# Patient Record
Sex: FEMALE | Race: WHITE | NOT HISPANIC OR LATINO | Employment: OTHER | URBAN - METROPOLITAN AREA
[De-identification: names, ages, dates, MRNs, and addresses within clinical notes are randomized per-mention and may not be internally consistent; named-entity substitution may affect disease eponyms.]

---

## 2018-05-21 ENCOUNTER — TRANSFERRED RECORDS (OUTPATIENT)
Dept: HEALTH INFORMATION MANAGEMENT | Facility: CLINIC | Age: 83
End: 2018-05-21

## 2018-05-23 ENCOUNTER — TRANSFERRED RECORDS (OUTPATIENT)
Dept: HEALTH INFORMATION MANAGEMENT | Facility: CLINIC | Age: 83
End: 2018-05-23

## 2018-05-23 LAB — EJECTION FRACTION: 84 %

## 2018-06-27 ENCOUNTER — FOLLOW UP ESTABLISHED (OUTPATIENT)
Dept: URBAN - METROPOLITAN AREA CLINIC 44 | Facility: CLINIC | Age: 83
End: 2018-06-27
Payer: MEDICARE

## 2018-06-27 PROCEDURE — 92004 COMPRE OPH EXAM NEW PT 1/>: CPT | Performed by: OPTOMETRIST

## 2018-06-27 PROCEDURE — 92250 FUNDUS PHOTOGRAPHY W/I&R: CPT | Performed by: OPTOMETRIST

## 2018-06-27 ASSESSMENT — VISUAL ACUITY
OD: 20/25
OS: 20/25

## 2018-06-27 ASSESSMENT — KERATOMETRY
OD: 42.88
OS: 41.88

## 2018-06-27 ASSESSMENT — INTRAOCULAR PRESSURE
OD: 11
OS: 10
OD: 12
OS: 12

## 2018-08-30 ENCOUNTER — FOLLOW UP ESTABLISHED (OUTPATIENT)
Dept: URBAN - METROPOLITAN AREA CLINIC 44 | Facility: CLINIC | Age: 83
End: 2018-08-30
Payer: MEDICARE

## 2018-08-30 PROCEDURE — 92012 INTRM OPH EXAM EST PATIENT: CPT | Performed by: OPTOMETRIST

## 2018-08-30 PROCEDURE — 92133 CPTRZD OPH DX IMG PST SGM ON: CPT | Performed by: OPTOMETRIST

## 2018-08-30 PROCEDURE — 92083 EXTENDED VISUAL FIELD XM: CPT | Performed by: OPTOMETRIST

## 2018-08-30 ASSESSMENT — INTRAOCULAR PRESSURE
OS: 19
OD: 14
OD: 10
OS: 10

## 2018-08-31 ENCOUNTER — TRANSFERRED RECORDS (OUTPATIENT)
Dept: HEALTH INFORMATION MANAGEMENT | Facility: CLINIC | Age: 83
End: 2018-08-31

## 2018-09-10 ENCOUNTER — FOLLOW UP ESTABLISHED (OUTPATIENT)
Dept: URBAN - METROPOLITAN AREA CLINIC 44 | Facility: CLINIC | Age: 83
End: 2018-09-10
Payer: MEDICARE

## 2018-09-10 DIAGNOSIS — H40.1133 PRIMARY OPEN-ANGLE GLAUCOMA, SEVERE STAGE, BILATERAL: Primary | ICD-10-CM

## 2018-09-10 PROCEDURE — 92014 COMPRE OPH EXAM EST PT 1/>: CPT | Performed by: OPHTHALMOLOGY

## 2018-09-10 ASSESSMENT — INTRAOCULAR PRESSURE
OS: 20
OD: 21

## 2018-10-01 ENCOUNTER — Encounter (OUTPATIENT)
Dept: URBAN - METROPOLITAN AREA CLINIC 44 | Facility: CLINIC | Age: 83
End: 2018-10-01
Payer: MEDICARE

## 2018-10-01 PROCEDURE — 65855 TRABECULOPLASTY LASER SURG: CPT | Performed by: OPHTHALMOLOGY

## 2018-10-15 ENCOUNTER — Encounter (OUTPATIENT)
Dept: URBAN - METROPOLITAN AREA CLINIC 44 | Facility: CLINIC | Age: 83
End: 2018-10-15
Payer: MEDICARE

## 2018-10-15 PROCEDURE — 65855 TRABECULOPLASTY LASER SURG: CPT | Performed by: OPHTHALMOLOGY

## 2018-11-23 ENCOUNTER — TRANSFERRED RECORDS (OUTPATIENT)
Dept: HEALTH INFORMATION MANAGEMENT | Facility: CLINIC | Age: 83
End: 2018-11-23

## 2018-11-23 LAB — EJECTION FRACTION: 69 %

## 2018-12-17 ENCOUNTER — FOLLOW UP ESTABLISHED (OUTPATIENT)
Dept: URBAN - METROPOLITAN AREA CLINIC 44 | Facility: CLINIC | Age: 83
End: 2018-12-17
Payer: MEDICARE

## 2018-12-17 PROCEDURE — 92012 INTRM OPH EXAM EST PATIENT: CPT | Performed by: OPHTHALMOLOGY

## 2018-12-17 ASSESSMENT — INTRAOCULAR PRESSURE
OD: 12
OS: 12

## 2019-01-03 ENCOUNTER — TRANSFERRED RECORDS (OUTPATIENT)
Dept: HEALTH INFORMATION MANAGEMENT | Facility: CLINIC | Age: 84
End: 2019-01-03

## 2019-01-22 ENCOUNTER — FOLLOW UP ESTABLISHED (OUTPATIENT)
Dept: URBAN - METROPOLITAN AREA CLINIC 44 | Facility: CLINIC | Age: 84
End: 2019-01-22
Payer: MEDICARE

## 2019-01-22 DIAGNOSIS — Z96.1 PRESENCE OF INTRAOCULAR LENS: ICD-10-CM

## 2019-01-22 DIAGNOSIS — H04.123 DRY EYE SYNDROME OF BILATERAL LACRIMAL GLANDS: ICD-10-CM

## 2019-01-22 DIAGNOSIS — H43.813 VITREOUS DEGENERATION, BILATERAL: ICD-10-CM

## 2019-01-22 PROCEDURE — 92134 CPTRZ OPH DX IMG PST SGM RTA: CPT | Performed by: OPTOMETRIST

## 2019-01-22 PROCEDURE — 92014 COMPRE OPH EXAM EST PT 1/>: CPT | Performed by: OPTOMETRIST

## 2019-01-22 RX ORDER — ENALAPRIL MALEATE 20 MG/1
20 MG TABLET ORAL
Qty: 0 | Refills: 0 | Status: INACTIVE
Start: 2019-01-22 | End: 2019-01-22

## 2019-01-22 RX ORDER — BRIMONIDINE TARTRATE 1 MG/ML
0.1 % SOLUTION/ DROPS OPHTHALMIC
Qty: 0 | Refills: 0 | Status: INACTIVE
Start: 2019-01-22 | End: 2019-02-07

## 2019-01-22 RX ORDER — DORZOLAMIDE HCL 20 MG/ML
2 % SOLUTION/ DROPS OPHTHALMIC
Qty: 3 | Refills: 3 | Status: INACTIVE
Start: 2019-01-22 | End: 2019-03-18

## 2019-01-22 ASSESSMENT — INTRAOCULAR PRESSURE
OD: 12
OS: 12

## 2019-01-22 ASSESSMENT — KERATOMETRY
OD: 43.38
OS: 42.25

## 2019-01-22 ASSESSMENT — VISUAL ACUITY
OS: 20/30
OD: 20/30

## 2019-04-22 ENCOUNTER — TRANSFERRED RECORDS (OUTPATIENT)
Dept: HEALTH INFORMATION MANAGEMENT | Facility: CLINIC | Age: 84
End: 2019-04-22

## 2019-04-26 ENCOUNTER — TRANSFERRED RECORDS (OUTPATIENT)
Dept: HEALTH INFORMATION MANAGEMENT | Facility: CLINIC | Age: 84
End: 2019-04-26

## 2019-05-06 ENCOUNTER — TRANSFERRED RECORDS (OUTPATIENT)
Dept: HEALTH INFORMATION MANAGEMENT | Facility: CLINIC | Age: 84
End: 2019-05-06

## 2019-06-03 ENCOUNTER — TRANSFERRED RECORDS (OUTPATIENT)
Dept: HEALTH INFORMATION MANAGEMENT | Facility: CLINIC | Age: 84
End: 2019-06-03

## 2019-06-03 ENCOUNTER — FOLLOW UP ESTABLISHED (OUTPATIENT)
Dept: URBAN - METROPOLITAN AREA CLINIC 44 | Facility: CLINIC | Age: 84
End: 2019-06-03
Payer: MEDICARE

## 2019-06-03 PROCEDURE — 92012 INTRM OPH EXAM EST PATIENT: CPT | Performed by: OPHTHALMOLOGY

## 2019-06-03 RX ORDER — DORZOLAMIDE HCL 20 MG/ML
2 % SOLUTION/ DROPS OPHTHALMIC
Qty: 3 | Refills: 3 | Status: INACTIVE
Start: 2019-06-03 | End: 2020-06-15

## 2019-06-03 RX ORDER — LATANOPROST 50 UG/ML
0.005 % SOLUTION OPHTHALMIC
Qty: 3 | Refills: 3 | Status: INACTIVE
Start: 2019-06-03 | End: 2019-06-05

## 2019-06-03 RX ORDER — BRIMONIDINE TARTRATE 1 MG/ML
0.1 % SOLUTION/ DROPS OPHTHALMIC
Qty: 3 | Refills: 3 | Status: INACTIVE
Start: 2019-06-03 | End: 2020-06-10

## 2019-06-03 ASSESSMENT — INTRAOCULAR PRESSURE
OD: 18
OS: 17

## 2019-06-13 ENCOUNTER — MEDICAL CORRESPONDENCE (OUTPATIENT)
Dept: HEALTH INFORMATION MANAGEMENT | Facility: CLINIC | Age: 84
End: 2019-06-13

## 2019-08-14 ENCOUNTER — OFFICE VISIT (OUTPATIENT)
Dept: FAMILY MEDICINE | Facility: CLINIC | Age: 84
End: 2019-08-14
Payer: COMMERCIAL

## 2019-08-14 VITALS
DIASTOLIC BLOOD PRESSURE: 60 MMHG | RESPIRATION RATE: 16 BRPM | SYSTOLIC BLOOD PRESSURE: 132 MMHG | HEART RATE: 59 BPM | BODY MASS INDEX: 26.06 KG/M2 | HEIGHT: 61 IN | WEIGHT: 138 LBS | TEMPERATURE: 97.8 F

## 2019-08-14 DIAGNOSIS — Z12.31 ENCOUNTER FOR SCREENING MAMMOGRAM FOR BREAST CANCER: ICD-10-CM

## 2019-08-14 DIAGNOSIS — I10 ESSENTIAL HYPERTENSION: Primary | ICD-10-CM

## 2019-08-14 DIAGNOSIS — F42.9 OBSESSIVE-COMPULSIVE DISORDER, UNSPECIFIED TYPE: ICD-10-CM

## 2019-08-14 DIAGNOSIS — H40.003 GLAUCOMA SUSPECT, BILATERAL: ICD-10-CM

## 2019-08-14 DIAGNOSIS — F41.9 ANXIETY: ICD-10-CM

## 2019-08-14 DIAGNOSIS — Z86.718 PERSONAL HISTORY OF DVT (DEEP VEIN THROMBOSIS): ICD-10-CM

## 2019-08-14 PROCEDURE — 99203 OFFICE O/P NEW LOW 30 MIN: CPT | Performed by: FAMILY MEDICINE

## 2019-08-14 RX ORDER — ENALAPRIL MALEATE 20 MG/1
TABLET ORAL 2 TIMES DAILY
COMMUNITY
Start: 2019-05-26 | End: 2019-09-04

## 2019-08-14 RX ORDER — HYDRALAZINE HYDROCHLORIDE 25 MG/1
TABLET, FILM COATED ORAL 3 TIMES DAILY
COMMUNITY
Start: 2019-06-04 | End: 2019-09-04

## 2019-08-14 RX ORDER — LATANOPROST 50 UG/ML
1 SOLUTION/ DROPS OPHTHALMIC AT BEDTIME
COMMUNITY
Start: 2019-08-06 | End: 2021-05-06

## 2019-08-14 RX ORDER — ALPRAZOLAM 0.5 MG
0.5 TABLET ORAL DAILY
Status: ON HOLD | COMMUNITY
Start: 2019-08-06 | End: 2021-03-09

## 2019-08-14 RX ORDER — CLOPIDOGREL BISULFATE 75 MG/1
75 TABLET ORAL DAILY
Qty: 90 TABLET | Refills: 1 | Status: SHIPPED | OUTPATIENT
Start: 2019-08-14 | End: 2019-12-05

## 2019-08-14 RX ORDER — SIMVASTATIN 10 MG
TABLET ORAL AT BEDTIME
COMMUNITY
Start: 2019-06-03 | End: 2019-12-05

## 2019-08-14 RX ORDER — OLANZAPINE 10 MG/1
10 TABLET ORAL AT BEDTIME
Status: ON HOLD | COMMUNITY
Start: 2019-06-08 | End: 2021-03-09

## 2019-08-14 RX ORDER — PAROXETINE 20 MG/1
20 TABLET, FILM COATED ORAL DAILY
Status: ON HOLD | COMMUNITY
Start: 2019-06-07 | End: 2021-03-09

## 2019-08-14 RX ORDER — CLOPIDOGREL BISULFATE 75 MG/1
TABLET ORAL DAILY
COMMUNITY
Start: 2019-04-16 | End: 2019-08-14

## 2019-08-14 ASSESSMENT — MIFFLIN-ST. JEOR: SCORE: 1028.34

## 2019-08-14 NOTE — PROGRESS NOTES
Subjective     Swathi Dukes is a 83 year old female who presents to clinic today for the following health issues:    HPI   Establish care    Swathi is a bit of a challenging historian.    She reports history of a left lower extremity DVT.  She is currently taking Plavix.  She was told she would need to be on Plavix the rest of her life.  When asked if she has a clotting disorder, she denies this.    She reports she has a history of depression, OCD, and panic attacks.  She is on Paxil, Zyprexa, and Xanax.  She is taking a half tab of Xanax every morning and occasionally the other half during the day for high panic times.    She reports a history of hypertension.  She reports she is taking enalapril and hydralazine.  I also see Norvasc on her list, although she is unsure if she is taking this currently.    Her son reports she has trouble with irritable bowel syndrome, diarrhea predominant, and some rectal muscle insufficiency.  For this reason, she has occasional stool leakage.  She can identify foods that tend to make this worse.  She does not plan to eliminate these foods.    She would like to continue breast cancer screening at this point.  She notes she would pursue treatment of breast cancer were to be identified.    She is unclear when her last bone density scan was.    She has previously declined the pneumonia vaccine as she feels she is at low risk for this.  She is unsure if she has had the previous shingles vaccine.      Patient Active Problem List   Diagnosis     Essential hypertension     Anxiety     Glaucoma suspect, bilateral     Personal history of DVT (deep vein thrombosis)     History reviewed. No pertinent surgical history.    Social History     Tobacco Use     Smoking status: Never Smoker     Smokeless tobacco: Never Used   Substance Use Topics     Alcohol use: Not Currently     History reviewed. No pertinent family history.      Reviewed and updated as needed this visit by Provider  Rodolfo   "Allergies  Meds  Problems  Med Hx  Surg Hx  Fam Hx         Review of Systems   ROS COMP: Constitutional, HEENT, cardiovascular, pulmonary, gi and gu systems are negative, except as otherwise noted.      Objective    /60   Pulse 59   Temp 97.8  F (36.6  C) (Oral)   Resp 16   Ht 1.549 m (5' 1\")   Wt 62.6 kg (138 lb)   Breastfeeding? No   BMI 26.07 kg/m    Body mass index is 26.07 kg/m .  Physical Exam   GENERAL: healthy, alert and no distress  EYES: Eyes grossly normal to inspection, PERRL and conjunctivae and sclerae normal  NECK: no adenopathy, no asymmetry, masses, or scars and thyroid normal to palpation  RESP: lungs clear to auscultation - no rales, rhonchi or wheezes  CV: regular rate and rhythm, normal S1 S2, no S3 or S4, no murmur, click or rub, no peripheral edema and peripheral pulses strong  SKIN: no suspicious lesions or rashes  NEURO: Normal strength and tone, mentation intact and speech normal  PSYCH: mentation appears normal, affect normal/bright    Diagnostic Test Results:  Labs reviewed in Epic        Assessment & Plan     1. Essential hypertension - in range on recheck, advised to continue current, although there is some question regarding which medications she is currently taking.  Will obtain her records from previous clinic to confirm medications.    2. Obsessive-compulsive disorder, unspecified type -she reports she takes Paxil and Zyprexa to help her manage both depression and OCD symptoms.  She feels these are well controlled and would like to continue these medications.    3. Anxiety -discussed concern regarding daily Xanax use.  Reviewed the potential dangers of using an addictive substance such as Xanax.  It does not seem like she is willing to eliminate Xanax from her regimen.  Advised that I do not prescribe Xanax and she should pursue an alternative avenue if she would like to continue this medication.    4. Glaucoma suspect, bilateral -gave referrals for a new " ophthalmologist in the area.    5. Personal history of DVT (deep vein thrombosis) -per her report, taking long-term Plavix for history of DVT.  Will need to obtain records to better evaluate of the need for persistent anticoagulation.  - clopidogrel (PLAVIX) 75 MG tablet; Take 1 tablet (75 mg) by mouth daily  Dispense: 90 tablet; Refill: 1    6. Encounter for screening mammogram for breast cancer  - MA Screening Digital Bilateral; Future     Return in about 3 months (around 11/14/2019) for Yearly Physical Exam.    Cortney Vanessa MD  [facility]

## 2019-08-14 NOTE — PATIENT INSTRUCTIONS
[facility]   [facility]    Check with insurance about Shingrix vaccine series    Low FODMAP diet

## 2019-08-27 ENCOUNTER — APPOINTMENT (OUTPATIENT)
Dept: CT IMAGING | Facility: CLINIC | Age: 84
End: 2019-08-27
Attending: EMERGENCY MEDICINE
Payer: COMMERCIAL

## 2019-08-27 ENCOUNTER — APPOINTMENT (OUTPATIENT)
Dept: GENERAL RADIOLOGY | Facility: CLINIC | Age: 84
End: 2019-08-27
Attending: EMERGENCY MEDICINE
Payer: COMMERCIAL

## 2019-08-27 ENCOUNTER — HOSPITAL ENCOUNTER (EMERGENCY)
Facility: CLINIC | Age: 84
Discharge: HOME | End: 2019-08-27
Attending: EMERGENCY MEDICINE | Admitting: EMERGENCY MEDICINE
Payer: COMMERCIAL

## 2019-08-27 VITALS
RESPIRATION RATE: 16 BRPM | HEART RATE: 62 BPM | SYSTOLIC BLOOD PRESSURE: 158 MMHG | WEIGHT: 138 LBS | BODY MASS INDEX: 26.07 KG/M2 | OXYGEN SATURATION: 96 % | TEMPERATURE: 97.8 F | DIASTOLIC BLOOD PRESSURE: 65 MMHG

## 2019-08-27 DIAGNOSIS — R07.89 ATYPICAL CHEST PAIN: ICD-10-CM

## 2019-08-27 DIAGNOSIS — R51.9 ACUTE NONINTRACTABLE HEADACHE, UNSPECIFIED HEADACHE TYPE: ICD-10-CM

## 2019-08-27 DIAGNOSIS — I10 ESSENTIAL HYPERTENSION: ICD-10-CM

## 2019-08-27 DIAGNOSIS — H53.8 BLURRED VISION: ICD-10-CM

## 2019-08-27 LAB
ANION GAP SERPL CALCULATED.3IONS-SCNC: 3 MMOL/L (ref 3–14)
BASOPHILS # BLD AUTO: 0.1 10E9/L (ref 0–0.2)
BASOPHILS NFR BLD AUTO: 1 %
BUN SERPL-MCNC: 19 MG/DL (ref 7–30)
CALCIUM SERPL-MCNC: 8.8 MG/DL (ref 8.5–10.1)
CHLORIDE SERPL-SCNC: 109 MMOL/L (ref 94–109)
CO2 SERPL-SCNC: 28 MMOL/L (ref 20–32)
CREAT SERPL-MCNC: 0.65 MG/DL (ref 0.52–1.04)
DIFFERENTIAL METHOD BLD: ABNORMAL
EOSINOPHIL # BLD AUTO: 0.2 10E9/L (ref 0–0.7)
EOSINOPHIL NFR BLD AUTO: 4.2 %
ERYTHROCYTE [DISTWIDTH] IN BLOOD BY AUTOMATED COUNT: 14.2 % (ref 10–15)
GFR SERPL CREATININE-BSD FRML MDRD: 83 ML/MIN/{1.73_M2}
GLUCOSE SERPL-MCNC: 129 MG/DL (ref 70–99)
HCT VFR BLD AUTO: 32.1 % (ref 35–47)
HGB BLD-MCNC: 10.4 G/DL (ref 11.7–15.7)
IMM GRANULOCYTES # BLD: 0 10E9/L (ref 0–0.4)
IMM GRANULOCYTES NFR BLD: 0.2 %
LYMPHOCYTES # BLD AUTO: 1.8 10E9/L (ref 0.8–5.3)
LYMPHOCYTES NFR BLD AUTO: 32.2 %
MCH RBC QN AUTO: 29.7 PG (ref 26.5–33)
MCHC RBC AUTO-ENTMCNC: 32.4 G/DL (ref 31.5–36.5)
MCV RBC AUTO: 92 FL (ref 78–100)
MONOCYTES # BLD AUTO: 0.7 10E9/L (ref 0–1.3)
MONOCYTES NFR BLD AUTO: 12.6 %
NEUTROPHILS # BLD AUTO: 2.9 10E9/L (ref 1.6–8.3)
NEUTROPHILS NFR BLD AUTO: 49.8 %
NRBC # BLD AUTO: 0 10*3/UL
NRBC BLD AUTO-RTO: 0 /100
PLATELET # BLD AUTO: 262 10E9/L (ref 150–450)
POTASSIUM SERPL-SCNC: 3.8 MMOL/L (ref 3.4–5.3)
RBC # BLD AUTO: 3.5 10E12/L (ref 3.8–5.2)
SODIUM SERPL-SCNC: 140 MMOL/L (ref 133–144)
TROPONIN I BLD-MCNC: 0 UG/L (ref 0–0.08)
TROPONIN I BLD-MCNC: 0.01 UG/L (ref 0–0.08)
TROPONIN I SERPL-MCNC: <0.015 UG/L (ref 0–0.04)
WBC # BLD AUTO: 5.7 10E9/L (ref 4–11)

## 2019-08-27 PROCEDURE — 80048 BASIC METABOLIC PNL TOTAL CA: CPT | Performed by: EMERGENCY MEDICINE

## 2019-08-27 PROCEDURE — 84484 ASSAY OF TROPONIN QUANT: CPT | Performed by: EMERGENCY MEDICINE

## 2019-08-27 PROCEDURE — 70450 CT HEAD/BRAIN W/O DYE: CPT

## 2019-08-27 PROCEDURE — 93005 ELECTROCARDIOGRAM TRACING: CPT

## 2019-08-27 PROCEDURE — 99285 EMERGENCY DEPT VISIT HI MDM: CPT | Mod: 25

## 2019-08-27 PROCEDURE — 71046 X-RAY EXAM CHEST 2 VIEWS: CPT

## 2019-08-27 PROCEDURE — 84484 ASSAY OF TROPONIN QUANT: CPT | Mod: 91

## 2019-08-27 PROCEDURE — 85025 COMPLETE CBC W/AUTO DIFF WBC: CPT | Performed by: EMERGENCY MEDICINE

## 2019-08-27 ASSESSMENT — ENCOUNTER SYMPTOMS
HEADACHES: 1
LIGHT-HEADEDNESS: 1
SHORTNESS OF BREATH: 0
WEAKNESS: 0

## 2019-08-27 NOTE — ED AVS SNAPSHOT
[facility] Emergency Department  [address]  [facility] [address]  Phone:  [phone]  Fax:  [phone]                                    Swathi Dukes   MRN: [id number]    Department:  [facility] Emergency Department   Date of Visit:  8/27/2019           After Visit Summary Signature Page    I have received my discharge instructions, and my questions have been answered. I have discussed any challenges I see with this plan with the nurse or doctor.    ..........................................................................................................................................  Patient/Patient Representative Signature      ..........................................................................................................................................  Patient Representative Print Name and Relationship to Patient    ..................................................               ................................................  Date                                   Time    ..........................................................................................................................................  Reviewed by Signature/Title    ...................................................              ..............................................  Date                                               Time          22EPIC Rev 08/18

## 2019-08-28 ENCOUNTER — NURSE TRIAGE (OUTPATIENT)
Dept: FAMILY MEDICINE | Facility: CLINIC | Age: 84
End: 2019-08-28

## 2019-08-28 LAB — INTERPRETATION ECG - MUSE: NORMAL

## 2019-08-28 NOTE — ED PROVIDER NOTES
History   Chief Complaint:  Hypertension & Headache    HPI   Swathi Dukes is an anticoagulated 83 year old female with a history of hypertension and DVT who presents with hypertension and a headache. The patient reports that yesterday she developed intermittent blurry vision bilaterally. At 1100 this morning she developed an intermittent headache and has continued to have blurry vision. She took a Tylenol at that time and the headache improved. The patient states that she took her hydralazine at 1800 this evening and took her blood pressure about one hour later at which point it was 209/80. It was checked again by staff at the patient's [facility] facility at 1900 and was 223/78, prompting her to come to the ED. In the ED, she also endorses lightheadedness and 3-4 episodes of sharp chest pain lasting about one second since 1700 this evening. The patient's son reports that the patient is supposed to take three hydralazine per day but has only been taking it one time per day. She did have a hypertensive crisis four months ago for which she was admitted in [place]. She was started on the hydralazine at that time. The son also adds the patient had several possible near-syncopal episodes last week. The patient does not typically get headaches. She denies shortness of breath, one-sided weakness, vision loss, diplopia, and syncope.    Allergies:  Aspirin  Iodine contrast     Medications:    Xanax  Plavix  Enalapril  Hydralazine  Zyprexa  Paxil   Zocor    Past Medical History:    Essential hypertension  Anxiety  Glaucoma suspect, bilateral  DVT    Past Surgical History:    History reviewed. No pertinent surgical history.    Family History:    History reviewed. No pertinent family history.     Social History:  Smoking status: Never smoker  Alcohol use: Not currently  Marital Status:   [5]     Review of Systems   Eyes: Positive for visual disturbance (positive for intermittent blurry vision bilaterally, negative  for vision loss and diplopia).   Respiratory: Negative for shortness of breath.    Cardiovascular: Positive for chest pain (intermittent).   Neurological: Positive for light-headedness and headaches (intermittent). Negative for syncope and weakness (one-sided).   All other systems reviewed and are negative.        Physical Exam     Patient Vitals for the past 24 hrs:   BP Temp Temp src Pulse Heart Rate Resp SpO2   08/27/19 2100 (!) 165/66 -- -- -- -- 16 96 %   08/27/19 2045 (!) 190/77 -- -- -- -- -- 97 %   08/27/19 2030 (!) 196/100 97.8  F (36.6  C) Oral 84 84 18 98 %     Physical Exam  General: Elderly female sitting upright  Eyes: PERRL, Conjunctive within normal limits. EOMI.  ENT: Moist mucous membranes, oropharynx clear.   CV: Normal S1S2, no murmur, rub or gallop. Regular rate and rhythm  Resp: Clear to auscultation bilaterally, no wheezes, rales or rhonchi. Normal respiratory effort.  GI: Abdomen is soft, nontender and nondistended. No palpable masses. No rebound or guarding.  MSK: No edema. Nontender. Normal active range of motion.   Skin: Warm and dry. No rashes or lesions or ecchymoses on visible skin.  Neuro: Alert and oriented. Responds appropriately to all questions and commands. No facial asymmetry. Speech is normal. Sensation and strength grossly intact. No focal findings appreciated. Normal muscle tone.  Psych: Normal mood and affect. Pleasant.    Emergency Department Course   ECG (20:54:17):  Rate 64 bpm. WV interval 216. QRS duration 82. QT/QTc 430/443. P-R-T axes 47 -25 77. Sinus rhythm with 1st degree AV block. Voltage criteria for left ventricular hypertrophy/ Interpreted at 2055 by Keyona Cabrera MD.    Imaging:  Radiographic findings were communicated with the patient who voiced understanding of the findings.    Head CT w/o Contrast:  1.  No CT finding of mass, infarct or hemorrhage.  2.  Age-related changes.  As read by Radiology.    Chest XR, PA & LAT:  The cardiac silhouette is  enlarged. Small focus of scarring in the right lateral midlung. The right lung is otherwise clear. There is retrocardiac airspace opacification which could reflect atelectasis or infiltrate. There is also focal   opacification overlying the lower thoracic spine on the lateral image, which may reflect left atrial dilatation, aortic ectasia or hiatal hernia. No visible pneumothorax or pleural effusion. Degenerative disc change throughout the thoracic and visualized   upper lumbar spine. Lower cervical facet arthropathy.  As read by Radiology.     Laboratory:  CBC: HGB 10.4 (L) o/w WNL (WBC 5.7, )  BMP: Glucose 129 (H) o/w WNL (Creatinine 0.65)     2107: Troponin: <0.015  2110: Troponin: 0.00  2310: Troponin: 0.01    Emergency Department Course:  Past medical records, nursing notes, and vitals reviewed.   2039: I performed an exam of the patient and obtained history, as documented above.    IV inserted and blood drawn. The patient was sent for a head CT and chest x-ray while in the emergency department, findings above. EKG obtained, results above.    2200: I rechecked the patient. Explained findings to the patient. Patient states that she is currently not having any symptoms. She has not had recurrence of the chest pain.     I rechecked the patient. Plan explained to the patient. Patient discharged home with instructions regarding supportive care, medications, and reasons to return. The importance of close follow-up was reviewed.     Impression & Plan    Medical Decision Making:  Swathi Dukes is a 83 year old female with a history of hypertension who, of her own accord, has been not taking her hydralazine as-prescribed who presents to the emergency department with elevated blood pressure as well as intermittent mild headaches over the day and occasional seconds of sharp, left-sided chest pain since 1700 this evening. Here in the emergency department, she was noting mild blurry vision bilaterally but no other  symptoms. She did not have recurrence of her chest pain here. This would be atypical for ACS by those symptoms. She had no signs of acute ischemia on EKG. Laboratory evaluation was remarkable for mild anemia but no other worrisome findings. She had a CT head given these new headaches for her although they were not severe, which did not show signs of intracranial hemorrhage, mass, or infarct. She also had a chest x-ray that did not show any acute pathology. Suspicion for pulmonary embolism is extremely low based on this evaluation and no ongoing chest pain with the description of pain earlier, therefore d-dimer was not checked and CT scan seems unlikely to be helpful. Her blood pressure improved over time here in the emergency department. She had taken a dose of hydralazine prior to evaluation here. She denied any symptoms on reassessment including her vision. Serial troponin will be checked and is pending on this dictation. If normal, she will be discharged home with recommendation to follow up with her PCP within three days. She was recommended to continue with her normal medications including taking the hydralazine as prescribed by an earlier provider. Recommended to return immediately for any worsening symptoms. All questions answered prior to discharge.    Diagnosis:    ICD-10-CM   1. Essential hypertension I10   2. Blurred vision H53.8   3. Acute nonintractable headache, unspecified headache type R51   4. Atypical chest pain R07.89       Disposition:  Discharged to home with her son.        Sha Bridges  8/27/2019   [facility] EMERGENCY DEPARTMENT  I, Sha Bridges, am serving as a scribe at 8:39 PM on 8/27/2019 to document services personally performed by Keyona Cabrera MD based on my observations and the provider's statements to me.        Keyona Cabrera MD  08/28/19 7250

## 2019-08-28 NOTE — ED TRIAGE NOTES
Patient had her BP checked at 7pm and it was 223/78. Intermittent headache today. Taking tylenol which did help with headache, last dose at 3pm. Patient stated that she suppse to take hydralazine 3 times a day, but only takes it once a day.ABCs intact.

## 2019-08-28 NOTE — TELEPHONE ENCOUNTER
Spoke with the pt. She reports new onset, in the last 24 hours moderate to severe weakness. She is having a hard time walking and moving around. She has been checking her BP at home, was 141/57 at noon, 152/57 at 2:00pm. Denies any dizziness or lightheadedness. Denies any chest pain.     Advised ER, due to new onset weakness that is moderate/severe.     Per request of Swathi, I called her son Brent and Left detailed message informing him of my recommendations. Advised callback if needed.     Brent is her transportation. 911 if weakness is severe.     Yesika Gil RN -- [place]           Reason for Disposition    [1] MODERATE weakness (i.e., interferes with work, school, normal activities) AND [2] cause unknown  (Exceptions: weakness with acute minor illness, or weakness from poor fluid intake)    Additional Information    Negative: Severe difficulty breathing (e.g., struggling for each breath, speaks in single words)    Negative: Shock suspected (e.g., cold/pale/clammy skin, too weak to stand, low BP, rapid pulse)    Negative: Difficult to awaken or acting confused (e.g., disoriented, slurred speech)    Negative: [1] Fainted > 15 minutes ago AND [2] still feels too weak or dizzy to stand    Negative: [1] SEVERE weakness (i.e., unable to walk or barely able to walk, requires support) AND     [2] new onset or worsening    Negative: Sounds like a life-threatening emergency to the triager    Negative: Weakness of the face, arm or leg on one side of the body    Negative: [1] Has diabetes (diabetes mellitus) AND [2] weakness from low blood sugar (i.e., < 60 mg/dl or 3.5 mmol/l)    Negative: Heat exhaustion suspected (i.e., dehydration from heat exposure)    Negative: Vomiting is main symptom    Negative: Diarrhea is main symptom    Negative: Difficulty breathing    Negative: Heart beating < 50 beats per minute OR > 140 beats per minute    Negative: Extra heart beats OR irregular heart beating   (i.e.,  "\"palpitations\")    Negative: Follows bleeding (e.g., from vomiting, rectum, vagina; EXCEPTION: small brief weakness from sight of a small amount blood)    Negative: Black or tarry bowel movements    Negative: [1] Drinking very little AND [2] dehydration suspected (e.g., no urine > 12 hours, very dry mouth, very lightheaded)    Negative: Patient sounds very sick or weak to the triager    Answer Assessment - Initial Assessment Questions  1. DESCRIPTION: \"Describe how you are feeling.\"      Just feeling very weak all over  2. SEVERITY: \"How bad is it?\"  \"Can you stand and walk?\"    - MILD - Feels weak or tired, but does not interfere with work, school or normal activities    - MODERATE - Able to stand and walk; weakness interferes with work, school, or normal activities    - SEVERE - Unable to stand or walk      Moderate to severe. Having a hard time walking.   3. ONSET:  \"When did the weakness begin?\"      In the last 24 hours, after ER discharge.   4. CAUSE: \"What do you think is causing the weakness?\"      Unknown. Maybe low blood pressure.   5. MEDICINES: \"Have you recently started a new medicine or had a change in the amount of a medicine?\"      Hydralazine was resumed taking TID, was taking only once daily.   6. OTHER SYMPTOMS: \"Do you have any other symptoms?\" (e.g., chest pain, fever, cough, SOB, vomiting, diarrhea, bleeding, other areas of pain)      Just significant weakness.   7. PREGNANCY: \"Is there any chance you are pregnant?\" \"When was your last menstrual period?\"      No    Protocols used: WEAKNESS (GENERALIZED) AND FATIGUE-A-AH    "

## 2019-08-28 NOTE — DISCHARGE INSTRUCTIONS
Discharge Instructions  Hypertension - High Blood Pressure    During you visit to the Emergency Department, your blood pressure was higher than the recommended blood pressure.  This may be related to stress, pain, medication or other temporary conditions. In these cases, your blood pressure may return to normal on its own. If you have a history of high blood pressure, you may need to have your provider adjust your medications. Sometimes, your high measurement here may indicate that you have developed high blood pressure that will stay high unless it is treated. As a general rule, high blood pressure causes problems over years rather than days, weeks, or months. So, while it is important to treat blood pressure, it is rarely important to treat blood pressure immediately. Occasionally we will begin a medication in the Emergency Department; more often we will recommend close follow-up for medications with a primary doctor/clinic.    Generally, every Emergency Department visit should have a follow-up clinic visit with either a primary or a specialty clinic/provider. Please follow-up as instructed by your emergency provider today.    Return to the Emergency Department if you start to have:  A severe headache.  Chest pain.  Shortness of breath.  Weakness or numbness that affects one part of the body.  Confusion.  Vision changes.  Significant swelling of legs and/or eyes.  A reaction to any medication started in the Emergency Department.    What can I do to help myself?  Avoid alcohol.  Take any blood pressure medicine that you are prescribed.  Get a good night s sleep.  Lower your salt intake.  Exercise.  Lose weight.  Manage stress.  See your doctor regularly    If blood pressure medication was started in the Emergency Department:  The medicine may not have an immediate effect. The body and brain determine what blood pressure you have. The medicine s job is to retrain the body s  thermostat  to a lower blood  pressure.  You will need to follow up with your provider to see how this medicine is working for you.  If you were given a prescription for medicine here today, be sure to read all of the information (including the package insert) that comes with your prescription.  This will include important information about the medicine, its side effects, and any warnings that you need to know about.  The pharmacist who fills the prescription can provide more information and answer questions you may have about the medicine.  If you have questions or concerns that the pharmacist cannot address, please call or return to the Emergency Department.   Remember that you can always come back to the Emergency Department if you are not able to see your regular provider in the amount of time listed above, if you get any new symptoms, or if there is anything that worries you.    Discharge Instructions  Headache    You were seen today for a headache. Headaches may be caused by many different things such as muscle tension, sinus inflammation, anxiety and stress, having too little sleep, too much alcohol, some medical conditions or injury. You may have a migraine, which is caused by changes in the blood vessels in your head.  At this time your provider does not find that your headache is a sign of anything dangerous or life-threatening.  However, sometimes the signs of serious illness do not show up right away.      Generally, every Emergency Department visit should have a follow-up clinic visit with either a primary or a specialty clinic/provider. Please follow-up as instructed by your emergency provider today.    Return to the Emergency Department if:  You get a new fever of 100.4 F or higher.  Your headache gets much worse.  You get a stiff neck with your headache.  You get a new headache that is significantly different or worse than headaches you have had before.  You are vomiting (throwing up) and cannot keep food or water down.  You have  blurry or double vision or other problems with your eyes.  You have a new weakness on one side of your body.  You have difficulty with balance which is new.  You or your family thinks you are confused.  You have a seizure.    What can I do to help myself?  Pain medications - You may take a pain medication such as Tylenol  (acetaminophen), Advil , Motrin  (ibuprofen) or Aleve  (naproxen).  Take a pain reliever as soon as you notice symptoms.  Starting medications as soon as you start to have symptoms may lessen the amount of pain you have.  Relaxing in a quiet, dark room may help.  Get enough sleep and eat meals regularly.  You may need to watch for certain foods or other things which may trigger your headaches.  Keeping a journal of your headaches and possible triggers may help you and your primary provider to identify things which you should avoid which may be causing your headaches.  If you were given a prescription for medicine here today, be sure to read all of the information (including the package insert) that comes with your prescription.  This will include important information about the medicine, its side effects, and any warnings that you need to know about.  The pharmacist who fills the prescription can provide more information and answer questions you may have about the medicine.  If you have questions or concerns that the pharmacist cannot address, please call or return to the Emergency Department.   Remember that you can always come back to the Emergency Department if you are not able to see your regular provider in the amount of time listed above, if you get any new symptoms, or if there is anything that worries you.

## 2019-09-04 ENCOUNTER — OFFICE VISIT (OUTPATIENT)
Dept: FAMILY MEDICINE | Facility: CLINIC | Age: 84
End: 2019-09-04
Payer: COMMERCIAL

## 2019-09-04 VITALS
DIASTOLIC BLOOD PRESSURE: 61 MMHG | HEIGHT: 61 IN | BODY MASS INDEX: 26.06 KG/M2 | HEART RATE: 65 BPM | RESPIRATION RATE: 16 BRPM | TEMPERATURE: 97.6 F | SYSTOLIC BLOOD PRESSURE: 147 MMHG | WEIGHT: 138 LBS

## 2019-09-04 DIAGNOSIS — I10 ESSENTIAL HYPERTENSION: Primary | ICD-10-CM

## 2019-09-04 PROCEDURE — 99213 OFFICE O/P EST LOW 20 MIN: CPT | Performed by: FAMILY MEDICINE

## 2019-09-04 RX ORDER — HYDRALAZINE HYDROCHLORIDE 25 MG/1
25 TABLET, FILM COATED ORAL 3 TIMES DAILY
Qty: 270 TABLET | Refills: 3 | Status: SHIPPED | OUTPATIENT
Start: 2019-09-04 | End: 2019-12-05

## 2019-09-04 RX ORDER — ENALAPRIL MALEATE 20 MG/1
20 TABLET ORAL 2 TIMES DAILY
Qty: 180 TABLET | Refills: 3 | Status: SHIPPED | OUTPATIENT
Start: 2019-09-04 | End: 2019-12-05

## 2019-09-04 ASSESSMENT — MIFFLIN-ST. JEOR: SCORE: 1028.34

## 2019-09-04 NOTE — PROGRESS NOTES
Subjective     Swathi Dukes is a 83 year old female who presents to clinic today for the following health issues:    HPI   Hypertension Follow-up      Do you check your blood pressure regularly outside of the clinic? Yes     Are you following a low salt diet? No    Are your blood pressures ever more than 140 on the top number (systolic) OR more   than 90 on the bottom number (diastolic), for example 140/90? Yes      How many servings of fruits and vegetables do you eat daily?  2-3    On average, how many sweetened beverages do you drink each day (soda, juice, sweet tea, etc)?   3    How many days per week do you miss taking your medication? 0      Was in the emergency room last week with hypertensive urgency.  She reports she was taking her enalapril as prescribed, 20 mg twice daily.  She was only taking once daily hydralazine however. Does not report medication side effect. Thought she only needed to take hydralazine once daily.     She is now back to taking twice daily enalapril as prescribed and 3 times daily hydralazine as prescribed.  She reports her home blood pressures are looking better. She is feeling well. No longer having headaches, blurry vision, or lightheadedness.       Patient Active Problem List   Diagnosis     Essential hypertension     Anxiety     Glaucoma suspect, bilateral     Personal history of DVT (deep vein thrombosis)     History reviewed. No pertinent surgical history.    Social History     Tobacco Use     Smoking status: Never Smoker     Smokeless tobacco: Never Used   Substance Use Topics     Alcohol use: Not Currently     History reviewed. No pertinent family history.      Reviewed and updated as needed this visit by Provider  Tobacco  Allergies  Meds  Problems  Med Hx  Surg Hx  Fam Hx         Review of Systems   ROS COMP: Constitutional, HEENT, cardiovascular, pulmonary, gi and gu systems are negative, except as otherwise noted.      Objective    BP (!) 147/61 (BP Location: Left  "arm, Patient Position: Chair, Cuff Size: Adult Small)   Pulse 65   Temp 97.6  F (36.4  C) (Oral)   Resp 16   Ht 1.549 m (5' 1\")   Wt 62.6 kg (138 lb)   Breastfeeding? No   BMI 26.07 kg/m    Body mass index is 26.07 kg/m .  Physical Exam   GENERAL: healthy, alert and no distress  RESP: lungs clear to auscultation - no rales, rhonchi or wheezes  CV: regular rate and rhythm, normal S1 S2, no S3 or S4, no murmur, click or rub, no peripheral edema and peripheral pulses strong  PSYCH: mentation appears normal, affect normal/bright    Diagnostic Test Results:  Labs reviewed in Epic        Assessment & Plan     1. Essential hypertension - in range today, taking meds as prescribed. Reviewed importance of taking medications as prescribed and why this is important.     Return in about 3 months (around 12/4/2019) for Yearly Physical Exam.    Cortney Vanessa MD  [facility]        "

## 2019-10-02 ENCOUNTER — TRANSFERRED RECORDS (OUTPATIENT)
Dept: HEALTH INFORMATION MANAGEMENT | Facility: CLINIC | Age: 84
End: 2019-10-02

## 2019-12-05 ENCOUNTER — OFFICE VISIT (OUTPATIENT)
Dept: FAMILY MEDICINE | Facility: CLINIC | Age: 84
End: 2019-12-05
Payer: COMMERCIAL

## 2019-12-05 ENCOUNTER — NURSE TRIAGE (OUTPATIENT)
Dept: FAMILY MEDICINE | Facility: CLINIC | Age: 84
End: 2019-12-05

## 2019-12-05 VITALS
HEART RATE: 60 BPM | BODY MASS INDEX: 26.45 KG/M2 | DIASTOLIC BLOOD PRESSURE: 58 MMHG | SYSTOLIC BLOOD PRESSURE: 134 MMHG | OXYGEN SATURATION: 95 % | TEMPERATURE: 98.3 F | WEIGHT: 140 LBS

## 2019-12-05 DIAGNOSIS — I35.1 NONRHEUMATIC AORTIC VALVE INSUFFICIENCY: ICD-10-CM

## 2019-12-05 DIAGNOSIS — M16.11 PRIMARY OSTEOARTHRITIS OF RIGHT HIP: ICD-10-CM

## 2019-12-05 DIAGNOSIS — Z23 NEED FOR PROPHYLACTIC VACCINATION AND INOCULATION AGAINST INFLUENZA: ICD-10-CM

## 2019-12-05 DIAGNOSIS — R29.898 PELVIC GIRDLE WEAKNESS: ICD-10-CM

## 2019-12-05 DIAGNOSIS — Z13.1 SCREENING FOR DIABETES MELLITUS: ICD-10-CM

## 2019-12-05 DIAGNOSIS — R73.09 OTHER ABNORMAL GLUCOSE: ICD-10-CM

## 2019-12-05 DIAGNOSIS — Z00.00 ENCOUNTER FOR MEDICARE ANNUAL WELLNESS EXAM: Primary | ICD-10-CM

## 2019-12-05 DIAGNOSIS — L57.0 AK (ACTINIC KERATOSIS): ICD-10-CM

## 2019-12-05 DIAGNOSIS — F42.2 MIXED OBSESSIONAL THOUGHTS AND ACTS: ICD-10-CM

## 2019-12-05 DIAGNOSIS — Z86.718 PERSONAL HISTORY OF DVT (DEEP VEIN THROMBOSIS): ICD-10-CM

## 2019-12-05 DIAGNOSIS — E78.2 MIXED HYPERLIPIDEMIA: ICD-10-CM

## 2019-12-05 DIAGNOSIS — I10 ESSENTIAL HYPERTENSION: ICD-10-CM

## 2019-12-05 DIAGNOSIS — M47.26 OSTEOARTHRITIS OF SPINE WITH RADICULOPATHY, LUMBAR REGION: ICD-10-CM

## 2019-12-05 DIAGNOSIS — M81.0 AGE-RELATED OSTEOPOROSIS WITHOUT CURRENT PATHOLOGICAL FRACTURE: ICD-10-CM

## 2019-12-05 PROCEDURE — 17000 DESTRUCT PREMALG LESION: CPT | Performed by: FAMILY MEDICINE

## 2019-12-05 PROCEDURE — G0008 ADMIN INFLUENZA VIRUS VAC: HCPCS | Performed by: FAMILY MEDICINE

## 2019-12-05 PROCEDURE — 99397 PER PM REEVAL EST PAT 65+ YR: CPT | Mod: 25 | Performed by: FAMILY MEDICINE

## 2019-12-05 PROCEDURE — 99213 OFFICE O/P EST LOW 20 MIN: CPT | Mod: 25 | Performed by: FAMILY MEDICINE

## 2019-12-05 PROCEDURE — 90662 IIV NO PRSV INCREASED AG IM: CPT | Performed by: FAMILY MEDICINE

## 2019-12-05 RX ORDER — HYDRALAZINE HYDROCHLORIDE 25 MG/1
25 TABLET, FILM COATED ORAL 3 TIMES DAILY
Qty: 270 TABLET | Refills: 3 | Status: SHIPPED | OUTPATIENT
Start: 2019-12-05 | End: 2020-12-16

## 2019-12-05 RX ORDER — CLOPIDOGREL BISULFATE 75 MG/1
75 TABLET ORAL DAILY
Qty: 90 TABLET | Refills: 1 | Status: SHIPPED | OUTPATIENT
Start: 2019-12-05 | End: 2019-12-05

## 2019-12-05 RX ORDER — ENALAPRIL MALEATE 20 MG/1
20 TABLET ORAL 2 TIMES DAILY
Qty: 180 TABLET | Refills: 3 | Status: SHIPPED | OUTPATIENT
Start: 2019-12-05 | End: 2020-09-15

## 2019-12-05 RX ORDER — SIMVASTATIN 10 MG
10 TABLET ORAL AT BEDTIME
Qty: 90 TABLET | Refills: 3 | Status: SHIPPED | OUTPATIENT
Start: 2019-12-05 | End: 2020-09-22

## 2019-12-05 RX ORDER — CLOPIDOGREL BISULFATE 75 MG/1
75 TABLET ORAL DAILY
Qty: 90 TABLET | Refills: 3 | Status: SHIPPED | OUTPATIENT
Start: 2019-12-05 | End: 2020-02-27

## 2019-12-05 ASSESSMENT — ENCOUNTER SYMPTOMS
JOINT SWELLING: 1
HEMATURIA: 0
MYALGIAS: 0
CONSTIPATION: 0
ABDOMINAL PAIN: 0
DYSURIA: 0
SORE THROAT: 0
SHORTNESS OF BREATH: 1
HEADACHES: 0
HEARTBURN: 0
FREQUENCY: 1
CHILLS: 0
ARTHRALGIAS: 1
FEVER: 0
HEMATOCHEZIA: 0
NAUSEA: 0
COUGH: 0
NERVOUS/ANXIOUS: 0
PALPITATIONS: 0
DIARRHEA: 1
WEAKNESS: 0
EYE PAIN: 0
BREAST MASS: 0
PARESTHESIAS: 0
DIZZINESS: 1

## 2019-12-05 ASSESSMENT — ACTIVITIES OF DAILY LIVING (ADL): CURRENT_FUNCTION: NO ASSISTANCE NEEDED

## 2019-12-05 ASSESSMENT — PATIENT HEALTH QUESTIONNAIRE - PHQ9
SUM OF ALL RESPONSES TO PHQ QUESTIONS 1-9: 2
SUM OF ALL RESPONSES TO PHQ QUESTIONS 1-9: 2
10. IF YOU CHECKED OFF ANY PROBLEMS, HOW DIFFICULT HAVE THESE PROBLEMS MADE IT FOR YOU TO DO YOUR WORK, TAKE CARE OF THINGS AT HOME, OR GET ALONG WITH OTHER PEOPLE: NOT DIFFICULT AT ALL

## 2019-12-05 NOTE — PATIENT INSTRUCTIONS
1. Actinic keratosis on nose frozen today - will blister, normal self care for a blister    2. Physical therapy for low back and leg pain - call to schedule, number in this paperwork    3. Schedule consultation with Cardiologist - call to schedule, number in this paperwork    4. No changes to BP medications    5. Continue plavix and simvastatin    6. If pain from recent fall on the buttocks persists, return to clinic for imaging.     7. Hand swelling is likely dependent edema, no need for concern     8. Flu shot given today    9. Call insurance about Shingrix vaccine series - see what coverage looks like    10. Consider Tdap vaccine for next visit        Patient Education   Personalized Prevention Plan  You are due for the preventive services outlined below.  Your care team is available to assist you in scheduling these services.  If you have already completed any of these items, please share that information with your care team to update in your medical record.  Health Maintenance Due   Topic Date Due     Discuss Advance Care Planning  1935     Diptheria Tetanus Pertussis (DTAP/TDAP/TD) Vaccine (1 - Tdap) 11/19/1948     Zoster (Shingles) Vaccine (1 of 2) 11/19/1987     Annual Wellness Visit  11/19/2002     FALL RISK ASSESSMENT  11/19/2002     Pneumococcal Vaccine (1 of 2 - PCV13) 11/19/2002     PHQ-2  01/01/2019     Flu Vaccine (1) 09/01/2019        At your visit today, we discussed your risk for falls and preventive options.    Fall Prevention  Falls often occur due to slipping, tripping or losing your balance. Millions of people fall every year and injure themselves. Here are ways to reduce your risk of falling again.    Think about your fall, was there anything that caused your fall that can be fixed, removed, or replaced?    Make your home safe by keeping walkways clear of objects you may trip over, such as electric cords.    Use non-slip pads under rugs. Don't use area rugs or small throw rugs.    Use  non-slip mats in bathtubs and showers.    Install handrails and lights on staircases. The handrails should be on both sides of the stairs.    Don't walk in poorly lit areas.    Don't stand on chairs or wobbly ladders.    Use caution when reaching overhead or looking upward. This position can cause a loss of balance.    Be sure your shoes fit properly, have non-slip bottoms and are in good condition.     Wear shoes both inside and out. Don't go barefoot or wear slippers.    Be cautious when going up and down stairs, curbs, and when walking on uneven sidewalks.    If your balance is poor, consider using a cane or walker.    If your fall was related to alcohol use, stop or limit alcohol intake.     If your fall was related to use of sleeping medicines, talk to your healthcare provider about this. You may need to reduce your dosage at bedtime if you awaken during the night to go to the bathroom.      To reduce the need for nighttime bathroom trips:  ? Don't drink fluids for several hours before going to bed  ? Empty your bladder before going to bed  ? Men can keep a urinal at the bedside    Stay as active as you can. Balance, flexibility, strength, and endurance all come from exercise. They all play a role in preventing falls. Ask your healthcare provider which types of activity are right for you.    Get your vision checked on a regular basis.    If you have pets, know where they are before you stand up or walk so you don't trip over them.    Use night lights.    Go over all your medicines with a pharmacist or other healthcare provider to see if any of them could make you more likely to fall.  Date Last Reviewed: 4/1/2018 2000-2018 [email or web address]. [address] [place] [address], [place] [address]. All rights reserved. This information is not intended as a substitute for professional medical care. Always follow your healthcare professional's instructions.

## 2019-12-05 NOTE — TELEPHONE ENCOUNTER
"    Additional Information    SEVERE dizziness (e.g., unable to stand, requires support to walk, feels like passing out now)    Answer Assessment - Initial Assessment Questions  1. DESCRIPTION: \"Describe your dizziness.\"     Wavy feeling  2. LIGHTHEADED: \"Do you feel lightheaded?\" (e.g., somewhat faint, woozy, weak upon standing)      \"liek a hard headache\" lightheaded  3. VERTIGO: \"Do you feel like either you or the room is spinning or tilting?\" (i.e. vertigo)      no  4. SEVERITY: \"How bad is it?\"  \"Do you feel like you are going to faint?\" \"Can you stand and walk?\"    - MILD - walking normally    - MODERATE - interferes with normal activities (e.g., work, school)     - SEVERE - unable to stand, requires support to walk, feels like passing out now.       Unable to really answer but rates it at a 5/10  5. ONSET:  \"When did the dizziness begin?\"      Started yesterday  6. AGGRAVATING FACTORS: \"Does anything make it worse?\" (e.g., standing, change in head position)      none  7. HEART RATE: \"Can you tell me your heart rate?\" \"How many beats in 15 seconds?\"  (Note: not all patients can do this)       No but no heart racing feeling  8. CAUSE: \"What do you think is causing the dizziness?\"      unsure  9. RECURRENT SYMPTOM: \"Have you had dizziness before?\" If so, ask: \"When was the last time?\" \"What happened that time?\"      Had this about 10 years ago when she had a clot in her leg  10. OTHER SYMPTOMS: \"Do you have any other symptoms?\" (e.g., fever, chest pain, vomiting, diarrhea, bleeding)        no  11. PREGNANCY: \"Is there any chance you are pregnant?\" \"When was your last menstrual period?\"        n/a    Protocols used: DIZZINESS-A-OH      "

## 2019-12-05 NOTE — PROGRESS NOTES
"SUBJECTIVE:   Swathi Dukes is a 84 year old female who presents for Preventive Visit.      Pain in the low back and posterior thighs upon rising from a chair if she's been seated for a while. Most of the time, pain resolves as she gets moving. At times, pain persists. Known history of lumbar OA with radiculopathy, diagnosed through her previous provider. No loss of B/B. No numbness or weakness down the legs.     Spot on the right side of the nose, present for 2 years, scaly patch overlying.     History of aortic insufficiency, was advised to establish care with a cardiologist here in [place].     History of osteopenia based on last DEXA earlier this year. Getting some weight bearing exercise. No calcium or vitamin D supplementation.     Taking hydralazine and enalapril for management of HTN. No side effects, not checking BPs.     Fall onto her buttocks on carpeted stairs a week ago. Immediate pain, but getting better over the past week. Bruising, on blood thinners. No trouble with sleeping. Some trouble with sitting and then rising.       Are you in the first 12 months of your Medicare coverage?  No    Healthy Habits:     In general, how would you rate your overall health?  Good    Frequency of exercise:  6-7 days/week    Duration of exercise:  Less than 15 minutes    Do you usually eat at least 4 servings of fruit and vegetables a day, include whole grains    & fiber and avoid regularly eating high fat or \"junk\" foods?  Yes    Taking medications regularly:  Yes    Medication side effects:  None    Ability to successfully perform activities of daily living:  No assistance needed    Home Safety:  No safety concerns identified    Hearing Impairment:  No hearing concerns    In the past 6 months, have you been bothered by leaking of urine?  No    In general, how would you rate your overall mental or emotional health?  Good      PHQ-2 Total Score: 3    Additional concerns today:  Yes    Do you feel safe in your environment? " Yes    Have you ever done Advance Care Planning? (For example, a Health Directive, POLST, or a discussion with a medical provider or your loved ones about your wishes): Yes, patient states has an Advance Care Planning document and will bring a copy to the clinic.    Fall risk  Fallen 2 or more times in the past year?: Yes  Any fall with injury in the past year?: Yes    Cognitive Screening   1) Repeat 3 items (Leader, Season, Table)    2) Clock draw: NORMAL  3) 3 item recall: Recalls 1 object   Results: NORMAL clock, 1-2 items recalled: COGNITIVE IMPAIRMENT LESS LIKELY    Mini-CogTM Copyright S [employer]. Licensed by the author for use in [facility]; reprinted with permission (solawrence@[facility]). All rights reserved.      Do you have sleep apnea, excessive snoring or daytime drowsiness?: no    Reviewed and updated as needed this visit by clinical staff  Tobacco  Allergies  Meds  Med Hx  Surg Hx  Fam Hx  Soc Hx        Reviewed and updated as needed this visit by Provider  Meds        Social History     Tobacco Use     Smoking status: Never Smoker     Smokeless tobacco: Never Used   Substance Use Topics     Alcohol use: Not Currently         Alcohol Use 12/5/2019   Prescreen: >3 drinks/day or >7 drinks/week? No         Current providers sharing in care for this patient include:   Patient Care Team:  Cortney Vanessa MD as PCP - General (Family Practice)  Cortney Vanessa MD as Assigned PCP    The following health maintenance items are reviewed in Epic and correct as of today:  Health Maintenance   Topic Date Due     ADVANCE CARE PLANNING  1935     ZOSTER IMMUNIZATION (1 of 2) 11/19/1987     MEDICARE ANNUAL WELLNESS VISIT  11/19/2002     FALL RISK ASSESSMENT  11/19/2002     PNEUMOCOCCAL IMMUNIZATION 65+ LOW/MEDIUM RISK (1 of 2 - PCV13) 11/19/2002     PHQ-2  01/01/2019     INFLUENZA VACCINE (1) 09/01/2019     DTAP/TDAP/TD IMMUNIZATION (1 - Tdap) 12/01/2020 (Originally 11/19/1946)     MAYE   "Completed     IPV IMMUNIZATION  Aged Out     MENINGITIS IMMUNIZATION  Aged Out     Patient Active Problem List   Diagnosis     Essential hypertension     Anxiety     Glaucoma suspect, bilateral     Personal history of DVT (deep vein thrombosis)     Mixed obsessional thoughts and acts     Age-related osteoporosis without current pathological fracture     Nonrheumatic aortic valve insufficiency     History reviewed. No pertinent surgical history.    Social History     Tobacco Use     Smoking status: Never Smoker     Smokeless tobacco: Never Used   Substance Use Topics     Alcohol use: Not Currently     History reviewed. No pertinent family history.          Review of Systems  Constitutional, HEENT, cardiovascular, pulmonary, GI, , musculoskeletal, neuro, skin, endocrine and psych systems are negative, except as otherwise noted.    OBJECTIVE:   /58 (BP Location: Left arm, Patient Position: Sitting, Cuff Size: Adult Regular)   Pulse 60   Temp 98.3  F (36.8  C) (Oral)   Wt 63.5 kg (140 lb)   SpO2 95%   BMI 26.45 kg/m   Estimated body mass index is 26.45 kg/m  as calculated from the following:    Height as of 9/4/19: 1.549 m (5' 1\").    Weight as of this encounter: 63.5 kg (140 lb).  Physical Exam  GENERAL: healthy, alert and no distress  EYES: Eyes grossly normal to inspection, PERRL and conjunctivae and sclerae normal  HENT: ear canals and TM's normal, nose and mouth without ulcers or lesions  NECK: no adenopathy, no asymmetry, masses, or scars and thyroid normal to palpation  RESP: lungs clear to auscultation - no rales, rhonchi or wheezes  BREAST: normal without masses, tenderness or nipple discharge and no palpable axillary masses or adenopathy  CV: regular rate and rhythm, normal S1 S2, no S3 or S4, no murmur, click or rub, no peripheral edema and peripheral pulses strong  ABDOMEN: soft, nontender, no hepatosplenomegaly, no masses and bowel sounds normal  MS: no gross musculoskeletal defects noted, no " edema  SKIN: solar lentigo on the nose, overlying AK - scaly erythematous papule  NEURO: Normal strength and tone, mentation intact and speech normal  PSYCH: mentation appears normal, affect normal/bright    Diagnostic Test Results:  Labs reviewed in Epic    Procedure: cryotherapy to AK on nose  3 cycles of freezing today, tolerated well, discussed aftercare      ASSESSMENT / PLAN:   1. Encounter for Medicare annual wellness exam  - Lipid panel reflex to direct LDL Fasting  - Hemoglobin A1c    2. Essential hypertension - in range, continue current, surveillance labs today  - enalapril (VASOTEC) 20 MG tablet; Take 1 tablet (20 mg) by mouth 2 times daily  Dispense: 180 tablet; Refill: 3  - hydrALAZINE (APRESOLINE) 25 MG tablet; Take 1 tablet (25 mg) by mouth 3 times daily  Dispense: 270 tablet; Refill: 3  - Comprehensive metabolic panel (BMP + Alb, Alk Phos, ALT, AST, Total. Bili, TP)    3. Osteoarthritis of spine with radiculopathy, lumbar region - suggested PT consultation to see how we can strengthen her core and buttocks  - [facility]RACT[facility] REFERRAL; Future    4. Primary osteoarthritis of right hip - as above  - [facility], [facility] CHIR[facility]RACTIC REFERRAL; Future    5. Pelvic girdle weakness    6. AK (actinic keratosis)  - DESTRUCT PREMALIGNANT LESION, FIRST    7. Personal history of DVT (deep vein thrombosis)  - clopidogrel (PLAVIX) 75 MG tablet; Take 1 tablet (75 mg) by mouth daily  Dispense: 90 tablet; Refill: 3    8. Mixed hyperlipidemia - continue statin  - simvastatin (ZOCOR) 10 MG tablet; Take 1 tablet (10 mg) by mouth At Bedtime  Dispense: 90 tablet; Refill: 3  - Lipid panel reflex to direct LDL Fasting    9. Need for prophylactic vaccination and inoculation against influenza  - INFLUENZA (HIGH DOSE) 3 VALENT VACCINE [99480]  - Vaccine Administration, Initial [37047]    10. Mixed obsessional thoughts and acts    11. Age-related osteoporosis without current pathological fracture  - Vitamin D  "Deficiency    12. Nonrheumatic aortic valve insufficiency  - CARDIOLOGY EVAL ADULT REFERRAL    COUNSELING:  Reviewed preventive health counseling, as reflected in patient instructions    Estimated body mass index is 26.45 kg/m  as calculated from the following:    Height as of 9/4/19: 1.549 m (5' 1\").    Weight as of this encounter: 63.5 kg (140 lb).     reports that she has never smoked. She has never used smokeless tobacco.      Appropriate preventive services were discussed with this patient, including applicable screening as appropriate for cardiovascular disease, diabetes, osteopenia/osteoporosis, and glaucoma.  As appropriate for age/gender, discussed screening for colorectal cancer, prostate cancer, breast cancer, and cervical cancer. Checklist reviewing preventive services available has been given to the patient.    Reviewed patients plan of care and provided an AVS. The Basic Care Plan (routine screening as documented in Health Maintenance) for Swathi meets the Care Plan requirement. This Care Plan has been established and reviewed with the Patient.    Cortney Vanessa MD  [facility]    Identified Health Risks:  Answers for HPI/ROS submitted by the patient on 12/5/2019   Annual Exam:  If you checked off any problems, how difficult have these problems made it for you to do your work, take care of things at home, or get along with other people?: Not difficult at all  PHQ9 TOTAL SCORE: 2    "

## 2019-12-06 ENCOUNTER — TELEPHONE (OUTPATIENT)
Dept: FAMILY MEDICINE | Facility: CLINIC | Age: 84
End: 2019-12-06

## 2019-12-06 ASSESSMENT — PATIENT HEALTH QUESTIONNAIRE - PHQ9: SUM OF ALL RESPONSES TO PHQ QUESTIONS 1-9: 2

## 2019-12-06 NOTE — TELEPHONE ENCOUNTER
"Called patient as she did not go to UC as directed.  She states she wanted to \"lay low\" and if she still had dizziness this morning should would come to UC.  Per patient she woke up this morning and is not having the dizziness.    Pt advised to follow up it returns    Pt expressed understanding and acceptance of the plan.  Pt had no further questions at this time.  Advised can call back to clinic at any time with concerns.       Siva Aguilar RN, BSN    "

## 2020-01-29 ENCOUNTER — OFFICE VISIT (OUTPATIENT)
Dept: CARDIOLOGY | Facility: CLINIC | Age: 85
End: 2020-01-29
Attending: FAMILY MEDICINE
Payer: COMMERCIAL

## 2020-01-29 VITALS
HEIGHT: 61 IN | OXYGEN SATURATION: 95 % | HEART RATE: 62 BPM | DIASTOLIC BLOOD PRESSURE: 58 MMHG | SYSTOLIC BLOOD PRESSURE: 136 MMHG | BODY MASS INDEX: 26.81 KG/M2 | WEIGHT: 142 LBS

## 2020-01-29 DIAGNOSIS — I10 ESSENTIAL HYPERTENSION: ICD-10-CM

## 2020-01-29 DIAGNOSIS — E78.5 HYPERLIPIDEMIA LDL GOAL <100: ICD-10-CM

## 2020-01-29 DIAGNOSIS — I35.9 AORTIC VALVE DISORDER: Primary | ICD-10-CM

## 2020-01-29 PROCEDURE — 82550 ASSAY OF CK (CPK): CPT | Performed by: INTERNAL MEDICINE

## 2020-01-29 PROCEDURE — 80061 LIPID PANEL: CPT | Performed by: INTERNAL MEDICINE

## 2020-01-29 PROCEDURE — 99205 OFFICE O/P NEW HI 60 MIN: CPT | Performed by: INTERNAL MEDICINE

## 2020-01-29 PROCEDURE — 36415 COLL VENOUS BLD VENIPUNCTURE: CPT | Performed by: INTERNAL MEDICINE

## 2020-01-29 ASSESSMENT — MIFFLIN-ST. JEOR: SCORE: 1041.49

## 2020-01-29 NOTE — PROGRESS NOTES
HISTORY:    Swathi Dukes is a pleasant 84-year-old patient who moved to [place] from [place] last summer and is here today to establish care.  No records were available for review at the time of my visit with her, but they arrived after she left.  Her records indicate a history of hypertension, diastolic dysfunction, chronic pericardial effusion, and hyperlipidemia.  The last available echo was done in November 2018 showing an ejection fraction of 69%, estimated right heart pressure of 32 mmHg, moderate aortic insufficiency, mild to moderate pericardial effusion without tamponade.  She had a normal nuclear stress test done in May 2018 in response to some complaints of chest pain at that time.    Swathi reports that she is doing well and has no cardiac concerns.  She does have some mild shortness of breath with exertion which she thinks has been worse over the past 6 months or so although she denies any PND or orthopnea.  She is still able to go to the exercise class at her assisted living home on a daily basis and can keep up with other attendees.  She is physically rather inactive with no strenuous activities.  She denies any exertional chest, arm, neck, or jaw discomfort as well as any sense of palpitations, syncope or near syncope, strokelike symptoms, orthostasis, significant peripheral edema, or symptoms of claudication.  She does however complain of bilateral leg pain from her hips to her ankles which is continuous but worse when she walks.    Examination today shows a 2/6 harsh systolic ejection murmur, no audible diastolic murmur, no rub, clear lungs, and no significant peripheral edema.  Her peripheral pulses are adequate.  She has no visible JVD and her lungs are clear.    ECG shows sinus rhythm with first-degree AV block and probable LVH.      ASSESSMENT/PLAN:    1.  Hypertension.  Blood pressure is currently well controlled using enalapril 20 mg twice daily and hydralazine 25 mg 3 times daily.  She  takes her medications independently and reports that she has no difficulty wearing to take her medicines 3 times daily.  Continue current medications for now.  2.  Hyperlipidemia.  The patient is taking low-dose simvastatin 10 mg daily.  I will have a lipid profile checked.  3.  Aortic insufficiency by medical record.  I do not hear an audible diastolic murmur, echo pending.  4.  Audible murmur of aortic stenosis, echo pending.  5.  Chronic pericardial effusion.  The patient reports that she had a surgery to drain her fluid with a small incision.  I do not see any reference to that in the medical records available to me.  The last available echo from [place] dated November 2018 reported a recurrent effusion measuring up to 2 cm without evidence of tamponade.  New echo pending.  6.  I see that the patient is on chronic Plavix and I see no reason for this.  I believe this medication likely exposes her to more risk than benefit and probably should be discontinued.    Thank you for inviting me to participate in your patient's care.  Please do not hesitate to call if I can be of further assistance.    Orders Placed This Encounter   Procedures     CK total     Lipid Profile     Echocardiogram Complete     No orders of the defined types were placed in this encounter.    There are no discontinued medications.    10 year ASCVD risk: The ASCVD Risk score (Calais DC Jr., et al., 2013) failed to calculate for the following reasons:    The 2013 ASCVD risk score is only valid for ages 40 to 79    Encounter Diagnoses   Name Primary?     Aortic valve disorder Yes     Essential hypertension      Hyperlipidemia LDL goal <100        CURRENT MEDICATIONS:  Current Outpatient Medications   Medication Sig Dispense Refill     ALPRAZolam (XANAX) 0.5 MG tablet 2 times daily as needed       clopidogrel (PLAVIX) 75 MG tablet Take 1 tablet (75 mg) by mouth daily 90 tablet 3     enalapril (VASOTEC) 20 MG tablet Take 1 tablet (20 mg) by mouth 2  times daily 180 tablet 3     hydrALAZINE (APRESOLINE) 25 MG tablet Take 1 tablet (25 mg) by mouth 3 times daily 270 tablet 3     latanoprost (XALATAN) 0.005 % ophthalmic solution daily       OLANZapine (ZYPREXA) 10 MG tablet At Bedtime       PARoxetine (PAXIL) 20 MG tablet daily       simvastatin (ZOCOR) 10 MG tablet Take 1 tablet (10 mg) by mouth At Bedtime 90 tablet 3       ALLERGIES     Allergies   Allergen Reactions     Aspirin      Iodine      Contrast         PAST MEDICAL HISTORY:  History reviewed. No pertinent past medical history.    PAST SURGICAL HISTORY:  History reviewed. No pertinent surgical history.    FAMILY HISTORY:  Family History   Problem Relation Age of Onset     Diabetes Father        SOCIAL HISTORY:  Social History     Socioeconomic History     Marital status:      Spouse name: None     Number of children: None     Years of education: None     Highest education level: None   Occupational History     None   Social Needs     Financial resource strain: None     Food insecurity:     Worry: None     Inability: None     Transportation needs:     Medical: None     Non-medical: None   Tobacco Use     Smoking status: Never Smoker     Smokeless tobacco: Never Used   Substance and Sexual Activity     Alcohol use: Not Currently     Drug use: Never     Sexual activity: Not Currently   Lifestyle     Physical activity:     Days per week: None     Minutes per session: None     Stress: None   Relationships     Social connections:     Talks on phone: None     Gets together: None     Attends Ca[employer] service: None     Active member of club or organization: None     Attends meetings of clubs or organizations: None     Relationship status: None     Intimate partner violence:     Fear of current or ex partner: None     Emotionally abused: None     Physically abused: None     Forced sexual activity: None   Other Topics Concern     Parent/sibling w/ CABG, MI or angioplasty before 65F 55M? Not Asked   Social  "History Narrative     None       Review of Systems:  Skin:  Positive for itching   Eyes:  Positive for glasses;glaucoma;double vision  ENT:  Negative    Respiratory:  Positive for dyspnea on exertion;shortness of breath  Cardiovascular:    Positive for  Gastroenterology: Negative    Genitourinary:  Negative    Musculoskeletal:  Positive for arthritis  Neurologic:  Positive for numbness or tingling of hands  Psychiatric:  Positive for anxiety;depression  Heme/Lymph/Imm:  Negative    Endocrine:  Negative      Physical Exam:  Vitals: /58 (BP Location: Right arm, Patient Position: Chair, Cuff Size: Adult Regular)   Pulse 62   Ht 1.549 m (5' 1\")   Wt 64.4 kg (142 lb)   SpO2 95%   BMI 26.83 kg/m      Constitutional:  cooperative, alert and oriented, well developed, well nourished, in no acute distress        Skin:  warm and dry to the touch, no apparent skin lesions or masses noted        Head:  normocephalic, no masses or lesions        Eyes:  no xanthalasma;no nystagmus        ENT:  no pallor or cyanosis;dentition good        Neck:  carotid pulses are full and equal bilaterally, JVP normal, no carotid bruit        Chest:  normal breath sounds, clear to auscultation, normal A-P diameter, normal symmetry, normal respiratory excursion, no use of accessory muscles        Cardiac: regular rhythm, normal S1/S2, no S3 or S4, apical impulse not displaced, no murmurs, gallops or rubs                  Abdomen:  abdomen soft;BS normoactive        Vascular: pulses full and equal                                      Extremities and Back:  no edema        Neurological:  no gross motor deficits          Recent Lab Results:  LIPID RESULTS:  No results found for: CHOL, HDL, LDL, TRIG, CHOLHDLRATIO    LIVER ENZYME RESULTS:  No results found for: AST, ALT    CBC RESULTS:  Lab Results   Component Value Date    WBC 5.7 08/27/2019    RBC 3.50 (L) 08/27/2019    HGB 10.4 (L) 08/27/2019    HCT 32.1 (L) 08/27/2019    MCV 92 08/27/2019 "    MCH 29.7 08/27/2019    MCHC 32.4 08/27/2019    RDW 14.2 08/27/2019     08/27/2019       BMP RESULTS:  Lab Results   Component Value Date     08/27/2019    POTASSIUM 3.8 08/27/2019    CHLORIDE 109 08/27/2019    CO2 28 08/27/2019    ANIONGAP 3 08/27/2019     (H) 08/27/2019    BUN 19 08/27/2019    CR 0.65 08/27/2019    GFRESTIMATED 83 08/27/2019    GFRESTBLACK >90 08/27/2019    FILIPE 8.8 08/27/2019        A1C RESULTS:  No results found for: A1C    INR RESULTS:  No results found for: INR      Luis Manuel Davis MD, FACC    CC  Cortney Vanessa MD  [id number] [place] [address]

## 2020-01-29 NOTE — LETTER
"  January 31, 2020       TO: Swathi Dukes   [address]  [facility] [address]       Dear Ms. Dukes,    The results of your recent laboratory tests.    Results for orders placed or performed in visit on 01/29/20   CK total     Status: None   Result Value Ref Range    CK Total 51 30 - 225 U/L   Lipid Profile     Status: None   Result Value Ref Range    Cholesterol 174 <200 mg/dL    Triglycerides 136 <150 mg/dL    HDL Cholesterol 79 >49 mg/dL    LDL Cholesterol Calculated 68 <100 mg/dL    Non HDL Cholesterol 95 <130 mg/dL         Your test results fall within the expected ranges. Please continue your current medications. If you have any questions, please contact me at [phone].      Sincerely,    Luh \"Jan\" RN, BSN/Dr. Davis's Nurse    [facility] - [place], [place]               "

## 2020-01-29 NOTE — LETTER
1/29/2020    Cortney Vanessa MD  [id number]  [facility] [address]    RE: Swathi Dukes       Dear Colleague,    I had the pleasure of seeing Swatih Dukes in the [facility].    HISTORY:    Swathi Dukes is a pleasant 84-year-old patient who moved to [place] from [place] last summer and is here today to establish care.  No records were available for review at the time of my visit with her, but they arrived after she left.  Her records indicate a history of hypertension, diastolic dysfunction, chronic pericardial effusion, and hyperlipidemia.  The last available echo was done in November 2018 showing an ejection fraction of 69%, estimated right heart pressure of 32 mmHg, moderate aortic insufficiency, mild to moderate pericardial effusion without tamponade.  She had a normal nuclear stress test done in May 2018 in response to some complaints of chest pain at that time.    Swathi reports that she is doing well and has no cardiac concerns.  She does have some mild shortness of breath with exertion which she thinks has been worse over the past 6 months or so although she denies any PND or orthopnea.  She is still able to go to the exercise class at her assisted living home on a daily basis and can keep up with other attendees.  She is physically rather inactive with no strenuous activities.  She denies any exertional chest, arm, neck, or jaw discomfort as well as any sense of palpitations, syncope or near syncope, strokelike symptoms, orthostasis, significant peripheral edema, or symptoms of claudication.  She does however complain of bilateral leg pain from her hips to her ankles which is continuous but worse when she walks.    Examination today shows a 2/6 harsh systolic ejection murmur, no audible diastolic murmur, no rub, clear lungs, and no significant peripheral edema.  Her peripheral pulses are adequate.  She has no visible JVD and her lungs are clear.    ECG shows sinus  rhythm with first-degree AV block and probable LVH.      ASSESSMENT/PLAN:    1.  Hypertension.  Blood pressure is currently well controlled using enalapril 20 mg twice daily and hydralazine 25 mg 3 times daily.  She takes her medications independently and reports that she has no difficulty wearing to take her medicines 3 times daily.  Continue current medications for now.  2.  Hyperlipidemia.  The patient is taking low-dose simvastatin 10 mg daily.  I will have a lipid profile checked.  3.  Aortic insufficiency by medical record.  I do not hear an audible diastolic murmur, echo pending.  4.  Audible murmur of aortic stenosis, echo pending.  5.  Chronic pericardial effusion.  The patient reports that she had a surgery to drain her fluid with a small incision.  I do not see any reference to that in the medical records available to me.  The last available echo from [place] dated November 2018 reported a recurrent effusion measuring up to 2 cm without evidence of tamponade.  New echo pending.  6.  I see that the patient is on chronic Plavix and I see no reason for this.  I believe this medication likely exposes her to more risk than benefit and probably should be discontinued.    Thank you for inviting me to participate in your patient's care.  Please do not hesitate to call if I can be of further assistance.    Orders Placed This Encounter   Procedures     CK total     Lipid Profile     Echocardiogram Complete     No orders of the defined types were placed in this encounter.    There are no discontinued medications.    10 year ASCVD risk: The ASCVD Risk score (Merlyn DION Jr., et al., 2013) failed to calculate for the following reasons:    The 2013 ASCVD risk score is only valid for ages 40 to 79    Encounter Diagnoses   Name Primary?     Aortic valve disorder Yes     Essential hypertension      Hyperlipidemia LDL goal <100        CURRENT MEDICATIONS:  Current Outpatient Medications   Medication Sig Dispense Refill      ALPRAZolam (XANAX) 0.5 MG tablet 2 times daily as needed       clopidogrel (PLAVIX) 75 MG tablet Take 1 tablet (75 mg) by mouth daily 90 tablet 3     enalapril (VASOTEC) 20 MG tablet Take 1 tablet (20 mg) by mouth 2 times daily 180 tablet 3     hydrALAZINE (APRESOLINE) 25 MG tablet Take 1 tablet (25 mg) by mouth 3 times daily 270 tablet 3     latanoprost (XALATAN) 0.005 % ophthalmic solution daily       OLANZapine (ZYPREXA) 10 MG tablet At Bedtime       PARoxetine (PAXIL) 20 MG tablet daily       simvastatin (ZOCOR) 10 MG tablet Take 1 tablet (10 mg) by mouth At Bedtime 90 tablet 3       ALLERGIES     Allergies   Allergen Reactions     Aspirin      Iodine      Contrast         PAST MEDICAL HISTORY:  History reviewed. No pertinent past medical history.    PAST SURGICAL HISTORY:  History reviewed. No pertinent surgical history.    FAMILY HISTORY:  Family History   Problem Relation Age of Onset     Diabetes Father        SOCIAL HISTORY:  Social History     Socioeconomic History     Marital status:      Spouse name: None     Number of children: None     Years of education: None     Highest education level: None   Occupational History     None   Social Needs     Financial resource strain: None     Food insecurity:     Worry: None     Inability: None     Transportation needs:     Medical: None     Non-medical: None   Tobacco Use     Smoking status: Never Smoker     Smokeless tobacco: Never Used   Substance and Sexual Activity     Alcohol use: Not Currently     Drug use: Never     Sexual activity: Not Currently   Lifestyle     Physical activity:     Days per week: None     Minutes per session: None     Stress: None   Relationships     Social connections:     Talks on phone: None     Gets together: None     Attends Church service: None     Active member of club or organization: None     Attends meetings of clubs or organizations: None     Relationship status: None     Intimate partner violence:     Fear of current  "or ex partner: None     Emotionally abused: None     Physically abused: None     Forced sexual activity: None   Other Topics Concern     Parent/sibling w/ CABG, MI or angioplasty before 65F 55M? Not Asked   Social History Narrative     None       Review of Systems:  Skin:  Positive for itching   Eyes:  Positive for glasses;glaucoma;double vision  ENT:  Negative    Respiratory:  Positive for dyspnea on exertion;shortness of breath  Cardiovascular:    Positive for  Gastroenterology: Negative    Genitourinary:  Negative    Musculoskeletal:  Positive for arthritis  Neurologic:  Positive for numbness or tingling of hands  Psychiatric:  Positive for anxiety;depression  Heme/Lymph/Imm:  Negative    Endocrine:  Negative      Physical Exam:  Vitals: /58 (BP Location: Right arm, Patient Position: Chair, Cuff Size: Adult Regular)   Pulse 62   Ht 1.549 m (5' 1\")   Wt 64.4 kg (142 lb)   SpO2 95%   BMI 26.83 kg/m       Constitutional:  cooperative, alert and oriented, well developed, well nourished, in no acute distress        Skin:  warm and dry to the touch, no apparent skin lesions or masses noted        Head:  normocephalic, no masses or lesions        Eyes:  no xanthalasma;no nystagmus        ENT:  no pallor or cyanosis;dentition good        Neck:  carotid pulses are full and equal bilaterally, JVP normal, no carotid bruit        Chest:  normal breath sounds, clear to auscultation, normal A-P diameter, normal symmetry, normal respiratory excursion, no use of accessory muscles        Cardiac: regular rhythm, normal S1/S2, no S3 or S4, apical impulse not displaced, no murmurs, gallops or rubs                  Abdomen:  abdomen soft;BS normoactive        Vascular: pulses full and equal                                      Extremities and Back:  no edema        Neurological:  no gross motor deficits          Recent Lab Results:  LIPID RESULTS:  No results found for: CHOL, HDL, LDL, TRIG, CHOLHDLRATIO    LIVER ENZYME " RESULTS:  No results found for: AST, ALT    CBC RESULTS:  Lab Results   Component Value Date    WBC 5.7 08/27/2019    RBC 3.50 (L) 08/27/2019    HGB 10.4 (L) 08/27/2019    HCT 32.1 (L) 08/27/2019    MCV 92 08/27/2019    MCH 29.7 08/27/2019    MCHC 32.4 08/27/2019    RDW 14.2 08/27/2019     08/27/2019       BMP RESULTS:  Lab Results   Component Value Date     08/27/2019    POTASSIUM 3.8 08/27/2019    CHLORIDE 109 08/27/2019    CO2 28 08/27/2019    ANIONGAP 3 08/27/2019     (H) 08/27/2019    BUN 19 08/27/2019    CR 0.65 08/27/2019    GFRESTIMATED 83 08/27/2019    GFRESTBLACK >90 08/27/2019    FILIPE 8.8 08/27/2019        A1C RESULTS:  No results found for: A1C    INR RESULTS:  No results found for: INR        Thank you for allowing me to participate in the care of your patient.    Sincerely,     Luis Manuel Davis MD     [facility]

## 2020-01-29 NOTE — LETTER
1/29/2020    Cortney Vanessa MD  [id number]  [facility] [address]    RE: Swathi Dukes       Dear Colleague,    I had the pleasure of seeing Swathi Dukes in the [facility].    HISTORY:    Swathi Dukes is a pleasant 84-year-old patient who moved to [place] from [place] last summer and is here today to establish care.  No records were available for review at the time of my visit with her, but they arrived after she left.  Her records indicate a history of hypertension, diastolic dysfunction, chronic pericardial effusion, and hyperlipidemia.  The last available echo was done in November 2018 showing an ejection fraction of 69%, estimated right heart pressure of 32 mmHg, moderate aortic insufficiency, mild to moderate pericardial effusion without tamponade.  She had a normal nuclear stress test done in May 2018 in response to some complaints of chest pain at that time.    Swathi reports that she is doing well and has no cardiac concerns.  She does have some mild shortness of breath with exertion which she thinks has been worse over the past 6 months or so although she denies any PND or orthopnea.  She is still able to go to the exercise class at her assisted living home on a daily basis and can keep up with other attendees.  She is physically rather inactive with no strenuous activities.  She denies any exertional chest, arm, neck, or jaw discomfort as well as any sense of palpitations, syncope or near syncope, strokelike symptoms, orthostasis, significant peripheral edema, or symptoms of claudication.  She does however complain of bilateral leg pain from her hips to her ankles which is continuous but worse when she walks.    Examination today shows a 2/6 harsh systolic ejection murmur, no audible diastolic murmur, no rub, clear lungs, and no significant peripheral edema.  Her peripheral pulses are adequate.  She has no visible JVD and her lungs are clear.    ECG shows sinus  rhythm with first-degree AV block and probable LVH.      ASSESSMENT/PLAN:    1.  Hypertension.  Blood pressure is currently well controlled using enalapril 20 mg twice daily and hydralazine 25 mg 3 times daily.  She takes her medications independently and reports that she has no difficulty wearing to take her medicines 3 times daily.  Continue current medications for now.  2.  Hyperlipidemia.  The patient is taking low-dose simvastatin 10 mg daily.  I will have a lipid profile checked.  3.  Aortic insufficiency by medical record.  I do not hear an audible diastolic murmur, echo pending.  4.  Audible murmur of aortic stenosis, echo pending.  5.  Chronic pericardial effusion.  The patient reports that she had a surgery to drain her fluid with a small incision.  I do not see any reference to that in the medical records available to me.  The last available echo from [place] dated November 2018 reported a recurrent effusion measuring up to 2 cm without evidence of tamponade.  New echo pending.  6.  I see that the patient is on chronic Plavix and I see no reason for this.  I believe this medication likely exposes her to more risk than benefit and probably should be discontinued.    Thank you for inviting me to participate in your patient's care.  Please do not hesitate to call if I can be of further assistance.    Orders Placed This Encounter   Procedures     CK total     Lipid Profile     Echocardiogram Complete     No orders of the defined types were placed in this encounter.    There are no discontinued medications.    10 year ASCVD risk: The ASCVD Risk score (Merlyn DION Jr., et al., 2013) failed to calculate for the following reasons:    The 2013 ASCVD risk score is only valid for ages 40 to 79    Encounter Diagnoses   Name Primary?     Aortic valve disorder Yes     Essential hypertension      Hyperlipidemia LDL goal <100        CURRENT MEDICATIONS:  Current Outpatient Medications   Medication Sig Dispense Refill      ALPRAZolam (XANAX) 0.5 MG tablet 2 times daily as needed       clopidogrel (PLAVIX) 75 MG tablet Take 1 tablet (75 mg) by mouth daily 90 tablet 3     enalapril (VASOTEC) 20 MG tablet Take 1 tablet (20 mg) by mouth 2 times daily 180 tablet 3     hydrALAZINE (APRESOLINE) 25 MG tablet Take 1 tablet (25 mg) by mouth 3 times daily 270 tablet 3     latanoprost (XALATAN) 0.005 % ophthalmic solution daily       OLANZapine (ZYPREXA) 10 MG tablet At Bedtime       PARoxetine (PAXIL) 20 MG tablet daily       simvastatin (ZOCOR) 10 MG tablet Take 1 tablet (10 mg) by mouth At Bedtime 90 tablet 3       ALLERGIES     Allergies   Allergen Reactions     Aspirin      Iodine      Contrast         PAST MEDICAL HISTORY:  History reviewed. No pertinent past medical history.    PAST SURGICAL HISTORY:  History reviewed. No pertinent surgical history.    FAMILY HISTORY:  Family History   Problem Relation Age of Onset     Diabetes Father        SOCIAL HISTORY:  Social History     Socioeconomic History     Marital status:      Spouse name: None     Number of children: None     Years of education: None     Highest education level: None   Occupational History     None   Social Needs     Financial resource strain: None     Food insecurity:     Worry: None     Inability: None     Transportation needs:     Medical: None     Non-medical: None   Tobacco Use     Smoking status: Never Smoker     Smokeless tobacco: Never Used   Substance and Sexual Activity     Alcohol use: Not Currently     Drug use: Never     Sexual activity: Not Currently   Lifestyle     Physical activity:     Days per week: None     Minutes per session: None     Stress: None   Relationships     Social connections:     Talks on phone: None     Gets together: None     Attends [employer] service: None     Active member of club or organization: None     Attends meetings of clubs or organizations: None     Relationship status: None     Intimate partner violence:     Fear of current  "or ex partner: None     Emotionally abused: None     Physically abused: None     Forced sexual activity: None   Other Topics Concern     Parent/sibling w/ CABG, MI or angioplasty before 65F 55M? Not Asked   Social History Narrative     None       Review of Systems:  Skin:  Positive for itching   Eyes:  Positive for glasses;glaucoma;double vision  ENT:  Negative    Respiratory:  Positive for dyspnea on exertion;shortness of breath  Cardiovascular:    Positive for  Gastroenterology: Negative    Genitourinary:  Negative    Musculoskeletal:  Positive for arthritis  Neurologic:  Positive for numbness or tingling of hands  Psychiatric:  Positive for anxiety;depression  Heme/Lymph/Imm:  Negative    Endocrine:  Negative      Physical Exam:  Vitals: /58 (BP Location: Right arm, Patient Position: Chair, Cuff Size: Adult Regular)   Pulse 62   Ht 1.549 m (5' 1\")   Wt 64.4 kg (142 lb)   SpO2 95%   BMI 26.83 kg/m       Constitutional:  cooperative, alert and oriented, well developed, well nourished, in no acute distress        Skin:  warm and dry to the touch, no apparent skin lesions or masses noted        Head:  normocephalic, no masses or lesions        Eyes:  no xanthalasma;no nystagmus        ENT:  no pallor or cyanosis;dentition good        Neck:  carotid pulses are full and equal bilaterally, JVP normal, no carotid bruit        Chest:  normal breath sounds, clear to auscultation, normal A-P diameter, normal symmetry, normal respiratory excursion, no use of accessory muscles        Cardiac: regular rhythm, normal S1/S2, no S3 or S4, apical impulse not displaced, no murmurs, gallops or rubs                  Abdomen:  abdomen soft;BS normoactive        Vascular: pulses full and equal                                      Extremities and Back:  no edema        Neurological:  no gross motor deficits          Recent Lab Results:  LIPID RESULTS:  No results found for: CHOL, HDL, LDL, TRIG, CHOLHDLRATIO    LIVER ENZYME " RESULTS:  No results found for: AST, ALT    CBC RESULTS:  Lab Results   Component Value Date    WBC 5.7 08/27/2019    RBC 3.50 (L) 08/27/2019    HGB 10.4 (L) 08/27/2019    HCT 32.1 (L) 08/27/2019    MCV 92 08/27/2019    MCH 29.7 08/27/2019    MCHC 32.4 08/27/2019    RDW 14.2 08/27/2019     08/27/2019       BMP RESULTS:  Lab Results   Component Value Date     08/27/2019    POTASSIUM 3.8 08/27/2019    CHLORIDE 109 08/27/2019    CO2 28 08/27/2019    ANIONGAP 3 08/27/2019     (H) 08/27/2019    BUN 19 08/27/2019    CR 0.65 08/27/2019    GFRESTIMATED 83 08/27/2019    GFRESTBLACK >90 08/27/2019    FILIPE 8.8 08/27/2019        A1C RESULTS:  No results found for: A1C    INR RESULTS:  No results found for: INR      Luis Manuel Davis MD, FACC    CC  Cortney Vanessa MD  [address] [place] [id number]                    Thank you for allowing me to participate in the care of your patient.      Sincerely,     Luis Manuel Davis MD     [facility]    cc:   Cortney Vanessa MD  [address] [place] [address]

## 2020-01-30 LAB
CHOLEST SERPL-MCNC: 174 MG/DL
CK SERPL-CCNC: 51 U/L (ref 30–225)
HDLC SERPL-MCNC: 79 MG/DL
LDLC SERPL CALC-MCNC: 68 MG/DL
NONHDLC SERPL-MCNC: 95 MG/DL
TRIGL SERPL-MCNC: 136 MG/DL

## 2020-02-05 ENCOUNTER — TELEPHONE (OUTPATIENT)
Dept: FAMILY MEDICINE | Facility: CLINIC | Age: 85
End: 2020-02-05

## 2020-02-05 ENCOUNTER — OFFICE VISIT (OUTPATIENT)
Dept: FAMILY MEDICINE | Facility: CLINIC | Age: 85
End: 2020-02-05
Payer: COMMERCIAL

## 2020-02-05 VITALS
RESPIRATION RATE: 14 BRPM | HEIGHT: 61 IN | WEIGHT: 140 LBS | SYSTOLIC BLOOD PRESSURE: 130 MMHG | BODY MASS INDEX: 26.43 KG/M2 | HEART RATE: 61 BPM | DIASTOLIC BLOOD PRESSURE: 58 MMHG | TEMPERATURE: 97.7 F

## 2020-02-05 DIAGNOSIS — L57.0 AK (ACTINIC KERATOSIS): ICD-10-CM

## 2020-02-05 DIAGNOSIS — N30.00 ACUTE CYSTITIS WITHOUT HEMATURIA: Primary | ICD-10-CM

## 2020-02-05 DIAGNOSIS — I10 ESSENTIAL HYPERTENSION: ICD-10-CM

## 2020-02-05 DIAGNOSIS — M81.0 AGE-RELATED OSTEOPOROSIS WITHOUT CURRENT PATHOLOGICAL FRACTURE: ICD-10-CM

## 2020-02-05 DIAGNOSIS — R82.90 CLOUDY URINE: ICD-10-CM

## 2020-02-05 DIAGNOSIS — M19.042 PRIMARY OSTEOARTHRITIS OF BOTH HANDS: Primary | ICD-10-CM

## 2020-02-05 DIAGNOSIS — M19.041 PRIMARY OSTEOARTHRITIS OF BOTH HANDS: Primary | ICD-10-CM

## 2020-02-05 DIAGNOSIS — R73.09 OTHER ABNORMAL GLUCOSE: ICD-10-CM

## 2020-02-05 DIAGNOSIS — M47.26 OSTEOARTHRITIS OF SPINE WITH RADICULOPATHY, LUMBAR REGION: ICD-10-CM

## 2020-02-05 DIAGNOSIS — R82.90 NONSPECIFIC FINDING ON EXAMINATION OF URINE: ICD-10-CM

## 2020-02-05 LAB
ALBUMIN UR-MCNC: NEGATIVE MG/DL
APPEARANCE UR: ABNORMAL
BACTERIA #/AREA URNS HPF: ABNORMAL /HPF
BILIRUB UR QL STRIP: NEGATIVE
COLOR UR AUTO: YELLOW
GLUCOSE UR STRIP-MCNC: NEGATIVE MG/DL
HBA1C MFR BLD: 5.5 % (ref 0–5.6)
HGB UR QL STRIP: NEGATIVE
KETONES UR STRIP-MCNC: NEGATIVE MG/DL
LEUKOCYTE ESTERASE UR QL STRIP: ABNORMAL
NITRATE UR QL: NEGATIVE
PH UR STRIP: 7 PH (ref 5–7)
RBC #/AREA URNS AUTO: ABNORMAL /HPF
SOURCE: ABNORMAL
SP GR UR STRIP: 1.01 (ref 1–1.03)
TRANS CELLS #/AREA URNS HPF: ABNORMAL /HPF
UROBILINOGEN UR STRIP-ACNC: 0.2 EU/DL (ref 0.2–1)
WBC #/AREA URNS AUTO: ABNORMAL /HPF
WBC CLUMPS #/AREA URNS HPF: PRESENT /HPF

## 2020-02-05 PROCEDURE — 87088 URINE BACTERIA CULTURE: CPT | Performed by: FAMILY MEDICINE

## 2020-02-05 PROCEDURE — 36415 COLL VENOUS BLD VENIPUNCTURE: CPT | Performed by: FAMILY MEDICINE

## 2020-02-05 PROCEDURE — 81001 URINALYSIS AUTO W/SCOPE: CPT | Performed by: FAMILY MEDICINE

## 2020-02-05 PROCEDURE — 83036 HEMOGLOBIN GLYCOSYLATED A1C: CPT | Performed by: FAMILY MEDICINE

## 2020-02-05 PROCEDURE — 87086 URINE CULTURE/COLONY COUNT: CPT | Performed by: FAMILY MEDICINE

## 2020-02-05 PROCEDURE — 82306 VITAMIN D 25 HYDROXY: CPT | Performed by: FAMILY MEDICINE

## 2020-02-05 PROCEDURE — 87186 SC STD MICRODIL/AGAR DIL: CPT | Performed by: FAMILY MEDICINE

## 2020-02-05 PROCEDURE — 80053 COMPREHEN METABOLIC PANEL: CPT | Performed by: FAMILY MEDICINE

## 2020-02-05 PROCEDURE — 17000 DESTRUCT PREMALG LESION: CPT | Performed by: FAMILY MEDICINE

## 2020-02-05 PROCEDURE — 99214 OFFICE O/P EST MOD 30 MIN: CPT | Mod: 25 | Performed by: FAMILY MEDICINE

## 2020-02-05 RX ORDER — NITROFURANTOIN 25; 75 MG/1; MG/1
100 CAPSULE ORAL 2 TIMES DAILY
Qty: 14 CAPSULE | Refills: 0 | Status: SHIPPED | OUTPATIENT
Start: 2020-02-05 | End: 2020-07-14

## 2020-02-05 ASSESSMENT — MIFFLIN-ST. JEOR: SCORE: 1032.42

## 2020-02-05 NOTE — PATIENT INSTRUCTIONS
For the legs and back ---  Physical therapy  Call one number to schedule at any of the above locations: [phone]  Can continue tylenol as you are taking this      For the hands ---  Try the voltaren gel four times daily      For the urine ---  We will do urinalysis today      For the nose ---  Freezing treatment today

## 2020-02-05 NOTE — PROGRESS NOTES
"Subjective     Swathi Dukes is a 84 year old female who presents to clinic today for the following health issues:    HPI     1. Low back and buttock pain for months. Pain is with rising from a seated position. No weakness in the legs. We addressed this at her last visit. She had to cancel her PT consultation and never rescheduled. She would be open to this if it will help. Tylenol 1000 mg BID helps relieve the pain.     2. Would like her physical lab work drawn today.     3. Pain in the hands - she is worried about an allergy with this. Pain present always in the hands, has been worsening.     4. Cloudy urine - past couple weeks, no pain with urination. Has had UTI in the past. NO fevers.     5. AK that we froze last visit never blistered, still present.       Patient Active Problem List   Diagnosis     Essential hypertension     Anxiety     Glaucoma suspect, bilateral     Personal history of DVT (deep vein thrombosis)     Mixed obsessional thoughts and acts     Age-related osteoporosis without current pathological fracture     Nonrheumatic aortic valve insufficiency     History reviewed. No pertinent surgical history.    Social History     Tobacco Use     Smoking status: Never Smoker     Smokeless tobacco: Never Used   Substance Use Topics     Alcohol use: Not Currently     Family History   Problem Relation Age of Onset     Diabetes Father          Reviewed and updated as needed this visit by Provider  Tobacco  Allergies  Meds  Problems  Med Hx  Surg Hx  Fam Hx         Review of Systems   ROS COMP: Constitutional, HEENT, cardiovascular, pulmonary, gi and gu systems are negative, except as otherwise noted.      Objective    /58 (BP Location: Right arm, Patient Position: Sitting, Cuff Size: Adult Regular)   Pulse 61   Temp 97.7  F (36.5  C) (Oral)   Resp 14   Ht 1.549 m (5' 1\")   Wt 63.5 kg (140 lb)   Breastfeeding No   BMI 26.45 kg/m    Body mass index is 26.45 kg/m .  Physical Exam   GENERAL: " healthy, alert and no distress  RESP: lungs clear to auscultation - no rales, rhonchi or wheezes  CV: regular rate and rhythm, normal S1 S2, no S3 or S4, no murmur, click or rub, no peripheral edema and peripheral pulses strong  MS: apparent DJD of the bilateral hands and fingers, no synovial joint capsule thickening    Diagnostic Test Results:  Results for orders placed or performed in visit on 02/05/20 (from the past 24 hour(s))   *UA reflex to Microscopic and Culture ([place] and [facility] (except [place] and [place])   Result Value Ref Range    Color Urine Yellow     Appearance Urine Cloudy     Glucose Urine Negative NEG^Negative mg/dL    Bilirubin Urine Negative NEG^Negative    Ketones Urine Negative NEG^Negative mg/dL    Specific Gravity Urine 1.015 1.003 - 1.035    Blood Urine Negative NEG^Negative    pH Urine 7.0 5.0 - 7.0 pH    Protein Albumin Urine Negative NEG^Negative mg/dL    Urobilinogen Urine 0.2 0.2 - 1.0 EU/dL    Nitrite Urine Negative NEG^Negative    Leukocyte Esterase Urine Large (A) NEG^Negative    Source Midstream Urine    Hemoglobin A1c   Result Value Ref Range    Hemoglobin A1C 5.5 0 - 5.6 %   Urine Microscopic   Result Value Ref Range    WBC Urine 25-50 (A) OTO5^0 - 5 /HPF    RBC Urine O - 2 OTO2^O - 2 /HPF    WBC Clumps Present (A) NEG^Negative /HPF    Transitional Epi Few FEW^Few /HPF    Bacteria Urine Many (A) NEG^Negative /HPF         Procedure: Cryotherapy  3 cycles of freezing to 1 AK on the nasal bridge, tolerated well.  Discussed aftercare.    Assessment & Plan     1. Primary osteoarthritis of both hands -discussed diagnosis, based purely on exam and her symptoms.  She is already taking Tylenol twice daily.  Offered a topical anti-inflammatory.  She had an allergy to aspirin, discussed this will not likely react the same way.  - diclofenac (VOLTAREN) 1 % topical gel; Place 4 g onto the skin 4 times daily  Dispense: 1 g; Refill: 3  - Urine Microscopic    2. Osteoarthritis of  spine with radiculopathy, lumbar region -encouraged the physical therapy referral that I had placed for her previously    3. Cloudy urine -will evaluate with urinalysis today  - *UA reflex to Microscopic and Culture ([place] and [facility] (except [place] and [place])    4. AK (actinic keratosis) -frozen today, contact if the lesion does not blister.  We will get her over to dermatology if that is the case.  - Hemoglobin A1c  - DESTRUCT PREMALIGNANT LESION, FIRST    5. Essential hypertension -surveillance lab work, blood pressure in range.  - Comprehensive metabolic panel (BMP + Alb, Alk Phos, ALT, AST, Total. Bili, TP)  - Hemoglobin A1c  - Vitamin D Deficiency    6. Other abnormal glucose   - Hemoglobin A1c    7. Age-related osteoporosis without current pathological fracture  - Hemoglobin A1c  - Vitamin D Deficiency     Return in about 1 year (around 2/5/2021) for Yearly Physical Exam.    Cortney Vanessa MD  [facility]

## 2020-02-05 NOTE — LETTER
February 6, 2020      Swathi Dukes  [address]   [place] [address]        Dear ,    We are writing to inform you of your test results.    Your recent lab results are back.     Your vitamin D levels are in a good range.     Your kidney and liver function look good.     Your diabetes screen was negative.     Resulted Orders   *UA reflex to Microscopic and Culture ([place] and [facility] (except [place] and [place])   Result Value Ref Range    Color Urine Yellow     Appearance Urine Cloudy     Glucose Urine Negative NEG^Negative mg/dL    Bilirubin Urine Negative NEG^Negative    Ketones Urine Negative NEG^Negative mg/dL    Specific Gravity Urine 1.015 1.003 - 1.035    Blood Urine Negative NEG^Negative    pH Urine 7.0 5.0 - 7.0 pH    Protein Albumin Urine Negative NEG^Negative mg/dL    Urobilinogen Urine 0.2 0.2 - 1.0 EU/dL    Nitrite Urine Negative NEG^Negative    Leukocyte Esterase Urine Large (A) NEG^Negative    Source Midstream Urine    Comprehensive metabolic panel (BMP + Alb, Alk Phos, ALT, AST, Total. Bili, TP)   Result Value Ref Range    Sodium 138 133 - 144 mmol/L    Potassium 4.2 3.4 - 5.3 mmol/L    Chloride 106 94 - 109 mmol/L    Carbon Dioxide 28 20 - 32 mmol/L    Anion Gap 4 3 - 14 mmol/L    Glucose 101 (H) 70 - 99 mg/dL    Urea Nitrogen 13 7 - 30 mg/dL    Creatinine 0.80 0.52 - 1.04 mg/dL    GFR Estimate 68 >60 mL/min/[1.73_m2]      Comment:      Non  GFR Calc  Starting 12/18/2018, serum creatinine based estimated GFR (eGFR) will be   calculated using the Chronic Kidney Disease Epidemiology Collaboration   (CKD-EPI) equation.      GFR Estimate If Black 79 >60 mL/min/[1.73_m2]      Comment:       GFR Calc  Starting 12/18/2018, serum creatinine based estimated GFR (eGFR) will be   calculated using the Chronic Kidney Disease Epidemiology Collaboration   (CKD-EPI) equation.      Calcium 9.4 8.5 - 10.1 mg/dL    Bilirubin Total 0.2 0.2 - 1.3 mg/dL     Albumin 3.7 3.4 - 5.0 g/dL    Protein Total 7.0 6.8 - 8.8 g/dL    Alkaline Phosphatase 75 40 - 150 U/L    ALT 18 0 - 50 U/L    AST 19 0 - 45 U/L   Hemoglobin A1c   Result Value Ref Range    Hemoglobin A1C 5.5 0 - 5.6 %      Comment:      Normal <5.7% Prediabetes 5.7-6.4%  Diabetes 6.5% or higher - adopted from ADA   consensus guidelines.     Vitamin D Deficiency   Result Value Ref Range    Vitamin D Deficiency screening 42 20 - 75 ug/L      Comment:      Season, race, dietary intake, and treatment affect the concentration of   25-hydroxy-Vitamin D. Values may decrease during winter months and increase   during summer months. Values 20-29 ug/L may indicate Vitamin D insufficiency   and values <20 ug/L may indicate Vitamin D deficiency.  Vitamin D determination is routinely performed by an immunoassay specific for   25 hydroxyvitamin D3.  If an individual is on vitamin D2 (ergocalciferol)   supplementation, please specify 25 OH vitamin D2 and D3 level determination by   LCMSMS test [id number].     Urine Microscopic   Result Value Ref Range    WBC Urine 25-50 (A) OTO5^0 - 5 /HPF    RBC Urine O - 2 OTO2^O - 2 /HPF    WBC Clumps Present (A) NEG^Negative /HPF    Transitional Epi Few FEW^Few /HPF    Bacteria Urine Many (A) NEG^Negative /HPF   Urine Culture Aerobic Bacterial   Result Value Ref Range    Specimen Description Midstream Urine     Culture Micro       Referred to [facility], await final report.       If you have any questions or concerns, please call the clinic at the number listed above.       Sincerely,        Cortney Vanessa MD

## 2020-02-05 NOTE — TELEPHONE ENCOUNTER
----- Message from Cortney Vanessa MD sent at 2/5/2020  1:48 PM CST -----  Please call - UA shows UTI, script send for macrobid. ANGELA

## 2020-02-06 ENCOUNTER — TELEPHONE (OUTPATIENT)
Dept: FAMILY MEDICINE | Facility: CLINIC | Age: 85
End: 2020-02-06

## 2020-02-06 LAB
ALBUMIN SERPL-MCNC: 3.7 G/DL (ref 3.4–5)
ALP SERPL-CCNC: 75 U/L (ref 40–150)
ALT SERPL W P-5'-P-CCNC: 18 U/L (ref 0–50)
ANION GAP SERPL CALCULATED.3IONS-SCNC: 4 MMOL/L (ref 3–14)
AST SERPL W P-5'-P-CCNC: 19 U/L (ref 0–45)
BACTERIA SPEC CULT: ABNORMAL
BILIRUB SERPL-MCNC: 0.2 MG/DL (ref 0.2–1.3)
BUN SERPL-MCNC: 13 MG/DL (ref 7–30)
CALCIUM SERPL-MCNC: 9.4 MG/DL (ref 8.5–10.1)
CHLORIDE SERPL-SCNC: 106 MMOL/L (ref 94–109)
CO2 SERPL-SCNC: 28 MMOL/L (ref 20–32)
CREAT SERPL-MCNC: 0.8 MG/DL (ref 0.52–1.04)
DEPRECATED CALCIDIOL+CALCIFEROL SERPL-MC: 42 UG/L (ref 20–75)
GFR SERPL CREATININE-BSD FRML MDRD: 68 ML/MIN/{1.73_M2}
GLUCOSE SERPL-MCNC: 101 MG/DL (ref 70–99)
POTASSIUM SERPL-SCNC: 4.2 MMOL/L (ref 3.4–5.3)
PROT SERPL-MCNC: 7 G/DL (ref 6.8–8.8)
SODIUM SERPL-SCNC: 138 MMOL/L (ref 133–144)
SPECIMEN SOURCE: ABNORMAL

## 2020-02-06 NOTE — TELEPHONE ENCOUNTER
Pt calling stating she used the voltaren gel about 10 minutes ago and now is having swelling of her esophagus and difficulty breathing.  She does not have any one at home but her son his coming over any minute and is an EMT.  She does not have any benadryl at home either to take.  Advised she needs to wash off the gel from her hands, call son to see where he is and if he is not close to the house she needs to call 911.    FYI to PCP    Pt expressed understanding and acceptance of the plan.  Pt had no further questions at this time.  Advised can call back to clinic at any time with concerns.

## 2020-02-06 NOTE — TELEPHONE ENCOUNTER
Talked with son over the phone.     Swathi is doing fine. He said she had a mild reaction. Took 50 mg benadryl.     Will not use Voltaren or any NSAID going forward.     Suggested biofreeze for hand discomfort. Can always discuss alternatives if pain persists. JH

## 2020-02-10 ENCOUNTER — THERAPY VISIT (OUTPATIENT)
Dept: PHYSICAL THERAPY | Facility: CLINIC | Age: 85
End: 2020-02-10
Payer: COMMERCIAL

## 2020-02-10 DIAGNOSIS — M54.50 LUMBAGO: ICD-10-CM

## 2020-02-10 DIAGNOSIS — M16.11 PRIMARY OSTEOARTHRITIS OF RIGHT HIP: ICD-10-CM

## 2020-02-10 DIAGNOSIS — M47.26 OSTEOARTHRITIS OF SPINE WITH RADICULOPATHY, LUMBAR REGION: ICD-10-CM

## 2020-02-10 PROCEDURE — 97162 PT EVAL MOD COMPLEX 30 MIN: CPT | Mod: GP | Performed by: PHYSICAL THERAPIST

## 2020-02-10 PROCEDURE — 97110 THERAPEUTIC EXERCISES: CPT | Mod: GP | Performed by: PHYSICAL THERAPIST

## 2020-02-10 ASSESSMENT — ACTIVITIES OF DAILY LIVING (ADL)
RECREATIONAL_ACTIVITIES: SLIGHT DIFFICULTY
SITTING_FOR_15_MINUTES: SLIGHT DIFFICULTY
GETTING_INTO_AND_OUT_OF_AN_AVERAGE_CAR: SLIGHT DIFFICULTY
HOS_ADL_SCORE(%): 75
PUTTING_ON_SOCKS_AND_SHOES: MODERATE DIFFICULTY
GOING_UP_1_FLIGHT_OF_STAIRS: SLIGHT DIFFICULTY
LIGHT_TO_MODERATE_WORK: SLIGHT DIFFICULTY
HOS_ADL_COUNT: 16
DEEP_SQUATTING: SLIGHT DIFFICULTY
WALKING_15_MINUTES_OR_GREATER: NO DIFFICULTY AT ALL
STANDING_FOR_15_MINUTES: SLIGHT DIFFICULTY
WALKING_DOWN_STEEP_HILLS: MODERATE DIFFICULTY
WALKING_UP_STEEP_HILLS: MODERATE DIFFICULTY
HOW_WOULD_YOU_RATE_YOUR_CURRENT_LEVEL_OF_FUNCTION_DURING_YOUR_USUAL_ACTIVITIES_OF_DAILY_LIVING_FROM_0_TO_100_WITH_100_BEING_YOUR_LEVEL_OF_FUNCTION_PRIOR_TO_YOUR_HIP_PROBLEM_AND_0_BEING_THE_INABILITY_TO_PERFORM_ANY_OF_YOUR_USUAL_DAILY_ACTIVITIES?: 50
GOING_DOWN_1_FLIGHT_OF_STAIRS: SLIGHT DIFFICULTY
HEAVY_WORK: MODERATE DIFFICULTY
WALKING_APPROXIMATELY_10_MINUTES: NO DIFFICULTY AT ALL
STEPPING_UP_AND_DOWN_CURBS: SLIGHT DIFFICULTY
HOS_ADL_ITEM_SCORE_TOTAL: 48
ROLLING_OVER_IN_BED: MODERATE DIFFICULTY
WALKING_INITIALLY: NO DIFFICULTY AT ALL
HOS_ADL_HIGHEST_POTENTIAL_SCORE: 64
TWISTING/PIVOTING_ON_INVOLVED_LEG: NO DIFFICULTY AT ALL

## 2020-02-10 NOTE — PROGRESS NOTES
[facility] Initial Evaluation  Subjective:  The history is provided by the patient. No  was used.   Swathi Dukes being seen for low back.   Date of Onset: Jan 2020. Where condition occurred: for unknown reasons.Problem occurred: unknown  and reported as 6/10 on pain scale. General health as reported by patient is good. Pertinent medical history includes:  Asthma, depression, high blood pressure, history of fractures, menopausal and osteoarthritis.  Medical allergies: other. Other medical allergies details: IVP dye, ASA.  Surgeries include:  Orthopedic surgery. Other surgery history details: R femur.  Current medications:  Anti-depressants, heparin/coumadin and high blood pressure medication.     Pain is described as sharp and is intermittent. Pain is worse in the P.M.. Since onset symptoms are unchanged.      Patient is retired.   Barriers include:  Lives alone and transportation.  Red flags:  None as reported by patient.  Type of problem:  Lumbar   Condition occurred with:  Insidious onset. This is a new condition   Problem details: Reporting insidious onset low back pain that radiates to the lateral R>L hip and anterior thigh and shin. Denies numbness/tingling. Unsure of what makes the pain worse, tends to increase as the day goes on. Tylenol helps with the pain..   Patient reports pain:  Lower lumbar spine. Radiates to:  Gluteals right, gluteals left, thigh left, thigh right, lower leg left and lower leg right. Associated symptoms:  Loss of strength. Symptoms are exacerbated by other (unknown) and relieved by rest and analgesics.                      Objective:  Standing Alignment:        Lumbar:  Lateral shift L and lordosis decr                Flexibility/Screens:       Lower Extremity:  Decreased left lower extremity flexibility:Hamstrings    Decreased right lower extremity flexibility:  Hamstrings               Lumbar/SI Evaluation  ROM:  Arom wnl lumbar: RSGIS L hip  to wall: abolishes symptoms.  AROM Lumbar:   Flexion:        Knees LBP  Ext:                    10% LBP   Side Bend:        Left:     Right:   Rotation:           Left:     Right:   Side Glide:        Left:     Right:         Strength: TrA Contraction: poor, Glute Max R: 3/5, L: -3/5  Lumbar Myotomes:    T12-L3 (Hip Flex):  Left: 4    Right: 4  L2-4 (Quads):  Left:  5    Right:  5  L4 (Ankle DF):  Left:  5    Right:  5  L5 (Great Toe Ext): Left: 5    Right: 5   S1 (Toe Raise):  Left: 5    Right: 5        Neural Tension/Mobility:      Left side:SLR w/DF  negative.     Right side:   SLR w/DF  negative.   Lumbar Palpation:    Tenderness present at Left:    PSIS  Tenderness present at Right: PSIS                                                     General     ROS    Assessment/Plan:    Patient is a 84 year old female with lumbar complaints.    Patient has the following significant findings with corresponding treatment plan.                Diagnosis 1:  Lumbar radiculopathy  Pain -  hot/cold therapy, US, manual therapy, education, directional preference exercise and home program  Decreased ROM/flexibility - manual therapy and therapeutic exercise  Decreased strength - therapeutic exercise and therapeutic activities  Impaired muscle performance - neuro re-education  Decreased function - therapeutic activities  Impaired posture - neuro re-education    Therapy Evaluation Codes:   1) History comprised of:   Personal factors that impact the plan of care:      Age.    Comorbidity factors that impact the plan of care are:      Asthma, Depression, High blood pressure, Menopausal and Osteoarthritis.     Medications impacting care: Anti-depressant, High blood pressure and Heparin/coumadin.  2) Examination of Body Systems comprised of:   Body structures and functions that impact the plan of care:      Lumbar spine.   Activity limitations that impact the plan of care are:      Lifting, Stairs, Standing and Walking.  3) Clinical  presentation characteristics are:   Evolving/Changing.  4) Decision-Making    Moderate complexity using standardized patient assessment instrument and/or measureable assessment of functional outcome.  Cumulative Therapy Evaluation is: Moderate complexity.    Previous and current functional limitations:  (See Goal Flow Sheet for this information)    Short term and Long term goals: (See Goal Flow Sheet for this information)     Communication ability:  Patient appears to be able to clearly communicate and understand verbal and written communication and follow directions correctly.  Treatment Explanation - The following has been discussed with the patient:   RX ordered/plan of care  Anticipated outcomes  Possible risks and side effects  This patient would benefit from PT intervention to resume normal activities.   Rehab potential is good.    Frequency:  1 X week, once daily  Duration:  for 6 weeks  Discharge Plan:  Achieve all LTG.  Independent in home treatment program.  Reach maximal therapeutic benefit.    Please refer to the daily flowsheet for treatment today, total treatment time and time spent performing 1:1 timed codes.

## 2020-02-11 ENCOUNTER — ANCILLARY PROCEDURE (OUTPATIENT)
Dept: CARDIOLOGY | Facility: CLINIC | Age: 85
End: 2020-02-11
Attending: INTERNAL MEDICINE
Payer: COMMERCIAL

## 2020-02-11 DIAGNOSIS — I35.9 AORTIC VALVE DISORDER: ICD-10-CM

## 2020-02-11 PROCEDURE — 93306 TTE W/DOPPLER COMPLETE: CPT | Performed by: INTERNAL MEDICINE

## 2020-02-21 ENCOUNTER — THERAPY VISIT (OUTPATIENT)
Dept: PHYSICAL THERAPY | Facility: CLINIC | Age: 85
End: 2020-02-21
Payer: COMMERCIAL

## 2020-02-21 DIAGNOSIS — M54.50 LUMBAGO: ICD-10-CM

## 2020-02-21 PROCEDURE — 97110 THERAPEUTIC EXERCISES: CPT | Mod: GP | Performed by: PHYSICAL THERAPIST

## 2020-02-21 PROCEDURE — 97112 NEUROMUSCULAR REEDUCATION: CPT | Mod: GP | Performed by: PHYSICAL THERAPIST

## 2020-02-27 ENCOUNTER — TELEPHONE (OUTPATIENT)
Dept: CARDIOLOGY | Facility: CLINIC | Age: 85
End: 2020-02-27

## 2020-02-27 DIAGNOSIS — I35.9 AORTIC VALVE DISORDER: Primary | ICD-10-CM

## 2020-02-27 NOTE — TELEPHONE ENCOUNTER
Echo was completed on 02-.    ----- Message from Luis Manuel Davis MD sent at 2/12/2020  1:06 PM CST -----  Echo results noted.  Small pericardial effusion, mild aortic stenosis, mild to moderate aortic insufficiency.  There is no apparent reason for her to be on Plavix.  We will discontinue this medication.  Plans will be for a one-year follow-up office visit with echocardiogram.    Patient was informed of her results.   Patient was instructed to stop her Plavix. Med list updated.   Patient was advised to f/u next year w/repeat Echo.   Patient had no questions.     Rad RN, BSN  [facility]

## 2020-03-02 ENCOUNTER — THERAPY VISIT (OUTPATIENT)
Dept: PHYSICAL THERAPY | Facility: CLINIC | Age: 85
End: 2020-03-02
Payer: COMMERCIAL

## 2020-03-02 DIAGNOSIS — M54.50 LUMBAGO: ICD-10-CM

## 2020-03-02 PROCEDURE — 97112 NEUROMUSCULAR REEDUCATION: CPT | Mod: GP | Performed by: PHYSICAL THERAPIST

## 2020-03-02 PROCEDURE — 97110 THERAPEUTIC EXERCISES: CPT | Mod: GP | Performed by: PHYSICAL THERAPIST

## 2020-03-09 ENCOUNTER — THERAPY VISIT (OUTPATIENT)
Dept: PHYSICAL THERAPY | Facility: CLINIC | Age: 85
End: 2020-03-09
Payer: COMMERCIAL

## 2020-03-09 DIAGNOSIS — M54.50 LUMBAGO: ICD-10-CM

## 2020-03-09 PROCEDURE — 97110 THERAPEUTIC EXERCISES: CPT | Mod: GP | Performed by: PHYSICAL THERAPIST

## 2020-03-09 PROCEDURE — 97112 NEUROMUSCULAR REEDUCATION: CPT | Mod: GP | Performed by: PHYSICAL THERAPIST

## 2020-04-17 ENCOUNTER — NURSE TRIAGE (OUTPATIENT)
Dept: FAMILY MEDICINE | Facility: CLINIC | Age: 85
End: 2020-04-17

## 2020-04-17 NOTE — TELEPHONE ENCOUNTER
Can reasonably use mucinex over the weekend if needed. Available over the counter, can son pick some up? ANGELA

## 2020-04-17 NOTE — TELEPHONE ENCOUNTER
"Pt calls, wants  input     S-(situation): thinks breathing is off    B-(background): coughs 1-2 times an hour, non productive, onset past 3 days, \"feels like obstacle in my throat\", lives at [facility], had nurse evaluate at facility, P-wnl, lungs sounds clear, no obvious concerns, no fever,  pt denies cold symptoms, able to articulate without difficulty, nurse at [facility] told pt to call PCP, pt \"thinks may need mucinex\",nurse will be checking in later this afternoon to \"see what pcp says\"    A-(assessment): breathing concern    R-(recommendations): pt wants  input, ROUTED TO , PLEASE ADVISE, OK TO OBSERVE, TRIAL OF MUCINEX?     INFORM PT OF PLAN    Additional Information    Negative: Breathing stopped and hasn't returned    Negative: Choking on something    Negative: SEVERE difficulty breathing (e.g., struggling for each breath, speaks in single words, pulse > 120)    Negative: Bluish (or gray) lips or face    Negative: Difficult to awaken or acting confused (e.g., disoriented, slurred speech)    Negative: Passed out (i.e., fainted, collapsed and was not responding)    Negative: Wheezing started suddenly after medicine, an allergic food, or bee sting    Negative: Stridor    Negative: Slow, shallow and weak breathing    Negative: Sounds like a life-threatening emergency to the triager    Negative: Chest pain    Negative: Wheezing (high pitched whistling sound) and previous asthma attacks or use of asthma medicines    Negative: Difficulty breathing and only present when coughing    Negative: Difficulty breathing and only from stuffy or runny nose    Negative: MODERATE difficulty breathing (e.g., speaks in phrases, SOB even at rest, pulse 100-120) of new onset or worse than normal    Negative: Wheezing can be heard across the room    Negative: Drooling or spitting out saliva (because can't swallow)    Negative: Any history of prior \"blood clot\" in leg or lungs    Negative: Recent illness requiring " "prolonged bedrest (i.e., immobilization)    Negative: Hip or leg fracture in past 2 months (e.g., or had cast on leg or ankle)    Negative: Major surgery in the past month    Negative: Recent long-distance travel with prolonged time in car, bus, plane, or train (i.e., within past 2 weeks; 6 or  more hours duration)    Negative: Extra heart beats OR irregular heart beating   (i.e., \"palpitations\")    Negative: Fever > 103 F (39.4 C)    Negative: Fever > 101 F (38.3 C) and over 60 years of age    Negative: Fever > 100.0 F (37.8 C) and bedridden (e.g., nursing home patient, stroke, chronic illness, recovering from surgery)    Negative: Fever > 100.0 F (37.8 C) and diabetes mellitus or weak immune system (e.g., HIV positive, cancer chemo, splenectomy, organ transplant, chronic steroids)    Negative: Periods where breathing stops and then resumes normally and bedridden (e.g., nursing home patient, CVA)    Negative: Pregnant or postpartum (< 1 month since delivery)    Negative: Patient sounds very sick or weak to the triager    Answer Assessment - Initial Assessment Questions  1. RESPIRATORY STATUS: \"Describe your breathing?\" (e.g., wheezing, shortness of breath, unable to speak, severe coughing)       \"feels like obstacle in my throat\" \"not free flowing\"  2. ONSET: \"When did this breathing problem begin?\"       2-3 days  3. PATTERN \"Does the difficult breathing come and go, or has it been constant since it started?\"       \"pretty constant\"  4. SEVERITY: \"How bad is your breathing?\" (e.g., mild, moderate, severe)     - MILD: No SOB at rest, mild SOB with walking, speaks normally in sentences, can lay down, no retractions, pulse < 100.   5. RECURRENT SYMPTOM: \"Have you had difficulty breathing before?\" If so, ask: \"When was the last time?\" and \"What happened that time?\"       Yes, this time worse, no wheezing, thinks asthm  6. CARDIAC HISTORY: \"Do you have any history of heart disease?\" (e.g., heart attack, angina, bypass " "surgery, angioplasty)       no  7. LUNG HISTORY: \"Do you have any history of lung disease?\"  (e.g., pulmonary embolus, asthma, emphysema)      Had COPD 5 years ago and no longer a problem  8. CAUSE: \"What do you think is causing the breathing problem?\"       I have a bad cold  9. OTHER SYMPTOMS: \"Do you have any other symptoms? (e.g., dizziness, runny nose, cough, chest pain, fever)      Has non productive, coughs 1-2 times an hour, denies cold symptoms  10. PREGNANCY: \"Is there any chance you are pregnant?\" \"When was your last menstrual period?\"        n/a  11. TRAVEL: \"Have you traveled out of the country in the last month?\" (e.g., travel history, exposures)        no    Protocols used: BREATHING DIFFICULTY-A-OH    Swathi Leggett RN, BSN  Message handled by Nurse Triage.    "

## 2020-04-20 ENCOUNTER — TELEPHONE (OUTPATIENT)
Dept: FAMILY MEDICINE | Facility: CLINIC | Age: 85
End: 2020-04-20

## 2020-04-20 NOTE — TELEPHONE ENCOUNTER
Pt calls, has phone visit tomorrow, c/o left ear pain, see 4/17/20 phone message, informs trial of mucinex helping, ear hurts when opens up mouth, informed best to make appointment, will see if [facility] has other recommendations tomorrow, pt will check with son to see if can bring in    Swathi Leggett RN, BSN  Message handled by Nurse Triage.

## 2020-04-21 ENCOUNTER — VIRTUAL VISIT (OUTPATIENT)
Dept: FAMILY MEDICINE | Facility: CLINIC | Age: 85
End: 2020-04-21
Payer: COMMERCIAL

## 2020-04-21 DIAGNOSIS — M26.609 TMJ (TEMPOROMANDIBULAR JOINT SYNDROME): Primary | ICD-10-CM

## 2020-04-21 PROCEDURE — 99213 OFFICE O/P EST LOW 20 MIN: CPT | Mod: 95 | Performed by: FAMILY MEDICINE

## 2020-04-21 RX ORDER — PREDNISONE 20 MG/1
20 TABLET ORAL DAILY
Qty: 5 TABLET | Refills: 0 | Status: SHIPPED | OUTPATIENT
Start: 2020-04-21 | End: 2020-07-14

## 2020-04-21 NOTE — PROGRESS NOTES
"Swathi Dukes is a 84 year old female who is being evaluated via a billable telephone visit.      The patient has been notified of following:     \"This telephone visit will be conducted via a call between you and your physician/provider. We have found that certain health care needs can be provided without the need for a physical exam.  This service lets us provide the care you need with a short phone conversation.  If a prescription is necessary we can send it directly to your pharmacy.  If lab work is needed we can place an order for that and you can then stop by our lab to have the test done at a later time.    Telephone visits are billed at different rates depending on your insurance coverage. During this emergency period, for some insurers they may be billed the same as an in-person visit.  Please reach out to your insurance provider with any questions.    If during the course of the call the physician/provider feels a telephone visit is not appropriate, you will not be charged for this service.\"    Patient has given verbal consent for Telephone visit?  Yes    How would you like to obtain your AVS? Mail a copy    Subjective     Swathi Dukes is a 84 year old female who presents to clinic today for the following health issues:    HPI    Had upper respiratory infection that started last week.  Began having left ear and jaw pain a few days into her illness.  Notes that she has significant left jaw pain that radiates to the left ear when chewing.    Heat and ice offer temporary relief.  Having some ear and jaw pain at rest, significant jaw pain that radiates to the ear with chewing.  No drainage from the ear.  No fevers.  No clicking or locking of the jaw.  No current dental issues.      Patient Active Problem List   Diagnosis     Essential hypertension     Anxiety     Glaucoma suspect, bilateral     Personal history of DVT (deep vein thrombosis)     Mixed obsessional thoughts and acts     Age-related osteoporosis " without current pathological fracture     Nonrheumatic aortic valve insufficiency     Lumbago     History reviewed. No pertinent surgical history.    Social History     Tobacco Use     Smoking status: Never Smoker     Smokeless tobacco: Never Used   Substance Use Topics     Alcohol use: Not Currently     Family History   Problem Relation Age of Onset     Diabetes Father            Reviewed and updated as needed this visit by Provider         Review of Systems   ROS COMP: Constitutional, HEENT, cardiovascular, pulmonary, gi and gu systems are negative, except as otherwise noted.       Objective   Reported vitals:  There were no vitals taken for this visit.   PSYCH: Alert and oriented times 3; coherent speech, normal   rate and volume, able to articulate logical thoughts, able   to abstract reason, no tangential thoughts, no hallucinations   or delusions  Her affect is normal    Diagnostic Test Results:  Labs reviewed in Epic        Assessment/Plan:  1. TMJ (temporomandibular joint syndrome) -suspected based on her current symptoms, but did review that I cannot assess the ear canal or middle ear structures over the phone.  She has an anaphylactic allergy to NSAIDs and aspirin.  For this reason, offered her oral prednisone low-dose for the next 5 days.  Reviewed potential side effects with her.  I will have my staff reach out to her in 3 days to assess her progress.  - predniSONE (DELTASONE) 20 MG tablet; Take 1 tablet (20 mg) by mouth daily  Dispense: 5 tablet; Refill: 0    Return in about 6 months (around 10/21/2020) for Medication Recheck.      Phone call duration:  12 minutes    Cortney Vanessa MD

## 2020-04-21 NOTE — Clinical Note
Please call on Friday AM to see how jaw pain is. If not better, needs to be seen by [facility] or [facility]. [facility]

## 2020-04-23 ENCOUNTER — NURSE TRIAGE (OUTPATIENT)
Dept: FAMILY MEDICINE | Facility: CLINIC | Age: 85
End: 2020-04-23

## 2020-04-23 NOTE — TELEPHONE ENCOUNTER
Patient calling stating she had a phone visit with Dr. Vanessa on 4/21/2020 for left ear pain and jaw pain and she is not getting any better.  Feels like she is getting worse.  Now having a fever (99.6)  Says her normal temp is low.  States that her right ear is now starting to hurt.  Her neighbor said her left ear looks a little swollen.  Feels like her breathing is labored or more shallow.  States that she was wheezing last night but it went away today.  Has a slight cough.  Says she has been dealing with this for 6 weeks and would like to have someone take a look at it.  Appointment scheduled tomorrow, 4/24/2020 with Dr. Parra.    Ashley Sol RN

## 2020-04-24 ENCOUNTER — OFFICE VISIT (OUTPATIENT)
Dept: FAMILY MEDICINE | Facility: CLINIC | Age: 85
End: 2020-04-24
Payer: COMMERCIAL

## 2020-04-24 ENCOUNTER — TELEPHONE (OUTPATIENT)
Dept: FAMILY MEDICINE | Facility: CLINIC | Age: 85
End: 2020-04-24

## 2020-04-24 VITALS
DIASTOLIC BLOOD PRESSURE: 60 MMHG | OXYGEN SATURATION: 98 % | HEART RATE: 63 BPM | TEMPERATURE: 98.6 F | RESPIRATION RATE: 14 BRPM | SYSTOLIC BLOOD PRESSURE: 170 MMHG

## 2020-04-24 DIAGNOSIS — J06.9 VIRAL URI WITH COUGH: ICD-10-CM

## 2020-04-24 DIAGNOSIS — H61.22 IMPACTED CERUMEN OF LEFT EAR: Primary | ICD-10-CM

## 2020-04-24 DIAGNOSIS — I10 ESSENTIAL HYPERTENSION: ICD-10-CM

## 2020-04-24 PROCEDURE — 69210 REMOVE IMPACTED EAR WAX UNI: CPT | Performed by: FAMILY MEDICINE

## 2020-04-24 PROCEDURE — 99213 OFFICE O/P EST LOW 20 MIN: CPT | Mod: 25 | Performed by: FAMILY MEDICINE

## 2020-04-24 ASSESSMENT — ENCOUNTER SYMPTOMS
TROUBLE SWALLOWING: 0
FEVER: 0
CHEST TIGHTNESS: 0
RHINORRHEA: 0
WHEEZING: 0
SHORTNESS OF BREATH: 0
SORE THROAT: 0

## 2020-04-24 NOTE — PATIENT INSTRUCTIONS
Patient Education       Earwax, Home Treatment    Everyone produces earwax from the lining of the ear canal. It serves to lubricate and protect the ear. The wax that forms in the canal naturally moves toward the outside of the ear and falls out. Sometimes the ear canal may contain too much wax. This can cause a blockage and loss of hearing. Directions are given below for home treatment.  Home care  If your doctor has advised you to remove a wax blockage yourself, follow these directions:    Unless a medicine was prescribed, you may use an over-the-counter product made for clearing earwax. These contain carbamide peroxide. Lie down with the blocked ear facing upward. Apply one dropper full of medicine and wait a few minutes. Grasp the outer ear and wiggle it to help the solution enter the canal.    Lean over a sink or basin with the blocked ear facing downward. Use a bulb syringe filled with warm (not hot or cold) water to rinse the ear several times. Use gentle pressure only.    If you are having trouble draining the water out of your ear canal, put a few drops of rubbing alcohol (isopropyl alcohol) into the ear canal. This will help remove the remaining water.    Repeat this procedure once a day for up to three days, or until your hearing is back to normal. Do not use this treatment for more than three days in a row.  Don ts    Don t use cold water to rinse the ear. This will make you dizzy.    Don t perform this procedure if you have an ear infection.    Don t perform this procedure if you have a ruptured eardrum.    Don t use cotton swabs, matches, hairpins, keys, or other objects to  clean  the ear canal. This can cause infection of the ear canal or rupture the eardrum. Because of their size and shape, cotton swabs can push earwax deeper into the ear canal instead of removing it.  Follow-up care  Follow up with your health care provider if you are not improving after three cleaning attempts, or as advised.  When  to seek medical advice  Call your health care provider right away if any of these occur:    Worsening ear pain    Fever of 101 F (38.3 C) or higher, or as directed by your health care provider    Hearing does not return to normal after three days of treatment    Fluid drainage or bleeding from the ear canal    Swelling, redness, or tenderness of the outer ear    Headache, neck pain, or stiff neck    3088-9756 The [employer]. [address] [place] [address], [place] [address]. All rights reserved. This information is not intended as a substitute for professional medical care. Always follow your healthcare professional's instructions.

## 2020-04-24 NOTE — TELEPHONE ENCOUNTER
Patient calling stating that when she was in for her visit today, her blood pressure was 170. Patient stated that she meant to talk to Dr. Parra about it but forgot. Patient is wondering if she needs to change her medication. Please advise and call patient at [phone], okay to leave message.    Kari Islas,

## 2020-04-24 NOTE — TELEPHONE ENCOUNTER
TC contacted patient; recommend 1 wk followup for BP recheck, patient declined to schedule and will call us back later to schedule appt.    Aidan Parra MD

## 2020-04-24 NOTE — PROGRESS NOTES
Subjective     Swathi Dukes is a 84 year old female who presents to clinic today for the following health issues:    HPI   Patient is a pleasant 84-year-old female with history of essential hypertension who presents to clinic for concerns of left ear pain.     She is currently on Mucinex for recent cold-like symptoms have been getting better.  Has been dealing with symptoms for over a month.  No difficulties breathing however intermittently gets a nonproductive cough which Mucinex seems to be helping.  Her biggest concern is that her left ear hurts and now her right ear is beginning to hurt.  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    Reviewed and updated as needed this visit by Provider         Review of Systems   Constitutional: Negative for fever.   HENT: Positive for congestion. Negative for rhinorrhea, sore throat and trouble swallowing.    Respiratory: Negative for chest tightness, shortness of breath and wheezing.    Cardiovascular: Negative for chest pain.      Past Medical History:   Diagnosis Date     HTN (hypertension)          Family History   Problem Relation Age of Onset     Diabetes Father        Social History     Tobacco Use     Smoking status: Never Smoker     Smokeless tobacco: Never Used   Substance Use Topics     Alcohol use: Not Currently           Objective    BP (!) 170/60 (BP Location: Left arm, Patient Position: Left side, Cuff Size: Adult Small)   Pulse 63   Temp 98.6  F (37  C)   Resp 14   SpO2 98%   There is no height or weight on file to calculate BMI.   171/64 mmHg on recheck       Physical Exam  Vitals signs reviewed.   Constitutional:       Appearance: She is not ill-appearing.   HENT:      Right Ear: Tympanic membrane normal.      Left Ear: Tympanic membrane normal.      Ears:      Comments: Scant cerumen in the right ear canal.    Left ear canal,  complete obstruction with cerumen.    Bilateral mastoids are normal in appearance.  No pre-or postauricular adenopathy.       Mouth/Throat:      Pharynx: Oropharynx is clear.   Eyes:      General:         Right eye: No discharge.         Left eye: No discharge.   Cardiovascular:      Rate and Rhythm: Normal rate and regular rhythm.   Pulmonary:      Effort: Pulmonary effort is normal.      Breath sounds: Normal breath sounds.   Lymphadenopathy:      Cervical: No cervical adenopathy.   Psychiatric:         Mood and Affect: Mood normal.          Diagnostic Test Results:  Labs reviewed in Epic        Assessment & Plan     1. Impacted cerumen of left ear  Full droplet precautions were obtained throughout the entire visit.  Left ear canal was irrigated with warm saline, cerumen was removed via curette by the physician, remove cerumen was shown to the patient, had symptomatic improvement after removal.  Recheck after cerumen removal showed an intact tympanic membrane without evidence of otitis media.  Start Debrox.  - carbamide peroxide (DEBROX) 6.5 % otic solution; Place 5 drops into both ears 2 times daily  Dispense: 15 mL; Refill: 0  - REMOVE IMPACTED CERUMEN    2. Essential hypertension  Uncontrolled, asymptomatic, recheck BP in 1 week.      3. Viral URI with cough  Improving.  Saturating well on room air with no focal pulmonary findings.  X-ray not necessary at this time.  We will continue to monitor.    Return in about 3 days (around 4/27/2020) for If symptoms do not improve or gets worse..    Aidan Parra MD  [facility]      Documentation was prepared using Dragon voice recognition software. Please excuse typographical errors. Please contact me if documentation is unclear.

## 2020-05-26 ENCOUNTER — TRANSFERRED RECORDS (OUTPATIENT)
Dept: HEALTH INFORMATION MANAGEMENT | Facility: CLINIC | Age: 85
End: 2020-05-26

## 2020-05-29 ENCOUNTER — TRANSFERRED RECORDS (OUTPATIENT)
Dept: HEALTH INFORMATION MANAGEMENT | Facility: CLINIC | Age: 85
End: 2020-05-29

## 2020-05-29 ENCOUNTER — OFFICE VISIT (OUTPATIENT)
Dept: FAMILY MEDICINE | Facility: CLINIC | Age: 85
End: 2020-05-29
Payer: COMMERCIAL

## 2020-05-29 ENCOUNTER — TELEPHONE (OUTPATIENT)
Dept: FAMILY MEDICINE | Facility: CLINIC | Age: 85
End: 2020-05-29

## 2020-05-29 VITALS
TEMPERATURE: 98.9 F | BODY MASS INDEX: 23.78 KG/M2 | HEIGHT: 64 IN | OXYGEN SATURATION: 94 % | SYSTOLIC BLOOD PRESSURE: 148 MMHG | HEART RATE: 64 BPM | WEIGHT: 139.3 LBS | DIASTOLIC BLOOD PRESSURE: 60 MMHG

## 2020-05-29 DIAGNOSIS — M77.22 INFLAMMATION AROUND WRIST, LEFT: ICD-10-CM

## 2020-05-29 DIAGNOSIS — K52.9 CHRONIC DIARRHEA: Primary | ICD-10-CM

## 2020-05-29 DIAGNOSIS — D72.829 LEUKOCYTOSIS, UNSPECIFIED TYPE: ICD-10-CM

## 2020-05-29 LAB
BASOPHILS # BLD AUTO: 0 10E9/L (ref 0–0.2)
BASOPHILS NFR BLD AUTO: 0.4 %
DIFFERENTIAL METHOD BLD: ABNORMAL
EOSINOPHIL # BLD AUTO: 0.1 10E9/L (ref 0–0.7)
EOSINOPHIL NFR BLD AUTO: 1.5 %
ERYTHROCYTE [DISTWIDTH] IN BLOOD BY AUTOMATED COUNT: 14.1 % (ref 10–15)
ERYTHROCYTE [SEDIMENTATION RATE] IN BLOOD BY WESTERGREN METHOD: 31 MM/H (ref 0–30)
HCT VFR BLD AUTO: 32.3 % (ref 35–47)
HGB BLD-MCNC: 10.4 G/DL (ref 11.7–15.7)
LYMPHOCYTES # BLD AUTO: 1.7 10E9/L (ref 0.8–5.3)
LYMPHOCYTES NFR BLD AUTO: 25.5 %
MCH RBC QN AUTO: 29.5 PG (ref 26.5–33)
MCHC RBC AUTO-ENTMCNC: 32.2 G/DL (ref 31.5–36.5)
MCV RBC AUTO: 92 FL (ref 78–100)
MONOCYTES # BLD AUTO: 0.9 10E9/L (ref 0–1.3)
MONOCYTES NFR BLD AUTO: 13.4 %
NEUTROPHILS # BLD AUTO: 4 10E9/L (ref 1.6–8.3)
NEUTROPHILS NFR BLD AUTO: 59.2 %
PLATELET # BLD AUTO: 240 10E9/L (ref 150–450)
RBC # BLD AUTO: 3.52 10E12/L (ref 3.8–5.2)
TSH SERPL-ACNC: 2.22 UIU/ML (ref 0.45–4.5)
WBC # BLD AUTO: 6.7 10E9/L (ref 4–11)

## 2020-05-29 PROCEDURE — 86140 C-REACTIVE PROTEIN: CPT | Performed by: PHYSICIAN ASSISTANT

## 2020-05-29 PROCEDURE — 86039 ANTINUCLEAR ANTIBODIES (ANA): CPT | Performed by: PHYSICIAN ASSISTANT

## 2020-05-29 PROCEDURE — 85652 RBC SED RATE AUTOMATED: CPT | Performed by: PHYSICIAN ASSISTANT

## 2020-05-29 PROCEDURE — 86256 FLUORESCENT ANTIBODY TITER: CPT | Mod: 90 | Performed by: PHYSICIAN ASSISTANT

## 2020-05-29 PROCEDURE — 80053 COMPREHEN METABOLIC PANEL: CPT | Performed by: PHYSICIAN ASSISTANT

## 2020-05-29 PROCEDURE — 86038 ANTINUCLEAR ANTIBODIES: CPT | Performed by: PHYSICIAN ASSISTANT

## 2020-05-29 PROCEDURE — 86200 CCP ANTIBODY: CPT | Performed by: PHYSICIAN ASSISTANT

## 2020-05-29 PROCEDURE — 83516 IMMUNOASSAY NONANTIBODY: CPT | Performed by: PHYSICIAN ASSISTANT

## 2020-05-29 PROCEDURE — 99000 SPECIMEN HANDLING OFFICE-LAB: CPT | Performed by: PHYSICIAN ASSISTANT

## 2020-05-29 PROCEDURE — 86431 RHEUMATOID FACTOR QUANT: CPT | Performed by: PHYSICIAN ASSISTANT

## 2020-05-29 PROCEDURE — 99214 OFFICE O/P EST MOD 30 MIN: CPT | Performed by: PHYSICIAN ASSISTANT

## 2020-05-29 PROCEDURE — 85025 COMPLETE CBC W/AUTO DIFF WBC: CPT | Performed by: PHYSICIAN ASSISTANT

## 2020-05-29 PROCEDURE — 36415 COLL VENOUS BLD VENIPUNCTURE: CPT | Performed by: PHYSICIAN ASSISTANT

## 2020-05-29 ASSESSMENT — MIFFLIN-ST. JEOR: SCORE: 1068.92

## 2020-05-29 NOTE — TELEPHONE ENCOUNTER
Left  hand pain started 4-5 days and today it is going up to elbow.  Hand might be swollen a little bit.  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  No redness or tenderness.  No injury. Can't make a tight fist.  She also has pain in the right middle finger but the left hand has pain in all fingers.    Please advise if pt should be seen in clinic or if you can do a telephone visit with  Her.    Siva Aguilar RN, BSN

## 2020-05-29 NOTE — TELEPHONE ENCOUNTER
General Call:   Who is calling:  Swathi  Reason for Call:  Left arm and Hand pain  What are your questions or concerns:  Patient thinks Dr. Vanessa should know for the last 5 days she has been having left hand pain and now for the last day left arm pain  Date of last appointment with provider: 04/24/20 Dr. Aida Maldonado to leave a detailed message:Yes at Home number on file [phone] (home)     Coty John

## 2020-05-29 NOTE — PROGRESS NOTES
Subjective     Swathi Dukes is a 84 year old female who presents to clinic today for the following health issues:    Started with right middle finger pain and swelling and locked up and then turned into left hand and wrist swelling, redness and warm to tough.  Not had before and no recent injury      Also, she has additional complaints of has had chronic diarrhea for 20 years.  Takes immodium and now hasnt gone for 2 days. .      HPI   Joint Pain    Onset: a week ago with just the finger pains    Description:   Location: left shoulder and left wrist  Character: shooting pain    Intensity: 10/10    Progression of Symptoms: intermittent    Accompanying Signs & Symptoms:  Other symptoms: swelling wrist pain  - very intense pain - shooting pain to elbow     History:   Previous similar pain: no       Precipitating factors:   Trauma or overuse: no     Alleviating factors:  Improved by: nothing    Therapies Tried and outcome:           Current Outpatient Medications   Medication Sig Dispense Refill     ALPRAZolam (XANAX) 0.5 MG tablet 2 times daily as needed       carbamide peroxide (DEBROX) 6.5 % otic solution Place 5 drops into both ears 2 times daily 15 mL 0     enalapril (VASOTEC) 20 MG tablet Take 1 tablet (20 mg) by mouth 2 times daily 180 tablet 3     hydrALAZINE (APRESOLINE) 25 MG tablet Take 1 tablet (25 mg) by mouth 3 times daily 270 tablet 3     latanoprost (XALATAN) 0.005 % ophthalmic solution daily       OLANZapine (ZYPREXA) 10 MG tablet At Bedtime       PARoxetine (PAXIL) 20 MG tablet daily       predniSONE (DELTASONE) 20 MG tablet Take 1 tablet (20 mg) by mouth daily 5 tablet 0     simvastatin (ZOCOR) 10 MG tablet Take 1 tablet (10 mg) by mouth At Bedtime 90 tablet 3     Recent Labs   Lab Test 05/29/20  1500 02/05/20  1145 01/29/20  1144   A1C  --  5.5  --    LDL  --   --  68   HDL  --   --  79   TRIG  --   --  136   ALT 17 18  --    CR 0.74 0.80  --    GFRESTIMATED 75 68  --    GFRESTBLACK 87 79  --   "  POTASSIUM 3.8 4.2  --       BP Readings from Last 3 Encounters:   05/29/20 (!) 148/60   04/30/20 120/40   04/24/20 (!) 170/60    Wt Readings from Last 3 Encounters:   05/29/20 63.2 kg (139 lb 4.8 oz)   04/30/20 63.5 kg (140 lb)   02/05/20 63.5 kg (140 lb)                      Reviewed and updated as needed this visit by Provider         Review of Systems   Constitutional, HEENT, cardiovascular, pulmonary, gi and gu systems are negative, except as otherwise noted.      Objective    BP (!) 148/60 (BP Location: Right arm, Patient Position: Chair, Cuff Size: Adult Regular)   Pulse 64   Temp 98.9  F (37.2  C) (Oral)   Ht 1.613 m (5' 3.5\")   Wt 63.2 kg (139 lb 4.8 oz)   SpO2 94%   BMI 24.29 kg/m    Body mass index is 24.29 kg/m .  Physical Exam   GENERAL: healthy, alert and no distress  HENT: ear canals and TM's normal, nose and mouth without ulcers or lesions  RESP: lungs clear to auscultation - no rales, rhonchi or wheezes  CV: regular rate and rhythm, normal S1 S2, no S3 or S4, no murmur, click or rub, no peripheral edema and peripheral pulses strong  Left wrist/hand: swollen and red and warm to touch over dorsum of hand and over wrist.  rom and hand  decreased due to pain.     Diagnostic Test Results:  Labs reviewed in Epic        Assessment & Plan     1. Chronic diarrhea  Well get lab to rule out celiac.  She is unsure if this has been done.   - Endomysial Antibody IgA by IFA  - Tissue transglutaminase conchita IgA and IgG    2. Inflammation around wrist, left  ? An autoimmune process like RA.  Will contact patient with results when known and determine plan  Put n wrist brace and advised to wear day and night   - CBC with platelets differential  - Comprehensive metabolic panel  - Erythrocyte sedimentation rate auto  - Rheumatoid factor  - CRP inflammation  - Cyclic Citrullinated Peptide Antibody IgG  - Anti Nuclear Conchita IgG by IFA with Reflex  - Wrist/Arm Supplies Order for DME - ONLY FOR DME    3. " Leukocytosis, unspecified type     - CBC with platelets differential       See Patient Instructions    No follow-ups on file.    Ramona Ann Aaseby-Aguilera, PA-C  [facility]

## 2020-05-30 LAB
ALBUMIN SERPL-MCNC: 3.5 G/DL (ref 3.4–5)
ALP SERPL-CCNC: 73 U/L (ref 40–150)
ALT SERPL W P-5'-P-CCNC: 17 U/L (ref 0–50)
ANION GAP SERPL CALCULATED.3IONS-SCNC: 6 MMOL/L (ref 3–14)
AST SERPL W P-5'-P-CCNC: 21 U/L (ref 0–45)
BILIRUB SERPL-MCNC: 0.3 MG/DL (ref 0.2–1.3)
BUN SERPL-MCNC: 12 MG/DL (ref 7–30)
CALCIUM SERPL-MCNC: 8.9 MG/DL (ref 8.5–10.1)
CHLORIDE SERPL-SCNC: 106 MMOL/L (ref 94–109)
CO2 SERPL-SCNC: 26 MMOL/L (ref 20–32)
CREAT SERPL-MCNC: 0.74 MG/DL (ref 0.52–1.04)
CRP SERPL-MCNC: 16 MG/L (ref 0–8)
GFR SERPL CREATININE-BSD FRML MDRD: 75 ML/MIN/{1.73_M2}
GLUCOSE SERPL-MCNC: 93 MG/DL (ref 70–99)
POTASSIUM SERPL-SCNC: 3.8 MMOL/L (ref 3.4–5.3)
PROT SERPL-MCNC: 7 G/DL (ref 6.8–8.8)
SODIUM SERPL-SCNC: 138 MMOL/L (ref 133–144)

## 2020-05-31 LAB — ENDOMYSIUM IGA TITR SER IF: NORMAL {TITER}

## 2020-06-01 ENCOUNTER — TELEPHONE (OUTPATIENT)
Dept: FAMILY MEDICINE | Facility: CLINIC | Age: 85
End: 2020-06-01

## 2020-06-01 DIAGNOSIS — M19.032 JOINT INFLAMMATION OF LEFT HAND AND WRIST: Primary | ICD-10-CM

## 2020-06-01 DIAGNOSIS — M19.042 JOINT INFLAMMATION OF LEFT HAND AND WRIST: Primary | ICD-10-CM

## 2020-06-01 LAB
CCP AB SER IA-ACNC: 1 U/ML
RHEUMATOID FACT SER NEPH-ACNC: <7 IU/ML (ref 0–20)
TTG IGA SER-ACNC: 1 U/ML
TTG IGG SER-ACNC: <1 U/ML

## 2020-06-01 NOTE — TELEPHONE ENCOUNTER
Pt notified of below    Pt expressed understanding and acceptance of the plan. had no further questions at this time.  Advised can call back to clinic at any time with concerns.     Ronda Miller RN

## 2020-06-01 NOTE — TELEPHONE ENCOUNTER
Swathi was seen last week. She was given a brace for her hand.    Swathi would like to know how long she needs to wear her hand brace.

## 2020-06-03 ENCOUNTER — TELEPHONE (OUTPATIENT)
Dept: FAMILY MEDICINE | Facility: CLINIC | Age: 85
End: 2020-06-03

## 2020-06-03 LAB
ANA PAT SER IF-IMP: ABNORMAL
ANA SER QL IF: POSITIVE
ANA TITR SER IF: ABNORMAL {TITER}

## 2020-06-03 NOTE — LETTER
Latesha 3, 2020      Swathi Elyaddie  [address]   [place] [address]        Dear Swathi,       We are writing to inform you of your test results.    One of your autoimmune labs came back positive.  I sent in a referral for rheumatology earlier this week.  They should be calling you to set up appointment.  If you haven't heard by end of the week please call the clinic.           Sincerely,          Ramona Aaseby-Aguilera, PA-C

## 2020-06-04 ENCOUNTER — TELEPHONE (OUTPATIENT)
Dept: FAMILY MEDICINE | Facility: CLINIC | Age: 85
End: 2020-06-04

## 2020-06-10 NOTE — TELEPHONE ENCOUNTER
Pt called and is wondering if there is a different rheumatologist she can be referred to. She stated she talked with the referral dept and they can't get her in anywhere until September.  She is wondering if that is ok or if there is any way you can get her in somewhere sooner. Please call her with a new referral or other options   [phone]

## 2020-06-12 ENCOUNTER — TELEPHONE (OUTPATIENT)
Dept: FAMILY MEDICINE | Facility: CLINIC | Age: 85
End: 2020-06-12

## 2020-06-22 ENCOUNTER — TRANSFERRED RECORDS (OUTPATIENT)
Dept: HEALTH INFORMATION MANAGEMENT | Facility: CLINIC | Age: 85
End: 2020-06-22

## 2020-06-24 ENCOUNTER — TRANSFERRED RECORDS (OUTPATIENT)
Dept: HEALTH INFORMATION MANAGEMENT | Facility: CLINIC | Age: 85
End: 2020-06-24

## 2020-07-07 ENCOUNTER — TRANSFERRED RECORDS (OUTPATIENT)
Dept: HEALTH INFORMATION MANAGEMENT | Facility: CLINIC | Age: 85
End: 2020-07-07

## 2020-07-14 ENCOUNTER — OFFICE VISIT (OUTPATIENT)
Dept: FAMILY MEDICINE | Facility: CLINIC | Age: 85
End: 2020-07-14
Payer: COMMERCIAL

## 2020-07-14 VITALS
SYSTOLIC BLOOD PRESSURE: 146 MMHG | BODY MASS INDEX: 23.9 KG/M2 | TEMPERATURE: 98.8 F | HEIGHT: 64 IN | DIASTOLIC BLOOD PRESSURE: 66 MMHG | HEART RATE: 56 BPM | RESPIRATION RATE: 14 BRPM | WEIGHT: 140 LBS

## 2020-07-14 DIAGNOSIS — K52.9 CHRONIC DIARRHEA: ICD-10-CM

## 2020-07-14 DIAGNOSIS — M16.12 PRIMARY OSTEOARTHRITIS OF LEFT HIP: ICD-10-CM

## 2020-07-14 DIAGNOSIS — N30.00 ACUTE CYSTITIS WITHOUT HEMATURIA: ICD-10-CM

## 2020-07-14 DIAGNOSIS — R30.0 DYSURIA: Primary | ICD-10-CM

## 2020-07-14 DIAGNOSIS — M79.641 PAIN IN BOTH HANDS: ICD-10-CM

## 2020-07-14 DIAGNOSIS — R82.90 ABNORMAL URINE FINDINGS: ICD-10-CM

## 2020-07-14 DIAGNOSIS — M79.642 PAIN IN BOTH HANDS: ICD-10-CM

## 2020-07-14 LAB
ALBUMIN UR-MCNC: NEGATIVE MG/DL
APPEARANCE UR: CLEAR
BACTERIA #/AREA URNS HPF: ABNORMAL /HPF
BILIRUB UR QL STRIP: NEGATIVE
COLOR UR AUTO: YELLOW
GLUCOSE UR STRIP-MCNC: NEGATIVE MG/DL
HGB UR QL STRIP: NEGATIVE
KETONES UR STRIP-MCNC: NEGATIVE MG/DL
LEUKOCYTE ESTERASE UR QL STRIP: ABNORMAL
NITRATE UR QL: NEGATIVE
PH UR STRIP: 7 PH (ref 5–7)
RBC #/AREA URNS AUTO: ABNORMAL /HPF
SOURCE: ABNORMAL
SP GR UR STRIP: 1.01 (ref 1–1.03)
SPECIMEN SOURCE: NORMAL
UROBILINOGEN UR STRIP-ACNC: 0.2 EU/DL (ref 0.2–1)
WBC #/AREA URNS AUTO: >100 /HPF
WET PREP SPEC: NORMAL

## 2020-07-14 PROCEDURE — 87088 URINE BACTERIA CULTURE: CPT | Performed by: FAMILY MEDICINE

## 2020-07-14 PROCEDURE — 87186 SC STD MICRODIL/AGAR DIL: CPT | Performed by: FAMILY MEDICINE

## 2020-07-14 PROCEDURE — 87086 URINE CULTURE/COLONY COUNT: CPT | Performed by: FAMILY MEDICINE

## 2020-07-14 PROCEDURE — 81001 URINALYSIS AUTO W/SCOPE: CPT | Performed by: FAMILY MEDICINE

## 2020-07-14 PROCEDURE — 87210 SMEAR WET MOUNT SALINE/INK: CPT | Performed by: FAMILY MEDICINE

## 2020-07-14 PROCEDURE — 99214 OFFICE O/P EST MOD 30 MIN: CPT | Performed by: FAMILY MEDICINE

## 2020-07-14 ASSESSMENT — MIFFLIN-ST. JEOR: SCORE: 1072.1

## 2020-07-14 NOTE — PROGRESS NOTES
Subjective     Swathi Dukes is a 84 year old female who presents to clinic today for the following health issues:    HPI   ARTHRITIS PAIN      Duration: chronic issue    Description (location/character/radiation): hands     Intensity:  severe    Accompanying signs and symptoms: none    History (similar episodes/previous evaluation): previous OV    Precipitating or alleviating factors: gripping    Therapies tried and outcome: None       Following with rheumatology for chronic hand and wrist pain.  Has known osteoarthritis of the lumbar spine.  Based on lab work, Swathi reports that her rheumatologist is suspicious she either has lupus or he also mentioned malaria.  She is currently on a trial of hydroxychloroquine. She has not noticed significant improvement in her hand and wrist pain.    Possible hernia left lower abdomen -has had left groin pain for the past 2 weeks.  Typically bothersome when she wakes up in the morning and starts moving.  Has not had any groin pain today.  Her neighbor says that she felt a lump in the area where Swathi is having her pain.    Working with a GI doctor in regards to her chronic diarrhea.  He suggested she started Citrucel and use Imodium as needed.  She has not yet started the Citrucel.    Reports dysuria for the past week.  She feels like she has urinary tract infection, which she has had in the past.  She just finished a course of antibiotics.  Denies vaginal symptoms of irritation, odor, discharge.      Patient Active Problem List   Diagnosis     Essential hypertension     Anxiety     Glaucoma suspect, bilateral     Personal history of DVT (deep vein thrombosis)     Mixed obsessional thoughts and acts     Age-related osteoporosis without current pathological fracture     Nonrheumatic aortic valve insufficiency     Lumbago     Chronic diarrhea     Pain in both hands     Primary osteoarthritis of left hip     History reviewed. No pertinent surgical history.    Social History  "    Tobacco Use     Smoking status: Never Smoker     Smokeless tobacco: Never Used   Substance Use Topics     Alcohol use: Not Currently     Family History   Problem Relation Age of Onset     Diabetes Father          Current Outpatient Medications   Medication Sig Dispense Refill     ALPRAZolam (XANAX) 0.5 MG tablet 2 times daily as needed       carbamide peroxide (DEBROX) 6.5 % otic solution Place 5 drops into both ears 2 times daily 15 mL 0     enalapril (VASOTEC) 20 MG tablet Take 1 tablet (20 mg) by mouth 2 times daily 180 tablet 3     hydrALAZINE (APRESOLINE) 25 MG tablet Take 1 tablet (25 mg) by mouth 3 times daily 270 tablet 3     latanoprost (XALATAN) 0.005 % ophthalmic solution daily       OLANZapine (ZYPREXA) 10 MG tablet At Bedtime       PARoxetine (PAXIL) 20 MG tablet daily       simvastatin (ZOCOR) 10 MG tablet Take 1 tablet (10 mg) by mouth At Bedtime 90 tablet 3     Reviewed and updated as needed this visit by Provider         Review of Systems   Constitutional, HEENT, cardiovascular, pulmonary, gi and gu systems are negative, except as otherwise noted.      Objective    BP (!) 146/66 (BP Location: Right arm, Patient Position: Sitting, Cuff Size: Adult Regular)   Pulse 56   Temp 98.8  F (37.1  C) (Oral)   Resp 14   Ht 1.613 m (5' 3.5\")   Wt 63.5 kg (140 lb)   Breastfeeding No   BMI 24.41 kg/m    Body mass index is 24.41 kg/m .  Physical Exam   GENERAL: healthy, alert and no distress  ABDOMEN: soft, nontender, no hepatosplenomegaly, no masses and bowel sounds normal  MS: No palpable hernia in the area of pain, negative Cesia    Diagnostic Test Results:  none         Assessment & Plan     1. Dysuria - lab work pending  - Wet prep  - *UA reflex to Microscopic and Culture ([facility] (except [place] and [place])    2. Chronic diarrhea -working with GI, encouraged her to start the regimen of Citrucel and as needed Imodium.    3. Pain in both hands -working with rheumatology.  She " is on a trial of hydroxychloroquine.    4. Primary osteoarthritis of left hip -suggested most likely diagnosis for her recent hip pain.  This is resolved at this point, I advised that she monitor her symptoms and call with concerns.      Return in about 3 months (around 10/14/2020) for Medication Recheck.    Cortney Vanessa MD  [facility]

## 2020-07-14 NOTE — PATIENT INSTRUCTIONS
Continue the hydroxychloroquine    Urine and vaginal swab for urinary symptoms    Monitor groin/hip pain, call if recurrence    Check BP twice in the next week and call with results

## 2020-07-15 RX ORDER — NITROFURANTOIN 25; 75 MG/1; MG/1
100 CAPSULE ORAL 2 TIMES DAILY
Qty: 14 CAPSULE | Refills: 0 | Status: SHIPPED | OUTPATIENT
Start: 2020-07-15 | End: 2020-07-22

## 2020-07-16 LAB
BACTERIA SPEC CULT: ABNORMAL
SPECIMEN SOURCE: ABNORMAL

## 2020-08-04 ENCOUNTER — HOSPITAL ENCOUNTER (EMERGENCY)
Facility: CLINIC | Age: 85
Discharge: HOME | End: 2020-08-04
Attending: EMERGENCY MEDICINE | Admitting: EMERGENCY MEDICINE
Payer: COMMERCIAL

## 2020-08-04 ENCOUNTER — TELEPHONE (OUTPATIENT)
Dept: FAMILY MEDICINE | Facility: CLINIC | Age: 85
End: 2020-08-04

## 2020-08-04 VITALS
SYSTOLIC BLOOD PRESSURE: 168 MMHG | RESPIRATION RATE: 16 BRPM | OXYGEN SATURATION: 100 % | DIASTOLIC BLOOD PRESSURE: 78 MMHG | TEMPERATURE: 98.1 F

## 2020-08-04 DIAGNOSIS — K52.9 CHRONIC DIARRHEA: ICD-10-CM

## 2020-08-04 DIAGNOSIS — F41.9 ANXIETY: ICD-10-CM

## 2020-08-04 DIAGNOSIS — R45.851 SUICIDAL IDEATION: ICD-10-CM

## 2020-08-04 DIAGNOSIS — R82.81 PYURIA: ICD-10-CM

## 2020-08-04 LAB
ALBUMIN UR-MCNC: 10 MG/DL
ANION GAP SERPL CALCULATED.3IONS-SCNC: 6 MMOL/L (ref 3–14)
APPEARANCE UR: CLEAR
BACTERIA #/AREA URNS HPF: ABNORMAL /HPF
BASOPHILS # BLD AUTO: 0.1 10E9/L (ref 0–0.2)
BASOPHILS NFR BLD AUTO: 0.8 %
BILIRUB UR QL STRIP: NEGATIVE
BUN SERPL-MCNC: 8 MG/DL (ref 7–30)
CALCIUM SERPL-MCNC: 9.1 MG/DL (ref 8.5–10.1)
CHLORIDE SERPL-SCNC: 101 MMOL/L (ref 94–109)
CO2 SERPL-SCNC: 28 MMOL/L (ref 20–32)
COLOR UR AUTO: ABNORMAL
CREAT SERPL-MCNC: 0.8 MG/DL (ref 0.52–1.04)
DIFFERENTIAL METHOD BLD: ABNORMAL
EOSINOPHIL # BLD AUTO: 0.1 10E9/L (ref 0–0.7)
EOSINOPHIL NFR BLD AUTO: 2 %
ERYTHROCYTE [DISTWIDTH] IN BLOOD BY AUTOMATED COUNT: 13.2 % (ref 10–15)
GFR SERPL CREATININE-BSD FRML MDRD: 69 ML/MIN/{1.73_M2}
GLUCOSE SERPL-MCNC: 98 MG/DL (ref 70–99)
GLUCOSE UR STRIP-MCNC: NEGATIVE MG/DL
HCT VFR BLD AUTO: 38 % (ref 35–47)
HGB BLD-MCNC: 11.9 G/DL (ref 11.7–15.7)
HGB UR QL STRIP: NEGATIVE
HYALINE CASTS #/AREA URNS LPF: 7 /LPF (ref 0–2)
IMM GRANULOCYTES # BLD: 0 10E9/L (ref 0–0.4)
IMM GRANULOCYTES NFR BLD: 0.5 %
KETONES UR STRIP-MCNC: NEGATIVE MG/DL
LEUKOCYTE ESTERASE UR QL STRIP: ABNORMAL
LYMPHOCYTES # BLD AUTO: 1.3 10E9/L (ref 0.8–5.3)
LYMPHOCYTES NFR BLD AUTO: 20.7 %
MCH RBC QN AUTO: 30 PG (ref 26.5–33)
MCHC RBC AUTO-ENTMCNC: 31.3 G/DL (ref 31.5–36.5)
MCV RBC AUTO: 96 FL (ref 78–100)
MONOCYTES # BLD AUTO: 0.8 10E9/L (ref 0–1.3)
MONOCYTES NFR BLD AUTO: 12.4 %
MUCOUS THREADS #/AREA URNS LPF: PRESENT /LPF
NEUTROPHILS # BLD AUTO: 4.1 10E9/L (ref 1.6–8.3)
NEUTROPHILS NFR BLD AUTO: 63.6 %
NITRATE UR QL: NEGATIVE
NRBC # BLD AUTO: 0 10*3/UL
NRBC BLD AUTO-RTO: 0 /100
PH UR STRIP: 5 PH (ref 5–7)
PLATELET # BLD AUTO: 277 10E9/L (ref 150–450)
POTASSIUM SERPL-SCNC: 3.7 MMOL/L (ref 3.4–5.3)
RBC # BLD AUTO: 3.97 10E12/L (ref 3.8–5.2)
RBC #/AREA URNS AUTO: 1 /HPF (ref 0–2)
SODIUM SERPL-SCNC: 135 MMOL/L (ref 133–144)
SOURCE: ABNORMAL
SP GR UR STRIP: 1.01 (ref 1–1.03)
SQUAMOUS #/AREA URNS AUTO: <1 /HPF (ref 0–1)
UROBILINOGEN UR STRIP-MCNC: NORMAL MG/DL (ref 0–2)
WBC # BLD AUTO: 6.4 10E9/L (ref 4–11)
WBC #/AREA URNS AUTO: 13 /HPF (ref 0–5)

## 2020-08-04 PROCEDURE — 81001 URINALYSIS AUTO W/SCOPE: CPT | Performed by: EMERGENCY MEDICINE

## 2020-08-04 PROCEDURE — 90791 PSYCH DIAGNOSTIC EVALUATION: CPT

## 2020-08-04 PROCEDURE — 85025 COMPLETE CBC W/AUTO DIFF WBC: CPT | Performed by: EMERGENCY MEDICINE

## 2020-08-04 PROCEDURE — 99285 EMERGENCY DEPT VISIT HI MDM: CPT | Mod: 25

## 2020-08-04 PROCEDURE — 80048 BASIC METABOLIC PNL TOTAL CA: CPT | Performed by: EMERGENCY MEDICINE

## 2020-08-04 PROCEDURE — 25000132 ZZH RX MED GY IP 250 OP 250 PS 637: Performed by: EMERGENCY MEDICINE

## 2020-08-04 PROCEDURE — 87086 URINE CULTURE/COLONY COUNT: CPT | Performed by: EMERGENCY MEDICINE

## 2020-08-04 RX ORDER — LORAZEPAM 0.5 MG/1
0.25 TABLET ORAL ONCE
Status: COMPLETED | OUTPATIENT
Start: 2020-08-04 | End: 2020-08-04

## 2020-08-04 RX ADMIN — LORAZEPAM 0.25 MG: 0.5 TABLET ORAL at 15:48

## 2020-08-04 ASSESSMENT — ENCOUNTER SYMPTOMS
NERVOUS/ANXIOUS: 1
CONSTIPATION: 1
HALLUCINATIONS: 0
DIARRHEA: 1
FREQUENCY: 0
DYSURIA: 0

## 2020-08-04 NOTE — ED PROVIDER NOTES
"  History     Chief Complaint:  Suicidal      The history is provided by the patient and a relative.      Swathi Dukes is a 84 year old female who presents with anxiety, depression, and thoughts of self-harm, which she has been intermittently experiencing over the last 2 months. Her son reports that COVID precautions were put in place in the senior living center where she resides, and residents were kept in their buildings and socialization was greatly reduced. He believes that this isolation is exacerbating the patient's baseline, as she has been dealing with mental health problems all of her adult life. The patient notes that one of her neighbors is a \"basket case\", and constantly makes complaints about her daily activities, though she denies feeling unsafe. Her son was contacted by Mitzi today while he was at work, and was told that the patient was suicidal and had a plan, which prompted him to bring her in for evaluation. Here, the patient reports that her whole body \"feels like it is shaking inside\". She is looking for a psychiatrist, as she is not established, and received a list of resources in the mail yesterday. She is currently prescribed Paroxetine and Alprazolam, and she last took her daily dose of 0.25 mg Alprazolam this morning, though mentions wanting new anxiety medication that is stronger. She denies any visual or auditory hallucinations, and states that she is not at risk to self-harm if she were to leave ED. She further denies any suicidal or homicidal ideation. She is not sure whether she wants to proceed with mental health hospitalization.    Of note, the patient also reports having 6 episodes of diarrhea on Sunday 8/1, and had taken 3 imodium, but has not had any bowel movements since. She was seen a month ago for her third recurrence of C. Difficile, for which she was prescribed oral vancomycin. Most recently, she was placed on a week of antibiotics in July for serial UTI's, but isn't sure " whether this has cleared up. She denies any urinary frequency or discomfort.      Allergies:  Dicoflenac  Aspirin  Iodine    Medications:    Alprazolam  Enalapril  Hydralazine  Olanzapine  Paroxetine  Simvastatin    Past Medical History:    Chronic diarrhea  Hypertension  Bilateral hand pain  Primary osteoarthritis of right hip  Lumbago  Mixed obsessional thoughts and acts  Age-related osteoporosis without current pathological fracture  Nonrheumatic aortic valve insufficiency  Anxiety  Bilateral glaucoma suspect  DVT    Past Surgical History:    History reviewed. No pertinent surgical history.    Family History:    Father: Diabetes    Social History:  The patient was accompanied to the ED by her son.  Smoking Status: Never smoker  Smokeless Tobacco: Never used  Alcohol Use: Not currently  Drug Use: No  Marital Status:      Review of Systems   Gastrointestinal: Positive for constipation (No BM since 8/1) and diarrhea (6 episodes 8/1).   Genitourinary: Negative for dysuria and frequency.   Psychiatric/Behavioral: Negative for hallucinations (visual or auditory) and suicidal ideas (Depressive mood but no SI or HI here per patient). The patient is nervous/anxious.    All other systems reviewed and are negative.    Physical Exam     Patient Vitals for the past 24 hrs:   BP Temp Temp src Heart Rate Resp SpO2   08/04/20 1523 (!) 168/78 98.1  F (36.7  C) Oral 85 16 100 %       Physical Exam    HEENT:  mmm  Neck: supple  CV: ppi, regular   Resp: speaking in full sentences without any resp distress  Abd: abdomen is soft without significant tenderness, masses, organomegaly or guarding  Ext: peripheral edema present:  No  Skin: warm dry well perfused  Neuro: Alert, no gross motor or sensory deficits,  gait stable  Psych: Mildly anxious appearing, no active suicidal ideation or homicidal ideation, not responding to internal stimuli.      Emergency Department Course       Laboratory:  Laboratory findings were communicated  with the patient and son who voiced understanding of the findings.    Labs Ordered and Resulted from Time of ED Arrival Up to the Time of Departure from the ED   ROUTINE UA WITH MICROSCOPIC - Abnormal; Notable for the following components:       Result Value    Protein Albumin Urine 10 (*)     Leukocyte Esterase Urine Small (*)     WBC Urine 13 (*)     Bacteria Urine Few (*)     Mucous Urine Present (*)     Hyaline Casts 7 (*)     All other components within normal limits   CBC WITH PLATELETS DIFFERENTIAL - Abnormal; Notable for the following components:    MCHC 31.3 (*)     All other components within normal limits   BASIC METABOLIC PANEL   URINE CULTURE AEROBIC BACTERIAL         Urine Culture Aerobic Bacterial: Pending    Procedures  None.    Interventions:  1548 Ativan 0.25 mg PO    Medications   LORazepam (ATIVAN) half-tab 0.25 mg (0.25 mg Oral Given 20 1548)       Emergency Department Course:  Past medical records, nursing notes, and vitals reviewed.    1534 I performed an exam of the patient as documented above.     IV was inserted and blood was drawn for laboratory testing, results above.    The patient provided a urine sample here in the emergency department. This was sent for laboratory testing, findings above.    Patient rechecked and updated.     I spoke with DEC regarding patient's presentation, findings, and plan of care.    1809 I rechecked the patient and discussed the results of her workup thus far.       Impression & Plan     Medical Decision Makin-year-old female here with concerns of anxiety, passive suicidal ideation earlier while talking to triage nurse.  I think she is at low risk of completing suicide has no homicidal ideation and does not have any active hallucinations.  Low suspicion for underlying provoking medical etiology.  Recently finished a course of antibiotics for urinary tract infection currently has no urinary frequency urgency or dysuria.  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  Would be hesitant to start antibiotics unless she has symptoms and a significantly abnormal UA.  UA here shows mild pyuria but in the absence of ongoing symptoms we will send this off for culture and not restart antibiotics given the risk-benefit given the recurrent C. difficile.  We will have our mental health  talk with her as well as her son and help with resources disposition.    Update: Seen by her mental health  felt safe and stable for discharge home will get a safety plan with follow-up visits arrange for them and discharged home both patient and son are comfortable and agreeable with that plan.  Discussed with her the urinalysis results and expected a call from the culture nurse should she grow out a bacteria and a discussion of if she develops symptoms would be an indication for antibiotics but given the recurrent C. difficile if we can safely avoid antibiotics would be in her best interest.  We also discussed anxiety medication and benzodiazepines and that I think given her history this would best come from primary care physician or psychiatry and not here from the emergency room.    Diagnosis:    ICD-10-CM    1. Anxiety  F41.9    2. Suicidal ideation  R45.851    3. Pyuria  R82.81    4. Chronic diarrhea  K52.9        Disposition:  Discharged to home.    Discharge Medications:  New Prescriptions    No medications on file         Scribe Disclosure:  I, Munira Lozano, am serving as a scribe at 3:34 PM on 8/4/2020 to document services personally performed by Yefri Gross MD based on my observations and the provider's statements to me.     8/4/2020   [facility] EMERGENCY DEPARTMENT       Yefri Gross MD  08/04/20 1813

## 2020-08-04 NOTE — ED TRIAGE NOTES
Pt experiencing high anxiety and suicidal ideation.  Recently received treatment for UTI; patient states she did finish the course of antibiotic.  Primary care referred to ED for evaluation.

## 2020-08-04 NOTE — ED AVS SNAPSHOT
[facility]  [address]  [facility] [address]  Phone:  [phone]  Fax:  [phone]                                    Swathi Dukes   MRN: [id number]    Department:  [facility]   Date of Visit:  8/4/2020           After Visit Summary Signature Page    I have received my discharge instructions, and my questions have been answered. I have discussed any challenges I see with this plan with the nurse or doctor.    ..........................................................................................................................................  Patient/Patient Representative Signature      ..........................................................................................................................................  Patient Representative Print Name and Relationship to Patient    ..................................................               ................................................  Date                                   Time    ..........................................................................................................................................  Reviewed by Signature/Title    ...................................................              ..............................................  Date                                               Time          22EPIC Rev 08/18

## 2020-08-04 NOTE — TELEPHONE ENCOUNTER
"Conference call with RNHussain with [facility]     Hussain was doing well check on pt and pt is expressing severe depression and anxiety with suicidal plan.   She is tell Hussain \"I would just take all my medication\"     Pt with recent UTI - this could be causing some symptoms.      Hussain does have CTC with pt's son.        Pt has recent RF of Xanax 0.5 mg on 6/27 # 30 for her provider in [place].      After several minutes of talking pt did agree to go to [facility]-  She did not want 911 called. Agreed to let Hussain call her son.   If Hussain is unable to reach her son she will LM for him to call her or [facility] clinic back     FYI to PCP     Ronda Miller RN        "

## 2020-08-04 NOTE — TELEPHONE ENCOUNTER
Patient called back and stated that her son is on his way and that she will be going to the [facility].     Helene Kong RN Flex

## 2020-08-05 LAB
BACTERIA SPEC CULT: NORMAL
SPECIMEN SOURCE: NORMAL

## 2020-08-06 NOTE — RESULT ENCOUNTER NOTE
Final urine culture report is NEGATIVE per May ED Lab Result protocol.    If NEGATIVE result, no change in treatment, per May ED Lab Result protocol.

## 2020-09-14 DIAGNOSIS — I10 ESSENTIAL HYPERTENSION: ICD-10-CM

## 2020-09-14 DIAGNOSIS — Z86.718 PERSONAL HISTORY OF DVT (DEEP VEIN THROMBOSIS): ICD-10-CM

## 2020-09-14 RX ORDER — CLOPIDOGREL BISULFATE 75 MG/1
75 TABLET ORAL DAILY
Qty: 90 TABLET | Refills: 1 | OUTPATIENT
Start: 2020-09-14

## 2020-09-14 NOTE — TELEPHONE ENCOUNTER
Routing refill request to provider for review/approval because:  Labs out of range:  bp  BP Readings from Last 3 Encounters:   08/04/20 (!) 168/78   07/14/20 (!) 146/66   05/29/20 (!) 148/60     Siva Aguilar RN, BSN

## 2020-09-14 NOTE — TELEPHONE ENCOUNTER
Patient requests refill of Clopidogrel 75 MG tablet, which is not active on patient's medication list.

## 2020-09-15 ENCOUNTER — NURSE TRIAGE (OUTPATIENT)
Dept: NURSING | Facility: CLINIC | Age: 85
End: 2020-09-15

## 2020-09-15 ENCOUNTER — TELEPHONE (OUTPATIENT)
Dept: FAMILY MEDICINE | Facility: CLINIC | Age: 85
End: 2020-09-15

## 2020-09-15 RX ORDER — ENALAPRIL MALEATE 20 MG/1
20 TABLET ORAL 2 TIMES DAILY
Qty: 180 TABLET | Refills: 3 | Status: SHIPPED | OUTPATIENT
Start: 2020-09-15 | End: 2021-01-22

## 2020-09-15 NOTE — TELEPHONE ENCOUNTER
Patient calling.  Advised to stop Plavix.  States no one told her.  Advised of below documentation.  Simvastatin has refill til 12/5/20.  Enalapril was sent today for a year RX.  Patient will no longer take Plavix per below.  No further question.  ALFREDO Yun Trang, RN        2/27/20 10:15 AM   Note      Echo was completed on 02-.     ----- Message from Luis Manuel Davis MD sent at 2/12/2020  1:06 PM CST -----  Echo results noted.  Small pericardial effusion, mild aortic stenosis, mild to moderate aortic insufficiency.  There is no apparent reason for her to be on Plavix.  We will discontinue this medication.  Plans will be for a one-year follow-up office visit with echocardiogram.     Patient was informed of her results.   Patient was instructed to stop her Plavix. Med list updated.   Patient was advised to f/u next year w/repeat Echo.   Patient had no questions.      Rad FUENTES, BSN  [facility]

## 2020-09-15 NOTE — TELEPHONE ENCOUNTER
General Call:     Who is calling:  Swathi    Reason for Call:  Pt stated that [employer] called to tell her that we need to approve her Rx of   simvastatin (ZOCOR) 10 MG tablet 10 mg, AT BEDTIME   and  Clopidogrel Bisulfate 75 mg Oral DAILY     What are your questions or concerns:  Clopidogrel was denied already due to not being on pt's active med list but pt states that she is still taking it.    [facility] - [facility] [place] [address] FDax- [phone]    Date of last appointment with provider: 07/14/2020    Okay to leave a detailed message:No at Home number on file [phone] (home)     Tatum Marie Patient Representative

## 2020-09-15 NOTE — TELEPHONE ENCOUNTER
Pt called in ask medication question.  The question is answered no other concern at this time.      Tommie Bonilla Nurse Advisor 9/15/2020 5:42 PM

## 2020-09-22 DIAGNOSIS — E78.2 MIXED HYPERLIPIDEMIA: ICD-10-CM

## 2020-09-22 RX ORDER — SIMVASTATIN 10 MG
10 TABLET ORAL AT BEDTIME
Qty: 90 TABLET | Refills: 3 | Status: SHIPPED | OUTPATIENT
Start: 2020-09-22 | End: 2021-01-22

## 2020-09-22 NOTE — TELEPHONE ENCOUNTER
Patient is requesting a refill for the following medications but one of them has been discontinued on her chart.    Coty John     simvastatin (ZOCOR) 10 MG tablet  90 tablet  3  12/5/2019   No    Sig - Route: Take 1 tablet (10 mg) by mouth At Bedtime - Oral    Sent to pharmacy as: simvastatin (ZOCOR) 10 MG tablet    Class: E-Prescribe      clopidogrel (PLAVIX) 75 MG tablet (Discontinued)  90 tablet  3  12/5/2019 2/27/2020  No    Sig - Route: Take 1 tablet (75 mg) by mouth daily - Oral    Sent to pharmacy as: clopidogrel (PLAVIX) 75 MG tablet    Class: E-Prescribe

## 2020-09-22 NOTE — TELEPHONE ENCOUNTER
Plavix was discontinued 12/5/2019 but in the office note it said to continue it    Please advise  Siva Aguilar RN, BSN

## 2020-10-01 ENCOUNTER — VIRTUAL VISIT (OUTPATIENT)
Dept: FAMILY MEDICINE | Facility: CLINIC | Age: 85
End: 2020-10-01
Payer: COMMERCIAL

## 2020-10-01 ENCOUNTER — TELEPHONE (OUTPATIENT)
Dept: FAMILY MEDICINE | Facility: CLINIC | Age: 85
End: 2020-10-01

## 2020-10-01 DIAGNOSIS — R45.851 SUICIDAL IDEATION: ICD-10-CM

## 2020-10-01 DIAGNOSIS — N30.00 ACUTE CYSTITIS WITHOUT HEMATURIA: Primary | ICD-10-CM

## 2020-10-01 DIAGNOSIS — R35.0 URINARY FREQUENCY: Primary | ICD-10-CM

## 2020-10-01 DIAGNOSIS — R82.90 NONSPECIFIC FINDING ON EXAMINATION OF URINE: ICD-10-CM

## 2020-10-01 LAB
ALBUMIN UR-MCNC: NEGATIVE MG/DL
APPEARANCE UR: ABNORMAL
BACTERIA #/AREA URNS HPF: ABNORMAL /HPF
BILIRUB UR QL STRIP: NEGATIVE
COLOR UR AUTO: YELLOW
GLUCOSE UR STRIP-MCNC: NEGATIVE MG/DL
HGB UR QL STRIP: NEGATIVE
KETONES UR STRIP-MCNC: NEGATIVE MG/DL
LEUKOCYTE ESTERASE UR QL STRIP: ABNORMAL
NITRATE UR QL: POSITIVE
NON-SQ EPI CELLS #/AREA URNS LPF: ABNORMAL /LPF
PH UR STRIP: 5.5 PH (ref 5–7)
RBC #/AREA URNS AUTO: ABNORMAL /HPF
SOURCE: ABNORMAL
SP GR UR STRIP: 1.01 (ref 1–1.03)
UROBILINOGEN UR STRIP-ACNC: 0.2 EU/DL (ref 0.2–1)
WBC #/AREA URNS AUTO: ABNORMAL /HPF

## 2020-10-01 PROCEDURE — 87086 URINE CULTURE/COLONY COUNT: CPT | Performed by: FAMILY MEDICINE

## 2020-10-01 PROCEDURE — 87186 SC STD MICRODIL/AGAR DIL: CPT | Performed by: FAMILY MEDICINE

## 2020-10-01 PROCEDURE — 99213 OFFICE O/P EST LOW 20 MIN: CPT | Mod: 95 | Performed by: FAMILY MEDICINE

## 2020-10-01 PROCEDURE — 87088 URINE BACTERIA CULTURE: CPT | Performed by: FAMILY MEDICINE

## 2020-10-01 PROCEDURE — 81001 URINALYSIS AUTO W/SCOPE: CPT | Performed by: FAMILY MEDICINE

## 2020-10-01 RX ORDER — NITROFURANTOIN 25; 75 MG/1; MG/1
100 CAPSULE ORAL 2 TIMES DAILY
Qty: 14 CAPSULE | Refills: 0 | Status: SHIPPED | OUTPATIENT
Start: 2020-10-01 | End: 2020-10-02

## 2020-10-01 NOTE — TELEPHONE ENCOUNTER
----- Message from Cortney Vanessa MD sent at 10/1/2020  4:41 PM CDT -----  Please call -sent macrobid to her pharmacy

## 2020-10-01 NOTE — PROGRESS NOTES
"Swathi Dukes is a 84 year old female who is being evaluated via a billable telephone visit.      The patient has been notified of following:     \"This telephone visit will be conducted via a call between you and your physician/provider. We have found that certain health care needs can be provided without the need for a physical exam.  This service lets us provide the care you need with a short phone conversation.  If a prescription is necessary we can send it directly to your pharmacy.  If lab work is needed we can place an order for that and you can then stop by our lab to have the test done at a later time.    Telephone visits are billed at different rates depending on your insurance coverage. During this emergency period, for some insurers they may be billed the same as an in-person visit.  Please reach out to your insurance provider with any questions.    If during the course of the call the physician/provider feels a telephone visit is not appropriate, you will not be charged for this service.\"    Patient has given verbal consent for Telephone visit?  Yes    What phone number would you like to be contacted at? [phone]    How would you like to obtain your AVS? Mail a copy    Subjective     Swathi Dukes is a 84 year old female who presents via phone visit today for the following health issues:    HPI     Genitourinary - Female  Onset/Duration: Ongoing for 3 weeks and worsening  Description:   Painful urination (Dysuria): no           Frequency: YES   Blood in urine (Hematuria): no  Delay in urine (Hesitency): no  Intensity: 8/10  Progression of Symptoms:  worsening  Accompanying Signs & Symptoms:  Fever/chills: no  Flank pain: Unknown due to chronic back pain  Nausea and vomiting: no  Vaginal symptoms: discharge  Abdominal/Pelvic Pain: no  History:   History of frequent UTI s: YES  History of kidney stones: no  Sexually Active: no  Possibility of pregnancy: No  Precipitating or alleviating factors: " None  Therapies tried and outcome: Taking Tylenol         History of recurrent UTI.   Last culture grew E enterococcus. Had asymptomatic period following last abx course in July 2020.       Was in ER with suicidal plan in August. Son took her in. Reports her continues to have symptoms of depression but no longer has thoughts of self harm. Has an appointment to meet with a Psychiatric CNP next week through [facility].          Review of Systems   Constitutional, HEENT, cardiovascular, pulmonary, gi and gu systems are negative, except as otherwise noted.       Objective          Vitals:  No vitals were obtained today due to virtual visit.    healthy, alert and no distress  PSYCH: Alert and oriented times 3; coherent speech, normal   rate and volume, able to articulate logical thoughts, able   to abstract reason, no tangential thoughts, no hallucinations   or delusions  Her affect is normal  RESP: No cough, no audible wheezing, able to talk in full sentences  Remainder of exam unable to be completed due to telephone visits          Assessment/Plan:    Assessment & Plan     1. Urinary frequency - will assess for infection, she will come in for UA today  - *UA reflex to Microscopic and Culture ([place] and [facility] (except [place] and [place])    2. Suicidal ideation - she contracts for safety, meeting with  next week.           No follow-ups on file.    Cortney Vanessa MD  [facility]    Phone call duration:  8 minutes

## 2020-10-02 DIAGNOSIS — N30.00 ACUTE CYSTITIS WITHOUT HEMATURIA: ICD-10-CM

## 2020-10-02 RX ORDER — NITROFURANTOIN 25; 75 MG/1; MG/1
100 CAPSULE ORAL 2 TIMES DAILY
Qty: 14 CAPSULE | Refills: 0 | Status: SHIPPED | OUTPATIENT
Start: 2020-10-02 | End: 2020-11-03

## 2020-10-02 NOTE — TELEPHONE ENCOUNTER
Left message on machine to call back    Please verify that pt was able to get the antibiotic sent in      Siva Aguilar RN, BSN

## 2020-10-02 NOTE — TELEPHONE ENCOUNTER
Patient calling back for lab results.  Advised of below.  Son is going to  RX today.  Jordyn Barnhart RN

## 2020-10-02 NOTE — TELEPHONE ENCOUNTER
The following Rx's was sent to [facility] mail order pharmacy, son states it should go to [facility].  nitroFURantoin macrocrystal-monohydrate (MACROBID) 100 MG capsule 14 capsule 0 10/1/2020 10/8/2020 --   Sig - Route: Take 1 capsule (100 mg) by mouth 2 times daily for 7 days - Oral   Sent to pharmacy as: Nitrofurantoin Monohyd Macro 100 MG Oral Capsule (MACROBID)   Class: E-Prescribe     Coty John

## 2020-10-03 LAB
BACTERIA SPEC CULT: ABNORMAL
SPECIMEN SOURCE: ABNORMAL

## 2020-10-21 ENCOUNTER — TRANSFERRED RECORDS (OUTPATIENT)
Dept: HEALTH INFORMATION MANAGEMENT | Facility: CLINIC | Age: 85
End: 2020-10-21

## 2020-11-02 NOTE — TELEPHONE ENCOUNTER
Last seen 7/8/19.   Pt doing well today and did not seek care in ER    Around 8 pm started to feel better and doing well today     Ronda Miller RN

## 2020-11-03 ENCOUNTER — ANCILLARY PROCEDURE (OUTPATIENT)
Dept: GENERAL RADIOLOGY | Facility: CLINIC | Age: 85
End: 2020-11-03
Attending: FAMILY MEDICINE
Payer: COMMERCIAL

## 2020-11-03 ENCOUNTER — OFFICE VISIT (OUTPATIENT)
Dept: FAMILY MEDICINE | Facility: CLINIC | Age: 85
End: 2020-11-03
Payer: COMMERCIAL

## 2020-11-03 VITALS
BODY MASS INDEX: 24.24 KG/M2 | WEIGHT: 139 LBS | RESPIRATION RATE: 20 BRPM | HEART RATE: 56 BPM | SYSTOLIC BLOOD PRESSURE: 174 MMHG | DIASTOLIC BLOOD PRESSURE: 62 MMHG

## 2020-11-03 DIAGNOSIS — M25.551 HIP PAIN, RIGHT: ICD-10-CM

## 2020-11-03 DIAGNOSIS — R00.2 PALPITATIONS: ICD-10-CM

## 2020-11-03 DIAGNOSIS — I10 ESSENTIAL HYPERTENSION: Primary | ICD-10-CM

## 2020-11-03 PROCEDURE — 99214 OFFICE O/P EST MOD 30 MIN: CPT | Performed by: FAMILY MEDICINE

## 2020-11-03 PROCEDURE — 93000 ELECTROCARDIOGRAM COMPLETE: CPT | Performed by: FAMILY MEDICINE

## 2020-11-03 PROCEDURE — 73502 X-RAY EXAM HIP UNI 2-3 VIEWS: CPT | Mod: RT | Performed by: RADIOLOGY

## 2020-11-03 PROCEDURE — 72100 X-RAY EXAM L-S SPINE 2/3 VWS: CPT | Performed by: RADIOLOGY

## 2020-11-03 RX ORDER — AMLODIPINE BESYLATE 5 MG/1
5 TABLET ORAL DAILY
Qty: 30 TABLET | Refills: 0 | Status: SHIPPED | OUTPATIENT
Start: 2020-11-03 | End: 2020-11-27

## 2020-11-03 NOTE — PROGRESS NOTES
Subjective     Swathi Dukes is a 84 year old female who presents to clinic today for the following health issues:    HPI      Patient presented to clinic today to follow up on blood pressure. Has been elevated the last few times she has checked in the last month. Of note, patient mentions she has felt some heart fluttering the last two weeks and had a complaint of a nose bleed yesterday. PCP agreed to follow up on patient to discuss previous complaints.     Denies any SOB, perspiration, pounding headaches, or visual changes.     Taking enalapril BID and hydralazine TID as prescribed.     Has had 2 episodes of heart fluttering, lasted a minute, then fully resolved. Was at rest during these episodes.     Echo in 2/2020 showed mild LVH, mild aortic stenosis.     Reports right hip pain for the past 6 weeks. Makes difficult to sit down at times. No pain while seated. Pain wraps from front of pelvic bone to back of pelvic bone. Reports pain as severe.     Recent diagnosis of lupus. Following with Rheumatology.       Review of Systems   Constitutional, HEENT, cardiovascular, pulmonary, gi and gu systems are negative, except as otherwise noted.      Objective    BP (!) 174/62 (BP Location: Right arm, Patient Position: Chair, Cuff Size: Adult Regular)   Pulse 56   Resp 20   Wt 63 kg (139 lb)   BMI 24.24 kg/m    Body mass index is 24.24 kg/m .  Physical Exam   GENERAL: healthy, alert and no distress  RESP: lungs clear to auscultation - no rales, rhonchi or wheezes  CV: regular rate and rhythm, normal S1 S2, no S3 or S4, no murmur, click or rub, no peripheral edema and peripheral pulses strong  PSYCH: mentation appears normal, affect normal/bright          Assessment & Plan     1. Essential hypertension - uncontrolled, will add amlodipine, recheck in 1 week.  - amLODIPine (NORVASC) 5 MG tablet; Take 1 tablet (5 mg) by mouth daily  Dispense: 30 tablet; Refill: 0    2. Palpitations - EKG showed NSR today.   - EKG 12-lead  complete w/read - Clinics    3. Hip pain, right - will await radiology read to determine next step.   - XR Hip Right 2-3 Views; Future  - XR Lumbar Spine 2/3 Views; Future            Return in about 3 months (around 2/3/2021) for Medication Recheck.    Cortney Vanessa MD  [facility]

## 2020-11-10 ENCOUNTER — ALLIED HEALTH/NURSE VISIT (OUTPATIENT)
Dept: FAMILY MEDICINE | Facility: CLINIC | Age: 85
End: 2020-11-10
Payer: COMMERCIAL

## 2020-11-10 ENCOUNTER — TELEPHONE (OUTPATIENT)
Dept: FAMILY MEDICINE | Facility: CLINIC | Age: 85
End: 2020-11-10

## 2020-11-10 VITALS — DIASTOLIC BLOOD PRESSURE: 60 MMHG | HEART RATE: 60 BPM | SYSTOLIC BLOOD PRESSURE: 140 MMHG

## 2020-11-10 DIAGNOSIS — Z01.30 BP CHECK: Primary | ICD-10-CM

## 2020-11-10 PROCEDURE — 99207 PR NO CHARGE NURSE ONLY: CPT

## 2020-11-10 NOTE — PROGRESS NOTES
Swathi Dukes is a 84 year old patient who comes in today for a Blood Pressure check.  Initial BP:  There were no vitals taken for this visit.     Data Unavailable  Disposition: results routed to provider    BP Reading 140/60. Pt states she is taking 2 BP medications and has no sx.    BP Readings from Last 6 Encounters:   11/03/20 (!) 174/62   11/03/20 (!) 174/62   08/04/20 (!) 168/78   07/14/20 (!) 146/66   05/29/20 (!) 148/60   04/30/20 120/40     CHERIE Torres

## 2020-11-10 NOTE — TELEPHONE ENCOUNTER
Spoke to patient.     She is in agreement to plan.     She is wondering if her son can take her blood pressure at home and then call in the BP readings in 2 weeks as it was difficult for her to get to the clinic today due to age and weather.     Please review and advise.     Helene Kong RN Flex

## 2020-11-10 NOTE — TELEPHONE ENCOUNTER
Swathi Dukes is a 84 year old patient who comes in today for a Blood Pressure check.  Initial BP:  There were no vitals taken for this visit.     Data Unavailable  Disposition: results routed to provider    Pt is taking 2 BP medications and came in with no sx.   BP Readings from Last 6 Encounters:   11/10/20 (!) 140/60   11/03/20 (!) 174/62   11/03/20 (!) 174/62   08/04/20 (!) 168/78   07/14/20 (!) 146/66   05/29/20 (!) 148/60     CHERIE Torres

## 2020-11-11 NOTE — TELEPHONE ENCOUNTER
Patient expressed understanding and acceptance of the plan and had no further questions at this time.     Helene Kong RN Flex

## 2020-11-19 ENCOUNTER — NURSE TRIAGE (OUTPATIENT)
Dept: FAMILY MEDICINE | Facility: CLINIC | Age: 85
End: 2020-11-19

## 2020-11-19 ENCOUNTER — OFFICE VISIT (OUTPATIENT)
Dept: PEDIATRICS | Facility: CLINIC | Age: 85
End: 2020-11-19
Payer: COMMERCIAL

## 2020-11-19 ENCOUNTER — ANCILLARY PROCEDURE (OUTPATIENT)
Dept: GENERAL RADIOLOGY | Facility: CLINIC | Age: 85
End: 2020-11-19
Attending: NURSE PRACTITIONER
Payer: COMMERCIAL

## 2020-11-19 VITALS
OXYGEN SATURATION: 95 % | BODY MASS INDEX: 23.73 KG/M2 | TEMPERATURE: 97.7 F | SYSTOLIC BLOOD PRESSURE: 132 MMHG | HEART RATE: 69 BPM | WEIGHT: 139 LBS | DIASTOLIC BLOOD PRESSURE: 52 MMHG | HEIGHT: 64 IN | RESPIRATION RATE: 16 BRPM

## 2020-11-19 DIAGNOSIS — R06.00 DYSPNEA, UNSPECIFIED TYPE: Primary | ICD-10-CM

## 2020-11-19 DIAGNOSIS — J34.89 FRONTAL SINUS PAIN: ICD-10-CM

## 2020-11-19 DIAGNOSIS — R09.81 NASAL CONGESTION: ICD-10-CM

## 2020-11-19 PROCEDURE — 71046 X-RAY EXAM CHEST 2 VIEWS: CPT | Performed by: RADIOLOGY

## 2020-11-19 PROCEDURE — 99214 OFFICE O/P EST MOD 30 MIN: CPT | Performed by: NURSE PRACTITIONER

## 2020-11-19 PROCEDURE — U0003 INFECTIOUS AGENT DETECTION BY NUCLEIC ACID (DNA OR RNA); SEVERE ACUTE RESPIRATORY SYNDROME CORONAVIRUS 2 (SARS-COV-2) (CORONAVIRUS DISEASE [COVID-19]), AMPLIFIED PROBE TECHNIQUE, MAKING USE OF HIGH THROUGHPUT TECHNOLOGIES AS DESCRIBED BY CMS-2020-01-R: HCPCS | Performed by: NURSE PRACTITIONER

## 2020-11-19 RX ORDER — AZITHROMYCIN 250 MG/1
TABLET, FILM COATED ORAL
Qty: 6 TABLET | Refills: 0 | Status: SHIPPED | OUTPATIENT
Start: 2020-11-19 | End: 2020-11-24

## 2020-11-19 ASSESSMENT — MIFFLIN-ST. JEOR: SCORE: 1062.56

## 2020-11-19 NOTE — PROGRESS NOTES
"Subjective     Swathi Dukes is a 85 year old female who presents to clinic today for the following health issues:    HPI      Acute Illness   Acute illness concerns: Shortness of breath. Labored breathing. Can't catch breath.  Onset/Duration: Started last week.  Symptoms:  Fever: No  Chills/Sweats: Yes- chills   Headache (location?): no  Sinus Pressure: no  Conjunctivitis:  no  Ear Pain: no  Rhinorrhea: no  Congestion: Yes- when blowing nose yellow mucus and blood.   Sore Throat: no  Cough: Yes -non-productive  Wheeze: Yes- located in chest.   Decreased Appetite: no  Nausea: no  Vomiting: no  Diarrhea: no  Dysuria/Freq.: no  Dysuria or Hematuria: no  Fatigue/Achiness: Yes- achiness in ribs and lower back.   Sick/Strep Exposure: no  Therapies tried and outcome: None    -Patient states that it feels like she is breathing through a towel.   -Had cold last week and states that it has gotten worse.  -Patient lives in an assisted living   -States one resident in her nursing home has COVID. Per patient, was not around this resident  -SOB with exertion  -No temperature recorded at home.  Afebrile now, 97.7%    Review of Systems   Constitutional, HEENT, cardiovascular, pulmonary, gi and gu systems are negative, except as otherwise noted.      Objective    /52 (BP Location: Right arm, Patient Position: Chair, Cuff Size: Adult Regular)   Pulse 69   Temp 97.7  F (36.5  C) (Tympanic)   Resp 16   Ht 1.613 m (5' 3.5\")   Wt 63 kg (139 lb)   SpO2 95%   BMI 24.24 kg/m    Body mass index is 24.24 kg/m .       Physical Exam   GENERAL: healthy, alert and no distress  EYES: Eyes grossly normal to inspection, PERRL and conjunctivae and sclerae normal  HENT: ear canals and TM's normal, nose and mouth without ulcers or lesions; sinus pressure to palpation--frontal  NECK: no adenopathy, no asymmetry, masses, or scars and thyroid normal to palpation  RESP: lungs clear to auscultation - no rales, rhonchi or wheezes  CV: regular " rate and rhythm, normal S1 S2, no S3 or S4, no murmur, click or rub, no peripheral edema and peripheral pulses strong  PSYCH: mentation appears normal, affect normal/bright      Xray - no acute abnormalities seen in chest xray.        Assessment & Plan     1. Dyspnea, unspecified type  Chest xray ordered  - XR Chest 2 Views    2. Nasal congestion  Will screen for COVID  Azithromycin given  - azithromycin (ZITHROMAX) 250 MG tablet; Take 2 tablets (500 mg) by mouth daily for 1 day, THEN 1 tablet (250 mg) daily for 4 days.  Dispense: 6 tablet; Refill: 0  - Symptomatic COVID-19 Virus (Coronavirus) by PCR    3. Frontal sinus pain  Use OTC sinus relief without decongestant d/t HTN        See Patient Instructions  ER if needed  No follow-ups on file.    Coty Ferguson NP  [facility]

## 2020-11-19 NOTE — LETTER
November 23, 2020      Swathi Dukes  [address]   [place] [address]        Hi Swathi,     Your COVID-19 PCR test was negative.     Please let me know if you have any questions,     Coty Ferguson DNP   Family Medicine     Resulted Orders   Symptomatic COVID-19 Virus (Coronavirus) by PCR   Result Value Ref Range    COVID-19 Virus PCR to [facility] - Source Nasopharyngeal     COVID-19 Virus PCR to [facility] - Result Not Detected       Comment:      Collection of multiple specimens from the same patient may be necessary to   detect the virus. The possibility of a false negative should be considered if   the patient's recent exposure or clinical presentation suggests 2019 nCOV   infection and diagnostic tests for other causes of illness are negative.   Repeat testing may be considered in this setting.  Patient sample was heat inactivated and amplified using the HDPCR SARS-CoV-2   assay ([employer] The HDPCRTM SARS-CoV-2 assay is a reverse   transcription real-time polymerase chain reaction (qRT-PCR) test intended for   the qualitative detection of nucleic acid  from SARS-CoV-2 in human nasopharyngeal swabs, oropharyngeal swabs, anterior   nasal swabs, mid-turbinate nasal swabs as well as nasal aspirate, nasal wash,   and bronchoalveolar lavage (BAL) specimens from individuals who are suspected   of COVID-19 by their healthcare provider.  A negative result does not rule out the presence of real-time PCR inhibitors   in the sp ecimen or COVID-19 RNA in concentrations below the limit of detection   of the assay. The possibility of a false negative should be considered if the   patients recent exposure or clinical presentation suggests COVID-19.   Additional testing or repeat testing requires consultation with the   laboratory.  Nasopharyngeal specimen is the preferred choice for swab-based SARS CoV2   testing. When collection of a nasopharyngeal swab is not possible the   following are acceptable  alternatives:  an oropharyngeal (OP) specimen collected by a healthcare professional, or a   nasal mid-turbinate (NMT) swab collected by a healthcare professional or by   onsite self-collection (using a flocked tapered swab), or an anterior nares   specimen collected by a healthcare professional or by onsite self-collection   (using a round foam swab). (Centers for Disease Control)  Testing performed by [facility] ([facility]) [address] [facility] [address]  The test performance characteristics were determined by [facility]. It has not been   cleared or approved by the FDA.  The laboratory is regulated under the Clinical Laboratory Improvement   Amendments of 1988 (CLIA-88) as qualified to perform high-complexity testing.   This test is used for clinical purposes. It should not be regarded as   investigational or for research.

## 2020-11-19 NOTE — TELEPHONE ENCOUNTER
"Patient called stating that she has started to have shortness of breath \"like I am breathing through a towel\". Had a cold last week and now has gotten worse, has chills. A little bit of labored breathing, no fever, has not had fever at all since symptoms began. Coughing a little bit. Patient denies any exposure to covid and lives in assisted living with no known cases within her facility.     Patient states \"I think I have pneumonia\". Advised patient be seen in clinic for lung evaluation and possible chest xray. Patient is requesting a chest xray.     Patient scheduled in [facility] for an acute visit. No availability in primary clinic.    Emerson HERNANDEZ RN, BSN     Additional Information    Negative: Breathing stopped and hasn't returned    Negative: Choking on something    Negative: SEVERE difficulty breathing (e.g., struggling for each breath, speaks in single words, pulse > 120)    Negative: Bluish (or gray) lips or face    Negative: Difficult to awaken or acting confused (e.g., disoriented, slurred speech)    Negative: Passed out (i.e., fainted, collapsed and was not responding)    Negative: Wheezing started suddenly after medicine, an allergic food, or bee sting    Negative: Stridor    Negative: Slow, shallow and weak breathing    Negative: Sounds like a life-threatening emergency to the triager    Negative: Chest pain    Negative: Wheezing (high pitched whistling sound) and previous asthma attacks or use of asthma medicines    Negative: Difficulty breathing and only present when coughing    Negative: Difficulty breathing and only from stuffy or runny nose    Negative: MODERATE difficulty breathing (e.g., speaks in phrases, SOB even at rest, pulse 100-120) of new onset or worse than normal    Negative: Wheezing can be heard across the room    Negative: Drooling or spitting out saliva (because can't swallow)    Negative: Any history of prior \"blood clot\" in leg or lungs    Negative: Recent illness requiring " "prolonged bedrest (i.e., immobilization)    Negative: Hip or leg fracture in past 2 months (e.g., or had cast on leg or ankle)    Negative: Major surgery in the past month    Negative: Recent long-distance travel with prolonged time in car, bus, plane, or train (i.e., within past 2 weeks; 6 or  more hours duration)    Negative: Extra heart beats OR irregular heart beating   (i.e., \"palpitations\")    Negative: Fever > 103 F (39.4 C)    Negative: Fever > 101 F (38.3 C) and over 60 years of age    Negative: Fever > 100.0 F (37.8 C) and bedridden (e.g., nursing home patient, stroke, chronic illness, recovering from surgery)    Negative: Fever > 100.0 F (37.8 C) and diabetes mellitus or weak immune system (e.g., HIV positive, cancer chemo, splenectomy, organ transplant, chronic steroids)    Negative: Periods where breathing stops and then resumes normally and bedridden (e.g., nursing home patient, CVA)    Negative: Pregnant or postpartum (from 0 to 6 weeks after delivery)    Negative: Patient sounds very sick or weak to the triager    Patient wants to be seen    MILD difficulty breathing (e.g., minimal/no SOB at rest, SOB with walking, pulse < 100) of new onset or worse than normal    Answer Assessment - Initial Assessment Questions  1. RESPIRATORY STATUS: \"Describe your breathing?\" (e.g., wheezing, shortness of breath, unable to speak, severe coughing)       Shortness of breath  2. ONSET: \"When did this breathing problem begin?\"       Couple days  3. PATTERN \"Does the difficult breathing come and go, or has it been constant since it started?\"       At rest and exertion  4. SEVERITY: \"How bad is your breathing?\" (e.g., mild, moderate, severe)     - MILD: No SOB at rest, mild SOB with walking, speaks normally in sentences, can lay down, no retractions, pulse < 100.     - MODERATE: SOB at rest, SOB with minimal exertion and prefers to sit, cannot lie down flat, speaks in phrases, mild retractions, audible wheezing, pulse " "100-120.     - SEVERE: Very SOB at rest, speaks in single words, struggling to breathe, sitting hunched forward, retractions, pulse > 120       Mild to moderate  5. RECURRENT SYMPTOM: \"Have you had difficulty breathing before?\" If so, ask: \"When was the last time?\" and \"What happened that time?\"       Yes, has hx of pneumonia  6. CARDIAC HISTORY: \"Do you have any history of heart disease?\" (e.g., heart attack, angina, bypass surgery, angioplasty)         7. LUNG HISTORY: \"Do you have any history of lung disease?\"  (e.g., pulmonary embolus, asthma, emphysema)        8. CAUSE: \"What do you think is causing the breathing problem?\"       I think I have pneumonia, had a cold last week  9. OTHER SYMPTOMS: \"Do you have any other symptoms? (e.g., dizziness, runny nose, cough, chest pain, fever)      Coughing a little bit  10. PREGNANCY: \"Is there any chance you are pregnant?\" \"When was your last menstrual period?\"        no  11. TRAVEL: \"Have you traveled out of the country in the last month?\" (e.g., travel history, exposures)        no    Protocols used: BREATHING DIFFICULTY-A-[place]      "

## 2020-11-20 LAB
SARS-COV-2 RNA SPEC QL NAA+PROBE: NOT DETECTED
SPECIMEN SOURCE: NORMAL

## 2020-11-27 DIAGNOSIS — I10 ESSENTIAL HYPERTENSION: ICD-10-CM

## 2020-11-27 RX ORDER — AMLODIPINE BESYLATE 5 MG/1
5 TABLET ORAL DAILY
Qty: 30 TABLET | Refills: 1 | Status: SHIPPED | OUTPATIENT
Start: 2020-11-27 | End: 2020-12-29

## 2020-11-27 NOTE — TELEPHONE ENCOUNTER
Routing refill request to provider for review/approval because:  Drug interaction warning    Pt is due for BP  Check in 2 months  Ronda Miller RN

## 2020-12-16 DIAGNOSIS — I10 ESSENTIAL HYPERTENSION: ICD-10-CM

## 2020-12-16 RX ORDER — HYDRALAZINE HYDROCHLORIDE 25 MG/1
25 TABLET, FILM COATED ORAL 3 TIMES DAILY
Qty: 270 TABLET | Refills: 3 | Status: SHIPPED | OUTPATIENT
Start: 2020-12-16 | End: 2021-01-22

## 2020-12-16 NOTE — TELEPHONE ENCOUNTER
Prescription approved per [facility] Ref[facility]. BP reviewed, last BP in range. Keily Thomas R.N.

## 2020-12-17 PROBLEM — M54.50 LUMBAGO: Status: RESOLVED | Noted: 2020-02-10 | Resolved: 2020-12-17

## 2020-12-17 NOTE — PROGRESS NOTES
Discharge Note    Progress reporting period is from initial evaluation date (please see noted date below) to Mar 9, 2020.  Linked Episodes   Type: Episode: Status: Noted: Resolved: Last update: Updated by:   PHYSICAL THERAPY LBP 2/10/2020 Active 2/10/2020  3/9/2020 10:16 AM Renetta Martines PT      Comments:       Swathi failed to follow up and current status is unknown.  Please see information below for last relevant information on current status.  Patient seen for 4 visits.    SUBJECTIVE  Subjective changes noted by patient:  Back is hurting more this week. Reports pain occasionally with the strengthening exercises. Notices an occasional sharp pain on the 4 rep of the roll.  .  Current pain level is  .     Previous pain level was  6/10.   Changes in function:  Yes (See Goal flowsheet attached for changes in current functional level)  Adverse reaction to treatment or activity: None    OBJECTIVE  Changes noted in objective findings:       ASSESSMENT/PLAN  Diagnosis: lumbar radiculopathy   Updated problem list and treatment plan:   Pain - HEP  Decreased function - HEP  Impaired muscle performance - HEP  STG/LTGs have been met or progress has been made towards goals:  Yes, please see goal flowsheet for most current information  Assessment of Progress: current status is unknown.    Last current status:     Self Management Plans:  HEP  I have re-evaluated this patient and find that the nature, scope, duration and intensity of the therapy is appropriate for the medical condition of the patient.  Swathi continues to require the following intervention to meet STG and LTG's:  HEP.    Recommendations:  Discharge with current home program.  Patient to follow up with MD as needed.    Please refer to the daily flowsheet for treatment today, total treatment time and time spent performing 1:1 timed codes.

## 2020-12-29 DIAGNOSIS — I10 ESSENTIAL HYPERTENSION: ICD-10-CM

## 2020-12-29 RX ORDER — AMLODIPINE BESYLATE 5 MG/1
5 TABLET ORAL DAILY
Qty: 90 TABLET | Refills: 0 | Status: SHIPPED | OUTPATIENT
Start: 2020-12-29 | End: 2021-01-22

## 2020-12-29 NOTE — TELEPHONE ENCOUNTER
Routing refill request to provider for review/approval because:  Drug interaction warning  Medium  Drug-Drug: amLODIPine and simvastatin    Helene Kong RN Flex

## 2021-01-02 ENCOUNTER — HOSPITAL ENCOUNTER (EMERGENCY)
Facility: CLINIC | Age: 86
Discharge: ANOTHER HOSPITAL | End: 2021-01-03
Attending: PHYSICIAN ASSISTANT | Admitting: PHYSICIAN ASSISTANT
Payer: COMMERCIAL

## 2021-01-02 ENCOUNTER — APPOINTMENT (OUTPATIENT)
Dept: CT IMAGING | Facility: CLINIC | Age: 86
End: 2021-01-02
Attending: PHYSICIAN ASSISTANT
Payer: COMMERCIAL

## 2021-01-02 DIAGNOSIS — R07.9 CHEST PAIN: ICD-10-CM

## 2021-01-02 DIAGNOSIS — I31.39 PERICARDIAL EFFUSION: ICD-10-CM

## 2021-01-02 DIAGNOSIS — R06.02 SOB (SHORTNESS OF BREATH): ICD-10-CM

## 2021-01-02 LAB
ALBUMIN SERPL-MCNC: 3.3 G/DL (ref 3.4–5)
ALP SERPL-CCNC: 77 U/L (ref 40–150)
ALT SERPL W P-5'-P-CCNC: 24 U/L (ref 0–50)
ANION GAP SERPL CALCULATED.3IONS-SCNC: 4 MMOL/L (ref 3–14)
AST SERPL W P-5'-P-CCNC: 37 U/L (ref 0–45)
BASOPHILS # BLD AUTO: 0 10E9/L (ref 0–0.2)
BASOPHILS NFR BLD AUTO: 0.6 %
BILIRUB SERPL-MCNC: 0.4 MG/DL (ref 0.2–1.3)
BUN SERPL-MCNC: 17 MG/DL (ref 7–30)
CALCIUM SERPL-MCNC: 9.3 MG/DL (ref 8.5–10.1)
CHLORIDE SERPL-SCNC: 108 MMOL/L (ref 94–109)
CO2 SERPL-SCNC: 27 MMOL/L (ref 20–32)
CREAT BLD-MCNC: 0.8 MG/DL (ref 0.52–1.04)
CREAT SERPL-MCNC: 0.75 MG/DL (ref 0.52–1.04)
DIFFERENTIAL METHOD BLD: ABNORMAL
EOSINOPHIL # BLD AUTO: 0.1 10E9/L (ref 0–0.7)
EOSINOPHIL NFR BLD AUTO: 1.1 %
ERYTHROCYTE [DISTWIDTH] IN BLOOD BY AUTOMATED COUNT: 15.6 % (ref 10–15)
ERYTHROCYTE [SEDIMENTATION RATE] IN BLOOD BY WESTERGREN METHOD: 48 MM/H (ref 0–30)
GFR SERPL CREATININE-BSD FRML MDRD: 69 ML/MIN/{1.73_M2}
GFR SERPL CREATININE-BSD FRML MDRD: 74 ML/MIN/{1.73_M2}
GLUCOSE SERPL-MCNC: 104 MG/DL (ref 70–99)
HCT VFR BLD AUTO: 30.1 % (ref 35–47)
HGB BLD-MCNC: 9.5 G/DL (ref 11.7–15.7)
IMM GRANULOCYTES # BLD: 0 10E9/L (ref 0–0.4)
IMM GRANULOCYTES NFR BLD: 0.3 %
LIPASE SERPL-CCNC: 52 U/L (ref 73–393)
LYMPHOCYTES # BLD AUTO: 1.5 10E9/L (ref 0.8–5.3)
LYMPHOCYTES NFR BLD AUTO: 21.4 %
MAGNESIUM SERPL-MCNC: 2.2 MG/DL (ref 1.6–2.3)
MCH RBC QN AUTO: 31.5 PG (ref 26.5–33)
MCHC RBC AUTO-ENTMCNC: 31.6 G/DL (ref 31.5–36.5)
MCV RBC AUTO: 100 FL (ref 78–100)
MONOCYTES # BLD AUTO: 0.6 10E9/L (ref 0–1.3)
MONOCYTES NFR BLD AUTO: 7.8 %
NEUTROPHILS # BLD AUTO: 4.9 10E9/L (ref 1.6–8.3)
NEUTROPHILS NFR BLD AUTO: 68.8 %
NRBC # BLD AUTO: 0 10*3/UL
NRBC BLD AUTO-RTO: 0 /100
PLATELET # BLD AUTO: 270 10E9/L (ref 150–450)
POTASSIUM SERPL-SCNC: 4.5 MMOL/L (ref 3.4–5.3)
PROT SERPL-MCNC: 7.2 G/DL (ref 6.8–8.8)
RBC # BLD AUTO: 3.02 10E12/L (ref 3.8–5.2)
SODIUM SERPL-SCNC: 139 MMOL/L (ref 133–144)
TROPONIN I SERPL-MCNC: <0.015 UG/L (ref 0–0.04)
WBC # BLD AUTO: 7.1 10E9/L (ref 4–11)

## 2021-01-02 PROCEDURE — 96374 THER/PROPH/DIAG INJ IV PUSH: CPT

## 2021-01-02 PROCEDURE — 250N000011 HC RX IP 250 OP 636: Performed by: PHYSICIAN ASSISTANT

## 2021-01-02 PROCEDURE — 82565 ASSAY OF CREATININE: CPT | Mod: 59

## 2021-01-02 PROCEDURE — 96375 TX/PRO/DX INJ NEW DRUG ADDON: CPT

## 2021-01-02 PROCEDURE — 99285 EMERGENCY DEPT VISIT HI MDM: CPT | Mod: 25

## 2021-01-02 PROCEDURE — 71250 CT THORAX DX C-: CPT

## 2021-01-02 PROCEDURE — 96361 HYDRATE IV INFUSION ADD-ON: CPT

## 2021-01-02 PROCEDURE — 85652 RBC SED RATE AUTOMATED: CPT | Performed by: PHYSICIAN ASSISTANT

## 2021-01-02 PROCEDURE — 85025 COMPLETE CBC W/AUTO DIFF WBC: CPT | Performed by: PHYSICIAN ASSISTANT

## 2021-01-02 PROCEDURE — 80053 COMPREHEN METABOLIC PANEL: CPT | Performed by: PHYSICIAN ASSISTANT

## 2021-01-02 PROCEDURE — 83690 ASSAY OF LIPASE: CPT | Performed by: PHYSICIAN ASSISTANT

## 2021-01-02 PROCEDURE — 83735 ASSAY OF MAGNESIUM: CPT | Performed by: PHYSICIAN ASSISTANT

## 2021-01-02 PROCEDURE — 250N000009 HC RX 250: Performed by: PHYSICIAN ASSISTANT

## 2021-01-02 PROCEDURE — 93005 ELECTROCARDIOGRAM TRACING: CPT

## 2021-01-02 PROCEDURE — 84484 ASSAY OF TROPONIN QUANT: CPT | Performed by: PHYSICIAN ASSISTANT

## 2021-01-02 RX ORDER — DIPHENHYDRAMINE HYDROCHLORIDE 50 MG/ML
25 INJECTION INTRAMUSCULAR; INTRAVENOUS ONCE
Status: COMPLETED | OUTPATIENT
Start: 2021-01-02 | End: 2021-01-02

## 2021-01-02 RX ORDER — METHYLPREDNISOLONE SODIUM SUCCINATE 125 MG/2ML
125 INJECTION, POWDER, LYOPHILIZED, FOR SOLUTION INTRAMUSCULAR; INTRAVENOUS ONCE
Status: COMPLETED | OUTPATIENT
Start: 2021-01-02 | End: 2021-01-02

## 2021-01-02 RX ADMIN — METHYLPREDNISOLONE SODIUM SUCCINATE 125 MG: 125 INJECTION, POWDER, FOR SOLUTION INTRAMUSCULAR; INTRAVENOUS at 23:17

## 2021-01-02 RX ADMIN — FAMOTIDINE 20 MG: 10 INJECTION, SOLUTION INTRAVENOUS at 23:16

## 2021-01-02 RX ADMIN — DIPHENHYDRAMINE HYDROCHLORIDE 25 MG: 50 INJECTION, SOLUTION INTRAMUSCULAR; INTRAVENOUS at 23:17

## 2021-01-02 ASSESSMENT — ENCOUNTER SYMPTOMS
RHINORRHEA: 0
SORE THROAT: 0
BACK PAIN: 1
SHORTNESS OF BREATH: 1
WHEEZING: 1
NECK PAIN: 1
DIAPHORESIS: 0
FATIGUE: 0
COUGH: 0
HEADACHES: 0
HEMATURIA: 0
FEVER: 0
NUMBNESS: 0
NAUSEA: 0
VOMITING: 0
DYSURIA: 0
DIARRHEA: 0
ABDOMINAL PAIN: 0
FREQUENCY: 0

## 2021-01-03 ENCOUNTER — HOSPITAL ENCOUNTER (INPATIENT)
Facility: CLINIC | Age: 86
LOS: 2 days | Discharge: HOME | DRG: 287 | End: 2021-01-05
Attending: STUDENT IN AN ORGANIZED HEALTH CARE EDUCATION/TRAINING PROGRAM | Admitting: STUDENT IN AN ORGANIZED HEALTH CARE EDUCATION/TRAINING PROGRAM
Payer: COMMERCIAL

## 2021-01-03 ENCOUNTER — APPOINTMENT (OUTPATIENT)
Dept: CARDIOLOGY | Facility: CLINIC | Age: 86
DRG: 287 | End: 2021-01-03
Attending: STUDENT IN AN ORGANIZED HEALTH CARE EDUCATION/TRAINING PROGRAM
Payer: COMMERCIAL

## 2021-01-03 VITALS
TEMPERATURE: 98.7 F | HEART RATE: 70 BPM | OXYGEN SATURATION: 96 % | RESPIRATION RATE: 16 BRPM | SYSTOLIC BLOOD PRESSURE: 159 MMHG | DIASTOLIC BLOOD PRESSURE: 62 MMHG

## 2021-01-03 DIAGNOSIS — I10 ESSENTIAL HYPERTENSION: ICD-10-CM

## 2021-01-03 DIAGNOSIS — M54.2 NECK PAIN: Primary | ICD-10-CM

## 2021-01-03 DIAGNOSIS — R94.39 ABNORMAL CARDIOVASCULAR STRESS TEST: ICD-10-CM

## 2021-01-03 PROBLEM — I31.39 PERICARDIAL EFFUSION: Status: ACTIVE | Noted: 2021-01-03

## 2021-01-03 LAB
ANION GAP SERPL CALCULATED.3IONS-SCNC: 5 MMOL/L (ref 3–14)
BUN SERPL-MCNC: 16 MG/DL (ref 7–30)
CALCIUM SERPL-MCNC: 9.3 MG/DL (ref 8.5–10.1)
CHLORIDE SERPL-SCNC: 111 MMOL/L (ref 94–109)
CK SERPL-CCNC: 58 U/L (ref 30–225)
CO2 SERPL-SCNC: 24 MMOL/L (ref 20–32)
CREAT SERPL-MCNC: 0.57 MG/DL (ref 0.52–1.04)
CRP SERPL-MCNC: 53.1 MG/L (ref 0–8)
ERYTHROCYTE [DISTWIDTH] IN BLOOD BY AUTOMATED COUNT: 15.6 % (ref 10–15)
ERYTHROCYTE [SEDIMENTATION RATE] IN BLOOD BY WESTERGREN METHOD: 66 MM/H (ref 0–30)
GFR SERPL CREATININE-BSD FRML MDRD: 86 ML/MIN/{1.73_M2}
GLUCOSE SERPL-MCNC: 163 MG/DL (ref 70–99)
HCT VFR BLD AUTO: 32.4 % (ref 35–47)
HGB BLD-MCNC: 10.4 G/DL (ref 11.7–15.7)
INR PPP: 0.97 (ref 0.86–1.14)
INTERPRETATION ECG - MUSE: NORMAL
LABORATORY COMMENT REPORT: NORMAL
MCH RBC QN AUTO: 31.2 PG (ref 26.5–33)
MCHC RBC AUTO-ENTMCNC: 32.1 G/DL (ref 31.5–36.5)
MCV RBC AUTO: 97 FL (ref 78–100)
PLATELET # BLD AUTO: 293 10E9/L (ref 150–450)
POTASSIUM SERPL-SCNC: 3.5 MMOL/L (ref 3.4–5.3)
RBC # BLD AUTO: 3.33 10E12/L (ref 3.8–5.2)
SARS-COV-2 RNA SPEC QL NAA+PROBE: NEGATIVE
SODIUM SERPL-SCNC: 140 MMOL/L (ref 133–144)
SPECIMEN SOURCE: NORMAL
TROPONIN I SERPL-MCNC: <0.015 UG/L (ref 0–0.04)
WBC # BLD AUTO: 5.8 10E9/L (ref 4–11)

## 2021-01-03 PROCEDURE — 85610 PROTHROMBIN TIME: CPT | Performed by: STUDENT IN AN ORGANIZED HEALTH CARE EDUCATION/TRAINING PROGRAM

## 2021-01-03 PROCEDURE — 86038 ANTINUCLEAR ANTIBODIES: CPT | Performed by: INTERNAL MEDICINE

## 2021-01-03 PROCEDURE — 250N000013 HC RX MED GY IP 250 OP 250 PS 637: Performed by: PSYCHIATRY & NEUROLOGY

## 2021-01-03 PROCEDURE — 210N000002 HC R&B HEART CARE

## 2021-01-03 PROCEDURE — 99223 1ST HOSP IP/OBS HIGH 75: CPT | Mod: AI | Performed by: STUDENT IN AN ORGANIZED HEALTH CARE EDUCATION/TRAINING PROGRAM

## 2021-01-03 PROCEDURE — 250N000013 HC RX MED GY IP 250 OP 250 PS 637: Performed by: STUDENT IN AN ORGANIZED HEALTH CARE EDUCATION/TRAINING PROGRAM

## 2021-01-03 PROCEDURE — 36415 COLL VENOUS BLD VENIPUNCTURE: CPT | Performed by: INTERNAL MEDICINE

## 2021-01-03 PROCEDURE — 250N000013 HC RX MED GY IP 250 OP 250 PS 637: Performed by: PHYSICIAN ASSISTANT

## 2021-01-03 PROCEDURE — 99222 1ST HOSP IP/OBS MODERATE 55: CPT | Performed by: PSYCHIATRY & NEUROLOGY

## 2021-01-03 PROCEDURE — 86039 ANTINUCLEAR ANTIBODIES (ANA): CPT | Performed by: INTERNAL MEDICINE

## 2021-01-03 PROCEDURE — 87635 SARS-COV-2 COVID-19 AMP PRB: CPT | Performed by: STUDENT IN AN ORGANIZED HEALTH CARE EDUCATION/TRAINING PROGRAM

## 2021-01-03 PROCEDURE — 83516 IMMUNOASSAY NONANTIBODY: CPT | Performed by: INTERNAL MEDICINE

## 2021-01-03 PROCEDURE — 99207 PR CDG-CODE CATEGORY CHANGED: CPT | Performed by: PSYCHIATRY & NEUROLOGY

## 2021-01-03 PROCEDURE — 82550 ASSAY OF CK (CPK): CPT | Performed by: INTERNAL MEDICINE

## 2021-01-03 PROCEDURE — 80048 BASIC METABOLIC PNL TOTAL CA: CPT | Performed by: STUDENT IN AN ORGANIZED HEALTH CARE EDUCATION/TRAINING PROGRAM

## 2021-01-03 PROCEDURE — 85027 COMPLETE CBC AUTOMATED: CPT | Performed by: STUDENT IN AN ORGANIZED HEALTH CARE EDUCATION/TRAINING PROGRAM

## 2021-01-03 PROCEDURE — 86140 C-REACTIVE PROTEIN: CPT | Performed by: INTERNAL MEDICINE

## 2021-01-03 PROCEDURE — 99223 1ST HOSP IP/OBS HIGH 75: CPT | Mod: 25 | Performed by: INTERNAL MEDICINE

## 2021-01-03 PROCEDURE — 93306 TTE W/DOPPLER COMPLETE: CPT

## 2021-01-03 PROCEDURE — 85652 RBC SED RATE AUTOMATED: CPT | Performed by: INTERNAL MEDICINE

## 2021-01-03 PROCEDURE — 258N000003 HC RX IP 258 OP 636: Performed by: PHYSICIAN ASSISTANT

## 2021-01-03 PROCEDURE — 250N000013 HC RX MED GY IP 250 OP 250 PS 637: Performed by: INTERNAL MEDICINE

## 2021-01-03 PROCEDURE — 84484 ASSAY OF TROPONIN QUANT: CPT | Performed by: INTERNAL MEDICINE

## 2021-01-03 PROCEDURE — 93306 TTE W/DOPPLER COMPLETE: CPT | Mod: 26 | Performed by: INTERNAL MEDICINE

## 2021-01-03 PROCEDURE — 36415 COLL VENOUS BLD VENIPUNCTURE: CPT | Performed by: STUDENT IN AN ORGANIZED HEALTH CARE EDUCATION/TRAINING PROGRAM

## 2021-01-03 RX ORDER — FOLIC ACID 1 MG/1
1 TABLET ORAL DAILY
Status: DISCONTINUED | OUTPATIENT
Start: 2021-01-03 | End: 2021-01-05 | Stop reason: HOSPADM

## 2021-01-03 RX ORDER — LATANOPROST 50 UG/ML
1 SOLUTION/ DROPS OPHTHALMIC DAILY
Status: DISCONTINUED | OUTPATIENT
Start: 2021-01-03 | End: 2021-01-05 | Stop reason: HOSPADM

## 2021-01-03 RX ORDER — ONDANSETRON 2 MG/ML
4 INJECTION INTRAMUSCULAR; INTRAVENOUS EVERY 6 HOURS PRN
Status: DISCONTINUED | OUTPATIENT
Start: 2021-01-03 | End: 2021-01-05 | Stop reason: HOSPADM

## 2021-01-03 RX ORDER — POLYETHYLENE GLYCOL 3350 17 G/17G
17 POWDER, FOR SOLUTION ORAL DAILY PRN
Status: DISCONTINUED | OUTPATIENT
Start: 2021-01-03 | End: 2021-01-05 | Stop reason: HOSPADM

## 2021-01-03 RX ORDER — ENALAPRIL MALEATE 20 MG/1
20 TABLET ORAL 2 TIMES DAILY
Status: DISCONTINUED | OUTPATIENT
Start: 2021-01-03 | End: 2021-01-05 | Stop reason: HOSPADM

## 2021-01-03 RX ORDER — ONDANSETRON 4 MG/1
4 TABLET, ORALLY DISINTEGRATING ORAL EVERY 6 HOURS PRN
Status: DISCONTINUED | OUTPATIENT
Start: 2021-01-03 | End: 2021-01-05 | Stop reason: HOSPADM

## 2021-01-03 RX ORDER — FOLIC ACID 1 MG/1
1 TABLET ORAL DAILY
COMMUNITY
End: 2021-08-07

## 2021-01-03 RX ORDER — BRIMONIDINE TARTRATE 1 MG/ML
1 SOLUTION/ DROPS OPHTHALMIC 2 TIMES DAILY
COMMUNITY
End: 2021-01-25

## 2021-01-03 RX ORDER — PAROXETINE 10 MG/1
5 TABLET, FILM COATED ORAL EVERY MORNING
Status: ON HOLD | COMMUNITY
End: 2021-03-09

## 2021-01-03 RX ORDER — ACETAMINOPHEN 500 MG
1000 TABLET ORAL EVERY 6 HOURS PRN
Status: DISCONTINUED | OUTPATIENT
Start: 2021-01-03 | End: 2021-01-05

## 2021-01-03 RX ORDER — SIMVASTATIN 10 MG
10 TABLET ORAL AT BEDTIME
Status: DISCONTINUED | OUTPATIENT
Start: 2021-01-03 | End: 2021-01-05 | Stop reason: HOSPADM

## 2021-01-03 RX ORDER — PROCHLORPERAZINE 25 MG
12.5 SUPPOSITORY, RECTAL RECTAL EVERY 12 HOURS PRN
Status: DISCONTINUED | OUTPATIENT
Start: 2021-01-03 | End: 2021-01-05 | Stop reason: HOSPADM

## 2021-01-03 RX ORDER — DORZOLAMIDE HCL 20 MG/ML
1 SOLUTION/ DROPS OPHTHALMIC 2 TIMES DAILY
COMMUNITY
End: 2021-01-25

## 2021-01-03 RX ORDER — OLANZAPINE 10 MG/1
10 TABLET ORAL AT BEDTIME
Status: DISCONTINUED | OUTPATIENT
Start: 2021-01-03 | End: 2021-01-05 | Stop reason: HOSPADM

## 2021-01-03 RX ORDER — PAROXETINE 20 MG/1
20 TABLET, FILM COATED ORAL DAILY
Status: DISCONTINUED | OUTPATIENT
Start: 2021-01-03 | End: 2021-01-03

## 2021-01-03 RX ORDER — DORZOLAMIDE HCL 20 MG/ML
1 SOLUTION/ DROPS OPHTHALMIC 2 TIMES DAILY
Status: DISCONTINUED | OUTPATIENT
Start: 2021-01-03 | End: 2021-01-05 | Stop reason: HOSPADM

## 2021-01-03 RX ORDER — LOPERAMIDE HCL 2 MG
2 CAPSULE ORAL 3 TIMES DAILY PRN
Status: DISCONTINUED | OUTPATIENT
Start: 2021-01-03 | End: 2021-01-05 | Stop reason: HOSPADM

## 2021-01-03 RX ORDER — HYDROXYCHLOROQUINE SULFATE 200 MG/1
200 TABLET, FILM COATED ORAL 2 TIMES DAILY
COMMUNITY
End: 2021-03-16

## 2021-01-03 RX ORDER — PROCHLORPERAZINE MALEATE 5 MG
5 TABLET ORAL EVERY 6 HOURS PRN
Status: DISCONTINUED | OUTPATIENT
Start: 2021-01-03 | End: 2021-01-05 | Stop reason: HOSPADM

## 2021-01-03 RX ORDER — LIDOCAINE 40 MG/G
CREAM TOPICAL
Status: DISCONTINUED | OUTPATIENT
Start: 2021-01-03 | End: 2021-01-05

## 2021-01-03 RX ORDER — AMOXICILLIN 250 MG
2 CAPSULE ORAL 2 TIMES DAILY PRN
Status: DISCONTINUED | OUTPATIENT
Start: 2021-01-03 | End: 2021-01-05 | Stop reason: HOSPADM

## 2021-01-03 RX ORDER — HYDROXYCHLOROQUINE SULFATE 200 MG/1
200 TABLET, FILM COATED ORAL 2 TIMES DAILY
Status: DISCONTINUED | OUTPATIENT
Start: 2021-01-03 | End: 2021-01-05 | Stop reason: HOSPADM

## 2021-01-03 RX ORDER — PAROXETINE 30 MG/1
30 TABLET, FILM COATED ORAL DAILY
Status: DISCONTINUED | OUTPATIENT
Start: 2021-01-04 | End: 2021-01-05 | Stop reason: HOSPADM

## 2021-01-03 RX ORDER — ALPRAZOLAM 0.5 MG
0.5 TABLET ORAL 2 TIMES DAILY PRN
Status: DISCONTINUED | OUTPATIENT
Start: 2021-01-03 | End: 2021-01-05 | Stop reason: HOSPADM

## 2021-01-03 RX ORDER — AMOXICILLIN 250 MG
1 CAPSULE ORAL 2 TIMES DAILY PRN
Status: DISCONTINUED | OUTPATIENT
Start: 2021-01-03 | End: 2021-01-05 | Stop reason: HOSPADM

## 2021-01-03 RX ORDER — BISACODYL 10 MG
10 SUPPOSITORY, RECTAL RECTAL DAILY PRN
Status: DISCONTINUED | OUTPATIENT
Start: 2021-01-03 | End: 2021-01-05 | Stop reason: HOSPADM

## 2021-01-03 RX ADMIN — OLANZAPINE 10 MG: 10 TABLET, FILM COATED ORAL at 20:47

## 2021-01-03 RX ADMIN — SIMVASTATIN 10 MG: 10 TABLET, FILM COATED ORAL at 20:47

## 2021-01-03 RX ADMIN — ALPRAZOLAM 0.5 MG: 0.5 TABLET ORAL at 19:49

## 2021-01-03 RX ADMIN — SODIUM CHLORIDE 1000 ML: 9 INJECTION, SOLUTION INTRAVENOUS at 01:00

## 2021-01-03 RX ADMIN — HYDROXYCHLOROQUINE SULFATE 200 MG: 200 TABLET, FILM COATED ORAL at 20:47

## 2021-01-03 RX ADMIN — LOPERAMIDE HYDROCHLORIDE 2 MG: 2 CAPSULE ORAL at 10:58

## 2021-01-03 RX ADMIN — FOLIC ACID 1 MG: 1 TABLET ORAL at 10:58

## 2021-01-03 RX ADMIN — ACETAMINOPHEN 1000 MG: 500 TABLET, FILM COATED ORAL at 16:10

## 2021-01-03 RX ADMIN — ACETAMINOPHEN AND CODEINE PHOSPHATE 2 TABLET: 300; 30 TABLET ORAL at 00:59

## 2021-01-03 RX ADMIN — PAROXETINE HYDROCHLORIDE 20 MG: 20 TABLET, FILM COATED ORAL at 08:40

## 2021-01-03 RX ADMIN — DORZOLAMIDE HCL 1 DROP: 20 SOLUTION/ DROPS OPHTHALMIC at 20:54

## 2021-01-03 RX ADMIN — ENALAPRIL MALEATE 20 MG: 20 TABLET ORAL at 20:47

## 2021-01-03 RX ADMIN — LOPERAMIDE HYDROCHLORIDE 2 MG: 2 CAPSULE ORAL at 12:28

## 2021-01-03 RX ADMIN — HYDROXYCHLOROQUINE SULFATE 200 MG: 200 TABLET, FILM COATED ORAL at 13:25

## 2021-01-03 RX ADMIN — ENALAPRIL MALEATE 20 MG: 20 TABLET ORAL at 13:25

## 2021-01-03 RX ADMIN — LATANOPROST 1 DROP: 50 SOLUTION OPHTHALMIC at 08:36

## 2021-01-03 NOTE — PHARMACY-ADMISSION MEDICATION HISTORY
Pharmacy Medication History  Admission medication history interview status for the 1/3/2021  admission is complete. See EPIC admission navigator for prior to admission medications       Medication history sources: Patient Sure Scripts     Adherence Assessment: Moderate    Significant changes made to the medication list:  Discontinued:  New: alphagan, dorzolamide, folic acid, hydroxychloroquine   Changed: alprazolam, hydralazine (?), paroxetine          Additional medication history information:   Patient says she takes 5mg of hydralazine three times a day. Gets 25mg tab filled.     Medication reconciliation completed by provider prior to medication history? Yes    Time spent in this activity: 30min       Prior to Admission medications    Medication Sig Last Dose Taking? Auth Provider   ALPRAZolam (XANAX) 0.5 MG tablet Take 0.5 mg by mouth daily  1/2/2021 at Unknown time Yes Reported, Patient   amLODIPine (NORVASC) 5 MG tablet Take 1 tablet (5 mg) by mouth daily 1/2/2021 at Unknown time Yes Adriane Hampton PA-C   brimonidine (ALPHAGAN P) 0.1 % ophthalmic solution Place 1 drop into both eyes 2 times daily 1/2/2021 at Unknown time Yes Unknown, Entered By History   dorzolamide (TRUSOPT) 2 % ophthalmic solution Place 1 drop into both eyes 2 times daily 1/2/2021 at Unknown time Yes Unknown, Entered By History   enalapril (VASOTEC) 20 MG tablet Take 1 tablet (20 mg) by mouth 2 times daily 1/2/2021 at Unknown time Yes Cortney Vanessa MD   folic acid (FOLVITE) 1 MG tablet Take 1 mg by mouth daily 1/2/2021 at Unknown time Yes Unknown, Entered By History   hydroxychloroquine (PLAQUENIL) 200 MG tablet Take 200 mg by mouth 2 times daily  Yes Unknown, Entered By History   PARoxetine (PAXIL) 10 MG tablet Take 5 mg by mouth every morning With 20mg to = 25mg daily 1/2/2021 at Unknown time Yes Unknown, Entered By History   simvastatin (ZOCOR) 10 MG tablet Take 1 tablet (10 mg) by mouth At Bedtime 1/2/2021 at  Unknown time Yes Cortney Vanessa MD   hydrALAZINE (APRESOLINE) 25 MG tablet Take 1 tablet (25 mg) by mouth 3 times daily  Patient taking differently: Take 5 mg by mouth 3 times daily    Cortney Vanessa MD   latanoprost (XALATAN) 0.005 % ophthalmic solution Place 1 drop into both eyes At Bedtime    Reported, Patient   OLANZapine (ZYPREXA) 10 MG tablet Take 10 mg by mouth At Bedtime    Reported, Patient   PARoxetine (PAXIL) 20 MG tablet Take 20 mg by mouth daily Plus 5mg = 25mg daily   Reported, Patient       The information provided in this note is only as accurate as the sources available at the time of the update(s).   Alexia StreetD

## 2021-01-03 NOTE — ED PROVIDER NOTES
History   Chief Complaint  Chest Pain    HPI   Swathi Dukes is a 85 year old female with a history of hypertension, DVT, aortic valve insufficiency, and anxiety, who presents for evaluation back pain that radiates into her chest and neck. She reports that this pain started about 48 hours ago and has been constant since, noting that it has been getting worse in severity. This pain is currently a 10/10 and associated with shortness of breath and some degree of wheezing. She denies any fevers, sweats, fatigue, headaches, facial numbness, change in vision, difficulty speaking, rhinorrhea, sore throat, cough, abdominal pain, nausea, vomiting, diarrhea, urinary symptoms, or rash.     Review of Systems   Constitutional: Negative for diaphoresis, fatigue and fever.   HENT: Negative for rhinorrhea and sore throat.    Eyes: Negative for visual disturbance.   Respiratory: Positive for shortness of breath and wheezing. Negative for cough.    Cardiovascular: Positive for chest pain.   Gastrointestinal: Negative for abdominal pain, diarrhea, nausea and vomiting.   Genitourinary: Negative for dysuria, frequency, hematuria and urgency.   Musculoskeletal: Positive for back pain and neck pain.   Skin: Negative for rash.   Neurological: Negative for numbness and headaches.   All other systems reviewed and are negative.    Allergies  Diclofenac  Aspirin  Iodine    Medications   Xanax  Amlodipine  Vasotec  Apresoline  Zyprexa  Paxil  Zocor    Past Medical History  Hypertension  Anxiety  Glaucoma  DVT  Aortic valve insufficiency  Chronic diarrhea  Osteoarthritis  Suicidal ideation    Family History  Diabetes    Social History  Presents to the ED alone.    Physical Exam     Patient Vitals for the past 24 hrs:   BP Temp Temp src Pulse Resp SpO2   01/02/21 2345 134/52 -- -- 59 -- 95 %   01/02/21 2330 139/53 -- -- 59 -- 95 %   01/02/21 2250 -- -- -- 57 16 94 %   01/02/21 2245 125/54 -- -- 56 18 93 %   01/02/21 2240 -- -- -- 56 17 95 %    01/02/21 2235 -- -- -- 57 15 95 %   01/02/21 2230 129/51 -- -- 56 22 95 %   01/02/21 2225 -- -- -- 58 15 95 %   01/02/21 2220 -- -- -- 59 9 95 %   01/02/21 2215 (!) 129/104 -- -- 58 21 94 %   01/02/21 2210 -- -- -- 57 19 95 %   01/02/21 2205 -- -- -- 60 16 97 %   01/02/21 2200 132/57 -- -- 58 16 93 %   01/02/21 2155 -- -- -- 58 18 94 %   01/02/21 2150 -- -- -- 59 18 94 %   01/02/21 2145 (!) 150/60 -- -- 60 15 96 %   01/02/21 2127 (!) 172/74 99.1  F (37.3  C) Oral 64 18 96 %     Physical Exam  Vitals signs and nursing note reviewed.   HENT:      Head: Atraumatic.   Eyes:      General: No scleral icterus.     Extraocular Movements: Extraocular movements intact.   Cardiovascular:      Rate and Rhythm: Normal rate and regular rhythm.      Pulses: Normal pulses.      Heart sounds: Normal heart sounds.   Pulmonary:      Effort: Pulmonary effort is normal. No respiratory distress.      Breath sounds: Normal breath sounds.   Abdominal:      General: There is no distension.      Palpations: Abdomen is soft.      Tenderness: There is no abdominal tenderness.   Musculoskeletal:         General: No tenderness.      Right lower leg: No edema.      Left lower leg: No edema.   Skin:     General: Skin is warm.      Findings: No rash.   Neurological:      Mental Status: She is alert.       Emergency Department Course   EKG  Time: 2138  Rate 63 bpm. UT interval 186. QRS duration 78. QT/QTc 424/433. P-R-T axes 53 -13 84.  Normal sinus rhythm. Left ventricular hypertrophy with repolarization abnormality. Abnormal ECG.   Read by Dr. Beny Collins.     Imaging:  CT Chest without contrast:   1.  Moderate to large pericardial effusion.   2.  Moderately enlarged heart with coronary artery disease and atherosclerotic vascular disease.  As per radiology.     Laboratory:  CBC: WBC: 7.1, HGB: 9.5 (L), PLT: 270  CMP: Glucose 104 (H), albumin 3.3 (L), o/w WNL (Creatinine: 0.75)  2214 Troponin I: <0.015  Lipase: 52 (L)  Magnesium:  2.2  Erythrocyte sedimentation rate auto: 48 (H)  Creatinine POCT: all WNL    Emergency Department Course:  Reviewed:  I reviewed nursing notes, vitals and past medical history    Assessments:  2200 I physically examined the patient as documented above.    2323 I rechecked the patient.   ED Course as of Jan 03 0101   Sun Jan 03, 2021   0029 12:29 AM Patient asking for information regarding diagnosis and plan. Updated again on her evaluation at this time, does not remember previous conversations.         Consults:   2350 I consulted with Dr. Wu, on call hospitalist at [facility], regarding the patient's history and presentation here in the emergency department.     I consulted with on call cardiologist, regarding the patient's history and presentation here in the emergency department.    Interventions:  2316 Pepcid 20 mg IV  2317 Benadryl 25 mg IV  2317 Solu-Medrol 125 mg IV    Disposition:  The patient was transferred to [facility] via EMS. Dr. Wu accepted the patient for transfer.     Impression & Plan   Medical Decision Making:  She presents for evaluation of back/chest/neck pain. Here she has reassuring vital signs during her ED evaluation. Her EKG is unchanged from prior without STEMI or dysrhythmia. Her troponin is without elevation ruling down NSTEMI. Clinically ACS/CAD is considered but is not the leading diagnosis. Due to the description of her symptoms initially there was desire for CTA to further evaluate for aortic or carotid artery dissection. Given the lack of neurological symptoms, carotid artery dissection less likely. She initially did not mention her anaphylaxis to contrast dye. Prior to undergoing CT imaging, she did mention having the contrast allergy 25-30 years ago. She has not undergone imaging since that time. Due to this concern, her study was performed without contrast while a plan was drafted. Given the time between her last imaging and newer contrast material being  available, patient was pre-medicated should there be a need for imaging was with contrast during her hospitalization.   Fortunately, her CT imaging did demonstrate the likely source of her symptoms. She is noted to demonstrate a moderate/large pericardial effusion. This would likely account for her symptoms at this time. Given limitations of [facility] regarding the need for formal ECHO, cardiology consultation, potential intervention, she was arranged for transfer to [facility]. Cardiology consulted, made aware of patient, plan for overnight ECHO, if tamponade physiology plan for emergent intervention otherwise AM consultation for care.     Diagnosis:    ICD-10-CM    1. Pericardial effusion  I31.3    2. Chest pain  R07.9    3. SOB (shortness of breath)  R06.02      Scribe Disclosure:  I, Darnell Venegas, am serving as a scribe on 1/2/2021 at 11:29 PM to personally document services performed by Viktor Alvarez PA based on my observations and the provider's statements to me.        Viktor Alvarez PA-C  01/03/21 0109

## 2021-01-03 NOTE — PLAN OF CARE
"CNS: A&O, pt states passive SI with plan without intent stating \"I have been really depressed lately and it's crossed my mind to just take a bunch of pills and end it. But I think of my son and know I can't do it.\"     CVS: SR, murmur present. Pt has 5/10 CP with CT showing mod/large pericardial effusion. Pt states dizziness w/ activity.     Resp: RA, LS clear    GI: NPO    : WNL    Skin: WNL    Access: PIV    Family: Pt's son aware of hospital admission    Plan: Stat echo, cardiology consult, psych consult for passive SI/depression. Bedrest w/ limited activity.    "

## 2021-01-03 NOTE — CONSULTS
"Consult Date:  01/03/2021      REASON FOR CONSULTATION:  Depression, suicidal ideation.      Requested by Dr. Wu.      IDENTIFYING DATA:  Swathi is a delightful 85-year-old   female with a history of severe recurrent depression with possible psychotic features along with OCD.  She recently moved back from [place] where she resided for 30 years.  Unfortunately, she developed chest pain and while being evaluated it was determined she has a pericardial effusion, so she is convalescing here on the Cardiac Intensive Care Unit.  She expressed some passive suicidal thoughts to her care providers here.      CHIEF COMPLAINT:  \"Sometimes I think why not just end it but I'd never do it because of my son.\"      HISTORY OF PRESENT ILLNESS:  Swathi is an 85-year-old   female.  She has a lengthy and storied history of major depressive disorder with possible psychotic features.  She has been hospitalized in the past, received most of her care in [place], but recently moved back to [place] to be closer to family.  She has been residing in a senior's setting, is unhappy there because she feels very isolated due to the pandemic.  She is on 20 mg of Paxil daily, 10 mg Zyprexa at bedtime, 0.5 mg of Xanax b.i.d.  She has been on many, many antidepressants, has a history of OCD with perfectionistic tendencies counting, checking and a history of \"hearing voices in my head.\"  She describes a conversation between 2 voices, sometimes it is hard for her to make this out.  Zyprexa was added about 2 years ago.  She is sleeping fine, she does not endorse active thoughts of self-harm, but has some degree of frustration and hopelessness mostly spurred on by isolation from the pandemic.  She does not currently have a psychiatric provider here in [place], but would like one.  She lives in the [facility].  She states that they tried ECT with her, but it was a mistake, she did not like how she felt and indicated " cognitive impairment from it.  She denies ever being diagnosed bipolar, was not entirely sure why they had her on Zyprexa.      On further questioning, she does not give a convincing history of hypomania, eugene, generalized anxiety, panic, eating disorder history or trauma history.  She alludes to some OCD symptoms, which are longstanding, long-term psychiatric medication management and past hospitalizations because of her depression.  She is not sure of her diagnosis, but she does acknowledge a history of hearing voices, which seemed to have escalated in the last couple of years, though somewhat improved on the Zyprexa.      PAST PSYCHIATRIC HISTORY:  As above.      PAST CHEMICAL DEPENDENCY HISTORY:  Negative.      PAST MEDICAL HISTORY:  Per chart review includes hypertension, possible cardiac tamponade this admission, hyperlipidemia, glaucoma.      PRIOR TO ADMISSION MEDICATIONS:   1.  Paxil 20 mg daily.   2.  Zyprexa 10 mg at bedtime.   3.  Xanax 0.5 mg b.i.d. p.r.n.   4.  Norvasc.   5.  Vasotec.   6.  Hydralazine.     7.  Xalatan.     8.  Zocor.      FAMILY HISTORY:  She denies mental illness, chemical dependency or suicide, though tragically her son who is a twin  at age 40 from complications from rapidly progressive Parkinson's disease.      SOCIAL HISTORY:  The patient is .  She had twin boys, one is still living.  The other  of Parkinson's about 20 years ago.  She resided in [place] for 30 years though she is originally from [place] and came back here recently.  She resides in the [facility] in a senior's setting, which she feels is confining, though it is a very nice place.  She has a very good relationship with her living son and views him as the main reason that she keeps moving forward with her life.  She denies trauma history, legal history or  history.  I believe she had 3 siblings in her family.      REVIEW OF SYSTEMS:  A 10-point review of systems unchanged from   [facility] 01/03/2021 at 6:07 a.m.      MOST RECENT VITAL SIGNS:  Blood pressure is 136/64, temperature 97.8, pulse 64, respiratory rate 14, oxygen saturation 94%.      MENTAL STATUS EXAMINATION:  Appearance:  The patient is a bespectacled woman.  She appears younger than her stated age of 85.  She shows little facial expression, but has a pleasant demeanor.  Speech is soft.  Rate and flow normal.  Use of language appropriate.  Motor exam is calm.  Coordination, station and gait not tested.  Muscle strength and tone adequate.  Affect is slightly constricted.  Mood depressed.  Thought process logical, coherent and goal directed.  No loosening of associations or flight of ideas.  No formal thought disorder on exam.  Thought content negative for current delusions, paranoia, or homicidal ideation.  She at times hears voices, having a conversation in her head, endorses some degree of depression with passive suicidal thoughts that wax and wane, but she does not have any planful thoughts.  She said she would never act on these and views her son as her main barrier to suicide because she has a very good relationship with him.  Insight and judgment intact.  Cognitive exam:  The patient is alert and oriented x3.  Concentration intact.  Recent and remote memory intact.  General fund of knowledge average.      IMPRESSION:  The patient is an 85-year-old woman with longstanding issues with OCD and probable major depressive disorder with psychotic features.  She is on Paxil and Zyprexa.  She is situationally stressed.  We can optimize the Paxil to address the depression and OCD symptoms.  She does not need a sitter.  She is interested in psychiatry and therapy referrals in the [facility].      DIAGNOSES:   1.  Major depressive disorder, recurrent, severe, rule out psychotic features.   2.  Obsessive-compulsive disorder by history.   3.  Multiple medical comorbidities including possible cardiac tamponade.      PLAN:   1.   Medical management as you are.   2.  No current need for a sitter.   3.  Would continue Paxil, Xanax and Zyprexa, though increasing Paxil at 30 mg daily is reasonable to address depressive undertones.   4.  Recommend Psychiatry and therapy referral, so I ordered a Social Service consult so we can accomplish this once she is discharged from the hospital.  She has complex longterm psychiatric needs.         NORI FULTON MD             D: 2021   T: 2021   MT: KIM      Name:     RYELE BRICE   MRN:      [id number]        Account:       [id number]   :      1935           Consult Date:  2021      Document: [id number]       cc: Joel Wu MD

## 2021-01-03 NOTE — H&P
[facility]    History and Physical - Hospitalist Service       Date of Admission:  1/3/2021    Assessment & Plan   Swathi Dukes is a 85 year old female admitted on 1/3/2021. She presented with neck pain to [facility].  Found to have moderate to large pericardial effusion.  Transferred to [facility] for further work-up.    Pericardial effusion  Concern for tamponade  Unclear etiology.  Patient reports prior history of pericardial effusion approximately 30 years ago that she had to have surgically drained.  She does not remember any further history, and does not recall if she was ever told the etiology for this effusion.  Based on the rapidly progressive nature of her neck pain and her orthostatic symptoms concern for developing tamponade.  Due to inability to perform air survey with CT, it is possible that she does have a type a dissection with associated effusion.  -Stat TTE  -Consult cardiology  -Hold anticoagulation and antiplatelets for possible pericardiocentesis    Hypertension  -Hold PTA enalapril, hydralazine, amlodipine due to pericardial effusion    Passive suicidal ideation  Reports thoughts of wondering what it would be like to take multiple pills to end her life but has no intent to do so.  Does have a history of obsessive thoughts and acts.  -Consult to psychiatry  -Continue PTA olanzapine, paxil    Hyperlipidemia  -Continue PTA statin    Glaucoma  - continue PTA gtts         Diet: NPO per Anesthesia Guidelines for Procedure/Surgery Except for: Meds  DVT Prophylaxis: Pneumatic Compression Devices  Momin Catheter: not present  Code Status: No CPR- Do NOT Intubate         Disposition Plan   Expected discharge: 4 - 7 days, recommended to TBD once pericardial effusion plan in place.  Entered: Joel Wu MD 01/03/2021, 5:51 AM     The patient's care was discussed with the Bedside Nurse, Patient and ED PA.    Joel Wu MD  [facility]  Hospital  Contact information available via [facility] Paging/Directory      ______________________________________________________________________    Chief Complaint   Neck pain    History is obtained from the patient    History of Present Illness   Swathi Dukes is a 85 year old female who presents with several days of worsening left neck pain.  This pain began suddenly and has progressively worsened.  She also notes associated orthostatic symptoms.  She otherwise denies any lightheadedness, dizziness, syncope, chest pain, or other associated symptoms.  She does have chronic right upper quadrant right mid back and right leg pain.    Because of her neck pain she decided to seek care.  She presented to [facility] ED and there was noted to have symptoms concerning for aortic dissection.  Unfortunately due to history of anaphylactic allergy to contrast she did not receive aortic survey.  Noncon CT of her chest was performed and demonstrated a large pericardial effusion.  She was reportedly hemodynamically stable with a wide pulse pressure.  Transfer was sought to [facility] and she was accepted.    Review of Systems    The 10 point Review of Systems is negative other than noted in the HPI or here.     Past Medical History    I have reviewed this patient's medical history and updated it with pertinent information if needed.   Past Medical History:   Diagnosis Date     HTN (hypertension)        Past Surgical History   I have reviewed this patient's surgical history and updated it with pertinent information if needed.  No past surgical history on file.   Reports prior history of drainage of fluid around her heart, but doesn't remember much more    Social History   I have reviewed this patient's social history and updated it with pertinent information if needed.  Social History     Tobacco Use     Smoking status: Never Smoker     Smokeless tobacco: Never Used   Substance Use Topics     Alcohol use: Not Currently      Drug use: Never       Family History   I have reviewed this patient's family history and updated it with pertinent information if needed.  Family History   Problem Relation Age of Onset     Diabetes Father        Prior to Admission Medications   Prior to Admission Medications   Prescriptions Last Dose Informant Patient Reported? Taking?   ALPRAZolam (XANAX) 0.5 MG tablet   Yes No   Si times daily as needed   OLANZapine (ZYPREXA) 10 MG tablet   Yes No   Sig: At Bedtime   PARoxetine (PAXIL) 20 MG tablet   Yes No   Sig: daily   amLODIPine (NORVASC) 5 MG tablet   No No   Sig: Take 1 tablet (5 mg) by mouth daily   carbamide peroxide (DEBROX) 6.5 % otic solution   No No   Sig: Place 5 drops into both ears 2 times daily   enalapril (VASOTEC) 20 MG tablet   No No   Sig: Take 1 tablet (20 mg) by mouth 2 times daily   hydrALAZINE (APRESOLINE) 25 MG tablet   No No   Sig: Take 1 tablet (25 mg) by mouth 3 times daily   latanoprost (XALATAN) 0.005 % ophthalmic solution   Yes No   Sig: daily   simvastatin (ZOCOR) 10 MG tablet   No No   Sig: Take 1 tablet (10 mg) by mouth At Bedtime      Facility-Administered Medications: None     Allergies   Allergies   Allergen Reactions     Diclofenac Anaphylaxis     Iodine Anaphylaxis     Contrast  Pt states anaphylactic reaction to ct contrast about 20 years ago     Aspirin        Physical Exam   Vital Signs:                    Weight: 0 lbs 0 oz    Constitutional: Awake, alert, cooperative, no apparent cardiopulmonary distress.  Eyes: Conjunctiva and pupils examined and normal.  HEENT: Moist mucous membranes, normal dentition.  Respiratory: Clear to auscultation bilaterally, no crackles or wheezing.  Cardiovascular: Regular rate and rhythm, normal S1 and S2, and no murmur noted.  GI: Soft, non-distended, non-tender, normal bowel sounds.  Lymph/Hematologic: No anterior cervical or supraclavicular adenopathy.  Skin: No rashes, no cyanosis, no edema noted on exposed  skin.  Musculoskeletal: No joint swelling, erythema or tenderness. No gross bony abnormalities  Neurologic: Cranial nerves 2-12 grossly intact, normal strength and sensation.  Psychiatric: Alert, oriented to person, place and time, no obvious anxiety or depression.      Data   Data reviewed today: I reviewed all medications, new labs and imaging results over the last 24 hours. I personally reviewed the EKG tracing showing NSR and the chest CT image(s) showing pericardial effusion.    Recent Labs   Lab 01/02/21  2214   WBC 7.1   HGB 9.5*            POTASSIUM 4.5   CHLORIDE 108   CO2 27   BUN 17   CR 0.75   ANIONGAP 4   FILIPE 9.3   *   ALBUMIN 3.3*   PROTTOTAL 7.2   BILITOTAL 0.4   ALKPHOS 77   ALT 24   AST 37   LIPASE 52*   TROPI <0.015     Recent Results (from the past 24 hour(s))   Chest CT w/o contrast    Narrative    EXAM: CT CHEST W/O CONTRAST  LOCATION: [facility]  DATE/TIME: 1/2/2021 11:01 PM    INDICATION: Chest, back and neck pain, allergy to dye.  COMPARISON: Chest x-ray dated 11/19/2020.  TECHNIQUE: CT chest without IV contrast. Multiplanar reformats were obtained. Dose reduction techniques were used.  CONTRAST: None.    FINDINGS: Lack of IV contrast degrades sensitivity to visceral and vascular pathology, patient is noted to have a severe contrast allergy.    LUNGS AND PLEURA: Discoid atelectasis lingula and left lower lobe. No pneumothorax or pleural effusion.    MEDIASTINUM/AXILLAE: Moderate to large pericardial effusion. Moderately enlarged heart. Atherosclerotic calcifications of the aorta and coronary arteries. Mitral annular calcifications. No hilar or mediastinal lymphadenopathy.    UPPER ABDOMEN: No significant finding.    MUSCULOSKELETAL: Multilevel discogenic degenerative change. Chronic appearing mild T10 compression deformity.      Impression    IMPRESSION:   1.  Moderate to large pericardial effusion.  2.  Moderately enlarged heart with coronary artery  disease and atherosclerotic vascular disease.

## 2021-01-03 NOTE — PROGRESS NOTES
Patient seen and evaluated this morning, history updated.   Patient has upper neck pain, bilateral shoulder, wrist, and hip pain. Some RLQ pain as well   Her pain is more on right side as compare to left for the last 3 months.  She was noticed to have Pericardial effusion on CT chest without contrast, no contrast because of possible allergy to contrast.   No chest pain, SOB,nausea or vomiting today  Reviewed echo mostly localized pericardial effusion without tamponade  Hemodynamically stable.     Her symptoms most likely secondary to lupus, may need to rule out drug induce lupus as she is on hydralazine.   Cardiology is consulted, will wait for there recommendations, may need thoracentesis for diagnosis.  At this time, I will send ESR, CRP, RAUL with reflex and check anti histone antibodies.   Appreciate input from Psychiatry  Overall stable, restart her enalapril and diet this morning.   Did received dose of IV Solumedrol 125 mg in ER at [facility], most likely that help her symptoms  Need out patient Rheumatology follow up.   Restart Plaquenil     Discussed with the nursing staff taking care of the patient today.

## 2021-01-03 NOTE — PLAN OF CARE
Neuro:depression, psych saw, adjusted meds, reports sadness and being overwhelmed  CV/Rhythm:SR, nuc stress in am, trop pending  Resp/02:RA  GI/Diet:regular diet, reports one episode of stools, immodium  :voiding  Skin/Incisions/Sites:intact  Pulses/CMS:intact  Edema:trace BLE  Activity/Falls Risk:SBA to BR, fall risk  Lines/Drains/IVs:PIV  Labs/BGM:echo done 65-70%, no tamponade, cards saw  Test/Procedures:nuc stress in am  VS/Pain:stable, reports generalized pain, R hip, L neck  DC Plan:post cardiac and lupus work up  Other:psych saw

## 2021-01-03 NOTE — CONSULTS
[facility]    Cardiology Consultation     Date of Admission:  1/3/2021    Assessment & Plan   Swathi Dukes is a 85 year old female who was admitted on 1/3/2021.    Chronic pericardial effusion  History of pericardial window 13 years ago in [place]  Exertional dyspnea  Right neck right chest abdomen and right thigh pain  Lupus  Hypertension  Coronary disease noted on CT  Mild aortic stenosis and mild AI    Plan and recommendations  1. The patient's presenting symptoms of right neck pain right shoulder pain abdominal and right hip pain do not seem cardiac in etiology.  2. Her pericardial effusion is chronic and has measured 1.5 to 2 cm posteriorly previously as well.  Per cardiology note this was noted even in 2018 study.  I do not think this is the cause of her symptoms.  She says she had a pericardial window and there is a small incision related to that about 10 years ago.  This was in [place] and we do not have records of that.  In any case I think this effusion is chronic and may be related to her rheumatologic illness.  This is not ideally amenable to tap interventionally.  Having said that I do not think she needs a therapeutic tap for this chronic effusion.  3. Unrelated to her current presentation she says she has been having exertional dyspnea and chest heaviness for the last many months when she walks at her senior living.  Her chest CT does show coronary calcifications in the left main and LAD.  I recommend a nuclear stress test tomorrow.  She says she is allergic to aspirin and does not want to take it.  If there is anterior or moderate to severe areas of ischemia we could discuss this again with her in preparation for potential coronary angiography.  She is on a statin therapy will continue at this time.  4. Only one set of troponin was done.  We will cycle that overnight.  5. Mild aortic stenosis and AI on echo. Outpatient follow up for this.  6. Cardiology will follow along  tomorrow.    Jimena Gann MD    Primary Care Physician   Cortney Vanessa    Reason for Consult   Reason for consult: I was asked by medicine team to evaluate this patient for pericardial effusion.    History of Present Illness   Swathi Dukes is a 85 year old female who presents with right-sided neck pain.  Patient denies any prior cardiovascular history.  She has a history of chronic pericardial effusion for which she had a pericardial window done about 10 to 13 years ago in [place] she says.  She was seen at [facility] at [place] for the same reason in 2018 when an echocardiogram showed a stable chronic 1/2 to 2 cm posterior pericardial effusion.  Conservative medical management was recommended for that.  She was last seen by us in January 2019.  She does not report any active cardiac issues however complains of exertional shortness of breath and periodic chest heaviness over the last few months.  This was not the reason for her presentation to the ED today.  She says she is allergic to aspirin.  She has rheumatologic illnesses for which she has recently been started on methotrexate in addition to her hydroxychloroquine that she takes before she is on simvastatin.     She presented yesterday to [facility] ED with right-sided neck pain radiating to her right shoulder right chest right abdomen and right hip.  On presentation she had a chest CT which showed moderate to large pericardial effusion and as result of this she was transferred from [facility] ER to [facility].  Echocardiography here showed stable findings compared to previous echoes.  There is a 2 cm posterior pericardial effusion.  No echocardiographic or hemodynamic signs of tamponade.  IVC is collapsible and blood pressures rocksolid stable.    Patient Active Problem List   Diagnosis     Essential hypertension     Anxiety     Glaucoma suspect, bilateral     Personal history of DVT (deep vein thrombosis)      Mixed obsessional thoughts and acts     Age-related osteoporosis without current pathological fracture     Nonrheumatic aortic valve insufficiency     Chronic diarrhea     Pain in both hands     Primary osteoarthritis of left hip     Suicidal ideation     Pericardial effusion       Past Medical History   I have reviewed this patient's medical history and updated it with pertinent information if needed.   Past Medical History:   Diagnosis Date     HTN (hypertension)        Past Surgical History   I have reviewed this patient's surgical history and updated it with pertinent information if needed.  No past surgical history on file.    Prior to Admission Medications   Prior to Admission Medications   Prescriptions Last Dose Informant Patient Reported? Taking?   ALPRAZolam (XANAX) 0.5 MG tablet 1/2/2021 at Unknown time Self Yes Yes   Sig: Take 0.5 mg by mouth daily    OLANZapine (ZYPREXA) 10 MG tablet  Self Yes No   Sig: Take 10 mg by mouth At Bedtime    PARoxetine (PAXIL) 10 MG tablet 1/2/2021 at Unknown time Self Yes Yes   Sig: Take 5 mg by mouth every morning With 20mg to = 25mg daily   PARoxetine (PAXIL) 20 MG tablet  Self Yes No   Sig: Take 20 mg by mouth daily Plus 5mg = 25mg daily   amLODIPine (NORVASC) 5 MG tablet 1/2/2021 at Unknown time Self No Yes   Sig: Take 1 tablet (5 mg) by mouth daily   brimonidine (ALPHAGAN P) 0.1 % ophthalmic solution 1/2/2021 at Unknown time Self Yes Yes   Sig: Place 1 drop into both eyes 2 times daily   dorzolamide (TRUSOPT) 2 % ophthalmic solution 1/2/2021 at Unknown time Self Yes Yes   Sig: Place 1 drop into both eyes 2 times daily   enalapril (VASOTEC) 20 MG tablet 1/2/2021 at Unknown time Self No Yes   Sig: Take 1 tablet (20 mg) by mouth 2 times daily   folic acid (FOLVITE) 1 MG tablet 1/2/2021 at Unknown time Self Yes Yes   Sig: Take 1 mg by mouth daily   hydrALAZINE (APRESOLINE) 25 MG tablet  Self No No   Sig: Take 1 tablet (25 mg) by mouth 3 times daily   Patient taking  differently: Take 5 mg by mouth 3 times daily    hydroxychloroquine (PLAQUENIL) 200 MG tablet  Self Yes Yes   Sig: Take 200 mg by mouth 2 times daily   latanoprost (XALATAN) 0.005 % ophthalmic solution  Self Yes No   Sig: Place 1 drop into both eyes At Bedtime    simvastatin (ZOCOR) 10 MG tablet 1/2/2021 at Unknown time Self No Yes   Sig: Take 1 tablet (10 mg) by mouth At Bedtime      Facility-Administered Medications: None     Current Facility-Administered Medications   Medication Dose Route Frequency     enalapril  20 mg Oral BID     folic acid  1 mg Oral Daily     hydroxychloroquine  200 mg Oral BID     latanoprost  1 drop Both Eyes Daily     OLANZapine  10 mg Oral At Bedtime     [START ON 1/4/2021] PARoxetine  30 mg Oral Daily     simvastatin  10 mg Oral At Bedtime     sodium chloride (PF)  3 mL Intracatheter Q8H     Current Facility-Administered Medications   Medication Last Rate     Allergies   Allergies   Allergen Reactions     Diclofenac Anaphylaxis     Iodine Anaphylaxis     Contrast  Pt states anaphylactic reaction to ct contrast about 20 years ago     Aspirin        Social History    reports that she has never smoked. She has never used smokeless tobacco. She reports previous alcohol use. She reports that she does not use drugs.    Family History   Family History   Problem Relation Age of Onset     Diabetes Father        Review of Systems   The comprehensive 10 point Review of Systems is negative other than noted in the HPI or here.     Physical Exam   Vital Signs with Ranges  Temp:  [97.8  F (36.6  C)-99.1  F (37.3  C)] 97.8  F (36.6  C)  Pulse:  [56-70] 68  Resp:  [9-22] 16  BP: (125-172)/() 151/73  SpO2:  [92 %-97 %] 92 %  Wt Readings from Last 4 Encounters:   01/03/21 59.9 kg (132 lb)   11/19/20 63 kg (139 lb)   11/03/20 63.4 kg (139 lb 12.8 oz)   11/03/20 63 kg (139 lb)     No intake/output data recorded.      Vitals: BP (!) 151/73 (BP Location: Left arm)   Pulse 68   Temp 97.8  F (36.6  C)  (Oral)   Resp 16   Wt 59.9 kg (132 lb)   SpO2 92%   BMI 23.02 kg/m      General alert oriented x3 no acute distress neck is supple JVP is normal lungs are clear cardiac sounds are regular there is a ejection systolic murmur psych appropriate affect she has suicidal ideations in the past HEENT no icterus pallor extremities warm without edema abdomen nondistended    Recent Labs   Lab 01/02/21 2214   TROPI <0.015       Recent Labs   Lab 01/03/21  0555 01/02/21 2222 01/02/21 2214   WBC 5.8  --  7.1   HGB 10.4*  --  9.5*   MCV 97  --  100     --  270   INR 0.97  --   --      --  139   POTASSIUM 3.5  --  4.5   CHLORIDE 111*  --  108   CO2 24  --  27   BUN 16  --  17   CR 0.57  --  0.75   GFRESTIMATED 86 69 74   GFRESTBLACK >90 83 85   ANIONGAP 5  --  4   FILIPE 9.3  --  9.3   *  --  104*   ALBUMIN  --   --  3.3*   PROTTOTAL  --   --  7.2   BILITOTAL  --   --  0.4   ALKPHOS  --   --  77   ALT  --   --  24   AST  --   --  37   LIPASE  --   --  52*   TROPI  --   --  <0.015     Recent Labs   Lab Test 01/29/20  1144   CHOL 174   HDL 79   LDL 68   TRIG 136     Recent Labs   Lab 01/03/21  0555 01/02/21 2214   WBC 5.8 7.1   HGB 10.4* 9.5*   HCT 32.4* 30.1*   MCV 97 100    270     No results for input(s): PH, PHV, PO2, PO2V, SAT, PCO2, PCO2V, HCO3, HCO3V in the last 168 hours.  No results for input(s): NTBNPI, NTBNP in the last 168 hours.  No results for input(s): DD in the last 168 hours.  Recent Labs   Lab 01/03/21  1128 01/02/21 2214   SED 66* 48*   CRP 53.1*  --      Recent Labs   Lab 01/03/21  0555 01/02/21  2214    270     No results for input(s): TSH in the last 168 hours.  No results for input(s): COLOR, APPEARANCE, URINEGLC, URINEBILI, URINEKETONE, SG, UBLD, URINEPH, PROTEIN, UROBILINOGEN, NITRITE, LEUKEST, RBCU, WBCU in the last 168 hours.    Imaging:  Recent Results (from the past 48 hour(s))   Chest CT w/o contrast    Narrative    EXAM: CT CHEST W/O CONTRAST  LOCATION: [facility]  DATE/TIME: 2021 11:01 PM    INDICATION: Chest, back and neck pain, allergy to dye.  COMPARISON: Chest x-ray dated 2020.  TECHNIQUE: CT chest without IV contrast. Multiplanar reformats were obtained. Dose reduction techniques were used.  CONTRAST: None.    FINDINGS: Lack of IV contrast degrades sensitivity to visceral and vascular pathology, patient is noted to have a severe contrast allergy.    LUNGS AND PLEURA: Discoid atelectasis lingula and left lower lobe. No pneumothorax or pleural effusion.    MEDIASTINUM/AXILLAE: Moderate to large pericardial effusion. Moderately enlarged heart. Atherosclerotic calcifications of the aorta and coronary arteries. Mitral annular calcifications. No hilar or mediastinal lymphadenopathy.    UPPER ABDOMEN: No significant finding.    MUSCULOSKELETAL: Multilevel discogenic degenerative change. Chronic appearing mild T10 compression deformity.      Impression    IMPRESSION:   1.  Moderate to large pericardial effusion.  2.  Moderately enlarged heart with coronary artery disease and atherosclerotic vascular disease.     Echocardiogram Complete    Narrative    [id number]  [id number]  [id number]  [id number]^GEETHA^JERE^MEGHNA           [facility]  Echocardiography Laboratory  [address] [place] [address]        Name: RYLEE BRICE  MRN: [id number]  : 1935  Study Date: 2021 06:57 AM  Age: 85 yrs  Gender: Female  Patient Location: [facility]  Reason For Study: Tamponade  Ordering Physician: JERE INIGUEZ  Referring Physician: MORENO CHAVEZ  Performed By: Lennie Olivo     BSA: 1.6 m2  Height: 63 in  Weight: 132 lb  HR: 65  BP: 167/63 mmHg  _____________________________________________________________________________  __        Procedure  Complete Portable Echo Adult.  _____________________________________________________________________________  __        Interpretation Summary     The visual ejection fraction is  estimated at 65-70%. No regional wall motion  abnormalities noted.  The right ventricle is normal in size and function.  Right ventricular systolic pressure could not be approximated due to  inadequate tricuspid regurgitation.  The inferior vena cava was normal in size with preserved respiratory  variability.  There is moderated sized pericardial effusion 1.9 cms maximum width that is  predominantly posterior without hemodynamic echocardiagraphic features of  tamponade.  Mild AS with MG of 10 mm Hg. Visually the valve appears to open well. Mild AI.     Compared to the previous study from 2020, the effusion is slightly larger. No  other significant change.  _____________________________________________________________________________  __        Left Ventricle  The left ventricle is normal in size. There is borderline concentric left  ventricular hypertrophy. Left ventricular systolic function is normal. The  visual ejection fraction is estimated at 65-70%. Diastolic Doppler findings  (E/E' ratio and/or other parameters) suggest left ventricular filling  pressures are indeterminate. No regional wall motion abnormalities noted.     Right Ventricle  The right ventricle is normal in size and function.     Atria  The left atrium is borderline dilated. Right atrial size is normal.     Mitral Valve  The mitral valve is normal in structure and function. There is mild mitral  annular calcification. There is trace mitral regurgitation. There is no mitral  valve stenosis.        Tricuspid Valve  The tricuspid valve is normal in structure and function. There is trace  tricuspid regurgitation. Right ventricular systolic pressure could not be  approximated due to inadequate tricuspid regurgitation.     Aortic Valve  The aortic valve is not well visualized. There is mild (1+) aortic  regurgitation. The peak AoV pressure gradient is 20.0 mmHg. The mean AoV  pressure gradient is 9.9 mmHg. Mild valvular aortic stenosis.     Pulmonic  Valve  The pulmonic valve is not well visualized. This degree of valvular  regurgitation is within normal limits.     Vessels  The aortic root is normal size. Normal size ascending aorta. The inferior vena  cava was normal in size with preserved respiratory variability.     Pericardium  Moderate posterior pericardial effusion.     _____________________________________________________________________________  __  MMode/2D Measurements & Calculations  IVSd: 1.0 cm  LVIDd: 4.7 cm  LVIDs: 2.7 cm  LVPWd: 0.90 cm  FS: 41.3 %  LV mass(C)d: 154.2 grams  LV mass(C)dI: 95.1 grams/m2     Ao root diam: 2.5 cm  LA dimension: 3.7 cm  asc Aorta Diam: 3.1 cm  LA/Ao: 1.5  RWT: 0.39        Doppler Measurements & Calculations  MV E max kike: 69.0 cm/sec  MV A max kike: 121.8 cm/sec  MV E/A: 0.57  MV dec slope: 284.0 cm/sec2  MV dec time: 0.24 sec  Ao V2 max: 223.9 cm/sec  Ao max P.0 mmHg  Ao V2 mean: 144.5 cm/sec  Ao mean P.9 mmHg  Ao V2 VTI: 52.4 cm  LV V1 max P.2 mmHg  LV V1 max: 151.5 cm/sec  LV V1 VTI: 37.9 cm  PA acc time: 0.10 sec  AV Kike Ratio (DI): 0.68  E/E' av.2  Lateral E/e': 7.1  Medial E/e': 17.3              _____________________________________________________________________________  __        Report approved by: Erin Arriola 2021 09:27 AM          Echo:  No results found for this or any previous visit (from the past 4320 hour(s)).

## 2021-01-03 NOTE — ED TRIAGE NOTES
Here for chest pain, sob and bilateral back/neck pain started on Thursday. Today pain is worse associated with chills. Taking tylenol all day, last dose about 1.5 hr ago. ABCs intact.

## 2021-01-04 ENCOUNTER — APPOINTMENT (OUTPATIENT)
Dept: NUCLEAR MEDICINE | Facility: CLINIC | Age: 86
DRG: 287 | End: 2021-01-04
Attending: INTERNAL MEDICINE
Payer: COMMERCIAL

## 2021-01-04 PROBLEM — M54.2 NECK PAIN: Status: ACTIVE | Noted: 2021-01-04

## 2021-01-04 PROBLEM — R94.39 ABNORMAL CARDIOVASCULAR STRESS TEST: Status: ACTIVE | Noted: 2021-01-03

## 2021-01-04 LAB
ALBUMIN SERPL-MCNC: 3.1 G/DL (ref 3.4–5)
ANA PAT SER IF-IMP: ABNORMAL
ANA SER QL IF: POSITIVE
ANA TITR SER IF: ABNORMAL {TITER}
ANION GAP SERPL CALCULATED.3IONS-SCNC: 3 MMOL/L (ref 3–14)
BASOPHILS # BLD AUTO: 0 10E9/L (ref 0–0.2)
BASOPHILS NFR BLD AUTO: 0.4 %
BUN SERPL-MCNC: 19 MG/DL (ref 7–30)
CALCIUM SERPL-MCNC: 8.8 MG/DL (ref 8.5–10.1)
CHLORIDE SERPL-SCNC: 112 MMOL/L (ref 94–109)
CO2 SERPL-SCNC: 26 MMOL/L (ref 20–32)
CREAT SERPL-MCNC: 0.63 MG/DL (ref 0.52–1.04)
CV STRESS MAX HR HE: 85
DIFFERENTIAL METHOD BLD: ABNORMAL
EOSINOPHIL # BLD AUTO: 0.1 10E9/L (ref 0–0.7)
EOSINOPHIL NFR BLD AUTO: 0.8 %
ERYTHROCYTE [DISTWIDTH] IN BLOOD BY AUTOMATED COUNT: 15.6 % (ref 10–15)
GFR SERPL CREATININE-BSD FRML MDRD: 83 ML/MIN/{1.73_M2}
GLUCOSE BLDC GLUCOMTR-MCNC: 96 MG/DL (ref 70–99)
GLUCOSE SERPL-MCNC: 102 MG/DL (ref 70–99)
HCT VFR BLD AUTO: 28.9 % (ref 35–47)
HGB BLD-MCNC: 9.4 G/DL (ref 11.7–15.7)
IMM GRANULOCYTES # BLD: 0 10E9/L (ref 0–0.4)
IMM GRANULOCYTES NFR BLD: 0.3 %
LYMPHOCYTES # BLD AUTO: 1.5 10E9/L (ref 0.8–5.3)
LYMPHOCYTES NFR BLD AUTO: 19.8 %
MCH RBC QN AUTO: 31.8 PG (ref 26.5–33)
MCHC RBC AUTO-ENTMCNC: 32.5 G/DL (ref 31.5–36.5)
MCV RBC AUTO: 98 FL (ref 78–100)
MONOCYTES # BLD AUTO: 0.4 10E9/L (ref 0–1.3)
MONOCYTES NFR BLD AUTO: 5.8 %
NEUTROPHILS # BLD AUTO: 5.4 10E9/L (ref 1.6–8.3)
NEUTROPHILS NFR BLD AUTO: 72.9 %
NRBC # BLD AUTO: 0 10*3/UL
NRBC BLD AUTO-RTO: 0 /100
PHOSPHATE SERPL-MCNC: 3.2 MG/DL (ref 2.5–4.5)
PLATELET # BLD AUTO: 278 10E9/L (ref 150–450)
POTASSIUM SERPL-SCNC: 3.6 MMOL/L (ref 3.4–5.3)
RATE PRESSURE PRODUCT: NORMAL
RBC # BLD AUTO: 2.96 10E12/L (ref 3.8–5.2)
SODIUM SERPL-SCNC: 141 MMOL/L (ref 133–144)
STRESS ECHO BASELINE DIASTOLIC HE: 67
STRESS ECHO BASELINE HR: 62
STRESS ECHO BASELINE SYSTOLIC BP: 179
STRESS ECHO CALCULATED PERCENT HR: 62 %
STRESS ECHO LAST STRESS DIASTOLIC BP: 66
STRESS ECHO LAST STRESS SYSTOLIC BP: 141
STRESS ECHO TARGET HR: 137
WBC # BLD AUTO: 7.4 10E9/L (ref 4–11)

## 2021-01-04 PROCEDURE — 250N000013 HC RX MED GY IP 250 OP 250 PS 637: Performed by: INTERNAL MEDICINE

## 2021-01-04 PROCEDURE — 85025 COMPLETE CBC W/AUTO DIFF WBC: CPT | Performed by: INTERNAL MEDICINE

## 2021-01-04 PROCEDURE — 999N000154 HC STATISTIC RADIOLOGY XRAY, US, CT, MAR, NM

## 2021-01-04 PROCEDURE — 250N000013 HC RX MED GY IP 250 OP 250 PS 637: Performed by: STUDENT IN AN ORGANIZED HEALTH CARE EDUCATION/TRAINING PROGRAM

## 2021-01-04 PROCEDURE — 80069 RENAL FUNCTION PANEL: CPT | Performed by: INTERNAL MEDICINE

## 2021-01-04 PROCEDURE — 78452 HT MUSCLE IMAGE SPECT MULT: CPT | Mod: 26 | Performed by: INTERNAL MEDICINE

## 2021-01-04 PROCEDURE — 93016 CV STRESS TEST SUPVJ ONLY: CPT | Performed by: INTERNAL MEDICINE

## 2021-01-04 PROCEDURE — 250N000011 HC RX IP 250 OP 636: Performed by: INTERNAL MEDICINE

## 2021-01-04 PROCEDURE — 999N000128 HC STATISTIC PERIPHERAL IV START W/O US GUIDANCE

## 2021-01-04 PROCEDURE — 999N001017 HC STATISTIC GLUCOSE BY METER IP

## 2021-01-04 PROCEDURE — 343N000001 HC RX 343: Performed by: STUDENT IN AN ORGANIZED HEALTH CARE EDUCATION/TRAINING PROGRAM

## 2021-01-04 PROCEDURE — A9502 TC99M TETROFOSMIN: HCPCS | Performed by: STUDENT IN AN ORGANIZED HEALTH CARE EDUCATION/TRAINING PROGRAM

## 2021-01-04 PROCEDURE — 99232 SBSQ HOSP IP/OBS MODERATE 35: CPT | Mod: 25 | Performed by: INTERNAL MEDICINE

## 2021-01-04 PROCEDURE — 99232 SBSQ HOSP IP/OBS MODERATE 35: CPT | Performed by: INTERNAL MEDICINE

## 2021-01-04 PROCEDURE — 250N000009 HC RX 250: Performed by: INTERNAL MEDICINE

## 2021-01-04 PROCEDURE — 36415 COLL VENOUS BLD VENIPUNCTURE: CPT | Performed by: INTERNAL MEDICINE

## 2021-01-04 PROCEDURE — 250N000012 HC RX MED GY IP 250 OP 636 PS 637: Performed by: INTERNAL MEDICINE

## 2021-01-04 PROCEDURE — 250N000013 HC RX MED GY IP 250 OP 250 PS 637: Performed by: PSYCHIATRY & NEUROLOGY

## 2021-01-04 PROCEDURE — 210N000002 HC R&B HEART CARE

## 2021-01-04 PROCEDURE — 93017 CV STRESS TEST TRACING ONLY: CPT | Performed by: REHABILITATION PRACTITIONER

## 2021-01-04 PROCEDURE — 78452 HT MUSCLE IMAGE SPECT MULT: CPT

## 2021-01-04 PROCEDURE — 93018 CV STRESS TEST I&R ONLY: CPT | Performed by: INTERNAL MEDICINE

## 2021-01-04 RX ORDER — DIPHENHYDRAMINE HYDROCHLORIDE 50 MG/ML
25 INJECTION INTRAMUSCULAR; INTRAVENOUS ONCE
Status: COMPLETED | OUTPATIENT
Start: 2021-01-04 | End: 2021-01-05

## 2021-01-04 RX ORDER — ACYCLOVIR 200 MG/1
0-1 CAPSULE ORAL
Status: DISCONTINUED | OUTPATIENT
Start: 2021-01-04 | End: 2021-01-04

## 2021-01-04 RX ORDER — CAFFEINE CITRATE 20 MG/ML
60 SOLUTION INTRAVENOUS
Status: DISCONTINUED | OUTPATIENT
Start: 2021-01-04 | End: 2021-01-04

## 2021-01-04 RX ORDER — AMINOPHYLLINE 25 MG/ML
50-100 INJECTION, SOLUTION INTRAVENOUS
Status: DISCONTINUED | OUTPATIENT
Start: 2021-01-04 | End: 2021-01-04

## 2021-01-04 RX ORDER — PAROXETINE HYDROCHLORIDE 10 MG/1
5 TABLET, FILM COATED ORAL EVERY MORNING
Status: DISCONTINUED | OUTPATIENT
Start: 2021-01-05 | End: 2021-01-04 | Stop reason: ALTCHOICE

## 2021-01-04 RX ORDER — REGADENOSON 0.08 MG/ML
0.4 INJECTION, SOLUTION INTRAVENOUS ONCE
Status: COMPLETED | OUTPATIENT
Start: 2021-01-04 | End: 2021-01-04

## 2021-01-04 RX ORDER — ALBUTEROL SULFATE 90 UG/1
2 AEROSOL, METERED RESPIRATORY (INHALATION) EVERY 5 MIN PRN
Status: DISCONTINUED | OUTPATIENT
Start: 2021-01-04 | End: 2021-01-04

## 2021-01-04 RX ORDER — AMLODIPINE BESYLATE 5 MG/1
5 TABLET ORAL DAILY
Status: DISCONTINUED | OUTPATIENT
Start: 2021-01-04 | End: 2021-01-05 | Stop reason: HOSPADM

## 2021-01-04 RX ORDER — BRIMONIDINE TARTRATE 2 MG/ML
1 SOLUTION/ DROPS OPHTHALMIC 2 TIMES DAILY
Status: DISCONTINUED | OUTPATIENT
Start: 2021-01-04 | End: 2021-01-05 | Stop reason: HOSPADM

## 2021-01-04 RX ORDER — ACETAMINOPHEN 500 MG
1000 TABLET ORAL EVERY 6 HOURS PRN
COMMUNITY
Start: 2021-01-04 | End: 2021-01-16

## 2021-01-04 RX ORDER — HYDRALAZINE HYDROCHLORIDE 25 MG/1
25 TABLET, FILM COATED ORAL 3 TIMES DAILY
Status: DISCONTINUED | OUTPATIENT
Start: 2021-01-04 | End: 2021-01-05 | Stop reason: HOSPADM

## 2021-01-04 RX ORDER — PREDNISONE 20 MG/1
20 TABLET ORAL EVERY 8 HOURS
Status: COMPLETED | OUTPATIENT
Start: 2021-01-04 | End: 2021-01-05

## 2021-01-04 RX ORDER — METOPROLOL TARTRATE 50 MG
50 TABLET ORAL 2 TIMES DAILY
Status: DISCONTINUED | OUTPATIENT
Start: 2021-01-04 | End: 2021-01-05

## 2021-01-04 RX ADMIN — FOLIC ACID 1 MG: 1 TABLET ORAL at 10:47

## 2021-01-04 RX ADMIN — HYDROXYCHLOROQUINE SULFATE 200 MG: 200 TABLET, FILM COATED ORAL at 20:52

## 2021-01-04 RX ADMIN — PREDNISONE 20 MG: 20 TABLET ORAL at 22:55

## 2021-01-04 RX ADMIN — LATANOPROST 1 DROP: 50 SOLUTION OPHTHALMIC at 10:49

## 2021-01-04 RX ADMIN — AMLODIPINE BESYLATE 5 MG: 5 TABLET ORAL at 14:43

## 2021-01-04 RX ADMIN — TETROFOSMIN 9.5 MCI.: 1.38 INJECTION, POWDER, LYOPHILIZED, FOR SOLUTION INTRAVENOUS at 07:25

## 2021-01-04 RX ADMIN — PAROXETINE HYDROCHLORIDE 30 MG: 30 TABLET, FILM COATED ORAL at 10:48

## 2021-01-04 RX ADMIN — PREDNISONE 20 MG: 20 TABLET ORAL at 16:04

## 2021-01-04 RX ADMIN — TETROFOSMIN 26.6 MCI.: 1.38 INJECTION, POWDER, LYOPHILIZED, FOR SOLUTION INTRAVENOUS at 10:27

## 2021-01-04 RX ADMIN — DORZOLAMIDE HCL 1 DROP: 20 SOLUTION/ DROPS OPHTHALMIC at 22:00

## 2021-01-04 RX ADMIN — ACETAMINOPHEN 1000 MG: 500 TABLET, FILM COATED ORAL at 16:46

## 2021-01-04 RX ADMIN — METOPROLOL TARTRATE 50 MG: 50 TABLET, FILM COATED ORAL at 20:52

## 2021-01-04 RX ADMIN — HYDROXYCHLOROQUINE SULFATE 200 MG: 200 TABLET, FILM COATED ORAL at 10:47

## 2021-01-04 RX ADMIN — HYDRALAZINE HYDROCHLORIDE 25 MG: 25 TABLET ORAL at 21:57

## 2021-01-04 RX ADMIN — ENALAPRIL MALEATE 20 MG: 20 TABLET ORAL at 21:57

## 2021-01-04 RX ADMIN — SIMVASTATIN 10 MG: 10 TABLET, FILM COATED ORAL at 21:57

## 2021-01-04 RX ADMIN — ENALAPRIL MALEATE 20 MG: 20 TABLET ORAL at 10:47

## 2021-01-04 RX ADMIN — REGADENOSON 0.4 MG: 0.08 INJECTION, SOLUTION INTRAVENOUS at 10:21

## 2021-01-04 RX ADMIN — BRIMONIDINE TARTRATE 1 DROP: 2 SOLUTION OPHTHALMIC at 22:00

## 2021-01-04 RX ADMIN — HYDRALAZINE HYDROCHLORIDE 25 MG: 25 TABLET ORAL at 14:43

## 2021-01-04 RX ADMIN — LOPERAMIDE HYDROCHLORIDE 2 MG: 2 CAPSULE ORAL at 21:58

## 2021-01-04 RX ADMIN — OLANZAPINE 10 MG: 10 TABLET, FILM COATED ORAL at 21:57

## 2021-01-04 RX ADMIN — DORZOLAMIDE HCL 1 DROP: 20 SOLUTION/ DROPS OPHTHALMIC at 10:49

## 2021-01-04 NOTE — PLAN OF CARE
Pt has flat affect, sl slow to follow commands. A1, Systolic 's Tele SR/SB 50-60.Trops negative, CT of chest showed some calcification, order for NM lexiscan. NPO.

## 2021-01-04 NOTE — DISCHARGE INSTRUCTIONS
Please follow up with [facility] for an outpatient psychotherapy appointment.  They are located at [address] [place] [address].  The phone number is [phone].      Schedule an appointment with your primary care doctor in about a week to discuss your hospitalization.         Cardiac Angiogram Discharge Instructions - Femoral    After you go home:      Have an adult stay with you until tomorrow.    Drink extra fluids for 2 days.    You may resume your normal diet.    No smoking       For 24 hours - due to the sedation you received:    Relax and take it easy.    Do NOT make any important or legal decisions.    Do NOT drive or operate machines at home or at work.    Do NOT drink alcohol.    Care of Groin Puncture Site:      For the first 24 hrs - check the puncture site every 1-2 hours while awake.    For 2 days, when you cough, sneeze, laugh or move your bowels, hold your hand over the puncture site and press firmly.    Remove the bandaid after 24 hours. If there is minor oozing, apply another bandaid and remove it after 12 hours.    It is normal to have a small bruise or pea size lump at the site.    You may shower tomorrow. Do NOT take a bath, or use a hot tub or pool for at least 3 days. Do NOT scrub the site. Do not use lotion or powder near the puncture site.    Activity:            For 2 days:    No stooping or squatting    Do NOT do any heavy activity such as exercise, lifting, or straining.     No housework, yard work or any activity that make you sweat    Do NOT lift more than 10 pounds    Bleeding:      If you start bleeding from the site in your groin, lie down flat and press firmly on/above the site for 10 minutes.     Once bleeding stops, lay flat for 2 hours.     Call [facility] as soon as you can.       Call 911 right away if you have heavy bleeding or bleeding that does not stop.      Medicines:      If you are taking an antiplatelet medication such as Plavix, Brilinta  or Effient, do not stop taking it until you talk to your cardiologist.      If you are on Metformin (Glucophage), do not restart it until you have blood tests (within 2 to 3 days after discharge).  After you have your blood drawn, you may restart the Metformin.     Take your medications, including blood thinners, unless your provider tells you not to.      If you take Coumadin (Warfarin), have your INR checked by your provider in  3-5 days. Call your clinic to schedule this.    If you have stopped any medicines, check with your provider about when to restart them.    Follow Up Appointments:      Follow up with [facility] Nurse Practitioner at [facility] of patient preference in 7-10 days.    Call the clinic if:      You have increased pain or a large or growing hard lump around the site.    The site is red, swollen, hot or tender.    Blood or fluid is draining from the site.    You have chills or a fever greater than 101 F (38 C).    Your leg feels numb, cool or changes color.    You have hives, a rash or unusual itching.    New pain in the back or belly that you cannot control with Tylenol.    Any questions or concerns.          [facility] [place]:    [phone] [facility] (7 days a week)

## 2021-01-04 NOTE — CONSULTS
SW:  [address] [place]  [address]  [phone]    This information has been placed on her discharge orders.      ERNESTINA Gordon, [facility]  Lead   [phone]  [facility]

## 2021-01-04 NOTE — PLAN OF CARE
VSS on RA.  Tele: SB 1AVB.  Denies pain or shortness of breath.  Up with SBA.  A&Ox4 but forgetful.  Lexiscan completed today.  NPO at midnight for angio tomorrow.  Call light within reach.  Will continue to monitor.

## 2021-01-04 NOTE — DISCHARGE SUMMARY
[facility]  Discharge Summary        Swathi Dukes MRN# [id number]   YOB: 1935 Age: 85 year old     Date of Admission: 1/3/2021  Date of Discharge: 1/5/2021  Admitting Physician: Jc Sierra MD  Discharge Physician: Jc Sierra MD     Primary Provider: Cortney Vanessa  Primary Care Physician Phone Number: [phone]         Discharge Diagnoses:   1. Neck pain, possibly musculoskeletal.  2. Pericardial effusion, suspect chronic, ?related to rheum disease.  3. Abnormal stress test, but negative angiogram (trivial CAD).  4. Mild aortic stenosis, mild aortic regurgitation.  5. Passive suicidal ideation.  6. Chronic right groin/abdominal pain.  7. Elevated inflammatory markers, suspect related to rheum disease.        Other Chronic Medical Problems:      1. Hypertension (benign essential).  2. SLE.  3. Hyperlipidemia.   4. Glaucoma.       Allergies:         Allergies   Allergen Reactions     Diclofenac Anaphylaxis     Iodine Anaphylaxis     Contrast  Pt states anaphylactic reaction to ct contrast about 20 years ago     Aspirin            Discharge Medications:        Current Discharge Medication List      START taking these medications    Details   acetaminophen (TYLENOL) 500 MG tablet Take 2 tablets (1,000 mg) by mouth every 6 hours as needed  Qty:      Associated Diagnoses: Neck pain      metoprolol tartrate (LOPRESSOR) 25 MG tablet Take 1 tablet (25 mg) by mouth 2 times daily  Qty: 180 tablet, Refills: 3    Associated Diagnoses: Essential hypertension         CONTINUE these medications which have NOT CHANGED    Details   ALPRAZolam (XANAX) 0.5 MG tablet Take 0.5 mg by mouth daily       amLODIPine (NORVASC) 5 MG tablet Take 1 tablet (5 mg) by mouth daily  Qty: 90 tablet, Refills: 0    Comments: Patient to see provider before the next refill.  Associated Diagnoses: Essential hypertension      brimonidine (ALPHAGAN P) 0.1 % ophthalmic solution Place 1  drop into both eyes 2 times daily      dorzolamide (TRUSOPT) 2 % ophthalmic solution Place 1 drop into both eyes 2 times daily      enalapril (VASOTEC) 20 MG tablet Take 1 tablet (20 mg) by mouth 2 times daily  Qty: 180 tablet, Refills: 3    Associated Diagnoses: Essential hypertension      folic acid (FOLVITE) 1 MG tablet Take 1 mg by mouth daily      hydroxychloroquine (PLAQUENIL) 200 MG tablet Take 200 mg by mouth 2 times daily      !! PARoxetine (PAXIL) 10 MG tablet Take 5 mg by mouth every morning With 20mg to = 25mg daily      simvastatin (ZOCOR) 10 MG tablet Take 1 tablet (10 mg) by mouth At Bedtime  Qty: 90 tablet, Refills: 3    Associated Diagnoses: Mixed hyperlipidemia      hydrALAZINE (APRESOLINE) 25 MG tablet Take 1 tablet (25 mg) by mouth 3 times daily  Qty: 270 tablet, Refills: 3    Associated Diagnoses: Essential hypertension      latanoprost (XALATAN) 0.005 % ophthalmic solution Place 1 drop into both eyes At Bedtime       OLANZapine (ZYPREXA) 10 MG tablet Take 10 mg by mouth At Bedtime       !! PARoxetine (PAXIL) 20 MG tablet Take 20 mg by mouth daily Plus 5mg = 25mg daily       !! - Potential duplicate medications found. Please discuss with provider.              Discharge Instructions and Follow-Up:      Discharge Orders      Reason for your hospital stay    Neck pain, suspect musculoskeletal.  Abnormal stress test.  Chronic pericardial effusion.     Follow-up and recommended labs and tests    Follow-up with primary care provider, Cortney Vanessa, within 7 days for hospital follow-up.  No follow up labs or test are needed.  Follow-up with primary Rheumatologist as previously scheduled.     Activity    Your activity upon discharge: activity as tolerated     Diet    Follow this diet upon discharge: Orders Placed This Encounter      NPO for Medical/Clinical Reasons Except for: Meds             Consultations This Hospital Stay:      1. Cardiology.  2. Psychiatry.        Admission History:       Please see the H&P by Jc Sierra MD on 1/3/2021 for complete details. Briefly, Swathi Dukes is a 85 year old female with history of HTN and HLD, who presented to [facility] admitted on 1/2/2021 woth neck pain and found to have a moderate to large pericardial effusion.  Transferred to [facility] for further work-up on 1/3/72186.        Problem Oriented Hospital Course:        Neck pain, possibly musculoskeletal.  Pericardial effusion, suspect chronic, ?related to rheum disease.  * Pt with h/o pericardial effusion requiring pericardial window in [place] 13 yrs ago.  * Presented to [facility] 1/2 with neck/chect pain. On initial evaluation 1/2, pt was afebrile, not tachycardic, was hypertensive. ECG 1/2 showed sinus w/o acute ischemic changes. Trop negative. CT chest w/o contrast 1/2 showed moderate to large pericardial effusion; mkderately enlarged heart with coronary artery disease and atherosclerotic vascular disease. Transferred to [facility] 1/3 for further management.  * Echo 1/3 showed LVEF 65-70%, no regional wall motion abnormalities; RV OK; moderate pericardial effusion w/o hemodynamic features of tamponade; mild AS; compared to the previous study from 2020, the effusion was slightly larger. Cardiology consulted and felt effusion was chronic and not contributing to presenting symptoms. Nuc stress 1/4 abnormal showing small area of mild ischemic in the mid to distal anterior segement(s) of the LV. Underwent angiogram 1/5 that showed only trivial disease.  - Continue PRN acetaminophen for pain.  - Follow-up neck pain outpatient.  - Follow-up effusion outpatient.  - Management of lupus as below.    Mild aortic stenosis, mild aortic regurgitation.  Noted on echo 1/3.  - Follow-up outpatient.    Hypertension (benign essential).  [PTA: amlodipine 5 mg daily; enalapril 20 mg BID; hydralazine 25 mg TID.]  Started on metoprolol per Cardiology.  - Continue amlodipine, enalapril, hydralazine, metoprolol  (new).     Passive suicidal ideation.  Reported thoughts of wondering what it would be like to take multiple pills to end her life but had no intent to do so.  Does have a history of obsessive thoughts and acts. Seen by Psychiatry.  - Outpatient Psychiatry and therapy referral.  - Continue PTA olanzapine, paxil.    SLE.  Elevated inflammatory markers.  * Pt has seen a Rheumatologist locally and was diagnosed with mild lupus. On hydroxychloroquine PTA.  CRP 53.1 and ESR 66 on 1/3. RAUL screen and anti-histone ab 1/3 pending.  - Continue hydroxychloroquine.  - Follow-up with Rheumatology.    Chronic right groin/abdominal pain.  Notes chronic pain in right groin/abdomen; not new; no relation to eating. Says she has told other providers about this but has not had it worked up.  - Follow-up outpatient.     Hyperlipidemia.  Chronic and stable.  -Continue simvastatin.     Glaucoma.  Chronic and stable.  - Continue PTA gtts.       COVID-19 testing.  COVID-19 PCR Results    COVID-19 PCR Results 11/19/20 1/3/21   COVID-19 Virus PCR to [facility] - Result Not Detected    COVID-19 Virus PCR to [facility] - Source Nasopharyngeal    SARS-CoV-2 Virus Specimen Source  Nasopharyngeal   SARS-CoV-2 PCR Result  NEGATIVE      Comments are available for some flowsheets but are not being displayed.         COVID-19 Antibody Results, Testing for Immunity    COVID-19 Antibody Results, Testing for Immunity   No data to display.                 Pending Results:        Unresulted Labs Ordered in the Past 30 Days of this Admission     No orders found from 12/4/2020 to 1/4/2021.                Discharge Disposition:      Discharged to home.        Discharge Time:      Less than 30 minutes.        Key Imaging Studies, Lab Findings and Procedures/Surgeries:        Results for orders placed or performed during the hospital encounter of 01/03/21   Echocardiogram Complete    Narrative    [id number]  [id number]  [id number]  [id number]^GEETHA^JERE^MEGHNA            [facility]  Echocardiography Laboratory  [address] [place] [address]        Name: RYLEE BRICE  MRN: [id number]  : 1935  Study Date: 2021 06:57 AM  Age: 85 yrs  Gender: Female  Patient Location: [facility]  Reason For Study: Tamponade  Ordering Physician: JERE INIGUEZ  Referring Physician: MORENO CHAVEZ  Performed By: Lennie Olivo     BSA: 1.6 m2  Height: 63 in  Weight: 132 lb  HR: 65  BP: 167/63 mmHg  _____________________________________________________________________________  __        Procedure  Complete Portable Echo Adult.  _____________________________________________________________________________  __        Interpretation Summary     The visual ejection fraction is estimated at 65-70%. No regional wall motion  abnormalities noted.  The right ventricle is normal in size and function.  Right ventricular systolic pressure could not be approximated due to  inadequate tricuspid regurgitation.  The inferior vena cava was normal in size with preserved respiratory  variability.  There is moderated sized pericardial effusion 1.9 cms maximum width that is  predominantly posterior without hemodynamic echocardiagraphic features of  tamponade.  Mild AS with MG of 10 mm Hg. Visually the valve appears to open well. Mild AI.     Compared to the previous study from , the effusion is slightly larger. No  other significant change.  _____________________________________________________________________________  __        Left Ventricle  The left ventricle is normal in size. There is borderline concentric left  ventricular hypertrophy. Left ventricular systolic function is normal. The  visual ejection fraction is estimated at 65-70%. Diastolic Doppler findings  (E/E' ratio and/or other parameters) suggest left ventricular filling  pressures are indeterminate. No regional wall motion abnormalities noted.     Right Ventricle  The right ventricle is normal in  size and function.     Atria  The left atrium is borderline dilated. Right atrial size is normal.     Mitral Valve  The mitral valve is normal in structure and function. There is mild mitral  annular calcification. There is trace mitral regurgitation. There is no mitral  valve stenosis.        Tricuspid Valve  The tricuspid valve is normal in structure and function. There is trace  tricuspid regurgitation. Right ventricular systolic pressure could not be  approximated due to inadequate tricuspid regurgitation.     Aortic Valve  The aortic valve is not well visualized. There is mild (1+) aortic  regurgitation. The peak AoV pressure gradient is 20.0 mmHg. The mean AoV  pressure gradient is 9.9 mmHg. Mild valvular aortic stenosis.     Pulmonic Valve  The pulmonic valve is not well visualized. This degree of valvular  regurgitation is within normal limits.     Vessels  The aortic root is normal size. Normal size ascending aorta. The inferior vena  cava was normal in size with preserved respiratory variability.     Pericardium  Moderate posterior pericardial effusion.     _____________________________________________________________________________  __  MMode/2D Measurements & Calculations  IVSd: 1.0 cm  LVIDd: 4.7 cm  LVIDs: 2.7 cm  LVPWd: 0.90 cm  FS: 41.3 %  LV mass(C)d: 154.2 grams  LV mass(C)dI: 95.1 grams/m2     Ao root diam: 2.5 cm  LA dimension: 3.7 cm  asc Aorta Diam: 3.1 cm  LA/Ao: 1.5  RWT: 0.39        Doppler Measurements & Calculations  MV E max kike: 69.0 cm/sec  MV A max kike: 121.8 cm/sec  MV E/A: 0.57  MV dec slope: 284.0 cm/sec2  MV dec time: 0.24 sec  Ao V2 max: 223.9 cm/sec  Ao max P.0 mmHg  Ao V2 mean: 144.5 cm/sec  Ao mean P.9 mmHg  Ao V2 VTI: 52.4 cm  LV V1 max P.2 mmHg  LV V1 max: 151.5 cm/sec  LV V1 VTI: 37.9 cm  PA acc time: 0.10 sec  AV Kike Ratio (DI): 0.68  E/E' av.2  Lateral E/e': 7.1  Medial E/e': 17.3               _____________________________________________________________________________  __        Report approved by: Erin Arriola 01/03/2021 09:27 AM      NM Lexiscan stress test (nuc card)     Value    Target     Baseline Systolic     Baseline Diastolic BP 67    Last Stress Systolic     Last Stress Diastolic BP 66    Baseline HR 62    Max HR 85    Calculated Percent HR 62    Rate Pressure Product 11,985.0    Narrative       The nuclear stress test is probably abnormal.     There is a small area of mild ischemia in the mid to distal anterior   segment(s) of the left ventricle.     Left ventricular function is hyperdynamic.     There is no prior study for comparison.       Procedure 1/5/2021    Heart Catheterization with Possible Intervention   Left Ventriculogram   Indications    Abnormal cardiovascular stress test [R94.39 (ICD-10-CM)]   Comments/Patient Narrative    Pt with chest pain, rule out for MI, arthritis, chronic pericardial effusion, calcium in LCA system and nuc gxt suggests mild anterior ischemia. Heart cath requested   Pre Procedure Diagnosis    abnormal stress test       Conclusion         The ejection fraction is greater than 55% by visual estimate.    Left ventricular filling pressures are normal.    Prox LAD lesion is 10% stenosed.    Mid LAD lesion is 15% stenosed.    Prox RCA lesion is 5% stenosed.     1, Trivial CAD. 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 Normal LVEF  3. Trivial gradient across aortic valve <10mmHg

## 2021-01-04 NOTE — PROGRESS NOTES
Pt A/O. C/o pain in hip improved w/ tylenol. Ambulating in room w/ St. By assist. Vitals stable on RA.  Tele SR. Following k+, recheck in the Am. Following trops. NPO at midnight for lexiscan. continue to monitor.

## 2021-01-04 NOTE — PROGRESS NOTES
INTERNAL MEDICINE UPDATE    Nuc stress showed:    The nuclear stress test is probably abnormal.    There is a small area of mild ischemia in the mid to distal anterior segment(s) of the left ventricle.    Left ventricular function is hyperdynamic.    There is no prior study for comparison.      PLAN:  - I d/w Dr. Juarez, Cardiology and plan [facility]; pt with contrast allergy and will need pretreatment, thus procedure will be tomorrow 1/5.   - Appreciate help from Dr. Juarez.    Jc Sierra Jr., MD  [phone] (p)  Text Page

## 2021-01-04 NOTE — PROGRESS NOTES
Care Suites Procedure Nursing Note    Patient Information  Name: Swathi Dukes  Age: 85 year old    Procedure  Procedure: Lexiscan stress test  Procedure start time: 1015    Procedure complete time: 1035  Concerns/abnormal assessment: Lt AC IV not flushing. Some leaking. Restarted per IV team  If abnormal assessment, provider notified: N/A  Plan/Other: Bladimir stress test well.    Baljeet Rincon RN

## 2021-01-04 NOTE — PROGRESS NOTES
Assessment and Plan:     Swathi Dukes is a 85 year old female who was admitted on 1/3/2021.       1. Right neck and shoulder pain, new and severe. Not like her usual arthritis pain. Negative troponins. CAD noted by CT involving LM and LAD. Also, symptoms of exertional dyspnea recently.     2. Chronic pericardial effusion,unchanged by echo without tamponade. History of pericardial window 13 years ago in [place].    3. Lupus    4. Hypertension with elevated BP's    5. Mild aortic stenosis and mild AI    Lexiscan nuclear perfusion test completed today. I reviewed the images personally. She has a small to moderate sized area of mid to  distal anterior and anteroseptal ischemia. I would suggest coronary angiography based on the location in the LAD or LM territories. She has has a contrast allergy with a severe reaction to contrast 25 years ago. I will premedicate her with prednisone and benedryl.     Risks and benefits of left heart catheterization and coronary angiogram were discussed with the patient in detail. Risk estimated at 0.1-0.3% for diagnostic angio and 1-2% for PCI, including risk of stroke, MI, death, emergent bypass, contrast induced allergic reaction, renal dysfunction, and vascular complications (including bleeding and transfusion) were discussed. Patient understands and wishes to proceed.     Plan and recommendations    Coronary angiogram tomorrow. Discussed with patient's son who agrees.     DEBRA AYALA MD        Interval History:   doing well; no cp, sob, n/v/d, or abd pain.          Medications:       amLODIPine  5 mg Oral Daily     brimonidine  1 drop Both Eyes BID     dorzolamide  1 drop Both Eyes BID     enalapril  20 mg Oral BID     folic acid  1 mg Oral Daily     hydrALAZINE  25 mg Oral TID     hydroxychloroquine  200 mg Oral BID     latanoprost  1 drop Both Eyes Daily     OLANZapine  10 mg Oral At Bedtime     PARoxetine  30 mg Oral Daily     simvastatin  10 mg Oral At Bedtime      sodium chloride (PF)  10 mL Intravenous Once     sodium chloride (PF)  3 mL Intracatheter Q8H            Physical Exam:   Blood pressure (!) 193/78, pulse 67, temperature 97.7  F (36.5  C), temperature source Oral, resp. rate 14, weight 62.6 kg (138 lb), SpO2 100 %, not currently breastfeeding.    Vitals:    01/03/21 0556 01/04/21 0535   Weight: 59.9 kg (132 lb) 62.6 kg (138 lb)         Intake/Output Summary (Last 24 hours) at 1/4/2021 1429  Last data filed at 1/4/2021 1116  Gross per 24 hour   Intake 480 ml   Output 700 ml   Net -220 ml       12/30 0700 - 01/04 0659  In: 720 [P.O.:720]  Out: 700 [Urine:700]  Net: 20    Exam:  GENERAL APPEARANCE ADULT: Alert, in no acute distress  RESP: lungs clear to auscultation   CV: regular rate and rhythm, no murmur, rub, or gallop  ABDOMEN: normal bowel sounds, soft, nontender, no hepatosplenomegaly or other masses  EXTREMITIES: No edema         Data:   LABS (Last four results)  CMP  Recent Labs   Lab 01/04/21  0556 01/03/21  0555 01/02/21 2222 01/02/21 2214    140  --  139   POTASSIUM 3.6 3.5  --  4.5   CHLORIDE 112* 111*  --  108   CO2 26 24  --  27   ANIONGAP 3 5  --  4   * 163*  --  104*   BUN 19 16  --  17   CR 0.63 0.57  --  0.75   GFRESTIMATED 83 86 69 74   GFRESTBLACK >90 >90 83 85   FILIPE 8.8 9.3  --  9.3   MAG  --   --   --  2.2   PHOS 3.2  --   --   --    PROTTOTAL  --   --   --  7.2   ALBUMIN 3.1*  --   --  3.3*   BILITOTAL  --   --   --  0.4   ALKPHOS  --   --   --  77   AST  --   --   --  37   ALT  --   --   --  24     CBC  Recent Labs   Lab 01/04/21  0556 01/03/21  0555 01/02/21 2214   WBC 7.4 5.8 7.1   RBC 2.96* 3.33* 3.02*   HGB 9.4* 10.4* 9.5*   HCT 28.9* 32.4* 30.1*   MCV 98 97 100   MCH 31.8 31.2 31.5   MCHC 32.5 32.1 31.6   RDW 15.6* 15.6* 15.6*    293 270     INR  Recent Labs   Lab 01/03/21  0555   INR 0.97     TROPONINS   Lab Results   Component Value Date    TROPI <0.015 01/03/2021    TROPI <0.015 01/03/2021    TROPI <0.015  01/03/2021    TROPI <0.015 01/02/2021    TROPI <0.015 08/27/2019    TROPONIN 0.01 08/27/2019    TROPONIN 0.00 08/27/2019                                                                                                               DEBRA AYALA MD

## 2021-01-04 NOTE — PROGRESS NOTES
[facility]    Internal Medicine Hospitalist Progress Note  01/04/2021  I evaluated patient on the above date.    Jc Sierra Jr., MD  [phone] (p)  Text Page        Assessment & Plan New actions/orders today (01/04/2021) are underlined.    Swathi Dukes is a 85 year old female with history of HTN and HLD, who presented to [facility] admitted on 1/2/2021 woth neck pain and found to have a moderate to large pericardial effusion.  Transferred to [facility] for further work-up on 1/3/61329.    Neck pain, possibly musculoskeletal.  Pericardial effusion, suspect chronic, ?related to rheum disease.  * Pt with h/o pericardial effusion requiring pericardial window in [place] 13 yrs ago.  * Presented to [facility] 1/2 with neck/chect pain. On initial evaluation 1/2, pt was afebrile, not tachycardic, was hypertensive. ECG 1/2 showed sinus w/o acute ischemic changes. Trop negative. CT chest w/o contrast 1/2 showed moderate to large pericardial effusion; mkderately enlarged heart with coronary artery disease and atherosclerotic vascular disease. Transferred to [facility] 1/3 for further management.  * Echo 1/3 showed LVEF 65-70%, no regional wall motion abnormalities; RV OK; moderate pericardial effusion w/o hemodynamic features of tamponade; mild AS; compared to the previous study from 2020, the effusion was slightly larger. Cardiology consulted and felt effusion was chronic and not contributing to presenting symptoms. Nuc stress 1/4 pending.  - Follow-up stress test results.  - Continue PRN acetaminophen for pain.  - Follow-up neck pain outpatient.  - Follow-up effusion outpatient.  - Management of lupus as below.  - Appreciate Cardiology help.    Hypertension (benign essential).  [PTA: amlodipine 5 mg daily; enalapril 20 mg BID; hydralazine 25 mg TID.]  - Continue enalapril.  - Resume amlodipine and hydralazine.     Passive suicidal ideation.  Reported thoughts of wondering what it would be like to take  multiple pills to end her life but had no intent to do so.  Does have a history of obsessive thoughts and acts. Seen by Psychiatry.  - Outpatient Psychiatry and therapy referral.  - Continue PTA olanzapine, paxil.    SLE.  Elevated inflammatory markers.  * Pt has seen a Rheumatologist locally and was diagnosed with mild lupus. On hydroxychloroquine PTA.  CRP 53.1 and ESR 66 on 1/3. RAUL screen and anti-histone ab 1/3 pending.  - Continue hydroxychloroquine.  - Follow-up with Rheumatology.    Chronic right upper abdominal pain.  Notes chronic pain in upper right abdomen; not new; no relation to eating. Says she has told other providers about this but has not had it worked up.  - Follow-up outpatient.     Hyperlipidemia.  Chronic and stable.  -Continue simvastatin.     Glaucoma.  Chronic and stable.  - Continue PTA gtts.       COVID-19 testing.  COVID-19 PCR Results    COVID-19 PCR Results 11/19/20 1/3/21   COVID-19 Virus PCR to [facility] - Result Not Detected    COVID-19 Virus PCR to [facility] - Source Nasopharyngeal    SARS-CoV-2 Virus Specimen Source  Nasopharyngeal   SARS-CoV-2 PCR Result  NEGATIVE      Comments are available for some flowsheets but are not being displayed.         COVID-19 Antibody Results, Testing for Immunity    COVID-19 Antibody Results, Testing for Immunity   No data to display.             Diet: NPO for Medical/Clinical Reasons Except for: Meds    Prophylaxis: PCD's, ambulation.     Momin Catheter: not present  Code Status: No CPR- Do NOT Intubate    Disposition Plan   Expected discharge: Today, recommended to prior living arrangement pending stress test results.  Entered: Jc Sierra MD 01/04/2021, 1:34 PM         Interval History   Doing better.  Pain better on its own.  Notes chronic pain in upper right abdomen; not new; no relation to eating.    -Data reviewed today: I reviewed all new labs and imaging over the last 24 hours. I personally reviewed the EKG tracing showing findings as  above.    Physical Exam    , Blood pressure (!) 176/69, pulse 59, temperature 97.7  F (36.5  C), temperature source Oral, resp. rate 16, weight 62.6 kg (138 lb), SpO2 96 %, not currently breastfeeding.  Vitals:    01/03/21 0556 01/04/21 0535   Weight: 59.9 kg (132 lb) 62.6 kg (138 lb)     Vital Signs with Ranges  Temp:  [97.4  F (36.3  C)-98.4  F (36.9  C)] 97.7  F (36.5  C)  Pulse:  [54-82] 59  Resp:  [11-23] 16  BP: (129-179)/(53-73) 176/69  SpO2:  [94 %-97 %] 96 %  Patient Vitals for the past 24 hrs:   BP Temp Temp src Pulse Resp SpO2 Weight   01/04/21 1304 (!) 176/69 -- -- 59 -- -- --   01/04/21 1042 (!) 175/66 97.7  F (36.5  C) Oral 72 16 96 % --   01/04/21 1029 (!) 146/63 -- -- 82 16 97 % --   01/04/21 1025 129/53 -- -- 81 14 97 % --   01/04/21 1021 (!) 179/67 -- -- 59 14 96 % --   01/04/21 0800 -- 98  F (36.7  C) Oral -- -- -- --   01/04/21 0535 (!) 165/73 -- -- 60 23 95 % 62.6 kg (138 lb)   01/04/21 0300 -- -- -- 54 11 -- --   01/04/21 0048 (!) 160/64 97.4  F (36.3  C) Oral 66 15 95 % --   01/03/21 1945 (!) 156/61 -- -- 61 19 96 % --   01/03/21 1607 (!) 148/63 -- -- 68 20 94 % --   01/03/21 1523 -- 98.4  F (36.9  C) Oral -- -- -- --     I/O's Last 24 hours  I/O last 3 completed shifts:  In: 720 [P.O.:720]  Out: 700 [Urine:700]    Constitutional: Awake, alert.  Respiratory: Diminished in bases. No crackles or wheezes.=  Cardiovascular: RRR, no m/r/g.  GI: Soft, no r/g tenderness, +BS.  Skin/Integumen:   Other:        Data   Recent Labs   Lab 01/04/21  0556 01/03/21  2151 01/03/21  1820 01/03/21  1414 01/03/21  0555 01/02/21  2214   WBC 7.4  --   --   --  5.8 7.1   HGB 9.4*  --   --   --  10.4* 9.5*   MCV 98  --   --   --  97 100     --   --   --  293 270   INR  --   --   --   --  0.97  --      --   --   --  140 139   POTASSIUM 3.6  --   --   --  3.5 4.5   CHLORIDE 112*  --   --   --  111* 108   CO2 26  --   --   --  24 27   BUN 19  --   --   --  16 17   CR 0.63  --   --   --  0.57 0.75   ANIONGAP  3  --   --   --  5 4   FILIPE 8.8  --   --   --  9.3 9.3   *  --   --   --  163* 104*   ALBUMIN 3.1*  --   --   --   --  3.3*   PROTTOTAL  --   --   --   --   --  7.2   BILITOTAL  --   --   --   --   --  0.4   ALKPHOS  --   --   --   --   --  77   ALT  --   --   --   --   --  24   AST  --   --   --   --   --  37   LIPASE  --   --   --   --   --  52*   TROPI  --  <0.015 <0.015 <0.015  --  <0.015     Recent Labs   Lab Test 01/04/21  0818 01/04/21  0556 01/03/21  0555 01/02/21  2214 08/04/20  1632 05/29/20  1500   GLC  --  102* 163* 104* 98 93   BGM 96  --   --   --   --   --      Recent Labs   Lab 01/03/21  1128   CRP 53.1*         Recent Results (from the past 24 hour(s))   NM Lexiscan stress test (nuc card)   Result Value    Target     Baseline Systolic     Baseline Diastolic BP 67    Last Stress Systolic     Last Stress Diastolic BP 66    Baseline HR 62    Max HR 85    Calculated Percent HR 62    Rate Pressure Product 11,985.0       Medications   All medications were reviewed.      dorzolamide  1 drop Both Eyes BID     enalapril  20 mg Oral BID     folic acid  1 mg Oral Daily     hydroxychloroquine  200 mg Oral BID     latanoprost  1 drop Both Eyes Daily     OLANZapine  10 mg Oral At Bedtime     PARoxetine  30 mg Oral Daily     simvastatin  10 mg Oral At Bedtime     sodium chloride (PF)  10 mL Intravenous Once     sodium chloride (PF)  3 mL Intracatheter Q8H

## 2021-01-04 NOTE — UTILIZATION REVIEW
"  Admission Status; Secondary Review Determination         Under the authority of the Utilization Management Committee, the utilization review process indicated a secondary review on the above patient.  The review outcome is based on review of the medical records, discussions with staff, and applying clinical experience noted on the date of the review.        (X)      Inpatient Status Appropriate - This patient's medical care is consistent with medical management for inpatient care and reasonable inpatient medical practice.      () Observation Status Appropriate - This patient does not meet hospital inpatient criteria and is placed in observation status. If this patient's primary payer is Medicare and was admitted as an inpatient, Condition Code 44 should be used and patient status changed to \"observation\".   () Admission Status NOT Appropriate - This patient's medical care is not consistent with medical management for Inpatient or Observation Status.          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  She presented with right-sided neck pain radiating to her right shoulder, right chest,  right abdomen, and right hip.  Chest CT showed a moderate to large pericardial effusion.  Patient also had some passive suicidal ideation.  TTE and Lexiscan are planned.  Cardiology and Psychiatry were consulted.  Her symptoms are not felt to be related to her effusion, and no pericardial intervention is planned.  She is appropriate for Observation status.  I spoke to Dr Sierra.    Addendum:  Stress test is positive, and patient will require LHC with premedication and will require ongoing hospitalization.  She is appropriate for Inpatient status, and recent Observation order should be disregarded.      The severity of illness, intensity of service provided, expected LOS and risk for adverse outcome make the care complex, high risk and appropriate for " hospital admission.        The information on this document is developed by the utilization review team in order for the business office to ensure compliance.  This only denotes the appropriateness of proper admission status and does not reflect the quality of care rendered.         The definitions of Inpatient Status and Observation Status used in making the determination above are those provided in the CMS Coverage Manual, Chapter 1 and Chapter 6, section 70.4.      Sincerely,     Kane Cain MD  Physician Advisor  Utilization Review/ Case Management  [facility].

## 2021-01-05 VITALS
OXYGEN SATURATION: 98 % | WEIGHT: 136.8 LBS | DIASTOLIC BLOOD PRESSURE: 58 MMHG | BODY MASS INDEX: 23.85 KG/M2 | HEART RATE: 57 BPM | RESPIRATION RATE: 16 BRPM | TEMPERATURE: 98.7 F | SYSTOLIC BLOOD PRESSURE: 146 MMHG

## 2021-01-05 LAB
ANION GAP SERPL CALCULATED.3IONS-SCNC: 4 MMOL/L (ref 3–14)
BASOPHILS # BLD AUTO: 0 10E9/L (ref 0–0.2)
BASOPHILS NFR BLD AUTO: 0.2 %
BUN SERPL-MCNC: 24 MG/DL (ref 7–30)
CALCIUM SERPL-MCNC: 9 MG/DL (ref 8.5–10.1)
CATH EF ESTIMATED: 60 %
CHLORIDE SERPL-SCNC: 109 MMOL/L (ref 94–109)
CO2 SERPL-SCNC: 26 MMOL/L (ref 20–32)
CREAT SERPL-MCNC: 0.67 MG/DL (ref 0.52–1.04)
DIFFERENTIAL METHOD BLD: ABNORMAL
EOSINOPHIL # BLD AUTO: 0 10E9/L (ref 0–0.7)
EOSINOPHIL NFR BLD AUTO: 0 %
ERYTHROCYTE [DISTWIDTH] IN BLOOD BY AUTOMATED COUNT: 15.4 % (ref 10–15)
GFR SERPL CREATININE-BSD FRML MDRD: 81 ML/MIN/{1.73_M2}
GLUCOSE SERPL-MCNC: 145 MG/DL (ref 70–99)
HCT VFR BLD AUTO: 29.7 % (ref 35–47)
HGB BLD-MCNC: 9.7 G/DL (ref 11.7–15.7)
HISTONE IGG SER IA-ACNC: 0.4 UNITS (ref 0–0.9)
IMM GRANULOCYTES # BLD: 0 10E9/L (ref 0–0.4)
IMM GRANULOCYTES NFR BLD: 0.5 %
LYMPHOCYTES # BLD AUTO: 0.5 10E9/L (ref 0.8–5.3)
LYMPHOCYTES NFR BLD AUTO: 8.3 %
MCH RBC QN AUTO: 31.7 PG (ref 26.5–33)
MCHC RBC AUTO-ENTMCNC: 32.7 G/DL (ref 31.5–36.5)
MCV RBC AUTO: 97 FL (ref 78–100)
MONOCYTES # BLD AUTO: 0.1 10E9/L (ref 0–1.3)
MONOCYTES NFR BLD AUTO: 1.5 %
NEUTROPHILS # BLD AUTO: 5.8 10E9/L (ref 1.6–8.3)
NEUTROPHILS NFR BLD AUTO: 89.5 %
NRBC # BLD AUTO: 0 10*3/UL
NRBC BLD AUTO-RTO: 0 /100
PLATELET # BLD AUTO: 267 10E9/L (ref 150–450)
POTASSIUM SERPL-SCNC: 4.2 MMOL/L (ref 3.4–5.3)
RBC # BLD AUTO: 3.06 10E12/L (ref 3.8–5.2)
SODIUM SERPL-SCNC: 139 MMOL/L (ref 133–144)
WBC # BLD AUTO: 6.5 10E9/L (ref 4–11)

## 2021-01-05 PROCEDURE — 250N000013 HC RX MED GY IP 250 OP 250 PS 637: Performed by: PSYCHIATRY & NEUROLOGY

## 2021-01-05 PROCEDURE — 250N000011 HC RX IP 250 OP 636: Performed by: INTERNAL MEDICINE

## 2021-01-05 PROCEDURE — B2111ZZ FLUOROSCOPY OF MULTIPLE CORONARY ARTERIES USING LOW OSMOLAR CONTRAST: ICD-10-PCS | Performed by: INTERNAL MEDICINE

## 2021-01-05 PROCEDURE — B2151ZZ FLUOROSCOPY OF LEFT HEART USING LOW OSMOLAR CONTRAST: ICD-10-PCS | Performed by: INTERNAL MEDICINE

## 2021-01-05 PROCEDURE — 250N000009 HC RX 250: Performed by: INTERNAL MEDICINE

## 2021-01-05 PROCEDURE — 99152 MOD SED SAME PHYS/QHP 5/>YRS: CPT | Performed by: INTERNAL MEDICINE

## 2021-01-05 PROCEDURE — 250N000013 HC RX MED GY IP 250 OP 250 PS 637: Performed by: INTERNAL MEDICINE

## 2021-01-05 PROCEDURE — 93458 L HRT ARTERY/VENTRICLE ANGIO: CPT | Performed by: INTERNAL MEDICINE

## 2021-01-05 PROCEDURE — 258N000003 HC RX IP 258 OP 636: Performed by: INTERNAL MEDICINE

## 2021-01-05 PROCEDURE — 80048 BASIC METABOLIC PNL TOTAL CA: CPT | Performed by: INTERNAL MEDICINE

## 2021-01-05 PROCEDURE — 99207 PR CDG-CODE CATEGORY CHANGED: CPT | Performed by: INTERNAL MEDICINE

## 2021-01-05 PROCEDURE — 272N000001 HC OR GENERAL SUPPLY STERILE: Performed by: INTERNAL MEDICINE

## 2021-01-05 PROCEDURE — 36415 COLL VENOUS BLD VENIPUNCTURE: CPT | Performed by: INTERNAL MEDICINE

## 2021-01-05 PROCEDURE — 4A023N7 MEASUREMENT OF CARDIAC SAMPLING AND PRESSURE, LEFT HEART, PERCUTANEOUS APPROACH: ICD-10-PCS | Performed by: INTERNAL MEDICINE

## 2021-01-05 PROCEDURE — 99232 SBSQ HOSP IP/OBS MODERATE 35: CPT | Mod: 25 | Performed by: INTERNAL MEDICINE

## 2021-01-05 PROCEDURE — 99153 MOD SED SAME PHYS/QHP EA: CPT | Performed by: INTERNAL MEDICINE

## 2021-01-05 PROCEDURE — 93005 ELECTROCARDIOGRAM TRACING: CPT

## 2021-01-05 PROCEDURE — 250N000012 HC RX MED GY IP 250 OP 636 PS 637: Performed by: INTERNAL MEDICINE

## 2021-01-05 PROCEDURE — 85025 COMPLETE CBC W/AUTO DIFF WBC: CPT | Performed by: INTERNAL MEDICINE

## 2021-01-05 PROCEDURE — 93010 ELECTROCARDIOGRAM REPORT: CPT | Performed by: INTERNAL MEDICINE

## 2021-01-05 PROCEDURE — 999N000128 HC STATISTIC PERIPHERAL IV START W/O US GUIDANCE

## 2021-01-05 PROCEDURE — 99238 HOSP IP/OBS DSCHRG MGMT 30/<: CPT | Performed by: INTERNAL MEDICINE

## 2021-01-05 RX ORDER — LIDOCAINE 40 MG/G
CREAM TOPICAL
Status: DISCONTINUED | OUTPATIENT
Start: 2021-01-05 | End: 2021-01-05 | Stop reason: HOSPADM

## 2021-01-05 RX ORDER — FENTANYL CITRATE 50 UG/ML
INJECTION, SOLUTION INTRAMUSCULAR; INTRAVENOUS
Status: DISCONTINUED | OUTPATIENT
Start: 2021-01-05 | End: 2021-01-05 | Stop reason: HOSPADM

## 2021-01-05 RX ORDER — FENTANYL CITRATE 50 UG/ML
25-50 INJECTION, SOLUTION INTRAMUSCULAR; INTRAVENOUS
Status: ACTIVE | OUTPATIENT
Start: 2021-01-05 | End: 2021-01-05

## 2021-01-05 RX ORDER — ACETAMINOPHEN 325 MG/1
650 TABLET ORAL EVERY 4 HOURS PRN
Status: DISCONTINUED | OUTPATIENT
Start: 2021-01-05 | End: 2021-01-05 | Stop reason: HOSPADM

## 2021-01-05 RX ORDER — ATROPINE SULFATE 0.1 MG/ML
0.5 INJECTION INTRAVENOUS
Status: ACTIVE | OUTPATIENT
Start: 2021-01-05 | End: 2021-01-05

## 2021-01-05 RX ORDER — IOPAMIDOL 755 MG/ML
INJECTION, SOLUTION INTRAVASCULAR
Status: DISCONTINUED | OUTPATIENT
Start: 2021-01-05 | End: 2021-01-05 | Stop reason: HOSPADM

## 2021-01-05 RX ORDER — LORAZEPAM 0.5 MG/1
0.5 TABLET ORAL
Status: DISCONTINUED | OUTPATIENT
Start: 2021-01-05 | End: 2021-01-05 | Stop reason: HOSPADM

## 2021-01-05 RX ORDER — METOPROLOL TARTRATE 25 MG/1
25 TABLET, FILM COATED ORAL 2 TIMES DAILY
Qty: 180 TABLET | Refills: 3 | Status: SHIPPED | OUTPATIENT
Start: 2021-01-05 | End: 2021-01-05

## 2021-01-05 RX ORDER — SODIUM CHLORIDE 9 MG/ML
INJECTION, SOLUTION INTRAVENOUS CONTINUOUS
Status: DISCONTINUED | OUTPATIENT
Start: 2021-01-05 | End: 2021-01-05 | Stop reason: HOSPADM

## 2021-01-05 RX ORDER — NALOXONE HYDROCHLORIDE 0.4 MG/ML
0.2 INJECTION, SOLUTION INTRAMUSCULAR; INTRAVENOUS; SUBCUTANEOUS
Status: DISCONTINUED | OUTPATIENT
Start: 2021-01-05 | End: 2021-01-05 | Stop reason: HOSPADM

## 2021-01-05 RX ORDER — FLUMAZENIL 0.1 MG/ML
0.2 INJECTION, SOLUTION INTRAVENOUS
Status: ACTIVE | OUTPATIENT
Start: 2021-01-05 | End: 2021-01-05

## 2021-01-05 RX ORDER — NALOXONE HYDROCHLORIDE 0.4 MG/ML
0.4 INJECTION, SOLUTION INTRAMUSCULAR; INTRAVENOUS; SUBCUTANEOUS
Status: DISCONTINUED | OUTPATIENT
Start: 2021-01-05 | End: 2021-01-05 | Stop reason: HOSPADM

## 2021-01-05 RX ORDER — POTASSIUM CHLORIDE 1500 MG/1
20 TABLET, EXTENDED RELEASE ORAL
Status: DISCONTINUED | OUTPATIENT
Start: 2021-01-05 | End: 2021-01-05 | Stop reason: HOSPADM

## 2021-01-05 RX ORDER — METOPROLOL TARTRATE 25 MG/1
25 TABLET, FILM COATED ORAL 2 TIMES DAILY
Qty: 180 TABLET | Refills: 3 | Status: ON HOLD | OUTPATIENT
Start: 2021-01-05 | End: 2021-01-18

## 2021-01-05 RX ORDER — LORAZEPAM 2 MG/ML
0.5 INJECTION INTRAMUSCULAR
Status: DISCONTINUED | OUTPATIENT
Start: 2021-01-05 | End: 2021-01-05 | Stop reason: HOSPADM

## 2021-01-05 RX ORDER — METOPROLOL TARTRATE 25 MG/1
25 TABLET, FILM COATED ORAL 2 TIMES DAILY
Status: DISCONTINUED | OUTPATIENT
Start: 2021-01-05 | End: 2021-01-05 | Stop reason: HOSPADM

## 2021-01-05 RX ADMIN — DORZOLAMIDE HCL 1 DROP: 20 SOLUTION/ DROPS OPHTHALMIC at 08:45

## 2021-01-05 RX ADMIN — DIPHENHYDRAMINE HYDROCHLORIDE 25 MG: 50 INJECTION, SOLUTION INTRAMUSCULAR; INTRAVENOUS at 09:56

## 2021-01-05 RX ADMIN — BRIMONIDINE TARTRATE 1 DROP: 2 SOLUTION OPHTHALMIC at 08:45

## 2021-01-05 RX ADMIN — HYDRALAZINE HYDROCHLORIDE 25 MG: 25 TABLET ORAL at 08:40

## 2021-01-05 RX ADMIN — ENALAPRIL MALEATE 20 MG: 20 TABLET ORAL at 20:07

## 2021-01-05 RX ADMIN — FOLIC ACID 1 MG: 1 TABLET ORAL at 08:41

## 2021-01-05 RX ADMIN — HYDROXYCHLOROQUINE SULFATE 200 MG: 200 TABLET, FILM COATED ORAL at 08:40

## 2021-01-05 RX ADMIN — BRIMONIDINE TARTRATE 1 DROP: 2 SOLUTION OPHTHALMIC at 20:10

## 2021-01-05 RX ADMIN — AMLODIPINE BESYLATE 5 MG: 5 TABLET ORAL at 08:41

## 2021-01-05 RX ADMIN — LOPERAMIDE HYDROCHLORIDE 2 MG: 2 CAPSULE ORAL at 07:01

## 2021-01-05 RX ADMIN — HYDROXYCHLOROQUINE SULFATE 200 MG: 200 TABLET, FILM COATED ORAL at 20:07

## 2021-01-05 RX ADMIN — PAROXETINE HYDROCHLORIDE 30 MG: 30 TABLET, FILM COATED ORAL at 08:41

## 2021-01-05 RX ADMIN — LOPERAMIDE HYDROCHLORIDE 2 MG: 2 CAPSULE ORAL at 20:09

## 2021-01-05 RX ADMIN — DORZOLAMIDE HCL 1 DROP: 20 SOLUTION/ DROPS OPHTHALMIC at 20:10

## 2021-01-05 RX ADMIN — PREDNISONE 20 MG: 20 TABLET ORAL at 07:01

## 2021-01-05 RX ADMIN — ENALAPRIL MALEATE 20 MG: 20 TABLET ORAL at 08:40

## 2021-01-05 RX ADMIN — LATANOPROST 1 DROP: 50 SOLUTION OPHTHALMIC at 20:10

## 2021-01-05 RX ADMIN — HYDRALAZINE HYDROCHLORIDE 25 MG: 25 TABLET ORAL at 15:23

## 2021-01-05 RX ADMIN — SODIUM CHLORIDE: 9 INJECTION, SOLUTION INTRAVENOUS at 08:47

## 2021-01-05 NOTE — PROGRESS NOTES
[facility]    Internal Medicine Hospitalist Progress Note  01/05/2021  I evaluated patient on the above date.    Jc Sierra Jr., MD  [phone] (p)  Text Page        Assessment & Plan New actions/orders today (01/05/2021) are underlined.    Swathi Dukes is a 85 year old female with history of HTN and HLD, who presented to [facility] admitted on 1/2/2021 woth neck pain and found to have a moderate to large pericardial effusion.  Transferred to [facility] for further work-up on 1/3/98935.    Neck pain, possibly musculoskeletal.  Pericardial effusion, suspect chronic, ?related to rheum disease.  * Pt with h/o pericardial effusion requiring pericardial window in [place] 13 yrs ago.  * Presented to [facility] 1/2 with neck/chect pain. On initial evaluation 1/2, pt was afebrile, not tachycardic, was hypertensive. ECG 1/2 showed sinus w/o acute ischemic changes. Trop negative. CT chest w/o contrast 1/2 showed moderate to large pericardial effusion; mkderately enlarged heart with coronary artery disease and atherosclerotic vascular disease. Transferred to [facility] 1/3 for further management.  * Echo 1/3 showed LVEF 65-70%, no regional wall motion abnormalities; RV OK; moderate pericardial effusion w/o hemodynamic features of tamponade; mild AS; compared to the previous study from 2020, the effusion was slightly larger. Cardiology consulted and felt effusion was chronic and not contributing to presenting symptoms. Nuc stress 1/4 abnormal showing small area of mild ischemic in the mid to distal anterior segement(s) of the LV. Underwent angiogram 1/5 that showed only trivial disease.  - Continue PRN acetaminophen for pain.  - Follow-up neck pain outpatient.  - Follow-up effusion outpatient.  - Management of lupus as below.  - Appreciate Cardiology help.    Mild aortic stenosis, mild aortic regurgitation.  Noted on echo 1/3.  - Follow-up outpatient.    Hypertension (benign essential).  [PTA: amlodipine 5 mg  daily; enalapril 20 mg BID; hydralazine 25 mg TID.]  - Continue amlodipine, enalapril and hydralazine.     Passive suicidal ideation.  Reported thoughts of wondering what it would be like to take multiple pills to end her life but had no intent to do so.  Does have a history of obsessive thoughts and acts. Seen by Psychiatry.  - Outpatient Psychiatry and therapy referral.  - Continue PTA olanzapine, paxil.    SLE.  Elevated inflammatory markers.  * Pt has seen a Rheumatologist locally and was diagnosed with mild lupus. On hydroxychloroquine PTA.  CRP 53.1 and ESR 66 on 1/3. RAUL screen and anti-histone ab 1/3 pending.  - Continue hydroxychloroquine.  - Follow-up with Rheumatology.    Chronic right groin/abdominal pain.  Notes chronic pain in upper right abdomen; not new; no relation to eating. Says she has told other providers about this but has not had it worked up.  - Follow-up outpatient.     Hyperlipidemia.  Chronic and stable.  -Continue simvastatin.     Glaucoma.  Chronic and stable.  - Continue PTA gtts.       COVID-19 testing.  COVID-19 PCR Results    COVID-19 PCR Results 11/19/20 1/3/21   COVID-19 Virus PCR to [facility] - Result Not Detected    COVID-19 Virus PCR to [facility] - Source Nasopharyngeal    SARS-CoV-2 Virus Specimen Source  Nasopharyngeal   SARS-CoV-2 PCR Result  NEGATIVE      Comments are available for some flowsheets but are not being displayed.         COVID-19 Antibody Results, Testing for Immunity    COVID-19 Antibody Results, Testing for Immunity   No data to display.             Diet: Diet  Advance Diet as Tolerated: Regular Diet Adult    Prophylaxis: PCD's, ambulation.     Momin Catheter: not present  Code Status: No CPR- Do NOT Intubate    Disposition Plan   Expected discharge: Today, recommended to prior living arrangement.  Entered: Jc Sierra MD 01/05/2021, 12:34 PM         Interval History   Doing OK after angiogram.  Ready to go home.    -Data reviewed today: I reviewed all  new labs and imaging over the last 24 hours. I personally reviewed the EKG tracing showing findings as above.    Physical Exam    , Blood pressure 138/55, pulse 57, temperature 97.3  F (36.3  C), temperature source Oral, resp. rate 14, weight 62.1 kg (136 lb 12.8 oz), SpO2 94 %, not currently breastfeeding.  Vitals:    01/03/21 0556 01/04/21 0535 01/05/21 0559   Weight: 59.9 kg (132 lb) 62.6 kg (138 lb) 62.1 kg (136 lb 12.8 oz)     Vital Signs with Ranges  Temp:  [97.3  F (36.3  C)-98.2  F (36.8  C)] 97.3  F (36.3  C)  Pulse:  [50-67] 57  Resp:  [12-14] 14  BP: (125-193)/(52-78) 138/55  SpO2:  [94 %-100 %] 94 %  Patient Vitals for the past 24 hrs:   BP Temp Temp src Pulse Resp SpO2 Weight   01/05/21 1215 138/55 -- -- 57 -- -- --   01/05/21 1200 125/52 -- -- 54 -- -- --   01/05/21 1145 134/53 -- -- 57 -- -- --   01/05/21 1130 135/56 -- -- 57 -- -- --   01/05/21 1116 135/53 -- -- 55 -- 94 % --   01/05/21 1107 (!) 153/61 97.3  F (36.3  C) Oral 57 14 -- --   01/05/21 0832 (!) 156/63 -- -- 52 14 98 % --   01/05/21 0800 -- 98.2  F (36.8  C) Oral -- -- -- --   01/05/21 0559 -- -- -- -- -- -- 62.1 kg (136 lb 12.8 oz)   01/05/21 0510 (!) 159/57 -- -- 57 -- -- --   01/05/21 0400 -- -- -- 51 12 -- --   01/05/21 0030 -- -- -- 50 14 -- --   01/04/21 2210 (!) 142/59 -- -- 56 -- -- --   01/04/21 2052 (!) 164/67 -- -- 63 -- -- --   01/04/21 1602 (!) 149/54 -- -- 66 -- -- --   01/04/21 1529 -- 97.8  F (36.6  C) Oral -- -- -- --   01/04/21 1428 (!) 193/78 -- -- 67 14 100 % --   01/04/21 1304 (!) 176/69 -- -- 59 -- -- --     I/O's Last 24 hours  I/O last 3 completed shifts:  In: 960 [P.O.:960]  Out: 575 [Urine:575]    Constitutional: Awake, alert, pleasant.  Respiratory: Diminished in bases. No crackles or wheezes.  Cardiovascular: RRR, no m/r/g.  GI:   Skin/Integumen:   Other:        Data   Recent Labs   Lab 01/05/21  0526 01/04/21  0556 01/03/21  2151 01/03/21  1820 01/03/21  1414 01/03/21  0555 01/02/21 2214   WBC 6.5 7.4  --   --    --  5.8 7.1   HGB 9.7* 9.4*  --   --   --  10.4* 9.5*   MCV 97 98  --   --   --  97 100    278  --   --   --  293 270   INR  --   --   --   --   --  0.97  --     141  --   --   --  140 139   POTASSIUM 4.2 3.6  --   --   --  3.5 4.5   CHLORIDE 109 112*  --   --   --  111* 108   CO2 26 26  --   --   --  24 27   BUN 24 19  --   --   --  16 17   CR 0.67 0.63  --   --   --  0.57 0.75   ANIONGAP 4 3  --   --   --  5 4   FILIPE 9.0 8.8  --   --   --  9.3 9.3   * 102*  --   --   --  163* 104*   ALBUMIN  --  3.1*  --   --   --   --  3.3*   PROTTOTAL  --   --   --   --   --   --  7.2   BILITOTAL  --   --   --   --   --   --  0.4   ALKPHOS  --   --   --   --   --   --  77   ALT  --   --   --   --   --   --  24   AST  --   --   --   --   --   --  37   LIPASE  --   --   --   --   --   --  52*   TROPI  --   --  <0.015 <0.015 <0.015  --  <0.015     Recent Labs   Lab Test 01/05/21  0526 01/04/21  0818 01/04/21  0556 01/03/21  0555 01/02/21  2214 08/04/20  1632   *  --  102* 163* 104* 98   BGM  --  96  --   --   --   --      Recent Labs   Lab 01/03/21  1128   CRP 53.1*         Recent Results (from the past 24 hour(s))   NM Lexiscan stress test (nuc card)   Result Value    Target     Baseline Systolic     Baseline Diastolic BP 67    Last Stress Systolic     Last Stress Diastolic BP 66    Baseline HR 62    Max HR 85    Calculated Percent HR 62    Rate Pressure Product 11,985.0    Narrative       The nuclear stress test is probably abnormal.     There is a small area of mild ischemia in the mid to distal anterior   segment(s) of the left ventricle.     Left ventricular function is hyperdynamic.     There is no prior study for comparison.     Cardiac Catheterization   Result Value    Cath EF Estimated 60    Narrative      The ejection fraction is greater than 55% by visual estimate.    Left ventricular filling pressures are normal.    Prox LAD lesion is 10% stenosed.    Mid LAD lesion is 15%  stenosed.    Prox RCA lesion is 5% stenosed.     1, Trivial CAD. 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 Normal LVEF  3. Trivial gradient across aortic valve <10mmHg         Medications   All medications were reviewed.    sodium chloride 75 mL/hr at 01/05/21 1113       amLODIPine  5 mg Oral Daily     brimonidine  1 drop Both Eyes BID     dorzolamide  1 drop Both Eyes BID     enalapril  20 mg Oral BID     folic acid  1 mg Oral Daily     hydrALAZINE  25 mg Oral TID     hydroxychloroquine  200 mg Oral BID     latanoprost  1 drop Both Eyes Daily     metoprolol tartrate  25 mg Oral BID     OLANZapine  10 mg Oral At Bedtime     PARoxetine  30 mg Oral Daily     simvastatin  10 mg Oral At Bedtime

## 2021-01-05 NOTE — PLAN OF CARE
VSS on RA except armand.  A&Ox4 but forgetful.  Up with SBA.  Angio today with no intervention.  R groin CDI.  Pt ambulating in [place] without difficulty post angio.  AVS discussed with pt and all questions answered.  New medications given to pt.  Clothes, Jacket, purse, wallet, glasses sent with pt.  Pts son will be picking her up at 2030.  Call light within reach.  Will continue to monitor.

## 2021-01-05 NOTE — PLAN OF CARE
Swathi is alert, oriented, pleasant, and forgetful. She denies pain, nausea, dyspnea. She does report diarrhea, receiving PRN TID Immodium. NPO overnight for angio today. Bed alarm for safety. Up with minimal assist to restroom.

## 2021-01-05 NOTE — PROGRESS NOTES
Assessment and Plan:     Swathi Dukes is a 85 year old female who was admitted on 1/3/2021.        1. Right neck and shoulder pain, new and severe. Not like her usual arthritis pain. Negative troponins. CAD noted by CT involving LM and LAD with coronary calcification. Also, symptoms of exertional dyspnea recently.  Lexiscan nuclear perfusion imaging study suggested anterior ischemia in the LAD, the same distribution of coronary calcification on CT scan.     2. Chronic pericardial effusion,unchanged by echo without tamponade. History of pericardial window 13 years ago in [place].     3. Lupus     4. Hypertension with elevated BP's     5. Mild aortic stenosis and mild AI    Coronary angiography performed today demonstrated no evidence of significant coronary artery disease.  The patient's chronic pericardial effusion appears stable and is likely asymptomatic  No active cardiac disease suspected.  No further work-up recommended.    I suspect her right shoulder and neck discomfort are due to her chronic arthritis issues.    Okay for discharge home after bedrest completed.  Heart rate is low.  I will reduce her metoprolol dose in half to 25 mg bid.      DEBRA AYALA MD        Interval History:   doing well; no cp, sob, n/v/d, or abd pain.          Medications:       amLODIPine  5 mg Oral Daily     brimonidine  1 drop Both Eyes BID     dorzolamide  1 drop Both Eyes BID     enalapril  20 mg Oral BID     folic acid  1 mg Oral Daily     hydrALAZINE  25 mg Oral TID     hydroxychloroquine  200 mg Oral BID     latanoprost  1 drop Both Eyes Daily     metoprolol tartrate  25 mg Oral BID     OLANZapine  10 mg Oral At Bedtime     PARoxetine  30 mg Oral Daily     simvastatin  10 mg Oral At Bedtime            Physical Exam:   Blood pressure 138/55, pulse 57, temperature 97.3  F (36.3  C), temperature source Oral, resp. rate 14, weight 62.1 kg (136 lb 12.8 oz), SpO2 94 %, not currently breastfeeding.    Vitals:     01/03/21 0556 01/04/21 0535 01/05/21 0559   Weight: 59.9 kg (132 lb) 62.6 kg (138 lb) 62.1 kg (136 lb 12.8 oz)         Intake/Output Summary (Last 24 hours) at 1/5/2021 1218  Last data filed at 1/5/2021 0507  Gross per 24 hour   Intake 720 ml   Output 575 ml   Net 145 ml       12/31 0700 - 01/05 0659  In: 1680 [P.O.:1680]  Out: 1275 [Urine:1275]  Net: 405    Exam:  GENERAL APPEARANCE ADULT: Alert, in no acute distress  RESP: lungs clear to auscultation   CV: regular rate and rhythm, no murmur, rub, or gallop  ABDOMEN: normal bowel sounds, soft, nontender, no hepatosplenomegaly or other masses  EXTREMITIES: No edema         Data:   LABS (Last four results)  CMP  Recent Labs   Lab 01/05/21  0526 01/04/21  0556 01/03/21  0555 01/02/21 2222 01/02/21  2214    141 140  --  139   POTASSIUM 4.2 3.6 3.5  --  4.5   CHLORIDE 109 112* 111*  --  108   CO2 26 26 24  --  27   ANIONGAP 4 3 5  --  4   * 102* 163*  --  104*   BUN 24 19 16  --  17   CR 0.67 0.63 0.57  --  0.75   GFRESTIMATED 81 83 86 69 74   GFRESTBLACK >90 >90 >90 83 85   FILIPE 9.0 8.8 9.3  --  9.3   MAG  --   --   --   --  2.2   PHOS  --  3.2  --   --   --    PROTTOTAL  --   --   --   --  7.2   ALBUMIN  --  3.1*  --   --  3.3*   BILITOTAL  --   --   --   --  0.4   ALKPHOS  --   --   --   --  77   AST  --   --   --   --  37   ALT  --   --   --   --  24     CBC  Recent Labs   Lab 01/05/21  0526 01/04/21  0556 01/03/21  0555 01/02/21  2214   WBC 6.5 7.4 5.8 7.1   RBC 3.06* 2.96* 3.33* 3.02*   HGB 9.7* 9.4* 10.4* 9.5*   HCT 29.7* 28.9* 32.4* 30.1*   MCV 97 98 97 100   MCH 31.7 31.8 31.2 31.5   MCHC 32.7 32.5 32.1 31.6   RDW 15.4* 15.6* 15.6* 15.6*    278 293 270     INR  Recent Labs   Lab 01/03/21  0555   INR 0.97     TROPONINS   Lab Results   Component Value Date    TROPI <0.015 01/03/2021    TROPI <0.015 01/03/2021    TROPI <0.015 01/03/2021    TROPI <0.015 01/02/2021    TROPI <0.015 08/27/2019    TROPONIN 0.01 08/27/2019    TROPONIN 0.00 08/27/2019                                                                                                                DEBRA AYALA MD

## 2021-01-05 NOTE — PRE-PROCEDURE
GENERAL PRE-PROCEDURE:   Procedure:  Heart cath poss intervention  Date/Time:  1/5/2021 10:30 AM    Written consent obtained?: Yes    Consent given by:  Patient  Expected level of sedation:  Moderate  ASA Class:  Class 3- Severe systemic disease, definite functional limitations    Reviewed consult and PN and discussed with pt  Asa allergy

## 2021-01-06 ENCOUNTER — TELEPHONE (OUTPATIENT)
Dept: FAMILY MEDICINE | Facility: CLINIC | Age: 86
End: 2021-01-06

## 2021-01-06 NOTE — TELEPHONE ENCOUNTER
ED / Discharge Outreach Protocol    Patient Contact    Attempt # 1    Was call answered?  No.  Left message on voicemail with information to call me back.    Follow-up with primary care provider, Cortney Vanessa, within 7 days for hospital follow-up.  No follow up labs or test are needed.  Follow-up with primary Rheumatologist as previously scheduled.

## 2021-01-06 NOTE — PLAN OF CARE
Patient ready for discharge at start of shift. Discharge education completed by previous RN. Bedtime medications given by this RN. Vital signs stable. Patient's ride arrived at 2030, patient was brought to door 6 by nursing assistant with belongings in wheelchair.

## 2021-01-07 NOTE — TELEPHONE ENCOUNTER
"Hospital/TCU/ED for chronic condition Discharge Protocol    \"Hi, my name is Siva Aguilar RN, a registered nurse, and I am calling from [facility].  I am calling to follow up and see how things are going for you after your recent emergency visit/hospital/TCU stay.\"    Tell me how you are doing now that you are home?\" still hurting      Discharge Instructions    \"Let's review your discharge instructions.  What is/are the follow-up recommendations?  Pt. Response: follow with PCP    \"Has an appointment with your primary care provider been scheduled?\"   pt has to check with her new insurance to see who is in her network.  she will call back to PAL     \"When you see the provider, I would recommend that you bring your medications with you.\"    Medications    \"Tell me what changed about your medicines when you discharged?\"    Changes to chronic meds?    0-1    \"What questions do you have about your medications?\"    None     New diagnoses of heart failure, COPD, diabetes, or MI?    No              Post Discharge Medication Reconciliation Status: discharge medications reconciled, continue medications without change.    Was MTM referral placed (*Make sure to put transitions as reason for referral)?   No    Call Summary    \"What questions or concerns do you have about your recent visit and your follow-up care?\"     none    \"If you have questions or things don't continue to improve, we encourage you contact us through the main clinic number (give number).  Even if the clinic is not open, triage nurses are available 24/7 to help you.     We would like you to know that our clinic has extended hours (provide information).  We also have urgent care (provide details on closest location and hours/contact info)\"      \"Thank you for your time and take care!\"         Siva Aguilar RN, BSN      "

## 2021-01-07 NOTE — TELEPHONE ENCOUNTER
Pt is scheduled next week for an OV   Is this ok or do you want to do virtual?    Siva Aguilar RN, BSN

## 2021-01-08 ENCOUNTER — TELEPHONE (OUTPATIENT)
Dept: CARDIOLOGY | Facility: CLINIC | Age: 86
End: 2021-01-08

## 2021-01-08 NOTE — TELEPHONE ENCOUNTER
Patient was evaluated by cardiology while inpatient for severe right chest and shoulder/neck pain unlike typical arthritic pain. PMH: RA, HTN, chronic pericardial effusion, pericardial window done in [place] 13 yrs ago, Lupus. Echo 1/3 showed LVEF 65-70%, no regional wall motion abnormalities; RV OK; moderate pericardial effusion w/o hemodynamic features of tamponade; mild AS; compared to the previous study from 2020, the effusion was slightly larger. Lexiscan nuclear perfusion imaging study suggested anterior ischemia in the LAD, the same distribution of coronary calcification on CT scan. 1/5/21: Coronary angiogram via RFA showed trivial CAD. Medical management. Metoprolol started. Called patient to discuss any post hospital d/c questions she may have, review medication changes, and confirm f/u appts.  Patient denied any questions regarding new medications or changes with their PTA medications. Patient denied any SOB, chest pain, or light headedness. RFA cardiac cath site is without bleeding, swelling, redness or tenderness. Denies fever. RN confirmed with patient that she has an OV scheduled on 1/13/21 at 1345 with Dr. Davis at our [facility]. Patient advised to call clinic with any cardiac related questions or concerns prior to this janine't. Patient verbalized understanding and agreed with plan. SOPHIE Alejandro RN.

## 2021-01-09 LAB — INTERPRETATION ECG - MUSE: NORMAL

## 2021-01-11 ENCOUNTER — OFFICE VISIT (OUTPATIENT)
Dept: FAMILY MEDICINE | Facility: CLINIC | Age: 86
End: 2021-01-11
Payer: COMMERCIAL

## 2021-01-11 VITALS
BODY MASS INDEX: 25.34 KG/M2 | OXYGEN SATURATION: 96 % | WEIGHT: 143 LBS | RESPIRATION RATE: 16 BRPM | DIASTOLIC BLOOD PRESSURE: 64 MMHG | HEIGHT: 63 IN | SYSTOLIC BLOOD PRESSURE: 138 MMHG | TEMPERATURE: 99.2 F

## 2021-01-11 DIAGNOSIS — M54.2 NECK PAIN: ICD-10-CM

## 2021-01-11 DIAGNOSIS — I31.39 PERICARDIAL EFFUSION: Primary | ICD-10-CM

## 2021-01-11 DIAGNOSIS — F32.4 MAJOR DEPRESSIVE DISORDER IN PARTIAL REMISSION, UNSPECIFIED WHETHER RECURRENT (H): ICD-10-CM

## 2021-01-11 PROCEDURE — 99214 OFFICE O/P EST MOD 30 MIN: CPT | Performed by: FAMILY MEDICINE

## 2021-01-11 ASSESSMENT — MIFFLIN-ST. JEOR: SCORE: 1072.77

## 2021-01-12 ASSESSMENT — ENCOUNTER SYMPTOMS
FATIGUE: 0
JOINT SWELLING: 0
FEVER: 0
CHOKING: 0
COUGH: 0
ARTHRALGIAS: 0

## 2021-01-13 ENCOUNTER — OFFICE VISIT (OUTPATIENT)
Dept: CARDIOLOGY | Facility: CLINIC | Age: 86
End: 2021-01-13
Attending: INTERNAL MEDICINE
Payer: COMMERCIAL

## 2021-01-13 VITALS
OXYGEN SATURATION: 96 % | HEART RATE: 48 BPM | BODY MASS INDEX: 25.46 KG/M2 | SYSTOLIC BLOOD PRESSURE: 118 MMHG | WEIGHT: 143.7 LBS | HEIGHT: 63 IN | DIASTOLIC BLOOD PRESSURE: 58 MMHG

## 2021-01-13 DIAGNOSIS — I35.9 AORTIC VALVE DISORDER: ICD-10-CM

## 2021-01-13 PROCEDURE — 99214 OFFICE O/P EST MOD 30 MIN: CPT | Performed by: INTERNAL MEDICINE

## 2021-01-13 ASSESSMENT — MIFFLIN-ST. JEOR: SCORE: 1075.95

## 2021-01-13 NOTE — PROGRESS NOTES
HISTORY:    Swathi Dukes is a pleasant 85-year-old female accompanied by her granddaughter today.  She moved from [place] about a year and a half ago and had been cared for there with complaints of hypertension, diastolic dysfunction, chronic pericardial effusion, and hyperlipidemia.  She also had aortic insufficiency.    Over the last year Swathi has done well.  She was hospitalized earlier this month with complaints of some jaw and head pain which was felt possibly to represent angina.  I reviewed her hospital records in detail.  She had several negative troponins but underwent a nuclear stress test suggesting distal anterior ischemia and subsequently underwent a coronary angiogram.  That study showed only trivial CAD and normal left ventricular filling pressures with a trivial gradient across the aortic valve.  Her recent echocardiogram was also reviewed and showed a normal ejection fraction with a moderate sized pericardial effusion (chronic, seen on previous echoes) and mild AS with a mean gradient of 10 mmHg.    Swathi reports that she exercises daily taking a walk of about 1 to 1.5 miles.  She finds herself getting mildly dyspneic with this walk.  She has a fair amount of back pain so walks in a stooped position and quite slowly.  Today her blood pressure was measured at 118/58.  She states that she did not believe that because it has never that lost so I rechecked it and found her systolic value to be 108.  She denies exertional chest pain, palpitations, PND/orthopnea, syncope or near syncope, strokelike symptoms, significant peripheral edema, or symptoms of claudication.      ASSESSMENT/PLAN:    1.  Chronic pericardial effusion.  This has been stable for at least a year and has been present for several years.  No specific therapy planned.  She will let us know if sh there is any significant change in her dyspnea.  2.  Mild aortic stenosis.  Trivial gradient, unlikely to become significant in any less than 5  years.  She does have some mild aortic insufficiency as well.  3.  Coronary artery disease.  Trivial by recent angiography.  Given her advanced age is unlikely this will ever be a problem.  4.  Hypertension.  Very well controlled on current medications, no changes needed.  5.  Hyperlipidemia.  Very well controlled on just 10 mg/day of simvastatin, continue same.    Thank you for inviting me to participate in your patient's care.  Please do not hesitate to call if I can be of further assistance.  Documents reviewed for today's visit include our last office visit, the recent heart cath, recent nuclear scan, recent echo, and recent laboratory studies.    Chart documentation was completed, in part, with [employer] voice-recognition software. Even though reviewed, some grammatical, spelling, and word errors may remain.       Orders Placed This Encounter   Procedures     Follow-Up with Cardiologist     No orders of the defined types were placed in this encounter.    There are no discontinued medications.    10 year ASCVD risk: The ASCVD Risk score (Simpson DION Jr., et al., 2013) failed to calculate for the following reasons:    The 2013 ASCVD risk score is only valid for ages 40 to 79    Encounter Diagnosis   Name Primary?     Aortic valve disorder        CURRENT MEDICATIONS:  Current Outpatient Medications   Medication Sig Dispense Refill     acetaminophen (TYLENOL) 500 MG tablet Take 2 tablets (1,000 mg) by mouth every 6 hours as needed       ALPRAZolam (XANAX) 0.5 MG tablet Take 0.5 mg by mouth daily        amLODIPine (NORVASC) 5 MG tablet Take 1 tablet (5 mg) by mouth daily 90 tablet 0     brimonidine (ALPHAGAN P) 0.1 % ophthalmic solution Place 1 drop into both eyes 2 times daily       dorzolamide (TRUSOPT) 2 % ophthalmic solution Place 1 drop into both eyes 2 times daily       enalapril (VASOTEC) 20 MG tablet Take 1 tablet (20 mg) by mouth 2 times daily 180 tablet 3     folic acid (FOLVITE) 1 MG tablet Take 1 mg by mouth  daily       hydrALAZINE (APRESOLINE) 25 MG tablet Take 1 tablet (25 mg) by mouth 3 times daily 270 tablet 3     hydroxychloroquine (PLAQUENIL) 200 MG tablet Take 200 mg by mouth 2 times daily       latanoprost (XALATAN) 0.005 % ophthalmic solution Place 1 drop into both eyes At Bedtime        metoprolol tartrate (LOPRESSOR) 25 MG tablet Take 1 tablet (25 mg) by mouth 2 times daily 180 tablet 3     OLANZapine (ZYPREXA) 10 MG tablet Take 10 mg by mouth At Bedtime        PARoxetine (PAXIL) 10 MG tablet Take 5 mg by mouth every morning With 20mg to = 25mg daily       PARoxetine (PAXIL) 20 MG tablet Take 20 mg by mouth daily Plus 5mg = 25mg daily       simvastatin (ZOCOR) 10 MG tablet Take 1 tablet (10 mg) by mouth At Bedtime 90 tablet 3       ALLERGIES     Allergies   Allergen Reactions     Diclofenac Anaphylaxis     Iodine Anaphylaxis     Contrast  Pt states anaphylactic reaction to ct contrast about 20 years ago     Aspirin        PAST MEDICAL HISTORY:  Past Medical History:   Diagnosis Date     HTN (hypertension)        PAST SURGICAL HISTORY:  Past Surgical History:   Procedure Laterality Date     CV HEART CATHETERIZATION WITH POSSIBLE INTERVENTION N/A 1/5/2021    Procedure: Heart Catheterization with Possible Intervention;  Surgeon: Hayden Bedoya MD;  Location:  HEART CARDIAC CATH LAB     CV LEFT VENTRICULOGRAM N/A 1/5/2021    Procedure: Left Ventriculogram;  Surgeon: Hayden Bedoya MD;  Location:  HEART CARDIAC CATH LAB       FAMILY HISTORY:  Family History   Problem Relation Age of Onset     Diabetes Father        SOCIAL HISTORY:  Social History     Socioeconomic History     Marital status:      Spouse name: None     Number of children: None     Years of education: None     Highest education level: None   Occupational History     None   Social Needs     Financial resource strain: None     Food insecurity     Worry: None     Inability: None     Transportation needs     Medical: None      "Non-medical: None   Tobacco Use     Smoking status: Never Smoker     Smokeless tobacco: Never Used   Substance and Sexual Activity     Alcohol use: Not Currently     Drug use: Never     Sexual activity: Not Currently   Lifestyle     Physical activity     Days per week: None     Minutes per session: None     Stress: None   Relationships     Social connections     Talks on phone: None     Gets together: None     Attends Sikh service: None     Active member of club or organization: None     Attends meetings of clubs or organizations: None     Relationship status: None     Intimate partner violence     Fear of current or ex partner: None     Emotionally abused: None     Physically abused: None     Forced sexual activity: None   Other Topics Concern     Parent/sibling w/ CABG, MI or angioplasty before 65F 55M? Not Asked   Social History Narrative     None       Review of Systems:  Skin:  Positive for itching   Eyes:  Positive for glasses;glaucoma;double vision  ENT:  Negative    Respiratory:  Positive for dyspnea on exertion;shortness of breath  Cardiovascular:    Positive for;fatigue  Gastroenterology: Negative    Genitourinary:  Negative    Musculoskeletal:  Positive for arthritis;back pain;neck pain;joint pain  Neurologic:  Positive for numbness or tingling of hands  Psychiatric:  Positive for anxiety;depression  Heme/Lymph/Imm:  Negative    Endocrine:  Negative      Physical Exam:  Vitals: /58 (BP Location: Right arm, Patient Position: Chair, Cuff Size: Adult Regular)   Pulse (!) 48   Ht 1.6 m (5' 3\")   Wt 65.2 kg (143 lb 11.2 oz)   SpO2 96%   BMI 25.46 kg/m      Constitutional:  cooperative, alert and oriented, well developed, well nourished, in no acute distress overweight      Skin:  warm and dry to the touch, no apparent skin lesions or masses noted        Head:  normocephalic, no masses or lesions        Eyes:  no xanthalasma;no nystagmus        ENT:  no pallor or cyanosis;dentition good   masked " chin!    Neck:  carotid pulses are full and equal bilaterally, JVP normal, no carotid bruit        Chest:  normal breath sounds, clear to auscultation, normal A-P diameter, normal symmetry, normal respiratory excursion, no use of accessory muscles        Cardiac: regular rhythm;normal S1 and S2;no S3 or S4;apical impulse not displaced       systolic murmur;grade 2;RUSB          Abdomen:  abdomen soft;BS normoactive        Vascular: pulses full and equal                                      Extremities and Back:  no edema        Neurological:  no gross motor deficits          Recent Lab Results:  LIPID RESULTS:  Lab Results   Component Value Date    CHOL 174 01/29/2020    HDL 79 01/29/2020    LDL 68 01/29/2020    TRIG 136 01/29/2020       LIVER ENZYME RESULTS:  Lab Results   Component Value Date    AST 37 01/02/2021    ALT 24 01/02/2021       CBC RESULTS:  Lab Results   Component Value Date    WBC 6.5 01/05/2021    RBC 3.06 (L) 01/05/2021    HGB 9.7 (L) 01/05/2021    HCT 29.7 (L) 01/05/2021    MCV 97 01/05/2021    MCH 31.7 01/05/2021    MCHC 32.7 01/05/2021    RDW 15.4 (H) 01/05/2021     01/05/2021       BMP RESULTS:  Lab Results   Component Value Date     01/05/2021    POTASSIUM 4.2 01/05/2021    CHLORIDE 109 01/05/2021    CO2 26 01/05/2021    ANIONGAP 4 01/05/2021     (H) 01/05/2021    BUN 24 01/05/2021    CR 0.67 01/05/2021    GFRESTIMATED 81 01/05/2021    GFRESTBLACK >90 01/05/2021    FILIPE 9.0 01/05/2021        A1C RESULTS:  Lab Results   Component Value Date    A1C 5.5 02/05/2020       INR RESULTS:  Lab Results   Component Value Date    INR 0.97 01/03/2021         Luis Manuel Davis MD, FACC    CC  Luis Manuel Davis MD  [address] [place] [id number]

## 2021-01-13 NOTE — LETTER
1/13/2021    Cortney Vanessa MD  [id number]  [facility] [address]    RE: Swathi Dukes       Dear Colleague,    I had the pleasure of seeing Swathi Dukes in the [facility].    HISTORY:    Swathi Dukes is a pleasant 85-year-old female accompanied by her granddaughter today.  She moved from [place] about a year and a half ago and had been cared for there with complaints of hypertension, diastolic dysfunction, chronic pericardial effusion, and hyperlipidemia.  She also had aortic insufficiency.    Over the last year Swathi has done well.  She was hospitalized earlier this month with complaints of some jaw and head pain which was felt possibly to represent angina.  I reviewed her hospital records in detail.  She had several negative troponins but underwent a nuclear stress test suggesting distal anterior ischemia and subsequently underwent a coronary angiogram.  That study showed only trivial CAD and normal left ventricular filling pressures with a trivial gradient across the aortic valve.  Her recent echocardiogram was also reviewed and showed a normal ejection fraction with a moderate sized pericardial effusion (chronic, seen on previous echoes) and mild AS with a mean gradient of 10 mmHg.    Swathi reports that she exercises daily taking a walk of about 1 to 1.5 miles.  She finds herself getting mildly dyspneic with this walk.  She has a fair amount of back pain so walks in a stooped position and quite slowly.  Today her blood pressure was measured at 118/58.  She states that she did not believe that because it has never that lost so I rechecked it and found her systolic value to be 108.  She denies exertional chest pain, palpitations, PND/orthopnea, syncope or near syncope, strokelike symptoms, significant peripheral edema, or symptoms of claudication.      ASSESSMENT/PLAN:    1.  Chronic pericardial effusion.  This has been stable for at least a year and has been present  for several years.  No specific therapy planned.  She will let us know if sh there is any significant change in her dyspnea.  2.  Mild aortic stenosis.  Trivial gradient, unlikely to become significant in any less than 5 years.  She does have some mild aortic insufficiency as well.  3.  Coronary artery disease.  Trivial by recent angiography.  Given her advanced age is unlikely this will ever be a problem.  4.  Hypertension.  Very well controlled on current medications, no changes needed.  5.  Hyperlipidemia.  Very well controlled on just 10 mg/day of simvastatin, continue same.    Thank you for inviting me to participate in your patient's care.  Please do not hesitate to call if I can be of further assistance.  Documents reviewed for today's visit include our last office visit, the recent heart cath, recent nuclear scan, recent echo, and recent laboratory studies.    Chart documentation was completed, in part, with [employer] voice-recognition software. Even though reviewed, some grammatical, spelling, and word errors may remain.       Orders Placed This Encounter   Procedures     Follow-Up with Cardiologist     No orders of the defined types were placed in this encounter.    There are no discontinued medications.    10 year ASCVD risk: The ASCVD Risk score (Merlyn DC Jr., et al., 2013) failed to calculate for the following reasons:    The 2013 ASCVD risk score is only valid for ages 40 to 79    Encounter Diagnosis   Name Primary?     Aortic valve disorder        CURRENT MEDICATIONS:  Current Outpatient Medications   Medication Sig Dispense Refill     acetaminophen (TYLENOL) 500 MG tablet Take 2 tablets (1,000 mg) by mouth every 6 hours as needed       ALPRAZolam (XANAX) 0.5 MG tablet Take 0.5 mg by mouth daily        amLODIPine (NORVASC) 5 MG tablet Take 1 tablet (5 mg) by mouth daily 90 tablet 0     brimonidine (ALPHAGAN P) 0.1 % ophthalmic solution Place 1 drop into both eyes 2 times daily       dorzolamide (TRUSOPT)  2 % ophthalmic solution Place 1 drop into both eyes 2 times daily       enalapril (VASOTEC) 20 MG tablet Take 1 tablet (20 mg) by mouth 2 times daily 180 tablet 3     folic acid (FOLVITE) 1 MG tablet Take 1 mg by mouth daily       hydrALAZINE (APRESOLINE) 25 MG tablet Take 1 tablet (25 mg) by mouth 3 times daily 270 tablet 3     hydroxychloroquine (PLAQUENIL) 200 MG tablet Take 200 mg by mouth 2 times daily       latanoprost (XALATAN) 0.005 % ophthalmic solution Place 1 drop into both eyes At Bedtime        metoprolol tartrate (LOPRESSOR) 25 MG tablet Take 1 tablet (25 mg) by mouth 2 times daily 180 tablet 3     OLANZapine (ZYPREXA) 10 MG tablet Take 10 mg by mouth At Bedtime        PARoxetine (PAXIL) 10 MG tablet Take 5 mg by mouth every morning With 20mg to = 25mg daily       PARoxetine (PAXIL) 20 MG tablet Take 20 mg by mouth daily Plus 5mg = 25mg daily       simvastatin (ZOCOR) 10 MG tablet Take 1 tablet (10 mg) by mouth At Bedtime 90 tablet 3       ALLERGIES     Allergies   Allergen Reactions     Diclofenac Anaphylaxis     Iodine Anaphylaxis     Contrast  Pt states anaphylactic reaction to ct contrast about 20 years ago     Aspirin        PAST MEDICAL HISTORY:  Past Medical History:   Diagnosis Date     HTN (hypertension)        PAST SURGICAL HISTORY:  Past Surgical History:   Procedure Laterality Date     CV HEART CATHETERIZATION WITH POSSIBLE INTERVENTION N/A 1/5/2021    Procedure: Heart Catheterization with Possible Intervention;  Surgeon: Hayden Bedoya MD;  Location:  HEART CARDIAC CATH LAB     CV LEFT VENTRICULOGRAM N/A 1/5/2021    Procedure: Left Ventriculogram;  Surgeon: Hayden Bedoya MD;  Location:  HEART CARDIAC CATH LAB       FAMILY HISTORY:  Family History   Problem Relation Age of Onset     Diabetes Father        SOCIAL HISTORY:  Social History     Socioeconomic History     Marital status:      Spouse name: None     Number of children: None     Years of education: None      "Highest education level: None   Occupational History     None   Social Needs     Financial resource strain: None     Food insecurity     Worry: None     Inability: None     Transportation needs     Medical: None     Non-medical: None   Tobacco Use     Smoking status: Never Smoker     Smokeless tobacco: Never Used   Substance and Sexual Activity     Alcohol use: Not Currently     Drug use: Never     Sexual activity: Not Currently   Lifestyle     Physical activity     Days per week: None     Minutes per session: None     Stress: None   Relationships     Social connections     Talks on phone: None     Gets together: None     Attends [employer] service: None     Active member of club or organization: None     Attends meetings of clubs or organizations: None     Relationship status: None     Intimate partner violence     Fear of current or ex partner: None     Emotionally abused: None     Physically abused: None     Forced sexual activity: None   Other Topics Concern     Parent/sibling w/ CABG, MI or angioplasty before 65F 55M? Not Asked   Social History Narrative     None       Review of Systems:  Skin:  Positive for itching   Eyes:  Positive for glasses;glaucoma;double vision  ENT:  Negative    Respiratory:  Positive for dyspnea on exertion;shortness of breath  Cardiovascular:    Positive for;fatigue  Gastroenterology: Negative    Genitourinary:  Negative    Musculoskeletal:  Positive for arthritis;back pain;neck pain;joint pain  Neurologic:  Positive for numbness or tingling of hands  Psychiatric:  Positive for anxiety;depression  Heme/Lymph/Imm:  Negative    Endocrine:  Negative      Physical Exam:  Vitals: /58 (BP Location: Right arm, Patient Position: Chair, Cuff Size: Adult Regular)   Pulse (!) 48   Ht 1.6 m (5' 3\")   Wt 65.2 kg (143 lb 11.2 oz)   SpO2 96%   BMI 25.46 kg/m      Constitutional:  cooperative, alert and oriented, well developed, well nourished, in no acute distress overweight      Skin:  " warm and dry to the touch, no apparent skin lesions or masses noted        Head:  normocephalic, no masses or lesions        Eyes:  no xanthalasma;no nystagmus        ENT:  no pallor or cyanosis;dentition good   masked chin!    Neck:  carotid pulses are full and equal bilaterally, JVP normal, no carotid bruit        Chest:  normal breath sounds, clear to auscultation, normal A-P diameter, normal symmetry, normal respiratory excursion, no use of accessory muscles        Cardiac: regular rhythm;normal S1 and S2;no S3 or S4;apical impulse not displaced       systolic murmur;grade 2;RUSB          Abdomen:  abdomen soft;BS normoactive        Vascular: pulses full and equal                                      Extremities and Back:  no edema        Neurological:  no gross motor deficits          Recent Lab Results:  LIPID RESULTS:  Lab Results   Component Value Date    CHOL 174 01/29/2020    HDL 79 01/29/2020    LDL 68 01/29/2020    TRIG 136 01/29/2020       LIVER ENZYME RESULTS:  Lab Results   Component Value Date    AST 37 01/02/2021    ALT 24 01/02/2021       CBC RESULTS:  Lab Results   Component Value Date    WBC 6.5 01/05/2021    RBC 3.06 (L) 01/05/2021    HGB 9.7 (L) 01/05/2021    HCT 29.7 (L) 01/05/2021    MCV 97 01/05/2021    MCH 31.7 01/05/2021    MCHC 32.7 01/05/2021    RDW 15.4 (H) 01/05/2021     01/05/2021       BMP RESULTS:  Lab Results   Component Value Date     01/05/2021    POTASSIUM 4.2 01/05/2021    CHLORIDE 109 01/05/2021    CO2 26 01/05/2021    ANIONGAP 4 01/05/2021     (H) 01/05/2021    BUN 24 01/05/2021    CR 0.67 01/05/2021    GFRESTIMATED 81 01/05/2021    GFRESTBLACK >90 01/05/2021    FILIPE 9.0 01/05/2021        A1C RESULTS:  Lab Results   Component Value Date    A1C 5.5 02/05/2020       INR RESULTS:  Lab Results   Component Value Date    INR 0.97 01/03/2021           Thank you for allowing me to participate in the care of your patient.    Sincerely,     Luis Manuel Davis MD      [facility]

## 2021-01-16 ENCOUNTER — APPOINTMENT (OUTPATIENT)
Dept: CT IMAGING | Facility: CLINIC | Age: 86
End: 2021-01-16
Attending: EMERGENCY MEDICINE
Payer: COMMERCIAL

## 2021-01-16 ENCOUNTER — APPOINTMENT (OUTPATIENT)
Dept: GENERAL RADIOLOGY | Facility: CLINIC | Age: 86
End: 2021-01-16
Attending: EMERGENCY MEDICINE
Payer: COMMERCIAL

## 2021-01-16 ENCOUNTER — HOSPITAL ENCOUNTER (EMERGENCY)
Facility: CLINIC | Age: 86
Discharge: HOME | End: 2021-01-16
Attending: EMERGENCY MEDICINE | Admitting: EMERGENCY MEDICINE
Payer: COMMERCIAL

## 2021-01-16 ENCOUNTER — HOSPITAL ENCOUNTER (OUTPATIENT)
Facility: CLINIC | Age: 86
Setting detail: OBSERVATION
Discharge: HOME | End: 2021-01-18
Attending: EMERGENCY MEDICINE | Admitting: INTERNAL MEDICINE
Payer: COMMERCIAL

## 2021-01-16 VITALS
OXYGEN SATURATION: 95 % | SYSTOLIC BLOOD PRESSURE: 184 MMHG | DIASTOLIC BLOOD PRESSURE: 72 MMHG | TEMPERATURE: 98.3 F | RESPIRATION RATE: 20 BRPM | HEART RATE: 49 BPM

## 2021-01-16 DIAGNOSIS — I31.39 PERICARDIAL EFFUSION: ICD-10-CM
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
## 2021-01-16 DIAGNOSIS — N39.0 URINARY TRACT INFECTION WITHOUT HEMATURIA, SITE UNSPECIFIED: ICD-10-CM

## 2021-01-16 DIAGNOSIS — S01.01XA LACERATION OF SCALP WITHOUT FOREIGN BODY, INITIAL ENCOUNTER: ICD-10-CM

## 2021-01-16 DIAGNOSIS — D64.9 CHRONIC ANEMIA: ICD-10-CM

## 2021-01-16 DIAGNOSIS — R06.02 SOB (SHORTNESS OF BREATH): ICD-10-CM

## 2021-01-16 DIAGNOSIS — N30.00 ACUTE CYSTITIS WITHOUT HEMATURIA: ICD-10-CM

## 2021-01-16 LAB
ALBUMIN SERPL-MCNC: 3.3 G/DL (ref 3.4–5)
ALBUMIN UR-MCNC: NEGATIVE MG/DL
ALP SERPL-CCNC: 75 U/L (ref 40–150)
ALT SERPL W P-5'-P-CCNC: 25 U/L (ref 0–50)
ANION GAP SERPL CALCULATED.3IONS-SCNC: 3 MMOL/L (ref 3–14)
ANION GAP SERPL CALCULATED.3IONS-SCNC: 6 MMOL/L (ref 3–14)
APPEARANCE UR: ABNORMAL
AST SERPL W P-5'-P-CCNC: 20 U/L (ref 0–45)
BACTERIA #/AREA URNS HPF: ABNORMAL /HPF
BASE EXCESS BLDV CALC-SCNC: 0.6 MMOL/L
BASOPHILS # BLD AUTO: 0.1 10E9/L (ref 0–0.2)
BASOPHILS # BLD AUTO: 0.1 10E9/L (ref 0–0.2)
BASOPHILS NFR BLD AUTO: 0.4 %
BASOPHILS NFR BLD AUTO: 0.6 %
BILIRUB SERPL-MCNC: 0.2 MG/DL (ref 0.2–1.3)
BILIRUB UR QL STRIP: NEGATIVE
BUN SERPL-MCNC: 15 MG/DL (ref 7–30)
BUN SERPL-MCNC: 19 MG/DL (ref 7–30)
CALCIUM SERPL-MCNC: 8.6 MG/DL (ref 8.5–10.1)
CALCIUM SERPL-MCNC: 9.2 MG/DL (ref 8.5–10.1)
CHLORIDE SERPL-SCNC: 108 MMOL/L (ref 94–109)
CHLORIDE SERPL-SCNC: 109 MMOL/L (ref 94–109)
CO2 SERPL-SCNC: 27 MMOL/L (ref 20–32)
CO2 SERPL-SCNC: 27 MMOL/L (ref 20–32)
COLOR UR AUTO: ABNORMAL
CREAT SERPL-MCNC: 0.69 MG/DL (ref 0.52–1.04)
CREAT SERPL-MCNC: 0.82 MG/DL (ref 0.52–1.04)
DIFFERENTIAL METHOD BLD: ABNORMAL
DIFFERENTIAL METHOD BLD: ABNORMAL
EOSINOPHIL # BLD AUTO: 0 10E9/L (ref 0–0.7)
EOSINOPHIL # BLD AUTO: 0.2 10E9/L (ref 0–0.7)
EOSINOPHIL NFR BLD AUTO: 0.2 %
EOSINOPHIL NFR BLD AUTO: 2.4 %
ERYTHROCYTE [DISTWIDTH] IN BLOOD BY AUTOMATED COUNT: 15.6 % (ref 10–15)
ERYTHROCYTE [DISTWIDTH] IN BLOOD BY AUTOMATED COUNT: 15.8 % (ref 10–15)
FLUAV RNA RESP QL NAA+PROBE: NEGATIVE
FLUBV RNA RESP QL NAA+PROBE: NEGATIVE
GFR SERPL CREATININE-BSD FRML MDRD: 66 ML/MIN/{1.73_M2}
GFR SERPL CREATININE-BSD FRML MDRD: 80 ML/MIN/{1.73_M2}
GLUCOSE SERPL-MCNC: 107 MG/DL (ref 70–99)
GLUCOSE SERPL-MCNC: 120 MG/DL (ref 70–99)
GLUCOSE UR STRIP-MCNC: NEGATIVE MG/DL
HCO3 BLDV-SCNC: 26 MMOL/L (ref 21–28)
HCT VFR BLD AUTO: 27.7 % (ref 35–47)
HCT VFR BLD AUTO: 28.3 % (ref 35–47)
HGB BLD-MCNC: 8.9 G/DL (ref 11.7–15.7)
HGB BLD-MCNC: 9.1 G/DL (ref 11.7–15.7)
HGB UR QL STRIP: NEGATIVE
IMM GRANULOCYTES # BLD: 0 10E9/L (ref 0–0.4)
IMM GRANULOCYTES # BLD: 0.1 10E9/L (ref 0–0.4)
IMM GRANULOCYTES NFR BLD: 0.3 %
IMM GRANULOCYTES NFR BLD: 0.6 %
INTERPRETATION ECG - MUSE: NORMAL
KETONES UR STRIP-MCNC: NEGATIVE MG/DL
LABORATORY COMMENT REPORT: NORMAL
LACTATE BLD-SCNC: 0.9 MMOL/L (ref 0.7–2)
LEUKOCYTE ESTERASE UR QL STRIP: ABNORMAL
LYMPHOCYTES # BLD AUTO: 0.9 10E9/L (ref 0.8–5.3)
LYMPHOCYTES # BLD AUTO: 1.4 10E9/L (ref 0.8–5.3)
LYMPHOCYTES NFR BLD AUTO: 15 %
LYMPHOCYTES NFR BLD AUTO: 7.6 %
MCH RBC QN AUTO: 31.9 PG (ref 26.5–33)
MCH RBC QN AUTO: 32.4 PG (ref 26.5–33)
MCHC RBC AUTO-ENTMCNC: 32.1 G/DL (ref 31.5–36.5)
MCHC RBC AUTO-ENTMCNC: 32.2 G/DL (ref 31.5–36.5)
MCV RBC AUTO: 101 FL (ref 78–100)
MCV RBC AUTO: 99 FL (ref 78–100)
MONOCYTES # BLD AUTO: 0.6 10E9/L (ref 0–1.3)
MONOCYTES # BLD AUTO: 0.7 10E9/L (ref 0–1.3)
MONOCYTES NFR BLD AUTO: 6.5 %
MONOCYTES NFR BLD AUTO: 6.5 %
MUCOUS THREADS #/AREA URNS LPF: PRESENT /LPF
NEUTROPHILS # BLD AUTO: 7.1 10E9/L (ref 1.6–8.3)
NEUTROPHILS # BLD AUTO: 9.6 10E9/L (ref 1.6–8.3)
NEUTROPHILS NFR BLD AUTO: 75.2 %
NEUTROPHILS NFR BLD AUTO: 84.7 %
NITRATE UR QL: POSITIVE
NRBC # BLD AUTO: 0 10*3/UL
NRBC # BLD AUTO: 0 10*3/UL
NRBC BLD AUTO-RTO: 0 /100
NRBC BLD AUTO-RTO: 0 /100
NT-PROBNP SERPL-MCNC: 2659 PG/ML (ref 0–1800)
O2/TOTAL GAS SETTING VFR VENT: ABNORMAL %
PCO2 BLDV: 41 MM HG (ref 40–50)
PH BLDV: 7.4 PH (ref 7.32–7.43)
PH UR STRIP: 6.5 PH (ref 5–7)
PLATELET # BLD AUTO: 251 10E9/L (ref 150–450)
PLATELET # BLD AUTO: 262 10E9/L (ref 150–450)
PO2 BLDV: 51 MM HG (ref 25–47)
POTASSIUM SERPL-SCNC: 3.8 MMOL/L (ref 3.4–5.3)
POTASSIUM SERPL-SCNC: 3.9 MMOL/L (ref 3.4–5.3)
PROT SERPL-MCNC: 6.4 G/DL (ref 6.8–8.8)
RBC # BLD AUTO: 2.75 10E12/L (ref 3.8–5.2)
RBC # BLD AUTO: 2.85 10E12/L (ref 3.8–5.2)
RBC #/AREA URNS AUTO: 5 /HPF (ref 0–2)
RSV RNA SPEC QL NAA+PROBE: NORMAL
SARS-COV-2 RNA RESP QL NAA+PROBE: NEGATIVE
SODIUM SERPL-SCNC: 139 MMOL/L (ref 133–144)
SODIUM SERPL-SCNC: 141 MMOL/L (ref 133–144)
SOURCE: ABNORMAL
SP GR UR STRIP: 1.01 (ref 1–1.03)
SPECIMEN SOURCE: NORMAL
TROPONIN I SERPL-MCNC: 0.02 UG/L (ref 0–0.04)
TROPONIN I SERPL-MCNC: <0.015 UG/L (ref 0–0.04)
UROBILINOGEN UR STRIP-MCNC: NORMAL MG/DL (ref 0–2)
WBC # BLD AUTO: 11.3 10E9/L (ref 4–11)
WBC # BLD AUTO: 9.5 10E9/L (ref 4–11)
WBC #/AREA URNS AUTO: 119 /HPF (ref 0–5)

## 2021-01-16 PROCEDURE — 80053 COMPREHEN METABOLIC PANEL: CPT | Performed by: EMERGENCY MEDICINE

## 2021-01-16 PROCEDURE — 73562 X-RAY EXAM OF KNEE 3: CPT | Mod: 50

## 2021-01-16 PROCEDURE — 99285 EMERGENCY DEPT VISIT HI MDM: CPT | Mod: 25

## 2021-01-16 PROCEDURE — 87186 SC STD MICRODIL/AGAR DIL: CPT | Performed by: EMERGENCY MEDICINE

## 2021-01-16 PROCEDURE — 12001 RPR S/N/AX/GEN/TRNK 2.5CM/<: CPT

## 2021-01-16 PROCEDURE — 81001 URINALYSIS AUTO W/SCOPE: CPT | Performed by: EMERGENCY MEDICINE

## 2021-01-16 PROCEDURE — 80048 BASIC METABOLIC PNL TOTAL CA: CPT | Performed by: EMERGENCY MEDICINE

## 2021-01-16 PROCEDURE — 250N000013 HC RX MED GY IP 250 OP 250 PS 637: Performed by: INTERNAL MEDICINE

## 2021-01-16 PROCEDURE — G0378 HOSPITAL OBSERVATION PER HR: HCPCS

## 2021-01-16 PROCEDURE — 36415 COLL VENOUS BLD VENIPUNCTURE: CPT | Performed by: EMERGENCY MEDICINE

## 2021-01-16 PROCEDURE — 73660 X-RAY EXAM OF TOE(S): CPT | Mod: RT

## 2021-01-16 PROCEDURE — C9803 HOPD COVID-19 SPEC COLLECT: HCPCS

## 2021-01-16 PROCEDURE — 250N000011 HC RX IP 250 OP 636: Performed by: EMERGENCY MEDICINE

## 2021-01-16 PROCEDURE — 83605 ASSAY OF LACTIC ACID: CPT | Performed by: EMERGENCY MEDICINE

## 2021-01-16 PROCEDURE — 250N000013 HC RX MED GY IP 250 OP 250 PS 637: Performed by: PHYSICIAN ASSISTANT

## 2021-01-16 PROCEDURE — 93005 ELECTROCARDIOGRAM TRACING: CPT | Mod: 59

## 2021-01-16 PROCEDURE — 85025 COMPLETE CBC W/AUTO DIFF WBC: CPT | Performed by: EMERGENCY MEDICINE

## 2021-01-16 PROCEDURE — 96374 THER/PROPH/DIAG INJ IV PUSH: CPT | Mod: 59

## 2021-01-16 PROCEDURE — 72125 CT NECK SPINE W/O DYE: CPT

## 2021-01-16 PROCEDURE — 70450 CT HEAD/BRAIN W/O DYE: CPT

## 2021-01-16 PROCEDURE — 87088 URINE BACTERIA CULTURE: CPT | Performed by: EMERGENCY MEDICINE

## 2021-01-16 PROCEDURE — 250N000013 HC RX MED GY IP 250 OP 250 PS 637: Performed by: EMERGENCY MEDICINE

## 2021-01-16 PROCEDURE — 87636 SARSCOV2 & INF A&B AMP PRB: CPT | Performed by: EMERGENCY MEDICINE

## 2021-01-16 PROCEDURE — 71250 CT THORAX DX C-: CPT

## 2021-01-16 PROCEDURE — 99220 PR INITIAL OBSERVATION CARE,LEVEL III: CPT | Performed by: INTERNAL MEDICINE

## 2021-01-16 PROCEDURE — 82803 BLOOD GASES ANY COMBINATION: CPT | Performed by: EMERGENCY MEDICINE

## 2021-01-16 PROCEDURE — 93005 ELECTROCARDIOGRAM TRACING: CPT

## 2021-01-16 PROCEDURE — 84484 ASSAY OF TROPONIN QUANT: CPT | Performed by: EMERGENCY MEDICINE

## 2021-01-16 PROCEDURE — 84484 ASSAY OF TROPONIN QUANT: CPT | Mod: 91 | Performed by: EMERGENCY MEDICINE

## 2021-01-16 PROCEDURE — 83880 ASSAY OF NATRIURETIC PEPTIDE: CPT | Performed by: EMERGENCY MEDICINE

## 2021-01-16 PROCEDURE — 87086 URINE CULTURE/COLONY COUNT: CPT | Performed by: EMERGENCY MEDICINE

## 2021-01-16 RX ORDER — ONDANSETRON 2 MG/ML
4 INJECTION INTRAMUSCULAR; INTRAVENOUS EVERY 6 HOURS PRN
Status: DISCONTINUED | OUTPATIENT
Start: 2021-01-16 | End: 2021-01-18 | Stop reason: HOSPADM

## 2021-01-16 RX ORDER — LANOLIN ALCOHOL/MO/W.PET/CERES
3 CREAM (GRAM) TOPICAL
Status: DISCONTINUED | OUTPATIENT
Start: 2021-01-16 | End: 2021-01-18 | Stop reason: HOSPADM

## 2021-01-16 RX ORDER — HYDRALAZINE HYDROCHLORIDE 25 MG/1
25 TABLET, FILM COATED ORAL 3 TIMES DAILY
Status: DISCONTINUED | OUTPATIENT
Start: 2021-01-16 | End: 2021-01-18 | Stop reason: HOSPADM

## 2021-01-16 RX ORDER — CEFTRIAXONE 1 G/1
1 INJECTION, POWDER, FOR SOLUTION INTRAMUSCULAR; INTRAVENOUS EVERY 24 HOURS
Status: DISCONTINUED | OUTPATIENT
Start: 2021-01-17 | End: 2021-01-17

## 2021-01-16 RX ORDER — ACETAMINOPHEN 325 MG/1
650 TABLET ORAL EVERY 4 HOURS PRN
Status: DISCONTINUED | OUTPATIENT
Start: 2021-01-16 | End: 2021-01-18 | Stop reason: HOSPADM

## 2021-01-16 RX ORDER — ENALAPRIL MALEATE 10 MG/1
20 TABLET ORAL 2 TIMES DAILY
Status: DISCONTINUED | OUTPATIENT
Start: 2021-01-16 | End: 2021-01-18 | Stop reason: HOSPADM

## 2021-01-16 RX ORDER — AMLODIPINE BESYLATE 5 MG/1
5 TABLET ORAL DAILY
Status: DISCONTINUED | OUTPATIENT
Start: 2021-01-17 | End: 2021-01-17

## 2021-01-16 RX ORDER — HYDROCODONE BITARTRATE AND ACETAMINOPHEN 5; 325 MG/1; MG/1
1 TABLET ORAL EVERY 6 HOURS PRN
Status: DISCONTINUED | OUTPATIENT
Start: 2021-01-16 | End: 2021-01-18 | Stop reason: HOSPADM

## 2021-01-16 RX ORDER — OLANZAPINE 10 MG/1
10 TABLET ORAL AT BEDTIME
Status: DISCONTINUED | OUTPATIENT
Start: 2021-01-16 | End: 2021-01-18 | Stop reason: HOSPADM

## 2021-01-16 RX ORDER — CEPHALEXIN 500 MG/1
500 CAPSULE ORAL 2 TIMES DAILY
Qty: 14 CAPSULE | Refills: 0 | Status: ON HOLD | OUTPATIENT
Start: 2021-01-16 | End: 2021-01-16

## 2021-01-16 RX ORDER — LIDOCAINE 4 G/G
2 PATCH TOPICAL
Status: DISCONTINUED | OUTPATIENT
Start: 2021-01-16 | End: 2021-01-18 | Stop reason: HOSPADM

## 2021-01-16 RX ORDER — NALOXONE HYDROCHLORIDE 0.4 MG/ML
0.4 INJECTION, SOLUTION INTRAMUSCULAR; INTRAVENOUS; SUBCUTANEOUS
Status: DISCONTINUED | OUTPATIENT
Start: 2021-01-16 | End: 2021-01-18 | Stop reason: HOSPADM

## 2021-01-16 RX ORDER — ONDANSETRON 4 MG/1
4 TABLET, ORALLY DISINTEGRATING ORAL EVERY 6 HOURS PRN
Status: DISCONTINUED | OUTPATIENT
Start: 2021-01-16 | End: 2021-01-18 | Stop reason: HOSPADM

## 2021-01-16 RX ORDER — NALOXONE HYDROCHLORIDE 0.4 MG/ML
0.2 INJECTION, SOLUTION INTRAMUSCULAR; INTRAVENOUS; SUBCUTANEOUS
Status: DISCONTINUED | OUTPATIENT
Start: 2021-01-16 | End: 2021-01-18 | Stop reason: HOSPADM

## 2021-01-16 RX ORDER — POLYETHYLENE GLYCOL 3350 17 G/17G
17 POWDER, FOR SOLUTION ORAL DAILY PRN
Status: DISCONTINUED | OUTPATIENT
Start: 2021-01-16 | End: 2021-01-18 | Stop reason: HOSPADM

## 2021-01-16 RX ORDER — PROCHLORPERAZINE 25 MG
12.5 SUPPOSITORY, RECTAL RECTAL EVERY 12 HOURS PRN
Status: DISCONTINUED | OUTPATIENT
Start: 2021-01-16 | End: 2021-01-18 | Stop reason: HOSPADM

## 2021-01-16 RX ORDER — PROCHLORPERAZINE MALEATE 5 MG
5 TABLET ORAL EVERY 6 HOURS PRN
Status: DISCONTINUED | OUTPATIENT
Start: 2021-01-16 | End: 2021-01-18 | Stop reason: HOSPADM

## 2021-01-16 RX ORDER — LIDOCAINE 40 MG/G
CREAM TOPICAL
Status: DISCONTINUED | OUTPATIENT
Start: 2021-01-16 | End: 2021-01-16 | Stop reason: HOSPADM

## 2021-01-16 RX ORDER — CEPHALEXIN 500 MG/1
500 CAPSULE ORAL ONCE
Status: COMPLETED | OUTPATIENT
Start: 2021-01-16 | End: 2021-01-16

## 2021-01-16 RX ORDER — ALPRAZOLAM 0.25 MG
0.5 TABLET ORAL DAILY PRN
Status: DISCONTINUED | OUTPATIENT
Start: 2021-01-16 | End: 2021-01-18 | Stop reason: HOSPADM

## 2021-01-16 RX ORDER — HYDROXYCHLOROQUINE SULFATE 200 MG/1
200 TABLET, FILM COATED ORAL 2 TIMES DAILY
Status: DISCONTINUED | OUTPATIENT
Start: 2021-01-16 | End: 2021-01-18 | Stop reason: HOSPADM

## 2021-01-16 RX ORDER — METOPROLOL TARTRATE 25 MG/1
25 TABLET, FILM COATED ORAL 2 TIMES DAILY
Status: DISCONTINUED | OUTPATIENT
Start: 2021-01-16 | End: 2021-01-17

## 2021-01-16 RX ORDER — CEFTRIAXONE 1 G/1
1 INJECTION, POWDER, FOR SOLUTION INTRAMUSCULAR; INTRAVENOUS ONCE
Status: COMPLETED | OUTPATIENT
Start: 2021-01-16 | End: 2021-01-16

## 2021-01-16 RX ORDER — LOPERAMIDE HCL 2 MG
2 CAPSULE ORAL ONCE
Status: COMPLETED | OUTPATIENT
Start: 2021-01-16 | End: 2021-01-16

## 2021-01-16 RX ADMIN — HYDRALAZINE HYDROCHLORIDE 25 MG: 25 TABLET, FILM COATED ORAL at 16:40

## 2021-01-16 RX ADMIN — LIDOCAINE 2 PATCH: 560 PATCH PERCUTANEOUS; TOPICAL; TRANSDERMAL at 20:35

## 2021-01-16 RX ADMIN — ENALAPRIL MALEATE 20 MG: 10 TABLET ORAL at 20:37

## 2021-01-16 RX ADMIN — HYDROXYCHLOROQUINE SULFATE 200 MG: 200 TABLET, FILM COATED ORAL at 20:35

## 2021-01-16 RX ADMIN — HYDRALAZINE HYDROCHLORIDE 25 MG: 25 TABLET, FILM COATED ORAL at 21:16

## 2021-01-16 RX ADMIN — OLANZAPINE 10 MG: 10 TABLET, FILM COATED ORAL at 21:59

## 2021-01-16 RX ADMIN — LOPERAMIDE HYDROCHLORIDE 2 MG: 2 CAPSULE ORAL at 16:40

## 2021-01-16 RX ADMIN — CEPHALEXIN 500 MG: 500 CAPSULE ORAL at 03:59

## 2021-01-16 RX ADMIN — CEFTRIAXONE 1 G: 1 INJECTION, POWDER, FOR SOLUTION INTRAMUSCULAR; INTRAVENOUS at 13:01

## 2021-01-16 RX ADMIN — METOPROLOL TARTRATE 25 MG: 25 TABLET, FILM COATED ORAL at 20:37

## 2021-01-16 ASSESSMENT — MIFFLIN-ST. JEOR: SCORE: 1059.84

## 2021-01-16 ASSESSMENT — ENCOUNTER SYMPTOMS
FEVER: 0
WOUND: 1
COUGH: 0
ARTHRALGIAS: 1
FEVER: 0
COUGH: 0
CHILLS: 1
CHILLS: 1
SHORTNESS OF BREATH: 1
SHORTNESS OF BREATH: 1

## 2021-01-16 NOTE — ED AVS SNAPSHOT
[facility] Emergency Dept  [address]  [facility] [address]  Phone: [phone]  Fax: [phone]                                    Swathi Dukes   MRN: [id number]    Department: [facility] Emergency Dept   Date of Visit: 1/16/2021           After Visit Summary Signature Page    I have received my discharge instructions, and my questions have been answered. I have discussed any challenges I see with this plan with the nurse or doctor.    ..........................................................................................................................................  Patient/Patient Representative Signature      ..........................................................................................................................................  Patient Representative Print Name and Relationship to Patient    ..................................................               ................................................  Date                                   Time    ..........................................................................................................................................  Reviewed by Signature/Title    ...................................................              ..............................................  Date                                               Time          22EPIC Rev 08/18

## 2021-01-16 NOTE — ED PROVIDER NOTES
History     Chief Complaint:  Shortness of Breath    HPI  Swathi Dukes is a 85 year old female with a history of HTN, DVT, Lupus, and recent pericardial effusion diagnosis who presents to the emergency department for evaluation of shortness of breath. Patient was recently admitted to [facility] from 1/3/2021 - 1/5/2021 due to a pericardial effusion, discharge summary described below. COVID swab negative at that time. This afternoon she began having chills and after eating dinner she began having difficulty breathing with some left sided chest pain. She denies fever or cough. She states her current symptoms feel different from her previous presentation.     Discharge  Neck pain, possibly musculoskeletal:  Pericardial effusion, suspect chronic, ?related to rheum disease.:  * Pt with h/o pericardial effusion requiring pericardial window in [place] 13 yrs ago.  * Presented to [facility] 1/2 with neck/chect pain. On initial evaluation 1/2, pt was afebrile, not tachycardic, was hypertensive. ECG 1/2 showed sinus w/o acute ischemic changes. Trop negative. CT chest w/o contrast 1/2 showed moderate to large pericardial effusion; mkderately enlarged heart with coronary artery disease and atherosclerotic vascular disease. [facility] management.  * Echo 1/3 showed LVEF 65-70%, no regional wall motion abnormalities; RV OK; moderate pericardial effusion w/o hemodynamic features of tamponade; mild AS; compared to the previous study from 2020, the effusion was slightly larger. Cardiology consulted and felt effusion was chronic and not contributing to presenting symptoms. Nuc stress 1/4 abnormal showing small area of mild ischemic in the mid to distal anterior segement(s) of the LV. Underwent angiogram 1/5 that showed only trivial disease.  - Continue PRN acetaminophen for pain.  - Follow-up neck pain outpatient.  - Follow-up effusion outpatient.  - Management of lupus as below.     Mild aortic stenosis, mild aortic  regurgitation:  Noted on echo 1/3.  - Follow-up outpatient.     Hypertension (benign essential):  [PTA: amlodipine 5 mg daily; enalapril 20 mg BID; hydralazine 25 mg TID.]  Started on metoprolol per Cardiology.  - Continue amlodipine, enalapril, hydralazine, metoprolol (new).     Passive suicidal ideation:  Reported thoughts of wondering what it would be like to take multiple pills to end her life but had no intent to do so.  Does have a history of obsessive thoughts and acts. Seen by Psychiatry.  - Outpatient Psychiatry and therapy referral.  - Continue PTA olanzapine, paxil.     SLE:  Elevated inflammatory markers::  * Pt has seen a Rheumatologist locally and was diagnosed with mild lupus. On hydroxychloroquine PTA.  CRP 53.1 and ESR 66 on 1/3. RAUL screen and anti-histone ab 1/3 pending.  - Continue hydroxychloroquine.  - Follow-up with Rheumatology.     Chronic right groin/abdominal pain:  Notes chronic pain in right groin/abdomen; not new; no relation to eating. Says she has told other providers about this but has not had it worked up.  - Follow-up outpatient.     Hyperlipidemia:  Chronic and stable.  -Continue simvastatin.     Glaucoma:  Chronic and stable.  - Continue PTA gtts.    Allergies:  Diclofenac  Iodine  Aspirin    Medications:    Xanax  Amlodipine  Enalapril  Folic acid  Hydralazine  Plaquenil  Metoprolol  Zyprexa  Paxil  Simvastatin    Past Medical History:    Aortic stenosis  Glaucoma  HTN  HLD  Pericardial effusion  Systemic lupus erythematosus  Osteoarthritis  DVT  Chronic diarrhea  Anxiety    Past Surgical History:    Heart cath  Left ventriculogram    Family History:    Diabetes    Social History:  The patient presents to the emergency department alone.    Review of Systems   Constitutional: Positive for chills. Negative for fever.   Respiratory: Positive for shortness of breath. Negative for cough.    Cardiovascular: Positive for chest pain.   All other systems reviewed and are  negative.    Physical Exam     Patient Vitals for the past 24 hrs:   BP Temp Temp src Pulse Resp SpO2   01/16/21 0415  184/72 -- -- 49 -- 95 %   01/16/21 0400  184/71 -- -- 51 -- 93 %   01/16/21 0345  186/70 -- -- 51 -- 93 %   01/16/21 0315  173/75 -- -- 51 20 94 %   01/16/21 0300 -- -- -- -- 21 95 %   01/16/21 0215  153/60 -- -- 51 25 92 %   01/16/21 0200  148/59 -- -- 50 23 92 %   01/16/21 0127  186/59 98.3  F (36.8  C) Oral 53 20 96 %     Physical Exam  Nursing note and vitals reviewed.  Constitutional: Cooperative.   HENT:   Mouth/Throat: Mucous membranes are normal.   Cardiovascular: Bradycardic but regular rhythm.  3/6 systolic murmur.  Pulmonary/Chest: Effort normal and breath sounds normal. No respiratory distress. No wheezes. No rales.   Abdominal: Soft. Normal appearance and bowel sounds are normal. No distension. There is no tenderness. There is no rigidity and no guarding.   Musculoskeletal: No lower extremity edema.  Neurological: Alert. Oriented x4  Skin: Skin is warm and dry. No rash noted.   Psychiatric: Normal mood and affect.        Emergency Department Course   EKG  Indication: Chest Pain and Shortness of Breath  Time: 1:41:03  Rate 52 bpm. DC interval 204. QRS duration 84. QT/QTc 468/435. P-R-T axes 62-9 69  Sinus bradycardia. Otherwise normal ECG.   Interpreted at 142 by Sha Cisneros MD.    Imaging:  CT Chest without contrast:   IMPRESSION:   1.  Stable moderate to large pericardial effusion along the left ventricular free wall posteriorly.    2.  Cardiomegaly and coronary artery disease.    3.  New pulmonary interstitial edema with small right pleural effusion.   as per radiology.      Laboratory:  CBC: WBC: 9.5, HGB: 9.1 (L), PLT: 251  CMP: Glucose 107 (H), Albumin: 3.3 (L), Protein Total: 6.4 (L), o/w WNL (Creatinine: 0.82)    Troponin (153): <0.015    Lactic acid (153): 0.9    UA: Nitrite: Positive, Leukocyte Esterase: Large, WBC: 119 (H), RBC: 5 (H), Bacteria: Many, Mucous: Present, o/w  Negative    Urine Culture Aerobic Bacterial: Pending    VBG: pH 7.40 / PCO2 41 / PO2 51 (H) / Bicarb 26    BNP: 2659 (H)    Symptomatic Influenza A/B antigen & COVID-19 PCR: Pending    Emergency Department Course:  Reviewed:  I reviewed the patient's nursing notes, vitals, past medical records, Care Everywhere.     Assessments:  142 I assessed the patient. Exam findings described above.    336 I reassessed the patient and discussed the results of her workup.    Interventions:  Keflex 500 mg Oral    Disposition:  Discharged to home.    Impression & Plan    Medical Decision Making:  Swathi Dukes is a 85 year old female with the above history including a recent admission to evaluate a large but likely chronic and stable pericardial effusion. She presents with somewhat of a globus sensation in her throat but no direct evidence of esophageal obstruction, tracheal obstruction, etc. Labs are reassuring. She has a slight elevation in her BNP and a stable pericardial effusion. No evidence of decompensation or heart failure. Troponin is negative. She is anemic but this is also chronic and stable. She has never been hypoxic or increased work to breathe. Recent COVID swab was reviewed which was negative. She did provide a urine sample and was found to have a nitrite positive UTI. Culture sent on this with antibiotics initiated. She would be a good candidate for discharge to home with close primary care follow up. She is comfortable with this and counseled in regards to return precautions.    Covid-19  Swathi Dukes was evaluated during a global COVID-19 pandemic, which necessitated consideration that the patient might be at risk for infection with the SARS-CoV-2 virus that causes COVID-19.   Applicable protocols for evaluation were followed during the patient's care.   COVID-19 was considered as part of the patient's evaluation. The plan for testing is:  a test was obtained at a previous visit and reviewed & considered  today.      Diagnosis:    ICD-10-CM   1. SOB (shortness of breath)  R06.02   2. Chronic anemia  D64.9   3. Pericardial effusion - chronic  I31.3   4. Urinary tract infection without hematuria N39.0       Disposition:  Discharged to home    Discharge Medications:  New Prescriptions    CEPHALEXIN (KEFLEX) 500 MG CAPSULE    Take 1 capsule (500 mg) by mouth 2 times daily for 7 days         Hang Rodas  1/16/2021   EMERGENCY DEPARTMENT  Scribe Disclosure:  I, Hang Rodas, am serving as a scribe at 1:42 AM on 1/16/2021 to document services personally performed by Sha Cisneros MD based on my observations and the provider's statements to me.          Sha Cisneros MD  01/16/21 0588

## 2021-01-16 NOTE — ED NOTES
[facility]  ED Nurse Handoff Report    Swathi Dukes is a 85 year old female   ED Chief complaint: No chief complaint on file.  . ED Diagnosis:   Final diagnoses:   Fall, initial encounter   Laceration of scalp without foreign body, initial encounter   Acute cystitis without hematuria     Allergies:   Allergies   Allergen Reactions     Diclofenac Anaphylaxis     Iodine Anaphylaxis     Contrast  Pt states anaphylactic reaction to ct contrast about 20 years ago     Aspirin        Code Status: DNR / DNI  Activity level - Baseline/Home:  Stand by Assist. Activity Level - Current:   Stand by Assist. Lift room needed: No. Bariatric: No   Needed: No   Isolation: No. Infection: Not Applicable.     Vital Signs:   Vitals:    01/16/21 1000 01/16/21 1015 01/16/21 1030   BP: (!) 176/64 (!) 160/60 (!) 153/55   Pulse: 56 53 53   SpO2: 92% 94% 95%       Cardiac Rhythm:  ,      Pain level:    Patient confused: Yes - mild baseline alzheimers. Forgetful at times. Alert and oriented X4 while in ER. Patient Falls Risk: Yes.   Elimination Status: Has voided   Patient Report - Initial Complaint: unwitnessed fall with right temporal artery laceration. Focused Assessment: MD sutured arterial laceration closed. Bleeding controlled. Patient alert and oriented. Reports pain tolerable.   Tests Performed: xray, ct scan, labs. Abnormal Results:   Labs Ordered and Resulted from Time of ED Arrival Up to the Time of Departure from the ED   CBC WITH PLATELETS DIFFERENTIAL - Abnormal; Notable for the following components:       Result Value    WBC 11.3 (*)     RBC Count 2.75 (*)     Hemoglobin 8.9 (*)     Hematocrit 27.7 (*)      (*)     RDW 15.6 (*)     Absolute Neutrophil 9.6 (*)     All other components within normal limits   BASIC METABOLIC PANEL - Abnormal; Notable for the following components:    Glucose 120 (*)     All other components within normal limits   TROPONIN I   PERIPHERAL IV CATHETER     XR Toe Right  G/E 2 Views   Final Result      XR Knee Bilateral 3 Views   Final Result      Cervical spine CT w/o contrast   Final Result   IMPRESSION:    1. No evidence of acute fracture or subluxation in the cervical spine.   2. Degenerative changes in the cervical spine as described above.       ALTA LUQUE MD      Head CT w/o contrast   Final Result   IMPRESSION:      1. No evidence of acute intracranial hemorrhage, mass, or herniation.   2. Moderate diffuse parenchymal volume loss and mild white matter   changes likely due to chronic microvascular ischemic disease.    3. Right lateral scalp soft tissue injury without underlying fracture   appreciated.      ALTA LUQUE MD      .   Treatments provided: see MAR  Family Comments: sonBrent, updated.   OBS brochure/video discussed/provided to patient:  Yes  ED Medications:   Medications   cefTRIAXone (ROCEPHIN) 1 g vial to attach to  mL bag for ADULTS or NS 50 mL bag for PEDS (1 g Intravenous New Bag 1/16/21 1301)     Drips infusing:  No  For the majority of the shift, the patient's behavior Green. Interventions performed were reinforced plan of care.    Sepsis treatment initiated: No     Patient tested for COVID 19 prior to admission: YES. Patient received COVID PCR at around 03:00 and result was negative    ED Nurse Name/Phone Number: Herminia Rodriguez RN,   2:02 PM    RECEIVING UNIT ED HANDOFF REVIEW    Above ED Nurse Handoff Report was reviewed: Yes  Reviewed by: Yoly Polk RN on January 16, 2021 at 2:48 PM

## 2021-01-16 NOTE — PHARMACY-ADMISSION MEDICATION HISTORY
Admission medication history interview status for this patient is complete. See [facility] admission navigator for allergy information, prior to admission medications and immunization status.     Medication history interview done via telephone during Covid-19 pandemic, indicate source(s): Patient  Medication history resources (including written lists, pill bottles, clinic record):None    Changes made to PTA medication list:  Added: none  Deleted: cephalexin  Changed: none    Actions taken by pharmacist (provider contacted, etc):None     Additional medication history information: Patient recently had Geodon 20 mg daily filled, however she has no recollection of starting this medication or currently taking it. This was not added on to the PTA med list.     Medication reconciliation/reorder completed by provider prior to medication history?  N   (Y/N)     Prior to Admission medications    Medication Sig Last Dose Taking? Auth Provider   ALPRAZolam (XANAX) 0.5 MG tablet Take 0.5 mg by mouth daily  1/15/2021 at Unknown time Yes Reported, Patient   amLODIPine (NORVASC) 5 MG tablet Take 1 tablet (5 mg) by mouth daily 1/15/2021 at Unknown time Yes Adriane Hampton PA-C   brimonidine (ALPHAGAN P) 0.1 % ophthalmic solution Place 1 drop into both eyes 2 times daily 1/15/2021 at Unknown time Yes Unknown, Entered By History   dorzolamide (TRUSOPT) 2 % ophthalmic solution Place 1 drop into both eyes 2 times daily 1/15/2021 at Unknown time Yes Unknown, Entered By History   enalapril (VASOTEC) 20 MG tablet Take 1 tablet (20 mg) by mouth 2 times daily 1/15/2021 at Unknown time Yes Cortney Vanessa MD   folic acid (FOLVITE) 1 MG tablet Take 1 mg by mouth daily 1/15/2021 at Unknown time Yes Unknown, Entered By History   hydrALAZINE (APRESOLINE) 25 MG tablet Take 1 tablet (25 mg) by mouth 3 times daily 1/15/2021 at Unknown time Yes Cortney Vanessa MD   hydroxychloroquine (PLAQUENIL) 200 MG tablet Take 200 mg by mouth  2 times daily 1/15/2021 at Unknown time Yes Unknown, Entered By History   latanoprost (XALATAN) 0.005 % ophthalmic solution Place 1 drop into both eyes At Bedtime  1/15/2021 at Unknown time Yes Reported, Patient   metoprolol tartrate (LOPRESSOR) 25 MG tablet Take 1 tablet (25 mg) by mouth 2 times daily 1/15/2021 at Unknown time Yes Jc Sierra MD   OLANZapine (ZYPREXA) 10 MG tablet Take 10 mg by mouth At Bedtime  1/15/2021 at Unknown time Yes Reported, Patient   PARoxetine (PAXIL) 10 MG tablet Take 5 mg by mouth every morning With 20mg to = 25mg daily 1/15/2021 at Unknown time Yes Unknown, Entered By History   PARoxetine (PAXIL) 20 MG tablet Take 20 mg by mouth daily Plus 5mg = 25mg daily 1/15/2021 at Unknown time Yes Reported, Patient   simvastatin (ZOCOR) 10 MG tablet Take 1 tablet (10 mg) by mouth At Bedtime 1/15/2021 at Unknown time Yes oCrtney Vanessa MD

## 2021-01-16 NOTE — PROGRESS NOTES
BP elevated on admit. 184/48- scheduled hydralazine will be given once verified and will re-assess.

## 2021-01-16 NOTE — ED PROVIDER NOTES
History     Chief Complaint:  Head laceration       HPI  Swathi Dukes is a 85 year old female with a history of  hypertension, DVT, Lupus, and recent pericardial effusion diagnosis  who presents for evaluation of a fall and head laceration.  Per EMS, the patient fell at her care facility and hit her head on the side of a table, inflicting a 1 cm laceration to her right scalp.  At the facility, manual pressure was applied to try to stop the bleeding.  In the ED, she also states she is having some knee pain.    Of note, the patient was discharged from here at 0142 this morning after presenting for shortness of breath.  She was found COVID negative.    Allergies:  Diclofenac  Iodine  Aspirin     Medications:    Xanax  Amlodipine  Enalapril  Folic acid  Hydralazine  Plaquenil  Metoprolol  Zyprexa  Paxil  Simvastatin     Past Medical History:    Aortic stenosis  Glaucoma  HTN  HLD  Pericardial effusion  Systemic lupus erythematosus  Osteoarthritis  DVT  Chronic diarrhea  Anxiety     Past Surgical History:    Heart cath  Left ventriculogram     Family History:    Diabetes     Social History:  The patient presents to ED via EMS.  PCP: Cortney Vanessa      Review of Systems   Constitutional: Positive for chills. Negative for fever.   Respiratory: Positive for shortness of breath. Negative for cough.    Musculoskeletal: Positive for arthralgias (knee).   Skin: Positive for wound.   All other systems reviewed and are negative.    Physical Exam     Patient Vitals for the past 24 hrs:   BP Pulse SpO2   01/16/21 1030 (!) 153/55 53 95 %   01/16/21 1015 (!) 160/60 53 94 %   01/16/21 1000 (!) 176/64 56 92 %        Physical Exam     Nursing note and vitals reviewed.  Constitutional: Cooperative.   HENT:   Mouth/Throat: Moist mucous membranes. 1 cm laceration to right temple with arterial bleeding.  Eyes: EOMI, nonicteric sclera  Cardiovascular: bradycardic  Pulmonary/Chest: Effort normal and breath sounds normal. No  respiratory distress. No wheezes. No rales.   Abdominal: Soft. Nontender, nondistended, no guarding or rigidity.   Musculoskeletal: Normal range of motion of all extremities including shoulders, elbows, wrists, hips, knees, ankles.  Bilateral knee contusions noted.  Neurological: Alert. Moves all extremities spontaneously.   Skin: Skin is warm and dry. No rash noted.       Emergency Department Course     ECG:  ECG taken at 1026, ECG read at 1033  Sinus bradycardia  Minimal voltage criteria for LVH, may be normal variant  Borderline ECG  Rate 53 bpm. WV interval 180 ms. QRS duration 84 ms. QT/QTc 470/441 ms. P-R-T axes 43 -14 39.     Imaging:    XR Toe Right G/E 2 Views  FINDINGS:  Degenerative change at the first MTP joint and IP joint of  the great toe. Degenerative change of multiple IP joints of the  additional toes. Degenerative change across the midfoot. No evidence  for fracture. No erosive changes are identified.     LUNA ADAMS MD  Reading per radiology     XR Knee Bilateral 3 Views  FINDINGS:  Postoperative changes of left total knee arthroplasty and  patellar resurfacing. Components appear well seated. Chronic  enthesitis along the superior pole of the patella at the quadriceps  attachment. Tiny left knee joint effusion. No evidence for fracture.  The right knee demonstrates incompletely visualized postoperative  changes within the distal aspect of the right femur. The knee joint  itself is negative for fracture or significant compartment narrowing.  There is a tiny calcification along the medial margin of the medial  tibial plateau which is likely chronic but could potentially represent  capsular or MCL injury. There is no significant intra-articular  effusion, but there is soft tissue swelling external and anterior to  the patella.     LUNA ADAMS MD  Reading per radiology     Cervical spine CT w/o contrast  IMPRESSION:   1. No evidence of acute fracture or subluxation in the cervical spine.  2.  Degenerative changes in the cervical spine as described above.     ALTA LUQUE MD  Reading per radiology     Head CT w/o contrast  IMPRESSION:     1. No evidence of acute intracranial hemorrhage, mass, or herniation.  2. Moderate diffuse parenchymal volume loss and mild white matter  changes likely due to chronic microvascular ischemic disease.   3. Right lateral scalp soft tissue injury without underlying fracture  appreciated.    ALTA LUQUE MD  Reading per radiology     Laboratory:    CBC: WBC: 11.3(H), HGB: 8.9(L), PLT: 262  BMP: Glucose 120(H), o/w WNL (Creatinine: 0.69)  Troponin (1244): 0.019    Procedures      Laceration Repair        LACERATION:  A simple clean 1 cm laceration.      LOCATION:  Right temple      FUNCTION:  Distally sensation are intact.      ANESTHESIA:  Local using 1% lidocaine with epi total of 3 mLs      PREPARATION:  Irrigation with Normal Saline.      DEBRIDEMENT:  no debridement      CLOSURE:  Wound was closed with One Layer.  Skin closed with 3 x 5.0 fast-absorbing plain gut sutures using interrupted sutures.      Emergency Department Course:     Reviewed:  I reviewed the patient's nursing notes, vitals, past medical records, Care Everywhere.      Assessments:  0953 I preformed my initial assessment of the patient.    1030 Pt rechecked. Laceration repaired.     1350 Patient rechecked and updated.      Consults:   1345 I spoke with Dr. Garcia of the hospitalist service regarding patient's presentation, findings, and plan of care.       Interventions:  1301 Rocephin, 1 g, IV injection     Disposition:  Admitted to Dr. Garcia.       Impression & Plan     Medical Decision Making:  Patient presents following fall at nursing home.  Laceration was noted to have arterial bleeding.  This was successfully stopped with 1% lidocaine with epi and direct pressure.  Then wound was cleaned and I repaired it.  Imaging negative for fracture.  Patient was just in emergency department several hours  ago.  This is her second fall in less than 24 hours.  She was found to have urinary tract infection last night.  Patient given 1 g of IV Rocephin for treatment of this, and admission requested given multiple falls and concern for safety at home.  Patient is in agreement with this plan.  All questions answered.  Dr. Garcia from our hospitalist service accepts patient for admission.    Covid-19  Swathi Dukes was evaluated during a global COVID-19 pandemic, which necessitated consideration that the patient might be at risk for infection with the SARS-CoV-2 virus that causes COVID-19.   Applicable protocols for evaluation were followed during the patient's care.   COVID-19 was considered as part of the patient's evaluation. The plan for testing is:  a test was obtained from a previous visit and considered today.    Diagnosis:     ICD-10-CM    1. Fall, initial encounter  W19.XXXA    2. Laceration of scalp without foreign body, initial encounter  S01.01XA    3. Acute cystitis without hematuria  N30.00         Disposition:  Admitted to Dr. Garcia.      Scribe Disclosure:  IMirna, am serving as a scribe at 9:56 AM on 1/16/2021 to document services personally performed by Huy William MD based on my observations and the provider's statements to me.     KingstreeHuy Suarez RD, MD  01/16/21 6563

## 2021-01-16 NOTE — PLAN OF CARE
ROOM # 227    Living Situation (if not independent, order SW consult):  Ind senior living  Facility name: [facility]  : Brent (son) [phone]    Activity level at baseline: Ind  Activity level on admit: SBA gaitbelt      Patient registered to observation; given Patient Bill of Rights; given the opportunity to ask questions about observation status and their plan of care.  Patient has been oriented to the observation room, bathroom and call light is in place.    Discussed discharge goals and expectations with patient/family.

## 2021-01-16 NOTE — ED NOTES
Bed: ED18  Expected date: 1/16/21  Expected time: 9:22 AM  Means of arrival: Ambulance  Comments:  [facility]- 85y, F, fall, fall, lac L temporal

## 2021-01-16 NOTE — ED TRIAGE NOTES
"Patient arrived at around 950 by ambulance after an unwitnessed fall. EMS holding pressure on right temporal artery. Bleeding controlled with manual pressure. States about \"5 towels\" of blood present in room. Unable to successfully use pressure dressing to control bleeding. Patient alert and oriented with some forgetfulness. EMS concerned for possible concussion. Patient likely hit head on corner of a table. Patient states she was getting up to go to the bathroom and unsure of how she fell. Reports pain to left knee, right greater toe, and head. ABCs intact. Alert and oriented X4. Oriented to room and call light. Patient educated about hand hygiene practices. MD notified of arterial laceration.     "

## 2021-01-16 NOTE — ED NOTES
"Pt ambulated to the bathroom without difficulty. Pt request to have urine sample sent to lab. Pt states, \"I get UTI's out the [facility].\"  "

## 2021-01-17 ENCOUNTER — APPOINTMENT (OUTPATIENT)
Dept: PHYSICAL THERAPY | Facility: CLINIC | Age: 86
End: 2021-01-17
Attending: INTERNAL MEDICINE
Payer: COMMERCIAL

## 2021-01-17 ENCOUNTER — APPOINTMENT (OUTPATIENT)
Dept: CARDIOLOGY | Facility: CLINIC | Age: 86
End: 2021-01-17
Attending: INTERNAL MEDICINE
Payer: COMMERCIAL

## 2021-01-17 LAB
ANION GAP SERPL CALCULATED.3IONS-SCNC: 3 MMOL/L (ref 3–14)
BUN SERPL-MCNC: 13 MG/DL (ref 7–30)
CALCIUM SERPL-MCNC: 8.5 MG/DL (ref 8.5–10.1)
CHLORIDE SERPL-SCNC: 111 MMOL/L (ref 94–109)
CO2 SERPL-SCNC: 27 MMOL/L (ref 20–32)
CREAT SERPL-MCNC: 0.71 MG/DL (ref 0.52–1.04)
ERYTHROCYTE [DISTWIDTH] IN BLOOD BY AUTOMATED COUNT: 15.8 % (ref 10–15)
GFR SERPL CREATININE-BSD FRML MDRD: 79 ML/MIN/{1.73_M2}
GLUCOSE SERPL-MCNC: 98 MG/DL (ref 70–99)
HCT VFR BLD AUTO: 24.1 % (ref 35–47)
HGB BLD-MCNC: 7.5 G/DL (ref 11.7–15.7)
HGB BLD-MCNC: 8.2 G/DL (ref 11.7–15.7)
MCH RBC QN AUTO: 31.6 PG (ref 26.5–33)
MCHC RBC AUTO-ENTMCNC: 31.1 G/DL (ref 31.5–36.5)
MCV RBC AUTO: 102 FL (ref 78–100)
PLATELET # BLD AUTO: 250 10E9/L (ref 150–450)
POTASSIUM SERPL-SCNC: 3.5 MMOL/L (ref 3.4–5.3)
RBC # BLD AUTO: 2.37 10E12/L (ref 3.8–5.2)
SODIUM SERPL-SCNC: 141 MMOL/L (ref 133–144)
WBC # BLD AUTO: 8.1 10E9/L (ref 4–11)

## 2021-01-17 PROCEDURE — 80048 BASIC METABOLIC PNL TOTAL CA: CPT | Performed by: INTERNAL MEDICINE

## 2021-01-17 PROCEDURE — 93325 DOPPLER ECHO COLOR FLOW MAPG: CPT

## 2021-01-17 PROCEDURE — 93308 TTE F-UP OR LMTD: CPT | Mod: 26 | Performed by: INTERNAL MEDICINE

## 2021-01-17 PROCEDURE — 85027 COMPLETE CBC AUTOMATED: CPT | Performed by: INTERNAL MEDICINE

## 2021-01-17 PROCEDURE — 97530 THERAPEUTIC ACTIVITIES: CPT | Mod: GP | Performed by: PHYSICAL THERAPIST

## 2021-01-17 PROCEDURE — 97161 PT EVAL LOW COMPLEX 20 MIN: CPT | Mod: GP | Performed by: PHYSICAL THERAPIST

## 2021-01-17 PROCEDURE — 93321 DOPPLER ECHO F-UP/LMTD STD: CPT | Mod: 26 | Performed by: INTERNAL MEDICINE

## 2021-01-17 PROCEDURE — 36415 COLL VENOUS BLD VENIPUNCTURE: CPT | Performed by: INTERNAL MEDICINE

## 2021-01-17 PROCEDURE — 93325 DOPPLER ECHO COLOR FLOW MAPG: CPT | Mod: 26 | Performed by: INTERNAL MEDICINE

## 2021-01-17 PROCEDURE — 99207 PR CDG-CHARGE REQUIRED MANUAL ENTRY: CPT | Performed by: INTERNAL MEDICINE

## 2021-01-17 PROCEDURE — G0378 HOSPITAL OBSERVATION PER HR: HCPCS

## 2021-01-17 PROCEDURE — 85018 HEMOGLOBIN: CPT | Performed by: INTERNAL MEDICINE

## 2021-01-17 PROCEDURE — 99225 PR SUBSEQUENT OBSERVATION CARE,LEVEL II: CPT | Performed by: INTERNAL MEDICINE

## 2021-01-17 PROCEDURE — 250N000011 HC RX IP 250 OP 636: Performed by: INTERNAL MEDICINE

## 2021-01-17 PROCEDURE — 250N000013 HC RX MED GY IP 250 OP 250 PS 637: Performed by: INTERNAL MEDICINE

## 2021-01-17 PROCEDURE — 97116 GAIT TRAINING THERAPY: CPT | Mod: GP | Performed by: PHYSICAL THERAPIST

## 2021-01-17 PROCEDURE — 96376 TX/PRO/DX INJ SAME DRUG ADON: CPT | Mod: 59

## 2021-01-17 RX ORDER — CEFUROXIME AXETIL 250 MG/1
500 TABLET ORAL EVERY 12 HOURS SCHEDULED
Status: DISCONTINUED | OUTPATIENT
Start: 2021-01-17 | End: 2021-01-18 | Stop reason: HOSPADM

## 2021-01-17 RX ORDER — LOPERAMIDE HCL 2 MG
2 CAPSULE ORAL 4 TIMES DAILY PRN
Status: DISCONTINUED | OUTPATIENT
Start: 2021-01-17 | End: 2021-01-17

## 2021-01-17 RX ORDER — AMLODIPINE BESYLATE 5 MG/1
5 TABLET ORAL 2 TIMES DAILY
Status: DISCONTINUED | OUTPATIENT
Start: 2021-01-17 | End: 2021-01-18 | Stop reason: HOSPADM

## 2021-01-17 RX ORDER — LOPERAMIDE HCL 2 MG
2 CAPSULE ORAL DAILY PRN
Status: DISCONTINUED | OUTPATIENT
Start: 2021-01-17 | End: 2021-01-18 | Stop reason: HOSPADM

## 2021-01-17 RX ADMIN — ENALAPRIL MALEATE 20 MG: 10 TABLET ORAL at 10:39

## 2021-01-17 RX ADMIN — HYDROXYCHLOROQUINE SULFATE 200 MG: 200 TABLET, FILM COATED ORAL at 20:03

## 2021-01-17 RX ADMIN — HYDROXYCHLOROQUINE SULFATE 200 MG: 200 TABLET, FILM COATED ORAL at 10:38

## 2021-01-17 RX ADMIN — ACETAMINOPHEN 650 MG: 325 TABLET, FILM COATED ORAL at 16:56

## 2021-01-17 RX ADMIN — LIDOCAINE 2 PATCH: 560 PATCH PERCUTANEOUS; TOPICAL; TRANSDERMAL at 20:04

## 2021-01-17 RX ADMIN — METOPROLOL TARTRATE 25 MG: 25 TABLET, FILM COATED ORAL at 10:38

## 2021-01-17 RX ADMIN — ENALAPRIL MALEATE 20 MG: 10 TABLET ORAL at 20:03

## 2021-01-17 RX ADMIN — CEFUROXIME AXETIL 500 MG: 250 TABLET ORAL at 20:09

## 2021-01-17 RX ADMIN — AMLODIPINE BESYLATE 5 MG: 5 TABLET ORAL at 20:03

## 2021-01-17 RX ADMIN — LOPERAMIDE HYDROCHLORIDE 2 MG: 2 CAPSULE ORAL at 11:55

## 2021-01-17 RX ADMIN — HYDRALAZINE HYDROCHLORIDE 25 MG: 25 TABLET, FILM COATED ORAL at 14:27

## 2021-01-17 RX ADMIN — HYDRALAZINE HYDROCHLORIDE 25 MG: 25 TABLET, FILM COATED ORAL at 10:38

## 2021-01-17 RX ADMIN — CEFTRIAXONE 1 G: 1 INJECTION, POWDER, FOR SOLUTION INTRAMUSCULAR; INTRAVENOUS at 10:47

## 2021-01-17 RX ADMIN — HYDRALAZINE HYDROCHLORIDE 25 MG: 25 TABLET, FILM COATED ORAL at 20:03

## 2021-01-17 RX ADMIN — OLANZAPINE 10 MG: 10 TABLET, FILM COATED ORAL at 22:23

## 2021-01-17 RX ADMIN — PAROXETINE HYDROCHLORIDE 25 MG: 10 TABLET, FILM COATED ORAL at 10:38

## 2021-01-17 RX ADMIN — AMLODIPINE BESYLATE 5 MG: 5 TABLET ORAL at 10:39

## 2021-01-17 ASSESSMENT — ACTIVITIES OF DAILY LIVING (ADL): DEPENDENT_IADLS:: INDEPENDENT

## 2021-01-17 NOTE — PLAN OF CARE
PRIMARY DIAGNOSIS: FALL/CYSTITIS  OUTPATIENT/OBSERVATION GOALS TO BE MET BEFORE DISCHARGE:  1. Orthostatic performed: N/A    2. Diagnostic testing complete & at baseline neurologic testing: Yes    3. Cleared by consultants (if involved): N/A    4. Interpretation of cardiac rhythm per telemetry tech: SB/SR- 50-mid 60's    5. Tolerating adequate PO diet and medications: Yes    6. Return to near baseline physical activity or neurologic status: Yes     Discharge Planner Nurse   Safe discharge environment identified: Yes  Barriers to discharge: Yes       Entered by: Yoly Polk 01/17/2021      Please review provider order for any additional goals.   Nurse to notify provider when observation goals have been met and patient is ready for discharge.

## 2021-01-17 NOTE — PROGRESS NOTES
01/17/21 1325   Quick Adds   Type of Visit Initial PT Evaluation   Living Environment   People in home alone   Current Living Arrangements independent living facility   Home Accessibility no concerns   Transportation Anticipated family or friend will provide   Living Environment Comments Pt lives in an independent living facilty. She has someone that helps with cleaning every other wednesday. She has TV dinners or can order dinner from the facility. Son lives in [place] and drives her to appointments as needed.   Self-Care   Usual Activity Tolerance moderate   Current Activity Tolerance moderate   Regular Exercise No   Equipment Currently Used at Home none   Activity/Exercise/Self-Care Comment Pt reports that she doesn't use any assistive devices at this time. She can get around her apartment and community without AD. Does walk around a good amount   Disability/Function   Hearing Difficulty or Deaf no   Wear Glasses or Blind yes   Fall history within last six months yes   Number of times patient has fallen within last six months 2   Change in Functional Status Since Onset of Current Illness/Injury no   General Information   Onset of Illness/Injury or Date of Surgery 01/16/21   Referring Physician Yessy Garcia MD   Patient/Family Therapy Goals Statement (PT) none stated at this time   Pertinent History of Current Problem (include personal factors and/or comorbidities that impact the POC) pt is a 85 year old female with a past medical history significant for SLE, mild aortic stenosis, chronic pericardial effusion, who presents to the hospital with concerns for falls and UTI.   Existing Precautions/Restrictions fall   General Observations Pt requiring some reinforcement to participate initially   Cognition   Orientation Status (Cognition) person;place;situation   Follows Commands (Cognition) follows two-step commands   Cognitive Status Comments Pt able to follow commands, attentive to therapist   Pain  Assessment   Patient Currently in Pain No   Posture    Posture Forward head position;Kyphosis;Protracted shoulders   Range of Motion (ROM)   ROM Comment WFL as seen per transfers   Strength   Strength Comments Tested sitting EOB: hip flexor L 3/5 R 3+/5, knee extensor L 4/5 R 4/5, hip abduction and adduction able to maintain against mod resistance   Bed Mobility   Comment (Bed Mobility) supine to sit SBA   Transfers   Transfer Safety Comments Sit to stand CGA no AD   Gait/Stairs (Locomotion)   Comment (Gait/Stairs) Pt walking 5' SBA without AD. Noted crouched posture, decreased step length   Balance   Balance Comments Pt unable to maintain narrow BRENNA in standing without 1 UE support, without support able to hold for ~5s requiring assist to correct   Sensory Examination   Sensory Perception Comments quick screen LE intact   Clinical Impression   Criteria for Skilled Therapeutic Intervention yes, treatment indicated   PT Diagnosis (PT) impaired gait   Influenced by the following impairments generalized weakness, balance, fatigue   Functional limitations due to impairments decrease independence with transfers and gait   Clinical Presentation Stable/Uncomplicated   Clinical Presentation Rationale per clincal judgement   Clinical Decision Making (Complexity) low complexity   Therapy Frequency (PT) Daily   Predicted Duration of Therapy Intervention (days/wks) 3 days   Planned Therapy Interventions (PT) balance training;gait training;home exercise program;neuromuscular re-education;patient/family education;ROM (range of motion);strengthening;transfer training   Anticipated Equipment Needs at Discharge (PT) walker, standard   Risk & Benefits of therapy have been explained evaluation/treatment results reviewed;care plan/treatment goals reviewed;participants included;patient   PT Discharge Planning    PT Discharge Recommendation (DC Rec) home with home care physical therapy   PT Rationale for DC Rec Patient would benefit from  ongoing acute therapy and ongoing therapy with home health PT to increase functional strength and decrease risk for falls. HHPT as patient would require Ax1 and it would be taxing effort to leave house.    PT Brief overview of current status  SBA bed mobility, SBA sit to stands with 2WW, SBA for ambulation with 2WW   Total Evaluation Time   Total Evaluation Time (Minutes) 11

## 2021-01-17 NOTE — PLAN OF CARE
"PRIMARY DIAGNOSIS: FALL/CYSTITIS  OUTPATIENT/OBSERVATION GOALS TO BE MET BEFORE DISCHARGE:  1. Orthostatic performed: N/A    2. Diagnostic testing complete & at baseline neurologic testing: XR knees and R greater toe, and CT C spine and head complete, echocardiogram ordered for tomorrow    3. Cleared by consultants (if involved): N/A    4. Interpretation of cardiac rhythm per telemetry tech: SR with HR 60s    5. Tolerating adequate PO diet and medications: Yes    6. Return to near baseline physical activity or neurologic status: Yes     BP (!) 153/40 (BP Location: Left arm)   Pulse 68   Temp 98.7  F (37.1  C) (Oral)   Resp 20   Ht 1.613 m (5' 3.5\")   Wt 62.8 kg (138 lb 6.4 oz)   SpO2 96%   BMI 24.13 kg/m      Discharge Planner Nurse   Safe discharge environment identified: Yes  Barriers to discharge: Yes       Entered by: Martha Peterson 01/16/2021 9:22 PM     Please review provider order for any additional goals.   Nurse to notify provider when observation goals have been met and patient is ready for discharge.    Patient alert and oriented x4 but forgetful at times. Complains of pain in L knee, L lower back, and R greater toe. Scheduled lidocaine patches applied with pain relief. Generalized bruising, R temple laceration with sutures, R knee abrasion. Telemetry monitoring. HTN improved with scheduled medications. Up SBA with gait belt. Tolerating regular diet. Reports diarrhea over \"last 30 years\" and has been \"seen by multiple specialists\", but no diarrhea noted since admission - one-time dose imodium administered, will continue to monitor. IV SL - Q24H IV rocephin. Echocardiogram ordered for tomorrow. Will continue with plan of care.    "

## 2021-01-17 NOTE — PLAN OF CARE
PRIMARY DIAGNOSIS: unwitnessed fall/cystitis  OUTPATIENT/OBSERVATION GOALS TO BE MET BEFORE DISCHARGE:  1. Orthostatic performed: N/A    2. Diagnostic testing complete & at baseline neurologic testing: No    3. Cleared by consultants (if involved): N/A    4. Interpretation of cardiac rhythm per telemetry tech: SB-1st degree AV block- HR 50's    5. Tolerating adequate PO diet and medications: Yes    6. Return to near baseline physical activity or neurologic status: Yes    Discharge Planner Nurse   Safe discharge environment identified: Yes  Barriers to discharge: Yes       Entered by: Yoly Polk 01/16/2021      Please review provider order for any additional goals.   Nurse to notify provider when observation goals have been met and patient is ready for discharge.    AOx4- forgetful. SBA suhail.  Head LAC, R temple-sutures, open to air-c/d/I.  Reports pain 6/10 in bilateral knees-R big toe-back-and head. Goal of 6/10- cold applied, declined further interventions.  Several bruises noted and tear on R knee-covered w/ padded dressing. Plan for echo tomorrow, monitor on tele, IV rocephin, and continued monitoring.

## 2021-01-17 NOTE — PLAN OF CARE
"PRIMARY DIAGNOSIS: FALL/CYSTITIS  OUTPATIENT/OBSERVATION GOALS TO BE MET BEFORE DISCHARGE:  1. Orthostatic performed: N/A    2. Diagnostic testing complete & at baseline neurologic testing: XR knees and R greater toe, and CT C spine and head complete, echocardiogram ordered for AM    3. Cleared by consultants (if involved): N/A    4. Interpretation of cardiac rhythm per telemetry tech: SB with HR 50s    5. Tolerating adequate PO diet and medications: Yes    6. Return to near baseline physical activity or neurologic status: Yes     BP (!) 156/37 (BP Location: Right arm)   Pulse 56   Temp 96.6  F (35.9  C) (Oral)   Resp 18   Ht 1.613 m (5' 3.5\")   Wt 62.8 kg (138 lb 6.4 oz)   SpO2 90%   BMI 24.13 kg/m      Discharge Planner Nurse   Safe discharge environment identified: Yes  Barriers to discharge: Yes       Entered by: Martha Peterson 01/17/2021 5:12 AM     Please review provider order for any additional goals.   Nurse to notify provider when observation goals have been met and patient is ready for discharge.    Patient alert and oriented x4 but forgetful at times. Complains of pain in L knee, L lower back, and R greater toe. Scheduled lidocaine patches applied with pain relief. Generalized bruising, R temple laceration with sutures, R knee abrasion. Telemetry monitoring. HTN improved with scheduled medications. Up SBA with gait belt. Tolerating regular diet. Reports diarrhea over \"last 30 years\" and has been \"seen by multiple specialists\", but no diarrhea noted since admission - one-time dose imodium administered, will continue to monitor. IV SL - Q24H IV rocephin. Echocardiogram ordered for tomorrow. Will continue with plan of care.    "

## 2021-01-17 NOTE — CONSULTS
Care Management Initial Consult    General Information  Assessment completed with: Patient,    Type of CM/SW Visit: Initial Assessment    Primary Care Provider verified and updated as needed: Yes   Readmission within the last 30 days: current reason for admission unrelated to previous admission      Reason for Consult: care coordination/care conference, discharge planning  Advance Care Planning:     NA       Communication Assessment  Patient's communication style: spoken language (English or Bilingual)    Hearing Difficulty or Deaf: no   Wear Glasses or Blind: yes    Cognitive  Cognitive/Neuro/Behavioral: .WDL except(forgetful)  Level of Consciousness: alert  Arousal Level: opens eyes spontaneously  Orientation: oriented x 4  Mood/Behavior: calm, cooperative  Best Language: 0 - No aphasia  Speech: clear, spontaneous, logical    Living Environment:      Current living Arrangements: independent living facility      Able to return to prior arrangements: yes       Family/Social Support:  Care provided by: self  Provides care for: no one     Children          Description of Support System: Supportive    Support Assessment: Patient communicates needs well met    Current Resources:   Skilled Home Care Services:  None  Community Resources: None  Equipment currently used at home: none  Supplies currently used at home: None    Employment/Financial:  Employment Status: retired        Financial Concerns: No concerns identified           Lifestyle & Psychosocial Needs:        Socioeconomic History     Marital status:      Spouse name: Not on file     Number of children: Not on file     Years of education: Not on file     Highest education level: Not on file     Tobacco Use     Smoking status: Never Smoker     Smokeless tobacco: Never Used   Substance and Sexual Activity     Alcohol use: Not Currently     Drug use: Never     Sexual activity: Not Currently       Functional Status:  Prior to admission patient needed  assistance:   Dependent ADLs:: Ambulation-no assistive device, Independent  Dependent IADLs:: Independent       Care Management Follow Up    Length of Stay (days): 0    Expected Discharge Date: 01/17/21     Concerns to be Addressed: discharge planning, denies needs/concerns at this time     Patient plan of care discussed at interdisciplinary rounds: Yes    Anticipated Discharge Disposition: Home     Anticipated Discharge Services: None  Anticipated Discharge DME: None    Education Provided on the Discharge Plan:  Yes  Patient/Family in Agreement with the Plan: yes    Referrals Placed by CM/SW:  None  Private pay costs discussed: Not applicable    Additional Information:  Met with patient at bedside. Patient lives at [facility]. Patient does not have any services. Patient does not use any DME/ HME. Patient manages ADL's and medications independently.     PT consult pending.    Patient anticipates that daughter in law, Irwin, will provide transportation home upon discharge.    CM will continue to follow for discharge planning.       Christine Anaya, RN    Christine Anaya RN Case Manager  Inpatient Care Coordination  [facility]   [phone]

## 2021-01-17 NOTE — DISCHARGE INSTRUCTIONS
Your home care referral was sent to [facility]  If you haven't heard from them within the next 24-48 hours,  Please call them at [phone]

## 2021-01-17 NOTE — PLAN OF CARE
"PRIMARY DIAGNOSIS: FALL/CYSTITIS  OUTPATIENT/OBSERVATION GOALS TO BE MET BEFORE DISCHARGE:  1. Orthostatic performed: N/A    2. Diagnostic testing complete & at baseline neurologic testing: XR knees and R greater toe, and CT C spine and head complete, echocardiogram ordered for tomorrow    3. Cleared by consultants (if involved): N/A    4. Interpretation of cardiac rhythm per telemetry tech: SB with HR 58    5. Tolerating adequate PO diet and medications: Yes    6. Return to near baseline physical activity or neurologic status: Yes     BP (!) 149/42 (BP Location: Right arm)   Pulse 55   Temp 99.5  F (37.5  C) (Oral)   Resp 18   Ht 1.613 m (5' 3.5\")   Wt 62.8 kg (138 lb 6.4 oz)   SpO2 94%   BMI 24.13 kg/m      Discharge Planner Nurse   Safe discharge environment identified: Yes  Barriers to discharge: Yes       Entered by: Martha Peterson 01/17/2021 12:43 AM     Please review provider order for any additional goals.   Nurse to notify provider when observation goals have been met and patient is ready for discharge.    Patient alert and oriented x4 but forgetful at times. Complains of pain in L knee, L lower back, and R greater toe. Scheduled lidocaine patches applied with pain relief. Generalized bruising, R temple laceration with sutures, R knee abrasion. Telemetry monitoring. HTN improved with scheduled medications. Up SBA with gait belt. Tolerating regular diet. Reports diarrhea over \"last 30 years\" and has been \"seen by multiple specialists\", but no diarrhea noted since admission - one-time dose imodium administered, will continue to monitor. IV SL - Q24H IV rocephin. Echocardiogram ordered for tomorrow. Will continue with plan of care.    "

## 2021-01-17 NOTE — PLAN OF CARE
"PRIMARY DIAGNOSIS: FALL/CYSTITIS  OUTPATIENT/OBSERVATION GOALS TO BE MET BEFORE DISCHARGE:  1. Orthostatic performed: N/A    2. Diagnostic testing complete & at baseline neurologic testing: XR knees and R greater toe, and CT C spine and head complete, echocardiogram to be done today    3. Cleared by consultants (if involved): N/A    4. Interpretation of cardiac rhythm per telemetry tech: SB with HR mid 50's    5. Tolerating adequate PO diet and medications: Yes    6. Return to near baseline physical activity or neurologic status: Yes     Discharge Planner Nurse   Safe discharge environment identified: Yes  Barriers to discharge: Yes       Entered by: Yoly Polk 01/17/2021      Please review provider order for any additional goals.   Nurse to notify provider when observation goals have been met and patient is ready for discharge.    Patient sleeping this am- not wanting to wake up. BP elevated- will give scheduled BP meds and reassess. Plan for echo and PT to see. LAC on R temporal c/d/i. BP (!) 176/56 (BP Location: Right arm)   Pulse 53   Temp 97.5  F (36.4  C) (Oral)   Resp 18   Ht 1.613 m (5' 3.5\")   Wt 62.8 kg (138 lb 6.4 oz)   SpO2 91%   BMI 24.13 kg/m    "

## 2021-01-17 NOTE — PLAN OF CARE
PRIMARY DIAGNOSIS: FALL/CYSTITIS  OUTPATIENT/OBSERVATION GOALS TO BE MET BEFORE DISCHARGE:  1. Orthostatic performed: N/A    2. Diagnostic testing complete & at baseline neurologic testing: Yes    3. Cleared by consultants (if involved): PT recommending home with home PT    4. Interpretation of cardiac rhythm per telemetry tech: SB/SR- 50-mid 60's    5. Tolerating adequate PO diet and medications: Yes    6. Return to near baseline physical activity or neurologic status: Yes     Discharge Planner Nurse   Safe discharge environment identified: Yes  Barriers to discharge: Yes       Entered by: Yoly Polk 01/17/2021      Please review provider order for any additional goals.   Nurse to notify provider when observation goals have been met and patient is ready for discharge.    AOx4-forgetful. PT recommending home with home PT. Metoprolol decreased-order to hold tonight's dose d/t sinus bradycardia throughout shift. Amlodipine increased. Echo completed-see results. Tylenol given x1 for pain- generalized but worst is in back. Pt. States lido patches work best- patch free period right now. Plan to Continue to monitor on tele and probable discharge tomorrow.

## 2021-01-18 ENCOUNTER — APPOINTMENT (OUTPATIENT)
Dept: CT IMAGING | Facility: CLINIC | Age: 86
End: 2021-01-18
Attending: INTERNAL MEDICINE
Payer: COMMERCIAL

## 2021-01-18 ENCOUNTER — APPOINTMENT (OUTPATIENT)
Dept: PHYSICAL THERAPY | Facility: CLINIC | Age: 86
End: 2021-01-18
Payer: COMMERCIAL

## 2021-01-18 ENCOUNTER — APPOINTMENT (OUTPATIENT)
Dept: CARDIOLOGY | Facility: CLINIC | Age: 86
End: 2021-01-18
Attending: INTERNAL MEDICINE
Payer: COMMERCIAL

## 2021-01-18 VITALS
OXYGEN SATURATION: 94 % | RESPIRATION RATE: 16 BRPM | BODY MASS INDEX: 23.63 KG/M2 | HEIGHT: 64 IN | SYSTOLIC BLOOD PRESSURE: 124 MMHG | DIASTOLIC BLOOD PRESSURE: 43 MMHG | WEIGHT: 138.4 LBS | TEMPERATURE: 97.4 F | HEART RATE: 47 BPM

## 2021-01-18 LAB
ANION GAP SERPL CALCULATED.3IONS-SCNC: 3 MMOL/L (ref 3–14)
BACTERIA SPEC CULT: ABNORMAL
BUN SERPL-MCNC: 17 MG/DL (ref 7–30)
CALCIUM SERPL-MCNC: 8.3 MG/DL (ref 8.5–10.1)
CHLORIDE SERPL-SCNC: 111 MMOL/L (ref 94–109)
CO2 SERPL-SCNC: 27 MMOL/L (ref 20–32)
CREAT SERPL-MCNC: 0.79 MG/DL (ref 0.52–1.04)
GFR SERPL CREATININE-BSD FRML MDRD: 69 ML/MIN/{1.73_M2}
GLUCOSE SERPL-MCNC: 102 MG/DL (ref 70–99)
HGB BLD-MCNC: 7.2 G/DL (ref 11.7–15.7)
HGB BLD-MCNC: 8 G/DL (ref 11.7–15.7)
INTERPRETATION ECG - MUSE: NORMAL
Lab: ABNORMAL
POTASSIUM SERPL-SCNC: 3.5 MMOL/L (ref 3.4–5.3)
SODIUM SERPL-SCNC: 141 MMOL/L (ref 133–144)
SPECIMEN SOURCE: ABNORMAL

## 2021-01-18 PROCEDURE — 99207 PR CDG-CODE CATEGORY CHANGED: CPT | Performed by: INTERNAL MEDICINE

## 2021-01-18 PROCEDURE — 85018 HEMOGLOBIN: CPT | Performed by: INTERNAL MEDICINE

## 2021-01-18 PROCEDURE — 93246 EXT ECG>7D<15D RECORDING: CPT

## 2021-01-18 PROCEDURE — 74176 CT ABD & PELVIS W/O CONTRAST: CPT

## 2021-01-18 PROCEDURE — G0378 HOSPITAL OBSERVATION PER HR: HCPCS

## 2021-01-18 PROCEDURE — 36415 COLL VENOUS BLD VENIPUNCTURE: CPT | Performed by: INTERNAL MEDICINE

## 2021-01-18 PROCEDURE — 99217 PR OBSERVATION CARE DISCHARGE: CPT | Performed by: INTERNAL MEDICINE

## 2021-01-18 PROCEDURE — 250N000013 HC RX MED GY IP 250 OP 250 PS 637: Performed by: INTERNAL MEDICINE

## 2021-01-18 PROCEDURE — 80048 BASIC METABOLIC PNL TOTAL CA: CPT | Performed by: INTERNAL MEDICINE

## 2021-01-18 PROCEDURE — 97530 THERAPEUTIC ACTIVITIES: CPT | Mod: GP | Performed by: PHYSICAL THERAPIST

## 2021-01-18 RX ORDER — METHENAMINE HIPPURATE 1000 MG/1
1 TABLET ORAL 2 TIMES DAILY
Qty: 60 TABLET | Refills: 0 | Status: SHIPPED | OUTPATIENT
Start: 2021-01-18 | End: 2021-02-11

## 2021-01-18 RX ADMIN — ENALAPRIL MALEATE 20 MG: 10 TABLET ORAL at 09:14

## 2021-01-18 RX ADMIN — LOPERAMIDE HYDROCHLORIDE 2 MG: 2 CAPSULE ORAL at 14:36

## 2021-01-18 RX ADMIN — AMLODIPINE BESYLATE 5 MG: 5 TABLET ORAL at 09:14

## 2021-01-18 RX ADMIN — HYDROXYCHLOROQUINE SULFATE 200 MG: 200 TABLET, FILM COATED ORAL at 09:15

## 2021-01-18 RX ADMIN — HYDRALAZINE HYDROCHLORIDE 25 MG: 25 TABLET, FILM COATED ORAL at 09:15

## 2021-01-18 RX ADMIN — PAROXETINE HYDROCHLORIDE 25 MG: 10 TABLET, FILM COATED ORAL at 09:14

## 2021-01-18 RX ADMIN — METOPROLOL TARTRATE 12.5 MG: 25 TABLET ORAL at 09:15

## 2021-01-18 RX ADMIN — CEFUROXIME AXETIL 500 MG: 250 TABLET ORAL at 09:16

## 2021-01-18 RX ADMIN — HYDRALAZINE HYDROCHLORIDE 25 MG: 25 TABLET, FILM COATED ORAL at 14:36

## 2021-01-18 RX ADMIN — ACETAMINOPHEN 650 MG: 325 TABLET, FILM COATED ORAL at 09:20

## 2021-01-18 NOTE — PLAN OF CARE
Patient's After Visit Summary was reviewed with patient.   Patient verbalized understanding of After Visit Summary, recommended follow up and was given an opportunity to ask questions.   Discharge medications sent home with patient/family: No   Discharged with son via private ride.   OBSERVATION patient END time: 4:58pm

## 2021-01-18 NOTE — PLAN OF CARE
"PRIMARY DIAGNOSIS: SYNCOPE/TIA  OUTPATIENT/OBSERVATION GOALS TO BE MET BEFORE DISCHARGE:  1. Orthostatic performed: N/A    2. Diagnostic testing complete & at baseline neurologic testing: Yes    3. Cleared by consultants (if involved): PT recommends home PT    4. Interpretation of cardiac rhythm per telemetry tech: SB with HR 50s    5. Tolerating adequate PO diet and medications: Yes    6. Return to near baseline physical activity or neurologic status: Yes     BP (!) 127/37 (BP Location: Right arm)   Pulse 67   Temp 96  F (35.6  C) (Oral)   Resp 18   Ht 1.613 m (5' 3.5\")   Wt 62.8 kg (138 lb 6.4 oz)   SpO2 96%   BMI 24.13 kg/m      Discharge Planner Nurse   Safe discharge environment identified: Yes  Barriers to discharge: Yes       Entered by: Martha Peterson 01/18/2021 1:13 AM     Please review provider order for any additional goals.   Nurse to notify provider when observation goals have been met and patient is ready for discharge.    Patient alert and oriented x4 but forgetful at times. States pain has improved and scheduled lidocaine patches are very effective for her lower back and L knee pain. Up SBA with gait belt. Tolerating regular diet. No IV access. PM dose metoprolol held d/t continued bradycardia per MD. PT recommends home with home PT, likely discharge back to independent living facility tomorrow. Will continue with plan of care.    "

## 2021-01-18 NOTE — PROGRESS NOTES
Paged provider Jose requesting to add home care RN orders for the discharge.    Christine Anaya RN Case Manager  Inpatient Care Coordination  [facility]   [phone]

## 2021-01-18 NOTE — PLAN OF CARE
"PRIMARY DIAGNOSIS: SYNCOPE/TIA  OUTPATIENT/OBSERVATION GOALS TO BE MET BEFORE DISCHARGE:  1. Orthostatic performed: N/A    2. Diagnostic testing complete & at baseline neurologic testing: Yes    3. Cleared by consultants (if involved): PT recommends home PT    4. Interpretation of cardiac rhythm per telemetry tech: SB with HR 50s    5. Tolerating adequate PO diet and medications: Yes    6. Return to near baseline physical activity or neurologic status: Yes     BP (!) 129/37 (BP Location: Right arm)   Pulse 51   Temp 97.8  F (36.6  C) (Oral)   Resp 18   Ht 1.613 m (5' 3.5\")   Wt 62.8 kg (138 lb 6.4 oz)   SpO2 94%   BMI 24.13 kg/m      Discharge Planner Nurse   Safe discharge environment identified: Yes  Barriers to discharge: Yes       Entered by: Martha Peterson 01/17/2021 11:04 PM     Please review provider order for any additional goals.   Nurse to notify provider when observation goals have been met and patient is ready for discharge.    Patient alert and oriented x4 but forgetful at times. States pain has improved and scheduled lidocaine patches are very effective for her lower back and L knee pain. Up SBA with gait belt. Tolerating regular diet. No IV access. PM dose metoprolol held d/t continued bradycardia per MD. PT recommends home with home PT, likely discharge back to independent living facility tomorrow. Will continue with plan of care.    "

## 2021-01-18 NOTE — PLAN OF CARE
"PRIMARY DIAGNOSIS: FALL/CYSTITIS  OUTPATIENT/OBSERVATION GOALS TO BE MET BEFORE DISCHARGE:  1. Orthostatic performed: N/A    2. Diagnostic testing complete & at baseline neurologic testing: Yes    3. Cleared by consultants (if involved): PT recommending home with home PT    4. Interpretation of cardiac rhythm per telemetry tech: SR- 60-70 per tele    5. Tolerating adequate PO diet and medications: Yes    6. Return to near baseline physical activity or neurologic status: Yes     Discharge Planner Nurse   Safe discharge environment identified: Yes  Barriers to discharge: No       Entered by: Yoly Polk 01/18/2021      Please review provider order for any additional goals.   Nurse to notify provider when observation goals have been met and patient is ready for discharge.    AOx4-forgetful. PT recommending home with home PT. Metoprolol decreased d/t sinus bradycardia throughout shift. Amlodipine increased. BP elevated this am-scheduled BP meds given and will re-assess. Tylenol given x1 for pain- generalized but worst is in back. Pt. States lido patches work best- patch free period right now.  Hgb dropped today 8.2 -->7.2.  Repeat hgb and abd ct today. BP (!) 172/47 (BP Location: Right arm)   Pulse 60   Temp 98.1  F (36.7  C) (Oral)   Resp 16   Ht 1.613 m (5' 3.5\")   Wt 62.8 kg (138 lb 6.4 oz)   SpO2 92%   BMI 24.13 kg/m      "

## 2021-01-18 NOTE — PLAN OF CARE
"PRIMARY DIAGNOSIS: FALL/CYSTITIS  OUTPATIENT/OBSERVATION GOALS TO BE MET BEFORE DISCHARGE:  1. Orthostatic performed: N/A    2. Diagnostic testing complete & at baseline neurologic testing: Yes    3. Cleared by consultants (if involved): PT recommending home with home PT    4. Interpretation of cardiac rhythm per telemetry tech: SR- 60-70 per tele    5. Tolerating adequate PO diet and medications: Yes    6. Return to near baseline physical activity or neurologic status: Yes     Discharge Planner Nurse   Safe discharge environment identified: Yes  Barriers to discharge: No       Entered by: Yoly Polk 01/18/2021      Please review provider order for any additional goals.   Nurse to notify provider when observation goals have been met and patient is ready for discharge.    AOx4-forgetful. PT recommending home with home PT. Metoprolol decreased d/t sinus bradycardia throughout shift. Amlodipine increased. /35 this afternoon when working with PT-pt reported feeling \"funny in the head\". Hgb dropped today 8.2 -->7.2.  Repeat hgb at 1300 and abd ct today.  "

## 2021-01-18 NOTE — DISCHARGE SUMMARY
[facility]  Hospitalist Discharge Summary       Date of Admission:  1/16/2021  Date of Discharge:  1/18/2021  4:58 PM  Discharging Provider: Yessy Garcia MD      Discharge Diagnoses   #E.Coli UTI  #Recurrent falls  #Left knee and right great toe bruising  #Closed head injury with scalp laceration and hematoma   #Acute on chronic anemia  #Mild bradycardia  #Chronic pericardial effusion  #Mild aortic stenosis  #SLE  #Hyperlipidemia   #Incidental findings of calcified liver lesion and nodular appearance of pancreas    Follow-ups Needed After Discharge   Follow-up Appointments     Follow-up and recommended labs and tests       Follow up with primary care provider, Cortney Vanessa, within 7 days   for hospital follow- up.  The following labs/tests are recommended:   Hemoglobin    Stop taking the metoprolol medication as it might be causing slow heart   beat and contributing to your dizziness. Wear the heart monitor for 2   weeks to monitor your pulse rate and rhythm at home.     -- Check your BP regularly, if it is staying high e.g. two systolic   readings > 160, then consider increasing the amlodipine to 5 mg twice a   day. Call your cardiology clinic if any questions    -- Finish the course of Keflex antibiotic for UTI    -- Starting you on a new medication called Hiprex to help prevent UTI's    -- Your CT abdomen showed incidental finding of a calcified liver lesion,   and fatty infiltration of pancreas, this can be followed up with MRI scan.   Discuss with your PCP             Unresulted Labs Ordered in the Past 30 Days of this Admission     No orders found from 12/17/2020 to 1/17/2021.          Hospital Course     Swathi Dukes is a 85 year old female with a past medical history significant for SLE, mild aortic stenosis, chronic pericardial effusion, who presented to the hospital with concerns for falls and UTI.     Patient was recently hospitalized at [facility]  about a week ago with concerns for neck and chest pain.  Underwent coronary angiogram which showed only trivial CAD.  The effusion was felt to be chronic in nature related to her rheumatological disease.  Has a remote history of requiring a pericardial window > 10 years ago.  Her pain was felt to be musculoskeletal in etiology and she was discharged home with addition of metoprolol.     Patient reports that she has been doing well since discharge home, when she had onset of fevers and chills a day PTA. She does have a history of frequent UTIs and presented to the ER that night.  She was found to have a UTI and was sent home with oral antibiotics.  Patient reports that she was sitting in a recliner when she felt like she had to have a bowel movement and she tried to rush to the bathroom.  She fell down and hit the edge of furniture with laceration to her scalp. She had significant bleeding with several soaking towels and was brought by EMS to the ER.  It looks like she required an epi injection to the area and some stitches to stop this scalp bleeding.  Also reports some soreness on her right knee and left foot great toe, with XR negative for fracture.      Very briefly, patient was treated with antibiotics for the UTI. Her trauma evaluation was negative. She did have some decrease in her Hb from baseline but stable in 7-8 range. She was noted to have some bradycardia and her metoprolol was stopped. At this point, stable for discharge home with assistance from family.     Detailed discussion of hospital issues as follows:    #E.Coli UTI.  Received IV ceftriaxone, lost IV access. Change to PO antibiotics. Cultures show it is sensitive to cephalosporins. Patient already has a prescription for Keflex, and her son confirmed that they had picked it. Advised to finish that.     Her son also mentioned hx of frequent UTI's. Recommended outpatient evaluation with urology. For now, will add Hiprex (has normal renal function)  to see if it can help.      #Recurrent falls  #Left knee and right great toe bruising.  X-rays negative for fracture.    #Closed head injury with scalp laceration and hematoma   It is unclear if patient truly had a syncopal event or not. Patient initially reported LOC and then denied it. Suspect worsening weakness due to her UTI. She had extensive evaluation and imaging including CT head, neck, CT chest, CT abdomen/pelvis, and XRs which did not show any acute traumatic injuries or fractures. Currently, her pain is under control. She has done well with PT and will discharge home with home health care. Her family will also be providing additional assistance for now.     In terms of the etiology of the falls, suspect worsening related to her UTI. Of note, she was also started on metoprolol recently about 2 weeks ago when she was admitted with chest pain. She was noticed to have mild bradycardia with HR in 50's and occasionally dropping to high 40's during this stay. She did report some dizziness on an occasion with this. At this point, will stop the metoprolol and discharge her home with a zio patch to r/o major bradycardia as the cause. She also has hx of chronic pericardial effusion, a limited ECHO was repeated which shows this is stable.     #Acute on chronic anemia. With acute anemia due to blood loss from the laceration. Pt reports she had significant bleeding form temple area after the fall.   -- Hb in 9-10 range at baseline, decreased but stable in 7-8 range. No other acute pathology noted on extensive imaging.      #Chronic pericardial effusion repeat limited echo as above    #HTN. Stopping the metoprolol as above. Pt is on numerous other agents including amlodipine, hydralazine which can be increased. She does run low diastolic BP. I have advised monitoring her BP at home, if staying elevated then consider increasing amlodipine to 5 mg BID. Her hydralazine could also be further increased if needed.      #Incidental findings:   Calcified lesion on liver, which appears stable per radiology and nodular appearance of the pancreas which is likely due to fatty infiltration per report. An MRI could be considered for follow up in the future. I have discussed these findings with the patient and advised to discuss with PCP.    This is a complex patient with multiple medical issues. At this time she is doing well, with pain adequately controlled, and ambulating. Discussed option of further observation in hospital although pt would really like to go home and would also get more assistance from her son. Updated son Brent over phone per pt permission and answered his questions.     #Mild aortic stenosis  #SLE  #Hyperlipidemia     Consultations This Hospital Stay   PHYSICAL THERAPY ADULT IP CONSULT  CARE MANAGEMENT / SOCIAL WORK IP CONSULT    Code Status   No CPR- Do NOT Intubate    Time Spent on this Encounter   I, Yessy Garcia MD, personally saw the patient today and spent greater than 30 minutes discharging this patient.       Yessy Garcia MD  [facility]  ______________________________________________________________________    Physical Exam   Vital Signs: Temp: 97.4  F (36.3  C) Temp src: Oral BP: 124/43 Pulse: (!) 47   Resp: 16 SpO2: 94 % O2 Device: None (Room air)    Weight: 138 lbs 6.4 oz  Patient is awake and alert, no acute distress, the bruising on right side of scalp appears to be improving  Lungs are clear to auscultation   Cardiac exam reveals regular rate and rhythm, normal S1, S2  Abdomen is soft, non-tender, no rebound or guarding  Bruising on left knee and right toe, but intact ROM and no major swelling        Primary Care Physician   Cortney Vanessa    Discharge Disposition   Discharged to home  Condition at discharge: Stable        Discharge Orders      Home Care PT Referral for Hospital Discharge      Home Care OT Referral for Hospital Discharge      Home care nursing  referral      Reason for your hospital stay    Fall  UTI  Slow heart beat     Follow-up and recommended labs and tests     Follow up with primary care provider, Cortney Vanessa, within 7 days for hospital follow- up.  The following labs/tests are recommended: Hemoglobin    Stop taking the metoprolol medication as it might be causing slow heart beat and contributing to your dizziness. Wear the heart monitor for 2 weeks to monitor your pulse rate and rhythm at home.     -- Check your BP regularly, if it is staying high e.g. two systolic readings > 160, then consider increasing the amlodipine to 5 mg twice a day. Call your cardiology clinic if any questions    -- Finish the course of Keflex antibiotic for UTI    -- Starting you on a new medication called Hiprex to help prevent UTI's    -- Your CT abdomen showed incidental finding of a calcified liver lesion, and fatty infiltration of pancreas, this can be followed up with MRI scan. Discuss with your PCP     Activity    Your activity upon discharge: activity as tolerated     MD face to face encounter    Documentation of Face to Face and Certification for Home Health Services    I certify that patient: Swathi Dukes is under my care and that I, or a nurse practitioner or physician's assistant working with me, had a face-to-face encounter that meets the physician face-to-face encounter requirements with this patient on: 1/18/2021.    This encounter with the patient was in whole, or in part, for the following medical condition, which is the primary reason for home health care: UTI, fall, scalp laceration.    I certify that, based on my findings, the following services are medically necessary home health services: Occupational Therapy and Physical Therapy.    My clinical findings support the need for the above services because: Occupational Therapy Services are needed to assess and treat cognitive ability and address ADL safety due to impairment in activity tolerance.  and Physical Therapy Services are needed to assess and treat the following functional impairments: mobility.    Further, I certify that my clinical findings support that this patient is homebound (i.e. absences from home require considerable and taxing effort and are for medical reasons or Jew services or infrequently or of short duration when for other reasons) because: Requires assistance of another person or specialized equipment to access medical services because patient: Is unable to exit home safely on own    Based on the above findings. I certify that this patient is confined to the home and needs intermittent skilled nursing care, physical therapy and/or speech therapy.  The patient is under my care, and I have initiated the establishment of the plan of care.  This patient will be followed by a physician who will periodically review the plan of care.  Physician/Provider to provide follow up care: Cortney Vanessa    Attending hospital physician (the Medicare certified [place] provider): Yessy Garcia MD  Physician Signature: See electronic signature associated with these discharge orders.  Date: 1/18/2021     Leadless EKG Monitor 8 to 14 Days    bradycardia, please forward results to patient's cardiology team     Walker Order    DME Documentation:   Describe the reason for need to support medical necessity: Impaired gait and mobility.     I, the undersigned, certify that the above prescribed supplies are medically necessary for this patient and is both reasonable and necessary in reference to accepted standards of medical and necessary in reference to accepted standards of medical practice in the treatment of this patient's condition and is not prescribed as a convenience.     Diet    Follow this diet upon discharge:      Regular Diet Adult     Discharge Medications   Current Discharge Medication List      START taking these medications    Details   methenamine (HIPREX) 1 g tablet Take 1 tablet  (1 g) by mouth 2 times daily  Qty: 60 tablet, Refills: 0    Comments: Future refills by PCP Dr. Cortney Vanessa with phone number [phone].  Associated Diagnoses: Acute cystitis without hematuria         CONTINUE these medications which have NOT CHANGED    Details   ALPRAZolam (XANAX) 0.5 MG tablet Take 0.5 mg by mouth daily       amLODIPine (NORVASC) 5 MG tablet Take 1 tablet (5 mg) by mouth daily  Qty: 90 tablet, Refills: 0    Comments: Patient to see provider before the next refill.  Associated Diagnoses: Essential hypertension      brimonidine (ALPHAGAN P) 0.1 % ophthalmic solution Place 1 drop into both eyes 2 times daily      dorzolamide (TRUSOPT) 2 % ophthalmic solution Place 1 drop into both eyes 2 times daily      enalapril (VASOTEC) 20 MG tablet Take 1 tablet (20 mg) by mouth 2 times daily  Qty: 180 tablet, Refills: 3    Associated Diagnoses: Essential hypertension      folic acid (FOLVITE) 1 MG tablet Take 1 mg by mouth daily      hydrALAZINE (APRESOLINE) 25 MG tablet Take 1 tablet (25 mg) by mouth 3 times daily  Qty: 270 tablet, Refills: 3    Associated Diagnoses: Essential hypertension      hydroxychloroquine (PLAQUENIL) 200 MG tablet Take 200 mg by mouth 2 times daily      latanoprost (XALATAN) 0.005 % ophthalmic solution Place 1 drop into both eyes At Bedtime       OLANZapine (ZYPREXA) 10 MG tablet Take 10 mg by mouth At Bedtime       !! PARoxetine (PAXIL) 10 MG tablet Take 5 mg by mouth every morning With 20mg to = 25mg daily      !! PARoxetine (PAXIL) 20 MG tablet Take 20 mg by mouth daily Plus 5mg = 25mg daily      simvastatin (ZOCOR) 10 MG tablet Take 1 tablet (10 mg) by mouth At Bedtime  Qty: 90 tablet, Refills: 3    Associated Diagnoses: Mixed hyperlipidemia       !! - Potential duplicate medications found. Please discuss with provider.      STOP taking these medications       metoprolol tartrate (LOPRESSOR) 25 MG tablet Comments:   Reason for Stopping:             Allergies   Allergies    Allergen Reactions     Diclofenac Anaphylaxis     Iodine Anaphylaxis     Contrast  Pt states anaphylactic reaction to ct contrast about 20 years ago     Aspirin        Significant Results and Procedures   Results for orders placed or performed during the hospital encounter of 01/16/21   XR Knee Bilateral 3 Views    Narrative    KNEE BILATERAL THREE VIEWS  1/16/2021 12:15 PM     CLINICAL INFORMATION: Fall, bilateral knee pain and bruising.    ADDITIONAL INFORMATION: None.    COMPARISON: None.    FINDINGS:  Postoperative changes of left total knee arthroplasty and  patellar resurfacing. Components appear well seated. Chronic  enthesitis along the superior pole of the patella at the quadriceps  attachment. Tiny left knee joint effusion. No evidence for fracture.  The right knee demonstrates incompletely visualized postoperative  changes within the distal aspect of the right femur. The knee joint  itself is negative for fracture or significant compartment narrowing.  There is a tiny calcification along the medial margin of the medial  tibial plateau which is likely chronic but could potentially represent  capsular or MCL injury. There is no significant intra-articular  effusion, but there is soft tissue swelling external and anterior to  the patella.    LUNA ADAMS MD   Head CT w/o contrast    Narrative    CT SCAN OF THE HEAD WITHOUT CONTRAST   1/16/2021 12:01 PM     HISTORY: Fall, head trauma.    TECHNIQUE:  Axial images of the head and coronal reformations without  IV contrast material. Radiation dose for this scan was reduced using  automated exposure control, adjustment of the mA and/or kV according  to patient size, or iterative reconstruction technique.    COMPARISON: Head CT 8/27/2018.    FINDINGS: There is no evidence of intracranial hemorrhage, mass, acute  infarct or anomaly. The ventricles are prominent in size, but not  significantly changed since prior head CT 8/27/2019. Moderate diffuse  parenchymal  volume loss. Mild patchy periventricular white matter  hypodensities which are nonspecific, but likely related to chronic  microvascular ischemic disease.     The visualized portions of the sinuses and mastoids appear normal.    Right lateral scalp soft tissue injury without underlying fracture  appreciated.      Impression    IMPRESSION:     1. No evidence of acute intracranial hemorrhage, mass, or herniation.  2. Moderate diffuse parenchymal volume loss and mild white matter  changes likely due to chronic microvascular ischemic disease.   3. Right lateral scalp soft tissue injury without underlying fracture  appreciated.    ALTA LUQUE MD   Cervical spine CT w/o contrast    Narrative    CT CERVICAL SPINE WITHOUT CONTRAST   1/16/2021 12:02 PM     HISTORY: Fall, head trauma.     TECHNIQUE: Axial images of the cervical spine were obtained without  intravenous contrast. Multiplanar reformations were performed.   Radiation dose for this scan was reduced using automated exposure  control, adjustment of the mA and/or kV according to patient size, or  iterative reconstruction technique.    COMPARISON: None.    FINDINGS: No evidence of fracture. No prevertebral soft tissue  swelling. Alignment is normal. Vertebral body height is maintained. No  destructive osseous lesions. Marked degenerative endplate changes and  loss of disc height throughout the cervical spine. Multiple levels of  facet hypertrophy. Mild spinal canal narrowings at C3-C4, C4-C5, and  C5-C6 due to disc osteophytes. Multiple levels of neural foraminal  narrowing due to disc osteophytes and facet hypertrophy, particularly  from C3-C4 through C6-C7.     Visualized paraspinous tissues: Unremarkable.      Impression    IMPRESSION:   1. No evidence of acute fracture or subluxation in the cervical spine.  2. Degenerative changes in the cervical spine as described above.     ALTA LUQUE MD   XR Toe Right G/E 2 Views    Narrative    RIGHT TOE TWO OR MORE VIEWS  1/16/2021 12:16 PM     CLINICAL INFORMATION: Toe pain after fall.    ADDITIONAL INFORMATION: None.    COMPARISON: None.    FINDINGS:  Degenerative change at the first MTP joint and IP joint of  the great toe. Degenerative change of multiple IP joints of the  additional toes. Degenerative change across the midfoot. No evidence  for fracture. No erosive changes are identified.    LUNA ADAMS MD   CT Abdomen Pelvis w/o Contrast    Narrative    CT ABDOMEN/PELVIS WITHOUT CONTRAST January 18, 2021 12:15 PM     HISTORY: Abdominal pain, acute, nonlocalized. Right-sided abdominal  pain, recent fall, rule out acute injuries, known pericardial effusion  and lupus.    TECHNIQUE: Noncontrast CT abdomen and pelvis was performed. Radiation  dose for this scan was reduced using automated exposure control,  adjustment of the mA and/or kV according to patient size, or iterative  reconstruction technique.    COMPARISON: Chest CT on 1/2/2021.    FINDINGS:   Lung bases: Small right pleural effusion and associated basilar  atelectasis/consolidation. Mild left basilar pulmonary opacities,  likely atelectasis. Partially visualized moderate size pericardial  effusion.    Abdomen/pelvis: Limited evaluation of the abdominal organs is due to  lack of IV contrast. Calcification along the posterior aspect of the  left hepatic lobe in a subcapsular area, appear similar to 1/2/2021  chest CT, not well evaluated on this noncontrast exam. Otherwise the  unenhanced liver, gallbladder, spleen and adrenal glands are grossly  unremarkable. Diffuse nodular appearance of the pancreas, likely due  to underlying diffuse fatty infiltration. No radiodense kidney stones  or hydronephrosis in either kidney.    No abnormally dilated bowel loops. Surgical clips along the cecum,  likely from prior appendectomy. Small posterior stomach diverticulum  (series 3 image 22). No significant free fluid in the abdomen and  pelvis. No free peritoneal or portal venous  gas. Moderate  atherosclerotic vascular calcification of the abdominal aorta and  iliac vessels.    Bones and soft tissues: Multilevel degenerative changes of the spine.  Postsurgical changes of right femoral ORIF. Few old lateral right rib  fractures.      Impression    IMPRESSION:  1. No evidence of acute injury in the abdomen and pelvis.  2. Partially visualized moderate size pericardial effusion.  3. Small right pleural effusion and associated basilar  atelectasis/consolidation, minimally increased as compared to  2020 exam.  4. Calcified lesion along the posterior aspect of the left hepatic  lobe in a subcapsular area, appear similar to 2020 chest CT, not  well evaluated on this noncontrast exam  5. Diffuse nodular appearance of the pancreas, likely due to diffuse  fatty infiltration, no definite solid pancreatic mass visualized. This  can be further evaluated with abdominal MRI/MRCP if clinically  warranted.    ROSLYN CALLEJAS MD   Echocardiogram Limited    Narrative    [id number]  [id number]  [id number]  [id number]^[id number]^UR           [facility]  Echocardiography Laboratory  [address] [place] [address]        Name: RYLEE BRICE  MRN: [id number]  : 1935  Study Date: 2021 07:56 AM  Age: 85 yrs  Gender: Female  Patient Location: [facility]  Reason For Study: Pericardial Effusion  Ordering Physician: VALERI CROW  Referring Physician: MORENO CHAVEZ  Performed By: Barbara Benavidez     BSA: 1.7 m2  Height: 63 in  Weight: 138 lb  HR: 53  BP: 174/59 mmHg  _____________________________________________________________________________  __        Procedure  Limited Portable Echo Adult.  _____________________________________________________________________________  __        Interpretation Summary     Left ventricular systolic function is normal.  The visual ejection fraction is estimated at 60-65%.  The right ventricle is normal in structure, function and  size.  Mild-moderate aortic regurgitation. Mild aortic stenosis. Mean gradient 13  mmHg.  Localized moderate pericardial effusion adjacent to the inferolateral  (posterior) wall. Diameter 1.8 cm. Mild septal flattening is present but  insignificant TR and MV Doppler inflow profile variation to suggestive  significant constrictive physiology. No RV or RA diastolic collapse.  IVC is normal.        Compared to study dated 1/3/2021, findings are overall similar.  _____________________________________________________________________________  __        Left Ventricle  The left ventricle is normal in structure, function and size. Left ventricular  systolic function is normal. The visual ejection fraction is estimated at 60-  65%. Normal left ventricular wall motion.     Right Ventricle  The right ventricle is normal in structure, function and size.     Atria  The left atrium is not well visualized. Right atrial size is normal.     Mitral Valve  The mitral valve is normal in structure and function. There is mild (1+)  mitral regurgitation.        Tricuspid Valve  The tricuspid valve is normal in structure and function. There is mild (1+)  tricuspid regurgitation. Right ventricular systolic pressure could not be  approximated due to inadequate tricuspid regurgitation.     Aortic Valve  The aortic valve is trileaflet. There is mild to moderate (1-2+) aortic  regurgitation. Mild valvular aortic stenosis. The mean AoV pressure gradient  is 13.3 mmHg.     Pulmonic Valve  The pulmonic valve is normal in structure and function. There is mild (1+)  pulmonic valvular regurgitation.     Vessels  The aortic root is normal size. The inferior vena cava is normal.     Pericardium  Localized moderate pericardial effusion adjacent to the inferolateral  (posterior) wall. Diameter 1.8 cm. Mild septal flattening is present but  insignificant TR and MV Doppler inflow profile variation to suggestive  significant constrictive physiology. The  mitral valve inflow Doppler reveals  no significant respiratory variation. The tricuspid valve inflow Doppler  reveals no significant respiratory variation. There are no echocardiographic  indications of cardiac tamponade.     _____________________________________________________________________________  __  MMode/2D Measurements & Calculations  LVOT diam: 1.7 cm  LVOT area: 2.2 cm2        Doppler Measurements & Calculations  Ao V2 max: 257.9 cm/sec  Ao max P.0 mmHg  Ao V2 mean: 170.0 cm/sec  Ao mean P.3 mmHg  Ao V2 VTI: 61.7 cm  TRISTIN(I,D): 1.4 cm2  TRISTIN(V,D): 1.4 cm2     AI P1/2t: 654.1 msec  LV V1 max PG: 10.5 mmHg  LV V1 max: 162.4 cm/sec  LV V1 VTI: 40.4 cm  SV(LVOT): 89.3 ml  SI(LVOT): 54.0 ml/m2  AV Kike Ratio (DI): 0.63  TRISTIN Index (cm2/m2): 0.88           _____________________________________________________________________________  __           Report approved by: Erin Vicente 2021 09:10 AM

## 2021-01-18 NOTE — PROGRESS NOTES
[facility]  Spoke with pt and son to discuss plans for HC.  Pt to be discharged today and has agreed to have [facility] follow with services of SN, PT and OT. Pt will be staying with son for a few days and is requesting home care start once she returns to [facility]. Patient care support center processing referral. Pt has 24 hour phone number for [facility] for any questions or concerns provided on AVS.

## 2021-01-19 ENCOUNTER — TELEPHONE (OUTPATIENT)
Dept: FAMILY MEDICINE | Facility: CLINIC | Age: 86
End: 2021-01-19

## 2021-01-19 NOTE — PLAN OF CARE
Physical Therapy Discharge Summary    Reason for therapy discharge:    Discharged to home with home therapy.    Progress towards therapy goal(s). See goals on Care Plan in [facility] electronic health record for goal details.  Goals partially met.  Barriers to achieving goals:   discharge from facility.    Therapy recommendation(s):    Continued therapy is recommended.  Rationale/Recommendations:  HHPT to maximize safety and indep with mobility.      Note: Pt not seen by documenting PT on this date. Information obtained from chart review.

## 2021-01-19 NOTE — TELEPHONE ENCOUNTER
Pt was in the hospital and does need follow up with PCP.  According to the homecare notes pt will be staying with her son for a couple days prior to returning to her assisted living facility.    Please advise if you need to see patient in office or ok for a virtual visit based on the hospital admission.    Siva Aguilar RN, BSN

## 2021-01-19 NOTE — TELEPHONE ENCOUNTER
"Son has some concerns about medication changes that occurred.    1. He is concerned about her Hgb being on the low end and if it is normal for her to be around 9-10.  He was told by hospitalist  that she tends to hover around this area and is ok but he wants to make sure this is ok for patient.    Baseline is around 10-11. This is reasonable, MCV have all been in range. Was low on hospital discharge. Needs to be rechecked when she returns in near future, will discuss at visit.     2. He is concerned about her BP and medication changes.  She was taken off metoprolol and is wearing a heart monitor currently. He states pt has been working with PCP regarding her hypertension and bradycardia.    This is appropriate for the current moment. Will see what the monitor shows.     3. He is concerned about the Kelfex that patient was placed on as she has a history of c. Diff.  She was prescribed this the day before she fell by the ER.  She was trying to get to the bathroom and fell down.  He wants to know if this is the best medication for her or would PCP recommend something different that was prescribed in the past by PCP.    Normal course of Keflex should not increase risk. I don't even see on her list any longer. Maybe she has completed?    4.  Hospital prescribed a medication to prevent UTIs called Methenamine Hippurate 1 GM Oral Tablet (HIPREX).  He is wondering if PCP feels this is appropriate for her.    Reasonable in the short term, can be helpful to prevent UTI in the long term however. Will discuss at visit.     He will be there during the telephone visit on Monday and PAL information given.    Siva Aguilar RN, BSN            Hospital/TCU/ED for chronic condition Discharge Protocol    \"Hi, my name is Siva Aguilar RN, a registered nurse, and I am calling from [facility].  I am calling to follow up and see how things are going for you after your recent emergency visit/hospital/TCU stay.\"    Tell me how you are " "doing now that you are home?\" doing ok per son      Discharge Instructions    \"Let's review your discharge instructions.  What is/are the follow-up recommendations?  Pt. Response: follow up with PCP    \"Has an appointment with your primary care provider been scheduled?\"   No (schedule appointment)    \"When you see the provider, I would recommend that you bring your medications with you.\"    Medications    \"Tell me what changed about your medicines when you discharged?\"    Changes to chronic meds?    0-1    \"What questions do you have about your medications?\"    son is concerned about stopping and starting medications by hospital and wants to make sure that PCP is aware of these changes and if she agrees with them - Questions cannot be easily answered - Epic MTM referral needed     New diagnoses of heart failure, COPD, diabetes, or MI?    No              Post Discharge Medication Reconciliation Status: discharge medications reconciled, continue medications without change.    Was MTM referral placed (*Make sure to put transitions as reason for referral)?   No    Call Summary    \"What questions or concerns do you have about your recent visit and your follow-up care?\"     none    \"If you have questions or things don't continue to improve, we encourage you contact us through the main clinic number (give number).  Even if the clinic is not open, triage nurses are available 24/7 to help you.     We would like you to know that our clinic has extended hours (provide information).  We also have urgent care (provide details on closest location and hours/contact info)\"      \"Thank you for your time and take care!\"             "

## 2021-01-22 ENCOUNTER — TELEPHONE (OUTPATIENT)
Dept: FAMILY MEDICINE | Facility: CLINIC | Age: 86
End: 2021-01-22

## 2021-01-22 DIAGNOSIS — E78.2 MIXED HYPERLIPIDEMIA: ICD-10-CM

## 2021-01-22 DIAGNOSIS — D50.9 IRON DEFICIENCY ANEMIA, UNSPECIFIED IRON DEFICIENCY ANEMIA TYPE: Primary | ICD-10-CM

## 2021-01-22 DIAGNOSIS — N30.00 ACUTE CYSTITIS WITHOUT HEMATURIA: ICD-10-CM

## 2021-01-22 DIAGNOSIS — I10 ESSENTIAL HYPERTENSION: ICD-10-CM

## 2021-01-22 RX ORDER — ENALAPRIL MALEATE 20 MG/1
20 TABLET ORAL 2 TIMES DAILY
Qty: 180 TABLET | Refills: 2 | Status: SHIPPED | OUTPATIENT
Start: 2021-01-22 | End: 2021-04-26

## 2021-01-22 RX ORDER — SIMVASTATIN 10 MG
10 TABLET ORAL AT BEDTIME
Qty: 90 TABLET | Refills: 2 | Status: SHIPPED | OUTPATIENT
Start: 2021-01-22 | End: 2021-06-04

## 2021-01-22 RX ORDER — HYDRALAZINE HYDROCHLORIDE 25 MG/1
25 TABLET, FILM COATED ORAL 3 TIMES DAILY
Qty: 270 TABLET | Refills: 3 | Status: SHIPPED | OUTPATIENT
Start: 2021-01-22 | End: 2021-06-04

## 2021-01-22 RX ORDER — AMLODIPINE BESYLATE 5 MG/1
5 TABLET ORAL DAILY
Qty: 90 TABLET | Refills: 0 | Status: SHIPPED | OUTPATIENT
Start: 2021-01-22 | End: 2021-03-26

## 2021-01-22 NOTE — TELEPHONE ENCOUNTER
[facility] nurse calling regarding patient medications and indicates that pt has a new pharmacy.  Son also called requesting a refill on brimonidine eye drops, however, this has never been prescribed by provider before.  Requested a call back from Brent regarding this.    Per Jaimie pt never took her keflex that was prescribed and she had 3 medication boxes filled with all sorts of wrong medications.  Jaimie set up her pills for the next week and will be seeing pt on Tuesday 1/26/21.  She can draw a CBC and get an UA as well.    Pt will be getting PT/OT/HHA as well.    Will follow up with Jaimie on Monday after visit     Siva Aguilra RN, BSN

## 2021-01-22 NOTE — TELEPHONE ENCOUNTER
Spoke with Brent and relayed information below.  He won't be able to attend visit on Monday but advised that PAL will reach out afterwards to give him an update on that plan of care.  He or Swathi can give PAL direct number to homecare for orders and concerns.    Siva Aguilar RN, BSN

## 2021-01-24 DIAGNOSIS — H40.003 GLAUCOMA SUSPECT, BILATERAL: Primary | ICD-10-CM

## 2021-01-24 RX ORDER — BRIMONIDINE TARTRATE 1 MG/ML
1 SOLUTION/ DROPS OPHTHALMIC 2 TIMES DAILY
Status: CANCELLED | OUTPATIENT
Start: 2021-01-24

## 2021-01-25 RX ORDER — DORZOLAMIDE HCL 20 MG/ML
1 SOLUTION/ DROPS OPHTHALMIC 2 TIMES DAILY
Qty: 10 ML | Refills: 0 | Status: SHIPPED | OUTPATIENT
Start: 2021-01-25 | End: 2021-05-06

## 2021-01-25 RX ORDER — BRIMONIDINE TARTRATE 1 MG/ML
1 SOLUTION/ DROPS OPHTHALMIC 2 TIMES DAILY
Qty: 10 ML | Refills: 0 | Status: SHIPPED | OUTPATIENT
Start: 2021-01-25 | End: 2021-03-19

## 2021-01-25 NOTE — TELEPHONE ENCOUNTER
Pt also needs a UA per last notes    Do you want to place that order too?    Jaimie will collect the samples and drop off     Siva Aguilar RN, BSN

## 2021-01-25 NOTE — TELEPHONE ENCOUNTER
Son is wondering if PCP can refill these eye drops for pt as she has not been following the previous provider.  She uses these for glaucoma    Siva Aguilar RN, BSN

## 2021-01-25 NOTE — TELEPHONE ENCOUNTER
Will fill once. These should come from ophtho however, because they should be monitoring her condition.

## 2021-01-25 NOTE — TELEPHONE ENCOUNTER
Do you want to place orders for a CBC and UA tomorrow for the HC to collect?    Siva Aguilar RN, BSN

## 2021-01-25 NOTE — TELEPHONE ENCOUNTER
Per Jaimie the patient never started the Keflex as she was in the hospital.    Do you want a UA tomorrow to see if she still has an infection?    Siva Aguilar RN, BSN

## 2021-01-26 NOTE — PROGRESS NOTES
Pre-Visit Planning   Next 5 appointments (look out 90 days)    Apr 12, 2021  2:00 PM  (Arrive by 1:40 PM)  Office Visit with Cortney Vanessa MD  [facility] [id number] [facility] [id number] [phone]        Appointment Notes for this encounter:      video visit hospital follow up [phone]  Brent with be with patient    Questionnaires Reviewed/Assigned  Additional questionnaires assigned      Patient preferred phone number: [phone]    Patient contact not needed. PAL scheduled appt with son, Brent, who will be with patient  PHQ will be updated during visit so son can help pt   Siva Aguilar RN, BSN

## 2021-01-26 NOTE — TELEPHONE ENCOUNTER
Routing refill request to provider for review/approval because:  Drug not on the [facility] refill protocol   Siva Aguilar RN, BSN

## 2021-01-27 ENCOUNTER — TELEPHONE (OUTPATIENT)
Dept: FAMILY MEDICINE | Facility: CLINIC | Age: 86
End: 2021-01-27

## 2021-01-27 DIAGNOSIS — D50.9 IRON DEFICIENCY ANEMIA, UNSPECIFIED IRON DEFICIENCY ANEMIA TYPE: ICD-10-CM

## 2021-01-27 DIAGNOSIS — N30.00 ACUTE CYSTITIS WITHOUT HEMATURIA: ICD-10-CM

## 2021-01-27 LAB
ALBUMIN UR-MCNC: NEGATIVE MG/DL
ANION GAP SERPL CALCULATED.3IONS-SCNC: 9 MMOL/L (ref 3–14)
APPEARANCE UR: CLEAR
BILIRUB UR QL STRIP: NEGATIVE
BUN SERPL-MCNC: 13 MG/DL (ref 7–30)
CALCIUM SERPL-MCNC: 9.6 MG/DL (ref 8.5–10.1)
CHLORIDE SERPL-SCNC: 104 MMOL/L (ref 94–109)
CO2 SERPL-SCNC: 27 MMOL/L (ref 20–32)
COLOR UR AUTO: YELLOW
CREAT SERPL-MCNC: 0.9 MG/DL (ref 0.52–1.04)
ERYTHROCYTE [DISTWIDTH] IN BLOOD BY AUTOMATED COUNT: 14.9 % (ref 10–15)
GFR SERPL CREATININE-BSD FRML MDRD: 58 ML/MIN/{1.73_M2}
GLUCOSE SERPL-MCNC: 122 MG/DL (ref 70–99)
GLUCOSE UR STRIP-MCNC: NEGATIVE MG/DL
HCT VFR BLD AUTO: 26.9 % (ref 35–47)
HGB BLD-MCNC: 8.2 G/DL (ref 11.7–15.7)
HGB UR QL STRIP: NEGATIVE
KETONES UR STRIP-MCNC: NEGATIVE MG/DL
LEUKOCYTE ESTERASE UR QL STRIP: NEGATIVE
MCH RBC QN AUTO: 32.2 PG (ref 26.5–33)
MCHC RBC AUTO-ENTMCNC: 30.5 G/DL (ref 31.5–36.5)
MCV RBC AUTO: 106 FL (ref 78–100)
NITRATE UR QL: NEGATIVE
PH UR STRIP: 6 PH (ref 5–7)
PLATELET # BLD AUTO: 386 10E9/L (ref 150–450)
POTASSIUM SERPL-SCNC: 4.3 MMOL/L (ref 3.4–5.3)
RBC # BLD AUTO: 2.55 10E12/L (ref 3.8–5.2)
SODIUM SERPL-SCNC: 140 MMOL/L (ref 133–144)
SOURCE: NORMAL
SP GR UR STRIP: <=1.005 (ref 1–1.03)
UROBILINOGEN UR STRIP-ACNC: 0.2 EU/DL (ref 0.2–1)
WBC # BLD AUTO: 6.5 10E9/L (ref 4–11)

## 2021-01-27 PROCEDURE — 36415 COLL VENOUS BLD VENIPUNCTURE: CPT | Performed by: FAMILY MEDICINE

## 2021-01-27 PROCEDURE — 81003 URINALYSIS AUTO W/O SCOPE: CPT | Performed by: FAMILY MEDICINE

## 2021-01-27 PROCEDURE — 85027 COMPLETE CBC AUTOMATED: CPT | Performed by: FAMILY MEDICINE

## 2021-01-27 PROCEDURE — 80048 BASIC METABOLIC PNL TOTAL CA: CPT | Performed by: FAMILY MEDICINE

## 2021-01-27 NOTE — TELEPHONE ENCOUNTER
Edilia is requesting verbal orders for OT 2 x a week for 2 weeks,self care, strengthening and cognitive testing please call [phone]

## 2021-01-27 NOTE — TELEPHONE ENCOUNTER
[facility] now requests orders and shares plan of care/discharge summaries for some patients through [employer].  Please REPLY TO THIS MESSAGE OR ROUTE BACK TO THE AUTHOR in order to give authorization for orders when needed.  This is considered a verbal order, you will still receive a faxed copy of orders for signature.  Thank you for your assistance in improving collaboration for our patients.    ORDER    Skilled PT 1w1, 2w2 to include safety with new walker, safety with gait and transfers, fall risk reduction strategies, and strengthening  Sarah Ibanez PT  [email or web address]  [phone]

## 2021-01-28 ENCOUNTER — VIRTUAL VISIT (OUTPATIENT)
Dept: FAMILY MEDICINE | Facility: CLINIC | Age: 86
End: 2021-01-28
Payer: COMMERCIAL

## 2021-01-28 VITALS — WEIGHT: 138 LBS | HEIGHT: 64 IN | BODY MASS INDEX: 23.56 KG/M2

## 2021-01-28 DIAGNOSIS — B96.20 E. COLI UTI: ICD-10-CM

## 2021-01-28 DIAGNOSIS — K76.9 LIVER LESION: ICD-10-CM

## 2021-01-28 DIAGNOSIS — Z09 HOSPITAL DISCHARGE FOLLOW-UP: Primary | ICD-10-CM

## 2021-01-28 DIAGNOSIS — M54.16 LUMBAR RADICULOPATHY: ICD-10-CM

## 2021-01-28 DIAGNOSIS — D62 ANEMIA DUE TO BLOOD LOSS, ACUTE: ICD-10-CM

## 2021-01-28 DIAGNOSIS — H40.003 GLAUCOMA SUSPECT, BILATERAL: ICD-10-CM

## 2021-01-28 DIAGNOSIS — R00.1 BRADYCARDIA: ICD-10-CM

## 2021-01-28 DIAGNOSIS — N17.9 ACUTE KIDNEY INJURY SUPERIMPOSED ON CKD (H): ICD-10-CM

## 2021-01-28 DIAGNOSIS — N39.0 RECURRENT UTI: ICD-10-CM
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
## 2021-01-28 DIAGNOSIS — S01.01XD LACERATION OF SCALP, SUBSEQUENT ENCOUNTER: ICD-10-CM

## 2021-01-28 DIAGNOSIS — N18.9 ACUTE KIDNEY INJURY SUPERIMPOSED ON CKD (H): ICD-10-CM

## 2021-01-28 DIAGNOSIS — N39.0 E. COLI UTI: ICD-10-CM

## 2021-01-28 PROCEDURE — 99214 OFFICE O/P EST MOD 30 MIN: CPT | Performed by: FAMILY MEDICINE

## 2021-01-28 RX ORDER — FERROUS GLUCONATE 324(38)MG
324 TABLET ORAL 2 TIMES DAILY WITH MEALS
Qty: 180 TABLET | Refills: 0 | Status: SHIPPED | OUTPATIENT
Start: 2021-01-28 | End: 2021-03-15

## 2021-01-28 RX ORDER — LANOLIN ALCOHOL/MO/W.PET/CERES
1000 CREAM (GRAM) TOPICAL DAILY
Qty: 90 TABLET | Refills: 0 | Status: SHIPPED | OUTPATIENT
Start: 2021-01-28 | End: 2021-03-15

## 2021-01-28 RX ORDER — HYDROXYCHLOROQUINE SULFATE 200 MG/1
200 TABLET, FILM COATED ORAL 2 TIMES DAILY
OUTPATIENT
Start: 2021-01-28

## 2021-01-28 ASSESSMENT — MIFFLIN-ST. JEOR: SCORE: 1058.02

## 2021-01-28 NOTE — PROGRESS NOTES
1. Sutures:    CLOSURE:  Wound was closed with One Layer.  Skin closed with 3 x 5.0 fast-absorbing plain gut sutures using interrupted sutures.    2. Pt is still wearing monitor so will call next week after she is finished to see if cardio wants to see her in person or virtually.  Will coordinate with Brent on this.    3.  HC RN has been updated on medication changes and lab needed next week    4.  Pt is on tracking list for 3 month lab and MRI scheduling.    5.  BP and HR from HC were taken before Swathi took her BP medications.  Pt has also been missing her afternoon dose of hydralazine.    bp 180/78 hr 78  bp  155/80 hr 77    Siva Aguilar RN, BSN

## 2021-01-28 NOTE — PROGRESS NOTES
Spoke with Jaimie and pt is taking her amlodipine already.  She was able to find another BP reading that was done by another nurse yesterday after pt had taken her medications 140/80.  Per huddle with PCP pt is to continue same regimen right now and Jaimie will have Brent look into the pill box when he comes to see Swathi to make sure her afternoon dose of hydralazine has been taken.  Jaimie is working the the assisted living facility to take over dispensing of medications so pt doesn't miss any and doesn't take them incorrectly.    Siva Aguilar RN, BSN

## 2021-01-28 NOTE — PROGRESS NOTES
Swathi is a 85 year old who is being evaluated via a billable video visit.      How would you like to obtain your AVS? Mail a copy  If the video visit is dropped, the invitation should be resent by: Text to cell phone: [phone]- zoom call via her cell - Brent her son will help her  Will anyone else be joining your video visit? No, but son will be there and may do most of the talking      Video Start Time: 10:12 AM      Assessment & Plan     Hospital discharge follow-up - stable    Fall, subsequent encounter - no orthostatic symptoms, suspect related to possible UTI and bradycardia    Laceration of scalp, subsequent encounter - no concerns - NEED TO KNOW IF THESE ARE DISSOLVABLE OR NON-DISSOLVABLE SUTURES - IF DISSOLVABLE, SHOULD COME OUT NOW    Bradycardia - possible contributed to by her metoprolol. This has now been stopped. She should be on amlodipine, enalapril, and hydralazine currently. NEED UPDATES BP AND HR READINGS FROM HOME HEALTH. PLEASE HELP SCHEDULE CARDIOLOGY FOLLOW UP AS WELL TO REVIEW HOLTER RESULTS.    E. coli UTI - UA from yesterday clear, no need to treat    Recurrent UTI - unsure if she is currently taking Hipprex, recent GFR decline - IF NOT, SHE SHOULD NOT START. IF SO, SHE SHOULD STOP.     Anemia due to blood loss, acute - likely related to her fall. Hgb levels from yesterday have come up a bit since discharge. Will put her on iron and B12 supplements for the net 3 months, then recheck levels at that time. After we replace iron/B12, will plan to check CBC every 3 months to ensure Hgb levels stay stable. LAB ORDERS IN FOR 3 MONTHS, SCRIPTS SENT TO HER PHARMACY - PLEASE UPDATE HOME HEALTH.   - cyanocobalamin (VITAMIN B-12) 1000 MCG tablet; Take 1 tablet (1,000 mcg) by mouth daily  - ferrous gluconate (FERGON) 324 (38 Fe) MG tablet; Take 1 tablet (324 mg) by mouth 2 times daily (with meals)    Acute kidney injury superimposed on CKD (H) - unclear what has caused this, possibly the Hipprex if she  is taking (should stop as above). Encouraged her to drink more fluids, and we will recheck BMP in a week. PLEASE ALERT HH RN ABOUT BLOOD DRAW IN A WEEK.   - Basic metabolic panel  (Ca, Cl, CO2, Creat, Gluc, K, Na, BUN)    Lumbar radiculopathy - given this has persisted for months, waking her from sleep, will pursue advanced imaging. She would be open to VIRI if indicated.   - MR Lumbar Spine w/o Contrast; Future    Liver lesion - needs MRI in the future, family open to this. Will hold for now and plan for 3-6 months imaging as she is currently dealing with several other health issues. PLEASE PUT ON YOUR RADAR FOR 3-6 MONTHS OUT.       Review of external notes as documented above     No follow-ups on file.    Cortney Vanessa MD  [facility] DEANGELO Echevarria is a 85 year old who presents to clinic today for the following health issues accompanied by her son    [facility]     Hospital Follow-up Visit:    Hospital/Nursing Home/IP Rehab Facility: [facility]  Date of Admission: 1/16/2021  Date of Discharge: 1/18/21  Reason(s) for Admission: recurrent UTI, E coli UTI, fall - closed head injury with scalp laceration, anemia, bradycardia, liver lesion      Was your hospitalization related to COVID-19? No   Problems taking medications regularly:  None, although there was question of whether she was taking medications correctly prior to hospitalization  Medication changes since discharge: None, son does not believe she is taking Hipprex currently (never started), nor did she take course of Keflex for UTI.   Problems adhering to non-medication therapy:  As above    Summary of hospitalization:  [facility] discharge summary reviewed  Diagnostic Tests/Treatments reviewed.  Follow up needed: has Holter on currently, will be removed in 4 days  Other Healthcare Providers Involved in Patient s Care:         Specialist appointment - Cardiology, no follow up scheduled  Update since  discharge: stable.       Post Discharge Medication Reconciliation: discharge medications reconciled and changed, per note/orders.  Plan of care communicated with patient and family                E coli UTI - based on home health UA yesterday, urine is clear. Swathi denies urinary symptoms. Did not take course of Keflex prescribed previously, but did get a few doses of abx in the hospital.     Recurrent UTI - was prescribed Hipprex, son is unsure if she is currently taking. Hospitalist suggested possible Urology consultation.     Bradycardia - son reports low to low-normal HR and higher BP over the past several visits. Put on metoprolol from hospital discharge earlier this month to help with HTN. No other changes were made to BP medications.  Son Brent notes Swathi had some confusion about her medications PTA. Now home health RN managing.     Scalp laceration - Swathi notes her sutures are still in place, wonders if she needs them removed or if they will dissolve on their own. Denies pain or drainage.     Anemia - lost quite a bit of blood with her fall and scalp laceration. Hgb went down by a few points. Not currently on supplementation.    LAWANDA - labs from yesterday indicated a tompkins GFR decline. Swathi notes she drinks maybe 30-40 oz water per day. Denies orthostatic symptoms or presyncope since she's been home. Unclear if she is taking Hipprex currently.     Liver lesion - noted on CT scan, comment of calcification. Family would be open to MRI in the future to further investigate.     Right hip pain - ongoing for months, occurring nightly, waking Swathi from sleep. Taking apap before bedtime, however she is awoken about 4-6 hours afterward with severe pain from the right hip that radiates down the leg. Redosing her apap is helpful enough to get her back to sleep. XR from 11/2020 showed to issues with the hip. 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       Review of Systems   Constitutional, HEENT,  cardiovascular, pulmonary, GI, , musculoskeletal, neuro, skin, endocrine and psych systems are negative, except as otherwise noted.      Objective           Vitals:  No vitals were obtained today due to virtual visit.    Physical Exam   GENERAL: Healthy, alert and no distress  EYES: Eyes grossly normal to inspection.  No discharge or erythema, or obvious scleral/conjunctival abnormalities.  RESP: No audible wheeze, cough, or visible cyanosis.  No visible retractions or increased work of breathing.    SKIN: Visible skin clear. No significant rash, abnormal pigmentation or lesions.  NEURO: Cranial nerves grossly intact.  Mentation and speech appropriate for age.  PSYCH: Mentation appears normal, affect normal/bright, judgement and insight intact, normal speech and appearance well-groomed.    All diagnostics from hospitalization were viewed by me.           Video-Visit Details    Type of service:  Video Visit    Video End Time: 10:35 AM    Originating Location (pt. Location): Home    Distant Location (provider location):  [facility]     Platform used for Video Visit: [employer]

## 2021-01-29 RX ORDER — BRIMONIDINE TARTRATE 1 MG/ML
1 SOLUTION/ DROPS OPHTHALMIC 2 TIMES DAILY
Qty: 10 ML | Refills: 0 | OUTPATIENT
Start: 2021-01-29

## 2021-02-02 ENCOUNTER — TELEPHONE (OUTPATIENT)
Dept: FAMILY MEDICINE | Facility: CLINIC | Age: 86
End: 2021-02-02

## 2021-02-02 NOTE — TELEPHONE ENCOUNTER
Received a callback from cardio and once the results are reviewed the provider will directed his staff on how he would like to follow up with patient.  Informed them that pt's son, Brent, is the one who schedules the appointments for her.    Informed Brent and will track to make sure the results come in.    Siva Aguilar RN, BSN

## 2021-02-02 NOTE — TELEPHONE ENCOUNTER
LM for cardiology to call    Need to know if they want patient to be seen in clinic for ZIO patch review or virtual    Siva Aguilar RN, BSN

## 2021-02-04 ENCOUNTER — HOSPITAL ENCOUNTER (OUTPATIENT)
Dept: MRI IMAGING | Facility: CLINIC | Age: 86
Discharge: HOME | End: 2021-02-04
Attending: FAMILY MEDICINE | Admitting: FAMILY MEDICINE
Payer: COMMERCIAL

## 2021-02-04 DIAGNOSIS — M54.16 LUMBAR RADICULOPATHY: ICD-10-CM

## 2021-02-04 PROCEDURE — 72148 MRI LUMBAR SPINE W/O DYE: CPT

## 2021-02-05 DIAGNOSIS — Z53.9 DIAGNOSIS NOT YET DEFINED: Primary | ICD-10-CM

## 2021-02-05 PROCEDURE — G0180 MD CERTIFICATION HHA PATIENT: HCPCS | Performed by: FAMILY MEDICINE

## 2021-02-08 ENCOUNTER — TELEPHONE (OUTPATIENT)
Dept: FAMILY MEDICINE | Facility: CLINIC | Age: 86
End: 2021-02-08

## 2021-02-08 NOTE — TELEPHONE ENCOUNTER
Pt calling stating the nurse that was out recently threw a bunch of medication away and now she doesn't have enough pills in her pillbox.  Explained to patient that the nurse set up her medications according to her medication list, got rid of the ones she should not have, and her pill box will look different for this reason.  Explained that she had too many of certain pills and they were mixed up which could have contributed to her fall.  She states she doesn't have enough of her medications either.  Informed her that she should be getting medications in the mail and that she shouldn't be running out.    Offered to speak with her son who has been helping her and she expressed that this is what she wanted.    Spoke to Brent and explained the situation.  He states he was there last night and there wasn't any issue and he will go over there in a little bit today.  He states that pt has a neighbor who mettles in her care and tells her incorrect information.  He will discuss this with her as well and setting out PAL number to call with questions/concerns.    Brent will call with any needs    Siva Aguilar RN, BSN

## 2021-02-10 NOTE — H&P
[facility]    History and Physical  Hospitalist    Name: Swathi Dukes    MRN: [id number]  YOB: 1935    Age: 85 year old  Primary care provider: Cortney Vanessa       Date of Admission:  1/16/2021  Date of Service (when I saw the patient): 01/16/21    Assessment & Plan   Swathi Dukes is a 85 year old female with a past medical history significant for SLE, mild aortic stenosis, chronic pericardial effusion, who presents with concerns for a fall and UTI.    Patient was recently hospitalized at [facility] about a week ago with concerns for neck and chest pain.  Underwent coronary angiogram which showed only trivial CAD.  The effusion was felt to be chronic in nature related to her rheumatological disease.  Has a remote history of requiring a pericardial window 13 years ago.  Her pain was felt to be musculoskeletal in etiology and she was discharged home.    Patient reports that she has been doing well since discharge home, when yesterday evening she noticed having some fevers and chills.  Associated some shortness of breath.  She does have a history of having UTIs and presented to the ER.  She was found to have a UTI and was sent home with oral antibiotics.  Patient reports that she was sitting in a recliner when she felt like she had to have a bowel movement and she tried to rush to the bathroom.  She reports that she fell down and hit the edge of furniture with laceration to her scalp.  She denies loss of consciousness to me and remembers the events.  She had significant bleeding and was brought by EMS to the ER.  It looks like she required an epi injection to the area and some stitches to stop this scalp bleeding.  Currently she feels well, reports some soreness on her right knee and left foot great toe.  Currently denies any chest pain or shortness of breath.  Feeling better after receiving a dose of IV antibiotic.  However given that she has had 2  falls in the last 24 hours, observation to the hospital was requested.    #UTI.  Continue IV ceftriaxone, follow cultures    #Recurrent falls x2.  #Left knee and right great toe bruising.  X-ray negative for fracture.    #Closed head injury with scalp laceration and hematoma   Patient denies any LOC to me.  Probably worsening weakness due to her UTI.  -Watch on telemetry  -Given her chronic pericardial effusion we will repeat a limited echo to ensure no major changes  -We will try to ambulate once she is feeling better, consider PT eval if difficulty with  -pain control    #Chronic pericardial effusion repeat limited echo as above    #Mild aortic stenosis  #SLE.  Continue her Plaquenil  #Hyperlipidemia     COVID-19 Status.  Test negative    Patient's baseline home medications will need to be resumed once the prior to admission medication list has been verified by pharmacy     DVT Prophylaxis: Pneumatic Compression Devices  Code Status: DNR / DNI.  Confirmed with the patient.    Disposition: Expected discharge - home tomorrow if pain controlled and safe to ambulate     Plan of care was discussed with the patient in great detail. All of the questions were answered extensively. Patient is comfortable with the plan and agrees with it.     Covid-19  Swathi Dukes was evaluated during a global COVID-19 pandemic, and applicable protocols for evaluation were followed during the patient's care.      Alexandra Tayo [facility]    Chief Complaint   fall    History is obtained from the patient    Case discussed with ER provider Dr. Huy William    History of Present Illness   Swathi Dukes is a 85 year old female who presents with fall.  Details of the HPI as above    Past Medical History    I have reviewed this patient's medical history and updated it with pertinent information if needed.   Past Medical History:   Diagnosis Date     Aortic stenosis      Glaucoma      HTN (hypertension)      Hyperlipidemia      Pericardial  effusion      Systemic lupus erythematosus        Past Surgical History   I have reviewed this patient's surgical history and updated it with pertinent information if needed.  Past Surgical History:   Procedure Laterality Date     CV HEART CATHETERIZATION WITH POSSIBLE INTERVENTION N/A 1/5/2021    Procedure: Heart Catheterization with Possible Intervention;  Surgeon: Hayden Bedoya MD;  Location:  HEART CARDIAC CATH LAB     CV LEFT VENTRICULOGRAM N/A 1/5/2021    Procedure: Left Ventriculogram;  Surgeon: Hayden Bedoya MD;  Location:  HEART CARDIAC CATH LAB       Prior to Admission Medications   Prior to Admission Medications   Prescriptions Last Dose Informant Patient Reported? Taking?   ALPRAZolam (XANAX) 0.5 MG tablet  Self Yes No   Sig: Take 0.5 mg by mouth daily    OLANZapine (ZYPREXA) 10 MG tablet  Self Yes No   Sig: Take 10 mg by mouth At Bedtime    PARoxetine (PAXIL) 10 MG tablet  Self Yes No   Sig: Take 5 mg by mouth every morning With 20mg to = 25mg daily   PARoxetine (PAXIL) 20 MG tablet  Self Yes No   Sig: Take 20 mg by mouth daily Plus 5mg = 25mg daily   amLODIPine (NORVASC) 5 MG tablet  Self No No   Sig: Take 1 tablet (5 mg) by mouth daily   brimonidine (ALPHAGAN P) 0.1 % ophthalmic solution  Self Yes No   Sig: Place 1 drop into both eyes 2 times daily   cephALEXin (KEFLEX) 500 MG capsule   No No   Sig: Take 1 capsule (500 mg) by mouth 2 times daily for 7 days   dorzolamide (TRUSOPT) 2 % ophthalmic solution  Self Yes No   Sig: Place 1 drop into both eyes 2 times daily   enalapril (VASOTEC) 20 MG tablet  Self No No   Sig: Take 1 tablet (20 mg) by mouth 2 times daily   folic acid (FOLVITE) 1 MG tablet  Self Yes No   Sig: Take 1 mg by mouth daily   hydrALAZINE (APRESOLINE) 25 MG tablet  Self No No   Sig: Take 1 tablet (25 mg) by mouth 3 times daily   hydroxychloroquine (PLAQUENIL) 200 MG tablet  Self Yes No   Sig: Take 200 mg by mouth 2 times daily   latanoprost (XALATAN) 0.005 % ophthalmic  solution  Self Yes No   Sig: Place 1 drop into both eyes At Bedtime    metoprolol tartrate (LOPRESSOR) 25 MG tablet   No No   Sig: Take 1 tablet (25 mg) by mouth 2 times daily   simvastatin (ZOCOR) 10 MG tablet  Self No No   Sig: Take 1 tablet (10 mg) by mouth At Bedtime      Facility-Administered Medications: None     Allergies   Allergies   Allergen Reactions     Diclofenac Anaphylaxis     Iodine Anaphylaxis     Contrast  Pt states anaphylactic reaction to ct contrast about 20 years ago     Aspirin        Social History   I have reviewed this patient's social history and updated it with pertinent information if needed.   Social History     Socioeconomic History     Marital status:      Spouse name: Not on file     Number of children: Not on file     Years of education: Not on file     Highest education level: Not on file   Occupational History     Not on file   Social Needs     Financial resource strain: Not on file     Food insecurity     Worry: Not on file     Inability: Not on file     Transportation needs     Medical: Not on file     Non-medical: Not on file   Tobacco Use     Smoking status: Never Smoker     Smokeless tobacco: Never Used   Substance and Sexual Activity     Alcohol use: Not Currently     Drug use: Never     Sexual activity: Not Currently   Lifestyle     Physical activity     Days per week: Not on file     Minutes per session: Not on file     Stress: Not on file   Relationships     Social connections     Talks on phone: Not on file     Gets together: Not on file     Attends [employer] service: Not on file     Active member of club or organization: Not on file     Attends meetings of clubs or organizations: Not on file     Relationship status: Not on file     Intimate partner violence     Fear of current or ex partner: Not on file     Emotionally abused: Not on file     Physically abused: Not on file     Forced sexual activity: Not on file   Other Topics Concern     Parent/sibling w/ CABG,  MI or angioplasty before 65F 55M? Not Asked   Social History Narrative     Not on file         Family History   I have reviewed this patient's family history and updated it with pertinent information if needed.   Family History   Problem Relation Age of Onset     Diabetes Father        Review of Systems   The 10 point Review of Systems is negative other than noted in the HPI or here.     Physical Exam       BP: (!) 153/55 Pulse: 53     SpO2: 95 %      Vital Signs with Ranges  Temp:  [98.3  F (36.8  C)] 98.3  F (36.8  C)  Pulse:  [49-56] 53  Resp:  [20-25] 20  BP: (148-186)/(55-75) 153/55  SpO2:  [92 %-96 %] 95 %    GENERAL:  Awake and alert, Comfortable appearing, No acute distress.  PSYCH: Pleasant, Cooperative, appropriate affect, no hallucinations   EYES: EOMI, conjunctiva clear  HEENT:  Head with significant bruising around the right temporal area with a scalp hematoma, s/p repair in ER. Neck is Supple, trachea is midline   CARDIOVASCULAR: Regular rate and rhythm, Normal S1, S2, no loud murmurs, no rubs or gallops.   PULMONARY:  Clear to auscultation bilaterally with good entry on both sides  CHEST: Good inspiratory effort bilaterally   GI: Abdomen is soft, non tender, non-distended, normal bowel sounds. No rebound or guarding   SKIN:  Dry, warm to touch. No obvious exanthems on exposed areas  EXTREMITIES:  Good capillary refill with signs of adequate peripheral perfusion. No peripheral edema   Neuro: Awake and oriented x 3. No focal weakness or numbness is noted. Moving all extremities with good strength, Grossly non-focal.   MSK: Noted mild bruising on left knee with small effusion and the right great toe, ROM intact.     Data   Data reviewed today:  I personally reviewed.    All lab data and imaging results from today have been reviewed.     Recent Labs   Lab 01/16/21  1244 01/16/21  0153   WBC 11.3* 9.5   HGB 8.9* 9.1*   * 99    251    139   POTASSIUM 3.9 3.8   CHLORIDE 108 109   CO2 27  27   BUN 15 19   CR 0.69 0.82   ANIONGAP 6 3   FILIPE 9.2 8.6   * 107*   ALBUMIN  --  3.3*   PROTTOTAL  --  6.4*   BILITOTAL  --  0.2   ALKPHOS  --  75   ALT  --  25   AST  --  20   TROPI 0.019 <0.015       Recent Results (from the past 24 hour(s))   Chest CT w/o contrast    Narrative    EXAM: CT CHEST W/O CONTRAST  LOCATION: [facility]  DATE/TIME: 1/16/2021 2:24 AM    INDICATION: Shortness of breath. Recent pericardial effusion.  COMPARISON: CT from 01/02/2021.  TECHNIQUE: CT chest without IV contrast. Multiplanar reformats were obtained. Dose reduction techniques were used.  CONTRAST: None.    FINDINGS:   LUNGS AND PLEURA: New diffuse interstitial infiltrates most suggestive of edema, with central bronchial wall/interstitial thickening. New small right pleural effusion. Left lower lobe subsegmental atelectasis.    MEDIASTINUM/AXILLAE: Stable cardiomegaly. A moderate to large pericardial effusion is seen in the posterior pericardial sac along the left ventricular free wall measuring up to 2 cm in thickness, not significantly changed from prior CT. There is   hypodensity of the ventricular blood pool relative to the myocardium consistent with anemia. There are coronary artery calcifications.    UPPER ABDOMEN: No acute findings.    MUSCULOSKELETAL: Lower thoracic degenerative change.      Impression    IMPRESSION:   1.  Stable moderate to large pericardial effusion along the left ventricular free wall posteriorly.    2.  Cardiomegaly and coronary artery disease.    3.  New pulmonary interstitial edema with small right pleural effusion.     Head CT w/o contrast    Narrative    CT SCAN OF THE HEAD WITHOUT CONTRAST   1/16/2021 12:01 PM     HISTORY: Fall, head trauma.    TECHNIQUE:  Axial images of the head and coronal reformations without  IV contrast material. Radiation dose for this scan was reduced using  automated exposure control, adjustment of the mA and/or kV according  to patient size, or  iterative reconstruction technique.    COMPARISON: Head CT 8/27/2018.    FINDINGS: There is no evidence of intracranial hemorrhage, mass, acute  infarct or anomaly. The ventricles are prominent in size, but not  significantly changed since prior head CT 8/27/2019. Moderate diffuse  parenchymal volume loss. Mild patchy periventricular white matter  hypodensities which are nonspecific, but likely related to chronic  microvascular ischemic disease.     The visualized portions of the sinuses and mastoids appear normal.    Right lateral scalp soft tissue injury without underlying fracture  appreciated.      Impression    IMPRESSION:     1. No evidence of acute intracranial hemorrhage, mass, or herniation.  2. Moderate diffuse parenchymal volume loss and mild white matter  changes likely due to chronic microvascular ischemic disease.   3. Right lateral scalp soft tissue injury without underlying fracture  appreciated.    ALTA LUQUE MD   Cervical spine CT w/o contrast    Narrative    CT CERVICAL SPINE WITHOUT CONTRAST   1/16/2021 12:02 PM     HISTORY: Fall, head trauma.     TECHNIQUE: Axial images of the cervical spine were obtained without  intravenous contrast. Multiplanar reformations were performed.   Radiation dose for this scan was reduced using automated exposure  control, adjustment of the mA and/or kV according to patient size, or  iterative reconstruction technique.    COMPARISON: None.    FINDINGS: No evidence of fracture. No prevertebral soft tissue  swelling. Alignment is normal. Vertebral body height is maintained. No  destructive osseous lesions. Marked degenerative endplate changes and  loss of disc height throughout the cervical spine. Multiple levels of  facet hypertrophy. Mild spinal canal narrowings at C3-C4, C4-C5, and  C5-C6 due to disc osteophytes. Multiple levels of neural foraminal  narrowing due to disc osteophytes and facet hypertrophy, particularly  from C3-C4 through C6-C7.     Visualized  paraspinous tissues: Unremarkable.      Impression    IMPRESSION:   1. No evidence of acute fracture or subluxation in the cervical spine.  2. Degenerative changes in the cervical spine as described above.     ALTA LUQUE MD   XR Knee Bilateral 3 Views    Narrative    KNEE BILATERAL THREE VIEWS  1/16/2021 12:15 PM     CLINICAL INFORMATION: Fall, bilateral knee pain and bruising.    ADDITIONAL INFORMATION: None.    COMPARISON: None.    FINDINGS:  Postoperative changes of left total knee arthroplasty and  patellar resurfacing. Components appear well seated. Chronic  enthesitis along the superior pole of the patella at the quadriceps  attachment. Tiny left knee joint effusion. No evidence for fracture.  The right knee demonstrates incompletely visualized postoperative  changes within the distal aspect of the right femur. The knee joint  itself is negative for fracture or significant compartment narrowing.  There is a tiny calcification along the medial margin of the medial  tibial plateau which is likely chronic but could potentially represent  capsular or MCL injury. There is no significant intra-articular  effusion, but there is soft tissue swelling external and anterior to  the patella.    LUNA ADAMS MD   XR Toe Right G/E 2 Views    Narrative    RIGHT TOE TWO OR MORE VIEWS 1/16/2021 12:16 PM     CLINICAL INFORMATION: Toe pain after fall.    ADDITIONAL INFORMATION: None.    COMPARISON: None.    FINDINGS:  Degenerative change at the first MTP joint and IP joint of  the great toe. Degenerative change of multiple IP joints of the  additional toes. Degenerative change across the midfoot. No evidence  for fracture. No erosive changes are identified.    LUNA ADAMS MD          [place] Counseling: I discussed with the patient the risks of tildrakizumab including but not limited to immunosuppression, malignancy, posterior leukoencephalopathy syndrome, and serious infections.  The patient understands that monitoring is required including a PPD at baseline and must alert us or the primary physician if symptoms of infection or other concerning signs are noted.

## 2021-02-11 NOTE — TELEPHONE ENCOUNTER
Zio results were just scanned into [facility] today.  Cardiology should follow up with pt/son to schedule appt to discuss results    Siva Aguilar RN, BSN

## 2021-02-13 ENCOUNTER — NURSE TRIAGE (OUTPATIENT)
Dept: NURSING | Facility: CLINIC | Age: 86
End: 2021-02-13

## 2021-02-13 NOTE — TELEPHONE ENCOUNTER
"Pt called stating, I have \"horrible bladder infection\", and the only time I can get in is monday after 10 am after dental appointment.     Pt reported she has frequent urination every 3 minutes, and severe burning urination that started about noon today. Pt reported she has had bladder infection previously.      Per protocol pt was advised to be seen with in 24 hours, pt stated she doesn't not have a ride to come in to  urgent care, and the only time she will get a ride is Monday. Pt schedule in clinic appointment on monday at 12:50 pm, pt was advised to call back if she develops fever or her symptoms get worse. Pt will take tylenol for th burning urination.        Frank Gifford RN  [facility]       COVID 19 Nurse Triage Plan/Patient Instructions    Please be aware that novel coronavirus (COVID-19) may be circulating in the community. If you develop symptoms such as fever, cough, or SOB or if you have concerns about the presence of another infection including coronavirus (COVID-19), please contact your health care provider or visit [email or web address].     Disposition/Instructions    In-Person Visit with provider recommended. Reference Visit Selection Guide.    Thank you for taking steps to prevent the spread of this virus.  o Limit your contact with others.  o Wear a simple mask to cover your cough.  o Wash your hands well and often.    Resources    [facility]: About COVID-19: [email or web address]    CDC: What to Do If You're Sick: www.cdc.gov/coronavirus/2019-ncov/about/steps-when-sick.html    CDC: Ending Home Isolation: www.cdc.gov/coronavirus/2019-ncov/hcp/disposition-in-home-patients.html     CDC: Caring for Someone: www.cdc.gov/coronavirus/2019-ncov/if-you-are-sick/care-for-someone.html     [facility]: Interim Guidance for Hospital Discharge to Home: www.health.[facility].mn.us/diseases/coronavirus/hcp/hospdischarge.pdf    [facility] clinical trials (COVID-19 research " studies): [email or web address]-clinical-trials     Below are the Iwebalize-19 hotlines at the [place] [facility] ([facility]). Interpreters are available.   o For health questions: Call 300-261-2659 or 1-173.260.1878 (7 a.m. to 7 p.m.)  o For questions about schools and childcare: Call 195-540-7136 or 1-201.810.2165 (7 a.m. to 7 p.m.)       Additional Information    Negative: Shock suspected (e.g., cold/pale/clammy skin, too weak to stand, low BP, rapid pulse)    Negative: Sounds like a life-threatening emergency to the triager    Negative: Followed a genital area injury    Negative: Followed a genital area injury (penis, scrotum)    Negative: Vaginal discharge    Negative: Pus (white, yellow) or bloody discharge from end of penis    Negative: [1] Taking antibiotic for urinary tract infection (UTI) AND [2] female    Negative: [1] Taking antibiotic for urinary tract infection (UTI) AND [2] male    Negative: [1] Discomfort (pain, burning or stinging) when passing urine AND [2] pregnant    Negative: [1] Discomfort (pain, burning or stinging) when passing urine AND [2] postpartum (from 0 to 6 weeks after delivery)    Negative: [1] Discomfort (pain, burning or stinging) when passing urine AND [2] female    Negative: [1] Discomfort (pain, burning or stinging) when passing urine AND [2] male    Negative: Pain or itching in the vulvar area    Negative: Pain in scrotum is main symptom    Negative: Blood in the urine is main symptom    Negative: Symptoms arising from use of a urinary catheter ([place]omin or [place])    Negative: [1] Unable to urinate (or only a few drops) > 4 hours AND     [2] bladder feels very full (e.g., palpable bladder or strong urge to urinate)    Negative: [1] Decreased urination and [2] drinking very little AND [2] dehydration suspected (e.g., dark urine, no urine > 12 hours, very dry mouth, very lightheaded)    Negative: Patient sounds very sick or weak to the triager    Negative: Fever > 100.5 F  (38.1 C)    Negative: Side (flank) or lower back pain present    Negative: [1] Can't control passage of urine (i.e., urinary incontinence) AND [2] new onset (< 2 weeks) or worsening    Urinating more frequently than usual (i.e., frequency)    Protocols used: URINARY SYMPTOMS-A-AH

## 2021-02-16 ENCOUNTER — HOSPITAL ENCOUNTER (INPATIENT)
Facility: CLINIC | Age: 86
LOS: 23 days | Discharge: HOME HEALTH | DRG: 885 | End: 2021-03-12
Attending: EMERGENCY MEDICINE | Admitting: PSYCHIATRY & NEUROLOGY
Payer: COMMERCIAL

## 2021-02-16 ENCOUNTER — TELEPHONE (OUTPATIENT)
Dept: FAMILY MEDICINE | Facility: CLINIC | Age: 86
End: 2021-02-16

## 2021-02-16 ENCOUNTER — TELEPHONE (OUTPATIENT)
Dept: BEHAVIORAL HEALTH | Facility: CLINIC | Age: 86
End: 2021-02-16

## 2021-02-16 DIAGNOSIS — M54.16 LUMBAR RADICULOPATHY: ICD-10-CM

## 2021-02-16 DIAGNOSIS — F42.2 MIXED OBSESSIONAL THOUGHTS AND ACTS: ICD-10-CM

## 2021-02-16 DIAGNOSIS — F32.A DEPRESSION, UNSPECIFIED DEPRESSION TYPE: ICD-10-CM

## 2021-02-16 DIAGNOSIS — F33.9 EPISODE OF RECURRENT MAJOR DEPRESSIVE DISORDER, UNSPECIFIED DEPRESSION EPISODE SEVERITY (H): ICD-10-CM

## 2021-02-16 DIAGNOSIS — R45.851 SUICIDAL IDEATION: ICD-10-CM

## 2021-02-16 DIAGNOSIS — F03.90 MAJOR NEUROCOGNITIVE DISORDER (H): Primary | ICD-10-CM

## 2021-02-16 DIAGNOSIS — Z11.52 ENCOUNTER FOR SCREENING LABORATORY TESTING FOR SEVERE ACUTE RESPIRATORY SYNDROME CORONAVIRUS 2 (SARS-COV-2): ICD-10-CM

## 2021-02-16 DIAGNOSIS — F41.9 ANXIETY: ICD-10-CM

## 2021-02-16 DIAGNOSIS — K52.9 CHRONIC DIARRHEA: ICD-10-CM

## 2021-02-16 LAB
ALBUMIN SERPL-MCNC: 3.8 G/DL (ref 3.4–5)
ALP SERPL-CCNC: 98 U/L (ref 40–150)
ALT SERPL W P-5'-P-CCNC: 15 U/L (ref 0–50)
AMPHETAMINES UR QL SCN: NEGATIVE
ANION GAP SERPL CALCULATED.3IONS-SCNC: 6 MMOL/L (ref 3–14)
AST SERPL W P-5'-P-CCNC: 15 U/L (ref 0–45)
BARBITURATES UR QL: NEGATIVE
BASOPHILS # BLD AUTO: 0 10E9/L (ref 0–0.2)
BASOPHILS NFR BLD AUTO: 0.5 %
BENZODIAZ UR QL: POSITIVE
BILIRUB SERPL-MCNC: 0.2 MG/DL (ref 0.2–1.3)
BUN SERPL-MCNC: 15 MG/DL (ref 7–30)
CALCIUM SERPL-MCNC: 9.5 MG/DL (ref 8.5–10.1)
CANNABINOIDS UR QL SCN: NEGATIVE
CHLORIDE SERPL-SCNC: 106 MMOL/L (ref 94–109)
CO2 SERPL-SCNC: 26 MMOL/L (ref 20–32)
COCAINE UR QL: NEGATIVE
CREAT SERPL-MCNC: 0.78 MG/DL (ref 0.52–1.04)
DIFFERENTIAL METHOD BLD: ABNORMAL
EOSINOPHIL # BLD AUTO: 0 10E9/L (ref 0–0.7)
EOSINOPHIL NFR BLD AUTO: 0.5 %
ERYTHROCYTE [DISTWIDTH] IN BLOOD BY AUTOMATED COUNT: 14.6 % (ref 10–15)
ETHANOL UR QL SCN: NEGATIVE
GFR SERPL CREATININE-BSD FRML MDRD: 70 ML/MIN/{1.73_M2}
GLUCOSE SERPL-MCNC: 101 MG/DL (ref 70–99)
HCT VFR BLD AUTO: 30.3 % (ref 35–47)
HGB BLD-MCNC: 9.8 G/DL (ref 11.7–15.7)
IMM GRANULOCYTES # BLD: 0 10E9/L (ref 0–0.4)
IMM GRANULOCYTES NFR BLD: 0.3 %
LABORATORY COMMENT REPORT: NORMAL
LYMPHOCYTES # BLD AUTO: 0.7 10E9/L (ref 0.8–5.3)
LYMPHOCYTES NFR BLD AUTO: 10.7 %
MCH RBC QN AUTO: 31.9 PG (ref 26.5–33)
MCHC RBC AUTO-ENTMCNC: 32.3 G/DL (ref 31.5–36.5)
MCV RBC AUTO: 99 FL (ref 78–100)
MONOCYTES # BLD AUTO: 0.5 10E9/L (ref 0–1.3)
MONOCYTES NFR BLD AUTO: 6.9 %
NEUTROPHILS # BLD AUTO: 5.3 10E9/L (ref 1.6–8.3)
NEUTROPHILS NFR BLD AUTO: 81.1 %
NRBC # BLD AUTO: 0 10*3/UL
NRBC BLD AUTO-RTO: 0 /100
OPIATES UR QL SCN: NEGATIVE
PLATELET # BLD AUTO: 351 10E9/L (ref 150–450)
POTASSIUM SERPL-SCNC: 4.3 MMOL/L (ref 3.4–5.3)
PROT SERPL-MCNC: 7.5 G/DL (ref 6.8–8.8)
RBC # BLD AUTO: 3.07 10E12/L (ref 3.8–5.2)
SARS-COV-2 RNA RESP QL NAA+PROBE: NEGATIVE
SODIUM SERPL-SCNC: 138 MMOL/L (ref 133–144)
SPECIMEN SOURCE: NORMAL
WBC # BLD AUTO: 6.6 10E9/L (ref 4–11)

## 2021-02-16 PROCEDURE — 80320 DRUG SCREEN QUANTALCOHOLS: CPT | Performed by: EMERGENCY MEDICINE

## 2021-02-16 PROCEDURE — 99285 EMERGENCY DEPT VISIT HI MDM: CPT | Mod: 25 | Performed by: EMERGENCY MEDICINE

## 2021-02-16 PROCEDURE — 80307 DRUG TEST PRSMV CHEM ANLYZR: CPT | Performed by: EMERGENCY MEDICINE

## 2021-02-16 PROCEDURE — 99285 EMERGENCY DEPT VISIT HI MDM: CPT | Performed by: EMERGENCY MEDICINE

## 2021-02-16 PROCEDURE — 80053 COMPREHEN METABOLIC PANEL: CPT | Performed by: EMERGENCY MEDICINE

## 2021-02-16 PROCEDURE — 250N000013 HC RX MED GY IP 250 OP 250 PS 637: Performed by: EMERGENCY MEDICINE

## 2021-02-16 PROCEDURE — U0005 INFEC AGEN DETEC AMPLI PROBE: HCPCS | Performed by: EMERGENCY MEDICINE

## 2021-02-16 PROCEDURE — U0003 INFECTIOUS AGENT DETECTION BY NUCLEIC ACID (DNA OR RNA); SEVERE ACUTE RESPIRATORY SYNDROME CORONAVIRUS 2 (SARS-COV-2) (CORONAVIRUS DISEASE [COVID-19]), AMPLIFIED PROBE TECHNIQUE, MAKING USE OF HIGH THROUGHPUT TECHNOLOGIES AS DESCRIBED BY CMS-2020-01-R: HCPCS | Performed by: EMERGENCY MEDICINE

## 2021-02-16 PROCEDURE — 90791 PSYCH DIAGNOSTIC EVALUATION: CPT | Performed by: FAMILY MEDICINE

## 2021-02-16 PROCEDURE — 85025 COMPLETE CBC W/AUTO DIFF WBC: CPT | Performed by: EMERGENCY MEDICINE

## 2021-02-16 PROCEDURE — 250N000009 HC RX 250: Performed by: EMERGENCY MEDICINE

## 2021-02-16 PROCEDURE — C9803 HOPD COVID-19 SPEC COLLECT: HCPCS | Performed by: EMERGENCY MEDICINE

## 2021-02-16 RX ORDER — HYDROXYZINE HYDROCHLORIDE 25 MG/1
25 TABLET, FILM COATED ORAL ONCE
Status: COMPLETED | OUTPATIENT
Start: 2021-02-16 | End: 2021-02-16

## 2021-02-16 RX ORDER — LATANOPROST 50 UG/ML
1 SOLUTION/ DROPS OPHTHALMIC AT BEDTIME
Status: DISCONTINUED | OUTPATIENT
Start: 2021-02-16 | End: 2021-03-12 | Stop reason: HOSPADM

## 2021-02-16 RX ORDER — ALPRAZOLAM 0.5 MG
0.5 TABLET ORAL ONCE
Status: COMPLETED | OUTPATIENT
Start: 2021-02-16 | End: 2021-02-16

## 2021-02-16 RX ORDER — SIMVASTATIN 10 MG
10 TABLET ORAL AT BEDTIME
Status: DISCONTINUED | OUTPATIENT
Start: 2021-02-16 | End: 2021-03-12 | Stop reason: HOSPADM

## 2021-02-16 RX ORDER — HYDRALAZINE HYDROCHLORIDE 25 MG/1
25 TABLET, FILM COATED ORAL 3 TIMES DAILY
Status: DISCONTINUED | OUTPATIENT
Start: 2021-02-16 | End: 2021-03-12 | Stop reason: HOSPADM

## 2021-02-16 RX ORDER — ENALAPRIL MALEATE 20 MG/1
20 TABLET ORAL 2 TIMES DAILY
Status: DISCONTINUED | OUTPATIENT
Start: 2021-02-16 | End: 2021-03-12 | Stop reason: HOSPADM

## 2021-02-16 RX ORDER — BRIMONIDINE TARTRATE 1.5 MG/ML
1 SOLUTION/ DROPS OPHTHALMIC 2 TIMES DAILY
Status: DISCONTINUED | OUTPATIENT
Start: 2021-02-16 | End: 2021-03-12 | Stop reason: HOSPADM

## 2021-02-16 RX ORDER — DORZOLAMIDE HCL 20 MG/ML
1 SOLUTION/ DROPS OPHTHALMIC 2 TIMES DAILY
Status: DISCONTINUED | OUTPATIENT
Start: 2021-02-16 | End: 2021-03-12 | Stop reason: HOSPADM

## 2021-02-16 RX ADMIN — LATANOPROST 1 DROP: 50 SOLUTION OPHTHALMIC at 23:09

## 2021-02-16 RX ADMIN — ENALAPRIL MALEATE 20 MG: 20 TABLET ORAL at 23:29

## 2021-02-16 RX ADMIN — HYDROXYZINE HYDROCHLORIDE 25 MG: 25 TABLET, FILM COATED ORAL at 14:19

## 2021-02-16 RX ADMIN — ALPRAZOLAM 0.5 MG: 0.5 TABLET ORAL at 11:40

## 2021-02-16 RX ADMIN — DORZOLAMIDE HYDROCHLORIDE 1 DROP: 20 SOLUTION/ DROPS OPHTHALMIC at 23:08

## 2021-02-16 RX ADMIN — SIMVASTATIN 10 MG: 10 TABLET, FILM COATED ORAL at 23:29

## 2021-02-16 RX ADMIN — HYDRALAZINE HYDROCHLORIDE 25 MG: 25 TABLET ORAL at 23:29

## 2021-02-16 RX ADMIN — BRIMONIDINE TARTRATE 1 DROP: 1.5 SOLUTION OPHTHALMIC at 23:06

## 2021-02-16 ASSESSMENT — ENCOUNTER SYMPTOMS
NAUSEA: 0
DYSPHORIC MOOD: 1
VOMITING: 0
CHILLS: 0
FEVER: 0
SHORTNESS OF BREATH: 0
DIARRHEA: 0
DYSURIA: 0

## 2021-02-16 NOTE — SAFE
Swathi Dukes  February 16, 2021    Patient is an 85 year old female presenting to the ED with SI x 1 month and plan to overdose on medication.  She reports if her son would not have helped her today she would have taken the medication.  Hx of depression, anxiety, OCD, and dependent personality disorder.      Current Suicidal Ideation/Self-Injurious Concerns/Methods: Ingestion plan to overdose on medication    Inappropriate Sexual Behavior: No    Aggression/Homicidal Ideation: None - N/A      For additional details see full DEC assessment.       Jayda Dillon

## 2021-02-16 NOTE — TELEPHONE ENCOUNTER
Received callback from Brent.  Pt is currently at the [facility] ER due to suicidal ideation.  Brent will call [facility] once pt has been discharged to facilitate follow up.    Siva Aguilar RN, BSN

## 2021-02-16 NOTE — TELEPHONE ENCOUNTER
LMTRC for patient.  She was scheduled and no showed for a UTI yesterday.      LM for son to call back to see how she is doing as well as scheduling a follow up with Dr Vanessa for medication review and MTM.  Zio patch results are in but cardiology appt not schedule.  Will have Brent call to schedule with them for a follow up to review results.    Siva Aguilar RN, BSN

## 2021-02-16 NOTE — ED NOTES
Pt lives in independent living, in transition to assisted living. Pt's son was called to pick his mom up and bring her in due to suicidal ideations. Pt states thoughts only. Denies homicidal ideations. Pt wants to see a psych doctor. Denies ever attempting to end life. Pt placed on a continuous one to one with a . Ambulates with a walker, but doesn't at all times.

## 2021-02-16 NOTE — ED PROVIDER NOTES
[facility] EMERGENCY DEPARTMENT ([facility])   February 16, 2021  ED 9   10:46 AM   History     Chief Complaint   Patient presents with     Suicidal     ongoing, thoughts only     The history is provided by the patient and medical records.     Swathi Dukes is a 85 year old female with history of lupus, hypertension, hyperlipidemia, aortic stenosis and report of longstanding psychiatric issues who presents with depression and passive suicidal ideation over the past month.  Patient was living independently previously, but suffered multiple falls and was transitioned to assisted living on a temporary basis for patient to recover.  Son states this decision was made in order to keep her safe, patient has been very unhappy about this choice given the financial cost, loss of independence, and the fact that she feels it is unnecessary.  Son states that they our staff is with [facility], which has aging in place programs and plan is to return patient back to her home once she is recovered better.  Son states that patient has had PT/OT evaluation and that they felt she could benefit from 24/7 care, patient is not ready for this.  Patient wants to return to independent living and her mood has been worsening over the past month.  Patient has been up since 5 AM. She could barely take a shower because she was shaking so bad and her mind isn't working. She states she is feeling anxious, sad and frustrated, isn't functioning.  Patient called her son and he brought her here for further evaluation.  Son states that patient has had mental health since 1960s and required psychiatric hospitalizations, no psychiatric hospitalizations in past 10 years.  She did have a psychiatric assessment at [facility], was seen by [facility] crisis team and was referred to new Psychiatry group whom she is still with. She is followed by a therapist whom she has seen 3 times in the past  3x, therapist isn't aware of her issues.  Patient  "feels she needs to be hospitalized. Patient states \"I just need help.\" States that she is suicidal but hasn't thought of how she'd commit suicide. She has not tried to kill herself before.   No alcohol use. Son believes she is on Paxil as well as another anxiolytic. She takes alprazolam once a day, no doses yet today. She does feels she needs something stronger. Primary care doctor was concerned for her. Her primary care doctor doesn't prescribe benzodiazepines to geriatric patients, and had told patient that she would need to see a different provider if she wanted a benzodiazepine prescription.  Patient does see a nurse practitioner who is able to write for benzos for her.  She would like to see a psychiatrist.  She states that she does not have any plans to harm herself.  No homicidal ideation.    Epic records reviewed, patient contacted her clinic complaining of urinary frequency and dysuria that started 3 days ago, got an appointment arranged for yesterday but missed it.  Clinic reached out to patient's son, Brent Dukes ([facility] trauma services director).  Today patient denies any urinary symptoms. No medical concerns or complaints. No chest pain, shortness of breath, nausea, vomiting. She has had history of recurrent urinary tract infections, had multiple positive urine cultures last year that grew out E. coli (resistant to ampicillin, ciprofloxacin and levofloxacin) as well as Enterococcus faecalis.  Last positive urine culture was from 1/16/2021 that grew out the resistant E. coli.  She was treated with 1 g of IV Rocephin as well as 1 capsule of Keflex 500 mg.    Patient spoke with [facility] , please see her notes for details. Patient did divulge suicide plan to [facility] , stating that if she didn't reach out to her son she would have overdosed on her medications.     PAST MEDICAL HISTORY:   Past Medical History:   Diagnosis Date     Aortic stenosis      Glaucoma      HTN (hypertension)      " Hyperlipidemia      Pericardial effusion      Systemic lupus erythematosus        PAST SURGICAL HISTORY:   Past Surgical History:   Procedure Laterality Date     CV HEART CATHETERIZATION WITH POSSIBLE INTERVENTION N/A 1/5/2021    Procedure: Heart Catheterization with Possible Intervention;  Surgeon: Hayden Bedoya MD;  Location:  HEART CARDIAC CATH LAB     CV LEFT VENTRICULOGRAM N/A 1/5/2021    Procedure: Left Ventriculogram;  Surgeon: Hayden Bedoya MD;  Location:  HEART CARDIAC CATH LAB       Past medical history, past surgical history, medications, and allergies were reviewed with the patient. Additional pertinent items: None    FAMILY HISTORY:   Family History   Problem Relation Age of Onset     Diabetes Father        SOCIAL HISTORY:   Social History     Tobacco Use     Smoking status: Never Smoker     Smokeless tobacco: Never Used   Substance Use Topics     Alcohol use: Not Currently     Social history was reviewed with the patient. Additional pertinent items: None        Patient's Medications   New Prescriptions    No medications on file   Previous Medications    ALPRAZOLAM (XANAX) 0.5 MG TABLET    Take 0.5 mg by mouth daily     AMLODIPINE (NORVASC) 5 MG TABLET    Take 1 tablet (5 mg) by mouth daily    BRIMONIDINE (ALPHAGAN P) 0.1 % OPHTHALMIC SOLUTION    Place 1 drop into both eyes 2 times daily    CYANOCOBALAMIN (VITAMIN B-12) 1000 MCG TABLET    Take 1 tablet (1,000 mcg) by mouth daily    DORZOLAMIDE (TRUSOPT) 2 % OPHTHALMIC SOLUTION    Place 1 drop into both eyes 2 times daily    ENALAPRIL (VASOTEC) 20 MG TABLET    Take 1 tablet (20 mg) by mouth 2 times daily    FERROUS GLUCONATE (FERGON) 324 (38 FE) MG TABLET    Take 1 tablet (324 mg) by mouth 2 times daily (with meals)    FOLIC ACID (FOLVITE) 1 MG TABLET    Take 1 mg by mouth daily    HYDRALAZINE (APRESOLINE) 25 MG TABLET    Take 1 tablet (25 mg) by mouth 3 times daily    HYDROXYCHLOROQUINE (PLAQUENIL) 200 MG TABLET    Take 200 mg by mouth 2  times daily    LATANOPROST (XALATAN) 0.005 % OPHTHALMIC SOLUTION    Place 1 drop into both eyes At Bedtime     OLANZAPINE (ZYPREXA) 10 MG TABLET    Take 10 mg by mouth At Bedtime     PAROXETINE (PAXIL) 10 MG TABLET    Take 5 mg by mouth every morning With 20mg to = 25mg daily    PAROXETINE (PAXIL) 20 MG TABLET    Take 20 mg by mouth daily Plus 5mg = 25mg daily    SIMVASTATIN (ZOCOR) 10 MG TABLET    Take 1 tablet (10 mg) by mouth At Bedtime   Modified Medications    No medications on file   Discontinued Medications    No medications on file          Allergies   Allergen Reactions     Diclofenac Anaphylaxis     Iodine Anaphylaxis     Contrast  Pt states anaphylactic reaction to ct contrast about 20 years ago     Aspirin         Review of Systems   Constitutional: Negative for chills and fever.   Respiratory: Negative for shortness of breath.    Cardiovascular: Negative for chest pain.   Gastrointestinal: Negative for diarrhea, nausea and vomiting.   Genitourinary: Negative for dysuria.   Psychiatric/Behavioral: Positive for dysphoric mood and suicidal ideas.   All other systems reviewed and are negative.        Physical Exam   BP: (!) 177/71  Pulse: 84  Temp: 97.3  F (36.3  C)  Resp: 20  Weight: 61.9 kg (136 lb 6.4 oz)  SpO2: 96 %      Physical Exam  Vitals signs and nursing note reviewed.   Constitutional:       General: She is not in acute distress.     Appearance: She is well-developed.   HENT:      Head: Normocephalic.   Eyes:      Extraocular Movements: Extraocular movements intact.   Neck:      Musculoskeletal: Neck supple.   Pulmonary:      Effort: No respiratory distress.   Abdominal:      General: There is no distension.   Musculoskeletal:         General: No deformity.   Skin:     General: Skin is dry.   Neurological:      Mental Status: She is alert.      Comments: alert   Psychiatric:         Behavior: Behavior normal.      Comments: Tearful         ED Course        Procedures        Results for orders  placed or performed during the hospital encounter of 02/16/21   CBC with platelets differential     Status: Abnormal   Result Value Ref Range    WBC 6.6 4.0 - 11.0 10e9/L    RBC Count 3.07 (L) 3.8 - 5.2 10e12/L    Hemoglobin 9.8 (L) 11.7 - 15.7 g/dL    Hematocrit 30.3 (L) 35.0 - 47.0 %    MCV 99 78 - 100 fl    MCH 31.9 26.5 - 33.0 pg    MCHC 32.3 31.5 - 36.5 g/dL    RDW 14.6 10.0 - 15.0 %    Platelet Count 351 150 - 450 10e9/L    Diff Method Automated Method     % Neutrophils 81.1 %    % Lymphocytes 10.7 %    % Monocytes 6.9 %    % Eosinophils 0.5 %    % Basophils 0.5 %    % Immature Granulocytes 0.3 %    Nucleated RBCs 0 0 /100    Absolute Neutrophil 5.3 1.6 - 8.3 10e9/L    Absolute Lymphocytes 0.7 (L) 0.8 - 5.3 10e9/L    Absolute Monocytes 0.5 0.0 - 1.3 10e9/L    Absolute Eosinophils 0.0 0.0 - 0.7 10e9/L    Absolute Basophils 0.0 0.0 - 0.2 10e9/L    Abs Immature Granulocytes 0.0 0 - 0.4 10e9/L    Absolute Nucleated RBC 0.0    Comprehensive metabolic panel     Status: Abnormal   Result Value Ref Range    Sodium 138 133 - 144 mmol/L    Potassium 4.3 3.4 - 5.3 mmol/L    Chloride 106 94 - 109 mmol/L    Carbon Dioxide 26 20 - 32 mmol/L    Anion Gap 6 3 - 14 mmol/L    Glucose 101 (H) 70 - 99 mg/dL    Urea Nitrogen 15 7 - 30 mg/dL    Creatinine 0.78 0.52 - 1.04 mg/dL    GFR Estimate 70 >60 mL/min/[1.73_m2]    GFR Estimate If Black 81 >60 mL/min/[1.73_m2]    Calcium 9.5 8.5 - 10.1 mg/dL    Bilirubin Total 0.2 0.2 - 1.3 mg/dL    Albumin 3.8 3.4 - 5.0 g/dL    Protein Total 7.5 6.8 - 8.8 g/dL    Alkaline Phosphatase 98 40 - 150 U/L    ALT 15 0 - 50 U/L    AST 15 0 - 45 U/L   Asymptomatic SARS-CoV-2 COVID-19 Virus (Coronavirus) by PCR     Status: None    Specimen: Nasopharyngeal   Result Value Ref Range    SARS-CoV-2 Virus Specimen Source Nasopharyngeal     SARS-CoV-2 PCR Result NEGATIVE     SARS-CoV-2 PCR Comment       Testing was performed using the Xpert Xpress SARS-CoV-2 Assay on the [employer] Gene-Xpert   Instrument  Systems. Additional information about this Emergency Use Authorization (EUA)   assay can be found via the Lab Guide.              Results for orders placed or performed during the hospital encounter of 02/16/21 (from the past 24 hour(s))   CBC with platelets differential   Result Value Ref Range    WBC 6.6 4.0 - 11.0 10e9/L    RBC Count 3.07 (L) 3.8 - 5.2 10e12/L    Hemoglobin 9.8 (L) 11.7 - 15.7 g/dL    Hematocrit 30.3 (L) 35.0 - 47.0 %    MCV 99 78 - 100 fl    MCH 31.9 26.5 - 33.0 pg    MCHC 32.3 31.5 - 36.5 g/dL    RDW 14.6 10.0 - 15.0 %    Platelet Count 351 150 - 450 10e9/L    Diff Method Automated Method     % Neutrophils 81.1 %    % Lymphocytes 10.7 %    % Monocytes 6.9 %    % Eosinophils 0.5 %    % Basophils 0.5 %    % Immature Granulocytes 0.3 %    Nucleated RBCs 0 0 /100    Absolute Neutrophil 5.3 1.6 - 8.3 10e9/L    Absolute Lymphocytes 0.7 (L) 0.8 - 5.3 10e9/L    Absolute Monocytes 0.5 0.0 - 1.3 10e9/L    Absolute Eosinophils 0.0 0.0 - 0.7 10e9/L    Absolute Basophils 0.0 0.0 - 0.2 10e9/L    Abs Immature Granulocytes 0.0 0 - 0.4 10e9/L    Absolute Nucleated RBC 0.0    Comprehensive metabolic panel   Result Value Ref Range    Sodium 138 133 - 144 mmol/L    Potassium 4.3 3.4 - 5.3 mmol/L    Chloride 106 94 - 109 mmol/L    Carbon Dioxide 26 20 - 32 mmol/L    Anion Gap 6 3 - 14 mmol/L    Glucose 101 (H) 70 - 99 mg/dL    Urea Nitrogen 15 7 - 30 mg/dL    Creatinine 0.78 0.52 - 1.04 mg/dL    GFR Estimate 70 >60 mL/min/[1.73_m2]    GFR Estimate If Black 81 >60 mL/min/[1.73_m2]    Calcium 9.5 8.5 - 10.1 mg/dL    Bilirubin Total 0.2 0.2 - 1.3 mg/dL    Albumin 3.8 3.4 - 5.0 g/dL    Protein Total 7.5 6.8 - 8.8 g/dL    Alkaline Phosphatase 98 40 - 150 U/L    ALT 15 0 - 50 U/L    AST 15 0 - 45 U/L   Asymptomatic SARS-CoV-2 COVID-19 Virus (Coronavirus) by PCR    Specimen: Nasopharyngeal   Result Value Ref Range    SARS-CoV-2 Virus Specimen Source Nasopharyngeal     SARS-CoV-2 PCR Result NEGATIVE     SARS-CoV-2 PCR  Comment       Testing was performed using the Xpert Xpress SARS-CoV-2 Assay on the [employer] Gene-Xpert   Instrument Systems. Additional information about this Emergency Use Authorization (EUA)   assay can be found via the Lab Guide.       Medications   ALPRAZolam (XANAX) tablet 0.5 mg (0.5 mg Oral Given 2/16/21 1140)             Assessments & Plan (with Medical Decision Making)   85-year-old female presents to us with a chief complaint of anxiety and suicidal thoughts.  The patient stated that if she had not come in today she thinks she would have likely overdosed.  She was evaluated by myself as well as the mental health .  We do feel is appropriate to admit the patient to the hospital.  Patient is in agreement with this plan.  She has no further other medical symptoms at this time and is medically cleared.    I have reviewed the nursing notes.    I have reviewed the findings, diagnosis, plan and need for follow up with the patient.    New Prescriptions    No medications on file       Final diagnoses:   Suicidal ideation   Depression, unspecified depression type   I, Joselyn Garcia, am serving as a trained medical scribe to document services personally performed by Rico Davidson DO based on the provider's statements to me on February 16, 2021.  This document has been checked and approved by the attending provider.    I, Rico Davidson DO, was physically present and have reviewed and verified the accuracy of this note documented by Joselyn Garcia, medical scribe.       2/16/2021   [facility] EMERGENCY DEPARTMENT     Rico Davidson DO  02/16/21 2318

## 2021-02-16 NOTE — PHARMACY-ADMISSION MEDICATION HISTORY
"Admission Medication History Completed by Pharmacy    See [facility] Admission Navigator for allergy information, preferred outpatient pharmacy, prior to admission medications and immunization status.     Medication history sources:  patient via iPad interview, Discharge Summary 1/18/21 ([facility])    Changes made to PTA medication list (reason)  Added: N/A  Deleted: N/A  Changed: N/A    Additional medication history information:   - Patient denies any changes to medication list (with exception of new iron and cyanocobalamin supplements) since her last hospital discharge. No medications today prior to presenting in ED.  - Patient denies taking any additional Rx/OTC medications other than the ones listed below.    Addendum: patient reports she has one additional psych med for her \"compulsions\" (aside from paroxetine, olanzapine, and alprazolam which she recognized the names of). Writer reviewed available [employer] records with patient and only other recent psych med filled is ziprasidone 20 mg daily (last filled 1/9/21 for 30 days prescribed by Ya Boyd)- patient was adamant she is not taking this medication.    Prior to Admission medications    Medication Sig Last Dose Taking? Auth Provider   ALPRAZolam (XANAX) 0.5 MG tablet Take 0.5 mg by mouth daily  2/15/2021 Yes Reported, Patient   amLODIPine (NORVASC) 5 MG tablet Take 1 tablet (5 mg) by mouth daily 2/15/2021 Yes Cortney Vanessa MD   brimonidine (ALPHAGAN P) 0.1 % ophthalmic solution Place 1 drop into both eyes 2 times daily 2/15/2021 Yes Cortney Vanessa MD   cyanocobalamin (VITAMIN B-12) 1000 MCG tablet Take 1 tablet (1,000 mcg) by mouth daily 2/15/2021 Yes Cortney Vanessa MD   dorzolamide (TRUSOPT) 2 % ophthalmic solution Place 1 drop into both eyes 2 times daily 2/15/2021 Yes Cortney Vanessa MD   enalapril (VASOTEC) 20 MG tablet Take 1 tablet (20 mg) by mouth 2 times daily 2/15/2021 Yes Cortney Vanessa MD "   ferrous gluconate (FERGON) 324 (38 Fe) MG tablet Take 1 tablet (324 mg) by mouth 2 times daily (with meals) 2/15/2021 Yes Cortney Vanessa MD   folic acid (FOLVITE) 1 MG tablet Take 1 mg by mouth daily 2/15/2021 Yes Unknown, Entered By History   hydrALAZINE (APRESOLINE) 25 MG tablet Take 1 tablet (25 mg) by mouth 3 times daily 2/15/2021 Yes Cortney Vanessa MD   hydroxychloroquine (PLAQUENIL) 200 MG tablet Take 200 mg by mouth 2 times daily 2/15/2021 Yes Unknown, Entered By History   latanoprost (XALATAN) 0.005 % ophthalmic solution Place 1 drop into both eyes At Bedtime  2/15/2021 Yes Reported, Patient   OLANZapine (ZYPREXA) 10 MG tablet Take 10 mg by mouth At Bedtime  2/15/2021 Yes Reported, Patient   PARoxetine (PAXIL) 10 MG tablet Take 5 mg by mouth every morning With 20mg to = 25mg daily 2/15/2021 Yes Unknown, Entered By History   PARoxetine (PAXIL) 20 MG tablet Take 20 mg by mouth daily Plus 5mg = 25mg daily 2/15/2021 Yes Reported, Patient   simvastatin (ZOCOR) 10 MG tablet Take 1 tablet (10 mg) by mouth At Bedtime 2/15/2021 Yes Cortney Vanessa MD     Date completed: 02/16/21    Medication history completed by:   Roberto Gonzalez, PharmD, BCPS  [facility]  Emergency Department: Ascom *[id number]

## 2021-02-16 NOTE — TELEPHONE ENCOUNTER
Pt brought to [facility] ED by son.  B: pt worsening depression past month.  Pt increase isolation, lonely, does not believe meds are not working, crying bouts constantly.  Pt SI with thghts to overdose on her meds. Called son today and states that if he didn't bring her in she would have ingested. Pt lives in senior living and up until a month ago was in the independent section until a fall. Pt uses walker to ambulate. No other acute medical issues. No dementia.   A: depression,anxiety.  Anxious, cooperative. Vol.  R: milmikeeva/3b  Patient cleared and ready for behavioral bed placement: Yes

## 2021-02-17 PROBLEM — R45.851 SUICIDAL IDEATION: Status: ACTIVE | Noted: 2020-10-01

## 2021-02-17 PROBLEM — F32.A DEPRESSION, UNSPECIFIED DEPRESSION TYPE: Status: ACTIVE | Noted: 2021-02-17

## 2021-02-17 LAB
ALBUMIN UR-MCNC: NEGATIVE MG/DL
APPEARANCE UR: CLEAR
BILIRUB UR QL STRIP: NEGATIVE
COLOR UR AUTO: ABNORMAL
GLUCOSE UR STRIP-MCNC: NEGATIVE MG/DL
HGB UR QL STRIP: NEGATIVE
HYALINE CASTS #/AREA URNS LPF: 1 /LPF (ref 0–2)
KETONES UR STRIP-MCNC: NEGATIVE MG/DL
LEUKOCYTE ESTERASE UR QL STRIP: ABNORMAL
MUCOUS THREADS #/AREA URNS LPF: PRESENT /LPF
NITRATE UR QL: NEGATIVE
PH UR STRIP: 5 PH (ref 5–7)
RBC #/AREA URNS AUTO: 1 /HPF (ref 0–2)
SOURCE: ABNORMAL
SP GR UR STRIP: 1.01 (ref 1–1.03)
UROBILINOGEN UR STRIP-MCNC: NORMAL MG/DL (ref 0–2)
WBC #/AREA URNS AUTO: 12 /HPF (ref 0–5)

## 2021-02-17 PROCEDURE — H2032 ACTIVITY THERAPY, PER 15 MIN: HCPCS

## 2021-02-17 PROCEDURE — 250N000013 HC RX MED GY IP 250 OP 250 PS 637: Performed by: NURSE PRACTITIONER

## 2021-02-17 PROCEDURE — 99207 PR CONSULT E&M CHANGED TO INITIAL LEVEL: CPT | Performed by: NURSE PRACTITIONER

## 2021-02-17 PROCEDURE — 99222 1ST HOSP IP/OBS MODERATE 55: CPT | Performed by: NURSE PRACTITIONER

## 2021-02-17 PROCEDURE — 124N000003 HC R&B MH SENIOR/ADOLESCENT

## 2021-02-17 PROCEDURE — G0177 OPPS/PHP; TRAIN & EDUC SERV: HCPCS

## 2021-02-17 PROCEDURE — 250N000009 HC RX 250: Performed by: EMERGENCY MEDICINE

## 2021-02-17 PROCEDURE — 93005 ELECTROCARDIOGRAM TRACING: CPT

## 2021-02-17 PROCEDURE — 81001 URINALYSIS AUTO W/SCOPE: CPT | Performed by: EMERGENCY MEDICINE

## 2021-02-17 PROCEDURE — 250N000013 HC RX MED GY IP 250 OP 250 PS 637: Performed by: EMERGENCY MEDICINE

## 2021-02-17 PROCEDURE — 250N000013 HC RX MED GY IP 250 OP 250 PS 637: Performed by: FAMILY MEDICINE

## 2021-02-17 PROCEDURE — 87086 URINE CULTURE/COLONY COUNT: CPT | Performed by: EMERGENCY MEDICINE

## 2021-02-17 PROCEDURE — 87088 URINE BACTERIA CULTURE: CPT | Performed by: EMERGENCY MEDICINE

## 2021-02-17 PROCEDURE — 93010 ELECTROCARDIOGRAM REPORT: CPT | Performed by: INTERNAL MEDICINE

## 2021-02-17 PROCEDURE — 99223 1ST HOSP IP/OBS HIGH 75: CPT | Mod: AI | Performed by: NURSE PRACTITIONER

## 2021-02-17 PROCEDURE — 87186 SC STD MICRODIL/AGAR DIL: CPT | Performed by: EMERGENCY MEDICINE

## 2021-02-17 RX ORDER — OLANZAPINE 5 MG/1
5 TABLET ORAL AT BEDTIME
Status: DISCONTINUED | OUTPATIENT
Start: 2021-02-17 | End: 2021-03-01

## 2021-02-17 RX ORDER — ALPRAZOLAM 0.5 MG
0.5 TABLET ORAL DAILY
Status: DISCONTINUED | OUTPATIENT
Start: 2021-02-17 | End: 2021-02-17

## 2021-02-17 RX ORDER — HYDROXYZINE HYDROCHLORIDE 25 MG/1
25 TABLET, FILM COATED ORAL EVERY 4 HOURS PRN
Status: DISCONTINUED | OUTPATIENT
Start: 2021-02-17 | End: 2021-03-05

## 2021-02-17 RX ORDER — OLANZAPINE 5 MG/1
5 TABLET ORAL 3 TIMES DAILY PRN
Status: DISCONTINUED | OUTPATIENT
Start: 2021-02-17 | End: 2021-03-05

## 2021-02-17 RX ORDER — FOLIC ACID 1 MG/1
1 TABLET ORAL DAILY
Status: DISCONTINUED | OUTPATIENT
Start: 2021-02-17 | End: 2021-02-26

## 2021-02-17 RX ORDER — GABAPENTIN 100 MG/1
100 CAPSULE ORAL 3 TIMES DAILY
Status: DISCONTINUED | OUTPATIENT
Start: 2021-02-17 | End: 2021-02-26

## 2021-02-17 RX ORDER — OLANZAPINE 10 MG/2ML
5 INJECTION, POWDER, FOR SOLUTION INTRAMUSCULAR 3 TIMES DAILY PRN
Status: DISCONTINUED | OUTPATIENT
Start: 2021-02-17 | End: 2021-03-05

## 2021-02-17 RX ORDER — HYDROXYCHLOROQUINE SULFATE 200 MG/1
200 TABLET, FILM COATED ORAL 2 TIMES DAILY
Status: DISCONTINUED | OUTPATIENT
Start: 2021-02-17 | End: 2021-02-17

## 2021-02-17 RX ORDER — TRAZODONE HYDROCHLORIDE 50 MG/1
50 TABLET, FILM COATED ORAL
Status: DISCONTINUED | OUTPATIENT
Start: 2021-02-17 | End: 2021-03-12 | Stop reason: HOSPADM

## 2021-02-17 RX ORDER — PAROXETINE 10 MG/1
20 TABLET, FILM COATED ORAL DAILY
Status: DISCONTINUED | OUTPATIENT
Start: 2021-02-17 | End: 2021-02-17

## 2021-02-17 RX ORDER — AMLODIPINE BESYLATE 5 MG/1
5 TABLET ORAL DAILY
Status: DISCONTINUED | OUTPATIENT
Start: 2021-02-17 | End: 2021-03-12 | Stop reason: HOSPADM

## 2021-02-17 RX ORDER — PAROXETINE HYDROCHLORIDE 10 MG/1
5 TABLET, FILM COATED ORAL EVERY MORNING
Status: DISCONTINUED | OUTPATIENT
Start: 2021-02-17 | End: 2021-02-17

## 2021-02-17 RX ORDER — FERROUS GLUCONATE 324(38)MG
324 TABLET ORAL 2 TIMES DAILY WITH MEALS
Status: DISCONTINUED | OUTPATIENT
Start: 2021-02-17 | End: 2021-03-12 | Stop reason: HOSPADM

## 2021-02-17 RX ORDER — LANOLIN ALCOHOL/MO/W.PET/CERES
1000 CREAM (GRAM) TOPICAL DAILY
Status: DISCONTINUED | OUTPATIENT
Start: 2021-02-17 | End: 2021-03-12 | Stop reason: HOSPADM

## 2021-02-17 RX ORDER — ALPRAZOLAM 0.5 MG
0.5 TABLET ORAL AT BEDTIME
Status: DISCONTINUED | OUTPATIENT
Start: 2021-02-17 | End: 2021-02-24

## 2021-02-17 RX ORDER — LOPERAMIDE HCL 2 MG
4 CAPSULE ORAL ONCE
Status: COMPLETED | OUTPATIENT
Start: 2021-02-17 | End: 2021-02-17

## 2021-02-17 RX ORDER — HYDROXYZINE HYDROCHLORIDE 25 MG/1
25 TABLET, FILM COATED ORAL ONCE
Status: COMPLETED | OUTPATIENT
Start: 2021-02-17 | End: 2021-02-17

## 2021-02-17 RX ORDER — POLYETHYLENE GLYCOL 3350 17 G/17G
17 POWDER, FOR SOLUTION ORAL DAILY PRN
Status: DISCONTINUED | OUTPATIENT
Start: 2021-02-17 | End: 2021-03-12 | Stop reason: HOSPADM

## 2021-02-17 RX ORDER — PAROXETINE 10 MG/1
10 TABLET, FILM COATED ORAL AT BEDTIME
Status: DISCONTINUED | OUTPATIENT
Start: 2021-02-17 | End: 2021-02-19

## 2021-02-17 RX ORDER — HYDROXYCHLOROQUINE SULFATE 200 MG/1
200 TABLET, FILM COATED ORAL 2 TIMES DAILY
Status: DISCONTINUED | OUTPATIENT
Start: 2021-02-17 | End: 2021-03-12 | Stop reason: HOSPADM

## 2021-02-17 RX ORDER — ACETAMINOPHEN 325 MG/1
650 TABLET ORAL EVERY 4 HOURS PRN
Status: DISCONTINUED | OUTPATIENT
Start: 2021-02-17 | End: 2021-03-12 | Stop reason: HOSPADM

## 2021-02-17 RX ORDER — MAGNESIUM HYDROXIDE/ALUMINUM HYDROXICE/SIMETHICONE 120; 1200; 1200 MG/30ML; MG/30ML; MG/30ML
30 SUSPENSION ORAL EVERY 4 HOURS PRN
Status: DISCONTINUED | OUTPATIENT
Start: 2021-02-17 | End: 2021-03-12 | Stop reason: HOSPADM

## 2021-02-17 RX ORDER — DULOXETIN HYDROCHLORIDE 20 MG/1
20 CAPSULE, DELAYED RELEASE ORAL DAILY
Status: DISCONTINUED | OUTPATIENT
Start: 2021-02-18 | End: 2021-02-19

## 2021-02-17 RX ADMIN — GABAPENTIN 100 MG: 100 CAPSULE ORAL at 14:05

## 2021-02-17 RX ADMIN — ENALAPRIL MALEATE 20 MG: 20 TABLET ORAL at 08:40

## 2021-02-17 RX ADMIN — DORZOLAMIDE HYDROCHLORIDE 1 DROP: 20 SOLUTION/ DROPS OPHTHALMIC at 21:05

## 2021-02-17 RX ADMIN — DORZOLAMIDE HYDROCHLORIDE 1 DROP: 20 SOLUTION/ DROPS OPHTHALMIC at 07:46

## 2021-02-17 RX ADMIN — HYDRALAZINE HYDROCHLORIDE 25 MG: 25 TABLET ORAL at 14:05

## 2021-02-17 RX ADMIN — ENALAPRIL MALEATE 20 MG: 20 TABLET ORAL at 21:04

## 2021-02-17 RX ADMIN — CYANOCOBALAMIN TAB 1000 MCG 1000 MCG: 1000 TAB at 12:48

## 2021-02-17 RX ADMIN — HYDROXYCHLOROQUINE SULFATE 200 MG: 200 TABLET, FILM COATED ORAL at 21:04

## 2021-02-17 RX ADMIN — PAROXETINE HYDROCHLORIDE 10 MG: 10 TABLET, FILM COATED ORAL at 21:04

## 2021-02-17 RX ADMIN — OLANZAPINE 5 MG: 5 TABLET, FILM COATED ORAL at 21:04

## 2021-02-17 RX ADMIN — SIMVASTATIN 10 MG: 10 TABLET, FILM COATED ORAL at 21:04

## 2021-02-17 RX ADMIN — HYDRALAZINE HYDROCHLORIDE 25 MG: 25 TABLET ORAL at 08:40

## 2021-02-17 RX ADMIN — BRIMONIDINE TARTRATE 1 DROP: 1.5 SOLUTION OPHTHALMIC at 07:46

## 2021-02-17 RX ADMIN — HYDROXYZINE HYDROCHLORIDE 25 MG: 25 TABLET, FILM COATED ORAL at 12:55

## 2021-02-17 RX ADMIN — ALPRAZOLAM 0.5 MG: 0.5 TABLET ORAL at 21:04

## 2021-02-17 RX ADMIN — LOPERAMIDE HYDROCHLORIDE 4 MG: 2 CAPSULE ORAL at 07:45

## 2021-02-17 RX ADMIN — AMLODIPINE BESYLATE 5 MG: 5 TABLET ORAL at 12:48

## 2021-02-17 RX ADMIN — HYDROXYCHLOROQUINE SULFATE 200 MG: 200 TABLET, FILM COATED ORAL at 12:48

## 2021-02-17 RX ADMIN — GABAPENTIN 100 MG: 100 CAPSULE ORAL at 21:04

## 2021-02-17 RX ADMIN — BRIMONIDINE TARTRATE 1 DROP: 1.5 SOLUTION OPHTHALMIC at 21:05

## 2021-02-17 RX ADMIN — FERROUS GLUCONATE 324 MG: 324 TABLET ORAL at 12:48

## 2021-02-17 RX ADMIN — LATANOPROST 1 DROP: 50 SOLUTION OPHTHALMIC at 21:06

## 2021-02-17 RX ADMIN — HYDROXYZINE HYDROCHLORIDE 25 MG: 25 TABLET, FILM COATED ORAL at 08:40

## 2021-02-17 RX ADMIN — FOLIC ACID 1 MG: 1 TABLET ORAL at 12:48

## 2021-02-17 RX ADMIN — FERROUS GLUCONATE 324 MG: 324 TABLET ORAL at 18:14

## 2021-02-17 RX ADMIN — HYDRALAZINE HYDROCHLORIDE 25 MG: 25 TABLET ORAL at 21:04

## 2021-02-17 ASSESSMENT — ACTIVITIES OF DAILY LIVING (ADL)
LAUNDRY: UNABLE TO COMPLETE
ORAL_HYGIENE: INDEPENDENT
DRESS: INDEPENDENT
LAUNDRY: WITH SUPERVISION
HYGIENE/GROOMING: INDEPENDENT
HYGIENE/GROOMING: INDEPENDENT
ORAL_HYGIENE: INDEPENDENT
DRESS: INDEPENDENT

## 2021-02-17 NOTE — H&P
"DATE OF ADMISSION: 2/16/2021                                     PATIENT'S [id number]   DATE OF SERVICE: 2/17/2021                                           PATIENT'S: 1935  ADMITTING PROVIDER: Tod Villanueva MD  ATTENDING PROVIDER: Gloria URIARTE CNP  LEGAL STATUS:  Voluntary  SOURCES OF INFORMATION: Information was obtained from the patient and available records.  CHIEF COMPLAIN: \"I have been so depressed I could not take a shower by myself\".  HISTORY F PRESENT ILLNESS: Per ED note: Swathi Dukes is a 85 year old female with history of lupus, hypertension, hyperlipidemia, aortic stenosis and report of longstanding psychiatric issues who presents with depression and passive suicidal ideation over the past month.  Patient was living independently previously, but suffered multiple falls and was transitioned to assisted living on a temporary basis for patient to recover.  Son states this decision was made in order to keep her safe, patient has been very unhappy about this choice given the financial cost, loss of independence, and the fact that she feels it is unnecessary.  Son states that they our staff is with [facility], which has aging in place programs and plan is to return patient back to her home once she is recovered better.  Son states that patient has had PT/OT evaluation and that they felt she could benefit from 24/7 care, patient is not ready for this.  Patient wants to return to independent living and her mood has been worsening over the past month.  She could barely take a shower because she was shaking so bad and her mind isn't working. She states she is feeling anxious, sad and frustrated, isn't functioning.  Son states that patient has had mental health since 1960s and required psychiatric hospitalizations, no psychiatric hospitalizations in past 10 years.  She did have a psychiatric assessment at [facility], was seen by [facility] crisis team and was referred to [facility]" "Psychiatry group whom she is still with. She is followed by a therapist whom she has seen 3 times in the past  3x, therapist isn't aware of her issues.  Patient feels she needs to be hospitalized. Patient states \"I just need help.\" States that she is suicidal but hasn't thought of how she'd commit suicide. She has not tried to kill herself before. No alcohol use. Son believes she is on Paxil as well as another anxiolytic. She takes alprazolam once a day, no doses yet today. She does feels she needs something stronger. Primary care doctor was concerned for her. Her primary care doctor doesn't prescribe benzodiazepines to geriatric patients, and had told patient that she would need to see a different provider if she wanted a benzodiazepine prescription.  Patient does see a nurse practitioner who is able to write for benzos for her.  She would like to see a psychiatrist.  She states that she does not have any plans to harm herself.  No homicidal ideation. Epic records reviewed, patient contacted her clinic complaining of urinary frequency and dysuria that started 3 days ago, got an appointment arranged for yesterday but missed it.  Clinic reached out to patient's son, Brent Dukes ([facility] trauma services director).  Today patient denies any urinary symptoms. No medical concerns or complaints. No chest pain, shortness of breath, nausea, vomiting. She has had history of recurrent urinary tract infections, had multiple positive urine cultures last year that grew out E. coli (resistant to ampicillin, ciprofloxacin and levofloxacin) as well as Enterococcus faecalis.  Last positive urine culture was from 1/16/2021 that grew out the resistant E. coli.  She was treated with 1 g of IV Rocephin as well as 1 capsule of Keflex 500 mg. Patient spoke with [facility] , please see her notes for details. Patient did divulge suicide plan to [facility] , stating that if she didn't reach out to her son she would have " "overdosed on her medications.   The patient is a fairly reliable historian.  She confirms information in the previous paragraph.  She is here because of severe depression and inability to take care of herself. She tried to take a shower but was not able to due to shakiness.  The current episode started about a year and a half ago when she broke up with a boyfriend of 7 years.  She used to live with him, but since the break-up, she moved into an assisted living about a year and a half ago.  She does not like the place and has been starting to slowly decompensate.  The Covid restrictions have also contributed to her depression.  She feels quite isolated.  She is allowed to attend one event  And have one visitor a week.  She feels bored.  She reads books and watches TV.  Reports feeling anxious, sad, and frustrated.  Depression is rated as \"bad\".  Sleep is \"okay\".  She takes catnaps throughout the day.  Lacks interest in everyday activities.  Energy is low, memory and concentration impaired, appetite is good.  Reports passive suicidal ideation with a plan to take a bottle of pills.  Protective factors are her children.  She feels hopeless, helpless, and worthless.  Reports ruminating thoughts.  Anxiety is high all day long.  Reports panic attacks 2-3 times a day that manifest as \"sadness and depression\".  Denies manic episodes.  Reports psychosis, primarily hearing a voice that is derogatory in nature.  She is not sure if this is her own voice or comes from outside her head.  Reports history of OCD that manifest as a constant cleaning.  Denies eating disorder, borderline personality disorder, suicide attempts, self injury behaviors.  Denies history of seizures.  To the patient reports that she fell twice in the last months.  During the second fall she hit her head but did not lose consciousness.  SUBSTANCE USE HISTORY:   Denies.  Drinks 2 diet Cokes a day.  Never smoked cigarettes.  Does not drink " coffee.    PSYCHIATRIC HISTORY:   The patient is a history of depression, anxiety, and OCD.  She reports 5 or 6 hospitalization in her lifetime, the last one about 10 years ago.  She had ECT treatment which made her symptoms worse.  Prior medication trials include Prozac, possibly Wellbutrin, Xanax for many years, Valium, hydroxyzine, Zyprexa, possibly Haldol.  She does not have a psychiatrist.  Her medications are prescribed by her Doctor.  No history of commitments or violence towards others.  PAST MEDICAL HISTORY:   Past Medical History:   Diagnosis Date     Aortic stenosis      Glaucoma      HTN (hypertension)      Hyperlipidemia      Pericardial effusion      Systemic lupus erythematosus        Past Surgical History:   Procedure Laterality Date     CV HEART CATHETERIZATION WITH POSSIBLE INTERVENTION N/A 1/5/2021    Procedure: Heart Catheterization with Possible Intervention;  Surgeon: Hayden Bedoya MD;  Location:  HEART CARDIAC CATH LAB     CV LEFT VENTRICULOGRAM N/A 1/5/2021    Procedure: Left Ventriculogram;  Surgeon: Hayden Bedoya MD;  Location:  HEART CARDIAC CATH LAB       ALLERGIES:    Allergies   Allergen Reactions     Diclofenac Anaphylaxis     Iodine Anaphylaxis     Contrast  Pt states anaphylactic reaction to ct contrast about 20 years ago     Aspirin      FAMILY HISTORY:  Family history is negative for mental illness and chemical dependency.  Family History   Problem Relation Age of Onset     Diabetes Father        SOCIAL HISTORY:   The patient grew up in [place].  She has 3 brothers and 1 sister.  One of her brothers passed away about a month ago.  They were not close.  The patient has been  for many years.  She has 2 twin sons.  Currently lives in a assisted living.  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  She completed 2 years of college.  Denies legal history.   MEDICAL REVIEW OF SYSTEM: Please refer to the review of systems done by Lety  Rico Hoyos,  on 2/16/2021, which I reviewed and confirmed. The remaining 10-point systems review was negative based on my exam.   MEDICATIONS PRIOR TO ADMISSION:   Prior to Admission medications    Medication Sig Start Date End Date Taking? Authorizing Provider   ALPRAZolam (XANAX) 0.5 MG tablet Take 0.5 mg by mouth daily  8/6/19  Yes Reported, Patient   amLODIPine (NORVASC) 5 MG tablet Take 1 tablet (5 mg) by mouth daily 1/22/21  Yes Cortney Vanessa MD   brimonidine (ALPHAGAN P) 0.1 % ophthalmic solution Place 1 drop into both eyes 2 times daily 1/25/21  Yes Cortney Vanessa MD   cyanocobalamin (VITAMIN B-12) 1000 MCG tablet Take 1 tablet (1,000 mcg) by mouth daily 1/28/21  Yes Cortney Vanessa MD   dorzolamide (TRUSOPT) 2 % ophthalmic solution Place 1 drop into both eyes 2 times daily 1/25/21  Yes Cortney Vanessa MD   enalapril (VASOTEC) 20 MG tablet Take 1 tablet (20 mg) by mouth 2 times daily 1/22/21  Yes Cortney Vanessa MD   ferrous gluconate (FERGON) 324 (38 Fe) MG tablet Take 1 tablet (324 mg) by mouth 2 times daily (with meals) 1/28/21  Yes Cortney Vanessa MD   folic acid (FOLVITE) 1 MG tablet Take 1 mg by mouth daily   Yes Unknown, Entered By History   hydrALAZINE (APRESOLINE) 25 MG tablet Take 1 tablet (25 mg) by mouth 3 times daily 1/22/21  Yes Cortney Vanessa MD   hydroxychloroquine (PLAQUENIL) 200 MG tablet Take 200 mg by mouth 2 times daily   Yes Unknown, Entered By History   latanoprost (XALATAN) 0.005 % ophthalmic solution Place 1 drop into both eyes At Bedtime  8/6/19  Yes Reported, Patient   OLANZapine (ZYPREXA) 10 MG tablet Take 10 mg by mouth At Bedtime  6/8/19  Yes Reported, Patient   PARoxetine (PAXIL) 10 MG tablet Take 5 mg by mouth every morning With 20mg to = 25mg daily   Yes Unknown, Entered By History   PARoxetine (PAXIL) 20 MG tablet Take 20 mg by mouth daily Plus 5mg = 25mg daily 6/7/19  Yes Reported, Patient   simvastatin (ZOCOR) 10 MG  tablet Take 1 tablet (10 mg) by mouth At Bedtime 1/22/21  Yes Cortney Vanessa MD     LABORATORY DATA:   Recent Results (from the past 672 hour(s))   *UA reflex to Microscopic and Culture ([place] and [facility] (except [place] and [place])    Collection Time: 01/27/21  6:22 PM    Specimen: Midstream Urine   Result Value Ref Range    Color Urine Yellow     Appearance Urine Clear     Glucose Urine Negative NEG^Negative mg/dL    Bilirubin Urine Negative NEG^Negative    Ketones Urine Negative NEG^Negative mg/dL    Specific Gravity Urine <=1.005 1.003 - 1.035    Blood Urine Negative NEG^Negative    pH Urine 6.0 5.0 - 7.0 pH    Protein Albumin Urine Negative NEG^Negative mg/dL    Urobilinogen Urine 0.2 0.2 - 1.0 EU/dL    Nitrite Urine Negative NEG^Negative    Leukocyte Esterase Urine Negative NEG^Negative    Source Midstream Urine    **Basic metabolic panel FUTURE anytime    Collection Time: 01/27/21  6:23 PM   Result Value Ref Range    Sodium 140 133 - 144 mmol/L    Potassium 4.3 3.4 - 5.3 mmol/L    Chloride 104 94 - 109 mmol/L    Carbon Dioxide 27 20 - 32 mmol/L    Anion Gap 9 3 - 14 mmol/L    Glucose 122 (H) 70 - 99 mg/dL    Urea Nitrogen 13 7 - 30 mg/dL    Creatinine 0.90 0.52 - 1.04 mg/dL    GFR Estimate 58 (L) >60 mL/min/[1.73_m2]    GFR Estimate If Black 67 >60 mL/min/[1.73_m2]    Calcium 9.6 8.5 - 10.1 mg/dL   **CBC with platelets FUTURE anytime    Collection Time: 01/27/21  6:23 PM   Result Value Ref Range    WBC 6.5 4.0 - 11.0 10e9/L    RBC Count 2.55 (L) 3.8 - 5.2 10e12/L    Hemoglobin 8.2 (L) 11.7 - 15.7 g/dL    Hematocrit 26.9 (L) 35.0 - 47.0 %     (H) 78 - 100 fl    MCH 32.2 26.5 - 33.0 pg    MCHC 30.5 (L) 31.5 - 36.5 g/dL    RDW 14.9 10.0 - 15.0 %    Platelet Count 386 150 - 450 10e9/L   CBC with platelets differential    Collection Time: 02/16/21 11:36 AM   Result Value Ref Range    WBC 6.6 4.0 - 11.0 10e9/L    RBC Count 3.07 (L) 3.8 - 5.2 10e12/L    Hemoglobin 9.8 (L) 11.7 - 15.7 g/dL     Hematocrit 30.3 (L) 35.0 - 47.0 %    MCV 99 78 - 100 fl    MCH 31.9 26.5 - 33.0 pg    MCHC 32.3 31.5 - 36.5 g/dL    RDW 14.6 10.0 - 15.0 %    Platelet Count 351 150 - 450 10e9/L    Diff Method Automated Method     % Neutrophils 81.1 %    % Lymphocytes 10.7 %    % Monocytes 6.9 %    % Eosinophils 0.5 %    % Basophils 0.5 %    % Immature Granulocytes 0.3 %    Nucleated RBCs 0 0 /100    Absolute Neutrophil 5.3 1.6 - 8.3 10e9/L    Absolute Lymphocytes 0.7 (L) 0.8 - 5.3 10e9/L    Absolute Monocytes 0.5 0.0 - 1.3 10e9/L    Absolute Eosinophils 0.0 0.0 - 0.7 10e9/L    Absolute Basophils 0.0 0.0 - 0.2 10e9/L    Abs Immature Granulocytes 0.0 0 - 0.4 10e9/L    Absolute Nucleated RBC 0.0    Comprehensive metabolic panel    Collection Time: 02/16/21 11:36 AM   Result Value Ref Range    Sodium 138 133 - 144 mmol/L    Potassium 4.3 3.4 - 5.3 mmol/L    Chloride 106 94 - 109 mmol/L    Carbon Dioxide 26 20 - 32 mmol/L    Anion Gap 6 3 - 14 mmol/L    Glucose 101 (H) 70 - 99 mg/dL    Urea Nitrogen 15 7 - 30 mg/dL    Creatinine 0.78 0.52 - 1.04 mg/dL    GFR Estimate 70 >60 mL/min/[1.73_m2]    GFR Estimate If Black 81 >60 mL/min/[1.73_m2]    Calcium 9.5 8.5 - 10.1 mg/dL    Bilirubin Total 0.2 0.2 - 1.3 mg/dL    Albumin 3.8 3.4 - 5.0 g/dL    Protein Total 7.5 6.8 - 8.8 g/dL    Alkaline Phosphatase 98 40 - 150 U/L    ALT 15 0 - 50 U/L    AST 15 0 - 45 U/L   Asymptomatic SARS-CoV-2 COVID-19 Virus (Coronavirus) by PCR    Collection Time: 02/16/21 11:36 AM    Specimen: Nasopharyngeal   Result Value Ref Range    SARS-CoV-2 Virus Specimen Source Nasopharyngeal     SARS-CoV-2 PCR Result NEGATIVE     SARS-CoV-2 PCR Comment       Testing was performed using the Xpert Xpress SARS-CoV-2 Assay on the Cepheid Gene-Xpert   Instrument Systems. Additional information about this Emergency Use Authorization (EUA)   assay can be found via the Lab Guide.     Drug abuse screen 6 urine (chem dep)    Collection Time: 02/16/21  6:53 PM   Result Value Ref  Range    Amphetamine Qual Urine Negative NEG^Negative    Barbiturates Qual Urine Negative NEG^Negative    Benzodiazepine Qual Urine Positive (A) NEG^Negative    Cannabinoids Qual Urine Negative NEG^Negative    Cocaine Qual Urine Negative NEG^Negative    Ethanol Qual Urine Negative NEG^Negative    Opiates Qualitative Urine Negative NEG^Negative   UA reflex to Microscopic and Culture    Collection Time: 02/17/21  1:04 AM    Specimen: Urine clean catch; Unspecified Urine   Result Value Ref Range    Color Urine Light Yellow     Appearance Urine Clear     Glucose Urine Negative NEG^Negative mg/dL    Bilirubin Urine Negative NEG^Negative    Ketones Urine Negative NEG^Negative mg/dL    Specific Gravity Urine 1.010 1.003 - 1.035    Blood Urine Negative NEG^Negative    pH Urine 5.0 5.0 - 7.0 pH    Protein Albumin Urine Negative NEG^Negative mg/dL    Urobilinogen mg/dL Normal 0.0 - 2.0 mg/dL    Nitrite Urine Negative NEG^Negative    Leukocyte Esterase Urine Moderate (A) NEG^Negative    Source Unspecified Urine     RBC Urine 1 0 - 2 /HPF    WBC Urine 12 (H) 0 - 5 /HPF    Mucous Urine Present (A) NEG^Negative /LPF    Hyaline Casts 1 0 - 2 /LPF   Urine Culture Aerobic Bacterial    Collection Time: 02/17/21  1:04 AM    Specimen: Unspecified Urine   Result Value Ref Range    Specimen Description Unspecified Urine     Special Requests Specimen received in preservative     Culture Micro PENDING      PHYSICAL EXAMINATON:   Temp: 98.6  F (37  C) Temp src: Temporal BP: (!) 160/62 Pulse: 95   Resp: 16 SpO2: 95 % O2 Device: None (Room air)    Data Unavailable 136 lbs 6.4 oz Body mass index is 23.78 kg/m .  MENTAL STATUS EXAM: Patient is a very pleasant,  female who is sitting in a wheelchair.  She is calm, pleasant, and cooperative.  Eye contact is good, mood is depressed, affect is sad, speech is clear and coherent, psychomotor behavior is negative for agitation retardation, thought process is logical and goal oriented, no  loose associations, thought content is negative for suicidal homicidal ideation, paranoia, delusions, auditory visual hallucinations, insight and judgment are intact, she is oriented to self, date, place, and situation, attention span and concentration are intact, recent and remote memory are intact, she has no problems expressing herself, fund of knowledge is adequate for the level of education and training.     DIAGNOSIS:  1.  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  Generalized anxiety disorder  3.  OCD, per history  4.  Medical problems include: Recent UTI, history of DVT, glaucoma, aortic valve insufficiency, lung nodules, pericardial effusion, chronic pain, chronic diarrhea, among others.  PLAN AND RECOMMENDATIONS: The patient is a very pleasant,  female who was admitted with severe depression, anxiety, suicidal thoughts.  The following medication changes will take place:   --Continue Xanax 0.5 mg at bedtime.    --Decrease Paxil to 10 mg at bedtime.    --Start Cymbalta 20 mg every morning.    --Decrease Zyprexa to 5 mg at bedtime.    --Start gabapentin 100 mg 3 times a day.    --Blood work was reviewed.  Additional orders will include vitamin D, B12, folate, TSH with T4.  UA/UC.  EKG.    --Internal medicine follow-up for medical problems.   --The patient was consulted on nature of illness and treatment options.   --Care was coordinated with the treatment team.  Attestation: Patient has been seen and evaluated by yeimy URIARTE CNP  2/17/2021  11:24 AM  This note was created with the help of Dragon dictation system. All grammatical/typing errors or context distortion are unintentional and inherent to software.

## 2021-02-17 NOTE — ED NOTES
ED to Behavioral Floor Handoff    SITUATION  Swathi Dukes is a 85 year old female who speaks English and lives in an assisted living alone The patient arrived in the ED by private car from home with a complaint of Suicidal (ongoing, thoughts only)  .The patient's current symptoms started/worsened 1 day(s) ago and during this time the symptoms have increased.   In the ED, pt was diagnosed with   Final diagnoses:   Suicidal ideation   Depression, unspecified depression type        Initial vitals were: BP: (!) 177/71  Pulse: 84  Temp: 97.3  F (36.3  C)  Resp: 20  Weight: 61.9 kg (136 lb 6.4 oz)  SpO2: 96 %   --------  Is the patient diabetic? No   If yes, last blood glucose? --     If yes, was this treated in the ED? --  --------  Is the patient inebriated (ETOH) No or Impaired on other substances? No  MSSA done? N/A  Last MSSA score: --    Were withdrawal symptoms treated? N/A  Does the patient have a seizure history? No. If yes, date of most recent seizure--  --------  Is the patient patient experiencing suicidal ideation? reports occasional suicidal thoughts representing feeling that life is not worth feeling    Homicidal ideation? denies current or recent homicidal ideation or behaviors.    Self-injurious behavior/urges? denies current or recent self injurious behavior or ideation.  ------  Was pt aggressive in the ED No  Was a code called No  Is the pt now cooperative? Yes  -------  Meds given in ED:   Medications   enalapril (VASOTEC) tablet 20 mg (20 mg Oral Given 2/17/21 0840)   brimonidine (ALPHAGAN-P) 0.15 % ophthalmic solution 1 drop (1 drop Both Eyes Given 2/17/21 0746)   hydrALAZINE (APRESOLINE) tablet 25 mg (25 mg Oral Given 2/17/21 0840)   simvastatin (ZOCOR) tablet 10 mg (10 mg Oral Given 2/16/21 3219)   dorzolamide (TRUSOPT) 2 % ophthalmic solution 1 drop (1 drop Both Eyes Given 2/17/21 0746)   latanoprost (XALATAN) 0.005 % ophthalmic solution 1 drop (1 drop Both Eyes Given 2/16/21 7331)    ALPRAZolam (XANAX) tablet 0.5 mg (0.5 mg Oral Given 2/16/21 1140)   hydrOXYzine (ATARAX) tablet 25 mg (25 mg Oral Given 2/16/21 1419)   loperamide (IMODIUM) capsule 4 mg (4 mg Oral Given 2/17/21 7252)   hydrOXYzine (ATARAX) tablet 25 mg (25 mg Oral Given 2/17/21 2092)      Family present during ED course? No  Family currently present? No    BACKGROUND  Does the patient have a cognitive impairment or developmental disability? No  Allergies:   Allergies   Allergen Reactions     Diclofenac Anaphylaxis     Iodine Anaphylaxis     Contrast  Pt states anaphylactic reaction to ct contrast about 20 years ago     Aspirin    .   Social demographics are   Social History     Socioeconomic History     Marital status:      Spouse name: None     Number of children: None     Years of education: None     Highest education level: None   Occupational History     None   Social Needs     Financial resource strain: None     Food insecurity     Worry: None     Inability: None     Transportation needs     Medical: None     Non-medical: None   Tobacco Use     Smoking status: Never Smoker     Smokeless tobacco: Never Used   Substance and Sexual Activity     Alcohol use: Not Currently     Drug use: Never     Sexual activity: Not Currently   Lifestyle     Physical activity     Days per week: None     Minutes per session: None     Stress: None   Relationships     Social connections     Talks on phone: None     Gets together: None     Attends [employer] service: None     Active member of club or organization: None     Attends meetings of clubs or organizations: None     Relationship status: None     Intimate partner violence     Fear of current or ex partner: None     Emotionally abused: None     Physically abused: None     Forced sexual activity: None   Other Topics Concern     Parent/sibling w/ CABG, MI or angioplasty before 65F 55M? Not Asked   Social History Narrative     None        ASSESSMENT  Labs results   Labs Ordered and  Resulted from Time of ED Arrival Up to the Time of Departure from the ED   CBC WITH PLATELETS DIFFERENTIAL - Abnormal; Notable for the following components:       Result Value    RBC Count 3.07 (*)     Hemoglobin 9.8 (*)     Hematocrit 30.3 (*)     Absolute Lymphocytes 0.7 (*)     All other components within normal limits   COMPREHENSIVE METABOLIC PANEL - Abnormal; Notable for the following components:    Glucose 101 (*)     All other components within normal limits   UA MACROSCOPIC WITH REFLEX TO MICRO AND CULTURE - Abnormal; Notable for the following components:    Leukocyte Esterase Urine Moderate (*)     WBC Urine 12 (*)     Mucous Urine Present (*)     All other components within normal limits   DRUG ABUSE SCREEN 6 CHEM DEP URINE ([facility]) - Abnormal; Notable for the following components:    Benzodiazepine Qual Urine Positive (*)     All other components within normal limits   SARS-COV-2 (COVID-19) VIRUS RT-PCR   URINE CULTURE AEROBIC BACTERIAL      Imaging Studies: No results found for this or any previous visit (from the past 24 hour(s)).   Most recent vital signs /50   Pulse 61   Temp 98.5  F (36.9  C) (Oral)   Resp 18   Wt 61.9 kg (136 lb 6.4 oz)   SpO2 95%   BMI 23.78 kg/m     Abnormal labs/tests/findings requiring intervention:---   Pain control: pt had none  Nausea control: pt had none    RECOMMENDATION  Are any infection precautions needed (MRSA, VRE, etc.)? No If yes, what infection? --  ---  Does the patient have mobility issues? independently. If yes, what device does the pt use? ---  ---  Is patient on 72 hour hold or commitment? No If on 72 hour hold, have hold and rights been given to patient? No  Are admitting orders written if after 10 p.m. ?N/A  Tasks needing to be completed:---     Beth Ryder, RN   ascom--    [phone] [place]   [phone] [facility] ED

## 2021-02-17 NOTE — ED NOTES
Sign out Provider: Johnnie      Sign out Plan: Patient who was seen and evaluated on the earlier shift.  She was cleared medically and had a psychiatric evaluation and deemed in need of inpatient psychiatric admission.  She is awaiting bed placement.      Reassessment: While awaiting bed placement she reported to the nurse an episode of diarrhea.  She states she frequently gets diarrhea when she is anxious.  She is in no other distress.  She was requesting her morning meds and an antidiarrheal and was given a single dose of Imodium.  Appears otherwise stable.      Disposition: Continue with plan for inpatient admission.       Kane Herzog MD  02/17/21 0805

## 2021-02-17 NOTE — PLAN OF CARE
Problem: OT General Care Plan  Goal: OT Goal 1  Description: Will attend OT groups and set goal focus.      Note: Patient has not attended OT groups yet.  Will encourage attendance and participation, set goal focus and assess cognition needs.

## 2021-02-17 NOTE — PLAN OF CARE
Problem: General Plan of Care (Inpatient Behavioral)  Goal: Individualization/Patient Specific Goal (IP Behavioral)  Description: The patient and/or their representative will achieve their patient-specific goals related to the plan of care.    The patient-specific goals include:  Note: Reasons you are in the hospital:  1)  Took shower and couldn't remember what to do.  2)  OCD  3)  Depression - plan to overdose on medication    Goals for Discharges:  1)  Get well  2)  Develop coping skills

## 2021-02-17 NOTE — PLAN OF CARE
Pt admitted voluntarily to station 3B due increased depression, anxiety and suicidal ideations. Patient reports feeling lonely, sad and frustrated. She reports multiple falls and shakiness due to anxiety. Patient has history of Lupus, hypertension, hyperlipidemia, aortic stenoses, anxiety and depression. Precipitating events include increased loneliness, social isolations due to Covid and difficulty performing ADLS. Safety search completed. VS stable at admit time. See patient profile. 15 min checks initiated. Treatment plan initiated. Brief orientation to unit provided.

## 2021-02-17 NOTE — ED NOTES
"Patient awake in the room, teary eyes. Appears sad, writer asked pt why she is crying, pt reported \"I have been alone since 10 pm yesterday\" I want someone to talk to. Writer stayed with the patient for few minutes.   "

## 2021-02-17 NOTE — ED NOTES
Pt's son called to give pt's mental health provider information.    Mariama Boyd [phone] [address] [place], [place]

## 2021-02-17 NOTE — PLAN OF CARE
Problem: General Plan of Care (Inpatient Behavioral)  Goal: Team Discussion  Description: Team Plan:  Note: BEHAVIORAL TEAM DISCUSSION    Participants: Gloria Martinez DNP, Opal Maldonado RN, [facility], Edilia Castillo, OT  Progress: New admit  Anticipated length of stay: 5 - 7 days  Continued Stay Criteria/Rationale: Confusion  Medical/Physical: See medical notes  Precautions:   Behavioral Orders   Procedures     Code 1 - Restrict to Unit     Discontinue 1:1 attendant for suicide risk     Order Specific Question:   I have performed an in person assessment of the patient     Answer:   Based on this assessment the patient no longer requires a one on one attendant at this point in time.     Order Specific Question:   Rationale     Answer:   Patient States able to remain safe in hospital     Fall precautions     Routine Programming     As clinically indicated     Status 15     Every 15 minutes.     Suicide precautions     Patients on Suicide Precautions should have a Combination Diet ordered that includes a Diet selection(s) AND a Behavioral Tray selection for Safe Tray - with utensils, or Safe Tray - NO utensils       Plan: The plan is to assess the patient for mental health and medication needs.  The patient will be prescribed medications to treat the identified symptoms.  Upon discharge the patient will be referred to services as appropriate based on the assessment.  Rationale for change in precautions or plan: N/A

## 2021-02-17 NOTE — CONSULTS
Internal Medicine Consult - Initial Visit       Swathi Dukes MRN# [id number]   YOB: 1935 Age: 85 year old   Date of Admission: 2/16/2021  PCP: Cortney Vanessa  Date of Service: 2/17/2021    Referring Provider: Tod Villanueva MD  Reason for Consult: Medical co-management          Assessment and Recommendations:   Swathi Dukes is a 85 year old female with a history of depression, anxiety, SLE, chronic pericardial effusion, aortic stenosis, HTN, HLD, glaucoma, recurrent UTIs, and recent falls at home resulting in closed head injury w/ scalp laceration and hematoma (seen at [facility] 1/16-1/18) admitted to station 3B for depression and SI.      # Depression, SI   # Anxiety   Living independently until after several falls at home.  Wants to regain independence and go back to prior living arrangement.  Per chart review, pt w/ hx of psychiatric hospitalization but not in the last 10 years.     - Defer management to Psychiatry  - Consider PT/OT consults while on the unit to help with strength/mobility     # Recurrent UTIs - Per pt, feels that this has been a long standing issue.  Has never been seen by Urology, but recommended to do so after recent UTI during hospitalization 1/16-1/18.  UA this admission unremarkable.  - Recommended to start Hiprex after recent discharge - will clarify this w/ pt's family as to whether or not this was started   - Encourage adequate fluid intake  - Please notify IM if new c/o urinary frequency, dysuria, abdominal pain     # Bilateral leg pain - Describes aches in bilateral legs.  Reports onset when admitted to the unit, attributes to uncomfortable bed/mattress.  Mag, lytes wnl.  Hx of LLE DVT in the past > 10 years ago, but not currently on anticoagulation.   - Monitor, if worsening, will order US BLEs to evaluate for DVT/VTE  - Soft care mattress     # CAD   # HTN  # HLD   # Hx aortic stenosis   BPs stable.    - Continue Amlodipine 5mg daily   - Continue  Enalpril 20mg BID   - Continue Hydralazine 25mg daily   - Continue simvastatin 10mg at HS     # SLE - Began having issues with joint swelling and hand pain about six months ago.  Follows w/ [facility].   - Continue PTA Plaquenil 200mg BID     # Chronic pericardial effusion - S/p pericardial window ~ 13 years ago in [place].  Last echo on 1/18/21 with preserved EF 60-65%, mild-moderate aortic regurgitation, mild aortic stenosis, and 1.8cm pericardial effusion with insignificant septal flattening.  No complaints of dyspnea today.       # Glaucoma - Continue PTA eye gtts     Medicine will sign off, no further recommendations at this time.  Please feel free to reconsult if patient's symptoms worsen or if new problems arise.  Thank you for the opportunity to care for this patient.       Tangela Vuong, CNP, APRN  Internal Medicine [facility] Hospitalist  [facility]  Pager [phone]           History of Present Illness:   History is obtained from the patient and medical record.     This patient is a 85 year old female with a history of depression, anxiety, SLE, chronic pericardial effusion, aortic stenosis, HTN, HLD, glaucoma, recurrent UTIs, and recent falls at home resulting in closed head injury w/ scalp laceration and hematoma (seen at [facility] 1/16-1/18) admitted to station 3B for depression and SI.        Internal Medicine service was asked to see patient for medical co-management.  Swathi is seen in the conference room.  She ambulates with a walker but states that she does not require one at home.  She reports some pain in her legs, but otherwise has no medical complaints.  Denies headaches, dizziness, chest pain, dyspnea, vomiting, and abdominal pain.            Review of Systems:   A 10 point ROS was performed and negative unless otherwise noted in HPI.           Past Medical History:   Reviewed and updated in Epic.  Past Medical History:   Diagnosis Date     Aortic stenosis      Glaucoma       HTN (hypertension)      Hyperlipidemia      Pericardial effusion      Systemic lupus erythematosus              Past Surgical History:   Reviewed and updated in Epic.  Past Surgical History:   Procedure Laterality Date     CV HEART CATHETERIZATION WITH POSSIBLE INTERVENTION N/A 1/5/2021    Procedure: Heart Catheterization with Possible Intervention;  Surgeon: Hayden Bedoya MD;  Location:  HEART CARDIAC CATH LAB     CV LEFT VENTRICULOGRAM N/A 1/5/2021    Procedure: Left Ventriculogram;  Surgeon: Hayden Bedoya MD;  Location:  HEART CARDIAC CATH LAB             Social History:   Reviewed and updated in [facility].  Social History     Socioeconomic History     Marital status:      Spouse name: Not on file     Number of children: Not on file     Years of education: Not on file     Highest education level: Not on file   Occupational History     Not on file   Social Needs     Financial resource strain: Not on file     Food insecurity     Worry: Not on file     Inability: Not on file     Transportation needs     Medical: Not on file     Non-medical: Not on file   Tobacco Use     Smoking status: Never Smoker     Smokeless tobacco: Never Used   Substance and Sexual Activity     Alcohol use: Not Currently     Drug use: Never     Sexual activity: Not Currently   Lifestyle     Physical activity     Days per week: Not on file     Minutes per session: Not on file     Stress: Not on file   Relationships     Social connections     Talks on phone: Not on file     Gets together: Not on file     Attends Anabaptist service: Not on file     Active member of club or organization: Not on file     Attends meetings of clubs or organizations: Not on file     Relationship status: Not on file     Intimate partner violence     Fear of current or ex partner: Not on file     Emotionally abused: Not on file     Physically abused: Not on file     Forced sexual activity: Not on file   Other Topics Concern     Parent/sibling w/ CABG,  MI or angioplasty before 65F 55M? Not Asked   Social History Narrative     Not on file              Family History:   Reviewed and updated in Epic.  Family History   Problem Relation Age of Onset     Diabetes Father              Allergies:     Allergies   Allergen Reactions     Diclofenac Anaphylaxis     Iodine Anaphylaxis     Contrast  Pt states anaphylactic reaction to ct contrast about 20 years ago     Aspirin              Medications:     Current Facility-Administered Medications   Medication     acetaminophen (TYLENOL) tablet 650 mg     ALPRAZolam (XANAX) tablet 0.5 mg     alum & mag hydroxide-simethicone (MAALOX) suspension 30 mL     amLODIPine (NORVASC) tablet 5 mg     brimonidine (ALPHAGAN-P) 0.15 % ophthalmic solution 1 drop     cyanocobalamin (VITAMIN B-12) tablet 1,000 mcg     dorzolamide (TRUSOPT) 2 % ophthalmic solution 1 drop     enalapril (VASOTEC) tablet 20 mg     ferrous gluconate (FERGON) tablet 324 mg     folic acid (FOLVITE) tablet 1 mg     hydrALAZINE (APRESOLINE) tablet 25 mg     hydroxychloroquine (PLAQUENIL) tablet 200 mg     hydrOXYzine (ATARAX) tablet 25 mg     latanoprost (XALATAN) 0.005 % ophthalmic solution 1 drop     OLANZapine (zyPREXA) tablet 5 mg    Or     OLANZapine (zyPREXA) injection 5 mg     PARoxetine (PAXIL) tablet 25 mg     polyethylene glycol (MIRALAX) Packet 17 g     simvastatin (ZOCOR) tablet 10 mg     traZODone (DESYREL) tablet 50 mg            Physical Exam:   Blood pressure (!) 160/62, pulse 95, temperature 98.6  F (37  C), temperature source Temporal, resp. rate 16, weight 61.9 kg (136 lb 6.4 oz), SpO2 95 %, not currently breastfeeding.  Body mass index is 23.78 kg/m .    GENERAL: Alert and oriented x 3. Well nourished, well developed.  No acute distress.    HEENT: Normocephalic, atraumatic. Anicteric sclera. Mucous membranes moist.   CV: RRR. S1, S2. No murmurs appreciated.   RESPIRATORY: Effort normal on room air. Lungs CTAB with no wheezing, rales, or rhonchi.   GI:  Abdomen soft and non distended, bowel sounds present x all 4 quadrants. No tenderness, rebound, or guarding.   NEUROLOGICAL: No focal deficits. Follows commands.  Strength equal in upper and lower extremities.   MUSCULOSKELETAL: No joint swelling or tenderness. Moves all extremities.   EXTREMITIES: No gross deformities. No peripheral edema. Intact bilateral pedal pulses.   SKIN: Grossly warm, dry, and intact. No jaundice. No rashes.             Data:   I personally reviewed the following studies:    ROUTINE IP LABS (Last four results)  CMP   Recent Labs   Lab 02/16/21  1136      POTASSIUM 4.3   CHLORIDE 106   CO2 26   ANIONGAP 6   *   BUN 15   CR 0.78   FILIPE 9.5   PROTTOTAL 7.5   ALBUMIN 3.8   BILITOTAL 0.2   ALKPHOS 98   AST 15   ALT 15     CBC   Recent Labs   Lab 02/16/21  1136   WBC 6.6   RBC 3.07*   HGB 9.8*   HCT 30.3*   MCV 99   MCH 31.9   MCHC 32.3   RDW 14.6        INR No lab results found in last 7 days.        Unresulted Labs Ordered in the Past 30 Days of this Admission     Date and Time Order Name Status Description    2/17/2021 0104 Urine Culture Aerobic Bacterial Preliminary               Suturegard Retention Suture: 2-0 Nylon

## 2021-02-17 NOTE — PROGRESS NOTES
02/17/21 1123   Valuables   Patient Belongings locker;sent to security per site process   Patient Belongings Put in Hospital Secure Location (Security or Locker, etc.) cash/credit card;clothing;keys;purse/wallet;shoes;wallet;other (see comments)   Did you bring any home meds/supplements to the hospital?  No       IN PT LOCKER: 1 pair of gloves, jacket, change ($), alert lanyard, face mask, 1 pair of socks, 1 pair of shoes, purse, 2 lipsticks (one without cap), tin of mints, set of keys, wallet    SENT TO SECURITY ([employer] #[id number]): $287.00 in cash, [employer] (5080), [employer] (0739), [employer] (9201), [employer] (9884), [employer] (1962), [employer] (1922), [employer] (2749), [employer] (5898), [employer] giftcard    *there was no cellphone in pt's belongings upon admission*      A               Admission:  I am responsible for any personal items that are not sent to the safe or pharmacy.  [facility] is not responsible for loss, theft or damage of any property in my possession.    Signature:  _________________________________ Date: _______  Time: _____                                              Staff Signature:  ____________________________ Date: ________  Time: _____      2nd Staff person, if patient is unable/unwilling to sign:    Signature: ________________________________ Date: ________  Time: _____     Discharge:  [facility] has returned all of my personal belongings:    Signature: _________________________________ Date: ________  Time: _____                                          Staff Signature:  ____________________________ Date: ________  Time: _____

## 2021-02-17 NOTE — PLAN OF CARE
Initial Psychosocial Assessment    I have reviewed the chart, met with the patient, and developed Care Plan.  Information for assessment was obtained from:   Pt interview and chart review      Presenting Problem:  Pt is a 85 year old female with history of lupus, hypertension, hyperlipidemia, aortic stenosis and report of longstanding psychiatric issues who presents with depression and passive suicidal ideation over the past month.  Patient was living independently previously, but suffered multiple falls and was transitioned to assisted living on a temporary basis for patient to recover.  Son states this decision was made in order to keep her safe, patient has been very unhappy about this choice given the financial cost, loss of independence, and the fact that she feels it is unnecessary.  Son states that they our staff is with [facility], which has aging in place programs and plan is to return patient back to her home once she is recovered better.  Son states that patient has had PT/OT evaluation and that they felt she could benefit from 24/7 care, patient is not ready for this.  Patient wants to return to independent living and her mood has been worsening over the past month.  Patient has been up since 5 AM. She could barely take a shower because she was shaking so bad and her mind isn't working. She states she is feeling anxious, sad and frustrated, isn't functioning.  Patient called her son and he brought her here for further evaluation.  Son states that patient has had mental health since 1960s and required psychiatric hospitalizations, no psychiatric hospitalizations in past 10 years.  She did have a psychiatric assessment at [facility], was seen by [facility] crisis team and was referred to [facility] group whom she is still with. She is followed by a therapist whom she has seen 3 times in the past  3x, therapist isn't aware of her issues.  Patient feels she needs to be hospitalized. Patient states  "\"I just need help.\" States that she is suicidal but hasn't thought of how she'd commit suicide. She has not tried to kill herself before. No alcohol use. Son believes she is on Paxil as well as another anxiolytic. She takes alprazolam once a day, no doses yet today. She does feels she needs something stronger. Primary care doctor was concerned for her. Her primary care doctor doesn't prescribe benzodiazepines to geriatric patients, and had told patient that she would need to see a different provider if she wanted a benzodiazepine prescription.  Patient does see a nurse practitioner who is able to write for benzos for her.  She would like to see a psychiatrist.  She states that she does not have any plans to harm herself.  No homicidal ideation.    History of Mental Health and Chemical Dependency:  Pt reports a history of OCD and depression. Pt has been hospitalized for mental health issues at least 3-4 times. Unable to remember last hospitalization except that it was in [place].    Family Description (Constellation, Family Psychiatric History):  Pt is . She had male twins. One of them is . The other is 60 years old. Pt was born and raised byboth parents. No history of abuse.    Significant Life Events (Illness, Abuse, Trauma, Death):  None identified.    Living Situation:  Lives in [facility] [place]    Educational Background:  Three years of college.    Occupational History:  Retired. Worked as teller in bank.    Financial Status:  Social security  Investments    Legal Issues:  None identified.    Ethnic/Cultural Issues:  \"I'm a Polish.\"    Spiritual Orientation:  Polish Quaker     Service History:  No      Current Treatment Providers are:  Medication management  KANNAN Johnson    Social Service Assessment/Plan:  The plan is to assess the patient for mental health and medication needs.  The patient will be prescribed medications to treat the identified symptoms.  Upon " discharge the patient will be referred to services as appropriate based on the assessment.

## 2021-02-18 ENCOUNTER — APPOINTMENT (OUTPATIENT)
Dept: PHYSICAL THERAPY | Facility: CLINIC | Age: 86
DRG: 885 | End: 2021-02-18
Payer: COMMERCIAL

## 2021-02-18 LAB
BACTERIA SPEC CULT: ABNORMAL
DEPRECATED CALCIDIOL+CALCIFEROL SERPL-MC: 36 UG/L (ref 20–75)
FOLATE SERPL-MCNC: 18.8 NG/ML
Lab: ABNORMAL
MAGNESIUM SERPL-MCNC: 2.2 MG/DL (ref 1.6–2.3)
SPECIMEN SOURCE: ABNORMAL
TSH SERPL DL<=0.005 MIU/L-ACNC: 2.07 MU/L (ref 0.4–4)
VIT B12 SERPL-MCNC: 661 PG/ML (ref 193–986)

## 2021-02-18 PROCEDURE — 250N000013 HC RX MED GY IP 250 OP 250 PS 637: Performed by: NURSE PRACTITIONER

## 2021-02-18 PROCEDURE — 97110 THERAPEUTIC EXERCISES: CPT | Mod: GP

## 2021-02-18 PROCEDURE — 82746 ASSAY OF FOLIC ACID SERUM: CPT | Performed by: NURSE PRACTITIONER

## 2021-02-18 PROCEDURE — 124N000003 HC R&B MH SENIOR/ADOLESCENT

## 2021-02-18 PROCEDURE — 250N000013 HC RX MED GY IP 250 OP 250 PS 637: Performed by: EMERGENCY MEDICINE

## 2021-02-18 PROCEDURE — 36415 COLL VENOUS BLD VENIPUNCTURE: CPT | Performed by: NURSE PRACTITIONER

## 2021-02-18 PROCEDURE — 84443 ASSAY THYROID STIM HORMONE: CPT | Performed by: NURSE PRACTITIONER

## 2021-02-18 PROCEDURE — 82607 VITAMIN B-12: CPT | Performed by: NURSE PRACTITIONER

## 2021-02-18 PROCEDURE — 99233 SBSQ HOSP IP/OBS HIGH 50: CPT | Performed by: NURSE PRACTITIONER

## 2021-02-18 PROCEDURE — 97161 PT EVAL LOW COMPLEX 20 MIN: CPT | Mod: GP

## 2021-02-18 PROCEDURE — 83735 ASSAY OF MAGNESIUM: CPT | Performed by: NURSE PRACTITIONER

## 2021-02-18 PROCEDURE — G0177 OPPS/PHP; TRAIN & EDUC SERV: HCPCS

## 2021-02-18 PROCEDURE — 97116 GAIT TRAINING THERAPY: CPT | Mod: GP

## 2021-02-18 PROCEDURE — 82306 VITAMIN D 25 HYDROXY: CPT | Performed by: NURSE PRACTITIONER

## 2021-02-18 RX ADMIN — HYDRALAZINE HYDROCHLORIDE 25 MG: 25 TABLET ORAL at 14:25

## 2021-02-18 RX ADMIN — BRIMONIDINE TARTRATE 1 DROP: 1.5 SOLUTION OPHTHALMIC at 08:47

## 2021-02-18 RX ADMIN — HYDRALAZINE HYDROCHLORIDE 25 MG: 25 TABLET ORAL at 08:44

## 2021-02-18 RX ADMIN — DORZOLAMIDE HYDROCHLORIDE 1 DROP: 20 SOLUTION/ DROPS OPHTHALMIC at 20:37

## 2021-02-18 RX ADMIN — DORZOLAMIDE HYDROCHLORIDE 1 DROP: 20 SOLUTION/ DROPS OPHTHALMIC at 08:47

## 2021-02-18 RX ADMIN — GABAPENTIN 100 MG: 100 CAPSULE ORAL at 14:25

## 2021-02-18 RX ADMIN — HYDROXYCHLOROQUINE SULFATE 200 MG: 200 TABLET, FILM COATED ORAL at 08:44

## 2021-02-18 RX ADMIN — ENALAPRIL MALEATE 20 MG: 20 TABLET ORAL at 20:37

## 2021-02-18 RX ADMIN — FERROUS GLUCONATE 324 MG: 324 TABLET ORAL at 17:40

## 2021-02-18 RX ADMIN — FERROUS GLUCONATE 324 MG: 324 TABLET ORAL at 08:44

## 2021-02-18 RX ADMIN — ENALAPRIL MALEATE 20 MG: 20 TABLET ORAL at 08:44

## 2021-02-18 RX ADMIN — AMLODIPINE BESYLATE 5 MG: 5 TABLET ORAL at 08:44

## 2021-02-18 RX ADMIN — CYANOCOBALAMIN TAB 1000 MCG 1000 MCG: 1000 TAB at 08:44

## 2021-02-18 RX ADMIN — HYDROXYCHLOROQUINE SULFATE 200 MG: 200 TABLET, FILM COATED ORAL at 20:37

## 2021-02-18 RX ADMIN — GABAPENTIN 100 MG: 100 CAPSULE ORAL at 20:37

## 2021-02-18 RX ADMIN — SIMVASTATIN 10 MG: 10 TABLET, FILM COATED ORAL at 22:33

## 2021-02-18 RX ADMIN — FOLIC ACID 1 MG: 1 TABLET ORAL at 08:44

## 2021-02-18 RX ADMIN — GABAPENTIN 100 MG: 100 CAPSULE ORAL at 08:47

## 2021-02-18 RX ADMIN — ALPRAZOLAM 0.5 MG: 0.5 TABLET ORAL at 22:33

## 2021-02-18 RX ADMIN — OLANZAPINE 5 MG: 5 TABLET, FILM COATED ORAL at 22:33

## 2021-02-18 RX ADMIN — PAROXETINE HYDROCHLORIDE 10 MG: 10 TABLET, FILM COATED ORAL at 22:33

## 2021-02-18 RX ADMIN — HYDRALAZINE HYDROCHLORIDE 25 MG: 25 TABLET ORAL at 20:37

## 2021-02-18 RX ADMIN — LATANOPROST 1 DROP: 50 SOLUTION OPHTHALMIC at 22:33

## 2021-02-18 RX ADMIN — BRIMONIDINE TARTRATE 1 DROP: 1.5 SOLUTION OPHTHALMIC at 20:37

## 2021-02-18 RX ADMIN — DULOXETINE HYDROCHLORIDE 20 MG: 20 CAPSULE, DELAYED RELEASE ORAL at 08:44

## 2021-02-18 ASSESSMENT — ACTIVITIES OF DAILY LIVING (ADL)
DRESS: SCRUBS (BEHAVIORAL HEALTH);INDEPENDENT
HYGIENE/GROOMING: INDEPENDENT
LAUNDRY: UNABLE TO COMPLETE
LAUNDRY: UNABLE TO COMPLETE
ORAL_HYGIENE: INDEPENDENT
ORAL_HYGIENE: INDEPENDENT
DRESS: SCRUBS (BEHAVIORAL HEALTH)
HYGIENE/GROOMING: SHOWER;WITH SUPERVISION

## 2021-02-18 NOTE — PLAN OF CARE
"  Problem: Depressive Symptoms  Goal: Depressive Symptoms  Description: Signs and symptoms of listed problems will be absent or manageable.  Outcome: No Change    Assessment Pt describes her mood as \"scared\", elaborates it to needing to take a shower and \"not wanting to not happen again what happen before\". Found pt sitting on side of bed not doing anything. Pt does not make her requests known. Pt appears like she did not know what to do. Pt able to complete tasks, but not knowing what to do or where to go next despite being in hospital x 2 days. Pt was disoriented to date by 2 days. This     Observations Pt ate breakfast in lounge, but quickly went back to her room. Pt showered independently with supervision. Pt did well with some anxiety, made tremor signs with her hands.     Precautions  Suicide and Fall     Self Care   Sleep slept well last night   ADL showered with supervision    Appetite adequate,     poor fluid intake    Groups attending    Toileting no signs of diarrhea     Medical elevated /73, 156/70 c/o of generalized pain all over, but declined tylenol.     Intervention   Medication compliant denies side effects   Coping Skills Promoted   Education (see education section)    Recommendation/ Follow Up  Asking for imodium related to chronic diarrhea.   "

## 2021-02-18 NOTE — PROGRESS NOTES
Patient attended a 60 minute psychoeducation group on the Cognitive Model. The relationship between thoughts, emotions and behaviors was discussed. Participants discussed several scenarios and ways to change irrational thoughts into positive and rational thoughts. They also discussed the new emotion and behavior that came from positive, rational thoughts. Patient was a quiet participant, but would respond when called upon.

## 2021-02-18 NOTE — PLAN OF CARE
Problem: General Plan of Care (Inpatient Behavioral)  Goal: Team Discussion  Description: Team Plan:  Note: BEHAVIORAL TEAM DISCUSSION    Participants: Gloria Martinez DNP,  Cortney Gray RN, Sofia Swan [facility], Edilia Castillo, OT  Progress: Some improvement  Anticipated length of stay: 5-7 days  Continued Stay Criteria/Rationale: Depression, anxiety  Medical/Physical: See medical notes  Precautions:   Behavioral Orders   Procedures     Code 1 - Restrict to Unit     Discontinue 1:1 attendant for suicide risk     Order Specific Question:   I have performed an in person assessment of the patient     Answer:   Based on this assessment the patient no longer requires a one on one attendant at this point in time.     Order Specific Question:   Rationale     Answer:   Patient States able to remain safe in hospital     Fall precautions     Routine Programming     As clinically indicated     Status 15     Every 15 minutes.     Suicide precautions     Patients on Suicide Precautions should have a Combination Diet ordered that includes a Diet selection(s) AND a Behavioral Tray selection for Safe Tray - with utensils, or Safe Tray - NO utensils       Plan: Pt will discharge back to [facility] once her mental health stabilizes.  Rationale for change in precautions or plan: N/A

## 2021-02-18 NOTE — PLAN OF CARE
"Patient was out in the milieu socializing with peers and attending unit activities. She endorses high anxiety and depression, rates both at 10. Patient reports feeling lonely at her living facility and not having anyone to socialize with. She states \"all I do all day is eat and sit in my room\". She requested and was PRN hydroxyzine for anxiety. Patient denies thoughts of self harm and is cooperative with care.    "

## 2021-02-18 NOTE — PLAN OF CARE
Pt attended a structured Therapeutic Recreation group this evening. Pt actively participated in a guided chair exercise routine as a strategy to facilitate therapeutic exercise, calming, and stress management. Pt actively followed all the movements and remained attentive and engaged throughout group. Pt contributed one idea to the discussion at the end of group regarding the benefits of exercise, stretching, and deep breathing.

## 2021-02-18 NOTE — PROGRESS NOTES
"[facility], [place]   Psychiatric Progress Note        Interim History:   From H&P: Per ED note: Swathi Dukes is a 85 year old female with history of lupus, hypertension, hyperlipidemia, aortic stenosis and report of longstanding psychiatric issues who presents with depression and passive suicidal ideation over the past month.  Patient was living independently previously, but suffered multiple falls and was transitioned to assisted living on a temporary basis for patient to recover.  Son states this decision was made in order to keep her safe, patient has been very unhappy about this choice given the financial cost, loss of independence, and the fact that she feels it is unnecessary.  Son states that they our staff is with [facility], which has aging in place programs and plan is to return patient back to her home once she is recovered better.  Son states that patient has had PT/OT evaluation and that they felt she could benefit from 24/7 care, patient is not ready for this.  Patient wants to return to independent living and her mood has been worsening over the past month.  She could barely take a shower because she was shaking so bad and her mind isn't working. She states she is feeling anxious, sad and frustrated, isn't functioning.  Son states that patient has had mental health since 1960s and required psychiatric hospitalizations, no psychiatric hospitalizations in past 10 years.      Team meeting report: The patient's care was discussed with the treatment team during the daily team meeting and/or staff's chart notes were reviewed.  Staff report patient has been calm, pleasant, cooperative. Patient is attending groups and participating appropriately. Patient is eating well and taking medications as prescribed. Rates mood as severely depressed and anxious. Feels lonely in her AL. Slept all night.    Met with patient. Feels slightly better. \"I am not shaking nearly as much as before\". " "Still anxious but better. \"My legs heart because I am nervous. Sleep was good. Appetite and fluid intake are poor. Hasn't had water today. The patient doesn't want to go back to her AL, \"nobody helps me with the shower\". \"I am afraid to go home\". Wants to live independently but agrees that currently she is not in a position to do so.     Spoke with patient's son Brent [phone].  States the patient has a lifelong history of depression and anxiety.  She is originally from [place] however, moved to [place] when her sons went to college there.  Remained in [place] for about 20 years.  The patient has had ECT treatments about 50 years ago.  She was hospitalized multiple times including in [facility] where she was started on Valium, which she has taken for 20 to 30 years.  When in [place], the patient was taking Haldol and Parnate for about 10 years.  Believes that the Haldol has caused cognitive issues.  The patient has had major crisis in her life such as his twn brother dying at age 39 of Parkinson's disease and her  passing away 2 years prior, from Parkinson's as well.  The patient has been having frequent UTIs.  When living in [place], she was in and out of the urgency care.  She has been dependent on her boyfriend who \"made a lot of the decisions for her\".  About a year and a half ago, the boyfriend felt that he can no longer take care of her since she has been having more and more problems.  Since then she has been living in an assisted living, which is very restrictive in terms of the COVID-19 pandemic.  She has been allowed 1 visit and 1 group activity a week.  The patient has been falling frequently.  She has expressed suicidal ideation on several occasion.  Reports that about a month ago the patient was \"fine\".  Money are not a problem, however, the patient does not want to pay for the AL.  She has her own apartment.         Medications:       ALPRAZolam  0.5 mg Oral At Bedtime     " amLODIPine  5 mg Oral Daily     brimonidine  1 drop Both Eyes BID     cyanocobalamin  1,000 mcg Oral Daily     dorzolamide  1 drop Both Eyes BID     DULoxetine  20 mg Oral Daily     enalapril  20 mg Oral BID     ferrous gluconate  324 mg Oral BID w/meals     folic acid  1 mg Oral Daily     gabapentin  100 mg Oral TID     hydrALAZINE  25 mg Oral TID     hydroxychloroquine  200 mg Oral BID     latanoprost  1 drop Both Eyes At Bedtime     OLANZapine  5 mg Oral At Bedtime     PARoxetine  10 mg Oral At Bedtime     simvastatin  10 mg Oral At Bedtime          Allergies:     Allergies   Allergen Reactions     Diclofenac Anaphylaxis     Iodine Anaphylaxis     Contrast  Pt states anaphylactic reaction to ct contrast about 20 years ago     Aspirin           Labs:     Recent Results (from the past 672 hour(s))   *UA reflex to Microscopic and Culture ([facility] (except [place] and [place])    Collection Time: 01/27/21  6:22 PM    Specimen: Midstream Urine   Result Value Ref Range    Color Urine Yellow     Appearance Urine Clear     Glucose Urine Negative NEG^Negative mg/dL    Bilirubin Urine Negative NEG^Negative    Ketones Urine Negative NEG^Negative mg/dL    Specific Gravity Urine <=1.005 1.003 - 1.035    Blood Urine Negative NEG^Negative    pH Urine 6.0 5.0 - 7.0 pH    Protein Albumin Urine Negative NEG^Negative mg/dL    Urobilinogen Urine 0.2 0.2 - 1.0 EU/dL    Nitrite Urine Negative NEG^Negative    Leukocyte Esterase Urine Negative NEG^Negative    Source Midstream Urine    **Basic metabolic panel FUTURE anytime    Collection Time: 01/27/21  6:23 PM   Result Value Ref Range    Sodium 140 133 - 144 mmol/L    Potassium 4.3 3.4 - 5.3 mmol/L    Chloride 104 94 - 109 mmol/L    Carbon Dioxide 27 20 - 32 mmol/L    Anion Gap 9 3 - 14 mmol/L    Glucose 122 (H) 70 - 99 mg/dL    Urea Nitrogen 13 7 - 30 mg/dL    Creatinine 0.90 0.52 - 1.04 mg/dL    GFR Estimate 58 (L) >60 mL/min/[1.73_m2]    GFR Estimate If Black 67  >60 mL/min/[1.73_m2]    Calcium 9.6 8.5 - 10.1 mg/dL   **CBC with platelets FUTURE anytime    Collection Time: 01/27/21  6:23 PM   Result Value Ref Range    WBC 6.5 4.0 - 11.0 10e9/L    RBC Count 2.55 (L) 3.8 - 5.2 10e12/L    Hemoglobin 8.2 (L) 11.7 - 15.7 g/dL    Hematocrit 26.9 (L) 35.0 - 47.0 %     (H) 78 - 100 fl    MCH 32.2 26.5 - 33.0 pg    MCHC 30.5 (L) 31.5 - 36.5 g/dL    RDW 14.9 10.0 - 15.0 %    Platelet Count 386 150 - 450 10e9/L   CBC with platelets differential    Collection Time: 02/16/21 11:36 AM   Result Value Ref Range    WBC 6.6 4.0 - 11.0 10e9/L    RBC Count 3.07 (L) 3.8 - 5.2 10e12/L    Hemoglobin 9.8 (L) 11.7 - 15.7 g/dL    Hematocrit 30.3 (L) 35.0 - 47.0 %    MCV 99 78 - 100 fl    MCH 31.9 26.5 - 33.0 pg    MCHC 32.3 31.5 - 36.5 g/dL    RDW 14.6 10.0 - 15.0 %    Platelet Count 351 150 - 450 10e9/L    Diff Method Automated Method     % Neutrophils 81.1 %    % Lymphocytes 10.7 %    % Monocytes 6.9 %    % Eosinophils 0.5 %    % Basophils 0.5 %    % Immature Granulocytes 0.3 %    Nucleated RBCs 0 0 /100    Absolute Neutrophil 5.3 1.6 - 8.3 10e9/L    Absolute Lymphocytes 0.7 (L) 0.8 - 5.3 10e9/L    Absolute Monocytes 0.5 0.0 - 1.3 10e9/L    Absolute Eosinophils 0.0 0.0 - 0.7 10e9/L    Absolute Basophils 0.0 0.0 - 0.2 10e9/L    Abs Immature Granulocytes 0.0 0 - 0.4 10e9/L    Absolute Nucleated RBC 0.0    Comprehensive metabolic panel    Collection Time: 02/16/21 11:36 AM   Result Value Ref Range    Sodium 138 133 - 144 mmol/L    Potassium 4.3 3.4 - 5.3 mmol/L    Chloride 106 94 - 109 mmol/L    Carbon Dioxide 26 20 - 32 mmol/L    Anion Gap 6 3 - 14 mmol/L    Glucose 101 (H) 70 - 99 mg/dL    Urea Nitrogen 15 7 - 30 mg/dL    Creatinine 0.78 0.52 - 1.04 mg/dL    GFR Estimate 70 >60 mL/min/[1.73_m2]    GFR Estimate If Black 81 >60 mL/min/[1.73_m2]    Calcium 9.5 8.5 - 10.1 mg/dL    Bilirubin Total 0.2 0.2 - 1.3 mg/dL    Albumin 3.8 3.4 - 5.0 g/dL    Protein Total 7.5 6.8 - 8.8 g/dL    Alkaline  Phosphatase 98 40 - 150 U/L    ALT 15 0 - 50 U/L    AST 15 0 - 45 U/L   Asymptomatic SARS-CoV-2 COVID-19 Virus (Coronavirus) by PCR    Collection Time: 02/16/21 11:36 AM    Specimen: Nasopharyngeal   Result Value Ref Range    SARS-CoV-2 Virus Specimen Source Nasopharyngeal     SARS-CoV-2 PCR Result NEGATIVE     SARS-CoV-2 PCR Comment       Testing was performed using the Xpert Xpress SARS-CoV-2 Assay on the Cepheid Gene-Xpert   Instrument Systems. Additional information about this Emergency Use Authorization (EUA)   assay can be found via the Lab Guide.     Drug abuse screen 6 urine (chem dep)    Collection Time: 02/16/21  6:53 PM   Result Value Ref Range    Amphetamine Qual Urine Negative NEG^Negative    Barbiturates Qual Urine Negative NEG^Negative    Benzodiazepine Qual Urine Positive (A) NEG^Negative    Cannabinoids Qual Urine Negative NEG^Negative    Cocaine Qual Urine Negative NEG^Negative    Ethanol Qual Urine Negative NEG^Negative    Opiates Qualitative Urine Negative NEG^Negative   UA reflex to Microscopic and Culture    Collection Time: 02/17/21  1:04 AM    Specimen: Urine clean catch; Unspecified Urine   Result Value Ref Range    Color Urine Light Yellow     Appearance Urine Clear     Glucose Urine Negative NEG^Negative mg/dL    Bilirubin Urine Negative NEG^Negative    Ketones Urine Negative NEG^Negative mg/dL    Specific Gravity Urine 1.010 1.003 - 1.035    Blood Urine Negative NEG^Negative    pH Urine 5.0 5.0 - 7.0 pH    Protein Albumin Urine Negative NEG^Negative mg/dL    Urobilinogen mg/dL Normal 0.0 - 2.0 mg/dL    Nitrite Urine Negative NEG^Negative    Leukocyte Esterase Urine Moderate (A) NEG^Negative    Source Unspecified Urine     RBC Urine 1 0 - 2 /HPF    WBC Urine 12 (H) 0 - 5 /HPF    Mucous Urine Present (A) NEG^Negative /LPF    Hyaline Casts 1 0 - 2 /LPF   Urine Culture Aerobic Bacterial    Collection Time: 02/17/21  1:04 AM    Specimen: Unspecified Urine   Result Value Ref Range    Specimen  Description Unspecified Urine     Special Requests Specimen received in preservative     Culture Micro (A)      10,000 to 50,000 colonies/mL  Enterococcus faecalis  Susceptibility testing in progress     EKG 12-lead, tracing only    Collection Time: 02/17/21 12:01 PM   Result Value Ref Range    Interpretation ECG Click View Image link to view waveform and result    TSH with free T4 reflex and/or T3 as indicated    Collection Time: 02/18/21  7:36 AM   Result Value Ref Range    TSH 2.07 0.40 - 4.00 mU/L            Psychiatric Examination:   Temp: 97.5  F (36.4  C) Temp src: Temporal BP: (!) 159/74 Pulse: 66   Resp: 16 SpO2: 98 % O2 Device: None (Room air)    Weight is 136 lbs 6.4 oz  Body mass index is 23.78 kg/m .    Appearance: well groomed, awake, alert, cooperative, no apparent distress and normal weight  Attitude:  cooperative  Eye Contact:  good  Mood:  anxious and depressed  Affect:  intensity is blunted and intensity is flat  Speech:  dysarthria  Psychomotor Behavior:  no evidence of tardive dyskinesia, dystonia, or tics  Throught Process:  logical and goal oriented  Associations:  no loose associations  Thought Content:  no evidence of suicidal ideation or homicidal ideation  Insight:  good  Judgement:  intact  Oriented to:  time, person, and place  Attention Span and Concentration:  intact  Recent and Remote Memory:  fair         Precautions:     Behavioral Orders   Procedures     Code 1 - Restrict to Unit     Discontinue 1:1 attendant for suicide risk     Order Specific Question:   I have performed an in person assessment of the patient     Answer:   Based on this assessment the patient no longer requires a one on one attendant at this point in time.     Order Specific Question:   Rationale     Answer:   Patient States able to remain safe in hospital     Fall precautions     Routine Programming     As clinically indicated     Status 15     Every 15 minutes.     Suicide precautions     Patients on Suicide  Precautions should have a Combination Diet ordered that includes a Diet selection(s) AND a Behavioral Tray selection for Safe Tray - with utensils, or Safe Tray - NO utensils            DIagnoses:   1.  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  Generalized anxiety disorder  3.  OCD, per history  4.  Medical problems include: Recent UTI, history of DVT, glaucoma, aortic valve insufficiency, lung nodules, pericardial effusion, chronic pain, chronic diarrhea, among others.         Plan:   The patient is a very pleasant,  female who was admitted with severe depression, anxiety, suicidal thoughts.  The following medication changes will take place:   --Continue Xanax 0.5 mg at bedtime.    --Decrease Paxil to 10 mg at bedtime.    --Start Cymbalta 20 mg every morning.    --Decrease Zyprexa to 5 mg at bedtime.    --Start Gabapentin 100 mg 3 times a day.      --Blood work was reviewed.  Additional orders will include vitamin D, B12, folate, TSH with T4.  UA/UC.  EKG.    --Internal medicine follow-up for medical problems.     --The patient was consulted on nature of illness and treatment options.   --Care was coordinated with the treatment team.  --PT  --I&O       Disposition Plan   Reason for ongoing admission: is unable to care for self due to depression, anxiety, SI.   Disposition: TBD  Estimated length of stay: 5-7 days  Legal Status:  voluntary  Discharge will be granted once symptoms improved.    Gloria URIARTE CNP  Date: 02/18/21  Time: 12:35 PM        This note was created with the help of Dragon dictation system. All grammatical/typing errors or context distortion are unintentional and inherent to software.    More than 30 minutes were spent for assessment, documentation, and coordination of care.

## 2021-02-18 NOTE — PLAN OF CARE
"  Problem: Depressive Symptoms  Goal: Depressive Symptoms  Description: Signs and symptoms of listed problems will be absent or manageable.  Outcome: Improving  Pt visible in milieu but mostly withdrawn, social with staff. Affect flat and blunted, mood sad and depressed, brightens at times with conversation. Patient making negative statements about herself, stating \"I am a disappointment to my self, I am going to end up not being able to do anything again\". Writer encouraged positive thinking and that PT will be working with her. Pt endorsed anxiety and depression at 8/10 with 10 being the highest, however states it has improved since admission with the help of groups.   Goal today to improve anxiety and depression was to spend less time in her room in bed.   Pt states \"I would like the psychiatrist to help me learn how to cope without activities when I return home\". She states that is the reason she breakdown in the first place.    Pt ate about 75% of super, drank a total of 720 ml. Medication compliant.  "

## 2021-02-18 NOTE — PROGRESS NOTES
"CLINICAL NUTRITION SERVICES - ASSESSMENT NOTE     Nutrition Prescription    RECOMMENDATIONS FOR MDs/PROVIDERS TO ORDER:  None today    Malnutrition Status:    Unable to determine     Recommendations already ordered by Registered Dietitian (RD):  Nutrition Education  PM snack: pretzels  HS snack: baked chips      Future/Additional Recommendations:  Adjust snacks per pt preferences      REASON FOR ASSESSMENT  Rylee Dukes is an 85 year old female assessed by the dietitian for MST score of 2: positive  for wt loss of unsure and negative  for poor PO intake R/t decreased appetite  PMH significant for lupus, hypertension, hyperlipidemia, aortic stenosis, depression, anxiety, and OCD admitted for SI  NUTRITION HISTORY  Pt reports a good appetite PTA. Stated she usually eats 3 meals per day however they aren't \"real meals\" as they're typically smaller. Will have 1/2 a sandwich or a bowl of frosted flakes for a meal. Will have a few snacks such as candy or cookies as well. Reports a UBW of 137# and no recent wt changes.   Per RN note: Patient reports feeling lonely at her living facility and not having anyone to socialize with. She states \"all I do all day is eat and sit in my room\".   CURRENT NUTRITION ORDERS  Diet: Regular  Intake/Tolerance: Per documentation pt is eating well. Reports eating a muffin and eggs this morning. Believes she is eating about the same as usually. Denies N/V/C/D or difficulty chewing/swallowing. Offered snacks, pt requested chips and pretzels for PM and HS snacks.     LABS  Labs reviewed:  Results for RYLEE DUKES (MRN [id number]) as of 2/18/2021 08:34   Ref. Range 2/16/2021 11:36   Sodium Latest Ref Range: 133 - 144 mmol/L 138   Potassium Latest Ref Range: 3.4 - 5.3 mmol/L 4.3   Chloride Latest Ref Range: 94 - 109 mmol/L 106   Carbon Dioxide Latest Ref Range: 20 - 32 mmol/L 26   Urea Nitrogen Latest Ref Range: 7 - 30 mg/dL 15   Creatinine Latest Ref Range: 0.52 - 1.04 mg/dL 0.78   GFR " "Estimate Latest Ref Range: >60 mL/min/1.73_m2 70   GFR Estimate If Black Latest Ref Range: >60 mL/min/1.73_m2 81   Calcium Latest Ref Range: 8.5 - 10.1 mg/dL 9.5   Anion Gap Latest Ref Range: 3 - 14 mmol/L 6   Albumin Latest Ref Range: 3.4 - 5.0 g/dL 3.8   Protein Total Latest Ref Range: 6.8 - 8.8 g/dL 7.5   Bilirubin Total Latest Ref Range: 0.2 - 1.3 mg/dL 0.2   Alkaline Phosphatase Latest Ref Range: 40 - 150 U/L 98   ALT Latest Ref Range: 0 - 50 U/L 15   AST Latest Ref Range: 0 - 45 U/L 15      MEDICATIONS  Medications reviewed:  Vitamin B12  Fergon  Folic acid  PRN: maalox, miralax    ANTHROPOMETRICS  Height: 161.3 cm (5'3.5\")  Most Recent Weight: 61.9 kg (136 lb 6.4 oz)    IBW: 53.4 kg  BMI: 23.8 kg/m^2, Normal BMI  Weight History: 7# (4.9%) wt loss in 1 month, 3# (2.2%) wt loss in 3 months and 4# (2.8%) wt loss in 7 months. BTE=318  Wt Readings from Last 10 Encounters:   02/16/21 61.9 kg (136 lb 6.4 oz)   01/28/21 62.6 kg (138 lb)   01/16/21 62.8 kg (138 lb 6.4 oz)   01/13/21 65.2 kg (143 lb 11.2 oz)   01/11/21 64.9 kg (143 lb)   01/05/21 62.1 kg (136 lb 12.8 oz)   11/19/20 63 kg (139 lb)   11/03/20 63.4 kg (139 lb 12.8 oz)   11/03/20 63 kg (139 lb)   07/14/20 63.5 kg (140 lb)   06/04/19 62.6 kg (138 lb)     Dosing Weight: 61.9 kg    ASSESSED NUTRITION NEEDS  Estimated Energy Needs: 7501-3546 kcals/day (25 - 30 kcals/kg)  Justification: Maintenance  Estimated Protein Needs: 62-74 grams protein/day (1 - 1.2 grams of pro/kg)  Justification: Maintenance  Estimated Fluid Needs: 2129-3843 mL/day (1 mL/kcal)   Justification: Maintenance or Per provider pending fluid status    PHYSICAL FINDINGS  See malnutrition section below.    MALNUTRITION  % Intake: No decreased intake noted  % Weight Loss: Up to 5% in 1 month (non-severe)  Subcutaneous Fat Loss: Unable to assess  Muscle Loss: Unable to assess  Fluid Accumulation/Edema: None noted  Malnutrition Diagnosis: Unable to determine due to no NFPA completed     NUTRITION " DIAGNOSIS  No nutrition diagnosis at this time     INTERVENTIONS  Implementation  Nutrition Education: Discussed role of RD, menu ordering and available snacks/supplements. Encouraged continued good intakes to meet nutritional needs and maintain wt.    PM snack: pretzels  HS snack: baked chips      Goals  Patient to consume % of nutritionally adequate meal trays TID, or the equivalent with supplements/snacks.     Monitoring/Evaluation  Progress toward goals will be monitored and evaluated per protocol.    Tayla Medina MS, RD, LDN  Unit Pager [phone]

## 2021-02-18 NOTE — PLAN OF CARE
Work Completed: Reviewed chart. Met with team to discuss pt progress. Pt appears a bit less anxious than yesterday.    Discharge plan or goal: Discharge to [facility].                Barriers to discharge: High anxiety and depression.

## 2021-02-18 NOTE — PROGRESS NOTES
02/18/21 1558   Quick Adds   Type of Visit Initial PT Evaluation       Present no   Language English   Living Environment   People in home facility resident   Current Living Arrangements independent living facility   Home Accessibility no concerns   Transportation Anticipated family or friend will provide   Self-Care   Usual Activity Tolerance moderate   Current Activity Tolerance fair   Regular Exercise No   Equipment Currently Used at Home none   Disability/Function   Hearing Difficulty or Deaf no   Wear Glasses or Blind yes   Concentrating, Remembering or Making Decisions Difficulty no   Difficulty Communicating no   Difficulty Eating/Swallowing no   Walking or Climbing Stairs Difficulty no   Dressing/Bathing Difficulty no   Toileting issues no   Doing Errands Independently Difficulty (such as shopping) no   Fall history within last six months yes   Number of times patient has fallen within last six months 2   Change in Functional Status Since Onset of Current Illness/Injury yes   General Information   Onset of Illness/Injury or Date of Surgery 02/18/21   Referring Physician Gloria Martinez, CHAPITO CNP   Patient/Family Therapy Goals Statement (PT) Did not endorse   Pertinent History of Current Problem (include personal factors and/or comorbidities that impact the POC)  85 year old female with history of lupus, hypertension, hyperlipidemia, aortic stenosis and report of longstanding psychiatric issues who presents with depression and passive suicidal ideation over the past month.  Patient was living independently previously, but suffered multiple falls and was transitioned to assisted living on a temporary basis for patient to recover.  Son states this decision was made in order to keep her safe, patient has been very unhappy about this choice given the financial cost, loss of independence, and the fact that she feels it is unnecessary.  Son states that they our staff is with  [facility], which has aging in place programs and plan is to return patient back to her home once she is recovered better.  Son states that patient has had PT/OT evaluation and that they felt she could benefit from 24/7 care, patient is not ready for this.  Patient wants to return to independent living and her mood has been worsening over the past month.   Existing Precautions/Restrictions fall   Weight-Bearing Status - LUE full weight-bearing   Weight-Bearing Status - RUE full weight-bearing   Weight-Bearing Status - LLE full weight-bearing   Weight-Bearing Status - RLE full weight-bearing   General Observations Sitting up in common area upon arrival, pleasant and agreeable   Cognition   Orientation Status (Cognition) oriented x 3   Affect/Mental Status (Cognition) WNL   Follows Commands (Cognition) WNL   Pain Assessment   Patient Currently in Pain Yes, see Vital Sign flowsheet   Integumentary/Edema   Integumentary/Edema no deficits were identifed   Posture    Posture Forward head position;Protracted shoulders;Kyphosis   Posture Comments Mild to moderate sitting EOB and standing   Range of Motion (ROM)   ROM Comment Did not formally assess, demosntrates functional ROM with mobility   Strength   Strength Comments Did not formally assess, demonstrates some weakness and decreased overall activity tolerance   Bed Mobility   Comment (Bed Mobility) Independent   Transfers   Transfer Safety Comments PT: Completes sit<>stand transfer with SBA and use of walker   Gait/Stairs (Locomotion)   Comment (Gait/Stairs) PT: Ambulates in [place] with SBA and use of walker, mild instability noted   Balance   Balance Comments Independent sitting balance, SBA for standing balance with UE support from walker   Sensory Examination   Sensory Perception WNL   Clinical Impression   Criteria for Skilled Therapeutic Intervention yes, treatment indicated   PT Diagnosis (PT) impaired functional mobility   Influenced by the following impairments  decreased activity tolerance, decreased standing balance   Functional limitations due to impairments impaired transfers and ambulation   Clinical Presentation Stable/Uncomplicated   Clinical Presentation Rationale  85 year old female with history of lupus, hypertension, hyperlipidemia, aortic stenosis and report of longstanding psychiatric issues who presents with depression and passive suicidal ideation over the past month.  Patient was living independently previously, but suffered multiple falls and was transitioned to assisted living on a temporary basis for patient to recover.  Son states this decision was made in order to keep her safe, patient has been very unhappy about this choice given the financial cost, loss of independence, and the fact that she feels it is unnecessary.  Son states that they our staff is with [facility], which has aging in place programs and plan is to return patient back to her home once she is recovered better.  Son states that patient has had PT/OT evaluation and that they felt she could benefit from 24/7 care, patient is not ready for this.  Patient wants to return to independent living and her mood has been worsening over the past month. Mobilizing well   Clinical Decision Making (Complexity) low complexity   Therapy Frequency (PT) 4x/week   Predicted Duration of Therapy Intervention (days/wks) 10 days   Planned Therapy Interventions (PT) balance training;gait training;strengthening;neuromuscular re-education;transfer training   Anticipated Equipment Needs at Discharge (PT) walker, standard   Risk & Benefits of therapy have been explained evaluation/treatment results reviewed;care plan/treatment goals reviewed;risks/benefits reviewed;current/potential barriers reviewed   PT Discharge Planning    PT Discharge Recommendation (DC Rec) home with home care physical therapy   PT Rationale for DC Rec PT: Return to independent senior living with home PT for safety evaluation and progression    PT Brief overview of current status  PT: Independent with bed mobility and SBA for transfers and ambulation with use of walker   Total Evaluation Time   Total Evaluation Time (Minutes) 9

## 2021-02-19 ENCOUNTER — APPOINTMENT (OUTPATIENT)
Dept: PHYSICAL THERAPY | Facility: CLINIC | Age: 86
DRG: 885 | End: 2021-02-19
Payer: COMMERCIAL

## 2021-02-19 LAB — INTERPRETATION ECG - MUSE: NORMAL

## 2021-02-19 PROCEDURE — 250N000013 HC RX MED GY IP 250 OP 250 PS 637: Performed by: NURSE PRACTITIONER

## 2021-02-19 PROCEDURE — 97116 GAIT TRAINING THERAPY: CPT | Mod: GP | Performed by: PHYSICAL THERAPIST

## 2021-02-19 PROCEDURE — 124N000003 HC R&B MH SENIOR/ADOLESCENT

## 2021-02-19 PROCEDURE — 97110 THERAPEUTIC EXERCISES: CPT | Mod: GP | Performed by: PHYSICAL THERAPIST

## 2021-02-19 PROCEDURE — 250N000013 HC RX MED GY IP 250 OP 250 PS 637: Performed by: EMERGENCY MEDICINE

## 2021-02-19 PROCEDURE — G0177 OPPS/PHP; TRAIN & EDUC SERV: HCPCS

## 2021-02-19 PROCEDURE — 99233 SBSQ HOSP IP/OBS HIGH 50: CPT | Performed by: NURSE PRACTITIONER

## 2021-02-19 RX ORDER — LOPERAMIDE HCL 2 MG
2 CAPSULE ORAL 3 TIMES DAILY PRN
Status: DISCONTINUED | OUTPATIENT
Start: 2021-02-19 | End: 2021-03-11

## 2021-02-19 RX ORDER — DULOXETIN HYDROCHLORIDE 20 MG/1
40 CAPSULE, DELAYED RELEASE ORAL DAILY
Status: DISCONTINUED | OUTPATIENT
Start: 2021-02-20 | End: 2021-02-23

## 2021-02-19 RX ORDER — PAROXETINE HYDROCHLORIDE 10 MG/1
5 TABLET, FILM COATED ORAL AT BEDTIME
Status: DISCONTINUED | OUTPATIENT
Start: 2021-02-19 | End: 2021-02-22

## 2021-02-19 RX ORDER — LOPERAMIDE HCL 2 MG
4 CAPSULE ORAL 4 TIMES DAILY PRN
Status: DISCONTINUED | OUTPATIENT
Start: 2021-02-19 | End: 2021-02-19

## 2021-02-19 RX ADMIN — GABAPENTIN 100 MG: 100 CAPSULE ORAL at 08:46

## 2021-02-19 RX ADMIN — BRIMONIDINE TARTRATE 1 DROP: 1.5 SOLUTION OPHTHALMIC at 08:51

## 2021-02-19 RX ADMIN — AMLODIPINE BESYLATE 5 MG: 5 TABLET ORAL at 08:46

## 2021-02-19 RX ADMIN — SIMVASTATIN 10 MG: 10 TABLET, FILM COATED ORAL at 21:43

## 2021-02-19 RX ADMIN — ALPRAZOLAM 0.5 MG: 0.5 TABLET ORAL at 21:43

## 2021-02-19 RX ADMIN — DORZOLAMIDE HYDROCHLORIDE 1 DROP: 20 SOLUTION/ DROPS OPHTHALMIC at 19:59

## 2021-02-19 RX ADMIN — CYANOCOBALAMIN TAB 1000 MCG 1000 MCG: 1000 TAB at 08:45

## 2021-02-19 RX ADMIN — GABAPENTIN 100 MG: 100 CAPSULE ORAL at 13:40

## 2021-02-19 RX ADMIN — HYDROXYCHLOROQUINE SULFATE 200 MG: 200 TABLET, FILM COATED ORAL at 20:00

## 2021-02-19 RX ADMIN — DORZOLAMIDE HYDROCHLORIDE 1 DROP: 20 SOLUTION/ DROPS OPHTHALMIC at 08:51

## 2021-02-19 RX ADMIN — GABAPENTIN 100 MG: 100 CAPSULE ORAL at 20:00

## 2021-02-19 RX ADMIN — FOLIC ACID 1 MG: 1 TABLET ORAL at 08:46

## 2021-02-19 RX ADMIN — HYDRALAZINE HYDROCHLORIDE 25 MG: 25 TABLET ORAL at 20:00

## 2021-02-19 RX ADMIN — HYDRALAZINE HYDROCHLORIDE 25 MG: 25 TABLET ORAL at 08:45

## 2021-02-19 RX ADMIN — FERROUS GLUCONATE 324 MG: 324 TABLET ORAL at 08:45

## 2021-02-19 RX ADMIN — LATANOPROST 1 DROP: 50 SOLUTION OPHTHALMIC at 21:43

## 2021-02-19 RX ADMIN — DULOXETINE HYDROCHLORIDE 20 MG: 20 CAPSULE, DELAYED RELEASE ORAL at 08:45

## 2021-02-19 RX ADMIN — Medication 5 MG: at 21:43

## 2021-02-19 RX ADMIN — FERROUS GLUCONATE 324 MG: 324 TABLET ORAL at 18:09

## 2021-02-19 RX ADMIN — HYDRALAZINE HYDROCHLORIDE 25 MG: 25 TABLET ORAL at 13:39

## 2021-02-19 RX ADMIN — HYDROXYCHLOROQUINE SULFATE 200 MG: 200 TABLET, FILM COATED ORAL at 08:45

## 2021-02-19 RX ADMIN — ENALAPRIL MALEATE 20 MG: 20 TABLET ORAL at 20:00

## 2021-02-19 RX ADMIN — BRIMONIDINE TARTRATE 1 DROP: 1.5 SOLUTION OPHTHALMIC at 20:00

## 2021-02-19 RX ADMIN — OLANZAPINE 5 MG: 5 TABLET, FILM COATED ORAL at 21:43

## 2021-02-19 RX ADMIN — ENALAPRIL MALEATE 20 MG: 20 TABLET ORAL at 08:45

## 2021-02-19 ASSESSMENT — ACTIVITIES OF DAILY LIVING (ADL)
HYGIENE/GROOMING: INDEPENDENT
ORAL_HYGIENE: INDEPENDENT
DRESS: INDEPENDENT
LAUNDRY: UNABLE TO COMPLETE
LAUNDRY: UNABLE TO COMPLETE
HYGIENE/GROOMING: INDEPENDENT
ORAL_HYGIENE: INDEPENDENT
DRESS: INDEPENDENT

## 2021-02-19 NOTE — PLAN OF CARE
Work Completed: Reviewed chart. Was not at team. 1:1 therapy was ordered. This writer is unable to do this today due to time restraints.    Discharge plan or goal: Discharge home when mental health is stable.                Barriers to discharge: Symptom management

## 2021-02-19 NOTE — PROGRESS NOTES
[facility]  Patient is currently open to home care services with [facility]. The patient is currently receiving     RN services.  [facility]  and team have been notified of patient admission.  [facility] liaison will continue to follow patient during stay.  If appropriate provide orders in Epic  to resume home care at time of discharge.  Intake  or [email or web address]

## 2021-02-19 NOTE — PLAN OF CARE
Problem: Adult Inpatient Plan of Care  Goal: Optimal Comfort and Wellbeing  Outcome: Improving     Slept all night for 7.25 hours, no intake this shift.

## 2021-02-19 NOTE — PROGRESS NOTES
[facility], [place]   Psychiatric Progress Note        Interim History:   From H&P: Per ED note: Swathi Dukes is a 85 year old female with history of lupus, hypertension, hyperlipidemia, aortic stenosis and report of longstanding psychiatric issues who presents with depression and passive suicidal ideation over the past month.  Patient was living independently previously, but suffered multiple falls and was transitioned to assisted living on a temporary basis for patient to recover.  Son states this decision was made in order to keep her safe, patient has been very unhappy about this choice given the financial cost, loss of independence, and the fact that she feels it is unnecessary.  Son states that they our staff is with [facility], which has aging in place programs and plan is to return patient back to her home once she is recovered better.  Son states that patient has had PT/OT evaluation and that they felt she could benefit from 24/7 care, patient is not ready for this.  Patient wants to return to independent living and her mood has been worsening over the past month.  She could barely take a shower because she was shaking so bad and her mind isn't working. She states she is feeling anxious, sad and frustrated, isn't functioning.  Son states that patient has had mental health since 1960s and required psychiatric hospitalizations, no psychiatric hospitalizations in past 10 years.      2/18/2021 Spoke with patient's son Brent [phone].  States the patient has a lifelong history of depression and anxiety.  She is originally from [place] however, moved to [place] when her sons went to college there.  Remained in [place] for about 20 years.  The patient has had ECT treatments about 50 years ago.  She was hospitalized multiple times including in [facility] where she was started on Valium, which she has taken for 20 to 30 years.  When in [place], the patient was taking  "Haldol and Parnate for about 10 years.  Believes that the Haldol has caused cognitive issues.  The patient has had major crisis in her life such as his twn brother dying at age 39 of Parkinson's disease and her  passing away 2 years prior, from Parkinson's as well.  The patient has been having frequent UTIs.  When living in [place], she was in and out of the [facility] care.  She has been dependent on her boyfriend who \"made a lot of the decisions for her\".  About a year and a half ago, the boyfriend felt that he can no longer take care of her since she has been having more and more problems.  Since then she has been living in an assisted living, which is very restrictive in terms of the COVID-19 pandemic.  She has been allowed 1 visit and 1 group activity a week.  The patient has been falling frequently.  She has expressed suicidal ideation on several occasion.  Reports that about a month ago the patient was \"fine\".  Money are not a problem, however, the patient does not want to pay for the AL.  She has her own apartment.    Team meeting report: The patient's care was discussed with the treatment team during the daily team meeting and/or staff's chart notes were reviewed.  Staff report patient has been calm, pleasant, cooperative. Patient is attending groups and participating appropriately. Patient is eating well and taking medications as prescribed.  She was visible in the milieu, social with peers.  Rated depression and anxiety 8/10, 10 being the worst.  Made several negative self statements.  Reports improvement since admission.  Slept all night.    Met with patient.  Reports some improvement in her depression and anxiety.  Agreeable to increase Cymbalta and decrease Paxil.  The patient was repeating herself a bit.  Reports that her boyfriend passed away 4 weeks ago and her brother 3 weeks ago.  She spoke with both of them on a regular basis and misses them.  She reports significant body pain, not sure why.  " Rates depression as 10/10.  The patient is smiling while saying how depressed she is.   Anxiety is about the same.  She feels tired.  Agreeable to have individual therapy and on the unit.  Complains of diarrhea and would like to have Imodium.           Medications:       ALPRAZolam  0.5 mg Oral At Bedtime     amLODIPine  5 mg Oral Daily     brimonidine  1 drop Both Eyes BID     cyanocobalamin  1,000 mcg Oral Daily     dorzolamide  1 drop Both Eyes BID     [START ON 2/20/2021] DULoxetine  40 mg Oral Daily     enalapril  20 mg Oral BID     ferrous gluconate  324 mg Oral BID w/meals     folic acid  1 mg Oral Daily     gabapentin  100 mg Oral TID     hydrALAZINE  25 mg Oral TID     hydroxychloroquine  200 mg Oral BID     latanoprost  1 drop Both Eyes At Bedtime     OLANZapine  5 mg Oral At Bedtime     PARoxetine  5 mg Oral At Bedtime     simvastatin  10 mg Oral At Bedtime          Allergies:     Allergies   Allergen Reactions     Diclofenac Anaphylaxis     Iodine Anaphylaxis     Contrast  Pt states anaphylactic reaction to ct contrast about 20 years ago     Aspirin           Labs:     Recent Results (from the past 672 hour(s))   *UA reflex to Microscopic and Culture ([place] and [facility] (except [place] and [place])    Collection Time: 01/27/21  6:22 PM    Specimen: Midstream Urine   Result Value Ref Range    Color Urine Yellow     Appearance Urine Clear     Glucose Urine Negative NEG^Negative mg/dL    Bilirubin Urine Negative NEG^Negative    Ketones Urine Negative NEG^Negative mg/dL    Specific Gravity Urine <=1.005 1.003 - 1.035    Blood Urine Negative NEG^Negative    pH Urine 6.0 5.0 - 7.0 pH    Protein Albumin Urine Negative NEG^Negative mg/dL    Urobilinogen Urine 0.2 0.2 - 1.0 EU/dL    Nitrite Urine Negative NEG^Negative    Leukocyte Esterase Urine Negative NEG^Negative    Source Midstream Urine    **Basic metabolic panel FUTURE anytime    Collection Time: 01/27/21  6:23 PM   Result Value Ref Range     Sodium 140 133 - 144 mmol/L    Potassium 4.3 3.4 - 5.3 mmol/L    Chloride 104 94 - 109 mmol/L    Carbon Dioxide 27 20 - 32 mmol/L    Anion Gap 9 3 - 14 mmol/L    Glucose 122 (H) 70 - 99 mg/dL    Urea Nitrogen 13 7 - 30 mg/dL    Creatinine 0.90 0.52 - 1.04 mg/dL    GFR Estimate 58 (L) >60 mL/min/[1.73_m2]    GFR Estimate If Black 67 >60 mL/min/[1.73_m2]    Calcium 9.6 8.5 - 10.1 mg/dL   **CBC with platelets FUTURE anytime    Collection Time: 01/27/21  6:23 PM   Result Value Ref Range    WBC 6.5 4.0 - 11.0 10e9/L    RBC Count 2.55 (L) 3.8 - 5.2 10e12/L    Hemoglobin 8.2 (L) 11.7 - 15.7 g/dL    Hematocrit 26.9 (L) 35.0 - 47.0 %     (H) 78 - 100 fl    MCH 32.2 26.5 - 33.0 pg    MCHC 30.5 (L) 31.5 - 36.5 g/dL    RDW 14.9 10.0 - 15.0 %    Platelet Count 386 150 - 450 10e9/L   CBC with platelets differential    Collection Time: 02/16/21 11:36 AM   Result Value Ref Range    WBC 6.6 4.0 - 11.0 10e9/L    RBC Count 3.07 (L) 3.8 - 5.2 10e12/L    Hemoglobin 9.8 (L) 11.7 - 15.7 g/dL    Hematocrit 30.3 (L) 35.0 - 47.0 %    MCV 99 78 - 100 fl    MCH 31.9 26.5 - 33.0 pg    MCHC 32.3 31.5 - 36.5 g/dL    RDW 14.6 10.0 - 15.0 %    Platelet Count 351 150 - 450 10e9/L    Diff Method Automated Method     % Neutrophils 81.1 %    % Lymphocytes 10.7 %    % Monocytes 6.9 %    % Eosinophils 0.5 %    % Basophils 0.5 %    % Immature Granulocytes 0.3 %    Nucleated RBCs 0 0 /100    Absolute Neutrophil 5.3 1.6 - 8.3 10e9/L    Absolute Lymphocytes 0.7 (L) 0.8 - 5.3 10e9/L    Absolute Monocytes 0.5 0.0 - 1.3 10e9/L    Absolute Eosinophils 0.0 0.0 - 0.7 10e9/L    Absolute Basophils 0.0 0.0 - 0.2 10e9/L    Abs Immature Granulocytes 0.0 0 - 0.4 10e9/L    Absolute Nucleated RBC 0.0    Comprehensive metabolic panel    Collection Time: 02/16/21 11:36 AM   Result Value Ref Range    Sodium 138 133 - 144 mmol/L    Potassium 4.3 3.4 - 5.3 mmol/L    Chloride 106 94 - 109 mmol/L    Carbon Dioxide 26 20 - 32 mmol/L    Anion Gap 6 3 - 14 mmol/L    Glucose  101 (H) 70 - 99 mg/dL    Urea Nitrogen 15 7 - 30 mg/dL    Creatinine 0.78 0.52 - 1.04 mg/dL    GFR Estimate 70 >60 mL/min/[1.73_m2]    GFR Estimate If Black 81 >60 mL/min/[1.73_m2]    Calcium 9.5 8.5 - 10.1 mg/dL    Bilirubin Total 0.2 0.2 - 1.3 mg/dL    Albumin 3.8 3.4 - 5.0 g/dL    Protein Total 7.5 6.8 - 8.8 g/dL    Alkaline Phosphatase 98 40 - 150 U/L    ALT 15 0 - 50 U/L    AST 15 0 - 45 U/L   Asymptomatic SARS-CoV-2 COVID-19 Virus (Coronavirus) by PCR    Collection Time: 02/16/21 11:36 AM    Specimen: Nasopharyngeal   Result Value Ref Range    SARS-CoV-2 Virus Specimen Source Nasopharyngeal     SARS-CoV-2 PCR Result NEGATIVE     SARS-CoV-2 PCR Comment       Testing was performed using the [employer] Xpress SARS-CoV-2 Assay on the Cepheid Gene-Xpert   Instrument Systems. Additional information about this Emergency Use Authorization (EUA)   assay can be found via the Lab Guide.     Drug abuse screen 6 urine (chem dep)    Collection Time: 02/16/21  6:53 PM   Result Value Ref Range    Amphetamine Qual Urine Negative NEG^Negative    Barbiturates Qual Urine Negative NEG^Negative    Benzodiazepine Qual Urine Positive (A) NEG^Negative    Cannabinoids Qual Urine Negative NEG^Negative    Cocaine Qual Urine Negative NEG^Negative    Ethanol Qual Urine Negative NEG^Negative    Opiates Qualitative Urine Negative NEG^Negative   UA reflex to Microscopic and Culture    Collection Time: 02/17/21  1:04 AM    Specimen: Urine clean catch; Unspecified Urine   Result Value Ref Range    Color Urine Light Yellow     Appearance Urine Clear     Glucose Urine Negative NEG^Negative mg/dL    Bilirubin Urine Negative NEG^Negative    Ketones Urine Negative NEG^Negative mg/dL    Specific Gravity Urine 1.010 1.003 - 1.035    Blood Urine Negative NEG^Negative    pH Urine 5.0 5.0 - 7.0 pH    Protein Albumin Urine Negative NEG^Negative mg/dL    Urobilinogen mg/dL Normal 0.0 - 2.0 mg/dL    Nitrite Urine Negative NEG^Negative    Leukocyte Esterase Urine  Moderate (A) NEG^Negative    Source Unspecified Urine     RBC Urine 1 0 - 2 /HPF    WBC Urine 12 (H) 0 - 5 /HPF    Mucous Urine Present (A) NEG^Negative /LPF    Hyaline Casts 1 0 - 2 /LPF   Urine Culture Aerobic Bacterial    Collection Time: 02/17/21  1:04 AM    Specimen: Unspecified Urine   Result Value Ref Range    Specimen Description Unspecified Urine     Special Requests Specimen received in preservative     Culture Micro (A)      10,000 to 50,000 colonies/mL  Enterococcus faecalis         Susceptibility    Enterococcus faecalis - ELIF     AMPICILLIN <=2 Sensitive ug/mL     NITROFURANTOIN <=16 Sensitive ug/mL     PENICILLIN 4 Sensitive ug/mL     VANCOMYCIN 1 Sensitive ug/mL   EKG 12-lead, tracing only    Collection Time: 02/17/21 12:01 PM   Result Value Ref Range    Interpretation ECG Click View Image link to view waveform and result    TSH with free T4 reflex and/or T3 as indicated    Collection Time: 02/18/21  7:36 AM   Result Value Ref Range    TSH 2.07 0.40 - 4.00 mU/L   Vitamin B12    Collection Time: 02/18/21  7:36 AM   Result Value Ref Range    Vitamin B12 661 193 - 986 pg/mL   Folate    Collection Time: 02/18/21  7:36 AM   Result Value Ref Range    Folate 18.8 >5.4 ng/mL   Vitamin D    Collection Time: 02/18/21  7:36 AM   Result Value Ref Range    Vitamin D Deficiency screening 36 20 - 75 ug/L   Magnesium    Collection Time: 02/18/21  7:36 AM   Result Value Ref Range    Magnesium 2.2 1.6 - 2.3 mg/dL            Psychiatric Examination:   Temp: 97.3  F (36.3  C) Temp src: Temporal BP: (!) 156/71 Pulse: 62   Resp: 16 SpO2: 98 % O2 Device: None (Room air)    Weight is 136 lbs 6.4 oz  Body mass index is 23.78 kg/m .    Appearance: well groomed, awake, alert, cooperative, no apparent distress and normal weight  Attitude:  cooperative  Eye Contact:  good  Mood:  anxious and depressed  Affect:  intensity is blunted and intensity is flat  Speech:  dysarthria  Psychomotor Behavior:  no evidence of tardive  dyskinesia, dystonia, or tics  Throught Process:  logical and goal oriented  Associations:  no loose associations  Thought Content:  no evidence of suicidal ideation or homicidal ideation  Insight:  good  Judgement:  intact  Oriented to:  time, person, and place  Attention Span and Concentration:  intact  Recent and Remote Memory:  fair         Precautions:     Behavioral Orders   Procedures     Code 1 - Restrict to Unit     Discontinue 1:1 attendant for suicide risk     Order Specific Question:   I have performed an in person assessment of the patient     Answer:   Based on this assessment the patient no longer requires a one on one attendant at this point in time.     Order Specific Question:   Rationale     Answer:   Patient States able to remain safe in hospital     Fall precautions     Routine Programming     As clinically indicated     Status 15     Every 15 minutes.     Suicide precautions     Patients on Suicide Precautions should have a Combination Diet ordered that includes a Diet selection(s) AND a Behavioral Tray selection for Safe Tray - with utensils, or Safe Tray - NO utensils            DIagnoses:   1.  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  Generalized anxiety disorder  3.  OCD, per history  4.  Medical problems include: Recent UTI, history of DVT, glaucoma, aortic valve insufficiency, lung nodules, pericardial effusion, chronic pain, chronic diarrhea, among others.         Plan:   The patient is a very pleasant,  female who was admitted with severe depression, anxiety, suicidal thoughts.  The following medication changes will take place:   --Continue Xanax 0.5 mg at bedtime.    --Decrease Paxil to 5 mg at bedtime.    --Increase Cymbalta to 40 mg every morning.    --Decrease Zyprexa to 5 mg at bedtime.    --Start Gabapentin 100 mg 3 times a day.      --Blood work was reviewed.  Additional orders will include vitamin D, B12, folate, TSH with T4.  UA/UC.   EKG.    --Internal medicine follow-up for medical problems.     --The patient was consulted on nature of illness and treatment options.   --Care was coordinated with the treatment team.  --PT  --I&O       Disposition Plan   Reason for ongoing admission: is unable to care for self due to depression, anxiety, SI.   Disposition: TBD  Estimated length of stay: 5-7 days  Legal Status:  voluntary  Discharge will be granted once symptoms improved.    Gloria URIARTE CNP  Date: 02/19/21  Time: 4:10 PM        This note was created with the help of Dragon dictation system. All grammatical/typing errors or context distortion are unintentional and inherent to software.    More than 30 minutes were spent for assessment, documentation, and coordination of care.

## 2021-02-19 NOTE — PROGRESS NOTES
"   02/18/21 1800   General Information   Date Initially Attended OT 02/18/21   Clinical Impression   Affect Flat   Orientation Oriented to person, place and time   Appearance and ADLs General cleanliness observed in most areas   Attention to Internal Stimuli No observed signs   Interaction Skills Interacts appropriately with staff   Ability to Communicate Needs Does so with prompts   Verbal Content Clear;Appropriate to topic   Ability to Maintain Boundaries Maintains appropriate physical boundaries;Maintains appropriate verbal boundaries   Participation Participates with minimal encouragement   Concentration Concentrates 30+ minutes   Ability to Concentrate With structure   Follows and Comprehends Directions Independently follows 1 step verbal directions;Needs further assessment   Memory Needs further assessment   Organization Independently organizes simple tasks;Needs further assessment   Decision Making Independent   Planning and Problem Solving Needs further assessment   Ability to Apply and Learn Concepts Applies within group structure   Frustrations / Stress Tolerance Independently identifies sources of frustration/stress   Level of Insight Some insight   Self Esteem Poor self esteem   Social Supports Has knowledge of support systems   General Observation/Plan   General Observations/Plan See Comments   Attended 2 of 2 OT groups.  In the a.m. group she participated for 50 minutes in a goal oriented task group with 6 patients.  She stated reason for admission as \"because I am scared \"she did not identify a personal strength.  OT goal focus she chose was to feel more comfortable trying new things, increase assertiveness and problem solving.  In the afternoon group she participated for 65 minutes with 7 patients in an activity focused on identifying affirmations that were pertinent to her.  She made choices, initiated participation, and appeared attentive and interested.  Affect appeared flat. Plan: will encourage " attendance, participation in activities as comfort allows, provide structured positive outcome opportunities to build self esteem and energy. Encourage initiating conversation during groups to build comfort in assertiveness.  Provide opportunity to explore and expand healthy coping strategies.

## 2021-02-19 NOTE — PLAN OF CARE
"  Problem: OT General Care Plan  Goal: OT Goal 1  Description: Will attend OT groups and set goal focus.      Note: Attended 2 of 2 OT groups.  In the first group she participated for 50 minutes with 6 patients in a goal oriented task group.  She made decisions and spent time planning and organizing details of her 2 step task and followed through with support given.  In the second group she participated also for 50 minutes with 6 patients focused on the topic of resiliency.  She stated waking up with an awareness of why she is experiencing \"the most difficult time in my life \".  She explained this depression focused around the loss of 2 people to her within 3 weeks.  She stated looking forward to talking with her doctor about this and feeling she has a great deal of resilience and strength.  She stated coming to this conclusion has been very helpful for her in analyzing her difficulties she is experiencing.     "

## 2021-02-19 NOTE — PLAN OF CARE
Problem: Suicidal Behavior  Goal: Suicidal Behavior is Absent or Managed  Outcome: Improving  Note: Denies suicidal ideation. Does not have a coping skill at this time. Remains on suicide precautions and status 15     Problem: Depressive Symptoms  Goal: Depressive Symptoms  Description: Signs and symptoms of listed problems will be absent or manageable.  Outcome: No Change  Flowsheets (Taken 2/19/2021 8428)  Depressive Symptoms Assessed: all  Depressive Symptoms Present:    affect    mood    insight    thought process    sleep     Assessment- Pt rates her depression 5/10. Pt shows insight into depression being related to the grieving process of the loss of her spouse and another relative and being isolated due to COVID. See new order for Individual Therapy.     Observations- appears tired, closing her eye in lounge, stating she needs to take a nap. Blunted, flat affect, depressed mood, negativistic thinking.     Precautions  Fall- steady with walker  Suicide     Self Care   Sleep- slept all night- woke earlier than usual, appears sleepy   ADL- adequate   Appetite- adequate    Intake 1200 ml    Groups- attending and participating    Social- with select pts- enjoyed movie and activities last pm    Toileting- see order for PRN imodium/ no diarrhea    Intervention   Medication compliant/ denies  Side effects   Coping Skills Promoted- groups/ individual therapy   Education (see education section)    Pt declines request for more information on [employer] Directives.

## 2021-02-20 DIAGNOSIS — H40.003 GLAUCOMA SUSPECT, BILATERAL: ICD-10-CM

## 2021-02-20 PROCEDURE — 250N000013 HC RX MED GY IP 250 OP 250 PS 637: Performed by: NURSE PRACTITIONER

## 2021-02-20 PROCEDURE — 90853 GROUP PSYCHOTHERAPY: CPT

## 2021-02-20 PROCEDURE — 250N000013 HC RX MED GY IP 250 OP 250 PS 637: Performed by: EMERGENCY MEDICINE

## 2021-02-20 PROCEDURE — 124N000003 HC R&B MH SENIOR/ADOLESCENT

## 2021-02-20 RX ADMIN — ENALAPRIL MALEATE 20 MG: 20 TABLET ORAL at 09:01

## 2021-02-20 RX ADMIN — HYDRALAZINE HYDROCHLORIDE 25 MG: 25 TABLET ORAL at 20:06

## 2021-02-20 RX ADMIN — DULOXETINE HYDROCHLORIDE 40 MG: 20 CAPSULE, DELAYED RELEASE ORAL at 09:00

## 2021-02-20 RX ADMIN — BRIMONIDINE TARTRATE 1 DROP: 1.5 SOLUTION OPHTHALMIC at 20:06

## 2021-02-20 RX ADMIN — DORZOLAMIDE HYDROCHLORIDE 1 DROP: 20 SOLUTION/ DROPS OPHTHALMIC at 20:06

## 2021-02-20 RX ADMIN — HYDROXYCHLOROQUINE SULFATE 200 MG: 200 TABLET, FILM COATED ORAL at 20:06

## 2021-02-20 RX ADMIN — AMLODIPINE BESYLATE 5 MG: 5 TABLET ORAL at 09:01

## 2021-02-20 RX ADMIN — ENALAPRIL MALEATE 20 MG: 20 TABLET ORAL at 20:07

## 2021-02-20 RX ADMIN — BRIMONIDINE TARTRATE 1 DROP: 1.5 SOLUTION OPHTHALMIC at 09:00

## 2021-02-20 RX ADMIN — FOLIC ACID 1 MG: 1 TABLET ORAL at 09:01

## 2021-02-20 RX ADMIN — HYDRALAZINE HYDROCHLORIDE 25 MG: 25 TABLET ORAL at 15:02

## 2021-02-20 RX ADMIN — HYDROXYCHLOROQUINE SULFATE 200 MG: 200 TABLET, FILM COATED ORAL at 09:00

## 2021-02-20 RX ADMIN — GABAPENTIN 100 MG: 100 CAPSULE ORAL at 20:07

## 2021-02-20 RX ADMIN — DORZOLAMIDE HYDROCHLORIDE 1 DROP: 20 SOLUTION/ DROPS OPHTHALMIC at 09:00

## 2021-02-20 RX ADMIN — SIMVASTATIN 10 MG: 10 TABLET, FILM COATED ORAL at 21:51

## 2021-02-20 RX ADMIN — OLANZAPINE 5 MG: 5 TABLET, FILM COATED ORAL at 21:51

## 2021-02-20 RX ADMIN — FERROUS GLUCONATE 324 MG: 324 TABLET ORAL at 09:01

## 2021-02-20 RX ADMIN — CYANOCOBALAMIN TAB 1000 MCG 1000 MCG: 1000 TAB at 09:00

## 2021-02-20 RX ADMIN — GABAPENTIN 100 MG: 100 CAPSULE ORAL at 15:03

## 2021-02-20 RX ADMIN — Medication 5 MG: at 21:52

## 2021-02-20 RX ADMIN — FERROUS GLUCONATE 324 MG: 324 TABLET ORAL at 17:44

## 2021-02-20 RX ADMIN — HYDRALAZINE HYDROCHLORIDE 25 MG: 25 TABLET ORAL at 09:00

## 2021-02-20 RX ADMIN — LATANOPROST 1 DROP: 50 SOLUTION OPHTHALMIC at 21:50

## 2021-02-20 RX ADMIN — GABAPENTIN 100 MG: 100 CAPSULE ORAL at 09:01

## 2021-02-20 RX ADMIN — ALPRAZOLAM 0.5 MG: 0.5 TABLET ORAL at 21:50

## 2021-02-20 ASSESSMENT — ACTIVITIES OF DAILY LIVING (ADL)
ORAL_HYGIENE: INDEPENDENT
ORAL_HYGIENE: INDEPENDENT
HYGIENE/GROOMING: SHOWER;WITH SUPERVISION
HYGIENE/GROOMING: INDEPENDENT
LAUNDRY: UNABLE TO COMPLETE
DRESS: INDEPENDENT
LAUNDRY: UNABLE TO COMPLETE
DRESS: INDEPENDENT

## 2021-02-20 NOTE — PLAN OF CARE
Problem: Adult Inpatient Plan of Care  Goal: Optimal Comfort and Wellbeing  2/20/2021 0614 by Suzie Dong, RN  Outcome: Improving  Patient slept for 6.5 hrs.  No intake, this shift.

## 2021-02-20 NOTE — PLAN OF CARE
"  Problem: Depressive Symptoms  Goal: Depressive Symptoms  Description: Signs and symptoms of listed problems will be absent or manageable.  Outcome: No Change     Assessment- Pt denies SI. Reports anxiety, especially around taking a shower since that is when she had her \"breakdown\". Pt appears to have OCD type traits around the shower that maybe contributing to her anxiety. Pt was concerned about the number of times she rinsed her hair. Pt rinsed her hair 9 times, but at [facility] rinses her hair 13 times. Pt states she worries about it all day if she does not rinse if 13 times. Staff did verify her hair was clean.     Pt frustrated about drinking more fluids. In 1080  BP trend on elevated side consistently     Had BM today, describes it as diarrhea, not observed     "

## 2021-02-20 NOTE — PLAN OF CARE
"  Problem: Depressive Symptoms  Goal: Depressive Symptoms  Description: Signs and symptoms of listed problems will be absent or manageable.  Outcome: Improving  Pt visible in milieu, affect is is flat, blunted, mood depressed, brightens at times during conversations. Pt endorse anxiety at 7/10 and depression at 5/10 (10 being the highest. Major stressor per pt is being lonely. Pt stated \"I think  I need a man in my life\" Pt reports partner passed away about 5 weeks ago and her elder brother about 2 weeks after which. States it contributed to her break down, couple with being bored at her assisted living with no activities to attend due to COVID restrictions. Pt states attending groups and watching TV have been helpful to reduce anxiety and depression while here but she fears when she discharge back to the assisted living she will breakdown again. Pt made negative statements about herself like \"I will probably never get better, I worked with PT today and she said I did good but I think she was lying just to make me feel better\". Writer encouraged pt to think positive and she agreed stating it was a goal she was working on.     Appetite is good, ate 100% of her meal, drank a total of 840 ml during shift. No lose stool reported. Medication compliant.       "

## 2021-02-21 ENCOUNTER — APPOINTMENT (OUTPATIENT)
Dept: PHYSICAL THERAPY | Facility: CLINIC | Age: 86
DRG: 885 | End: 2021-02-21
Payer: COMMERCIAL

## 2021-02-21 PROCEDURE — 124N000003 HC R&B MH SENIOR/ADOLESCENT

## 2021-02-21 PROCEDURE — 97110 THERAPEUTIC EXERCISES: CPT | Mod: GP | Performed by: CLINIC/CENTER

## 2021-02-21 PROCEDURE — 250N000013 HC RX MED GY IP 250 OP 250 PS 637: Performed by: NURSE PRACTITIONER

## 2021-02-21 PROCEDURE — 97116 GAIT TRAINING THERAPY: CPT | Mod: GP | Performed by: CLINIC/CENTER

## 2021-02-21 PROCEDURE — 250N000013 HC RX MED GY IP 250 OP 250 PS 637: Performed by: EMERGENCY MEDICINE

## 2021-02-21 RX ADMIN — Medication 5 MG: at 22:17

## 2021-02-21 RX ADMIN — DORZOLAMIDE HYDROCHLORIDE 1 DROP: 20 SOLUTION/ DROPS OPHTHALMIC at 08:31

## 2021-02-21 RX ADMIN — GABAPENTIN 100 MG: 100 CAPSULE ORAL at 08:30

## 2021-02-21 RX ADMIN — LATANOPROST 1 DROP: 50 SOLUTION OPHTHALMIC at 22:16

## 2021-02-21 RX ADMIN — DULOXETINE HYDROCHLORIDE 40 MG: 20 CAPSULE, DELAYED RELEASE ORAL at 08:29

## 2021-02-21 RX ADMIN — HYDROXYCHLOROQUINE SULFATE 200 MG: 200 TABLET, FILM COATED ORAL at 08:30

## 2021-02-21 RX ADMIN — BRIMONIDINE TARTRATE 1 DROP: 1.5 SOLUTION OPHTHALMIC at 20:41

## 2021-02-21 RX ADMIN — HYDRALAZINE HYDROCHLORIDE 25 MG: 25 TABLET ORAL at 13:14

## 2021-02-21 RX ADMIN — SIMVASTATIN 10 MG: 10 TABLET, FILM COATED ORAL at 22:17

## 2021-02-21 RX ADMIN — CYANOCOBALAMIN TAB 1000 MCG 1000 MCG: 1000 TAB at 08:30

## 2021-02-21 RX ADMIN — BRIMONIDINE TARTRATE 1 DROP: 1.5 SOLUTION OPHTHALMIC at 08:30

## 2021-02-21 RX ADMIN — HYDRALAZINE HYDROCHLORIDE 25 MG: 25 TABLET ORAL at 08:30

## 2021-02-21 RX ADMIN — ACETAMINOPHEN 650 MG: 325 TABLET, FILM COATED ORAL at 13:17

## 2021-02-21 RX ADMIN — FERROUS GLUCONATE 324 MG: 324 TABLET ORAL at 18:17

## 2021-02-21 RX ADMIN — GABAPENTIN 100 MG: 100 CAPSULE ORAL at 13:14

## 2021-02-21 RX ADMIN — FOLIC ACID 1 MG: 1 TABLET ORAL at 08:30

## 2021-02-21 RX ADMIN — ENALAPRIL MALEATE 20 MG: 20 TABLET ORAL at 08:30

## 2021-02-21 RX ADMIN — ALPRAZOLAM 0.5 MG: 0.5 TABLET ORAL at 22:17

## 2021-02-21 RX ADMIN — FERROUS GLUCONATE 324 MG: 324 TABLET ORAL at 08:30

## 2021-02-21 RX ADMIN — AMLODIPINE BESYLATE 5 MG: 5 TABLET ORAL at 08:30

## 2021-02-21 RX ADMIN — ENALAPRIL MALEATE 20 MG: 20 TABLET ORAL at 20:41

## 2021-02-21 RX ADMIN — DORZOLAMIDE HYDROCHLORIDE 1 DROP: 20 SOLUTION/ DROPS OPHTHALMIC at 20:41

## 2021-02-21 RX ADMIN — GABAPENTIN 100 MG: 100 CAPSULE ORAL at 20:41

## 2021-02-21 RX ADMIN — HYDRALAZINE HYDROCHLORIDE 25 MG: 25 TABLET ORAL at 20:41

## 2021-02-21 RX ADMIN — OLANZAPINE 5 MG: 5 TABLET, FILM COATED ORAL at 22:17

## 2021-02-21 RX ADMIN — HYDROXYCHLOROQUINE SULFATE 200 MG: 200 TABLET, FILM COATED ORAL at 20:41

## 2021-02-21 ASSESSMENT — ACTIVITIES OF DAILY LIVING (ADL)
HYGIENE/GROOMING: HANDWASHING;INDEPENDENT
ORAL_HYGIENE: INDEPENDENT
DRESS: INDEPENDENT
ORAL_HYGIENE: INDEPENDENT
DRESS: SCRUBS (BEHAVIORAL HEALTH);INDEPENDENT
HYGIENE/GROOMING: INDEPENDENT
LAUNDRY: UNABLE TO COMPLETE
LAUNDRY: UNABLE TO COMPLETE

## 2021-02-21 NOTE — PROGRESS NOTES
02/20/21 2200   Groups   Details    (Psychotherapy)   Number of patients attending the group:  6  Group Length:  1 Hours     Group Therapy Type: Psychotherapy-Animal wisdom- strengths and self esteem     Summary of Group / Topics Discussed:      The  Psychotherapy group goal is to promote insight to positive choice and change. Group processing is within a supportive and safe environment. Patients will process emotions using verbal group and expressive psychotherapy interventions including visual art/writing interventions.     Group interventions support patients by: identifying strengths     Modalities to reach these goals include:Strength based positive psychology and creative expression      Subjective -mood slept a lot, bored today     Objective/ Intervention- Goal of group and Therapeutic modality utilized- animal connections/ strength based activity     Group Response- very engaged     Patient Response- Pt was pleasant, cooperative, social and engaged. She chose a dog as her animal connection.  She said they are cuddly even though she has never owned a dog, parents wouldn't let them she said. She spoke about her neighbor being kind and bringing banana bread. She said it was nice but she just wants someone to sit down and talk to her and she is tired of covid.    Beny Contreras, RAEFT, ATR-BC

## 2021-02-21 NOTE — PLAN OF CARE
"  Problem: Suicidal Behavior  Goal: Suicidal Behavior is Absent or Managed  Outcome: Completed  Note: Pt denies suicidal thoughts or thoughts of wishing to be dead. Pt has plans to work on her grief issues and is looking forward to individual therapy on the unit. Pt has been attending groups and is active with her care plan.     Problem: Behavioral Health Plan of Care  Goal: Develops/Participates in Therapeutic [place] to Support Successful Transition  Intervention: Foster Therapeutic [place]  Recent Flowsheet Documentation  Taken 2/21/2021 1300 by Cortney Gray RN  Trust Relationship/Rapport:    emotional support provided    thoughts/feelings acknowledged   Pt has a negative viewpoint on herself and her situation and more irritable this morning. Pt appears slightly restless with not having as many groups today. Pt expresses frustration as to why an SIO dementia pt is receiving so much attention, stating \"she doesn't look so impaired\". Offered pt alternative activities to keep her busy. Charting reports pt has a hx of being too dependent on others. Pt has a dry sense of humor and may reflect in her negative self talk. Pt is coloring in lounge, commenting it doesn't look good, breakfast tasted awful, [employer] movie was bad.     Fluids 1380 ml, unclear if pt is exaggerating her fluid intake due to the large amount. Pt is seen drinking frequent fluids and is most likely drinking adequate amounts. Recommend discontinuing fluid monitoring as it agitates her.     PRN 1317 Tylenol 650 mg po pain bilateral upper thighs, not helpful, used warm blanket and bingo group to temporarily distract from group.     UPDATE  Eun Farley- reviewed BP- see parameters, see new order for ultra sound regarding leg pains and hx of blood clots.   "

## 2021-02-21 NOTE — PLAN OF CARE
"  Problem: Behavioral Health Plan of Care  Goal: Plan of Care Review  Outcome: Improving  Pt endorsed anxiety and rated at 7/10 (10 being the highest)  at the beginning of the shift. Stated anxiety was high due to her having a shower. States showers makes her nervous. As the day went by pt reported improved anxiety. Attended group, socialized with staff and select peers. Before bed time pt said anxiety was down to 5/10, denies any depression, hallucinations, SI/SIB. Pt continue to make negative statements, stated \" I will never get better to leave this place\". Writer provided encouragement and active listening.     Gait steady with walker, noted walking a couple of times without walker and gait was slow but steady.      C/O generalized pain, declined any PRN for pain.    Pt had a total of 1200 ml of fluids, 100% of meal, medication compliant.     "

## 2021-02-21 NOTE — PROGRESS NOTES
Patient visible in the lounge at 0015.  Approached by this writer, client declined sleep medication.  Sat quietly in the lounge, and drinking cranberry juice. Returned to her room at 0030.   Patient observed sleeping at 0045.  Patient slept for 7 hrs.  Total intake of approximately 80 mls.

## 2021-02-21 NOTE — PROGRESS NOTES
Brief Medicine Note   21 February 2021       Contacted by pt's RN to review blood pressure trends.  BPs elevated last 24 hr, systolic up to 170s yesterday and today but appears this was before scheduled antihypertensives were given.  After meds, BPs 150s/60-70s.  Likely component of anxiety contributory, based on chart review and pt expressing anxiety and gets agitated with reminders to drink fluids.  Pain may be contributing as well; pt has been reporting bilateral thigh pain ongoing since admission, intermittently abated with APAP and warm blankets.  Soft care mattress has not helped. She does have underlying SLE, which may be contributory as well.      I discussed with patient concern about the possibility of DVT/VTE as possible cause of her leg in light of lab work (electrolytes, magnesium, vitamin D level) being normal.  She is a little uncertain about whether or not she would like to have an ultrasound as she has had multiple falls recently that resulted in a scalp laceration with significant bleeding, and may not be a good candidate for anticoagulation.  I attempted to call her son Brent to discuss further but was unable to reach him.        Plan:   - Will not change any anti HTN medication doses today, please contact IM if SBPs consistently > 160, DBP > 90  - Consider US dopplers BLEs to evaluate for clot if worsening thigh pain   - Continue APAP PRN and warm blankets       Tangela Vuong, CNP, APRN  Internal Medicine [facility]  Pager [phone]

## 2021-02-22 PROCEDURE — 90853 GROUP PSYCHOTHERAPY: CPT

## 2021-02-22 PROCEDURE — 250N000013 HC RX MED GY IP 250 OP 250 PS 637: Performed by: NURSE PRACTITIONER

## 2021-02-22 PROCEDURE — G0177 OPPS/PHP; TRAIN & EDUC SERV: HCPCS

## 2021-02-22 PROCEDURE — 99233 SBSQ HOSP IP/OBS HIGH 50: CPT | Performed by: NURSE PRACTITIONER

## 2021-02-22 PROCEDURE — 124N000003 HC R&B MH SENIOR/ADOLESCENT

## 2021-02-22 PROCEDURE — 250N000013 HC RX MED GY IP 250 OP 250 PS 637: Performed by: EMERGENCY MEDICINE

## 2021-02-22 RX ORDER — DORZOLAMIDE HCL 20 MG/ML
1 SOLUTION/ DROPS OPHTHALMIC 2 TIMES DAILY
Qty: 10 ML | Refills: 0 | OUTPATIENT
Start: 2021-02-22

## 2021-02-22 RX ADMIN — FOLIC ACID 1 MG: 1 TABLET ORAL at 08:35

## 2021-02-22 RX ADMIN — LATANOPROST 1 DROP: 50 SOLUTION OPHTHALMIC at 21:56

## 2021-02-22 RX ADMIN — ALPRAZOLAM 0.5 MG: 0.5 TABLET ORAL at 21:56

## 2021-02-22 RX ADMIN — AMLODIPINE BESYLATE 5 MG: 5 TABLET ORAL at 08:36

## 2021-02-22 RX ADMIN — CYANOCOBALAMIN TAB 1000 MCG 1000 MCG: 1000 TAB at 08:34

## 2021-02-22 RX ADMIN — HYDRALAZINE HYDROCHLORIDE 25 MG: 25 TABLET ORAL at 08:35

## 2021-02-22 RX ADMIN — GABAPENTIN 100 MG: 100 CAPSULE ORAL at 15:08

## 2021-02-22 RX ADMIN — FERROUS GLUCONATE 324 MG: 324 TABLET ORAL at 18:07

## 2021-02-22 RX ADMIN — HYDROXYCHLOROQUINE SULFATE 200 MG: 200 TABLET, FILM COATED ORAL at 08:35

## 2021-02-22 RX ADMIN — SIMVASTATIN 10 MG: 10 TABLET, FILM COATED ORAL at 21:56

## 2021-02-22 RX ADMIN — ENALAPRIL MALEATE 20 MG: 20 TABLET ORAL at 08:35

## 2021-02-22 RX ADMIN — DORZOLAMIDE HYDROCHLORIDE 1 DROP: 20 SOLUTION/ DROPS OPHTHALMIC at 08:44

## 2021-02-22 RX ADMIN — DORZOLAMIDE HYDROCHLORIDE 1 DROP: 20 SOLUTION/ DROPS OPHTHALMIC at 20:02

## 2021-02-22 RX ADMIN — GABAPENTIN 100 MG: 100 CAPSULE ORAL at 20:02

## 2021-02-22 RX ADMIN — BRIMONIDINE TARTRATE 1 DROP: 1.5 SOLUTION OPHTHALMIC at 08:44

## 2021-02-22 RX ADMIN — ENALAPRIL MALEATE 20 MG: 20 TABLET ORAL at 20:02

## 2021-02-22 RX ADMIN — HYDRALAZINE HYDROCHLORIDE 25 MG: 25 TABLET ORAL at 20:02

## 2021-02-22 RX ADMIN — FERROUS GLUCONATE 324 MG: 324 TABLET ORAL at 08:35

## 2021-02-22 RX ADMIN — BRIMONIDINE TARTRATE 1 DROP: 1.5 SOLUTION OPHTHALMIC at 20:02

## 2021-02-22 RX ADMIN — HYDROXYCHLOROQUINE SULFATE 200 MG: 200 TABLET, FILM COATED ORAL at 20:02

## 2021-02-22 RX ADMIN — HYDRALAZINE HYDROCHLORIDE 25 MG: 25 TABLET ORAL at 15:08

## 2021-02-22 RX ADMIN — OLANZAPINE 5 MG: 5 TABLET, FILM COATED ORAL at 21:57

## 2021-02-22 RX ADMIN — DULOXETINE HYDROCHLORIDE 40 MG: 20 CAPSULE, DELAYED RELEASE ORAL at 08:35

## 2021-02-22 RX ADMIN — GABAPENTIN 100 MG: 100 CAPSULE ORAL at 08:41

## 2021-02-22 ASSESSMENT — ACTIVITIES OF DAILY LIVING (ADL)
LAUNDRY: UNABLE TO COMPLETE
HYGIENE/GROOMING: INDEPENDENT
ORAL_HYGIENE: INDEPENDENT
HYGIENE/GROOMING: INDEPENDENT
ORAL_HYGIENE: INDEPENDENT
DRESS: INDEPENDENT
LAUNDRY: UNABLE TO COMPLETE
DRESS: INDEPENDENT

## 2021-02-22 NOTE — PLAN OF CARE
Problem: General Rehab Plan of Care  Goal: Therapeutic Recreation/Music Therapy Goal (Art Therapy)  Description: The patient and/or their representative will achieve their patient-specific goals related to the plan of care.  The patient-specific goals include: emotional expression  Outcome: No Change     Art Therapy directive was to create a watercolor mandala using suggested mindfulness techniques and by creating a personal intention for the day and/or week. Goals of directive: mindfulness, emotional expression, emotional regulation, trauma containment, media exploration.  Pt was an active participant for the full duration of group (1 hour). Pt finished watercolor mandala and briefly shared with group. Pts intention for painting was: love. Pts mood was calm, pleasant participant.

## 2021-02-22 NOTE — PROGRESS NOTES
Brief Medicine Note:    Patient seen in follow up for lower extremity pain. Reports it as more of an ache today in bilateral thighs rather than pain. Currently has a warm blanket resting on her legs which is helpful and alleviating pain. Discussed option of lower extremity US to r/o DVT - she would like to hold off on this today and re-evaluate tomorrow. Informed patient to notify RN and internal medicine if any worsening pain or if she develops chest pain, shortness of breath.    Also, note patient's /63 this AM prior to BP medications. Will continue to follow to make adjustments as needed based on BP trends.     Plan:  - Medicine will continue to follow and check in with patient tomorrow re: US dopplers BLE  - Continue prn APAP and warm blankets; reassuring that these are improving symptoms  - Please notify IM if chest pain, shortness of breath, tachycardia or if worsening pain in lower extremities  - Please notify IM if SBPs consistently > 160 or DBP > 90    IM will continue to follow peripherally. Please reach out with any further questions or concerns.    Marleni Lester PA-C  Internal Medicine [facility]  [facility]  Pager: [phone]

## 2021-02-22 NOTE — PROGRESS NOTES
[facility], [place]   Psychiatric Progress Note        Interim History:   From H&P: Per ED note: Swathi Dukes is a 85 year old female with history of lupus, hypertension, hyperlipidemia, aortic stenosis and report of longstanding psychiatric issues who presents with depression and passive suicidal ideation over the past month.  Patient was living independently previously, but suffered multiple falls and was transitioned to assisted living on a temporary basis for patient to recover.  Son states this decision was made in order to keep her safe, patient has been very unhappy about this choice given the financial cost, loss of independence, and the fact that she feels it is unnecessary.  Son states that they our staff is with [facility], which has aging in place programs and plan is to return patient back to her home once she is recovered better.  Son states that patient has had PT/OT evaluation and that they felt she could benefit from 24/7 care, patient is not ready for this.  Patient wants to return to independent living and her mood has been worsening over the past month.  She could barely take a shower because she was shaking so bad and her mind isn't working. She states she is feeling anxious, sad and frustrated, isn't functioning.  Son states that patient has had mental health since 1960s and required psychiatric hospitalizations, no psychiatric hospitalizations in past 10 years.      2/18/2021 Spoke with patient's son Brent [phone].  States the patient has a lifelong history of depression and anxiety.  She is originally from [place] however, moved to [place] when her sons went to college there.  Remained in [place] for about 20 years.  The patient has had ECT treatments about 50 years ago.  She was hospitalized multiple times including in [facility] where she was started on Valium, which she has taken for 20 to 30 years.  When in [place], the patient was taking  "Haldol and Parnate for about 10 years.  Believes that the Haldol has caused cognitive issues.  The patient has had major crisis in her life such as his twn brother dying at age 39 of Parkinson's disease and her  passing away 2 years prior, from Parkinson's as well.  The patient has been having frequent UTIs.  When living in [place], she was in and out of the [facility] care.  She has been dependent on her boyfriend who \"made a lot of the decisions for her\".  About a year and a half ago, the boyfriend felt that he can no longer take care of her since she has been having more and more problems.  Since then she has been living in an assisted living, which is very restrictive in terms of the COVID-19 pandemic.  She has been allowed 1 visit and 1 group activity a week.  The patient has been falling frequently.  She has expressed suicidal ideation on several occasion.  Reports that about a month ago the patient was \"fine\".  Money are not a problem, however, the patient does not want to pay for the AL.  She has her own apartment.    Team meeting report: The patient's care was discussed with the treatment team during the daily team meeting and/or staff's chart notes were reviewed.  Staff report patient has been calm, pleasant, cooperative. Patient is attending groups and participating appropriately. Patient is eating well and taking medications as prescribed.  She was visible in the milieu, social with peers.   Reports improvement since admission.  Slept all night.    Met with patient.  She is in bed napping after breakfast.  States that the weekend was good.  She got a phone call from her daughter-in-law which sent her into a panic attack.  Unable to explain why the phone call was anxiety provoking.  Depression is \"so far so good\".  Denies anxiety.  Denies suicidal ideation.  She is sleeping and eating well.  Encourage patients to stay out of her room as much as possible.  No questions or concerns.         Medications: "       ALPRAZolam  0.5 mg Oral At Bedtime     amLODIPine  5 mg Oral Daily     brimonidine  1 drop Both Eyes BID     cyanocobalamin  1,000 mcg Oral Daily     dorzolamide  1 drop Both Eyes BID     DULoxetine  40 mg Oral Daily     enalapril  20 mg Oral BID     ferrous gluconate  324 mg Oral BID w/meals     folic acid  1 mg Oral Daily     gabapentin  100 mg Oral TID     hydrALAZINE  25 mg Oral TID     hydroxychloroquine  200 mg Oral BID     latanoprost  1 drop Both Eyes At Bedtime     OLANZapine  5 mg Oral At Bedtime     PARoxetine  5 mg Oral At Bedtime     simvastatin  10 mg Oral At Bedtime          Allergies:     Allergies   Allergen Reactions     Diclofenac Anaphylaxis     Iodine Anaphylaxis     Contrast  Pt states anaphylactic reaction to ct contrast about 20 years ago     Aspirin           Labs:     Recent Results (from the past 672 hour(s))   *UA reflex to Microscopic and Culture ([place] [facility] (except [place] and [place])    Collection Time: 01/27/21  6:22 PM    Specimen: Midstream Urine   Result Value Ref Range    Color Urine Yellow     Appearance Urine Clear     Glucose Urine Negative NEG^Negative mg/dL    Bilirubin Urine Negative NEG^Negative    Ketones Urine Negative NEG^Negative mg/dL    Specific Gravity Urine <=1.005 1.003 - 1.035    Blood Urine Negative NEG^Negative    pH Urine 6.0 5.0 - 7.0 pH    Protein Albumin Urine Negative NEG^Negative mg/dL    Urobilinogen Urine 0.2 0.2 - 1.0 EU/dL    Nitrite Urine Negative NEG^Negative    Leukocyte Esterase Urine Negative NEG^Negative    Source Midstream Urine    **Basic metabolic panel FUTURE anytime    Collection Time: 01/27/21  6:23 PM   Result Value Ref Range    Sodium 140 133 - 144 mmol/L    Potassium 4.3 3.4 - 5.3 mmol/L    Chloride 104 94 - 109 mmol/L    Carbon Dioxide 27 20 - 32 mmol/L    Anion Gap 9 3 - 14 mmol/L    Glucose 122 (H) 70 - 99 mg/dL    Urea Nitrogen 13 7 - 30 mg/dL    Creatinine 0.90 0.52 - 1.04 mg/dL    GFR Estimate 58 (L) >60  mL/min/[1.73_m2]    GFR Estimate If Black 67 >60 mL/min/[1.73_m2]    Calcium 9.6 8.5 - 10.1 mg/dL   **CBC with platelets FUTURE anytime    Collection Time: 01/27/21  6:23 PM   Result Value Ref Range    WBC 6.5 4.0 - 11.0 10e9/L    RBC Count 2.55 (L) 3.8 - 5.2 10e12/L    Hemoglobin 8.2 (L) 11.7 - 15.7 g/dL    Hematocrit 26.9 (L) 35.0 - 47.0 %     (H) 78 - 100 fl    MCH 32.2 26.5 - 33.0 pg    MCHC 30.5 (L) 31.5 - 36.5 g/dL    RDW 14.9 10.0 - 15.0 %    Platelet Count 386 150 - 450 10e9/L   CBC with platelets differential    Collection Time: 02/16/21 11:36 AM   Result Value Ref Range    WBC 6.6 4.0 - 11.0 10e9/L    RBC Count 3.07 (L) 3.8 - 5.2 10e12/L    Hemoglobin 9.8 (L) 11.7 - 15.7 g/dL    Hematocrit 30.3 (L) 35.0 - 47.0 %    MCV 99 78 - 100 fl    MCH 31.9 26.5 - 33.0 pg    MCHC 32.3 31.5 - 36.5 g/dL    RDW 14.6 10.0 - 15.0 %    Platelet Count 351 150 - 450 10e9/L    Diff Method Automated Method     % Neutrophils 81.1 %    % Lymphocytes 10.7 %    % Monocytes 6.9 %    % Eosinophils 0.5 %    % Basophils 0.5 %    % Immature Granulocytes 0.3 %    Nucleated RBCs 0 0 /100    Absolute Neutrophil 5.3 1.6 - 8.3 10e9/L    Absolute Lymphocytes 0.7 (L) 0.8 - 5.3 10e9/L    Absolute Monocytes 0.5 0.0 - 1.3 10e9/L    Absolute Eosinophils 0.0 0.0 - 0.7 10e9/L    Absolute Basophils 0.0 0.0 - 0.2 10e9/L    Abs Immature Granulocytes 0.0 0 - 0.4 10e9/L    Absolute Nucleated RBC 0.0    Comprehensive metabolic panel    Collection Time: 02/16/21 11:36 AM   Result Value Ref Range    Sodium 138 133 - 144 mmol/L    Potassium 4.3 3.4 - 5.3 mmol/L    Chloride 106 94 - 109 mmol/L    Carbon Dioxide 26 20 - 32 mmol/L    Anion Gap 6 3 - 14 mmol/L    Glucose 101 (H) 70 - 99 mg/dL    Urea Nitrogen 15 7 - 30 mg/dL    Creatinine 0.78 0.52 - 1.04 mg/dL    GFR Estimate 70 >60 mL/min/[1.73_m2]    GFR Estimate If Black 81 >60 mL/min/[1.73_m2]    Calcium 9.5 8.5 - 10.1 mg/dL    Bilirubin Total 0.2 0.2 - 1.3 mg/dL    Albumin 3.8 3.4 - 5.0 g/dL     Protein Total 7.5 6.8 - 8.8 g/dL    Alkaline Phosphatase 98 40 - 150 U/L    ALT 15 0 - 50 U/L    AST 15 0 - 45 U/L   Asymptomatic SARS-CoV-2 COVID-19 Virus (Coronavirus) by PCR    Collection Time: 02/16/21 11:36 AM    Specimen: Nasopharyngeal   Result Value Ref Range    SARS-CoV-2 Virus Specimen Source Nasopharyngeal     SARS-CoV-2 PCR Result NEGATIVE     SARS-CoV-2 PCR Comment       Testing was performed using the oneDrumert Xpress SARS-CoV-2 Assay on the Cepheid Gene-Xpert   Instrument Systems. Additional information about this Emergency Use Authorization (EUA)   assay can be found via the Lab Guide.     Drug abuse screen 6 urine (chem dep)    Collection Time: 02/16/21  6:53 PM   Result Value Ref Range    Amphetamine Qual Urine Negative NEG^Negative    Barbiturates Qual Urine Negative NEG^Negative    Benzodiazepine Qual Urine Positive (A) NEG^Negative    Cannabinoids Qual Urine Negative NEG^Negative    Cocaine Qual Urine Negative NEG^Negative    Ethanol Qual Urine Negative NEG^Negative    Opiates Qualitative Urine Negative NEG^Negative   UA reflex to Microscopic and Culture    Collection Time: 02/17/21  1:04 AM    Specimen: Urine clean catch; Unspecified Urine   Result Value Ref Range    Color Urine Light Yellow     Appearance Urine Clear     Glucose Urine Negative NEG^Negative mg/dL    Bilirubin Urine Negative NEG^Negative    Ketones Urine Negative NEG^Negative mg/dL    Specific Gravity Urine 1.010 1.003 - 1.035    Blood Urine Negative NEG^Negative    pH Urine 5.0 5.0 - 7.0 pH    Protein Albumin Urine Negative NEG^Negative mg/dL    Urobilinogen mg/dL Normal 0.0 - 2.0 mg/dL    Nitrite Urine Negative NEG^Negative    Leukocyte Esterase Urine Moderate (A) NEG^Negative    Source Unspecified Urine     RBC Urine 1 0 - 2 /HPF    WBC Urine 12 (H) 0 - 5 /HPF    Mucous Urine Present (A) NEG^Negative /LPF    Hyaline Casts 1 0 - 2 /LPF   Urine Culture Aerobic Bacterial    Collection Time: 02/17/21  1:04 AM    Specimen: Unspecified  Urine   Result Value Ref Range    Specimen Description Unspecified Urine     Special Requests Specimen received in preservative     Culture Micro (A)      10,000 to 50,000 colonies/mL  Enterococcus faecalis         Susceptibility    Enterococcus faecalis - ELIF     AMPICILLIN <=2 Sensitive ug/mL     NITROFURANTOIN <=16 Sensitive ug/mL     PENICILLIN 4 Sensitive ug/mL     VANCOMYCIN 1 Sensitive ug/mL   EKG 12-lead, tracing only    Collection Time: 02/17/21 12:01 PM   Result Value Ref Range    Interpretation ECG Click View Image link to view waveform and result    TSH with free T4 reflex and/or T3 as indicated    Collection Time: 02/18/21  7:36 AM   Result Value Ref Range    TSH 2.07 0.40 - 4.00 mU/L   Vitamin B12    Collection Time: 02/18/21  7:36 AM   Result Value Ref Range    Vitamin B12 661 193 - 986 pg/mL   Folate    Collection Time: 02/18/21  7:36 AM   Result Value Ref Range    Folate 18.8 >5.4 ng/mL   Vitamin D    Collection Time: 02/18/21  7:36 AM   Result Value Ref Range    Vitamin D Deficiency screening 36 20 - 75 ug/L   Magnesium    Collection Time: 02/18/21  7:36 AM   Result Value Ref Range    Magnesium 2.2 1.6 - 2.3 mg/dL            Psychiatric Examination:   Temp: 97.7  F (36.5  C) Temp src: Temporal BP: 137/65 Pulse: 60   Resp: 16 SpO2: 98 % O2 Device: None (Room air)    Weight is 139 lbs 8 oz  Body mass index is 24.32 kg/m .    Appearance: well groomed, awake, alert, cooperative, no apparent distress and normal weight  Attitude:  cooperative  Eye Contact:  good  Mood:  anxious and depressed  Affect:  intensity is blunted and intensity is flat  Speech:  dysarthria  Psychomotor Behavior:  no evidence of tardive dyskinesia, dystonia, or tics  Throught Process:  logical and goal oriented  Associations:  no loose associations  Thought Content:  no evidence of suicidal ideation or homicidal ideation  Insight:  good  Judgement:  intact  Oriented to:  time, person, and place  Attention Span and Concentration:   intact  Recent and Remote Memory:  fair         Precautions:     Behavioral Orders   Procedures     Code 1 - Restrict to Unit     Code 2     Discontinue 1:1 attendant for suicide risk     Order Specific Question:   I have performed an in person assessment of the patient     Answer:   Based on this assessment the patient no longer requires a one on one attendant at this point in time.     Order Specific Question:   Rationale     Answer:   Patient States able to remain safe in hospital     Fall precautions     Routine Programming     As clinically indicated     Status 15     Every 15 minutes.          DIagnoses:   1.  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  Generalized anxiety disorder  3.  OCD, per history  4.  Medical problems include: Recent UTI, history of DVT, glaucoma, aortic valve insufficiency, lung nodules, pericardial effusion, chronic pain, chronic diarrhea, among others.         Plan:   The patient is a very pleasant,  female who was admitted with severe depression, anxiety, suicidal thoughts.  The following medication changes will take place:   --Continue Xanax 0.5 mg at bedtime.    --Discontinue Paxil to 5 mg at bedtime.    --Increase Cymbalta to 40 mg every morning.    --Decrease Zyprexa to 5 mg at bedtime.    --Start Gabapentin 100 mg 3 times a day.      --Blood work was reviewed.  Additional orders will include vitamin D, B12, folate, TSH with T4.  UA/UC.  EKG.    --Internal medicine follow-up for medical problems.     --The patient was consulted on nature of illness and treatment options.   --Care was coordinated with the treatment team.  --PT  --I&O       Disposition Plan   Reason for ongoing admission: is unable to care for self due to depression, anxiety, SI.   Disposition: TBD  Estimated length of stay: 5-7 days  Legal Status:  voluntary  Discharge will be granted once symptoms improved.    Gloria URIARTE CNP  Date: 02/22/21  Time: 3:29  PM          This note was created with the help of Dragon dictation system. All grammatical/typing errors or context distortion are unintentional and inherent to software.    More than 30 minutes were spent for assessment, documentation, and coordination of care.

## 2021-02-22 NOTE — PLAN OF CARE
Work Completed: Reviewed chart. Met with team to discuss pt progress. Met with pt 1:1 for therapy for 30 minutes.    Discharge plan or goal: Plan is for pt to return to facility upon discharge.                Barriers to discharge: Symptom management

## 2021-02-22 NOTE — PLAN OF CARE
"  Problem: OT General Care Plan  Goal: OT Goal 1  Description: Will attend OT groups and set goal focus.      Note: Attended 2 of 2 OT groups.  On approach she stated disinterest in attending, with wanting to \"just be alone \".  She then appeared and participated in a goal oriented task group for 50 minutes with 5 patients, on a familiar 2 step task.  She made decisions and followed through with her plans.  She was pleasant with appear on approach in conversation.  Affect appeared flat.  She also participated in a group with 4 patients for 45 minutes focused on identifying helpful coping strategies.  She stated the interest in working with \"therapist on a one-to-one, this is what I need \".  She identified a neighbor friend as being a strong support for her, who has wisdom and seems to appreciate sharing it.     "

## 2021-02-22 NOTE — PLAN OF CARE
"  Problem: Mood Impairment (Anxiety Signs/Symptoms)  Goal: Improved Mood Symptoms (Anxiety Signs/Symptoms)  Outcome: Improving  Pt overall pleasant and corporative. Affect flat/blunted, mood calm. Visible in milieu, social with staff and select peers. Pt endorsed anxiety as low at the beginning  of shift and stated  she was having a good day. Later in the evening pt stated anxiety was up due to talking with daughter in law. Stated \"I feel like she is trying to give me motherly advice, I don't need some one else to tell me what to do, I already have professionals here\". Then again stated \"she did not say anything bad though, I think she means well\". Pt said her anxiety after talking to daughter in law was about 9/10 (10 being the highest). After the call she went for [facility] groups and says it helped to bring down the anxiety level to about a 5/10.  Pt ate 100% of super, drank 720 ml, medication compliant. Ultrasound order was discontinued.       "

## 2021-02-22 NOTE — PLAN OF CARE
"Pt reports she slept well last night and her mood is \"good\". Pt denies leg pain. +1 pitting edema in right leg, nonpitting in left leg, no redness observed; fluids, activities and elevating LE encouraged. Pt declines to drink water, chose to drink cranberry juice. Elevated AM BP, meds given, will recheck VS, VS recheck WDL.    Affect flat, mood calm. Pt verbalizes she feels \"irritable, like I want to tell someone off\"  "

## 2021-02-23 PROCEDURE — 250N000013 HC RX MED GY IP 250 OP 250 PS 637: Performed by: NURSE PRACTITIONER

## 2021-02-23 PROCEDURE — H2032 ACTIVITY THERAPY, PER 15 MIN: HCPCS

## 2021-02-23 PROCEDURE — 99233 SBSQ HOSP IP/OBS HIGH 50: CPT | Performed by: NURSE PRACTITIONER

## 2021-02-23 PROCEDURE — 250N000013 HC RX MED GY IP 250 OP 250 PS 637: Performed by: EMERGENCY MEDICINE

## 2021-02-23 PROCEDURE — 124N000003 HC R&B MH SENIOR/ADOLESCENT

## 2021-02-23 RX ORDER — DULOXETIN HYDROCHLORIDE 60 MG/1
60 CAPSULE, DELAYED RELEASE ORAL DAILY
Status: DISCONTINUED | OUTPATIENT
Start: 2021-02-24 | End: 2021-03-10

## 2021-02-23 RX ADMIN — DORZOLAMIDE HYDROCHLORIDE 1 DROP: 20 SOLUTION/ DROPS OPHTHALMIC at 08:32

## 2021-02-23 RX ADMIN — FERROUS GLUCONATE 324 MG: 324 TABLET ORAL at 17:29

## 2021-02-23 RX ADMIN — AMLODIPINE BESYLATE 5 MG: 5 TABLET ORAL at 08:31

## 2021-02-23 RX ADMIN — OLANZAPINE 5 MG: 5 TABLET, FILM COATED ORAL at 21:52

## 2021-02-23 RX ADMIN — DULOXETINE HYDROCHLORIDE 40 MG: 20 CAPSULE, DELAYED RELEASE ORAL at 08:31

## 2021-02-23 RX ADMIN — SIMVASTATIN 10 MG: 10 TABLET, FILM COATED ORAL at 21:52

## 2021-02-23 RX ADMIN — FOLIC ACID 1 MG: 1 TABLET ORAL at 08:31

## 2021-02-23 RX ADMIN — GABAPENTIN 100 MG: 100 CAPSULE ORAL at 20:09

## 2021-02-23 RX ADMIN — HYDROXYCHLOROQUINE SULFATE 200 MG: 200 TABLET, FILM COATED ORAL at 20:09

## 2021-02-23 RX ADMIN — BRIMONIDINE TARTRATE 1 DROP: 1.5 SOLUTION OPHTHALMIC at 08:32

## 2021-02-23 RX ADMIN — HYDRALAZINE HYDROCHLORIDE 25 MG: 25 TABLET ORAL at 13:19

## 2021-02-23 RX ADMIN — GABAPENTIN 100 MG: 100 CAPSULE ORAL at 13:19

## 2021-02-23 RX ADMIN — LATANOPROST 1 DROP: 50 SOLUTION OPHTHALMIC at 21:53

## 2021-02-23 RX ADMIN — ALPRAZOLAM 0.5 MG: 0.5 TABLET ORAL at 21:52

## 2021-02-23 RX ADMIN — CYANOCOBALAMIN TAB 1000 MCG 1000 MCG: 1000 TAB at 08:31

## 2021-02-23 RX ADMIN — BRIMONIDINE TARTRATE 1 DROP: 1.5 SOLUTION OPHTHALMIC at 20:14

## 2021-02-23 RX ADMIN — ENALAPRIL MALEATE 20 MG: 20 TABLET ORAL at 08:30

## 2021-02-23 RX ADMIN — HYDRALAZINE HYDROCHLORIDE 25 MG: 25 TABLET ORAL at 20:09

## 2021-02-23 RX ADMIN — GABAPENTIN 100 MG: 100 CAPSULE ORAL at 08:31

## 2021-02-23 RX ADMIN — LOPERAMIDE HYDROCHLORIDE 2 MG: 2 CAPSULE ORAL at 12:37

## 2021-02-23 RX ADMIN — FERROUS GLUCONATE 324 MG: 324 TABLET ORAL at 08:31

## 2021-02-23 RX ADMIN — HYDROXYCHLOROQUINE SULFATE 200 MG: 200 TABLET, FILM COATED ORAL at 08:31

## 2021-02-23 RX ADMIN — ENALAPRIL MALEATE 20 MG: 20 TABLET ORAL at 20:09

## 2021-02-23 RX ADMIN — DORZOLAMIDE HYDROCHLORIDE 1 DROP: 20 SOLUTION/ DROPS OPHTHALMIC at 20:14

## 2021-02-23 RX ADMIN — HYDRALAZINE HYDROCHLORIDE 25 MG: 25 TABLET ORAL at 08:31

## 2021-02-23 ASSESSMENT — ACTIVITIES OF DAILY LIVING (ADL)
LAUNDRY: UNABLE TO COMPLETE
DRESS: SCRUBS (BEHAVIORAL HEALTH);INDEPENDENT
ORAL_HYGIENE: INDEPENDENT
HYGIENE/GROOMING: SHOWER;WITH SUPERVISION

## 2021-02-23 NOTE — PLAN OF CARE
"  Problem: Activity and Energy Impairment (Anxiety Signs/Symptoms)  Goal: Optimized Energy Level (Anxiety Signs/Symptoms)  Outcome: Improving  Pt was visible in the milieu, withdrawn, mood sad  at the beginning of the shift. Reported she was not having a good day and endorsed anxiety at 8/10 (10 being the highest). Stated she was hopeless and knew she will not get better. Pt also stated \"I don't know if I am receiving the right services or treatment I need, when I came here, I was expecting to be treated by a male psychiatrist and then to find out that it's a female and is a nurse practitioner\". Writer provided active listening with 1:1,  encouraged pt to discuss concerns with provider tomorrow and encouraged her to engage on coping skills. Pt did coloring, however sated it was horrible. Writer, complimented her work but pt said \"you are just flattering me. As the day went by with groups and socializing with staff and select peers mood improved and pt endorsed decreased anxiety.     Pt drank 840 ml of fluids, ate 100% of her meal.  Monitor: BP was on the higher side at around 2000: /68 prior to her antihypertensives. Rechecked at 2145 and was 136/75. IM was not notified. IM to be notified if consistently  SYS >160 and DYS >90.  Pt requested imodium stating she is supposed to get it every 3 days for C-Diff recommended by her outpatient doctor. Writer did not administer since pt states she has not had any diarrhea. Pt was also encouraged to discuss this with doctor tomorrow.  "

## 2021-02-23 NOTE — PROGRESS NOTES
Behavioral Health  Note    Behavioral Health  Spirituality Group Note    [address]    Name: Swathi Dukes YOB: 1935   MRN: [id number] Age: 85 year old      Patient attended -led group, which included discussion of spirituality, coping with illness and building resilience.    Patient attended group for 1 hrs.    The patient actively participated in group discussion and patient demonstrated an appreciation of topic's application for their personal circumstances. As one of Swathi's regrets, she listed living with so much worry and not enough freedom.  As her caden, she listed her boys.    Topic: regret and satsifaction      Taya Oliveros  Chaplain Resident  Pager: [phone]

## 2021-02-23 NOTE — PROGRESS NOTES
Brief Medicine Note:    Follow up with patient regarding lower extremity pain. Reports it has improved and would like to hold off on performing US dopplers. BP appears to be stable with AM readings higher and improving with BP medications.    BP (!) 156/55   Pulse 61   Temp 97.7  F (36.5  C) (Temporal)   Resp 16   Wt 64.6 kg (142 lb 8 oz)   SpO2 98%   BMI 24.85 kg/m      Medicine will sign off at this time. Please reach out with any further questions or concerns.    Plan:  - Continue prn APAP and warm blankets; reassuring that these are improving symptoms  - Please notify IM if chest pain, shortness of breath, tachycardia or if worsening pain in lower extremities  - Please notify IM if SBPs consistently > 160 or DBP > 90    Marleni Lester PA-C  Internal Medicine [facility] Hospitalist  [facility]  Pager: [phone]

## 2021-02-23 NOTE — PLAN OF CARE
"  Problem: OT General Care Plan  Goal: OT Goal 1  Description: Will attend OT groups and set goal focus.      Note: Attended 2 of 2 Ot groups. She participated in a goal oriented task group for 60 minutes with 5 pts. She worked at a steady pace on a familiar 2 step task, made decisions, did not always ask for assistance when needed but instead tried to problem solve on her own. Affect appeared flat. She offered no complaints. Attended the afternoon group for 55 minutes with 5 patients on the topic of  identifying strengths, life experiences and values related to working through and managing stress.  She talked about the importance of her neighbors support and how she walks in the halls at her AL and meet people to chat.  She gets together \"with my neighbor either at her place or mine and we talk for 2 hours \".  She identified positive self aspects and appreciates her ability to socialize.       "

## 2021-02-23 NOTE — PROGRESS NOTES
[facility], [place]   Psychiatric Progress Note        Interim History:   From H&P: Per ED note: Swathi Dukes is a 85 year old female with history of lupus, hypertension, hyperlipidemia, aortic stenosis and report of longstanding psychiatric issues who presents with depression and passive suicidal ideation over the past month.  Patient was living independently previously, but suffered multiple falls and was transitioned to assisted living on a temporary basis for patient to recover.  Son states this decision was made in order to keep her safe, patient has been very unhappy about this choice given the financial cost, loss of independence, and the fact that she feels it is unnecessary.  Son states that they our staff is with [facility], which has aging in place programs and plan is to return patient back to her home once she is recovered better.  Son states that patient has had PT/OT evaluation and that they felt she could benefit from 24/7 care, patient is not ready for this.  Patient wants to return to independent living and her mood has been worsening over the past month.  She could barely take a shower because she was shaking so bad and her mind isn't working. She states she is feeling anxious, sad and frustrated, isn't functioning.  Son states that patient has had mental health since 1960s and required psychiatric hospitalizations, no psychiatric hospitalizations in past 10 years.      2/18/2021 Spoke with patient's son Brent [phone].  States the patient has a lifelong history of depression and anxiety.  She is originally from [place] however, moved to [place] when her sons went to college there.  Remained in [place] for about 20 years.  The patient has had ECT treatments about 50 years ago.  She was hospitalized multiple times including in [facility] where she was started on Valium, which she has taken for 20 to 30 years.  When in [place], the patient was taking  "Haldol and Parnate for about 10 years.  Believes that the Haldol has caused cognitive issues.  The patient has had major crisis in her life such as his twn brother dying at age 39 of Parkinson's disease and her  passing away 2 years prior, from Parkinson's as well.  The patient has been having frequent UTIs.  When living in [place], she was in and out of the [facility] care.  She has been dependent on her boyfriend who \"made a lot of the decisions for her\".  About a year and a half ago, the boyfriend felt that he can no longer take care of her since she has been having more and more problems.  Since then she has been living in an assisted living, which is very restrictive in terms of the COVID-19 pandemic.  She has been allowed 1 visit and 1 group activity a week.  The patient has been falling frequently.  She has expressed suicidal ideation on several occasion.  Reports that about a month ago the patient was \"fine\".  Money are not a problem, however, the patient does not want to pay for the AL.  She has her own apartment.    Team meeting report: The patient's care was discussed with the treatment team during the daily team meeting and/or staff's chart notes were reviewed.  Staff report patient has been mostly calm, pleasant, cooperative.  Reported having a bad day.  She was isolated to her room in the morning and quite irritable.  Mood improved in the evening and she spent most of the time in the lounge.  She attended evening groups.  She feels hopeless.  Continues to make negative statements about her recovery.  She requested to have a male psychiatrist.  She is eating well.  Slept all night.    Met with patient.  She is in the lounge.  She feels better than yesterday.  Appears to be slightly confused.  Reports sleeping and eating well.  States she is not very depressed but is upset about not making a progress.  Asked the patient to elaborate since this statement does not make sense however, she was not able " "to explain it.  \"I am nuts\".  \"I am not thinking intelligently\".  \"I am thinking of something and my head finishes my thought\".  Again patient is not able to elaborate on these statements.  The patient was under the impression that at some point she will be seen by a male psychiatrist.  She prefers to see a male provider.         Medications:       ALPRAZolam  0.5 mg Oral At Bedtime     amLODIPine  5 mg Oral Daily     brimonidine  1 drop Both Eyes BID     cyanocobalamin  1,000 mcg Oral Daily     dorzolamide  1 drop Both Eyes BID     [START ON 2/24/2021] DULoxetine  60 mg Oral Daily     enalapril  20 mg Oral BID     ferrous gluconate  324 mg Oral BID w/meals     folic acid  1 mg Oral Daily     gabapentin  100 mg Oral TID     hydrALAZINE  25 mg Oral TID     hydroxychloroquine  200 mg Oral BID     latanoprost  1 drop Both Eyes At Bedtime     OLANZapine  5 mg Oral At Bedtime     simvastatin  10 mg Oral At Bedtime          Allergies:     Allergies   Allergen Reactions     Diclofenac Anaphylaxis     Iodine Anaphylaxis     Contrast  Pt states anaphylactic reaction to ct contrast about 20 years ago     Aspirin           Labs:     Recent Results (from the past 672 hour(s))   *UA reflex to Microscopic and Culture ([place] and [facility] (except [place] and [place])    Collection Time: 01/27/21  6:22 PM    Specimen: Midstream Urine   Result Value Ref Range    Color Urine Yellow     Appearance Urine Clear     Glucose Urine Negative NEG^Negative mg/dL    Bilirubin Urine Negative NEG^Negative    Ketones Urine Negative NEG^Negative mg/dL    Specific Gravity Urine <=1.005 1.003 - 1.035    Blood Urine Negative NEG^Negative    pH Urine 6.0 5.0 - 7.0 pH    Protein Albumin Urine Negative NEG^Negative mg/dL    Urobilinogen Urine 0.2 0.2 - 1.0 EU/dL    Nitrite Urine Negative NEG^Negative    Leukocyte Esterase Urine Negative NEG^Negative    Source Midstream Urine    **Basic metabolic panel FUTURE anytime    Collection Time: " 01/27/21  6:23 PM   Result Value Ref Range    Sodium 140 133 - 144 mmol/L    Potassium 4.3 3.4 - 5.3 mmol/L    Chloride 104 94 - 109 mmol/L    Carbon Dioxide 27 20 - 32 mmol/L    Anion Gap 9 3 - 14 mmol/L    Glucose 122 (H) 70 - 99 mg/dL    Urea Nitrogen 13 7 - 30 mg/dL    Creatinine 0.90 0.52 - 1.04 mg/dL    GFR Estimate 58 (L) >60 mL/min/[1.73_m2]    GFR Estimate If Black 67 >60 mL/min/[1.73_m2]    Calcium 9.6 8.5 - 10.1 mg/dL   **CBC with platelets FUTURE anytime    Collection Time: 01/27/21  6:23 PM   Result Value Ref Range    WBC 6.5 4.0 - 11.0 10e9/L    RBC Count 2.55 (L) 3.8 - 5.2 10e12/L    Hemoglobin 8.2 (L) 11.7 - 15.7 g/dL    Hematocrit 26.9 (L) 35.0 - 47.0 %     (H) 78 - 100 fl    MCH 32.2 26.5 - 33.0 pg    MCHC 30.5 (L) 31.5 - 36.5 g/dL    RDW 14.9 10.0 - 15.0 %    Platelet Count 386 150 - 450 10e9/L   CBC with platelets differential    Collection Time: 02/16/21 11:36 AM   Result Value Ref Range    WBC 6.6 4.0 - 11.0 10e9/L    RBC Count 3.07 (L) 3.8 - 5.2 10e12/L    Hemoglobin 9.8 (L) 11.7 - 15.7 g/dL    Hematocrit 30.3 (L) 35.0 - 47.0 %    MCV 99 78 - 100 fl    MCH 31.9 26.5 - 33.0 pg    MCHC 32.3 31.5 - 36.5 g/dL    RDW 14.6 10.0 - 15.0 %    Platelet Count 351 150 - 450 10e9/L    Diff Method Automated Method     % Neutrophils 81.1 %    % Lymphocytes 10.7 %    % Monocytes 6.9 %    % Eosinophils 0.5 %    % Basophils 0.5 %    % Immature Granulocytes 0.3 %    Nucleated RBCs 0 0 /100    Absolute Neutrophil 5.3 1.6 - 8.3 10e9/L    Absolute Lymphocytes 0.7 (L) 0.8 - 5.3 10e9/L    Absolute Monocytes 0.5 0.0 - 1.3 10e9/L    Absolute Eosinophils 0.0 0.0 - 0.7 10e9/L    Absolute Basophils 0.0 0.0 - 0.2 10e9/L    Abs Immature Granulocytes 0.0 0 - 0.4 10e9/L    Absolute Nucleated RBC 0.0    Comprehensive metabolic panel    Collection Time: 02/16/21 11:36 AM   Result Value Ref Range    Sodium 138 133 - 144 mmol/L    Potassium 4.3 3.4 - 5.3 mmol/L    Chloride 106 94 - 109 mmol/L    Carbon Dioxide 26 20 - 32  mmol/L    Anion Gap 6 3 - 14 mmol/L    Glucose 101 (H) 70 - 99 mg/dL    Urea Nitrogen 15 7 - 30 mg/dL    Creatinine 0.78 0.52 - 1.04 mg/dL    GFR Estimate 70 >60 mL/min/[1.73_m2]    GFR Estimate If Black 81 >60 mL/min/[1.73_m2]    Calcium 9.5 8.5 - 10.1 mg/dL    Bilirubin Total 0.2 0.2 - 1.3 mg/dL    Albumin 3.8 3.4 - 5.0 g/dL    Protein Total 7.5 6.8 - 8.8 g/dL    Alkaline Phosphatase 98 40 - 150 U/L    ALT 15 0 - 50 U/L    AST 15 0 - 45 U/L   Asymptomatic SARS-CoV-2 COVID-19 Virus (Coronavirus) by PCR    Collection Time: 02/16/21 11:36 AM    Specimen: Nasopharyngeal   Result Value Ref Range    SARS-CoV-2 Virus Specimen Source Nasopharyngeal     SARS-CoV-2 PCR Result NEGATIVE     SARS-CoV-2 PCR Comment       Testing was performed using the [employer] Xpress SARS-CoV-2 Assay on the Cepheid Gene-Xpert   Instrument Systems. Additional information about this Emergency Use Authorization (EUA)   assay can be found via the Lab Guide.     Drug abuse screen 6 urine (chem dep)    Collection Time: 02/16/21  6:53 PM   Result Value Ref Range    Amphetamine Qual Urine Negative NEG^Negative    Barbiturates Qual Urine Negative NEG^Negative    Benzodiazepine Qual Urine Positive (A) NEG^Negative    Cannabinoids Qual Urine Negative NEG^Negative    Cocaine Qual Urine Negative NEG^Negative    Ethanol Qual Urine Negative NEG^Negative    Opiates Qualitative Urine Negative NEG^Negative   UA reflex to Microscopic and Culture    Collection Time: 02/17/21  1:04 AM    Specimen: Urine clean catch; Unspecified Urine   Result Value Ref Range    Color Urine Light Yellow     Appearance Urine Clear     Glucose Urine Negative NEG^Negative mg/dL    Bilirubin Urine Negative NEG^Negative    Ketones Urine Negative NEG^Negative mg/dL    Specific Gravity Urine 1.010 1.003 - 1.035    Blood Urine Negative NEG^Negative    pH Urine 5.0 5.0 - 7.0 pH    Protein Albumin Urine Negative NEG^Negative mg/dL    Urobilinogen mg/dL Normal 0.0 - 2.0 mg/dL    Nitrite Urine  Negative NEG^Negative    Leukocyte Esterase Urine Moderate (A) NEG^Negative    Source Unspecified Urine     RBC Urine 1 0 - 2 /HPF    WBC Urine 12 (H) 0 - 5 /HPF    Mucous Urine Present (A) NEG^Negative /LPF    Hyaline Casts 1 0 - 2 /LPF   Urine Culture Aerobic Bacterial    Collection Time: 02/17/21  1:04 AM    Specimen: Unspecified Urine   Result Value Ref Range    Specimen Description Unspecified Urine     Special Requests Specimen received in preservative     Culture Micro (A)      10,000 to 50,000 colonies/mL  Enterococcus faecalis         Susceptibility    Enterococcus faecalis - ELIF     AMPICILLIN <=2 Sensitive ug/mL     NITROFURANTOIN <=16 Sensitive ug/mL     PENICILLIN 4 Sensitive ug/mL     VANCOMYCIN 1 Sensitive ug/mL   EKG 12-lead, tracing only    Collection Time: 02/17/21 12:01 PM   Result Value Ref Range    Interpretation ECG Click View Image link to view waveform and result    TSH with free T4 reflex and/or T3 as indicated    Collection Time: 02/18/21  7:36 AM   Result Value Ref Range    TSH 2.07 0.40 - 4.00 mU/L   Vitamin B12    Collection Time: 02/18/21  7:36 AM   Result Value Ref Range    Vitamin B12 661 193 - 986 pg/mL   Folate    Collection Time: 02/18/21  7:36 AM   Result Value Ref Range    Folate 18.8 >5.4 ng/mL   Vitamin D    Collection Time: 02/18/21  7:36 AM   Result Value Ref Range    Vitamin D Deficiency screening 36 20 - 75 ug/L   Magnesium    Collection Time: 02/18/21  7:36 AM   Result Value Ref Range    Magnesium 2.2 1.6 - 2.3 mg/dL            Psychiatric Examination:   Temp: 97.7  F (36.5  C) Temp src: Temporal BP: (!) 156/55 Pulse: 61   Resp: 16 SpO2: 98 % O2 Device: None (Room air)    Weight is 142 lbs 8 oz  Body mass index is 24.85 kg/m .    Appearance: well groomed, awake, alert, cooperative, no apparent distress and normal weight  Attitude:  cooperative  Eye Contact:  good  Mood:  anxious and depressed  Affect:  intensity is blunted and intensity is flat  Speech:   dysarthria  Psychomotor Behavior:  no evidence of tardive dyskinesia, dystonia, or tics  Throught Process:  logical and goal oriented  Associations:  no loose associations  Thought Content:  no evidence of suicidal ideation or homicidal ideation  Insight:  good  Judgement:  intact  Oriented to:  time, person, and place  Attention Span and Concentration:  intact  Recent and Remote Memory:  fair         Precautions:     Behavioral Orders   Procedures     Code 1 - Restrict to Unit     Discontinue 1:1 attendant for suicide risk     Order Specific Question:   I have performed an in person assessment of the patient     Answer:   Based on this assessment the patient no longer requires a one on one attendant at this point in time.     Order Specific Question:   Rationale     Answer:   Patient States able to remain safe in hospital     Fall precautions     Routine Programming     As clinically indicated     Status 15     Every 15 minutes.          DIagnoses:   1.  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  Generalized anxiety disorder  3.  OCD, per history  4.  Medical problems include: Recent UTI, history of DVT, glaucoma, aortic valve insufficiency, lung nodules, pericardial effusion, chronic pain, chronic diarrhea, among others.         Plan:   The patient is a very pleasant,  female who was admitted with severe depression, anxiety, suicidal thoughts.  The following medication changes will take place:   --Continue Xanax 0.5 mg at bedtime.    --Discontinue Paxil to 5 mg at bedtime.    --Cymbalta to 60 mg every morning.    --Zyprexa to 5 mg at bedtime.    --Gabapentin 100 mg 3 times a day.      --Blood work was reviewed.  Additional orders will include vitamin D, B12, folate, TSH with T4.  UA/UC.  EKG.    --Internal medicine follow-up for medical problems.     --The patient was consulted on nature of illness and treatment options.   --Care was coordinated with the treatment  team.  --PT  --I&O       Disposition Plan   Reason for ongoing admission: is unable to care for self due to depression, anxiety, SI.   Disposition: TBD  Estimated length of stay: 5-7 days  Legal Status:  voluntary  Discharge will be granted once symptoms improved.  Transfer care to Dr. Ricardo.    Gloria URIARTE CNP  Date: 02/23/21  Time: 2:07 PM    This note was created with the help of Dragon dictation system. All grammatical/typing errors or context distortion are unintentional and inherent to software.    More than 30 minutes were spent for assessment, documentation, and coordination of care.

## 2021-02-23 NOTE — PLAN OF CARE
Work Completed: Reviewed chart.    Discharge plan or goal: Discharge home when stable.                Barriers to discharge: Managing ongoing symptoms

## 2021-02-23 NOTE — PROGRESS NOTES
" 02/22/21 2200   Groups   Details    (Psychotherapy)   Number of patients attending the group:  6  Group Length:  1 Hours     Group Therapy Type: Psychotherapy     Summary of Group / Topics Discussed:      The  Psychotherapy group goal is to promote insight to positive choice and change. Group processing is within a supportive and safe environment. Patients will process emotions using verbal group and expressive psychotherapy interventions including visual art/writing interventions.     Group interventions support patients by: identifying strengths and identifying how they can help reach our recovery goals     Modalities to reach these goals include: -Positive Psychology- reaching goals with our strengths      Subjective -mood -skeptical- complaining about staff professionalism     Objective/ Intervention- Goal of group and Therapeutic modality utilized-Positive Psychology/ Strengths Based       Group Response- very engaged     Patient Response- Pt was pleasant, cooperative and engaged. Pt was able to identify 5 character strengths that will help her reach her mental health recovery goals. She brightened with the exercise and group conversation. She was not feeling very optimistic today. She is struggling with isolation of covid and \"boredom\". She does have many coping skills she reported including speaking to a neighbor for a couple hours a day, reading books etc. She talked about her OCD being a barrier for cooking.      Beny Contreras, PREM, ATR-BC       "

## 2021-02-23 NOTE — PLAN OF CARE
"  Problem: Mood Impairment (Anxiety Signs/Symptoms)  Goal: Improved Mood Symptoms (Anxiety Signs/Symptoms)  Outcome: No Change     Assessment: mood is labile, shifts from irritable to pleasant, blunted, irritated affect. Focused on males. Pleased she now has a older males psychiatrist, \"just the way I like them\". Asked male pt to help her pull the back of her sweatshirt down. Plus a variety of other comments about needing a male in her life.     Showering has been a source of anxiety since admission. Pt has been showering with staff standing in the room. Today, pt got in the shower quicker and took less time. Afterwards, pt remarked that she did not feel anxious about getting into the shower.     Pt's care is transferred to Dr Ricardo.   "

## 2021-02-24 ENCOUNTER — APPOINTMENT (OUTPATIENT)
Dept: PHYSICAL THERAPY | Facility: CLINIC | Age: 86
DRG: 885 | End: 2021-02-24
Payer: COMMERCIAL

## 2021-02-24 PROCEDURE — 97116 GAIT TRAINING THERAPY: CPT | Mod: GP | Performed by: PHYSICAL THERAPIST

## 2021-02-24 PROCEDURE — 250N000013 HC RX MED GY IP 250 OP 250 PS 637: Performed by: NURSE PRACTITIONER

## 2021-02-24 PROCEDURE — 97110 THERAPEUTIC EXERCISES: CPT | Mod: GP | Performed by: PHYSICAL THERAPIST

## 2021-02-24 PROCEDURE — G0177 OPPS/PHP; TRAIN & EDUC SERV: HCPCS

## 2021-02-24 PROCEDURE — 124N000003 HC R&B MH SENIOR/ADOLESCENT

## 2021-02-24 PROCEDURE — 250N000013 HC RX MED GY IP 250 OP 250 PS 637: Performed by: EMERGENCY MEDICINE

## 2021-02-24 PROCEDURE — 250N000013 HC RX MED GY IP 250 OP 250 PS 637: Performed by: PSYCHIATRY & NEUROLOGY

## 2021-02-24 RX ORDER — ALPRAZOLAM 0.25 MG
0.25 TABLET ORAL AT BEDTIME
Status: DISCONTINUED | OUTPATIENT
Start: 2021-02-24 | End: 2021-03-02

## 2021-02-24 RX ADMIN — HYDROXYCHLOROQUINE SULFATE 200 MG: 200 TABLET, FILM COATED ORAL at 20:26

## 2021-02-24 RX ADMIN — HYDRALAZINE HYDROCHLORIDE 25 MG: 25 TABLET ORAL at 20:26

## 2021-02-24 RX ADMIN — LATANOPROST 1 DROP: 50 SOLUTION OPHTHALMIC at 21:36

## 2021-02-24 RX ADMIN — GABAPENTIN 100 MG: 100 CAPSULE ORAL at 14:01

## 2021-02-24 RX ADMIN — ENALAPRIL MALEATE 20 MG: 20 TABLET ORAL at 20:26

## 2021-02-24 RX ADMIN — FERROUS GLUCONATE 324 MG: 324 TABLET ORAL at 08:33

## 2021-02-24 RX ADMIN — BRIMONIDINE TARTRATE 1 DROP: 1.5 SOLUTION OPHTHALMIC at 20:26

## 2021-02-24 RX ADMIN — GABAPENTIN 100 MG: 100 CAPSULE ORAL at 20:26

## 2021-02-24 RX ADMIN — OLANZAPINE 5 MG: 5 TABLET, FILM COATED ORAL at 21:36

## 2021-02-24 RX ADMIN — ALPRAZOLAM 0.25 MG: 0.25 TABLET ORAL at 21:37

## 2021-02-24 RX ADMIN — DORZOLAMIDE HYDROCHLORIDE 1 DROP: 20 SOLUTION/ DROPS OPHTHALMIC at 20:26

## 2021-02-24 RX ADMIN — HYDROXYCHLOROQUINE SULFATE 200 MG: 200 TABLET, FILM COATED ORAL at 08:33

## 2021-02-24 RX ADMIN — CYANOCOBALAMIN TAB 1000 MCG 1000 MCG: 1000 TAB at 08:33

## 2021-02-24 RX ADMIN — FOLIC ACID 1 MG: 1 TABLET ORAL at 08:33

## 2021-02-24 RX ADMIN — DORZOLAMIDE HYDROCHLORIDE 1 DROP: 20 SOLUTION/ DROPS OPHTHALMIC at 08:34

## 2021-02-24 RX ADMIN — LOPERAMIDE HYDROCHLORIDE 2 MG: 2 CAPSULE ORAL at 21:25

## 2021-02-24 RX ADMIN — DULOXETINE HYDROCHLORIDE 60 MG: 60 CAPSULE, DELAYED RELEASE ORAL at 08:33

## 2021-02-24 RX ADMIN — HYDRALAZINE HYDROCHLORIDE 25 MG: 25 TABLET ORAL at 14:01

## 2021-02-24 RX ADMIN — GABAPENTIN 100 MG: 100 CAPSULE ORAL at 08:33

## 2021-02-24 RX ADMIN — BRIMONIDINE TARTRATE 1 DROP: 1.5 SOLUTION OPHTHALMIC at 08:34

## 2021-02-24 RX ADMIN — SIMVASTATIN 10 MG: 10 TABLET, FILM COATED ORAL at 21:37

## 2021-02-24 RX ADMIN — FERROUS GLUCONATE 324 MG: 324 TABLET ORAL at 17:40

## 2021-02-24 RX ADMIN — HYDRALAZINE HYDROCHLORIDE 25 MG: 25 TABLET ORAL at 08:33

## 2021-02-24 RX ADMIN — AMLODIPINE BESYLATE 5 MG: 5 TABLET ORAL at 08:33

## 2021-02-24 RX ADMIN — ENALAPRIL MALEATE 20 MG: 20 TABLET ORAL at 08:33

## 2021-02-24 ASSESSMENT — ACTIVITIES OF DAILY LIVING (ADL)
DRESS: SCRUBS (BEHAVIORAL HEALTH)
HYGIENE/GROOMING: INDEPENDENT
ORAL_HYGIENE: INDEPENDENT
LAUNDRY: UNABLE TO COMPLETE

## 2021-02-24 NOTE — PLAN OF CARE
Work Completed: Reviewed chart.Met with team to discuss pt progress. Pt appears to be  Improving. Pt often with negative comments.    Discharge plan or goal: Discharge home when mental health is stable                Barriers to discharge: Ongoing symptom management.

## 2021-02-24 NOTE — PROGRESS NOTES
"Pt participated in dance/movement therapy (DMT) with a focus in moving through symptoms toward \"getting better.\"  Group members offered each other both verbal and nonverbal support and encouragement.  Group members explored the qualities that make a true friendship and danced some \"friendship dances.\"    Pt stated she is sleeping more in the hospital as she is missing her social connections and activities.  She feels bored here, though was socially-engaged during this session.       02/23/21 1130   Dance Movement Therapy   Type of Intervention structured groups   Response participates with encouragement   Hours 1     "

## 2021-02-24 NOTE — PROGRESS NOTES
"Patient seen, chart reviewed, care discussed with staff.  Blood pressure (!) 147/67, pulse 56, temperature 97.9  F (36.6  C), temperature source Temporal, resp. rate 16, weight 64.6 kg (142 lb 8 oz), SpO2 96 %, not currently breastfeeding.    By report, is feeling better, but requested a male psychiatrist and \"not an NP who is playing psychiatrist\".  She states she \"was a basket case\" when admitted and now is feeling better and \"thinking more clearly\".  She reports a sensation that might be hearing a voice, but never needs to look around to see who is speaking and visualizations of \" seeing a knife and seeing in stabbing someone\" (denies intent to stab people).  OCD discussed.      General appearance: good  Alert.   Affect: fair  Mood: fair    Speech:  normal.   Eye contact:  good.    Psychomotor behavior: normal  Gait: normal.    Abnormal movements: none.  Neuroleptic side effects including TD discussed in detail  Delusions: none  Hallucinations:  None clearly, see above  Thoughts: logical  Associations: intact  Judgement: good (except for her view of Ms Martinez)  Insight: good  Cognitions: intact in conversation  Memory:  intact in conversation  Orientation: normal    Not suicidal.    Plan:  OT cognitive testing  Decrease Xanax due to falls before admit and memory concerns      Current Facility-Administered Medications:      acetaminophen (TYLENOL) tablet 650 mg, 650 mg, Oral, Q4H PRN, Gloria Martineza, APRN CNP, 650 mg at 02/21/21 1317     ALPRAZolam (XANAX) tablet 0.5 mg, 0.5 mg, Oral, At Bedtime, Gloria Martinez, APRN CNP, 0.5 mg at 02/23/21 2152     alum & mag hydroxide-simethicone (MAALOX) suspension 30 mL, 30 mL, Oral, Q4H PRN, Gloria Martinez, APRN CNP     amLODIPine (NORVASC) tablet 5 mg, 5 mg, Oral, Daily, MilmikeevaGloriaeva, APRN CNP, 5 mg at 02/24/21 0817     brimonidine (ALPHAGAN-P) 0.15 % ophthalmic solution 1 drop, 1 drop, Both Eyes, BID, [place], " Jey Narayanan MD, 1 drop at 02/24/21 0834     cyanocobalamin (VITAMIN B-12) tablet 1,000 mcg, 1,000 mcg, Oral, Daily, Gloria Martinez APRN CNP, 1,000 mcg at 02/24/21 0833     dorzolamide (TRUSOPT) 2 % ophthalmic solution 1 drop, 1 drop, Both Eyes, BID, Jey Mayo MD, 1 drop at 02/24/21 0834     DULoxetine (CYMBALTA) DR capsule 60 mg, 60 mg, Oral, Daily, Gloria Martinez APRN CNP, 60 mg at 02/24/21 0833     enalapril (VASOTEC) tablet 20 mg, 20 mg, Oral, BID, Jey Mayo MD, 20 mg at 02/24/21 0833     ferrous gluconate (FERGON) tablet 324 mg, 324 mg, Oral, BID w/meals, Gloria Martinez APRN CNP, 324 mg at 02/24/21 0833     folic acid (FOLVITE) tablet 1 mg, 1 mg, Oral, Daily, Gloria Martinez APRN CNP, 1 mg at 02/24/21 0833     gabapentin (NEURONTIN) capsule 100 mg, 100 mg, Oral, TID, Gloria Martinez APRN CNP, 100 mg at 02/24/21 1401     hydrALAZINE (APRESOLINE) tablet 25 mg, 25 mg, Oral, TID, Jey Mayo MD, 25 mg at 02/24/21 1401     hydroxychloroquine (PLAQUENIL) tablet 200 mg, 200 mg, Oral, BID, Tangela Vuong APRN CNP, 200 mg at 02/24/21 0833     hydrOXYzine (ATARAX) tablet 25 mg, 25 mg, Oral, Q4H PRN, Gloria Martinez APRN CNP, 25 mg at 02/17/21 1255     latanoprost (XALATAN) 0.005 % ophthalmic solution 1 drop, 1 drop, Both Eyes, At Bedtime, Jey Mayo MD, 1 drop at 02/23/21 2153     loperamide (IMODIUM) capsule 2 mg, 2 mg, Oral, TID PRN, Tangela Vuong APRN CNP, 2 mg at 02/23/21 1237     OLANZapine (zyPREXA) tablet 5 mg, 5 mg, Oral, TID PRN **OR** OLANZapine (zyPREXA) injection 5 mg, 5 mg, Intramuscular, TID PRN, Gloria Martinez APRN CNP     OLANZapine (zyPREXA) tablet 5 mg, 5 mg, Oral, At Bedtime, Gloria Martinez APRN CNP, 5 mg at 02/23/21 2152     polyethylene glycol (MIRALAX) Packet 17 g, 17 g, Oral, Daily PRN, Gloria Martinez  CHAPITO Huff CNP     simvastatin (ZOCOR) tablet 10 mg, 10 mg, Oral, At Bedtime, Jey Mayo MD, 10 mg at 02/23/21 2152     traZODone (DESYREL) tablet 50 mg, 50 mg, Oral, At Bedtime PRN, Gloria Martinez APRN CNP  Recent Results (from the past 168 hour(s))   TSH with free T4 reflex and/or T3 as indicated    Collection Time: 02/18/21  7:36 AM   Result Value Ref Range    TSH 2.07 0.40 - 4.00 mU/L   Vitamin B12    Collection Time: 02/18/21  7:36 AM   Result Value Ref Range    Vitamin B12 661 193 - 986 pg/mL   Folate    Collection Time: 02/18/21  7:36 AM   Result Value Ref Range    Folate 18.8 >5.4 ng/mL   Vitamin D    Collection Time: 02/18/21  7:36 AM   Result Value Ref Range    Vitamin D Deficiency screening 36 20 - 75 ug/L   Magnesium    Collection Time: 02/18/21  7:36 AM   Result Value Ref Range    Magnesium 2.2 1.6 - 2.3 mg/dL

## 2021-02-24 NOTE — PLAN OF CARE
Pt participated in Therapeutic Recreation group with focus on socializing, problem solving, and leisure participation. Engaged and cooperative in group recreational intervention; word puzzles. Pt participated throughout entire duration of the group. Pt affect was flat and mood was calm. Pt slightly added to the group discussion during the activity, relating the discussion to each puzzle. Prior to the activity, pt was actively involved in the discussion about the benefits of daily recreation.

## 2021-02-24 NOTE — PLAN OF CARE
"  Problem: OT General Care Plan  Goal: OT Goal 1  Description: Will attend OT groups and set goal focus.      Note: Attended 2 of 2 OT groups. She participated in a goal oriented task group for 55 minutes with 4 pts. She worked on a familiar 2 step task, identified details she was not happy with though not ways in adapting the outcome. With discussion, she followed through on the ideas.  She also participated in an activity focused on identifying 3 things she does differently/better and identify how she has worked on this. She stated being a \"perfectionist\" which used to interfere with getting things done. \"I just decided I needed to change and set my mind to it.\" We discussed the plan for OT Cognitive Assessment on 2- in the am and she stated willingness and \"will not worry\" about it this evening.     "

## 2021-02-24 NOTE — PLAN OF CARE
Problem: Behavioral Health Plan of Care  Goal: Plan of Care Review  Recent Flowsheet Documentation  Taken 2/24/2021 1323 by Corteny Gray RN  Plan of Care Reviewed With: patient  Patient Agreement with Plan of Care: agrees  Goal: Develops/Participates in [place]rapeutic [place] to Support Successful Transition  Intervention: Foster Therapeutic [place]  Recent Flowsheet Documentation  Taken 2/24/2021 1323 by Cortney Gray RN  Trust Relationship/Rapport:    choices provided    empathic listening provided   Assessment Pt has been in unge majority of shift. Pt has flat, irritable, sarcastic affect. Mood is labile, mostly negative. Pt picks the negative aspects out of people. Pt is showing some memory impairment. Pt repeats the same stories and does not appear to recall that she has told me those stories already.     Pt met with Dr Ricardo today. When discussing how the meeting went, pt focused on his wedding ring.     Pt remains taking in adequate fluids  No diarrhea  Attending groups  Social with female peers when not falling asleep in [facility].     Xanax tapering down at bedtime

## 2021-02-25 ENCOUNTER — APPOINTMENT (OUTPATIENT)
Dept: PHYSICAL THERAPY | Facility: CLINIC | Age: 86
DRG: 885 | End: 2021-02-25
Payer: COMMERCIAL

## 2021-02-25 PROCEDURE — 124N000003 HC R&B MH SENIOR/ADOLESCENT

## 2021-02-25 PROCEDURE — 250N000013 HC RX MED GY IP 250 OP 250 PS 637: Performed by: NURSE PRACTITIONER

## 2021-02-25 PROCEDURE — H2032 ACTIVITY THERAPY, PER 15 MIN: HCPCS

## 2021-02-25 PROCEDURE — 96125 COGNITIVE TEST BY HC PRO: CPT | Mod: GO

## 2021-02-25 PROCEDURE — 250N000013 HC RX MED GY IP 250 OP 250 PS 637: Performed by: PSYCHIATRY & NEUROLOGY

## 2021-02-25 PROCEDURE — G0177 OPPS/PHP; TRAIN & EDUC SERV: HCPCS

## 2021-02-25 PROCEDURE — 97110 THERAPEUTIC EXERCISES: CPT | Mod: GP

## 2021-02-25 PROCEDURE — 97116 GAIT TRAINING THERAPY: CPT | Mod: GP

## 2021-02-25 PROCEDURE — 250N000013 HC RX MED GY IP 250 OP 250 PS 637: Performed by: EMERGENCY MEDICINE

## 2021-02-25 RX ADMIN — FOLIC ACID 1 MG: 1 TABLET ORAL at 09:42

## 2021-02-25 RX ADMIN — GABAPENTIN 100 MG: 100 CAPSULE ORAL at 09:43

## 2021-02-25 RX ADMIN — ACETAMINOPHEN 650 MG: 325 TABLET, FILM COATED ORAL at 03:16

## 2021-02-25 RX ADMIN — HYDROXYCHLOROQUINE SULFATE 200 MG: 200 TABLET, FILM COATED ORAL at 20:38

## 2021-02-25 RX ADMIN — FERROUS GLUCONATE 324 MG: 324 TABLET ORAL at 17:45

## 2021-02-25 RX ADMIN — ALPRAZOLAM 0.25 MG: 0.25 TABLET ORAL at 21:59

## 2021-02-25 RX ADMIN — CYANOCOBALAMIN TAB 1000 MCG 1000 MCG: 1000 TAB at 09:41

## 2021-02-25 RX ADMIN — LOPERAMIDE HYDROCHLORIDE 2 MG: 2 CAPSULE ORAL at 21:58

## 2021-02-25 RX ADMIN — BRIMONIDINE TARTRATE 1 DROP: 1.5 SOLUTION OPHTHALMIC at 20:44

## 2021-02-25 RX ADMIN — ENALAPRIL MALEATE 20 MG: 20 TABLET ORAL at 20:37

## 2021-02-25 RX ADMIN — ACETAMINOPHEN 650 MG: 325 TABLET, FILM COATED ORAL at 21:58

## 2021-02-25 RX ADMIN — BRIMONIDINE TARTRATE 1 DROP: 1.5 SOLUTION OPHTHALMIC at 09:39

## 2021-02-25 RX ADMIN — GABAPENTIN 100 MG: 100 CAPSULE ORAL at 14:59

## 2021-02-25 RX ADMIN — FERROUS GLUCONATE 324 MG: 324 TABLET ORAL at 09:41

## 2021-02-25 RX ADMIN — LATANOPROST 1 DROP: 50 SOLUTION OPHTHALMIC at 21:59

## 2021-02-25 RX ADMIN — OLANZAPINE 5 MG: 5 TABLET, FILM COATED ORAL at 21:59

## 2021-02-25 RX ADMIN — ACETAMINOPHEN 650 MG: 325 TABLET, FILM COATED ORAL at 18:06

## 2021-02-25 RX ADMIN — HYDRALAZINE HYDROCHLORIDE 25 MG: 25 TABLET ORAL at 14:59

## 2021-02-25 RX ADMIN — HYDRALAZINE HYDROCHLORIDE 25 MG: 25 TABLET ORAL at 20:38

## 2021-02-25 RX ADMIN — AMLODIPINE BESYLATE 5 MG: 5 TABLET ORAL at 09:42

## 2021-02-25 RX ADMIN — SIMVASTATIN 10 MG: 10 TABLET, FILM COATED ORAL at 21:59

## 2021-02-25 RX ADMIN — GABAPENTIN 100 MG: 100 CAPSULE ORAL at 20:38

## 2021-02-25 RX ADMIN — DORZOLAMIDE HYDROCHLORIDE 1 DROP: 20 SOLUTION/ DROPS OPHTHALMIC at 09:39

## 2021-02-25 RX ADMIN — ENALAPRIL MALEATE 20 MG: 20 TABLET ORAL at 09:42

## 2021-02-25 RX ADMIN — HYDRALAZINE HYDROCHLORIDE 25 MG: 25 TABLET ORAL at 09:38

## 2021-02-25 RX ADMIN — DULOXETINE HYDROCHLORIDE 60 MG: 60 CAPSULE, DELAYED RELEASE ORAL at 09:38

## 2021-02-25 RX ADMIN — DORZOLAMIDE HYDROCHLORIDE 1 DROP: 20 SOLUTION/ DROPS OPHTHALMIC at 20:44

## 2021-02-25 RX ADMIN — HYDROXYCHLOROQUINE SULFATE 200 MG: 200 TABLET, FILM COATED ORAL at 09:38

## 2021-02-25 ASSESSMENT — ACTIVITIES OF DAILY LIVING (ADL)
HYGIENE/GROOMING: WITH ASSISTANCE
HYGIENE/GROOMING: SHOWER;WITH SUPERVISION
ORAL_HYGIENE: INDEPENDENT
LAUNDRY: UNABLE TO COMPLETE
DRESS: INDEPENDENT
DRESS: INDEPENDENT;SCRUBS (BEHAVIORAL HEALTH)
ORAL_HYGIENE: INDEPENDENT
LAUNDRY: UNABLE TO COMPLETE

## 2021-02-25 NOTE — PLAN OF CARE
Work Completed: Reviewed chart. Met with team to discuss pt progress. Pt  Is less depressed, however, continues with negative self talk. Met with pt  briefly before she attended group.    Discharge plan or goal: Discharge home when stable.                Barriers to discharge:Ongoing mental health symptoms

## 2021-02-25 NOTE — CONSULTS
OCCUPATIONAL THERAPY:  Independent Living Skills Evaluation / CPT / MoCA   [employer],   [address]  Edilia Castillo, OTR/L    Patient Name     Swathi Dukes                        Date of Assessment:      2-        Time of day:  AM    THE RESULTS OF THIS ASSESSMENT PROVIDE RECOMMENDATIONS BASED UPON FUNCTION AT THE TIME OF ADMINISTRATION ONLY, MAY NOT REFLECT BASELINE FUNCTION.    COGNITIVE PERFORMANCE TEST: The CPT is a standardized, performance-based assessment used to assess cognitive-functional information processing and executive processing.  This test is based on performance of some common activities. The level of performance may help to describe how information is being processed, potential safety risks and recommendations for supervision needs in the individual s living environment.  Updated with 2018 guidelines    CHRISTIANO COGNITIVE ASSESSMENT (MoCA)   VERSION  ______8.1   The Christiano Cognitive Assessment (MoCA) was designed as a rapid screening instrument for mild cognitive dysfunction. It assesses different cognitive domains: attention and concentration, executive functions, memory, language, visuoconstructional skills, conceptual thinking, calculations, and orientation. The total possible score is 30 points; a score of 26 or above is considered normal.    CHRISTIANO COGNITIVE ASSESSMENT SCORE:____20 / 30  which is  below the normal range.     Draw a Clock Subtest: Score:    3   / 3  though note as she mackenzie the clock, she began with the numbers close to the right side of the clock around the right side. As she came to the 6 and worked up the left side of the clock, the numbers moved far away from the edge to be 1/3 into the center of the clock, still keeping the contour correct and round. All details were correct. This just seemed out of the typical shape (no loss of score was given).    Details of scores:    SUBTASK SCORE SUBTASK SCORE   Visuospatial/Executive   4  /5 Abstraction  2    /2   Naming   2  /3 Delayed Recall  0   /5   Attention   6  /6 Orientation  5   /6   Language   1  /3         CPT RESULTS:    SUBTASK SCORE COMMENTS   Phone   5  /6 Completed task correctly with cue. Provided general information including cost of item requested.   Shop   5  /6 Completed task successfully with general cue of looking for additional item they had sufficient funds to purchase. Did not preplan by checking amount of funds available prior to making purchase.   Med Box  4.5   /6 Was unsuccessful in correcting errors with specific cues. Fluidia was correct.   Wash  4.5   /5 Needed one verbal or visual direction provided to accomplish task.   Toast  5   /5 Completed concrete task correctly.     TOTAL CPT TASK 5 AVERAGE SCORE:    4.8    / 5.6  which is below the normal range.    GENERAL RECOMMENDATIONS BASED UPON THE COGNITIVE PERFORMANCE TEST (CPT) SCORE.  Note these are ranges and there may be fluctuations in abilities for an individual at different times of days                                                                                                             LEVEL 5.0   Mild functional decline due to deficits in executive control functions.  Basic cognitive functioning is intact, but higher-level functions are impaired and deficits may not be readily apparent.  May have trouble with complex information or activities; and problems are observed with memory, judgment, reasoning and planning ahead.  1. Safety:  May require assistance to plan ahead; or to manage complex medication schedules, appointments or finances.   2. IADL:  Generally able to complete basic self-cares and routine household tasks.  May begin to have difficulty with complex daily tasks such as reading, writing, meal preparation, shopping, finance management, job performance, management of complex medication regime, or driving. Check in support may be needed with monitoring of areas listed. ADLs typically are managed without  difficulty.  3. New learning may consist of trial and error.  4. May have mild problems with conversation and are often able to conceal deficits.  Driving ability may be affected. Recommend monitoring and consider whether a driving assessment is needed. Driving assessment recommended at least at 4.7 unless noted difficulties are noted otherwise.    ASSESSMENT/RECOMMENDATIONS (based upon assessment/group observation)    Swathi was pleasant and cooperative during this assessment session.   She explained her son, Brent, manages her medications by setting up a locked medication box that automatically administers her medications. He also does her grocery shopping and buys her microwave meals to use when not ordering from the meal options at the building. Cleaning is done for her from the apartment staff. Swathi does her own laundry and manages her own finances and stated confidence in continuing this skill. She worked as a  for 20 years and feels her experience and talents in this area make this task easily manageable. Swathi has retired from driving.    Swathi scored on the CPT 4.8, and on the MoCA  20, both scores being well below the normal range. Note the score on the MoCA Visualspatial/Executive score of 4/5. Note the score on Delayed Recall of 0/5. The Memory Index Score was 3/15, with spontaneous recall of 0 words, recalling 0 words with general cues, and 3 words with multiple choice cues. This Memory Index score being only 3 may possibly indicate difficulty with encoding information. The scores on these assessments may indicate mild functional decline. One might note difficulties in the areas of more complex problem solving, judgment, memory, planning and organizing.     If Swathi continues to perform at the level demonstrated during this assessment, recommendations include continued assistance with medication set up in having her son load the medications into the medication administration box and monitor  accuracy of compliance due to memory difficulties noted during this session. It may be also beneficial to monitor Financial management as needed. Though Swathi sees no difficulty in this area, she did agree if she begins to see problems with financial management, she would ask her son for assistance.     Swathi also has a clear understanding that at this time of continued protocol of Covid and decreased time with peers at her AL, she looks forward to increasing activities once residents in her building have all been vaccinated and structured activities are more available.     Additional recommendations discussed included general brain health tips of utilizing brain stimulating activities on a daily basis, such as cross word puzzles, word searches, reading, etc. Additional information was also discussed such as following as best possible to eat  heart healthy , sleeping adequate number of hours, getting physical activity within range of comfort ability, and managing stress levels as best possible.

## 2021-02-25 NOTE — PROGRESS NOTES
Patient woke up from sleep and reported that she has generalized body ache and requested for a pain medication. Tylenol 650 mg. given @ 0316 PRN for pain. Stayed seated in the lounge for about an hour and reported total relief from pain and returned back to her room.     Slept a total of 6.25 hours.

## 2021-02-25 NOTE — PLAN OF CARE
"  Soon after identifying self to patient as her assigned RN, she approached a female RN and stated \"When can we sit down and talk?\" When that RN reminded pt that writer was her RN, she stated \"but I want you.\" Pt continues to be visible in lounge. Little interaction with peers. Affect blunted. Continues to appear to be preoccupied with sex/gender of caregivers. Med-compliant, cooperative. Continue with current treatment plan and recommendations. Continue to monitor and reassess symptoms. Monitor response to medications. Monitor progress towards treatment goals. Encourage groups and participation.   "

## 2021-02-25 NOTE — PLAN OF CARE
Physical Therapy Discharge Summary    Reason for therapy discharge:    Discharged to home with home therapy. TRICIA    Progress towards therapy goal(s). See goals on Care Plan in [facility] electronic health record for goal details.  Goals met    Therapy recommendation(s):    Continued therapy is recommended.  Rationale/Recommendations:  To increase strength, balance, and reduce falls risk..     Pt performed well on gait assessment without assistive device. No overt LOBs with gait challenges. She does report periodic, unpredictable episodes of unsteadiness, so this writer recommends she continues to use her walker for safety. She would benefit from further PT for LE strength, as she reports LE weakness with gait, and for balance. She appears to be ambulating and mobilizing near baseline at this time. An LE exercise program was reviewed with her. PT will sign off.

## 2021-02-25 NOTE — PLAN OF CARE
Problem: Adult Inpatient Plan of Care  Goal: Plan of Care Review  Recent Flowsheet Documentation  Taken 2/25/2021 1253 by Cortney Gray RN  Plan of Care Reviewed With: patient  Goal: Optimal Comfort and Wellbeing  Intervention: Provide Person-Centered Care  Recent Flowsheet Documentation  Taken 2/25/2021 1253 by Cortney Gray RN  Trust Relationship/Rapport:   choices provided   care explained   thoughts/feelings acknowledged   Assessment- blunted, occasional irritated affect, negative, sarcastic labile mood, at time pleasant and appreciative of cares. Easily bored. Pt is not suicidal. Pt denies being depressed or anxious. Pt took a shower and washed her hair. She was worried she took too long, but denied feeling anxious.     Observations- attending groups, completed cognitive testing- remarked it was a silly test, because it indicated she needed help managing her money and she was a teller for 20 years. Pt appears reluctant to change.     Precautions- Fall risk- PT appt- pls review his note.

## 2021-02-26 ENCOUNTER — TELEPHONE (OUTPATIENT)
Dept: FAMILY MEDICINE | Facility: CLINIC | Age: 86
End: 2021-02-26

## 2021-02-26 PROCEDURE — G0177 OPPS/PHP; TRAIN & EDUC SERV: HCPCS

## 2021-02-26 PROCEDURE — 250N000013 HC RX MED GY IP 250 OP 250 PS 637: Performed by: NURSE PRACTITIONER

## 2021-02-26 PROCEDURE — 250N000013 HC RX MED GY IP 250 OP 250 PS 637: Performed by: EMERGENCY MEDICINE

## 2021-02-26 PROCEDURE — 250N000013 HC RX MED GY IP 250 OP 250 PS 637: Performed by: PSYCHIATRY & NEUROLOGY

## 2021-02-26 PROCEDURE — 124N000003 HC R&B MH SENIOR/ADOLESCENT

## 2021-02-26 RX ORDER — MEMANTINE HYDROCHLORIDE 5 MG/1
5 TABLET ORAL DAILY
Status: DISCONTINUED | OUTPATIENT
Start: 2021-02-26 | End: 2021-03-02

## 2021-02-26 RX ORDER — DONEPEZIL HYDROCHLORIDE 5 MG/1
5 TABLET, FILM COATED ORAL AT BEDTIME
Status: DISCONTINUED | OUTPATIENT
Start: 2021-02-26 | End: 2021-03-02

## 2021-02-26 RX ORDER — GABAPENTIN 100 MG/1
200 CAPSULE ORAL 2 TIMES DAILY
Status: DISCONTINUED | OUTPATIENT
Start: 2021-02-26 | End: 2021-03-04

## 2021-02-26 RX ADMIN — ALPRAZOLAM 0.25 MG: 0.25 TABLET ORAL at 21:04

## 2021-02-26 RX ADMIN — MEMANTINE 5 MG: 5 TABLET ORAL at 14:47

## 2021-02-26 RX ADMIN — HYDRALAZINE HYDROCHLORIDE 25 MG: 25 TABLET ORAL at 08:47

## 2021-02-26 RX ADMIN — CYANOCOBALAMIN TAB 1000 MCG 1000 MCG: 1000 TAB at 08:47

## 2021-02-26 RX ADMIN — GABAPENTIN 200 MG: 100 CAPSULE ORAL at 21:02

## 2021-02-26 RX ADMIN — DORZOLAMIDE HYDROCHLORIDE 1 DROP: 20 SOLUTION/ DROPS OPHTHALMIC at 08:58

## 2021-02-26 RX ADMIN — DONEPEZIL HYDROCHLORIDE 5 MG: 5 TABLET ORAL at 21:04

## 2021-02-26 RX ADMIN — DULOXETINE HYDROCHLORIDE 60 MG: 60 CAPSULE, DELAYED RELEASE ORAL at 08:48

## 2021-02-26 RX ADMIN — HYDROXYCHLOROQUINE SULFATE 200 MG: 200 TABLET, FILM COATED ORAL at 21:03

## 2021-02-26 RX ADMIN — BRIMONIDINE TARTRATE 1 DROP: 1.5 SOLUTION OPHTHALMIC at 22:00

## 2021-02-26 RX ADMIN — HYDROXYCHLOROQUINE SULFATE 200 MG: 200 TABLET, FILM COATED ORAL at 08:47

## 2021-02-26 RX ADMIN — DORZOLAMIDE HYDROCHLORIDE 1 DROP: 20 SOLUTION/ DROPS OPHTHALMIC at 21:58

## 2021-02-26 RX ADMIN — FOLIC ACID 1 MG: 1 TABLET ORAL at 08:47

## 2021-02-26 RX ADMIN — SIMVASTATIN 10 MG: 10 TABLET, FILM COATED ORAL at 21:03

## 2021-02-26 RX ADMIN — LATANOPROST 1 DROP: 50 SOLUTION OPHTHALMIC at 21:04

## 2021-02-26 RX ADMIN — FERROUS GLUCONATE 324 MG: 324 TABLET ORAL at 17:49

## 2021-02-26 RX ADMIN — FERROUS GLUCONATE 324 MG: 324 TABLET ORAL at 08:47

## 2021-02-26 RX ADMIN — BRIMONIDINE TARTRATE 1 DROP: 1.5 SOLUTION OPHTHALMIC at 08:48

## 2021-02-26 RX ADMIN — ENALAPRIL MALEATE 20 MG: 20 TABLET ORAL at 08:47

## 2021-02-26 RX ADMIN — GABAPENTIN 100 MG: 100 CAPSULE ORAL at 08:47

## 2021-02-26 RX ADMIN — OLANZAPINE 5 MG: 5 TABLET, FILM COATED ORAL at 21:03

## 2021-02-26 RX ADMIN — AMLODIPINE BESYLATE 5 MG: 5 TABLET ORAL at 08:47

## 2021-02-26 ASSESSMENT — ACTIVITIES OF DAILY LIVING (ADL)
ORAL_HYGIENE: INDEPENDENT
LAUNDRY: UNABLE TO COMPLETE
ORAL_HYGIENE: INDEPENDENT
HYGIENE/GROOMING: WITH ASSISTANCE
HYGIENE/GROOMING: WITH ASSISTANCE
LAUNDRY: UNABLE TO COMPLETE
DRESS: INDEPENDENT
DRESS: INDEPENDENT

## 2021-02-26 NOTE — PLAN OF CARE
Work Completed: Reviewed chart. Discussed pt progress with team. Pt improving. Unsure when she will discharge.    Discharge plan or goal: Discharge back to facility when mental health has stabilized.                Barriers to discharge: Management of mental health symptoms.

## 2021-02-26 NOTE — PROGRESS NOTES
"Patient seen, chart reviewed, care discussed with staff.  Blood pressure 116/67, pulse 54, temperature 97.7  F (36.5  C), temperature source Temporal, resp. rate 16, weight 63.5 kg (139 lb 14.4 oz), SpO2 97 %, not currently breastfeeding.    By report, slept.  C/o number of pills.  20/30MoCA, 4.8 on CPT    Memory discussed.  She is also worried about going home and \"cracking up\" due to isolation after discharge, she does not have a computer, and does not know how to uise her cell phone for video conferencing.  Her phone is at home.    She complains of back, leg pain, wonders if it is arthritis due to Lupus.      Current Facility-Administered Medications:      acetaminophen (TYLENOL) tablet 650 mg, 650 mg, Oral, Q4H PRN, Gloria Martinez APRN CNP, 650 mg at 02/25/21 2158     ALPRAZolam (XANAX) tablet 0.25 mg, 0.25 mg, Oral, At Bedtime, Sha Ricardo MD, 0.25 mg at 02/25/21 2159     alum & mag hydroxide-simethicone (MAALOX) suspension 30 mL, 30 mL, Oral, Q4H PRN, Gloria Martinez APRN CNP     amLODIPine (NORVASC) tablet 5 mg, 5 mg, Oral, Daily, Gloria Martinez APRN CNP, 5 mg at 02/26/21 0847     brimonidine (ALPHAGAN-P) 0.15 % ophthalmic solution 1 drop, 1 drop, Both Eyes, BID, Jey Mayo MD, 1 drop at 02/26/21 0848     cyanocobalamin (VITAMIN B-12) tablet 1,000 mcg, 1,000 mcg, Oral, Daily, Gloria Martinez APRN CNP, 1,000 mcg at 02/26/21 0847     dorzolamide (TRUSOPT) 2 % ophthalmic solution 1 drop, 1 drop, Both Eyes, BID, Jey Mayo MD, 1 drop at 02/26/21 0858     DULoxetine (CYMBALTA) DR capsule 60 mg, 60 mg, Oral, Daily, Gloria Martinez APRN CNP, 60 mg at 02/26/21 0848     enalapril (VASOTEC) tablet 20 mg, 20 mg, Oral, BID, Jey Mayo MD, 20 mg at 02/26/21 0847     ferrous gluconate (FERGON) tablet 324 mg, 324 mg, Oral, BID w/meals, Gloria Martinez APRN CNP, 324 mg at 02/26/21 0847     folic acid " (FOLVITE) tablet 1 mg, 1 mg, Oral, Daily, Gloria Martinez APRN CNP, 1 mg at 02/26/21 0847     gabapentin (NEURONTIN) capsule 100 mg, 100 mg, Oral, TID, Gloria Martinez APRN CNP, 100 mg at 02/26/21 0847     hydrALAZINE (APRESOLINE) tablet 25 mg, 25 mg, Oral, TID, Jey Mayo MD, 25 mg at 02/26/21 0847     hydroxychloroquine (PLAQUENIL) tablet 200 mg, 200 mg, Oral, BID, Tangela Vuong APRN CNP, 200 mg at 02/26/21 0847     hydrOXYzine (ATARAX) tablet 25 mg, 25 mg, Oral, Q4H PRN, Gloria Martinez APRN CNP, 25 mg at 02/17/21 1255     latanoprost (XALATAN) 0.005 % ophthalmic solution 1 drop, 1 drop, Both Eyes, At Bedtime, Jey aMyo MD, 1 drop at 02/25/21 2159     loperamide (IMODIUM) capsule 2 mg, 2 mg, Oral, TID PRN, Tangela Vuong APRN CNP, 2 mg at 02/25/21 2158     OLANZapine (zyPREXA) tablet 5 mg, 5 mg, Oral, TID PRN **OR** OLANZapine (zyPREXA) injection 5 mg, 5 mg, Intramuscular, TID PRN, Gloria Martinez APRN CNP     OLANZapine (zyPREXA) tablet 5 mg, 5 mg, Oral, At Bedtime, Gloria Martinez APRN CNP, 5 mg at 02/25/21 2159     polyethylene glycol (MIRALAX) Packet 17 g, 17 g, Oral, Daily PRN, Gloria Martinez APRN CNP     simvastatin (ZOCOR) tablet 10 mg, 10 mg, Oral, At Bedtime, Jey Mayo MD, 10 mg at 02/25/21 2159     traZODone (DESYREL) tablet 50 mg, 50 mg, Oral, At Bedtime PRN, Gloria Martinez, CHAPITO CNP  No results found for this or any previous visit (from the past 168 hour(s)).   General appearance: good  Alert.   Affect: fair  Mood: fair    Speech:  normal.   Eye contact:  good.    Psychomotor behavior: normal  Gait: normal.    Abnormal movements: none  Delusions: none  Hallucinations:  none  Thoughts: logical  Associations: intact  Judgement: fair  Insight: fair  Cognitions: intact in conversation  Memory:  intact in conversation  Orientation: normal    Not  suicidal.    Plan: add Aricept and Namenda  2.  Simplify med regimen

## 2021-02-26 NOTE — PLAN OF CARE
Problem: General Plan of Care (Inpatient Behavioral)  Goal: Team Discussion  Description: Team Plan:  Outcome: No Change  Note: BEHAVIORAL TEAM DISCUSSION    Participants: Dr. 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     Order Specific Question:   Rationale     Answer:   Patient States able to remain safe in hospital    Fall precautions    Occupational Therapy on the Unit     Order Specific Question:   Reason for Consult     Answer:   Eval of thought process, functional skills and behavior     Order Specific Question:   Course of Action:     Answer:   Eval & Treat as indicated     Order Specific Question:   Treatment Prescription:     Answer:   cognitive eval please    Routine Programming     As clinically indicated    Status 15     Every 15 minutes.     Plan: pt will be discharge back to facility when mental health has stabilized.  Rationale for change in precautions or plan: N/A

## 2021-02-26 NOTE — PLAN OF CARE
Problem: General Rehab Plan of Care  Goal: Therapeutic Recreation/Music Therapy Goal (Art Therapy)  Description: The patient and/or their representative will achieve their patient-specific goals related to the plan of care.  The patient-specific goals include: emotional expression  Outcome: No Change     Art Therapy directive is to create an image of calm mind vs anxious mind using a head profile template/handout and using lines, shapes and colors. Goals of directive: emotional expression, emotional regulation, mindfulness, trauma containment, media exploration. Pt was an engaged participant, finished both drawing and briefly shared with group. Pt referred to one drawing as calm mind and the other drawing as a thinking mind. Pt did not share any additional details of artwork. Pts mood was calm, pleasant participant.

## 2021-02-26 NOTE — PLAN OF CARE
"  Problem: Depressive Symptoms  Goal: Depressive Symptoms  Description: Signs and symptoms of listed problems will be absent or manageable.  Outcome: No Change  Note: Patient stated: 'I feel more depressed and more anxious all day today\", \"I finally got a different doctor, but I'm afraid that he changed my medications\". \"I did not see a doctor today\", \"I had an OT exam today, she told me I did really well, but she said that I'll need help with writing checks and dealing with my finances\", \"It upsets me: I've been taking care of my finances all my life\". \"I did not sleep well last night\", \"I got up at 3 as I was in pain, they gave me Tylenol, but I was up till 4, and than I woke up at 7 to see the OT lady\". \"I have pain all over my body\". \"Jaelyn been having diarrhea, I went twice today\", \"I like to take Imodium before bed, it helps\". \"I had C. Diff few times, but not now\". \"My energy is OK\".     Thoughts were clear and linear. Speech was fluent and well organized. Mood was blunted, affect was tense. Minor forgetfulness was noted, but otherwise, memory was intact. Patient kept self clean and well groomed. Spent the evening out on the unit, social with other patients on the unit. Patient was coloring and by the end of the evening, she said she felt somewhat better.   PRN Tylenol 650 mg PO was given twice this shift. PRN Imodium was given as requested. Patient did not report more loose stools during the shift.   She took scheduled medications, denied SE, none assessed by staff.      "

## 2021-02-26 NOTE — PLAN OF CARE
Problem: OT General Care Plan  Goal: OT Goal 1  Description: Will attend OT groups and set goal focus.      Note: Attended 2 of 2 OT groups.  She participated for 60 minutes with 4 patients in a goal oriented task group.  She initiated work on a multi step task, learning and following through with one-step directions provided.  She was attentive to detail and made decisions successfully.  She was social on approach and elaborated on comments.  She also participated for 60 minutes with 5 patients in a group activity focused on a weekly reflection, identifying accomplishments of the week and setting goals for this next week coming up.  She identified her most stressful issue at home, being keeping up with the mail.  We discussed multiple ideas on how to manage this task with also the idea of having her mail forwarded to her son who is presently assisting with this task.  After group on one-to-one, this author discussed various feelings about her memory, reviewing the outcome of the OT cognitive testing.  Patient stated feeling she has a great deal of difficulty with memory pointing to the agreement of her score in this area.  We also reviewed the recommendation, as the scores on the assessment might indicate some difficulty with managing finances, though this always been a strong area for this patient, to as stated previously, to keep an eye on these skills, which she agreed with.  She appears more relaxed and comfortable and engaged in all opportunities of group.  She initiated social comments with peers and supported others in a kind manner.  She also stated looking forward to activities being increase in her building as residents received there Covid vaccines and Covid protocols change.

## 2021-02-26 NOTE — CONSULTS
Brief Medicine Note:    IM consulted for follow-up of back and leg pain. See previous IM notes regarding leg pain. Reportedly wondering if it is arthritis due to lupus. Called RN to discuss; she was able to get shoes back for patient and is hopeful that will help and patient has not complained to RN regarding pain today. RN will discuss with patient if she would like to trial anything for pain. Has prn tylenol available.    Plan:  - RN to notify IM if patient agreeable to trial of topical/patches for pain  - Heat/Ice as needed  - Ordered ESR, CRP and BMP for AM  - IM will follow-up with patient tomorrow    Internal medicine will continue to follow for back pain and monitoring of labs. Please reach out with any further questions or concerns.    Marleni Lester PA-C  Internal Medicine [facility] Hospitalist  [facility]  Pager: [phone]

## 2021-02-26 NOTE — PLAN OF CARE
Fluids strongly encouraged, pt agreeable to drinking sips of water and cranberry juice, will continue to encourage fluids.     Flat affect, mood is calm, pt social with select staff and peers.     /65 P 52, pt denies feeling dizzy/light headed, gait steady, fluids encouraged will recheck VS. Recheck /66 P 65, pt verbalized she will let staff know if she feels dizzy or lightheaded, Apresoline held d/t hold parameters.

## 2021-02-27 LAB
ANION GAP SERPL CALCULATED.3IONS-SCNC: 7 MMOL/L (ref 3–14)
BUN SERPL-MCNC: 26 MG/DL (ref 7–30)
CALCIUM SERPL-MCNC: 9.2 MG/DL (ref 8.5–10.1)
CHLORIDE SERPL-SCNC: 105 MMOL/L (ref 94–109)
CO2 SERPL-SCNC: 27 MMOL/L (ref 20–32)
CREAT SERPL-MCNC: 0.8 MG/DL (ref 0.52–1.04)
CRP SERPL-MCNC: 3.4 MG/L (ref 0–8)
ERYTHROCYTE [SEDIMENTATION RATE] IN BLOOD BY WESTERGREN METHOD: 32 MM/H (ref 0–30)
GFR SERPL CREATININE-BSD FRML MDRD: 68 ML/MIN/{1.73_M2}
GLUCOSE SERPL-MCNC: 96 MG/DL (ref 70–99)
POTASSIUM SERPL-SCNC: 4.4 MMOL/L (ref 3.4–5.3)
SODIUM SERPL-SCNC: 139 MMOL/L (ref 133–144)

## 2021-02-27 PROCEDURE — 250N000013 HC RX MED GY IP 250 OP 250 PS 637: Performed by: NURSE PRACTITIONER

## 2021-02-27 PROCEDURE — 250N000013 HC RX MED GY IP 250 OP 250 PS 637: Performed by: PHYSICIAN ASSISTANT

## 2021-02-27 PROCEDURE — 85652 RBC SED RATE AUTOMATED: CPT | Performed by: PHYSICIAN ASSISTANT

## 2021-02-27 PROCEDURE — 80048 BASIC METABOLIC PNL TOTAL CA: CPT | Performed by: PHYSICIAN ASSISTANT

## 2021-02-27 PROCEDURE — 250N000013 HC RX MED GY IP 250 OP 250 PS 637: Performed by: EMERGENCY MEDICINE

## 2021-02-27 PROCEDURE — 124N000003 HC R&B MH SENIOR/ADOLESCENT

## 2021-02-27 PROCEDURE — H2032 ACTIVITY THERAPY, PER 15 MIN: HCPCS

## 2021-02-27 PROCEDURE — 250N000013 HC RX MED GY IP 250 OP 250 PS 637: Performed by: PSYCHIATRY & NEUROLOGY

## 2021-02-27 PROCEDURE — 86140 C-REACTIVE PROTEIN: CPT | Performed by: PHYSICIAN ASSISTANT

## 2021-02-27 PROCEDURE — 36415 COLL VENOUS BLD VENIPUNCTURE: CPT | Performed by: PHYSICIAN ASSISTANT

## 2021-02-27 RX ADMIN — LOPERAMIDE HYDROCHLORIDE 2 MG: 2 CAPSULE ORAL at 18:35

## 2021-02-27 RX ADMIN — MEMANTINE 5 MG: 5 TABLET ORAL at 08:14

## 2021-02-27 RX ADMIN — GABAPENTIN 100 MG: 100 CAPSULE ORAL at 21:00

## 2021-02-27 RX ADMIN — SIMVASTATIN 10 MG: 10 TABLET, FILM COATED ORAL at 22:00

## 2021-02-27 RX ADMIN — OLANZAPINE 5 MG: 5 TABLET, FILM COATED ORAL at 22:02

## 2021-02-27 RX ADMIN — MENTHOL 1 PATCH: 205.5 PATCH TOPICAL at 22:03

## 2021-02-27 RX ADMIN — DONEPEZIL HYDROCHLORIDE 5 MG: 5 TABLET ORAL at 22:00

## 2021-02-27 RX ADMIN — DULOXETINE HYDROCHLORIDE 60 MG: 60 CAPSULE, DELAYED RELEASE ORAL at 08:16

## 2021-02-27 RX ADMIN — BRIMONIDINE TARTRATE 1 DROP: 1.5 SOLUTION OPHTHALMIC at 09:01

## 2021-02-27 RX ADMIN — ALPRAZOLAM 0.25 MG: 0.25 TABLET ORAL at 22:00

## 2021-02-27 RX ADMIN — FERROUS GLUCONATE 324 MG: 324 TABLET ORAL at 17:43

## 2021-02-27 RX ADMIN — ACETAMINOPHEN 650 MG: 325 TABLET, FILM COATED ORAL at 22:01

## 2021-02-27 RX ADMIN — LATANOPROST 1 DROP: 50 SOLUTION OPHTHALMIC at 22:02

## 2021-02-27 RX ADMIN — HYDROXYCHLOROQUINE SULFATE 200 MG: 200 TABLET, FILM COATED ORAL at 08:15

## 2021-02-27 RX ADMIN — CYANOCOBALAMIN TAB 1000 MCG 1000 MCG: 1000 TAB at 08:15

## 2021-02-27 RX ADMIN — AMLODIPINE BESYLATE 5 MG: 5 TABLET ORAL at 08:14

## 2021-02-27 RX ADMIN — FERROUS GLUCONATE 324 MG: 324 TABLET ORAL at 08:16

## 2021-02-27 RX ADMIN — BRIMONIDINE TARTRATE 1 DROP: 1.5 SOLUTION OPHTHALMIC at 21:04

## 2021-02-27 RX ADMIN — DORZOLAMIDE HYDROCHLORIDE 1 DROP: 20 SOLUTION/ DROPS OPHTHALMIC at 08:17

## 2021-02-27 RX ADMIN — GABAPENTIN 200 MG: 100 CAPSULE ORAL at 08:14

## 2021-02-27 RX ADMIN — ACETAMINOPHEN 650 MG: 325 TABLET, FILM COATED ORAL at 13:40

## 2021-02-27 RX ADMIN — DORZOLAMIDE HYDROCHLORIDE 1 DROP: 20 SOLUTION/ DROPS OPHTHALMIC at 21:04

## 2021-02-27 RX ADMIN — HYDROXYCHLOROQUINE SULFATE 200 MG: 200 TABLET, FILM COATED ORAL at 21:01

## 2021-02-27 ASSESSMENT — ACTIVITIES OF DAILY LIVING (ADL)
LAUNDRY: UNABLE TO COMPLETE
DRESS: INDEPENDENT
ORAL_HYGIENE: INDEPENDENT
HYGIENE/GROOMING: SHOWER;WITH ASSISTANCE
HYGIENE/GROOMING: WITH ASSISTANCE
LAUNDRY: UNABLE TO COMPLETE
ORAL_HYGIENE: INDEPENDENT
DRESS: INDEPENDENT

## 2021-02-27 NOTE — PROGRESS NOTES
Brief Medicine Note:    Internal medicine saw patient today for back pain. ESR, CRP, and BMP reassuring regarding history of Lupus and concerns for inflammatory reaction. She reports it is chronic and endorses it feeling like arthritis. Noted her neck was stiffer this morning. Has tenderness/discomfort with palpation of her lower back and right hip. Was having leg pain earlier this admission which resolved with warm blankets. Discussed options for pain management and she endorses tylenol is helpful, as are salonpas patches.     Plan:  - Continue tylenol prn   - Icyhot patches added prn (we do not carry salonpas unfortunately)  - Ice or heat for pain PRN  - Encouraged gentle ROM/stretching of her neck    Internal medicine will sign off. Please reach out if any further questions or concerns.    Marleni Lester PA-C  Internal Medicine [facility] Hospitalist  [facility]  Pager: [phone]

## 2021-02-27 NOTE — PLAN OF CARE
"Pt is social with select peers in the milieu.  Affect is full range.  Denies depression, anxiety, SI/SIB.  States she had a \"pretty good day.\"  Appetite is good.  No physical discomfort reported.      Pt was seated at the table with 2 peers.  Another peer was hyperverbal - Pt stated, \"what a motor mouth she is\" and laughed at the peer.  Pt redirected and informed that it is unacceptable to call people names.     In the evening, /62, HR 61.  Scheduled Vasotech and Apresoline held due to systolic under 80.  One hour later, /54, HR 58.          "

## 2021-02-27 NOTE — PLAN OF CARE
Pt declines to drink water, will drink cranberry juice only. Vasotec and apresoline held per hold parameters. Pt appears to have third spacing in her face, bilateral non-pitting LE edema, pt declines to elevate legs, IM aware. Pt educated on factors that contribute to HTN, pt did not verbalize understanding.     Flat affect, brightens at times, pt social with select staff and peers. PRN tylenol and warm pack given for back pain with relief reported.

## 2021-02-28 PROCEDURE — H2032 ACTIVITY THERAPY, PER 15 MIN: HCPCS

## 2021-02-28 PROCEDURE — 250N000013 HC RX MED GY IP 250 OP 250 PS 637: Performed by: NURSE PRACTITIONER

## 2021-02-28 PROCEDURE — 250N000013 HC RX MED GY IP 250 OP 250 PS 637: Performed by: EMERGENCY MEDICINE

## 2021-02-28 PROCEDURE — 124N000003 HC R&B MH SENIOR/ADOLESCENT

## 2021-02-28 PROCEDURE — 250N000013 HC RX MED GY IP 250 OP 250 PS 637: Performed by: PHYSICIAN ASSISTANT

## 2021-02-28 PROCEDURE — 250N000013 HC RX MED GY IP 250 OP 250 PS 637: Performed by: PSYCHIATRY & NEUROLOGY

## 2021-02-28 RX ADMIN — LOPERAMIDE HYDROCHLORIDE 2 MG: 2 CAPSULE ORAL at 19:04

## 2021-02-28 RX ADMIN — FERROUS GLUCONATE 324 MG: 324 TABLET ORAL at 08:48

## 2021-02-28 RX ADMIN — ALPRAZOLAM 0.25 MG: 0.25 TABLET ORAL at 21:58

## 2021-02-28 RX ADMIN — SIMVASTATIN 10 MG: 10 TABLET, FILM COATED ORAL at 21:58

## 2021-02-28 RX ADMIN — HYDROXYCHLOROQUINE SULFATE 200 MG: 200 TABLET, FILM COATED ORAL at 20:35

## 2021-02-28 RX ADMIN — MENTHOL 1 PATCH: 205.5 PATCH TOPICAL at 22:08

## 2021-02-28 RX ADMIN — BRIMONIDINE TARTRATE 1 DROP: 1.5 SOLUTION OPHTHALMIC at 08:46

## 2021-02-28 RX ADMIN — CYANOCOBALAMIN TAB 1000 MCG 1000 MCG: 1000 TAB at 08:47

## 2021-02-28 RX ADMIN — BRIMONIDINE TARTRATE 1 DROP: 1.5 SOLUTION OPHTHALMIC at 20:38

## 2021-02-28 RX ADMIN — DORZOLAMIDE HYDROCHLORIDE 1 DROP: 20 SOLUTION/ DROPS OPHTHALMIC at 08:56

## 2021-02-28 RX ADMIN — DONEPEZIL HYDROCHLORIDE 5 MG: 5 TABLET ORAL at 21:58

## 2021-02-28 RX ADMIN — MENTHOL 1 PATCH: 205.5 PATCH TOPICAL at 14:21

## 2021-02-28 RX ADMIN — MEMANTINE 5 MG: 5 TABLET ORAL at 08:47

## 2021-02-28 RX ADMIN — FERROUS GLUCONATE 324 MG: 324 TABLET ORAL at 17:48

## 2021-02-28 RX ADMIN — GABAPENTIN 100 MG: 100 CAPSULE ORAL at 08:47

## 2021-02-28 RX ADMIN — LATANOPROST 1 DROP: 50 SOLUTION OPHTHALMIC at 21:58

## 2021-02-28 RX ADMIN — DORZOLAMIDE HYDROCHLORIDE 1 DROP: 20 SOLUTION/ DROPS OPHTHALMIC at 20:35

## 2021-02-28 RX ADMIN — AMLODIPINE BESYLATE 5 MG: 5 TABLET ORAL at 08:47

## 2021-02-28 RX ADMIN — ACETAMINOPHEN 650 MG: 325 TABLET, FILM COATED ORAL at 18:59

## 2021-02-28 RX ADMIN — HYDROXYCHLOROQUINE SULFATE 200 MG: 200 TABLET, FILM COATED ORAL at 08:47

## 2021-02-28 RX ADMIN — OLANZAPINE 5 MG: 5 TABLET, FILM COATED ORAL at 21:58

## 2021-02-28 RX ADMIN — DULOXETINE HYDROCHLORIDE 60 MG: 60 CAPSULE, DELAYED RELEASE ORAL at 08:48

## 2021-02-28 RX ADMIN — GABAPENTIN 100 MG: 100 CAPSULE ORAL at 20:36

## 2021-02-28 ASSESSMENT — ACTIVITIES OF DAILY LIVING (ADL)
LAUNDRY: UNABLE TO COMPLETE
ORAL_HYGIENE: INDEPENDENT
HYGIENE/GROOMING: SHOWER;WITH ASSISTANCE
DRESS: INDEPENDENT
LAUNDRY: UNABLE TO COMPLETE
ORAL_HYGIENE: INDEPENDENT
HYGIENE/GROOMING: SHOWER;WITH ASSISTANCE
DRESS: INDEPENDENT

## 2021-02-28 NOTE — PLAN OF CARE
Music Therapy Group note    Total time in session: 60 minutes    Number of patients in group: 3    Scope of service: humanistic    Patient progress: initial encounter    Patient response/reaction to treatment intervention(s): Swathi was pleasant in group this evening, using humor and tapping her fingers along with the beat of the songs.  Social with peers.  No concerns during group.    Colleen Galvan, MT-BC  Board-Certified Music Therapist

## 2021-02-28 NOTE — PLAN OF CARE
"Full range affect, mood is calm, pt social with staff and peers. Pt declines to drink water, will drink cranberry juice. Vasotec and apresoline held per hold parameters. Pt appears to have less third spacing in her face compared to yesterday, bilateral non-pitting LE edema, pt declines to elevate legs. Pt verbalized the icy hot patch helps relieve her pain.     Pt rates her anxiety as 5/10 (10 being the worst), pt unable to rate depression, pt verbalized she feels her mood has improved. Pt reports she \"has OCD\" and rinses her hair repeatedly, pt reports rinsing her hair 10 times during her shower. Pt showered this shift, pt completed shower independently with SBA, pt needed assistance putting socks on d/t wet floor.     Pt declined to take 200 mg of gabapentin this AM, pt took 100 mg. Pt reported she feels \"like cement\" and feels like she is\"wavey\", pt reports she feels like this all the time and not just with position changes, per pt these symptomes started 3 days ago. Gait slow and steady with use of walker.  At times pt doesn't use her walker. Pt encouraged to use her walker and drink more water. Pt verbalized she will ask for assistance if needed.       "

## 2021-02-28 NOTE — PLAN OF CARE
Problem: Mood Impairment (Anxiety Signs/Symptoms)  Goal: Improved Mood Symptoms (Anxiety Signs/Symptoms)  Outcome: Improving  Note: Patient reported feeling better today, said she does not have increased depression or anxiety.   Main concern: patient reported feeling dizzy today, she recall that  she has started on a new medication for improving her memory and increase dose of antianxiety medication. Patient was informed that the Gabapentin was increased from 100 mg TID to 200 mg BID. She said she will not take the 200 mg dose, as it makes her dizzy and took only 100 mg.  Thoughts were clear. Speech was organized. Mood was calm, affect appropriate to situation. No loose associations or racing thoughts. Memory appeared intact with some minor forgetfulness. Patient keeps self clean and well groomed. Appetite was reported as good, she ate 100% of her dinner meal.  Patient reported good energy, but feeling dizzy. She attended group and remained out on the unit most of the shift. Continent of B&B. Patient reported loose stool (second one for the day) and took PRN Imodium.

## 2021-03-01 ENCOUNTER — APPOINTMENT (OUTPATIENT)
Dept: GENERAL RADIOLOGY | Facility: CLINIC | Age: 86
DRG: 885 | End: 2021-03-01
Attending: PHYSICIAN ASSISTANT
Payer: COMMERCIAL

## 2021-03-01 PROCEDURE — 250N000013 HC RX MED GY IP 250 OP 250 PS 637: Performed by: PHYSICIAN ASSISTANT

## 2021-03-01 PROCEDURE — 250N000011 HC RX IP 250 OP 636: Performed by: PSYCHIATRY & NEUROLOGY

## 2021-03-01 PROCEDURE — 99207 PR CDG-MDM COMPONENT: MEETS LOW - DOWN CODED: CPT | Performed by: PHYSICIAN ASSISTANT

## 2021-03-01 PROCEDURE — 250N000013 HC RX MED GY IP 250 OP 250 PS 637: Performed by: NURSE PRACTITIONER

## 2021-03-01 PROCEDURE — 72040 X-RAY EXAM NECK SPINE 2-3 VW: CPT

## 2021-03-01 PROCEDURE — 124N000003 HC R&B MH SENIOR/ADOLESCENT

## 2021-03-01 PROCEDURE — 250N000013 HC RX MED GY IP 250 OP 250 PS 637: Performed by: EMERGENCY MEDICINE

## 2021-03-01 PROCEDURE — 99232 SBSQ HOSP IP/OBS MODERATE 35: CPT | Performed by: PHYSICIAN ASSISTANT

## 2021-03-01 PROCEDURE — 250N000013 HC RX MED GY IP 250 OP 250 PS 637: Performed by: PSYCHIATRY & NEUROLOGY

## 2021-03-01 RX ORDER — DIPHENHYDRAMINE HYDROCHLORIDE 50 MG/ML
50 INJECTION INTRAMUSCULAR; INTRAVENOUS ONCE
Status: DISCONTINUED | OUTPATIENT
Start: 2021-03-01 | End: 2021-03-01

## 2021-03-01 RX ORDER — BENZTROPINE MESYLATE 1 MG/1
2 TABLET ORAL EVERY 4 HOURS PRN
Status: DISCONTINUED | OUTPATIENT
Start: 2021-03-01 | End: 2021-03-02

## 2021-03-01 RX ORDER — BENZTROPINE MESYLATE 1 MG/ML
INJECTION, SOLUTION INTRAMUSCULAR; INTRAVENOUS
Status: DISCONTINUED
Start: 2021-03-01 | End: 2021-03-01 | Stop reason: WASHOUT

## 2021-03-01 RX ORDER — CYCLOBENZAPRINE HCL 10 MG
10 TABLET ORAL ONCE
Status: COMPLETED | OUTPATIENT
Start: 2021-03-01 | End: 2021-03-01

## 2021-03-01 RX ORDER — DIPHENHYDRAMINE HCL 50 MG
50 CAPSULE ORAL EVERY 6 HOURS PRN
Status: DISCONTINUED | OUTPATIENT
Start: 2021-03-01 | End: 2021-03-01

## 2021-03-01 RX ORDER — LIDOCAINE 4 G/G
2 PATCH TOPICAL
Status: DISCONTINUED | OUTPATIENT
Start: 2021-03-01 | End: 2021-03-12 | Stop reason: HOSPADM

## 2021-03-01 RX ORDER — DIPHENHYDRAMINE HYDROCHLORIDE 50 MG/ML
INJECTION INTRAMUSCULAR; INTRAVENOUS
Status: DISPENSED
Start: 2021-03-01 | End: 2021-03-01

## 2021-03-01 RX ORDER — DIPHENHYDRAMINE HCL 25 MG
25 CAPSULE ORAL EVERY 6 HOURS PRN
Status: DISCONTINUED | OUTPATIENT
Start: 2021-03-01 | End: 2021-03-12 | Stop reason: HOSPADM

## 2021-03-01 RX ADMIN — GABAPENTIN 100 MG: 100 CAPSULE ORAL at 08:57

## 2021-03-01 RX ADMIN — CYANOCOBALAMIN TAB 1000 MCG 1000 MCG: 1000 TAB at 08:57

## 2021-03-01 RX ADMIN — MEMANTINE 5 MG: 5 TABLET ORAL at 08:58

## 2021-03-01 RX ADMIN — FERROUS GLUCONATE 324 MG: 324 TABLET ORAL at 08:58

## 2021-03-01 RX ADMIN — LATANOPROST 1 DROP: 50 SOLUTION OPHTHALMIC at 22:06

## 2021-03-01 RX ADMIN — LIDOCAINE 1 PATCH: 560 PATCH PERCUTANEOUS; TOPICAL; TRANSDERMAL at 13:29

## 2021-03-01 RX ADMIN — DONEPEZIL HYDROCHLORIDE 5 MG: 5 TABLET ORAL at 22:05

## 2021-03-01 RX ADMIN — DORZOLAMIDE HYDROCHLORIDE 1 DROP: 20 SOLUTION/ DROPS OPHTHALMIC at 09:10

## 2021-03-01 RX ADMIN — ALPRAZOLAM 0.25 MG: 0.25 TABLET ORAL at 22:05

## 2021-03-01 RX ADMIN — CYCLOBENZAPRINE 10 MG: 10 TABLET, FILM COATED ORAL at 12:36

## 2021-03-01 RX ADMIN — BRIMONIDINE TARTRATE 1 DROP: 1.5 SOLUTION OPHTHALMIC at 09:09

## 2021-03-01 RX ADMIN — BENZTROPINE MESYLATE 2 MG: 1 TABLET ORAL at 10:17

## 2021-03-01 RX ADMIN — HYDROXYCHLOROQUINE SULFATE 200 MG: 200 TABLET, FILM COATED ORAL at 09:01

## 2021-03-01 RX ADMIN — ACETAMINOPHEN 650 MG: 325 TABLET, FILM COATED ORAL at 15:16

## 2021-03-01 RX ADMIN — DORZOLAMIDE HYDROCHLORIDE 1 DROP: 20 SOLUTION/ DROPS OPHTHALMIC at 20:15

## 2021-03-01 RX ADMIN — HYDROXYCHLOROQUINE SULFATE 200 MG: 200 TABLET, FILM COATED ORAL at 20:16

## 2021-03-01 RX ADMIN — ACETAMINOPHEN 650 MG: 325 TABLET, FILM COATED ORAL at 03:19

## 2021-03-01 RX ADMIN — DULOXETINE HYDROCHLORIDE 60 MG: 60 CAPSULE, DELAYED RELEASE ORAL at 08:57

## 2021-03-01 RX ADMIN — GABAPENTIN 100 MG: 100 CAPSULE ORAL at 20:16

## 2021-03-01 RX ADMIN — AMLODIPINE BESYLATE 5 MG: 5 TABLET ORAL at 09:01

## 2021-03-01 RX ADMIN — DIPHENHYDRAMINE HYDROCHLORIDE 25 MG: 25 CAPSULE ORAL at 22:06

## 2021-03-01 RX ADMIN — SIMVASTATIN 10 MG: 10 TABLET, FILM COATED ORAL at 22:05

## 2021-03-01 RX ADMIN — MENTHOL 1 PATCH: 205.5 PATCH TOPICAL at 22:07

## 2021-03-01 RX ADMIN — FERROUS GLUCONATE 324 MG: 324 TABLET ORAL at 18:13

## 2021-03-01 RX ADMIN — BRIMONIDINE TARTRATE 1 DROP: 1.5 SOLUTION OPHTHALMIC at 20:17

## 2021-03-01 RX ADMIN — ACETAMINOPHEN 650 MG: 325 TABLET, FILM COATED ORAL at 20:16

## 2021-03-01 ASSESSMENT — ACTIVITIES OF DAILY LIVING (ADL)
LAUNDRY: UNABLE TO COMPLETE
HYGIENE/GROOMING: SHOWER;WITH ASSISTANCE
DRESS: INDEPENDENT
ORAL_HYGIENE: INDEPENDENT

## 2021-03-01 NOTE — CONSULTS
[facility]    Internal Medicine Follow Up Note     Patient: Swathi Dukes  MRN: [id number]  Admission Date: 2/16/2021  Hospital Day # 12    Assessment & Recommendations: Swathi Dukes is a 85 year old woman with a past medical history of SLE, HTN, aortic stenosis, and HLD who is admitted to station 3B with depression and passive suicidal ideation. Medicine seeing today for acute neck pain.     Acute Neck Pain - Previously followed by medicine service for neck and low back pain.  Conservative measures were encouraged, included Icyhot patches, acetaminophen, and ROM exercises.  Inflammatory markers related to her SLE negative. Now with acutely worsening pain in neck.  Concerns for possible cervical dystonia vs DDD vs muscle spasm.   -Obtain cervical spine x-ray   -Provide one time dose Flexeril 10mg   -After discussion with patient, diphenhydramine allergy discontinued as she states she named the wrong medication  -Add Benadryl 25mg Q6H PRN  -Agree with Cogentin 2mg Q4H PRN  -Add on lidocaine patches   -Continue ongoing symptomatic management       Medicine will continue to follow, please page with any additional concerns.     Xochitl Werner PA-C  Hospitalist Service  Pager: [phone]  7a-6p M-F and 7a-3p weekends/holidays, through 3/7/21  Otherwise page job code [phone] (3B), [phone] (3A), or [phone] ([facility] and 4A)  Text paging via [facility] is appreciated  _________________________________________________________________    Subjective & Interval History:  Chief complaint of neck pain   Patient states her neck pain began acutely yesterday afternoon while sitting at the table for dinner.  She notes she had just showered and washed her hair.  She denies any new movement/exercises/falls.  She states since then she has had worsening neck pain, initially on the left lateral neck and radiating to her posterior neck.  She denies associated headache, vision changes, upper  extremity weakness or paresthesias, difficulty or painful swallowing, shortness of breath. She denies having neck pain like this previously.     Last 24 hour care team notes reviewed.   ROS: 4 point ROS (including Respiratory, CV, GI and ) was performed and negative unless otherwise noted in HPI.     Medications: Reviewed in EPIC.    Physical Exam:    Blood pressure (!) 163/78, pulse 74, temperature 97.7  F (36.5  C), temperature source Temporal, resp. rate 16, weight 63.1 kg (139 lb 3.2 oz), SpO2 97 %, not currently breastfeeding.    GENERAL: Alert and oriented x 3. In NAD  HEENT: Anicteric sclera. Mucous membranes moist.   CV: RRR. S1, S2. No murmurs appreciated.   RESPIRATORY: Effort normal. Lungs CTAB with no wheezing, rales, rhonchi.   GI: Abdomen soft, non distended, non tender.   NEUROLOGICAL: No focal deficits. Moves all extremities.    EXTREMITIES: Patient's left neck contracted to left sided. Tenderness to palpation of bilateral sternocleidomastoid muscles.  ROM limited secondary to pain. Strength of bilateral upper extremities 4/5. No peripheral edema. Intact bilateral pedal pulses.   SKIN: No jaundice. No rashes.      Labs & Studies of Note: I personally reviewed the following studies:    CT Cervical Spine 1/16/21: No evidence of fracture. No prevertebral soft tissue swelling. Alignment is normal. Vertebral body height is maintained. No destructive osseous lesions. Marked degenerative endplate changes and  loss of disc height throughout the cervical spine. Multiple levels of facet hypertrophy. Mild spinal canal narrowings at C3-C4, C4-C5, and C5-C6 due to disc osteophytes. Multiple levels of neural foraminal narrowing due to disc osteophytes and facet hypertrophy, particularly from C3-C4 through C6-C7.     Results for RYLEE BRICE (MRN [id number]) as of 3/1/2021 11:51   Ref. Range 2/27/2021 07:57   Sodium Latest Ref Range: 133 - 144 mmol/L 139   Potassium Latest Ref Range: 3.4 - 5.3 mmol/L 4.4    Chloride Latest Ref Range: 94 - 109 mmol/L 105   Carbon Dioxide Latest Ref Range: 20 - 32 mmol/L 27   Urea Nitrogen Latest Ref Range: 7 - 30 mg/dL 26   Creatinine Latest Ref Range: 0.52 - 1.04 mg/dL 0.80   GFR Estimate Latest Ref Range: >60 mL/min/1.73_m2 68   GFR Estimate If Black Latest Ref Range: >60 mL/min/1.73_m2 78   Calcium Latest Ref Range: 8.5 - 10.1 mg/dL 9.2   Anion Gap Latest Ref Range: 3 - 14 mmol/L 7   Results for RYLEE BRICE (MRN [id number]) as of 3/1/2021 11:51   Ref. Range 2/18/2021 07:36 2/27/2021 07:57   CRP Inflammation Latest Ref Range: 0.0 - 8.0 mg/L  3.4   Folate Latest Ref Range: >5.4 ng/mL 18.8    TSH Latest Ref Range: 0.40 - 4.00 mU/L 2.07    Vitamin B12 Latest Ref Range: 193 - 986 pg/mL 661    Vitamin D Deficiency screening Latest Ref Range: 20 - 75 ug/L 36

## 2021-03-01 NOTE — PLAN OF CARE
Problem: Mood Impairment (Anxiety Signs/Symptoms)  Goal: Improved Mood Symptoms (Anxiety Signs/Symptoms)  Outcome: Improving  Note:   Patient reported feeling well today, said she slept well and has been active, going to groups and taking a shower.    Main concern: left sided neck pain:patient reported the pain at about 1900, said she moved her head and the pain came suddenly. She was given PRN Tylenol and later icy hot patch was applied per pt's request.   Concern: Patient said she has been feeling stiff all day today. Discussed exercising ans stretching, patient was receptive, said she had been exercising at home prior to this admission.  Concern: loose stools: patient said that she thinks she is having problems with lactose intolerance, said she has been taking medications prior to eating lactose containing foods before. She said she would like to restart them. Imodium PRN was given per pt's request. Lactose intolerance was not documented in patient's allergy or problem list; medications like Lactulose were not documented on pt's PTA med list at this time.  Thoughts were clear. Speech was organized. Mood was calm, affect appropriate to situation. No loose associations or racing thoughts. Memory appeared intact with some minor forgetfulness. Patient keeps self clean and well groomed. Appetite was reported as good, she ate 100% of her dinner meal.  She attended group and remained out on the unit most of the shift. Continent of B&B.   Behavior was appropriate. Patient followed directions well, communicated her needs appropriately, social with other patients on the  Unit, attended offered groups and unit activities. She was out on the unit most of the shift. Took scheduled medications; took only 100 mg from the ordered 200 mg gabapentin, as he did last night. BP medications were held as order parameters were not met. Patient went to bed at 2230.

## 2021-03-01 NOTE — PROGRESS NOTES
Patient reported that she was  having pain on the left side of her neck and is spreading to the back of her neck. She said that she had the pain on the left side in the early part of the evening and now, the pain has gone to the back part of her neck. Tylenol 650 mg. given @ 0319 PRN for pain. Cold pack applied to the affected area. She got up from bed and preferred sitting on a chair.     She also reported that she has been to the bathroom frequently and was continent of urine at each time. She was wondering is she is having urinary tract infection. No other discomforts noted.     She was partially relieved of pain and went back to sleep @ 0430 and slept till the end of the shift. Slept a total of 7.5 hours.

## 2021-03-01 NOTE — PLAN OF CARE
Work Completed: Reviewed chart. Met with team to discuss pt progress. Pt's mental health has improved. She is now preoccupied with physical ailments. She is being seen by medical.    Discharge plan or goal: Discharge back to facility when mental health has stabilized.                Barriers to discharge: Ongoing symptom management.

## 2021-03-01 NOTE — PROGRESS NOTES
"Patient seen, chart reviewed, care discussed with staff.  Blood pressure (!) 163/78, pulse 74, temperature 97.7  F (36.5  C), temperature source Temporal, resp. rate 16, weight 63.1 kg (139 lb 3.2 oz), SpO2 97 %, not currently breastfeeding.    Acute neck pain this am, starting on the right and going across front of neck.  Movement in turning head limited by pain.   No tremors, not restless, no akathisia, no cogwheel.    Differential includes dystonia, but why it would develop now is uncertain. Aricept can rarelt trigger EPSE.  No benefit from Cogentin in one hour (allergy to Benadryl).      General appearance: good  Alert.   Affect: fair  Mood: fair    Speech:  normal.   Eye contact:  good.    Psychomotor behavior: in bed, decreased  Gait: not observed  Abnormal movements: none  Delusions: none  Hallucinations:   none  Thoughts: logical, afraid of going back to apartment where the isolation caused her to \"crack up\".    Associations: intact  Judgement: fair  Insight: fair  Cognitions: intact in conversation  Memory:  intact in conversation  Orientation: normal      Plan: stop Zyprexa and Aricept  2.  Consult medical      Current Facility-Administered Medications:      acetaminophen (TYLENOL) tablet 650 mg, 650 mg, Oral, Q4H PRN, Gloria Martinez APRN CNP, 650 mg at 03/01/21 0319     ALPRAZolam (XANAX) tablet 0.25 mg, 0.25 mg, Oral, At Bedtime, Sha Ricardo MD, 0.25 mg at 02/28/21 2158     alum & mag hydroxide-simethicone (MAALOX) suspension 30 mL, 30 mL, Oral, Q4H PRN, Gloria Martinez APRN CNP     amLODIPine (NORVASC) tablet 5 mg, 5 mg, Oral, Daily, Marleni Lester PA-C, 5 mg at 03/01/21 0901     benztropine (COGENTIN) tablet 2 mg, 2 mg, Oral, Q4H PRN, Sha Ricardo MD, 2 mg at 03/01/21 1017     brimonidine (ALPHAGAN-P) 0.15 % ophthalmic solution 1 drop, 1 drop, Both Eyes, BID, Jey Mayo MD, 1 drop at 03/01/21 0909     cyanocobalamin (VITAMIN B-12) tablet 1,000 mcg, " 1,000 mcg, Oral, Daily, Gloria Martinez APRN CNP, 1,000 mcg at 03/01/21 0857     diphenhydrAMINE (BENADRYL) 50 MG/ML injection, , , ,      donepezil (ARICEPT) tablet 5 mg, 5 mg, Oral, At Bedtime, Sha Ricardo MD, 5 mg at 02/28/21 2158     dorzolamide (TRUSOPT) 2 % ophthalmic solution 1 drop, 1 drop, Both Eyes, BID, Jey Mayo MD, 1 drop at 03/01/21 0910     DULoxetine (CYMBALTA) DR capsule 60 mg, 60 mg, Oral, Daily, Gloria Martinez APRN CNP, 60 mg at 03/01/21 0857     enalapril (VASOTEC) tablet 20 mg, 20 mg, Oral, BID, Marleni Lester PA-C, 20 mg at 02/26/21 0847     ferrous gluconate (FERGON) tablet 324 mg, 324 mg, Oral, BID w/meals, Gloria Martinez APRN CNP, 324 mg at 03/01/21 0858     gabapentin (NEURONTIN) capsule 200 mg, 200 mg, Oral, BID, Sha Ricardo MD, 100 mg at 03/01/21 0857     hydrALAZINE (APRESOLINE) tablet 25 mg, 25 mg, Oral, TID, Marleni Lester PA-C, 25 mg at 02/26/21 0847     hydroxychloroquine (PLAQUENIL) tablet 200 mg, 200 mg, Oral, BID, Tangela Vuong APRN CNP, 200 mg at 03/01/21 0901     hydrOXYzine (ATARAX) tablet 25 mg, 25 mg, Oral, Q4H PRN, Gloria Martinez APRN CNP, 25 mg at 02/17/21 1255     latanoprost (XALATAN) 0.005 % ophthalmic solution 1 drop, 1 drop, Both Eyes, At Bedtime, Jey Mayo MD, 1 drop at 02/28/21 2158     Lidocaine (LIDOCARE) 4 % Patch 2 patch, 2 patch, Transdermal, Q24H, Xochitl Werner PA-C     lidocaine patch in PLACE, , Transdermal, Q8H, Xochitl Werner PA-C     loperamide (IMODIUM) capsule 2 mg, 2 mg, Oral, TID PRN, Tangela Vuong, APRCATRINA CNP, 2 mg at 02/28/21 1904     memantine (NAMENDA) tablet 5 mg, 5 mg, Oral, Daily, Sha Ricardo MD, 5 mg at 03/01/21 0858     menthol (ICY HOT) 5 % patch 1 patch, 1 patch, Topical, Q8H PRN, 1 patch at 02/28/21 2208 **AND** menthol (ICY HOT) Patch in Place, , Transdermal, Q8H, Marleni Lester PA-C     OLANZapine (zyPREXA)  tablet 5 mg, 5 mg, Oral, TID PRN **OR** OLANZapine (zyPREXA) injection 5 mg, 5 mg, Intramuscular, TID PRN, Gloria Martinez APRN CNP     polyethylene glycol (MIRALAX) Packet 17 g, 17 g, Oral, Daily PRN, Gloria Martinez APRN CNP     simvastatin (ZOCOR) tablet 10 mg, 10 mg, Oral, At Bedtime, Jey Mayo MD, 10 mg at 02/28/21 2158     traZODone (DESYREL) tablet 50 mg, 50 mg, Oral, At Bedtime PRN, Gloria Martinez APRN CNP  Recent Results (from the past 168 hour(s))   CRP inflammation    Collection Time: 02/27/21  7:57 AM   Result Value Ref Range    CRP Inflammation 3.4 0.0 - 8.0 mg/L   Erythrocyte sedimentation rate auto    Collection Time: 02/27/21  7:57 AM   Result Value Ref Range    Sed Rate 32 (H) 0 - 30 mm/h   Basic metabolic panel    Collection Time: 02/27/21  7:57 AM   Result Value Ref Range    Sodium 139 133 - 144 mmol/L    Potassium 4.4 3.4 - 5.3 mmol/L    Chloride 105 94 - 109 mmol/L    Carbon Dioxide 27 20 - 32 mmol/L    Anion Gap 7 3 - 14 mmol/L    Glucose 96 70 - 99 mg/dL    Urea Nitrogen 26 7 - 30 mg/dL    Creatinine 0.80 0.52 - 1.04 mg/dL    GFR Estimate 68 >60 mL/min/[1.73_m2]    GFR Estimate If Black 78 >60 mL/min/[1.73_m2]    Calcium 9.2 8.5 - 10.1 mg/dL       Not suicidal.

## 2021-03-01 NOTE — PLAN OF CARE
Problem: Depressive Symptoms  Goal: Depressive Symptoms  Description: Signs and symptoms of listed problems will be absent or manageable.  Outcome: No Change  Flowsheets (Taken 3/1/2021 4686)  Depressive Symptoms Assessed: all  Depressive Symptoms Present:    affect    mood    insight    medication sensitivity    Pt denies anxiety and depression as well as suicidal ideation. Pt talked about events leading up to hospitalization. Pt reported how she is not happy with he current living situation  due to the price. Pt also voiced that covid isolation has been very hard. Pt does have a friend who lives  across the [place] from her  who she talks with daily.  Pt has complained of neck pain. Pt was initially ordered prn dose of benedryl, however when writer approached her she reported  that she was allergic and that she had had respiratory issues due  to it. Dr. Ricardo notified and order received for cogentin 2 mg. Pt denied that this was helpful. Medical team was consulted at Dr. Ricardo's request. Prn flexeril was ordered. Pt denied that this was helpful and order received for x-ray. Pt came out to the [facility] and read a magazine. Lidoderm patch was placed. Pt has used ice packs several times as well. Pt is currently sitting in the lounge reading and socializing with peer.   1500- Pt is reporting that Lidoderm patch has been helpful with her neck pain.

## 2021-03-01 NOTE — PLAN OF CARE
Pt actively participated in a structured Therapeutic Recreation group with a focus on leisure participation, relaxation, and exercise. Pt participated in the guided exercise for the full duration of the group. Pt followed along, engaged in the guided chair exercise routine.  Pt was encouraged to use positive imagery with the deep breathing and stretching to foster relaxation, improves focus, and reduce stress.

## 2021-03-02 LAB
LABORATORY COMMENT REPORT: NORMAL
SARS-COV-2 RNA RESP QL NAA+PROBE: NEGATIVE
SPECIMEN SOURCE: NORMAL

## 2021-03-02 PROCEDURE — 250N000013 HC RX MED GY IP 250 OP 250 PS 637: Performed by: NURSE PRACTITIONER

## 2021-03-02 PROCEDURE — 250N000013 HC RX MED GY IP 250 OP 250 PS 637: Performed by: EMERGENCY MEDICINE

## 2021-03-02 PROCEDURE — 250N000013 HC RX MED GY IP 250 OP 250 PS 637: Performed by: PHYSICIAN ASSISTANT

## 2021-03-02 PROCEDURE — G0177 OPPS/PHP; TRAIN & EDUC SERV: HCPCS

## 2021-03-02 PROCEDURE — U0003 INFECTIOUS AGENT DETECTION BY NUCLEIC ACID (DNA OR RNA); SEVERE ACUTE RESPIRATORY SYNDROME CORONAVIRUS 2 (SARS-COV-2) (CORONAVIRUS DISEASE [COVID-19]), AMPLIFIED PROBE TECHNIQUE, MAKING USE OF HIGH THROUGHPUT TECHNOLOGIES AS DESCRIBED BY CMS-2020-01-R: HCPCS | Performed by: PSYCHIATRY & NEUROLOGY

## 2021-03-02 PROCEDURE — 124N000003 HC R&B MH SENIOR/ADOLESCENT

## 2021-03-02 PROCEDURE — 250N000013 HC RX MED GY IP 250 OP 250 PS 637: Performed by: PSYCHIATRY & NEUROLOGY

## 2021-03-02 PROCEDURE — U0005 INFEC AGEN DETEC AMPLI PROBE: HCPCS | Performed by: PSYCHIATRY & NEUROLOGY

## 2021-03-02 RX ORDER — CYCLOBENZAPRINE HCL 5 MG
5 TABLET ORAL 3 TIMES DAILY PRN
Status: DISCONTINUED | OUTPATIENT
Start: 2021-03-02 | End: 2021-03-12 | Stop reason: HOSPADM

## 2021-03-02 RX ORDER — MEMANTINE HYDROCHLORIDE 10 MG/1
10 TABLET ORAL DAILY
Status: DISCONTINUED | OUTPATIENT
Start: 2021-03-03 | End: 2021-03-12 | Stop reason: HOSPADM

## 2021-03-02 RX ADMIN — HYDROXYCHLOROQUINE SULFATE 200 MG: 200 TABLET, FILM COATED ORAL at 08:20

## 2021-03-02 RX ADMIN — SIMVASTATIN 10 MG: 10 TABLET, FILM COATED ORAL at 21:02

## 2021-03-02 RX ADMIN — HYDROXYCHLOROQUINE SULFATE 200 MG: 200 TABLET, FILM COATED ORAL at 21:02

## 2021-03-02 RX ADMIN — LATANOPROST 1 DROP: 50 SOLUTION OPHTHALMIC at 21:11

## 2021-03-02 RX ADMIN — DORZOLAMIDE HYDROCHLORIDE 1 DROP: 20 SOLUTION/ DROPS OPHTHALMIC at 08:27

## 2021-03-02 RX ADMIN — HYDRALAZINE HYDROCHLORIDE 25 MG: 25 TABLET ORAL at 14:50

## 2021-03-02 RX ADMIN — DULOXETINE HYDROCHLORIDE 60 MG: 60 CAPSULE, DELAYED RELEASE ORAL at 08:20

## 2021-03-02 RX ADMIN — HYDRALAZINE HYDROCHLORIDE 25 MG: 25 TABLET ORAL at 21:02

## 2021-03-02 RX ADMIN — MENTHOL 1 PATCH: 205.5 PATCH TOPICAL at 21:11

## 2021-03-02 RX ADMIN — LIDOCAINE 1 PATCH: 560 PATCH PERCUTANEOUS; TOPICAL; TRANSDERMAL at 08:21

## 2021-03-02 RX ADMIN — MEMANTINE 5 MG: 5 TABLET ORAL at 08:20

## 2021-03-02 RX ADMIN — CYANOCOBALAMIN TAB 1000 MCG 1000 MCG: 1000 TAB at 08:20

## 2021-03-02 RX ADMIN — BRIMONIDINE TARTRATE 1 DROP: 1.5 SOLUTION OPHTHALMIC at 08:24

## 2021-03-02 RX ADMIN — CYCLOBENZAPRINE HYDROCHLORIDE 5 MG: 5 TABLET, FILM COATED ORAL at 11:20

## 2021-03-02 RX ADMIN — AMLODIPINE BESYLATE 5 MG: 5 TABLET ORAL at 08:35

## 2021-03-02 RX ADMIN — DORZOLAMIDE HYDROCHLORIDE 1 DROP: 20 SOLUTION/ DROPS OPHTHALMIC at 21:03

## 2021-03-02 RX ADMIN — BRIMONIDINE TARTRATE 1 DROP: 1.5 SOLUTION OPHTHALMIC at 21:03

## 2021-03-02 RX ADMIN — ENALAPRIL MALEATE 20 MG: 20 TABLET ORAL at 21:02

## 2021-03-02 RX ADMIN — GABAPENTIN 200 MG: 100 CAPSULE ORAL at 21:02

## 2021-03-02 RX ADMIN — FERROUS GLUCONATE 324 MG: 324 TABLET ORAL at 17:43

## 2021-03-02 RX ADMIN — GABAPENTIN 100 MG: 100 CAPSULE ORAL at 08:20

## 2021-03-02 RX ADMIN — FERROUS GLUCONATE 324 MG: 324 TABLET ORAL at 08:20

## 2021-03-02 ASSESSMENT — ACTIVITIES OF DAILY LIVING (ADL)
HYGIENE/GROOMING: INDEPENDENT
ORAL_HYGIENE: INDEPENDENT
LAUNDRY: UNABLE TO COMPLETE
DRESS: INDEPENDENT
DRESS: INDEPENDENT
ORAL_HYGIENE: INDEPENDENT
HYGIENE/GROOMING: INDEPENDENT

## 2021-03-02 NOTE — PLAN OF CARE
Problem: Pain  Goal: Acceptable Pain Control and Functional Ability  Outcome: No Change  Note: Patient continued to c/o sharp lift sided neck pain. She reports sharp pain with moving her neck. Patient slept until supper time and remained in the lounge after supper, socializing, talking to staff, and watching a movie.   Pain is attempted to be controlled with Lidocaine patch, cold pack, PRN Tylenol, one dose of Flexeril.   Patient agreed to take PRN Benadryl with HS medications with the hope that it will help her with the pain and sleep at night.   Intervention: Develop Pain Management Plan  Recent Flowsheet Documentation  Taken 3/1/2021 2015 by Emilee Gutierrez RN  Pain Management Interventions:   medication (see MAR)   cold applied   emotional support   diversional activity provided     Problem: Mood Impairment (Anxiety Signs/Symptoms)  Goal: Improved Mood Symptoms (Anxiety Signs/Symptoms)  Outcome: Improving  Note: Patient reported having difficult time due to sharp neck pain. However, she denied all psych symptoms. She was pleasant on approach, calm and cooperative. Communicated needs well. No behavioral issues.   Thoughts are rational, speech is clear and organized. Affect was calm, mood is appropriate to situation. Patient keeps self clean and well groomed. Denied physical issues, other that the pain.

## 2021-03-02 NOTE — PLAN OF CARE
Work Completed: Reviewed chart. Met with team to discuss pt progress. Pt's mental health symptoms are much improved. Pt is struggling with neck pain at this time. Met briefly with pt who reports that she is doing well. Discharge date has not been discussed.    Discharge plan or goal: Discharge back to her facility once mental health is stable.                Barriers to discharge: Pt is experiencing pain issues.

## 2021-03-02 NOTE — PLAN OF CARE
"  Problem: OT General Care Plan  Goal: OT Goal 1  Description: Will attend OT groups and set goal focus.      Note: Attended 2 of 2 OT groups. She took an active role in working on a multi step task during a goal oriented task group with 5 pts. She was attentive to detail, asked for assistance with directions and followed through with plans. She was social with others and talked about feeling pleased she \"just found out I can have visitors when I get home and there is an activity I like to do that is also starting\". She explained feeling happy about the changes.  Affect appeared brighter. She was more spontaneous with interactions and work on task.  Patient attended the afternoon group for 55 minutes with 6 patients.  The topic focused on Aftercare,  identiying support people, coping strategies, behaviors and red flags, affirmations and daily and weekly routines that are helpful with gaining balance.  She identified a neighbor and her son as major supports and utilizes both people when feeling stressed to help her reground and balance her worries.  She talked about being \"worrier \"and understanding she needs to reason through her thoughts at those moments to realize they are not necessary and instead causing her discomfort.  Initially she stated not having coping strategies though with discussion identified multiple ideas including talking to her son and neighbor, walking in the halls, TV, having a snack and especially a piece of chocolate and seemed able to it understand that these were success oriented ideas.  Again she seems more comfortable, interested in being involved, and active in all opportunities.       "

## 2021-03-02 NOTE — PLAN OF CARE
Problem: Depressive Symptoms  Goal: Depressive Symptoms  Description: Signs and symptoms of listed problems will be absent or manageable.  Outcome: No Change  Flowsheets (Taken 3/2/2021 1758)  Depressive Symptoms Assessed: all  Depressive Symptoms Present:    affect    mood    insight    anxiety   Vianney /medical PA web paged to review Vasotec and hydralazine as they have been held for the last 4 days due to not meeting blood pressure parameters.   Pt reports feeling more anxious today due to thinking of discharging home soon. Pt affect is flat but she brightens during interactions. Pt's eye contact is appropriate. Pt is social with select peers.   Pt continues to complain of neck pain but reports that it may be a little better today. Lidocaine patch placed this AM. Prn  flexeril 5 mg TID is now ordered as well as a PT assessment for neck exercises.   Dr. Ricardo notified of patient declining complete dose of gabapentin. Pt has only been taking 100 mg of the 200 mg dose.  Pt is social with select peers.   Pt received prn flexeril 5 mg at 1120.     New parameters received for Vasotec and Hydralazine.     1445- Pt declined need for pain medication or interventions at this time. Lidoderm patch in place.  Pt met parameter and received 1400 scheduled hydralazine.

## 2021-03-02 NOTE — PLAN OF CARE
Problem: Social, Occupational or Functional Impairment (Anxiety Signs/Symptoms)  Goal: Enhanced Social, Occupational or Functional Skills (Anxiety Signs/Symptoms)  Outcome: No Change     Slept all night for 7.25 hours, no complaints of pain.

## 2021-03-02 NOTE — PROGRESS NOTES
Patient seen, chart reviewed, care discussed with staff.  Blood pressure (!) 149/75, pulse 62, temperature 97.5  F (36.4  C), temperature source Temporal, resp. rate 16, weight 62.6 kg (137 lb 14.4 oz), SpO2 95 %, not currently breastfeeding.    Neck pain better, I think it is dystonia triggered by Aricept added to Zyprexa.      General appearance: good  Alert.   Affect: good, but very anxious she will relapse post diecharge  Mood: fair    Speech:  normal.   Eye contact:  good.    Psychomotor behavior: normal  Gait: normal.    Abnormal movements: none  Delusions: none  Hallucinations:  none  Thoughts: logical  Associations: intact  Judgement: fair  Insight: good  Cognitions: intact in conversation  Memory:  intact in conversation  Orientation: normal    Not suicidal.  (4.8 on CPT)    Patient Active Problem List   Diagnosis     Essential hypertension     Anxiety     Glaucoma suspect, bilateral     Personal history of DVT (deep vein thrombosis)     Mixed obsessional thoughts and acts     Age-related osteoporosis without current pathological fracture     Nonrheumatic aortic valve insufficiency     Chronic diarrhea     Pain in both hands     Primary osteoarthritis of left hip     Suicidal ideation     Pericardial effusion     Neck pain     Abnormal cardiovascular stress test     Acute cystitis without hematuria     Fall, initial encounter     Laceration of scalp without foreign body, initial encounter     Recurrent UTI     Anemia due to blood loss, acute     Acute kidney injury superimposed on CKD (H)     Lumbar radiculopathy     Liver lesion     Depression, unspecified depression type     Plan:  Discontinue Aricept and remaining Xanax      Current Facility-Administered Medications:      acetaminophen (TYLENOL) tablet 650 mg, 650 mg, Oral, Q4H PRN, Gloria Martinez APRN CNP, 650 mg at 03/01/21 2016     alum & mag hydroxide-simethicone (MAALOX) suspension 30 mL, 30 mL, Oral, Q4H PRN, Gloria Martinez  CHAPITO Huff CNP     amLODIPine (NORVASC) tablet 5 mg, 5 mg, Oral, Daily, Marleni Lester PA-C, 5 mg at 03/02/21 0835     brimonidine (ALPHAGAN-P) 0.15 % ophthalmic solution 1 drop, 1 drop, Both Eyes, BID, Jey Mayo MD, 1 drop at 03/02/21 0824     cyanocobalamin (VITAMIN B-12) tablet 1,000 mcg, 1,000 mcg, Oral, Daily, Gloria Martinez APRN CNP, 1,000 mcg at 03/02/21 0820     cyclobenzaprine (FLEXERIL) tablet 5 mg, 5 mg, Oral, TID PRN, Xochitl Werner PA-C, 5 mg at 03/02/21 1120     diphenhydrAMINE (BENADRYL) capsule 25 mg, 25 mg, Oral, Q6H PRN, Xochitl Werner PA-C, 25 mg at 03/01/21 2206     dorzolamide (TRUSOPT) 2 % ophthalmic solution 1 drop, 1 drop, Both Eyes, BID, Jey Mayo MD, 1 drop at 03/02/21 0827     DULoxetine (CYMBALTA) DR capsule 60 mg, 60 mg, Oral, Daily, Gloria Martinez APRN CNP, 60 mg at 03/02/21 0820     enalapril (VASOTEC) tablet 20 mg, 20 mg, Oral, BID, Xochitl Werner PA-C, 20 mg at 02/26/21 0847     ferrous gluconate (FERGON) tablet 324 mg, 324 mg, Oral, BID w/meals, Gloria Martinez APRN CNP, 324 mg at 03/02/21 0820     gabapentin (NEURONTIN) capsule 200 mg, 200 mg, Oral, BID, Sha Ricardo MD, 100 mg at 03/02/21 0820     hydrALAZINE (APRESOLINE) tablet 25 mg, 25 mg, Oral, TID, Xochitl Werner PA-C, 25 mg at 02/26/21 0847     hydroxychloroquine (PLAQUENIL) tablet 200 mg, 200 mg, Oral, BID, Tangela Vuong, CHAPITO CNP, 200 mg at 03/02/21 0820     hydrOXYzine (ATARAX) tablet 25 mg, 25 mg, Oral, Q4H PRN, Gloria Martinez, CHAPITO CNP, 25 mg at 02/17/21 1255     latanoprost (XALATAN) 0.005 % ophthalmic solution 1 drop, 1 drop, Both Eyes, At Bedtime, Jey Mayo MD, 1 drop at 03/01/21 2206     Lidocaine (LIDOCARE) 4 % Patch 2 patch, 2 patch, Transdermal, Q24H, Xochitl Werner PA-C, 1 patch at 03/02/21 0821     lidocaine patch in PLACE, , Transdermal, Q8H, Xochitl Werner PA-C     loperamide  (IMODIUM) capsule 2 mg, 2 mg, Oral, TID PRN, Tangela Vuong, CHAPITO CNP, 2 mg at 02/28/21 1904     memantine (NAMENDA) tablet 5 mg, 5 mg, Oral, Daily, Sha Ricardo MD, 5 mg at 03/02/21 0820     menthol (ICY HOT) 5 % patch 1 patch, 1 patch, Topical, Q8H PRN, 1 patch at 03/01/21 2207 **AND** menthol (ICY HOT) Patch in Place, , Transdermal, Q8H, Marleni Lester PA-C     OLANZapine (zyPREXA) tablet 5 mg, 5 mg, Oral, TID PRN **OR** OLANZapine (zyPREXA) injection 5 mg, 5 mg, Intramuscular, TID PRN, Gloria Martinez APRN CNP     polyethylene glycol (MIRALAX) Packet 17 g, 17 g, Oral, Daily PRN, Gloria Martinez APRN CNP     simvastatin (ZOCOR) tablet 10 mg, 10 mg, Oral, At Bedtime, Jey Mayo MD, 10 mg at 03/01/21 2205     traZODone (DESYREL) tablet 50 mg, 50 mg, Oral, At Bedtime PRN, Gloria Martinez APRN CNP  Recent Results (from the past 168 hour(s))   CRP inflammation    Collection Time: 02/27/21  7:57 AM   Result Value Ref Range    CRP Inflammation 3.4 0.0 - 8.0 mg/L   Erythrocyte sedimentation rate auto    Collection Time: 02/27/21  7:57 AM   Result Value Ref Range    Sed Rate 32 (H) 0 - 30 mm/h   Basic metabolic panel    Collection Time: 02/27/21  7:57 AM   Result Value Ref Range    Sodium 139 133 - 144 mmol/L    Potassium 4.4 3.4 - 5.3 mmol/L    Chloride 105 94 - 109 mmol/L    Carbon Dioxide 27 20 - 32 mmol/L    Anion Gap 7 3 - 14 mmol/L    Glucose 96 70 - 99 mg/dL    Urea Nitrogen 26 7 - 30 mg/dL    Creatinine 0.80 0.52 - 1.04 mg/dL    GFR Estimate 68 >60 mL/min/[1.73_m2]    GFR Estimate If Black 78 >60 mL/min/[1.73_m2]    Calcium 9.2 8.5 - 10.1 mg/dL

## 2021-03-02 NOTE — PROGRESS NOTES
Brief Medicine Progress Note    Internal Medicine following for new onset neck pain. Concerns for possible cervical dystonia vs DDD vs muscle spasm.  She received a one time dose of Cogentin, Benadryl and Flexeril. Patient states her neck pain has improved and has increased range of motion.  Her lateral neck muscles are  to palpation, but also improved from yesterday.  She is interested in working with physical therapy.     -Add Flexeril 5mg TID PRN for muscle spasms  -Continue Cogentin and Benadryl PRN  -Continue Lidocaine patches  -Consult to physical therapy to assist in ROM exercises     Medicine will sign off.  Please call or page with any questions or concerns.     Xochitl Werner PA-C  Hospitalist Service  [facility], [place]  Pager: [phone]

## 2021-03-03 PROCEDURE — 250N000013 HC RX MED GY IP 250 OP 250 PS 637: Performed by: NURSE PRACTITIONER

## 2021-03-03 PROCEDURE — 124N000003 HC R&B MH SENIOR/ADOLESCENT

## 2021-03-03 PROCEDURE — 250N000013 HC RX MED GY IP 250 OP 250 PS 637: Performed by: PHYSICIAN ASSISTANT

## 2021-03-03 PROCEDURE — 250N000013 HC RX MED GY IP 250 OP 250 PS 637: Performed by: EMERGENCY MEDICINE

## 2021-03-03 PROCEDURE — 250N000013 HC RX MED GY IP 250 OP 250 PS 637: Performed by: PSYCHIATRY & NEUROLOGY

## 2021-03-03 PROCEDURE — G0177 OPPS/PHP; TRAIN & EDUC SERV: HCPCS

## 2021-03-03 PROCEDURE — 999N000147 HC STATISTIC PT IP EVAL DEFER: Performed by: PHYSICAL THERAPIST

## 2021-03-03 RX ORDER — DONEPEZIL HYDROCHLORIDE 5 MG/1
5 TABLET, FILM COATED ORAL AT BEDTIME
Status: DISCONTINUED | OUTPATIENT
Start: 2021-03-03 | End: 2021-03-05

## 2021-03-03 RX ADMIN — BRIMONIDINE TARTRATE 1 DROP: 1.5 SOLUTION OPHTHALMIC at 09:59

## 2021-03-03 RX ADMIN — HYDRALAZINE HYDROCHLORIDE 25 MG: 25 TABLET ORAL at 13:52

## 2021-03-03 RX ADMIN — FERROUS GLUCONATE 324 MG: 324 TABLET ORAL at 08:55

## 2021-03-03 RX ADMIN — FERROUS GLUCONATE 324 MG: 324 TABLET ORAL at 17:33

## 2021-03-03 RX ADMIN — AMLODIPINE BESYLATE 5 MG: 5 TABLET ORAL at 08:55

## 2021-03-03 RX ADMIN — ENALAPRIL MALEATE 20 MG: 20 TABLET ORAL at 08:52

## 2021-03-03 RX ADMIN — LIDOCAINE 2 PATCH: 560 PATCH PERCUTANEOUS; TOPICAL; TRANSDERMAL at 08:49

## 2021-03-03 RX ADMIN — CYANOCOBALAMIN TAB 1000 MCG 1000 MCG: 1000 TAB at 08:51

## 2021-03-03 RX ADMIN — HYDROXYCHLOROQUINE SULFATE 200 MG: 200 TABLET, FILM COATED ORAL at 08:51

## 2021-03-03 RX ADMIN — DORZOLAMIDE HYDROCHLORIDE 1 DROP: 20 SOLUTION/ DROPS OPHTHALMIC at 09:46

## 2021-03-03 RX ADMIN — HYDRALAZINE HYDROCHLORIDE 25 MG: 25 TABLET ORAL at 20:15

## 2021-03-03 RX ADMIN — BRIMONIDINE TARTRATE 1 DROP: 1.5 SOLUTION OPHTHALMIC at 20:16

## 2021-03-03 RX ADMIN — GABAPENTIN 100 MG: 100 CAPSULE ORAL at 20:15

## 2021-03-03 RX ADMIN — LOPERAMIDE HYDROCHLORIDE 2 MG: 2 CAPSULE ORAL at 20:10

## 2021-03-03 RX ADMIN — DORZOLAMIDE HYDROCHLORIDE 1 DROP: 20 SOLUTION/ DROPS OPHTHALMIC at 20:28

## 2021-03-03 RX ADMIN — GABAPENTIN 200 MG: 100 CAPSULE ORAL at 08:54

## 2021-03-03 RX ADMIN — MEMANTINE 10 MG: 10 TABLET ORAL at 08:51

## 2021-03-03 RX ADMIN — LATANOPROST 1 DROP: 50 SOLUTION OPHTHALMIC at 20:15

## 2021-03-03 RX ADMIN — HYDRALAZINE HYDROCHLORIDE 25 MG: 25 TABLET ORAL at 08:55

## 2021-03-03 RX ADMIN — ENALAPRIL MALEATE 20 MG: 20 TABLET ORAL at 20:15

## 2021-03-03 RX ADMIN — DONEPEZIL HYDROCHLORIDE 5 MG: 5 TABLET ORAL at 20:18

## 2021-03-03 RX ADMIN — LOPERAMIDE HYDROCHLORIDE 2 MG: 2 CAPSULE ORAL at 06:51

## 2021-03-03 RX ADMIN — DULOXETINE HYDROCHLORIDE 60 MG: 60 CAPSULE, DELAYED RELEASE ORAL at 08:52

## 2021-03-03 RX ADMIN — SIMVASTATIN 10 MG: 10 TABLET, FILM COATED ORAL at 20:28

## 2021-03-03 RX ADMIN — HYDROXYCHLOROQUINE SULFATE 200 MG: 200 TABLET, FILM COATED ORAL at 20:15

## 2021-03-03 ASSESSMENT — ACTIVITIES OF DAILY LIVING (ADL)
DRESS: INDEPENDENT
HYGIENE/GROOMING: HANDWASHING;INDEPENDENT
LAUNDRY: WITH SUPERVISION
ORAL_HYGIENE: INDEPENDENT
DRESS: INDEPENDENT
ORAL_HYGIENE: INDEPENDENT
HYGIENE/GROOMING: INDEPENDENT
LAUNDRY: UNABLE TO COMPLETE

## 2021-03-03 NOTE — PLAN OF CARE
"  Problem: OT General Care Plan  Goal: OT Goal 1  Description: Will attend OT groups and set goal focus.      Note: Attended 2 of 2 OT groups.  The first group was a goal oriented task group lasting 50 minutes with 9 patients.  She worked at a constant pace, made decisions, asked for suggestions when feeling she needed an opinion, and followed through with her plans.  She was social with others.    She attended the second general health group for 55 minutes with 8 patients.  The topic of this group was distorted thinking which she stated an area she is working on and helpful to talk about.  She stated being a \"worrier \"and asked the group for some feedback on a topic she is trying to problem solve, which she ruminates about, being the excessive mail she receives at home.  The group asked if she was worried about missing something in the mail that she might be throwing away, which seemed to not be the issue.  She stated it difficult to receive so much \"junk \"mail though has ideas on how to manage it.  We discussed the idea that she is feeling better now than when she came into the hospital so this task may not be as overwhelming when going home with the ideas that she has to try.  She continues to talk of the positive influence her son has for her and the appreciation of what he does for her.  She offered support to appear who was considering a move and talked about some advantages of living where she is, in a senior apartment.  She appears more involved, more alert, and more comfortable and speaking up and being assertive.     "

## 2021-03-03 NOTE — PLAN OF CARE
"Orders Acknowledged, chart reviewed, PT attempted. Pt declined PT services 2/2 feeling better in her neck and states \"she is fine and doesn't need therapy\". Orders completed.   "

## 2021-03-03 NOTE — PLAN OF CARE
Problem: Adult Inpatient Plan of Care  Goal: Readiness for Transition of Care  Outcome: Improving     Behavioral  Pt slept 5 hours overnight; Pt oriented x 4; Pt social with staff and peers; pt compliant with medications and cares; Pt eating and drinking well; Pt pleasant and cooperative upon approach; Pt denied SI, SIB, HI, and hallucinations; affect flat and blunted; mood anxious;     Medical  Pt endorsed loose stools; given imodium with relief.     Plan  Possible discharge 3/5

## 2021-03-03 NOTE — PROGRESS NOTES
Patient seen, chart reviewed, care discussed with staff.  Blood pressure (!) 181/72, pulse 68, temperature 98.1  F (36.7  C), temperature source Temporal, resp. rate 16, weight 62.6 kg (137 lb 14.4 oz), SpO2 95 %, not currently breastfeeding.    Mood was lower last night, better now.  Anxiety less.  Dystonia less.  She feels the mattress might contribute.  OCD same with stopping Zyprexa.  Doing ok with stopping Xanax.  Diarrhea this am    Working on ways to cope with isolation at home.    Her coping is impaired by the cognitive dysfunction.       Current Outpatient Medications   Medication Instructions     ALPRAZolam (XANAX) 0.5 mg, Oral, DAILY     amLODIPine (NORVASC) 5 mg, Oral, DAILY     brimonidine (ALPHAGAN P) 0.1 % ophthalmic solution 1 drop, Both Eyes, 2 TIMES DAILY     cyanocobalamin (VITAMIN B-12) 1,000 mcg, Oral, DAILY     dorzolamide (TRUSOPT) 2 % ophthalmic solution 1 drop, Both Eyes, 2 TIMES DAILY     enalapril (VASOTEC) 20 mg, Oral, 2 TIMES DAILY     ferrous gluconate (FERGON) 324 mg, Oral, 2 TIMES DAILY WITH MEALS     folic acid (FOLVITE) 1 mg, Oral, DAILY     hydrALAZINE (APRESOLINE) 25 mg, Oral, 3 TIMES DAILY     hydroxychloroquine (PLAQUENIL) 200 mg, Oral, 2 TIMES DAILY     latanoprost (XALATAN) 0.005 % ophthalmic solution 1 drop, Both Eyes, AT BEDTIME     OLANZapine (ZYPREXA) 10 mg, Oral, AT BEDTIME     PARoxetine (PAXIL) 20 mg, Oral, DAILY, Plus 5mg = 25mg daily     PARoxetine (PAXIL) 5 mg, Oral, EVERY MORNING, With 20mg to = 25mg daily     simvastatin (ZOCOR) 10 mg, Oral, AT BEDTIME     Recent Results (from the past 168 hour(s))   CRP inflammation    Collection Time: 02/27/21  7:57 AM   Result Value Ref Range    CRP Inflammation 3.4 0.0 - 8.0 mg/L   Erythrocyte sedimentation rate auto    Collection Time: 02/27/21  7:57 AM   Result Value Ref Range    Sed Rate 32 (H) 0 - 30 mm/h   Basic metabolic panel    Collection Time: 02/27/21  7:57 AM   Result Value Ref Range    Sodium 139 133 - 144 mmol/L     Potassium 4.4 3.4 - 5.3 mmol/L    Chloride 105 94 - 109 mmol/L    Carbon Dioxide 27 20 - 32 mmol/L    Anion Gap 7 3 - 14 mmol/L    Glucose 96 70 - 99 mg/dL    Urea Nitrogen 26 7 - 30 mg/dL    Creatinine 0.80 0.52 - 1.04 mg/dL    GFR Estimate 68 >60 mL/min/[1.73_m2]    GFR Estimate If Black 78 >60 mL/min/[1.73_m2]    Calcium 9.2 8.5 - 10.1 mg/dL   Asymptomatic SARS-CoV-2 COVID-19 Virus (Coronavirus) by PCR    Collection Time: 03/02/21  1:05 PM    Specimen: Nasopharyngeal   Result Value Ref Range    SARS-CoV-2 Virus Specimen Source Nasopharyngeal     SARS-CoV-2 PCR Result NEGATIVE     SARS-CoV-2 PCR Comment       Testing was performed using the Xpert Xpress SARS-CoV-2 Assay on the Cepheid Gene-Xpert   Instrument Systems. Additional information about this Emergency Use Authorization (EUA)   assay can be found via the Lab Guide.     General appearance: good  Alert.   Affect: good  Mood: fair    Speech:  normal.   Eye contact:  good.    Psychomotor behavior: normal  Gait: normal.    Abnormal movements: none  Delusions: none  Hallucinations:  none  Thoughts: logical  Associations: intact  Judgement: good  Insight:good  Cognitions: intact in conversation (See OT consult)  Memory:  intact in conversation (see OT consult)  Orientation: normal    Not suicidal  .  Plan:  1.  Resume Aricept now that she is not on Zyprexa; if she continues to do well with out Zyprexa or Xanax, and with restarting Aricept, then discharge Friday

## 2021-03-03 NOTE — PLAN OF CARE
Problem: Sleep Disturbance (Anxiety Signs/Symptoms)  Goal: Improved Sleep (Anxiety Signs/Symptoms)  Outcome: No Change     Pt woke up at 0430 and came to the lounge thinking it was already 0800. Went back to her room,.  Slept 5 hours tonight.  0651 Imodium 2 mg given for  4 episodes of loose stools, encouraged fluids.Pt was instructed to alert staff next time she had another episode of loose stools.  0710 Pt had another episode of watery stools, showed stool to staff.

## 2021-03-03 NOTE — PLAN OF CARE
Work Completed: Reviewed chart. Met with team to discuss pt progress. Pt continues to improve. Discharge is planned for Friday. Spoke with staff at [facility] to inform them of discharge. They would like signed orders faxed to them at [phone]. Completed AVS with outpatient appointment scheduled.    Discharge plan or goal: Discharge to [facility] on Friday                Barriers to discharge: Ongoing symptom management,

## 2021-03-03 NOTE — PLAN OF CARE
"Patient states \"I've  gone back about 3 weeks now with my depression and I dont know why\" .  This writer spoke with the patient for over an hour and patient appeared to enjoy the interaction.  After the talk the patient rated her depression at a 3/10, anxiety 6/10.  Patient has been pleasant and cooperative tonight.  "

## 2021-03-03 NOTE — PLAN OF CARE
Problem: General Plan of Care (Inpatient Behavioral)  Goal: Team Discussion  Description: Team Plan:  Note: BEHAVIORAL TEAM DISCUSSION    Participants:Dr. Ricardo, Josi Albarado, RN, Sofia Swan [facility], Edilia Castillo OT  Progress: Significant improvement  Anticipated length of stay: 2 days  Continued Stay Criteria/Rationale: Depression, anxiety  Medical/Physical: See medical notes  Precautions:   Behavioral Orders   Procedures    Code 1 - Restrict to Unit    Code 2     For radiology    Discontinue 1:1 attendant for suicide risk     Order Specific Question:   I have performed an in person assessment of the patient     Answer:   Based on this assessment the patient no longer requires a one on one attendant at this point in time.     Order Specific Question:   Rationale     Answer:   Patient States able to remain safe in hospital    Fall precautions    Occupational Therapy on the Unit     Order Specific Question:   Reason for Consult     Answer:   Eval of thought process, functional skills and behavior     Order Specific Question:   Course of Action:     Answer:   Eval & Treat as indicated     Order Specific Question:   Treatment Prescription:     Answer:   cognitive eval please    Routine Programming     As clinically indicated    Status 15     Every 15 minutes.     Plan: Pt will discharge back to her facility on Friday.  Rationale for change in precautions or plan: N/A

## 2021-03-04 PROCEDURE — 124N000003 HC R&B MH SENIOR/ADOLESCENT

## 2021-03-04 PROCEDURE — 250N000013 HC RX MED GY IP 250 OP 250 PS 637: Performed by: PHYSICIAN ASSISTANT

## 2021-03-04 PROCEDURE — 250N000013 HC RX MED GY IP 250 OP 250 PS 637: Performed by: NURSE PRACTITIONER

## 2021-03-04 PROCEDURE — G0177 OPPS/PHP; TRAIN & EDUC SERV: HCPCS

## 2021-03-04 PROCEDURE — 250N000013 HC RX MED GY IP 250 OP 250 PS 637: Performed by: PSYCHIATRY & NEUROLOGY

## 2021-03-04 PROCEDURE — H2032 ACTIVITY THERAPY, PER 15 MIN: HCPCS

## 2021-03-04 PROCEDURE — 250N000013 HC RX MED GY IP 250 OP 250 PS 637: Performed by: EMERGENCY MEDICINE

## 2021-03-04 RX ORDER — GABAPENTIN 100 MG/1
100 CAPSULE ORAL 2 TIMES DAILY
Status: DISCONTINUED | OUTPATIENT
Start: 2021-03-04 | End: 2021-03-05

## 2021-03-04 RX ADMIN — LIDOCAINE 1 PATCH: 560 PATCH PERCUTANEOUS; TOPICAL; TRANSDERMAL at 12:23

## 2021-03-04 RX ADMIN — ENALAPRIL MALEATE 20 MG: 20 TABLET ORAL at 07:59

## 2021-03-04 RX ADMIN — SIMVASTATIN 10 MG: 10 TABLET, FILM COATED ORAL at 20:20

## 2021-03-04 RX ADMIN — GABAPENTIN 200 MG: 100 CAPSULE ORAL at 07:59

## 2021-03-04 RX ADMIN — DONEPEZIL HYDROCHLORIDE 5 MG: 5 TABLET ORAL at 20:27

## 2021-03-04 RX ADMIN — FERROUS GLUCONATE 324 MG: 324 TABLET ORAL at 18:17

## 2021-03-04 RX ADMIN — DORZOLAMIDE HYDROCHLORIDE 1 DROP: 20 SOLUTION/ DROPS OPHTHALMIC at 08:02

## 2021-03-04 RX ADMIN — BRIMONIDINE TARTRATE 1 DROP: 1.5 SOLUTION OPHTHALMIC at 08:01

## 2021-03-04 RX ADMIN — BRIMONIDINE TARTRATE 1 DROP: 1.5 SOLUTION OPHTHALMIC at 20:22

## 2021-03-04 RX ADMIN — LIDOCAINE 2 PATCH: 560 PATCH PERCUTANEOUS; TOPICAL; TRANSDERMAL at 07:59

## 2021-03-04 RX ADMIN — HYDROXYCHLOROQUINE SULFATE 200 MG: 200 TABLET, FILM COATED ORAL at 07:59

## 2021-03-04 RX ADMIN — HYDRALAZINE HYDROCHLORIDE 25 MG: 25 TABLET ORAL at 20:21

## 2021-03-04 RX ADMIN — HYDROXYZINE HYDROCHLORIDE 25 MG: 25 TABLET, FILM COATED ORAL at 07:59

## 2021-03-04 RX ADMIN — GABAPENTIN 100 MG: 100 CAPSULE ORAL at 20:24

## 2021-03-04 RX ADMIN — HYDROXYZINE HYDROCHLORIDE 25 MG: 25 TABLET, FILM COATED ORAL at 21:55

## 2021-03-04 RX ADMIN — LATANOPROST 1 DROP: 50 SOLUTION OPHTHALMIC at 21:56

## 2021-03-04 RX ADMIN — FERROUS GLUCONATE 324 MG: 324 TABLET ORAL at 07:59

## 2021-03-04 RX ADMIN — AMLODIPINE BESYLATE 5 MG: 5 TABLET ORAL at 07:59

## 2021-03-04 RX ADMIN — ENALAPRIL MALEATE 20 MG: 20 TABLET ORAL at 20:26

## 2021-03-04 RX ADMIN — HYDRALAZINE HYDROCHLORIDE 25 MG: 25 TABLET ORAL at 07:59

## 2021-03-04 RX ADMIN — DORZOLAMIDE HYDROCHLORIDE 1 DROP: 20 SOLUTION/ DROPS OPHTHALMIC at 20:22

## 2021-03-04 RX ADMIN — HYDROXYCHLOROQUINE SULFATE 200 MG: 200 TABLET, FILM COATED ORAL at 20:21

## 2021-03-04 RX ADMIN — CYANOCOBALAMIN TAB 1000 MCG 1000 MCG: 1000 TAB at 07:59

## 2021-03-04 RX ADMIN — DULOXETINE HYDROCHLORIDE 60 MG: 60 CAPSULE, DELAYED RELEASE ORAL at 07:59

## 2021-03-04 RX ADMIN — HYDRALAZINE HYDROCHLORIDE 25 MG: 25 TABLET ORAL at 14:45

## 2021-03-04 RX ADMIN — MEMANTINE 10 MG: 10 TABLET ORAL at 08:02

## 2021-03-04 ASSESSMENT — ACTIVITIES OF DAILY LIVING (ADL)
HYGIENE/GROOMING: INDEPENDENT
LAUNDRY: UNABLE TO COMPLETE
DRESS: INDEPENDENT
ORAL_HYGIENE: INDEPENDENT

## 2021-03-04 NOTE — PROGRESS NOTES
"Patient is visible in the milieu most of the shift. She is withdrawn. Her affect is flat and blunt. She is cooperative. Her speech is clear and coherent but there are times when she forgets the things that she wanted to say. She would immediately say 'I'm sorry, my memory\". During the 1:1 check in, patient is conversant and engaged. She talked about her family; her job before and her children and grandchildren. Patient denied wishing herself to be dead. She denied SI/SIB nor hallucinations. She only ate about 75% of food served at dinner time. She is med compliant. She is started on Aricept 5 mgs this evening. Patient had 1 loose stools this shift. Imodium 2 mgs given as prn for diarrhea. Patient only took 100 mgs of Gabapentin this evening instead of 200 mgs because she said \"It's too much for me\".  "

## 2021-03-04 NOTE — PLAN OF CARE
"            Work Completed: Reviewed chart. Met with team to discuss progress. Pt is reporting suicidal ideation today. She is scheduled to discharge back to her facility tomorrow.[facility]. Uncertain whether that will happen. Met with pt who reports that she is \"not doing well.\" Pt signed discharge medicare form.    Discharge plan or goal:  Discharge to [facility]                Barriers to discharge: Ongoing mental health symptoms    "

## 2021-03-04 NOTE — PLAN OF CARE
"  Problem: Adult Inpatient Plan of Care  Goal: Plan of Care Review  Outcome: No Change  Flowsheets (Taken 3/4/2021 1700)  Plan of Care Reviewed With: patient       1753  Pt was seen playing cards with peer and staff at beginning of the shift. She was pleasant but teary on approach after the card game.Staff took her to a private room to find out what was going on. Pt endorsed a high level of anxiety because of all her illness, and she does not think that she is ready to discharge back to her senior living facility. Pt says she was doing well but things suddenly changed in the last 48hrs.She does not think her meds are working.She does not want to leave but she also does not want to stay here. \" I don't know what I want to do\". \" I want to die by taking pills but I won't do it because of my son who is very delightful\". She cont to cry but laugh at the same time. Pt was offered prn anxiety meds but she declined.Staff gave her lavander sticker but she had it removed shortly after.Anxiety and depression rated at 10.No HI or AVH.No pain and no meds SE. Pt says she is scared of going to bed because she knows that she will not have a good night. \" I have come back to what I was when I was admitted\". She does not want more drugs. Her brain feels dead.She says she is nervous and shaky.No shakiness observed.Will cont to monitor.    2137 Pt was medication compliant. She cont to be visible in the milieu.No more crying noted.  "

## 2021-03-04 NOTE — PLAN OF CARE
"Pt rates anxiety and depression 10/10 this morning.  Pt received a prn hydroxyzine for anxiety.  Pt feels suicidal stating she has been this way for 2 days.  Pt states she has a plan of overdosing because she doesn't feel there is any hope for her. Pt contracts for safety while she is here.  Pt states \"I don't really want to be here but I also don't really want to be home.\"  Pt is med compliant.  Pt is tearful this AM.  Pt feels like she is overmedicated.  Writer called provider with update on over medicated and depression/SI.  Provider decreased gabapentin to 100 mg BID.   Pt showered with staff assistance due to OCD issues when showering per pt.  Pt attended groups.  Pt states she is feeling a tad better after showering and going to groups and prn hydroxyzine.    "

## 2021-03-04 NOTE — PROGRESS NOTES
CLINICAL NUTRITION SERVICES - REASSESSMENT NOTE     Nutrition Prescription    RECOMMENDATIONS FOR MDs/PROVIDERS TO ORDER:  None today    Malnutrition Status:    Unable to determine     Recommendations already ordered by Registered Dietitian (RD):  Continue current sncks    Future/Additional Recommendations:  Adjust snacks per pt request     EVALUATION OF THE PROGRESS TOWARD GOALS   Diet: Regular  Snacks: PM: pretzels, HS: baked chips   Intake: % of meals per flowsheet        NEW FINDINGS   Pt reports a good appetite. Stated she usually eats 100% of her meals. Ate less yesterday d/t diarrhea which has now improved. Has been enjoying her snacks, no adjustments today. Labs and MAR reviewed. Possible discharge tomorrow.   4# (2.8%) wt loss in 1 week. Stable since admit  03/04/21 62 kg (136 lb 11.2 oz)   02/25/21 63.5 kg (139 lb 14.4 oz)   02/16/21 61.9 kg (136 lb 6.4 oz)-admit wt   01/28/21 62.6 kg (138 lb)   01/16/21 62.8 kg (138 lb 6.4 oz)   01/13/21 65.2 kg (143 lb 11.2 oz)   01/11/21 64.9 kg (143 lb)   01/05/21 62.1 kg (136 lb 12.8 oz)   11/19/20 63 kg (139 lb)   11/03/20 63.4 kg (139 lb 12.8 oz)   11/03/20 63 kg (139 lb)   07/14/20 63.5 kg (140 lb)   06/04/19 62.6 kg (138 lb)       MALNUTRITION  % Intake: No decreased intake noted  % Weight Loss: > 2% in 1 week (severe)  Subcutaneous Fat Loss: Unable to assess  Muscle Loss: Unable to assess  Fluid Accumulation/Edema: None noted  Malnutrition Diagnosis: Unable to determine due to no NFPA completed     Previous Goals   Patient to consume % of nutritionally adequate meal trays TID, or the equivalent with supplements/snacks.  Evaluation: Met    Previous Nutrition Diagnosis  No nutrition diagnosis at this time     CURRENT NUTRITION DIAGNOSIS  No nutrition diagnosis at this time     INTERVENTIONS  Implementation  Continue current meals/snacks     Goals  Patient to consume % of nutritionally adequate meal trays TID, or the equivalent with  supplements/snacks.    Monitoring/Evaluation  No nutrition follow-up warranted at this time. RD to sign off. Please consult if further needs arise    Tayla Medina MS, RD, LDN  Unit Pager [phone]

## 2021-03-04 NOTE — PROGRESS NOTES
"Pt participated in dance/movement therapy (DMT) implementing some elements of Authentic Movement, so some patients became active \"witnesses\" while others were active \"movers\" though everyone was mindful of their own physical sensations, adjusted their own bodies for comfort or expression, and remained aware of their own emotional and cognitive reactions to the movements of the \"active movers.\"  Patients self-selected their roles based on energy level, physical capacity and personal preferences for movement or dance.  One group theme that emerged was a community experience of the pandemic \"sucking the life out of us\" and that causing a kind of societal fatigue.     Pt chose to be a witness saying she felt like moving \"ZERO.\"  In fact, she did struggle to remain actively aware as as witness becoming more relaxed throughout the session and dozing off to sleep.  She stated she needed the sleep as she hasn't slept \"more than four hours in weeks.\"         03/04/21 1130   Dance Movement Therapy   Type of Intervention structured groups   Response participates with encouragement   Hours 1         "

## 2021-03-04 NOTE — PLAN OF CARE
"  Problem: OT General Care Plan  Goal: OT Goal 1  Description: Will attend OT groups and set goal focus.      Note: Attended 2 of 2 OT groups. She participated in a goal oriented task group for 50 minutes with 6 pts. She was pleasant, initiated comments and work on a familiar less complex task. She stated not feeling as good today and explained it being the same yesterday. This author will discuss with pt a packet of information regarding structuring time at home to help ease the transition. She did stated looking forward to joining the \"[employer] game\" group once home that has started up in the past week with covid protocols adjusting with the progress of vaccinations with her Senior building peers.  In the afternoon group she participated for 60 minutes with 6 patients.  She offered occasional answers during an activity requiring quick and creative thinking.  She stated feeling sleepy and \"not the best \".  She asked this author to review prepared information packet of home time structuring activities tomorrow instead of today.     "

## 2021-03-04 NOTE — PLAN OF CARE
Problem: Sleep Disturbance (Anxiety Signs/Symptoms)  Goal: Improved Sleep (Anxiety Signs/Symptoms)  Outcome: Improving     Slept well until 0500, stayed in her room and no reports of diarrhea.

## 2021-03-05 PROCEDURE — 250N000013 HC RX MED GY IP 250 OP 250 PS 637: Performed by: PSYCHIATRY & NEUROLOGY

## 2021-03-05 PROCEDURE — 250N000013 HC RX MED GY IP 250 OP 250 PS 637: Performed by: PHYSICIAN ASSISTANT

## 2021-03-05 PROCEDURE — G0177 OPPS/PHP; TRAIN & EDUC SERV: HCPCS

## 2021-03-05 PROCEDURE — 250N000013 HC RX MED GY IP 250 OP 250 PS 637: Performed by: NURSE PRACTITIONER

## 2021-03-05 PROCEDURE — 124N000003 HC R&B MH SENIOR/ADOLESCENT

## 2021-03-05 PROCEDURE — 250N000013 HC RX MED GY IP 250 OP 250 PS 637: Performed by: EMERGENCY MEDICINE

## 2021-03-05 RX ORDER — BUSPIRONE HYDROCHLORIDE 5 MG/1
5 TABLET ORAL 3 TIMES DAILY
Status: DISCONTINUED | OUTPATIENT
Start: 2021-03-05 | End: 2021-03-12 | Stop reason: HOSPADM

## 2021-03-05 RX ORDER — DONEPEZIL HYDROCHLORIDE 10 MG/1
10 TABLET, FILM COATED ORAL AT BEDTIME
Status: DISCONTINUED | OUTPATIENT
Start: 2021-03-05 | End: 2021-03-09

## 2021-03-05 RX ORDER — PROPRANOLOL HYDROCHLORIDE 10 MG/1
10 TABLET ORAL EVERY 4 HOURS PRN
Status: DISCONTINUED | OUTPATIENT
Start: 2021-03-05 | End: 2021-03-12 | Stop reason: HOSPADM

## 2021-03-05 RX ADMIN — AMLODIPINE BESYLATE 5 MG: 5 TABLET ORAL at 08:07

## 2021-03-05 RX ADMIN — HYDROXYCHLOROQUINE SULFATE 200 MG: 200 TABLET, FILM COATED ORAL at 08:07

## 2021-03-05 RX ADMIN — PROPRANOLOL HYDROCHLORIDE 10 MG: 10 TABLET ORAL at 16:35

## 2021-03-05 RX ADMIN — DORZOLAMIDE HYDROCHLORIDE 1 DROP: 20 SOLUTION/ DROPS OPHTHALMIC at 08:08

## 2021-03-05 RX ADMIN — HYDRALAZINE HYDROCHLORIDE 25 MG: 25 TABLET ORAL at 20:25

## 2021-03-05 RX ADMIN — HYDRALAZINE HYDROCHLORIDE 25 MG: 25 TABLET ORAL at 08:07

## 2021-03-05 RX ADMIN — HYDRALAZINE HYDROCHLORIDE 25 MG: 25 TABLET ORAL at 14:34

## 2021-03-05 RX ADMIN — SIMVASTATIN 10 MG: 10 TABLET, FILM COATED ORAL at 22:07

## 2021-03-05 RX ADMIN — FERROUS GLUCONATE 324 MG: 324 TABLET ORAL at 16:34

## 2021-03-05 RX ADMIN — FERROUS GLUCONATE 324 MG: 324 TABLET ORAL at 08:07

## 2021-03-05 RX ADMIN — DULOXETINE HYDROCHLORIDE 60 MG: 60 CAPSULE, DELAYED RELEASE ORAL at 08:07

## 2021-03-05 RX ADMIN — CYANOCOBALAMIN TAB 1000 MCG 1000 MCG: 1000 TAB at 08:07

## 2021-03-05 RX ADMIN — HYDROXYCHLOROQUINE SULFATE 200 MG: 200 TABLET, FILM COATED ORAL at 20:25

## 2021-03-05 RX ADMIN — DORZOLAMIDE HYDROCHLORIDE 1 DROP: 20 SOLUTION/ DROPS OPHTHALMIC at 20:26

## 2021-03-05 RX ADMIN — MEMANTINE 10 MG: 10 TABLET ORAL at 08:07

## 2021-03-05 RX ADMIN — GABAPENTIN 100 MG: 100 CAPSULE ORAL at 08:07

## 2021-03-05 RX ADMIN — ENALAPRIL MALEATE 20 MG: 20 TABLET ORAL at 08:07

## 2021-03-05 RX ADMIN — BRIMONIDINE TARTRATE 1 DROP: 1.5 SOLUTION OPHTHALMIC at 08:08

## 2021-03-05 RX ADMIN — DONEPEZIL HYDROCHLORIDE 10 MG: 10 TABLET ORAL at 22:08

## 2021-03-05 RX ADMIN — BUSPIRONE HYDROCHLORIDE 5 MG: 5 TABLET ORAL at 14:34

## 2021-03-05 RX ADMIN — LATANOPROST 1 DROP: 50 SOLUTION OPHTHALMIC at 22:07

## 2021-03-05 RX ADMIN — ENALAPRIL MALEATE 20 MG: 20 TABLET ORAL at 20:25

## 2021-03-05 RX ADMIN — BUSPIRONE HYDROCHLORIDE 5 MG: 5 TABLET ORAL at 20:25

## 2021-03-05 RX ADMIN — BRIMONIDINE TARTRATE 1 DROP: 1.5 SOLUTION OPHTHALMIC at 20:26

## 2021-03-05 NOTE — PLAN OF CARE
"Pt rates depression today 10/10 and anxiety 10/10.  Pt denies any active SI/SIB this morning but states \"I know I will not make it if I leave right now, I will be right back.\"  Pt still feels like she is over medicated. Pt attended groups.    "

## 2021-03-05 NOTE — PLAN OF CARE
"            Work Completed: Reviewed chart. Met with team to discuss pt progress. Pt is feeling suicidal and is reportedly not doing well. Discharge may be rescheduled untl sometime next week. Added \"[employer]\" as a resource for loneliness and isolation during COVID on pt's AVS. Left voice message with Stella at [facility] ([phone]) to inform that pt will not be discharging back to their facility today and will requested call back to discuss discharge.    Discharge plan or goal: Discharge to [facility]                Barriers to discharge: Pt reports that she is suicidal    "

## 2021-03-05 NOTE — PROGRESS NOTES
"Patient seen, chart reviewed, care discussed with staff.  Felt \"overmedicated\" with gabapentin yesterday.  Increased tears and depression last two days.  Feels she will be unable to function at home and \"would be right back\".  Spells of shaking with anxiety.  Believed she was in [place] this am.    Blood pressure (!) 188/64, pulse 75, temperature 97.9  F (36.6  C), temperature source Oral, resp. rate 16, weight 62 kg (136 lb 11.2 oz), SpO2 97 %, not currently breastfeeding.    General appearance: stressed  Alert.   Affect: anxious, sad  Mood: discouraged, depressed  Speech:  normal.   Eye contact:  good.    Psychomotor behavior: normal  Gait: normal.    Abnormal movements: none  Delusions: none  Hallucinations:   none  Thoughts: logical, some concrete  Associations: intact  Judgement: fair  Insight: fair  Cognitions: intact in conversation  Memory:  intact in conversation  Orientation: normal    Not suicidal.    Imp: anxiety, decreased ability ro cognitive deal with this, MoCa=20 and confused by Gabapentin  Patient Active Problem List   Diagnosis     Essential hypertension     Anxiety     Glaucoma suspect, bilateral     Personal history of DVT (deep vein thrombosis)     Mixed obsessional thoughts and acts     Age-related osteoporosis without current pathological fracture     Nonrheumatic aortic valve insufficiency     Chronic diarrhea     Pain in both hands     Primary osteoarthritis of left hip     Suicidal ideation     Pericardial effusion     Neck pain     Abnormal cardiovascular stress test     Acute cystitis without hematuria     Fall, initial encounter     Laceration of scalp without foreign body, initial encounter     Recurrent UTI     Anemia due to blood loss, acute     Acute kidney injury superimposed on CKD (H)     Lumbar radiculopathy     Liver lesion     Depression, unspecified depression type   and neurocognitive disorder    Plan: Stop Gabapentin and Hydroxyzine  2.  Add Buspar 5mg TID and PRN 10mg " Propranolol    Discharge likely Monday 3/8 if med changes go well      Current Facility-Administered Medications:      acetaminophen (TYLENOL) tablet 650 mg, 650 mg, Oral, Q4H PRN, Gloria Martinez APRN CNP, 650 mg at 03/01/21 2016     alum & mag hydroxide-simethicone (MAALOX) suspension 30 mL, 30 mL, Oral, Q4H PRN, Gloria Martinez APRN CNP     amLODIPine (NORVASC) tablet 5 mg, 5 mg, Oral, Daily, Marleni Lester PA-C, 5 mg at 03/05/21 0807     brimonidine (ALPHAGAN-P) 0.15 % ophthalmic solution 1 drop, 1 drop, Both Eyes, BID, Jey Mayo MD, 1 drop at 03/05/21 0808     busPIRone (BUSPAR) tablet 5 mg, 5 mg, Oral, TID, Sha Ricardo MD     cyanocobalamin (VITAMIN B-12) tablet 1,000 mcg, 1,000 mcg, Oral, Daily, Gloria Martinez APRN CNP, 1,000 mcg at 03/05/21 0807     cyclobenzaprine (FLEXERIL) tablet 5 mg, 5 mg, Oral, TID PRN, Xochitl Werner PA-C, 5 mg at 03/02/21 1120     diphenhydrAMINE (BENADRYL) capsule 25 mg, 25 mg, Oral, Q6H PRN, Xochitl Werner PA-C, 25 mg at 03/01/21 2206     donepezil (ARICEPT) tablet 10 mg, 10 mg, Oral, At Bedtime, Sha Ricardo MD     dorzolamide (TRUSOPT) 2 % ophthalmic solution 1 drop, 1 drop, Both Eyes, BID, Jey Mayo MD, 1 drop at 03/05/21 0808     DULoxetine (CYMBALTA) DR capsule 60 mg, 60 mg, Oral, Daily, Gloria Martinez APRN CNP, 60 mg at 03/05/21 0807     enalapril (VASOTEC) tablet 20 mg, 20 mg, Oral, BID, Xochitl Werner PA-C, 20 mg at 03/05/21 0807     ferrous gluconate (FERGON) tablet 324 mg, 324 mg, Oral, BID w/meals, Gloria Martinez APRN CNP, 324 mg at 03/05/21 0807     hydrALAZINE (APRESOLINE) tablet 25 mg, 25 mg, Oral, TID, Xochitl Werner PA-C, 25 mg at 03/05/21 0807     hydroxychloroquine (PLAQUENIL) tablet 200 mg, 200 mg, Oral, BID, Tangela Vuong APRN CNP, 200 mg at 03/05/21 0807     latanoprost (XALATAN) 0.005 % ophthalmic solution 1 drop, 1 drop, Both Eyes, At  Bedtime, Jey Mayo MD, 1 drop at 03/04/21 2156     Lidocaine (LIDOCARE) 4 % Patch 2 patch, 2 patch, Transdermal, Q24H, Xochitl Werner PA-C, 1 patch at 03/04/21 1223     lidocaine patch in PLACE, , Transdermal, Q8H, Xochitl Werner PA-C     loperamide (IMODIUM) capsule 2 mg, 2 mg, Oral, TID PRN, Tangela Vuong APRN CNP, 2 mg at 03/03/21 2010     memantine (NAMENDA) tablet 10 mg, 10 mg, Oral, Daily, Sha Ricardo MD, 10 mg at 03/05/21 0807     menthol (ICY HOT) 5 % patch 1 patch, 1 patch, Topical, Q8H PRN, 1 patch at 03/02/21 2111 **AND** menthol (ICY HOT) Patch in Place, , Transdermal, Q8H, Marleni Lester PA-C, Stopped at 03/04/21 1446     polyethylene glycol (MIRALAX) Packet 17 g, 17 g, Oral, Daily PRN, Gloria Martinez APRN CNP     propranolol (INDERAL) tablet 10 mg, 10 mg, Oral, Q4H PRN, Sha Ricardo MD     simvastatin (ZOCOR) tablet 10 mg, 10 mg, Oral, At Bedtime, Jey Mayo MD, 10 mg at 03/04/21 2020     traZODone (DESYREL) tablet 50 mg, 50 mg, Oral, At Bedtime PRN, Gloria Martinez APRN CNP  Recent Results (from the past 168 hour(s))   CRP inflammation    Collection Time: 02/27/21  7:57 AM   Result Value Ref Range    CRP Inflammation 3.4 0.0 - 8.0 mg/L   Erythrocyte sedimentation rate auto    Collection Time: 02/27/21  7:57 AM   Result Value Ref Range    Sed Rate 32 (H) 0 - 30 mm/h   Basic metabolic panel    Collection Time: 02/27/21  7:57 AM   Result Value Ref Range    Sodium 139 133 - 144 mmol/L    Potassium 4.4 3.4 - 5.3 mmol/L    Chloride 105 94 - 109 mmol/L    Carbon Dioxide 27 20 - 32 mmol/L    Anion Gap 7 3 - 14 mmol/L    Glucose 96 70 - 99 mg/dL    Urea Nitrogen 26 7 - 30 mg/dL    Creatinine 0.80 0.52 - 1.04 mg/dL    GFR Estimate 68 >60 mL/min/[1.73_m2]    GFR Estimate If Black 78 >60 mL/min/[1.73_m2]    Calcium 9.2 8.5 - 10.1 mg/dL   Asymptomatic SARS-CoV-2 COVID-19 Virus (Coronavirus) by PCR    Collection Time: 03/02/21  1:05 PM     Specimen: Nasopharyngeal   Result Value Ref Range    SARS-CoV-2 Virus Specimen Source Nasopharyngeal     SARS-CoV-2 PCR Result NEGATIVE     SARS-CoV-2 PCR Comment       Testing was performed using the Xpert Xpress SARS-CoV-2 Assay on the Cepheid Gene-Xpert   Instrument Systems. Additional information about this Emergency Use Authorization (EUA)   assay can be found via the Lab Guide.

## 2021-03-05 NOTE — PLAN OF CARE
"  Problem: OT General Care Plan  Goal: OT Goal 1  Description: Will attend OT groups and set goal focus.      Note: Attended 1 of 2 OT groups, for most of the session, approximately 45 minutes with 6 patients.  She worked on a one-step familiar task, made decisions, and offered brief answers on approach.  She worked more quietly than typical stating to be having a difficult morning.  \"I feel so anxious and shaky. \"She left early stating the inability to continue working with her anxiety level interfering.  In the early afternoon, this author approached patient to review extensive packet regarding coping strategies, affirmation ideas, and activities she could use at home.  Information and ideas were reviewed on one-to-one and patient stated this to be helpful and will be used once home.  In the afternoon patient's affect appeared brighter, was seeming more interest in the talking and conversing.     "

## 2021-03-06 LAB
ALBUMIN SERPL-MCNC: 3.5 G/DL (ref 3.4–5)
ALP SERPL-CCNC: 84 U/L (ref 40–150)
ALT SERPL W P-5'-P-CCNC: 14 U/L (ref 0–50)
ANION GAP SERPL CALCULATED.3IONS-SCNC: 5 MMOL/L (ref 3–14)
AST SERPL W P-5'-P-CCNC: 17 U/L (ref 0–45)
BASOPHILS # BLD AUTO: 0.1 10E9/L (ref 0–0.2)
BASOPHILS NFR BLD AUTO: 0.8 %
BILIRUB SERPL-MCNC: 0.2 MG/DL (ref 0.2–1.3)
BUN SERPL-MCNC: 23 MG/DL (ref 7–30)
CALCIUM SERPL-MCNC: 9.2 MG/DL (ref 8.5–10.1)
CHLORIDE SERPL-SCNC: 101 MMOL/L (ref 94–109)
CO2 SERPL-SCNC: 26 MMOL/L (ref 20–32)
CREAT SERPL-MCNC: 0.72 MG/DL (ref 0.52–1.04)
DIFFERENTIAL METHOD BLD: ABNORMAL
EOSINOPHIL # BLD AUTO: 0.1 10E9/L (ref 0–0.7)
EOSINOPHIL NFR BLD AUTO: 1.2 %
ERYTHROCYTE [DISTWIDTH] IN BLOOD BY AUTOMATED COUNT: 13.2 % (ref 10–15)
GFR SERPL CREATININE-BSD FRML MDRD: 77 ML/MIN/{1.73_M2}
GLUCOSE SERPL-MCNC: 117 MG/DL (ref 70–99)
HCT VFR BLD AUTO: 30.9 % (ref 35–47)
HGB BLD-MCNC: 10 G/DL (ref 11.7–15.7)
IMM GRANULOCYTES # BLD: 0 10E9/L (ref 0–0.4)
IMM GRANULOCYTES NFR BLD: 0.2 %
LYMPHOCYTES # BLD AUTO: 1.3 10E9/L (ref 0.8–5.3)
LYMPHOCYTES NFR BLD AUTO: 14.8 %
MCH RBC QN AUTO: 30.3 PG (ref 26.5–33)
MCHC RBC AUTO-ENTMCNC: 32.4 G/DL (ref 31.5–36.5)
MCV RBC AUTO: 94 FL (ref 78–100)
MONOCYTES # BLD AUTO: 0.8 10E9/L (ref 0–1.3)
MONOCYTES NFR BLD AUTO: 9.9 %
NEUTROPHILS # BLD AUTO: 6.2 10E9/L (ref 1.6–8.3)
NEUTROPHILS NFR BLD AUTO: 73.1 %
NRBC # BLD AUTO: 0 10*3/UL
NRBC BLD AUTO-RTO: 0 /100
PLATELET # BLD AUTO: 342 10E9/L (ref 150–450)
POTASSIUM SERPL-SCNC: 4.1 MMOL/L (ref 3.4–5.3)
PROT SERPL-MCNC: 7 G/DL (ref 6.8–8.8)
RBC # BLD AUTO: 3.3 10E12/L (ref 3.8–5.2)
SODIUM SERPL-SCNC: 132 MMOL/L (ref 133–144)
WBC # BLD AUTO: 8.5 10E9/L (ref 4–11)

## 2021-03-06 PROCEDURE — 250N000013 HC RX MED GY IP 250 OP 250 PS 637: Performed by: PHYSICIAN ASSISTANT

## 2021-03-06 PROCEDURE — 85025 COMPLETE CBC W/AUTO DIFF WBC: CPT | Performed by: PSYCHIATRY & NEUROLOGY

## 2021-03-06 PROCEDURE — 250N000013 HC RX MED GY IP 250 OP 250 PS 637: Performed by: EMERGENCY MEDICINE

## 2021-03-06 PROCEDURE — 36415 COLL VENOUS BLD VENIPUNCTURE: CPT | Performed by: PSYCHIATRY & NEUROLOGY

## 2021-03-06 PROCEDURE — 250N000013 HC RX MED GY IP 250 OP 250 PS 637: Performed by: NURSE PRACTITIONER

## 2021-03-06 PROCEDURE — 124N000003 HC R&B MH SENIOR/ADOLESCENT

## 2021-03-06 PROCEDURE — 99232 SBSQ HOSP IP/OBS MODERATE 35: CPT | Performed by: PSYCHIATRY & NEUROLOGY

## 2021-03-06 PROCEDURE — 250N000011 HC RX IP 250 OP 636: Performed by: PSYCHIATRY & NEUROLOGY

## 2021-03-06 PROCEDURE — 80053 COMPREHEN METABOLIC PANEL: CPT | Performed by: PSYCHIATRY & NEUROLOGY

## 2021-03-06 PROCEDURE — 250N000013 HC RX MED GY IP 250 OP 250 PS 637: Performed by: PSYCHIATRY & NEUROLOGY

## 2021-03-06 RX ORDER — ONDANSETRON 4 MG/1
4 TABLET, ORALLY DISINTEGRATING ORAL EVERY 6 HOURS PRN
Status: DISCONTINUED | OUTPATIENT
Start: 2021-03-06 | End: 2021-03-12 | Stop reason: HOSPADM

## 2021-03-06 RX ADMIN — MEMANTINE 10 MG: 10 TABLET ORAL at 08:32

## 2021-03-06 RX ADMIN — PROPRANOLOL HYDROCHLORIDE 10 MG: 10 TABLET ORAL at 16:13

## 2021-03-06 RX ADMIN — HYDROXYCHLOROQUINE SULFATE 200 MG: 200 TABLET, FILM COATED ORAL at 20:29

## 2021-03-06 RX ADMIN — PROPRANOLOL HYDROCHLORIDE 10 MG: 10 TABLET ORAL at 11:50

## 2021-03-06 RX ADMIN — BUSPIRONE HYDROCHLORIDE 5 MG: 5 TABLET ORAL at 14:06

## 2021-03-06 RX ADMIN — BRIMONIDINE TARTRATE 1 DROP: 1.5 SOLUTION OPHTHALMIC at 08:33

## 2021-03-06 RX ADMIN — ENALAPRIL MALEATE 20 MG: 20 TABLET ORAL at 08:32

## 2021-03-06 RX ADMIN — DONEPEZIL HYDROCHLORIDE 10 MG: 10 TABLET ORAL at 20:50

## 2021-03-06 RX ADMIN — BUSPIRONE HYDROCHLORIDE 5 MG: 5 TABLET ORAL at 08:33

## 2021-03-06 RX ADMIN — HYDRALAZINE HYDROCHLORIDE 25 MG: 25 TABLET ORAL at 20:29

## 2021-03-06 RX ADMIN — HYDROXYCHLOROQUINE SULFATE 200 MG: 200 TABLET, FILM COATED ORAL at 08:33

## 2021-03-06 RX ADMIN — ALUMINUM HYDROXIDE, MAGNESIUM HYDROXIDE, AND SIMETHICONE 30 ML: 200; 200; 20 SUSPENSION ORAL at 16:13

## 2021-03-06 RX ADMIN — SIMVASTATIN 10 MG: 10 TABLET, FILM COATED ORAL at 20:50

## 2021-03-06 RX ADMIN — BRIMONIDINE TARTRATE 1 DROP: 1.5 SOLUTION OPHTHALMIC at 20:30

## 2021-03-06 RX ADMIN — LATANOPROST 1 DROP: 50 SOLUTION OPHTHALMIC at 20:51

## 2021-03-06 RX ADMIN — HYDRALAZINE HYDROCHLORIDE 25 MG: 25 TABLET ORAL at 14:06

## 2021-03-06 RX ADMIN — DIPHENHYDRAMINE HYDROCHLORIDE 25 MG: 25 CAPSULE ORAL at 06:13

## 2021-03-06 RX ADMIN — DORZOLAMIDE HYDROCHLORIDE 1 DROP: 20 SOLUTION/ DROPS OPHTHALMIC at 08:33

## 2021-03-06 RX ADMIN — ONDANSETRON 4 MG: 4 TABLET, ORALLY DISINTEGRATING ORAL at 19:04

## 2021-03-06 RX ADMIN — FERROUS GLUCONATE 324 MG: 324 TABLET ORAL at 08:33

## 2021-03-06 RX ADMIN — BUSPIRONE HYDROCHLORIDE 5 MG: 5 TABLET ORAL at 20:29

## 2021-03-06 RX ADMIN — DORZOLAMIDE HYDROCHLORIDE 1 DROP: 20 SOLUTION/ DROPS OPHTHALMIC at 20:30

## 2021-03-06 RX ADMIN — ENALAPRIL MALEATE 20 MG: 20 TABLET ORAL at 20:29

## 2021-03-06 RX ADMIN — PROPRANOLOL HYDROCHLORIDE 10 MG: 10 TABLET ORAL at 05:05

## 2021-03-06 RX ADMIN — HYDRALAZINE HYDROCHLORIDE 25 MG: 25 TABLET ORAL at 06:19

## 2021-03-06 RX ADMIN — DULOXETINE HYDROCHLORIDE 60 MG: 60 CAPSULE, DELAYED RELEASE ORAL at 08:33

## 2021-03-06 RX ADMIN — CYANOCOBALAMIN TAB 1000 MCG 1000 MCG: 1000 TAB at 08:33

## 2021-03-06 RX ADMIN — AMLODIPINE BESYLATE 5 MG: 5 TABLET ORAL at 06:18

## 2021-03-06 ASSESSMENT — ACTIVITIES OF DAILY LIVING (ADL)
HYGIENE/GROOMING: INDEPENDENT
ORAL_HYGIENE: INDEPENDENT
DRESS: INDEPENDENT;SCRUBS (BEHAVIORAL HEALTH)

## 2021-03-06 NOTE — PROGRESS NOTES
Patient seen, chart reviewed, care discussed with staff.    Feels terrible , anxious uptight , paranoid about discharge, feels worried about her discharge  slept poorly  Appetite is poor   Low energy and motivation    Has no active suicidal ideation but thinks she will die       Blood pressure (!) 160/68, pulse 58, temperature 96.8  F (36  C), resp. rate 16, weight 62 kg (136 lb 11.2 oz), SpO2 97 %, not currently breastfeeding.    General appearance: stressed  Alert.   Affect: anxious, sad  Mood: discouraged, depressed  Speech:  normal.   Eye contact:  good.    Psychomotor behavior: normal  Gait: normal.    Abnormal movements: none  Delusions: none  Hallucinations:   none  Thoughts: logical, some concrete  Associations: intact  Judgement: fair  Insight: fair  Cognitions: intact in conversation  Memory:  intact in conversation  Orientation: normal    Not suicidal.    Imp: anxiety, decreased ability ro cognitive deal with this, MoCa=20 and confused by Gabapentin  Patient Active Problem List   Diagnosis     Essential hypertension     Anxiety     Glaucoma suspect, bilateral     Personal history of DVT (deep vein thrombosis)     Mixed obsessional thoughts and acts     Age-related osteoporosis without current pathological fracture     Nonrheumatic aortic valve insufficiency     Chronic diarrhea     Pain in both hands     Primary osteoarthritis of left hip     Suicidal ideation     Pericardial effusion     Neck pain     Abnormal cardiovascular stress test     Acute cystitis without hematuria     Fall, initial encounter     Laceration of scalp without foreign body, initial encounter     Recurrent UTI     Anemia due to blood loss, acute     Acute kidney injury superimposed on CKD (H)     Lumbar radiculopathy     Liver lesion     Depression, unspecified depression type   and neurocognitive disorder    Plan: continue present meds  2.  Add Buspar 5mg TID and PRN 10mg Propranolol          Current Facility-Administered  Medications:      acetaminophen (TYLENOL) tablet 650 mg, 650 mg, Oral, Q4H PRN, Gloria Martinez, APRN CNP, 650 mg at 03/01/21 2016     alum & mag hydroxide-simethicone (MAALOX) suspension 30 mL, 30 mL, Oral, Q4H PRN, Gloria Martinez, APRN CNP     amLODIPine (NORVASC) tablet 5 mg, 5 mg, Oral, Daily, Marleni Lester PA-C, 5 mg at 03/06/21 0618     brimonidine (ALPHAGAN-P) 0.15 % ophthalmic solution 1 drop, 1 drop, Both Eyes, BID, Jey Mayo MD, 1 drop at 03/06/21 0833     busPIRone (BUSPAR) tablet 5 mg, 5 mg, Oral, TID, Sha Ricardo MD, 5 mg at 03/06/21 0833     cyanocobalamin (VITAMIN B-12) tablet 1,000 mcg, 1,000 mcg, Oral, Daily, Gloria Martinez APRN CNP, 1,000 mcg at 03/06/21 0833     cyclobenzaprine (FLEXERIL) tablet 5 mg, 5 mg, Oral, TID PRN, Xochitl Werner PA-C, 5 mg at 03/02/21 1120     diphenhydrAMINE (BENADRYL) capsule 25 mg, 25 mg, Oral, Q6H PRN, Xochitl Werner PA-C, 25 mg at 03/06/21 0613     donepezil (ARICEPT) tablet 10 mg, 10 mg, Oral, At Bedtime, Sha Ricardo MD, 10 mg at 03/05/21 2208     dorzolamide (TRUSOPT) 2 % ophthalmic solution 1 drop, 1 drop, Both Eyes, BID, Jey Mayo MD, 1 drop at 03/06/21 0833     DULoxetine (CYMBALTA) DR capsule 60 mg, 60 mg, Oral, Daily, Gloria Martinez APRN CNP, 60 mg at 03/06/21 0833     enalapril (VASOTEC) tablet 20 mg, 20 mg, Oral, BID, Xochitl Werner PA-C, 20 mg at 03/06/21 0832     ferrous gluconate (FERGON) tablet 324 mg, 324 mg, Oral, BID w/meals, Gloria Martinez APRN CNP, 324 mg at 03/06/21 0833     hydrALAZINE (APRESOLINE) tablet 25 mg, 25 mg, Oral, TID, Xochitl Werner PA-C, 25 mg at 03/06/21 0619     hydroxychloroquine (PLAQUENIL) tablet 200 mg, 200 mg, Oral, BID, Tangela Vuong APRN CNP, 200 mg at 03/06/21 0833     latanoprost (XALATAN) 0.005 % ophthalmic solution 1 drop, 1 drop, Both Eyes, At Bedtime, Jey Mayo MD, 1 drop at 03/05/21  2207     Lidocaine (LIDOCARE) 4 % Patch 2 patch, 2 patch, Transdermal, Q24H, Xochitl Werner PA-C, 1 patch at 03/04/21 1223     lidocaine patch in PLACE, , Transdermal, Q8H, Xochitl Werner PA-C     loperamide (IMODIUM) capsule 2 mg, 2 mg, Oral, TID PRN, Tangela Vuong APRN CNP, 2 mg at 03/03/21 2010     memantine (NAMENDA) tablet 10 mg, 10 mg, Oral, Daily, Sha Ricardo MD, 10 mg at 03/06/21 0832     menthol (ICY HOT) 5 % patch 1 patch, 1 patch, Topical, Q8H PRN, 1 patch at 03/02/21 2111 **AND** menthol (ICY HOT) Patch in Place, , Transdermal, Q8H, Marleni Lester PA-C, Stopped at 03/04/21 1446     polyethylene glycol (MIRALAX) Packet 17 g, 17 g, Oral, Daily PRN, Gloria Martinez APRN CNP     propranolol (INDERAL) tablet 10 mg, 10 mg, Oral, Q4H PRN, Sha Ricardo MD, 10 mg at 03/06/21 0505     simvastatin (ZOCOR) tablet 10 mg, 10 mg, Oral, At Bedtime, Jey Mayo MD, 10 mg at 03/05/21 2207     traZODone (DESYREL) tablet 50 mg, 50 mg, Oral, At Bedtime PRN, Gloria Martinez APRN CNP  Recent Results (from the past 168 hour(s))   Asymptomatic SARS-CoV-2 COVID-19 Virus (Coronavirus) by PCR    Collection Time: 03/02/21  1:05 PM    Specimen: Nasopharyngeal   Result Value Ref Range    SARS-CoV-2 Virus Specimen Source Nasopharyngeal     SARS-CoV-2 PCR Result NEGATIVE     SARS-CoV-2 PCR Comment       Testing was performed using the Xpert Xpress SARS-CoV-2 Assay on the Cepheid Gene-Xpert   Instrument Systems. Additional information about this Emergency Use Authorization (EUA)   assay can be found via the Lab Guide.

## 2021-03-06 NOTE — PLAN OF CARE
"  Problem: Depressive Symptoms  Goal: Depressive Symptoms  Description: Signs and symptoms of listed problems will be absent or manageable.  Outcome: No Change  Flowsheets (Taken 3/6/2021 1921)  Depressive Symptoms Assessed: all  Depressive Symptoms Present:    affect    mood    anxiety    insight    thought process    sleep     Problem: Behavioral Health Plan of Care  Goal: Optimized Coping Skills in Response to Life Stressors  Outcome: No Change   Pt reporting that this is the worst that she has ever felt. Pt  reports that she cannot go back to her apartment on Monday in this condition. Pt reports that she has not been sleeping well for the last several nights, feels as if she is having a break down and identifying feeling very depressed and anxious. Pt reports that she has to talk with a doctor today. Pt informed that her doctor is not on call. Pt reports that it is urgent that she talks with someone today. Pt informed that on call would be informed of her request.   Pt was medication compliant this AM but reports that she does not feel that the medications are helping and reports concerns that her medications have been changed every few days.   Dr. Valverde web paged.   Pt seen by Dr. Valverde.  Pt was offered/recieved prn propanolol 10 mg for anxiety. Pt has been encouraged to focus positive thoughts vs negative.   Pt has been encouraged to be in the lounge with peers vs laying in bed. Pt identifies ruminating thoughts when she is in her room. The thoughts are of her having to go back to her apartment and the \"break down\" she has prior to hospitalization. Pt is able to identify that the isolation from covid was the triggering factor for her increased anxiety.     Pt out for lunch. Pt reported that she felt prn propanolol was helpful with decreasing her anxiety and tremors. Pt was reminded that this was a medication she could ask for but could not take it more than every 4 hours. Pt also verbalized feeling " nauseated after lunch. Pt denies emesis. Pt was given a glass of ginger ale.   Pt denies active SI but admits that she has had  passive thoughts at times but adamantly denies plan or intent to harm herself as she reports that she would never hurt her son that way.

## 2021-03-06 NOTE — PLAN OF CARE
"  Pt c/o tremors \"throughout my whole body.\" Not observable by writer at rest. When pt held hands out in front of her, mild bilateral tremor observed. Stated she was feeling \"really anxious\" as well. Accepted offer of propanolol 10 mg po PRN. Later, pt declined to attend therapeutic group, stating she was again having the same symptoms. \"I don't know what to do.\" When asked what had been helpful in the past, she stated \"nothing.\" When asked if she had any coping skills that were helpful, pt could not think of any. Stated that \"talking with someone\" was helpful. Sat and talked with patient and offered to teach her some coping skills. She agreed to try a body scan, mindfulness meditation, affirmations/visualizations. Stated afterward they had been helpful. Her anxiety was diminished and she did no feel the tremor any longer. Encouraged pt to cultivate at least one coping skill to use when feeling anxious and she verbalized agreement. Denies SI/SIB. Med-compliant. Sleep, appetite, hygiene adequate. Hoping for discharge on Monday. Continue with current treatment plan and recommendations. Continue to monitor and reassess symptoms. Monitor response to medications. Monitor progress towards treatment goals. Encourage groups and participation.   "

## 2021-03-06 NOTE — PLAN OF CARE
"  Problem: Somatic Disturbance (Anxiety Signs/Symptoms)  Goal: Improved Somatic Symptoms (Anxiety Signs/Symptoms)  Outcome: Declining     Pt continues to c/o nausea since right after lunch. Accepted offer of Maalox PRN and propanolol 10 mg po PRN for anxiety. Reported she had not utilized any coping skills since last evening. Denies any connection between her nausea and the anxiety she discussed earlier with on-call provider today re: discharge, stating \"is that what she said, anxious about discharge?\" shaking her head and denying she had said that. Stating another RN \"did such a great job, checking on me every 20 minutes or so.\" Later reported Maalox had helped \"only for about 20 minutes.\" Pt encouraged to attempt to utilize some of the coping skills she was educated about last evening and was given ginger ale. Afebrile. Dr. Valverde contacted. Telephone orders received for Zofran 4 mg ODT q 6 hrs PRN nausea, CMP and CBC - differential. Pt aqcepted offer of Zofran 4 mg ODT.    Continue with current treatment plan and recommendations. Continue to monitor and reassess symptoms. Monitor response to medications. Monitor progress towards treatment goals. Encourage groups and participation.   "

## 2021-03-06 NOTE — PLAN OF CARE
"  Problem: Mood Impairment (Anxiety Signs/Symptoms)  Goal: Improved Mood Symptoms (Anxiety Signs/Symptoms)  Outcome: Declining       Woke up at 0115, told staff that she was not feeling well. Pt was seated in the chair on her room, when asked how she felt, replied \" I feel insane\" , when asked to elaborate, pt stated that she woke up feeling impulsive, \" I felt this way before and I was in the hospital for 4 months\". \" I don't know what else to tell you.  Offered prn medications for sleep or anxiety, pt declined\" I've had too much medications\".  Encouraged to do the coping techniques that were taught to her by her previous nurse.  Given snacks per request.  Appeared calm  0245 requested a 1:1 with staff, spent 20 minutes with pt. Stated she felt nauseous and had diarrhea, not observed.  Pt confided that she is anxious about the up coming discharge, anticipating that she will be isolated again at the [facility] Facility. Denies feeling suicidal \" I don't want to  do it to my child\"  Negative talks about her Psychiatrist and Unit NP. Pt was made aware that staff here are all  Qualified. Wants to talk longer with her psychiatrist during rounds and stated that   \" Lowering the medications made me feel worst\".  Continued to decline prn medications offered. Declined other non pharmacological approach to help with anxiety except breahting exercises which she said \" Worked half way\".  Pt was given the advantages about being discharged vs being in patient.  Declined to lay down.  Slept for 1 hour while seated on the chair.  0500 called and told writer that she's ready to take the sleep medication, pt was informed it's to late to administer sleep medication.  \"You told me I was anxious about going back to the AL , Now I can't get it off my mind\". This writer explained to the patient that what was asked earlier was if she was anxious about discharge .  /65 P 72, reported feeling shaky.  0505 Inderal 10 mg given  0600 angry and " "stated \" just give me my sleeping pill!\"( in the intercom)  Writer approached pt, she apologized ,\" You do not know what I'm going through, It's about my living situation, I had a breakdown there\"  Encouraged to focus on the positive side of the AL facility.  /64 P 58  0613 Benadryl 25 mg given  0618 Hydralazine 25 mg and Amlodipine 5 mg given earlier.  Slept on and off for 3.5 hours.    "

## 2021-03-07 PROCEDURE — 250N000013 HC RX MED GY IP 250 OP 250 PS 637: Performed by: PHYSICIAN ASSISTANT

## 2021-03-07 PROCEDURE — 250N000013 HC RX MED GY IP 250 OP 250 PS 637: Performed by: NURSE PRACTITIONER

## 2021-03-07 PROCEDURE — 250N000011 HC RX IP 250 OP 636: Performed by: PSYCHIATRY & NEUROLOGY

## 2021-03-07 PROCEDURE — 124N000003 HC R&B MH SENIOR/ADOLESCENT

## 2021-03-07 PROCEDURE — 250N000013 HC RX MED GY IP 250 OP 250 PS 637: Performed by: EMERGENCY MEDICINE

## 2021-03-07 PROCEDURE — 250N000013 HC RX MED GY IP 250 OP 250 PS 637: Performed by: PSYCHIATRY & NEUROLOGY

## 2021-03-07 RX ADMIN — HYDRALAZINE HYDROCHLORIDE 25 MG: 25 TABLET ORAL at 22:13

## 2021-03-07 RX ADMIN — LATANOPROST 1 DROP: 50 SOLUTION OPHTHALMIC at 22:16

## 2021-03-07 RX ADMIN — DIPHENHYDRAMINE HYDROCHLORIDE 25 MG: 25 CAPSULE ORAL at 00:15

## 2021-03-07 RX ADMIN — BUSPIRONE HYDROCHLORIDE 5 MG: 5 TABLET ORAL at 08:28

## 2021-03-07 RX ADMIN — AMLODIPINE BESYLATE 5 MG: 5 TABLET ORAL at 08:28

## 2021-03-07 RX ADMIN — HYDROXYCHLOROQUINE SULFATE 200 MG: 200 TABLET, FILM COATED ORAL at 08:28

## 2021-03-07 RX ADMIN — PROPRANOLOL HYDROCHLORIDE 10 MG: 10 TABLET ORAL at 00:14

## 2021-03-07 RX ADMIN — HYDROXYCHLOROQUINE SULFATE 200 MG: 200 TABLET, FILM COATED ORAL at 22:13

## 2021-03-07 RX ADMIN — DONEPEZIL HYDROCHLORIDE 10 MG: 10 TABLET ORAL at 22:13

## 2021-03-07 RX ADMIN — HYDRALAZINE HYDROCHLORIDE 25 MG: 25 TABLET ORAL at 13:54

## 2021-03-07 RX ADMIN — ONDANSETRON 4 MG: 4 TABLET, ORALLY DISINTEGRATING ORAL at 11:59

## 2021-03-07 RX ADMIN — ENALAPRIL MALEATE 20 MG: 20 TABLET ORAL at 08:30

## 2021-03-07 RX ADMIN — MEMANTINE 10 MG: 10 TABLET ORAL at 08:30

## 2021-03-07 RX ADMIN — ONDANSETRON 4 MG: 4 TABLET, ORALLY DISINTEGRATING ORAL at 21:22

## 2021-03-07 RX ADMIN — BRIMONIDINE TARTRATE 1 DROP: 1.5 SOLUTION OPHTHALMIC at 21:22

## 2021-03-07 RX ADMIN — DORZOLAMIDE HYDROCHLORIDE 1 DROP: 20 SOLUTION/ DROPS OPHTHALMIC at 21:22

## 2021-03-07 RX ADMIN — TRAZODONE HYDROCHLORIDE 50 MG: 50 TABLET ORAL at 22:14

## 2021-03-07 RX ADMIN — BRIMONIDINE TARTRATE 1 DROP: 1.5 SOLUTION OPHTHALMIC at 08:33

## 2021-03-07 RX ADMIN — SIMVASTATIN 10 MG: 10 TABLET, FILM COATED ORAL at 22:13

## 2021-03-07 RX ADMIN — ENALAPRIL MALEATE 20 MG: 20 TABLET ORAL at 22:13

## 2021-03-07 RX ADMIN — BUSPIRONE HYDROCHLORIDE 5 MG: 5 TABLET ORAL at 13:54

## 2021-03-07 RX ADMIN — PROPRANOLOL HYDROCHLORIDE 10 MG: 10 TABLET ORAL at 21:22

## 2021-03-07 RX ADMIN — DORZOLAMIDE HYDROCHLORIDE 1 DROP: 20 SOLUTION/ DROPS OPHTHALMIC at 08:34

## 2021-03-07 RX ADMIN — LOPERAMIDE HYDROCHLORIDE 2 MG: 2 CAPSULE ORAL at 04:20

## 2021-03-07 RX ADMIN — BUSPIRONE HYDROCHLORIDE 5 MG: 5 TABLET ORAL at 22:13

## 2021-03-07 RX ADMIN — DULOXETINE HYDROCHLORIDE 60 MG: 60 CAPSULE, DELAYED RELEASE ORAL at 08:29

## 2021-03-07 RX ADMIN — FERROUS GLUCONATE 324 MG: 324 TABLET ORAL at 17:54

## 2021-03-07 RX ADMIN — HYDRALAZINE HYDROCHLORIDE 25 MG: 25 TABLET ORAL at 08:29

## 2021-03-07 ASSESSMENT — ACTIVITIES OF DAILY LIVING (ADL)
ORAL_HYGIENE: INDEPENDENT
DRESS: INDEPENDENT;SCRUBS (BEHAVIORAL HEALTH)
HYGIENE/GROOMING: INDEPENDENT

## 2021-03-07 NOTE — PLAN OF CARE
Problem: Depressive Symptoms  Goal: Depressive Symptoms  Description: Signs and symptoms of listed problems will be absent or manageable.  Outcome: No Change  Flowsheets (Taken 3/7/2021 1130)  Depressive Symptoms Assessed: all  Depressive Symptoms Present:    affect    mood    anxiety    insight    speech    sleep    memory deficits       Recent Flowsheet Documentation  Taken 3/7/2021 1125 by Veena Funes RN  Body Position: position changed independently  Pt has continued to focus on being sick. Pt has reports that she has had on going nausea and diarrhea. Pt has been reminded that staff needs to see each episode of diarrhea and that she has been instructed to pull call light with each episode.  Pt has not had any diarrhea this shift. Pt denies constipation and patient was observed once on night shift to have a loose stool in which she received prn imodium. Pt has been reporting feeling nauseated for much of this shift. Pt napped after breakfast (banana and 1/2 blueberry muffin). Pt reported at that time (1010) she was feeling better and denied nausea. A short time later patient began verbalizing feeling nauseated again and was very focused on this.   Pt was encouraged to focus on her coping skills to aid in distraction but she has declined . Pt has been encouraged to call her friend/Robyn and her son/Brent. Pt has declined.  Pt continued to voice nausea. Pt was offered/accepted prn zofran. Pt declined lunch but did agree to drink a can of ginger ale. Pt able to walk to the lounge independently with her walker but declined to stay in the lounge for more than a few minutes.   Pt's affect is flat with inconsistent eye contact. Pt does engage during interactions and seemed to enjoy reminiscing about her past. Pt was orientated to person, place and date this AM.   Pt denies SI/SIB and reports that she would never do anything to harm herself because of her son. Pt is also denying anxiety and depression and reports  that she is physically sick.   Pt was assisted with a shower this PM. Pt needed minimal assistance.

## 2021-03-07 NOTE — PLAN OF CARE
"  Problem: Somatic Disturbance (Anxiety Signs/Symptoms)  Goal: Improved Somatic Symptoms (Anxiety Signs/Symptoms)  Outcome: No Change     Received sitting on bed with an empty emesis basin by her hands. Reported feeling nauseous and having diarrhea. Pt unable to vomit and no BM observed in the toilet. Anxious about her condition, \" Yesterday it was the nerves, now it's nausea\".  Declined to take sleep medication and laid at the foot part of the bed for easy access to the toilet just in case she had diarrhea.  Ice chips and saltine crackers provided.  Went to the toilet , no diarrhea.  /66 P 61  0014 Inderal 10 mg and Benadryl 25 mg given  Elevated HOB and encouraged to lay down, pt agreed.  Slept on and off  0420 imodium 2 mg given for  Watery stool, greenish in color.  \" Why do you have to see it?\".  Drank water and Ginger Ale  Appeared calmer   /75 P 61  Only slept 2.5 hours overnight.  "

## 2021-03-08 PROCEDURE — 250N000013 HC RX MED GY IP 250 OP 250 PS 637: Performed by: PHYSICIAN ASSISTANT

## 2021-03-08 PROCEDURE — 250N000013 HC RX MED GY IP 250 OP 250 PS 637: Performed by: NURSE PRACTITIONER

## 2021-03-08 PROCEDURE — 250N000013 HC RX MED GY IP 250 OP 250 PS 637: Performed by: PSYCHIATRY & NEUROLOGY

## 2021-03-08 PROCEDURE — 250N000013 HC RX MED GY IP 250 OP 250 PS 637: Performed by: EMERGENCY MEDICINE

## 2021-03-08 PROCEDURE — 124N000003 HC R&B MH SENIOR/ADOLESCENT

## 2021-03-08 RX ORDER — ALPRAZOLAM 0.25 MG
0.25 TABLET ORAL
Status: DISCONTINUED | OUTPATIENT
Start: 2021-03-08 | End: 2021-03-12 | Stop reason: HOSPADM

## 2021-03-08 RX ORDER — QUETIAPINE FUMARATE 25 MG/1
25 TABLET, FILM COATED ORAL 2 TIMES DAILY
Status: DISCONTINUED | OUTPATIENT
Start: 2021-03-08 | End: 2021-03-08

## 2021-03-08 RX ADMIN — DULOXETINE HYDROCHLORIDE 60 MG: 60 CAPSULE, DELAYED RELEASE ORAL at 08:59

## 2021-03-08 RX ADMIN — BRIMONIDINE TARTRATE 1 DROP: 1.5 SOLUTION OPHTHALMIC at 09:02

## 2021-03-08 RX ADMIN — BUSPIRONE HYDROCHLORIDE 5 MG: 5 TABLET ORAL at 20:30

## 2021-03-08 RX ADMIN — QUETIAPINE FUMARATE 25 MG: 25 TABLET ORAL at 11:09

## 2021-03-08 RX ADMIN — AMLODIPINE BESYLATE 5 MG: 5 TABLET ORAL at 09:00

## 2021-03-08 RX ADMIN — DORZOLAMIDE HYDROCHLORIDE 1 DROP: 20 SOLUTION/ DROPS OPHTHALMIC at 09:02

## 2021-03-08 RX ADMIN — Medication 12.5 MG: at 20:29

## 2021-03-08 RX ADMIN — MEMANTINE 10 MG: 10 TABLET ORAL at 08:59

## 2021-03-08 RX ADMIN — DONEPEZIL HYDROCHLORIDE 10 MG: 10 TABLET ORAL at 22:11

## 2021-03-08 RX ADMIN — DORZOLAMIDE HYDROCHLORIDE 1 DROP: 20 SOLUTION/ DROPS OPHTHALMIC at 20:31

## 2021-03-08 RX ADMIN — HYDROXYCHLOROQUINE SULFATE 200 MG: 200 TABLET, FILM COATED ORAL at 20:29

## 2021-03-08 RX ADMIN — ALPRAZOLAM 0.25 MG: 0.25 TABLET ORAL at 17:56

## 2021-03-08 RX ADMIN — FERROUS GLUCONATE 324 MG: 324 TABLET ORAL at 17:56

## 2021-03-08 RX ADMIN — FERROUS GLUCONATE 324 MG: 324 TABLET ORAL at 08:59

## 2021-03-08 RX ADMIN — ALPRAZOLAM 0.25 MG: 0.25 TABLET ORAL at 13:21

## 2021-03-08 RX ADMIN — CYANOCOBALAMIN TAB 1000 MCG 1000 MCG: 1000 TAB at 09:00

## 2021-03-08 RX ADMIN — LATANOPROST 1 DROP: 50 SOLUTION OPHTHALMIC at 22:11

## 2021-03-08 RX ADMIN — HYDRALAZINE HYDROCHLORIDE 25 MG: 25 TABLET ORAL at 20:29

## 2021-03-08 RX ADMIN — ENALAPRIL MALEATE 20 MG: 20 TABLET ORAL at 08:59

## 2021-03-08 RX ADMIN — BUSPIRONE HYDROCHLORIDE 5 MG: 5 TABLET ORAL at 14:40

## 2021-03-08 RX ADMIN — HYDRALAZINE HYDROCHLORIDE 25 MG: 25 TABLET ORAL at 09:00

## 2021-03-08 RX ADMIN — BUSPIRONE HYDROCHLORIDE 5 MG: 5 TABLET ORAL at 09:00

## 2021-03-08 RX ADMIN — ALUMINUM HYDROXIDE, MAGNESIUM HYDROXIDE, AND SIMETHICONE 30 ML: 200; 200; 20 SUSPENSION ORAL at 17:56

## 2021-03-08 RX ADMIN — SIMVASTATIN 10 MG: 10 TABLET, FILM COATED ORAL at 22:11

## 2021-03-08 RX ADMIN — BRIMONIDINE TARTRATE 1 DROP: 1.5 SOLUTION OPHTHALMIC at 20:31

## 2021-03-08 RX ADMIN — ENALAPRIL MALEATE 20 MG: 20 TABLET ORAL at 20:30

## 2021-03-08 RX ADMIN — HYDROXYCHLOROQUINE SULFATE 200 MG: 200 TABLET, FILM COATED ORAL at 08:59

## 2021-03-08 ASSESSMENT — ACTIVITIES OF DAILY LIVING (ADL)
LAUNDRY: UNABLE TO COMPLETE
HYGIENE/GROOMING: INDEPENDENT
DRESS: INDEPENDENT
ORAL_HYGIENE: INDEPENDENT

## 2021-03-08 NOTE — PLAN OF CARE
Work Completed: Reviewed chart. Met with team to discuss pt progress. Pt seems to be deteriorating, which may be due to fear of discharge.    Discharge plan or goal: Discharge to [facility] when mental health has stabilized.                Barriers to discharge: Ongoing mental and physical symptom management.

## 2021-03-08 NOTE — PLAN OF CARE
"  Pt continues to c/o nausea. Reports \"it's my OCD. I can't stop thinking about discharge tomorrow.\" Earlier, when asked by a staff \"Is there anything you need?\" she reponded \"yeah, a gun.\" Attended [employer] group prior to dinner and participated. At dinner, stated she was not able to eat solid food and requested beef broth, apple sauce and ginger ale, all of which she finished. Affect remains tense, blunted. Withdrawn and not socializing with peers. Isolates to room.     Later in evening, pt stated \"I need something strong to calm me down.\" Reported anxiety was out of control again and nausea. Given propanolol 10 mg po PRN and Zofran 4 mg ODT PRN. Reported \"I need something stronger.\" Encouraged to speak with provider tomorrow about medication alternatives. Stated \"I don't like the doctors here. I need more help than I'm getting.\" She was unable to articulate what kind of help she needed. When asked what her fear was surrounding going home, she was unable to provide an explanation. \"I don't know. I just don't know what to do anymore. I was in the hospital for 4 months before.\" Trazodone 50 mg po PRN administered with HS meds due to report pt did not sleep well last night.   "

## 2021-03-08 NOTE — PROGRESS NOTES
"Patient seen, chart reviewed, care discussed with staff.  Blood pressure (!) 175/63, pulse 73, temperature 97  F (36.1  C), temperature source Temporal, resp. rate 16, weight 62.1 kg (136 lb 12.8 oz), SpO2 94 %, not currently breastfeeding.    By report, suicidal comments, rates anxiety and depression \"10 out 0f 10\", nausea (she states this is anxiety for her, it occurred one day after Buspar started.  She notes she has so many obsessive thoughts she can't do ADLs  No neck stiffness    General appearance: distressed, visibly anxious, requests transfer to long term hospital as she feels unable to function  Alert.   Affect: distressed, anxious  Mood: depressed  Speech:  normal.   Eye contact:  good.    Psychomotor behavior: normal  Gait: normal.    Abnormal movements: none  Delusions: none  Hallucinations:  none  Thoughts: increased obsessive thoughts  Associations: intact  Judgement: poor  Insight: poor  Cognitions: intact in conversation  Memory:  intact in conversation  Orientation: normal    Not suicidal.    Imp: 1. OCD worse without neuroleptic  2.  Nausea more likely related to anxiety than to Buspar.  3.  With neurocognitive disorder, she is not able to use cognitive skills to deal with anxiety    Plan: Xanax on schedule, resume as a bridge to allow transition back to assisted living   2.  Restart a neuroleptic, Seroquel which should help anxiety as well as OCD and mood augmentation  3.  Discharge plan for Wednesday if she tolerates the med changes without unsteadiness.  4.  Ta;er off Xanax after transition back to assisted living      Current Facility-Administered Medications:      acetaminophen (TYLENOL) tablet 650 mg, 650 mg, Oral, Q4H PRN, Gloria Martinez, CHAPITO CNP, 650 mg at 03/01/21 2016     ALPRAZolam (XANAX) tablet 0.25 mg, 0.25 mg, Oral, TID w/meals, Sha Ricardo MD     alum & mag hydroxide-simethicone (MAALOX) suspension 30 mL, 30 mL, Oral, Q4H PRN, Gloria Martinez, APRN " CNP, 30 mL at 03/06/21 1613     amLODIPine (NORVASC) tablet 5 mg, 5 mg, Oral, Daily, Marleni Lester PA-C, 5 mg at 03/08/21 0900     brimonidine (ALPHAGAN-P) 0.15 % ophthalmic solution 1 drop, 1 drop, Both Eyes, BID, Jey Mayo MD, 1 drop at 03/08/21 0902     busPIRone (BUSPAR) tablet 5 mg, 5 mg, Oral, TID, Sha Ricardo MD, 5 mg at 03/08/21 0900     cyanocobalamin (VITAMIN B-12) tablet 1,000 mcg, 1,000 mcg, Oral, Daily, Gloria Martinez APRN CNP, 1,000 mcg at 03/08/21 0900     cyclobenzaprine (FLEXERIL) tablet 5 mg, 5 mg, Oral, TID PRN, Xochitl Werner PA-C, 5 mg at 03/02/21 1120     diphenhydrAMINE (BENADRYL) capsule 25 mg, 25 mg, Oral, Q6H PRN, Xochitl Werner PA-C, 25 mg at 03/07/21 0015     donepezil (ARICEPT) tablet 10 mg, 10 mg, Oral, At Bedtime, Sha Ricardo MD, 10 mg at 03/07/21 2213     dorzolamide (TRUSOPT) 2 % ophthalmic solution 1 drop, 1 drop, Both Eyes, BID, Jey Mayo MD, 1 drop at 03/08/21 0902     DULoxetine (CYMBALTA) DR capsule 60 mg, 60 mg, Oral, Daily, Gloria Martinez APRN CNP, 60 mg at 03/08/21 0859     enalapril (VASOTEC) tablet 20 mg, 20 mg, Oral, BID, Xochitl Werner PA-C, 20 mg at 03/08/21 0859     ferrous gluconate (FERGON) tablet 324 mg, 324 mg, Oral, BID w/meals, Gloria Martinez APRN CNP, 324 mg at 03/08/21 0859     hydrALAZINE (APRESOLINE) tablet 25 mg, 25 mg, Oral, TID, Xochitl Werner PA-C, 25 mg at 03/08/21 0900     hydroxychloroquine (PLAQUENIL) tablet 200 mg, 200 mg, Oral, BID, Tangela Vuong APRN CNP, 200 mg at 03/08/21 0859     latanoprost (XALATAN) 0.005 % ophthalmic solution 1 drop, 1 drop, Both Eyes, At Bedtime, Jey Mayo MD, 1 drop at 03/07/21 2216     Lidocaine (LIDOCARE) 4 % Patch 2 patch, 2 patch, Transdermal, Q24H, Xochitl Werner PA-C, 1 patch at 03/04/21 1223     lidocaine patch in PLACE, , Transdermal, Q8H, Xochitl Werner PA-C     loperamide (IMODIUM) capsule 2 mg, 2 mg,  Oral, TID PRN, Tangela Vuong APRN CNP, 2 mg at 03/07/21 0420     memantine (NAMENDA) tablet 10 mg, 10 mg, Oral, Daily, Sha Ricardo MD, 10 mg at 03/08/21 0859     menthol (ICY HOT) 5 % patch 1 patch, 1 patch, Topical, Q8H PRN, 1 patch at 03/02/21 2111 **AND** menthol (ICY HOT) Patch in Place, , Transdermal, Q8H, Marleni Lester PA-C, Stopped at 03/04/21 1446     ondansetron (ZOFRAN-ODT) ODT tab 4 mg, 4 mg, Oral, Q6H PRN, Manuel Valverde MD, 4 mg at 03/07/21 2122     polyethylene glycol (MIRALAX) Packet 17 g, 17 g, Oral, Daily PRN, Gloria Martinez APRN CNP     propranolol (INDERAL) tablet 10 mg, 10 mg, Oral, Q4H PRN, Xochitl Werner PA-C, 10 mg at 03/07/21 2122     QUEtiapine (SEROquel) tablet 25 mg, 25 mg, Oral, BID, Sha Ricardo MD     simvastatin (ZOCOR) tablet 10 mg, 10 mg, Oral, At Bedtime, Jey Mayo MD, 10 mg at 03/07/21 2213     traZODone (DESYREL) tablet 50 mg, 50 mg, Oral, At Bedtime PRN, Gloria Martinez APRN CNP, 50 mg at 03/07/21 2214  Recent Results (from the past 168 hour(s))   Asymptomatic SARS-CoV-2 COVID-19 Virus (Coronavirus) by PCR    Collection Time: 03/02/21  1:05 PM    Specimen: Nasopharyngeal   Result Value Ref Range    SARS-CoV-2 Virus Specimen Source Nasopharyngeal     SARS-CoV-2 PCR Result NEGATIVE     SARS-CoV-2 PCR Comment       Testing was performed using the Xpert Xpress SARS-CoV-2 Assay on the Cepheid Gene-Xpert   Instrument Systems. Additional information about this Emergency Use Authorization (EUA)   assay can be found via the Lab Guide.     CBC with platelets differential    Collection Time: 03/06/21  7:40 PM   Result Value Ref Range    WBC 8.5 4.0 - 11.0 10e9/L    RBC Count 3.30 (L) 3.8 - 5.2 10e12/L    Hemoglobin 10.0 (L) 11.7 - 15.7 g/dL    Hematocrit 30.9 (L) 35.0 - 47.0 %    MCV 94 78 - 100 fl    MCH 30.3 26.5 - 33.0 pg    MCHC 32.4 31.5 - 36.5 g/dL    RDW 13.2 10.0 - 15.0 %    Platelet Count 342 150 - 450 10e9/L     Diff Method Automated Method     % Neutrophils 73.1 %    % Lymphocytes 14.8 %    % Monocytes 9.9 %    % Eosinophils 1.2 %    % Basophils 0.8 %    % Immature Granulocytes 0.2 %    Nucleated RBCs 0 0 /100    Absolute Neutrophil 6.2 1.6 - 8.3 10e9/L    Absolute Lymphocytes 1.3 0.8 - 5.3 10e9/L    Absolute Monocytes 0.8 0.0 - 1.3 10e9/L    Absolute Eosinophils 0.1 0.0 - 0.7 10e9/L    Absolute Basophils 0.1 0.0 - 0.2 10e9/L    Abs Immature Granulocytes 0.0 0 - 0.4 10e9/L    Absolute Nucleated RBC 0.0    Comprehensive metabolic panel    Collection Time: 03/06/21  7:40 PM   Result Value Ref Range    Sodium 132 (L) 133 - 144 mmol/L    Potassium 4.1 3.4 - 5.3 mmol/L    Chloride 101 94 - 109 mmol/L    Carbon Dioxide 26 20 - 32 mmol/L    Anion Gap 5 3 - 14 mmol/L    Glucose 117 (H) 70 - 99 mg/dL    Urea Nitrogen 23 7 - 30 mg/dL    Creatinine 0.72 0.52 - 1.04 mg/dL    GFR Estimate 77 >60 mL/min/[1.73_m2]    GFR Estimate If Black 89 >60 mL/min/[1.73_m2]    Calcium 9.2 8.5 - 10.1 mg/dL    Bilirubin Total 0.2 0.2 - 1.3 mg/dL    Albumin 3.5 3.4 - 5.0 g/dL    Protein Total 7.0 6.8 - 8.8 g/dL    Alkaline Phosphatase 84 40 - 150 U/L    ALT 14 0 - 50 U/L    AST 17 0 - 45 U/L   \

## 2021-03-08 NOTE — PLAN OF CARE
"  Problem: Depressive Symptoms  Goal: Social and Therapeutic (Depression)  Description: Signs and symptoms of listed problems will be absent or manageable.  Outcome: Declining     Problem: Depressive Symptoms  Goal: Depressive Symptoms  Description: Signs and symptoms of listed problems will be absent or manageable.  Outcome: Declining  Flowsheets (Taken 3/8/2021 1122)  Depressive Symptoms Assessed: all  Depressive Symptoms Present:    affect    insight    anxiety    speech    thought process    mood    Pt continues to complain of high anxiety. Pt rated anxiety and depression at 10/10 this AM. Pt denies SI/SIB and when questioned about saying she wanted a gun last night she stated \"I was just teasing\". Pt was reminded that staff needed to take comments like this seriously. Pt reports that she is \"to sick\" to think about hurting herself and that she would not hurt herself because of her son. Pt reports that she is overwhelmed with the thought of going back to her apartment and this is triggering her to have increased ruminating thoughts. Pt's affect is flat with poor eye contact.   Pt has been declining groups as she is complaining of \"nausea\". Pt declined zofran when this was offered this AM. Pt did accept 240 ml of ginger ale. Pt was medication compliant this AM and took her medications with applesauce without increased complaints of nausea.     Pt complaining of dizziness after lunch and after receiving newly ordered 25 mg of seroquel. Pt did appear less anxious however she was unable to identify this. Pt noted to have a 30 point orthostatic drop which is unusual for patient (sitting-162/67 with a pulse of 60 and standing 131/63 with a pulse of 60). Dr. Ricardo notified ok to give noon dose of xanax and new order received for 12.5 mg of seroquel BID vs previous order of 25 mg BID.    Pt was able to rest comfortably in bed after receiving scheduled xanax. Pt was noted to have a lower than normal blood pressure " (siting 117/69 with a pulse of 72 and standing 97/58 with a pulse of 74). Pt denies dizziness. Dr. Ricardo notified of blood pressure decrease as well as writer asked if blood pressure parameter was needed. No changes in orders received.   Pt was able to walk to the lounge independently with her walker and she denied dizziness. Pt was given a tap bell for bed side and request was made for patient to use this if she felt unsteady or needed assistance.

## 2021-03-08 NOTE — PLAN OF CARE
"  Problem: Activity and Energy Impairment (Anxiety Signs/Symptoms)  Goal: Optimized Energy Level (Anxiety Signs/Symptoms)  Outcome: No Change     Pt was in bed most of the night and slept on and off.  Reported feeling nauseous at the start of the shift, ginger ale and saltine crackers given,Pt received prn Zofran at 9:22 PM.  Fell back to sleep.  Awake at 0530 stating \" I'm sick\", when asked to describe her feeling, pt pointer at her head , denied headache \" It's my head\".Denied feeling nauseous.No complaints of diarrhea.  "

## 2021-03-09 PROCEDURE — 250N000013 HC RX MED GY IP 250 OP 250 PS 637: Performed by: PSYCHIATRY & NEUROLOGY

## 2021-03-09 PROCEDURE — 250N000013 HC RX MED GY IP 250 OP 250 PS 637: Performed by: PHYSICIAN ASSISTANT

## 2021-03-09 PROCEDURE — 250N000013 HC RX MED GY IP 250 OP 250 PS 637: Performed by: EMERGENCY MEDICINE

## 2021-03-09 PROCEDURE — 250N000011 HC RX IP 250 OP 636: Performed by: PSYCHIATRY & NEUROLOGY

## 2021-03-09 PROCEDURE — 250N000013 HC RX MED GY IP 250 OP 250 PS 637: Performed by: NURSE PRACTITIONER

## 2021-03-09 PROCEDURE — 124N000003 HC R&B MH SENIOR/ADOLESCENT

## 2021-03-09 RX ORDER — RIVASTIGMINE 4.6 MG/24H
1 PATCH, EXTENDED RELEASE TRANSDERMAL DAILY
Status: DISCONTINUED | OUTPATIENT
Start: 2021-03-10 | End: 2021-03-12 | Stop reason: HOSPADM

## 2021-03-09 RX ORDER — DULOXETIN HYDROCHLORIDE 60 MG/1
60 CAPSULE, DELAYED RELEASE ORAL DAILY
Qty: 30 CAPSULE | Refills: 3 | Status: SHIPPED | OUTPATIENT
Start: 2021-03-10 | End: 2021-03-11

## 2021-03-09 RX ORDER — PROPRANOLOL HYDROCHLORIDE 10 MG/1
10 TABLET ORAL EVERY 4 HOURS PRN
Qty: 60 TABLET | Refills: 1 | Status: SHIPPED | OUTPATIENT
Start: 2021-03-09 | End: 2021-04-26

## 2021-03-09 RX ORDER — ALPRAZOLAM 0.25 MG
TABLET ORAL
Qty: 18 TABLET | Refills: 0 | Status: SHIPPED | OUTPATIENT
Start: 2021-03-09 | End: 2021-03-12

## 2021-03-09 RX ORDER — MEMANTINE HYDROCHLORIDE 10 MG/1
10 TABLET ORAL DAILY
Qty: 30 TABLET | Refills: 1 | Status: SHIPPED | OUTPATIENT
Start: 2021-03-10 | End: 2021-03-12

## 2021-03-09 RX ORDER — LIDOCAINE 4 G/G
2 PATCH TOPICAL EVERY 24 HOURS
Qty: 60 PATCH | Refills: 3 | Status: SHIPPED | OUTPATIENT
Start: 2021-03-09 | End: 2021-03-12

## 2021-03-09 RX ORDER — BUSPIRONE HYDROCHLORIDE 5 MG/1
5 TABLET ORAL 3 TIMES DAILY
Qty: 90 TABLET | Refills: 3 | Status: SHIPPED | OUTPATIENT
Start: 2021-03-09 | End: 2021-03-12

## 2021-03-09 RX ORDER — LOPERAMIDE HCL 2 MG
2 CAPSULE ORAL 3 TIMES DAILY PRN
Qty: 30 CAPSULE | Refills: 1 | Status: SHIPPED | OUTPATIENT
Start: 2021-03-09 | End: 2021-06-01

## 2021-03-09 RX ORDER — RIVASTIGMINE 4.6 MG/24H
1 PATCH, EXTENDED RELEASE TRANSDERMAL DAILY
Qty: 30 PATCH | Refills: 1 | Status: SHIPPED | OUTPATIENT
Start: 2021-03-10 | End: 2021-03-12

## 2021-03-09 RX ORDER — QUETIAPINE FUMARATE 25 MG/1
12.5 TABLET, FILM COATED ORAL 2 TIMES DAILY
Qty: 30 TABLET | Refills: 3 | Status: SHIPPED | OUTPATIENT
Start: 2021-03-09 | End: 2021-03-12

## 2021-03-09 RX ADMIN — Medication 12.5 MG: at 20:09

## 2021-03-09 RX ADMIN — ALPRAZOLAM 0.25 MG: 0.25 TABLET ORAL at 08:57

## 2021-03-09 RX ADMIN — LATANOPROST 1 DROP: 50 SOLUTION OPHTHALMIC at 20:55

## 2021-03-09 RX ADMIN — LOPERAMIDE HYDROCHLORIDE 2 MG: 2 CAPSULE ORAL at 05:24

## 2021-03-09 RX ADMIN — HYDROXYCHLOROQUINE SULFATE 200 MG: 200 TABLET, FILM COATED ORAL at 20:09

## 2021-03-09 RX ADMIN — DORZOLAMIDE HYDROCHLORIDE 1 DROP: 20 SOLUTION/ DROPS OPHTHALMIC at 09:01

## 2021-03-09 RX ADMIN — ENALAPRIL MALEATE 20 MG: 20 TABLET ORAL at 08:59

## 2021-03-09 RX ADMIN — ALPRAZOLAM 0.25 MG: 0.25 TABLET ORAL at 18:10

## 2021-03-09 RX ADMIN — BUSPIRONE HYDROCHLORIDE 5 MG: 5 TABLET ORAL at 09:00

## 2021-03-09 RX ADMIN — DORZOLAMIDE HYDROCHLORIDE 1 DROP: 20 SOLUTION/ DROPS OPHTHALMIC at 20:10

## 2021-03-09 RX ADMIN — HYDRALAZINE HYDROCHLORIDE 25 MG: 25 TABLET ORAL at 09:00

## 2021-03-09 RX ADMIN — Medication 12.5 MG: at 08:57

## 2021-03-09 RX ADMIN — AMLODIPINE BESYLATE 5 MG: 5 TABLET ORAL at 08:59

## 2021-03-09 RX ADMIN — ENALAPRIL MALEATE 20 MG: 20 TABLET ORAL at 20:09

## 2021-03-09 RX ADMIN — FERROUS GLUCONATE 324 MG: 324 TABLET ORAL at 18:10

## 2021-03-09 RX ADMIN — CYANOCOBALAMIN TAB 1000 MCG 1000 MCG: 1000 TAB at 08:58

## 2021-03-09 RX ADMIN — HYDRALAZINE HYDROCHLORIDE 25 MG: 25 TABLET ORAL at 20:09

## 2021-03-09 RX ADMIN — FERROUS GLUCONATE 324 MG: 324 TABLET ORAL at 08:58

## 2021-03-09 RX ADMIN — DULOXETINE HYDROCHLORIDE 60 MG: 60 CAPSULE, DELAYED RELEASE ORAL at 09:06

## 2021-03-09 RX ADMIN — BRIMONIDINE TARTRATE 1 DROP: 1.5 SOLUTION OPHTHALMIC at 20:10

## 2021-03-09 RX ADMIN — ALPRAZOLAM 0.25 MG: 0.25 TABLET ORAL at 12:36

## 2021-03-09 RX ADMIN — ONDANSETRON 4 MG: 4 TABLET, ORALLY DISINTEGRATING ORAL at 18:10

## 2021-03-09 RX ADMIN — BRIMONIDINE TARTRATE 1 DROP: 1.5 SOLUTION OPHTHALMIC at 09:00

## 2021-03-09 RX ADMIN — BUSPIRONE HYDROCHLORIDE 5 MG: 5 TABLET ORAL at 20:09

## 2021-03-09 RX ADMIN — MEMANTINE 10 MG: 10 TABLET ORAL at 08:58

## 2021-03-09 RX ADMIN — HYDROXYCHLOROQUINE SULFATE 200 MG: 200 TABLET, FILM COATED ORAL at 08:57

## 2021-03-09 RX ADMIN — SIMVASTATIN 10 MG: 10 TABLET, FILM COATED ORAL at 20:09

## 2021-03-09 RX ADMIN — BUSPIRONE HYDROCHLORIDE 5 MG: 5 TABLET ORAL at 14:51

## 2021-03-09 ASSESSMENT — ACTIVITIES OF DAILY LIVING (ADL)
HYGIENE/GROOMING: INDEPENDENT
DRESS: INDEPENDENT
ORAL_HYGIENE: INDEPENDENT

## 2021-03-09 NOTE — PLAN OF CARE
Slept better tonight, woke up at 0410 and reported diarrhea. Not observed in the toilet but  Small amount  of greenish brown mushy stool noted on the pull ups.  0520 Another episode of watery stools noted, moderate in amount  0524 Imodium 2 mg given.  Encouraged to drinks fluids.

## 2021-03-09 NOTE — PLAN OF CARE
Work Completed: Reviewed chart. Met with team to discuss pt progress. Pt appears brighter today. Met with pt briefly to discuss how she is feeling.     Discharge plan or goal: Discharge to [facility] when mental health stabilizes.                Barriers to discharge: Ongoing medical and mental health issues.

## 2021-03-09 NOTE — PROGRESS NOTES
"Patient's level of anxiety is low. She rated it as 4 out of 10 wherein 10 is the worst. Patient also rated her depression as 4. Patient stated that she feels good today with the changes made of her medications. Patient denies active thoughts of SI. She denied hallucinations of any form. Patient reported that she has some racing thoughts like \"objects flying in my brain\". Her mood today is good. She feels safe on the unit. She denied any signs of pain. She felt nauseous after dinner and epigastric discomfort. Maalox 30 ml given. Ice chips provided. Patient is med compliant. She did not attend the group activity this evening r/t nauseous feeling. Patient slept early tonight.  "

## 2021-03-09 NOTE — PROGRESS NOTES
Patient seen, chart reviewed, care discussed with staff.  Blood pressure (!) 151/73, pulse 76, temperature 97.7  F (36.5  C), temperature source Temporal, resp. rate 20, weight 62.1 kg (136 lb 12.8 oz), SpO2 96 %, not currently breastfeeding.    Anxiety, sleep better but diarrhea developed.  In her room in a brief, and sweatshirt.  By report, some racing thoughts (hypomania possible as she seems to need neuroleptic).    General appearance: miserable  Alert.   Affect: fair  Mood: fair    Speech:  decreased  Eye contact:  low   Psychomotor behavior: decreased, missed group for 2 days  Gait: normal by report  Abnormal movements: none  Delusions: none  Hallucinations:  None  Thoughts: logical  Associations: intact  Judgement: fair  Insight: good  Cognitions: intact in conversation  Memory:  intact in conversation  Orientation: normal    Not suicidal.  .  See OT resting, neurocognitive issues persist    Imp: diarrhea probably from Aricept.  She feels it is helping    Plan: change to Exelon patch. Discharge tomorrow if all goes well      Current Facility-Administered Medications:      acetaminophen (TYLENOL) tablet 650 mg, 650 mg, Oral, Q4H PRN, Gloria Martinez APRN CNP, 650 mg at 03/01/21 2016     ALPRAZolam (XANAX) tablet 0.25 mg, 0.25 mg, Oral, TID w/meals, Sha Ricardo MD, 0.25 mg at 03/09/21 0857     alum & mag hydroxide-simethicone (MAALOX) suspension 30 mL, 30 mL, Oral, Q4H PRN, Gloria Martinez APRN CNP, 30 mL at 03/08/21 1756     amLODIPine (NORVASC) tablet 5 mg, 5 mg, Oral, Daily, Marleni Lester PA-C, 5 mg at 03/09/21 0859     brimonidine (ALPHAGAN-P) 0.15 % ophthalmic solution 1 drop, 1 drop, Both Eyes, BID, Jey Mayo MD, 1 drop at 03/09/21 0900     busPIRone (BUSPAR) tablet 5 mg, 5 mg, Oral, TID, Sha Ricardo MD, 5 mg at 03/09/21 0900     cyanocobalamin (VITAMIN B-12) tablet 1,000 mcg, 1,000 mcg, Oral, Daily, Gloria Martinez APRN CNP, 1,000 mcg at  03/09/21 0858     cyclobenzaprine (FLEXERIL) tablet 5 mg, 5 mg, Oral, TID PRN, Xochitl Werner PA-C, 5 mg at 03/02/21 1120     diphenhydrAMINE (BENADRYL) capsule 25 mg, 25 mg, Oral, Q6H PRN, Xochitl Werner PA-C, 25 mg at 03/07/21 0015     dorzolamide (TRUSOPT) 2 % ophthalmic solution 1 drop, 1 drop, Both Eyes, BID, Jey Mayo MD, 1 drop at 03/09/21 0901     DULoxetine (CYMBALTA) DR capsule 60 mg, 60 mg, Oral, Daily, Gloria Martinez APRN CNP, 60 mg at 03/09/21 0906     enalapril (VASOTEC) tablet 20 mg, 20 mg, Oral, BID, Xochitl Werner PA-C, 20 mg at 03/09/21 0859     ferrous gluconate (FERGON) tablet 324 mg, 324 mg, Oral, BID w/meals, Gloria Martinez APRN CNP, 324 mg at 03/09/21 0858     hydrALAZINE (APRESOLINE) tablet 25 mg, 25 mg, Oral, TID, Xochitl Werner PA-C, 25 mg at 03/09/21 0900     hydroxychloroquine (PLAQUENIL) tablet 200 mg, 200 mg, Oral, BID, Tangela Vuong APRN CNP, 200 mg at 03/09/21 0857     latanoprost (XALATAN) 0.005 % ophthalmic solution 1 drop, 1 drop, Both Eyes, At Bedtime, Jey Mayo MD, 1 drop at 03/08/21 2211     Lidocaine (LIDOCARE) 4 % Patch 2 patch, 2 patch, Transdermal, Q24H, Xochitl Werner PA-C, 1 patch at 03/04/21 1223     lidocaine patch in PLACE, , Transdermal, Q8H, Xochitl Werenr PA-C     loperamide (IMODIUM) capsule 2 mg, 2 mg, Oral, TID PRN, Tangela Vuong APRN CNP, 2 mg at 03/09/21 0524     memantine (NAMENDA) tablet 10 mg, 10 mg, Oral, Daily, Sha Ricardo MD, 10 mg at 03/09/21 0858     menthol (ICY HOT) 5 % patch 1 patch, 1 patch, Topical, Q8H PRN, 1 patch at 03/02/21 2111 **AND** menthol (ICY HOT) Patch in Place, , Transdermal, Q8H, Marleni Lester PA-C, Stopped at 03/04/21 1446     ondansetron (ZOFRAN-ODT) ODT tab 4 mg, 4 mg, Oral, Q6H PRN, Manuel Valverde MD, 4 mg at 03/07/21 2122     polyethylene glycol (MIRALAX) Packet 17 g, 17 g, Oral, Daily PRN, Gloria Martinez, APRN  CNP     propranolol (INDERAL) tablet 10 mg, 10 mg, Oral, Q4H PRN, Xochitl Werner PA-C, 10 mg at 03/07/21 2122     QUEtiapine (SEROquel) half-tab 12.5 mg, 12.5 mg, Oral, BID, Sha Ricardo MD, 12.5 mg at 03/09/21 0857     [START ON 3/10/2021] rivastigmine (EXELON) 4.6 MG/24HR 24 hr patch 1 patch, 1 patch, Transdermal, Daily, Sha Ricardo MD     rivastigmine (EXELON) Patch in Place, , Transdermal, Q8H, Sha Ricardo MD     simvastatin (ZOCOR) tablet 10 mg, 10 mg, Oral, At Bedtime, Jey Mayo MD, 10 mg at 03/08/21 2211     traZODone (DESYREL) tablet 50 mg, 50 mg, Oral, At Bedtime PRN, Gloria Martinez APRN CNP, 50 mg at 03/07/21 2214  Recent Results (from the past 168 hour(s))   Asymptomatic SARS-CoV-2 COVID-19 Virus (Coronavirus) by PCR    Collection Time: 03/02/21  1:05 PM    Specimen: Nasopharyngeal   Result Value Ref Range    SARS-CoV-2 Virus Specimen Source Nasopharyngeal     SARS-CoV-2 PCR Result NEGATIVE     SARS-CoV-2 PCR Comment       Testing was performed using the Xpert Xpress SARS-CoV-2 Assay on the Cepheid Gene-Xpert   Instrument Systems. Additional information about this Emergency Use Authorization (EUA)   assay can be found via the Lab Guide.     CBC with platelets differential    Collection Time: 03/06/21  7:40 PM   Result Value Ref Range    WBC 8.5 4.0 - 11.0 10e9/L    RBC Count 3.30 (L) 3.8 - 5.2 10e12/L    Hemoglobin 10.0 (L) 11.7 - 15.7 g/dL    Hematocrit 30.9 (L) 35.0 - 47.0 %    MCV 94 78 - 100 fl    MCH 30.3 26.5 - 33.0 pg    MCHC 32.4 31.5 - 36.5 g/dL    RDW 13.2 10.0 - 15.0 %    Platelet Count 342 150 - 450 10e9/L    Diff Method Automated Method     % Neutrophils 73.1 %    % Lymphocytes 14.8 %    % Monocytes 9.9 %    % Eosinophils 1.2 %    % Basophils 0.8 %    % Immature Granulocytes 0.2 %    Nucleated RBCs 0 0 /100    Absolute Neutrophil 6.2 1.6 - 8.3 10e9/L    Absolute Lymphocytes 1.3 0.8 - 5.3 10e9/L    Absolute Monocytes 0.8 0.0 - 1.3 10e9/L    Absolute  Eosinophils 0.1 0.0 - 0.7 10e9/L    Absolute Basophils 0.1 0.0 - 0.2 10e9/L    Abs Immature Granulocytes 0.0 0 - 0.4 10e9/L    Absolute Nucleated RBC 0.0    Comprehensive metabolic panel    Collection Time: 03/06/21  7:40 PM   Result Value Ref Range    Sodium 132 (L) 133 - 144 mmol/L    Potassium 4.1 3.4 - 5.3 mmol/L    Chloride 101 94 - 109 mmol/L    Carbon Dioxide 26 20 - 32 mmol/L    Anion Gap 5 3 - 14 mmol/L    Glucose 117 (H) 70 - 99 mg/dL    Urea Nitrogen 23 7 - 30 mg/dL    Creatinine 0.72 0.52 - 1.04 mg/dL    GFR Estimate 77 >60 mL/min/[1.73_m2]    GFR Estimate If Black 89 >60 mL/min/[1.73_m2]    Calcium 9.2 8.5 - 10.1 mg/dL    Bilirubin Total 0.2 0.2 - 1.3 mg/dL    Albumin 3.5 3.4 - 5.0 g/dL    Protein Total 7.0 6.8 - 8.8 g/dL    Alkaline Phosphatase 84 40 - 150 U/L    ALT 14 0 - 50 U/L    AST 17 0 - 45 U/L

## 2021-03-10 LAB
ALBUMIN SERPL-MCNC: 3.1 G/DL (ref 3.4–5)
ALBUMIN UR-MCNC: 10 MG/DL
ALP SERPL-CCNC: 90 U/L (ref 40–150)
ALT SERPL W P-5'-P-CCNC: 17 U/L (ref 0–50)
ANION GAP SERPL CALCULATED.3IONS-SCNC: 6 MMOL/L (ref 3–14)
APPEARANCE UR: CLEAR
AST SERPL W P-5'-P-CCNC: 22 U/L (ref 0–45)
BACTERIA #/AREA URNS HPF: ABNORMAL /HPF
BASOPHILS # BLD AUTO: 0.1 10E9/L (ref 0–0.2)
BASOPHILS NFR BLD AUTO: 0.6 %
BILIRUB SERPL-MCNC: 0.3 MG/DL (ref 0.2–1.3)
BILIRUB UR QL STRIP: NEGATIVE
BUN SERPL-MCNC: 21 MG/DL (ref 7–30)
CALCIUM SERPL-MCNC: 9.6 MG/DL (ref 8.5–10.1)
CAPILLARY BLOOD COLLECTION: NORMAL
CAPILLARY BLOOD COLLECTION: NORMAL
CHLORIDE SERPL-SCNC: 96 MMOL/L (ref 94–109)
CO2 SERPL-SCNC: 23 MMOL/L (ref 20–32)
COLOR UR AUTO: ABNORMAL
CREAT SERPL-MCNC: 0.89 MG/DL (ref 0.52–1.04)
DIFFERENTIAL METHOD BLD: ABNORMAL
EOSINOPHIL # BLD AUTO: 0.1 10E9/L (ref 0–0.7)
EOSINOPHIL NFR BLD AUTO: 0.7 %
ERYTHROCYTE [DISTWIDTH] IN BLOOD BY AUTOMATED COUNT: 13.2 % (ref 10–15)
GFR SERPL CREATININE-BSD FRML MDRD: 59 ML/MIN/{1.73_M2}
GLUCOSE SERPL-MCNC: 103 MG/DL (ref 70–99)
GLUCOSE UR STRIP-MCNC: NEGATIVE MG/DL
HCT VFR BLD AUTO: 32.7 % (ref 35–47)
HGB BLD-MCNC: 10.7 G/DL (ref 11.7–15.7)
HGB UR QL STRIP: NEGATIVE
HYALINE CASTS #/AREA URNS LPF: 4 /LPF (ref 0–2)
IMM GRANULOCYTES # BLD: 0 10E9/L (ref 0–0.4)
IMM GRANULOCYTES NFR BLD: 0.3 %
KETONES UR STRIP-MCNC: NEGATIVE MG/DL
LABORATORY COMMENT REPORT: NORMAL
LEUKOCYTE ESTERASE UR QL STRIP: ABNORMAL
LIPASE SERPL-CCNC: 781 U/L (ref 73–393)
LYMPHOCYTES # BLD AUTO: 1.1 10E9/L (ref 0.8–5.3)
LYMPHOCYTES NFR BLD AUTO: 11.6 %
MCH RBC QN AUTO: 30.6 PG (ref 26.5–33)
MCHC RBC AUTO-ENTMCNC: 32.7 G/DL (ref 31.5–36.5)
MCV RBC AUTO: 93 FL (ref 78–100)
MONOCYTES # BLD AUTO: 0.9 10E9/L (ref 0–1.3)
MONOCYTES NFR BLD AUTO: 9.8 %
MUCOUS THREADS #/AREA URNS LPF: PRESENT /LPF
NEUTROPHILS # BLD AUTO: 7.4 10E9/L (ref 1.6–8.3)
NEUTROPHILS NFR BLD AUTO: 77 %
NITRATE UR QL: NEGATIVE
NRBC # BLD AUTO: 0 10*3/UL
NRBC BLD AUTO-RTO: 0 /100
OSMOLALITY SERPL: 266 MMOL/KG (ref 280–301)
OSMOLALITY UR: 228 MMOL/KG (ref 100–1200)
PH UR STRIP: 5.5 PH (ref 5–7)
PLATELET # BLD AUTO: 327 10E9/L (ref 150–450)
POTASSIUM SERPL-SCNC: 4.4 MMOL/L (ref 3.4–5.3)
PROT SERPL-MCNC: 6.9 G/DL (ref 6.8–8.8)
RBC # BLD AUTO: 3.5 10E12/L (ref 3.8–5.2)
RBC #/AREA URNS AUTO: 2 /HPF (ref 0–2)
SARS-COV-2 RNA RESP QL NAA+PROBE: NEGATIVE
SODIUM SERPL-SCNC: 125 MMOL/L (ref 133–144)
SODIUM SERPL-SCNC: 128 MMOL/L (ref 133–144)
SODIUM UR-SCNC: 15 MMOL/L
SOURCE: ABNORMAL
SP GR UR STRIP: 1.01 (ref 1–1.03)
SPECIMEN SOURCE: NORMAL
UROBILINOGEN UR STRIP-MCNC: NORMAL MG/DL (ref 0–2)
WBC # BLD AUTO: 9.6 10E9/L (ref 4–11)
WBC #/AREA URNS AUTO: 12 /HPF (ref 0–5)

## 2021-03-10 PROCEDURE — 250N000013 HC RX MED GY IP 250 OP 250 PS 637: Performed by: PHYSICIAN ASSISTANT

## 2021-03-10 PROCEDURE — 83690 ASSAY OF LIPASE: CPT | Performed by: PSYCHIATRY & NEUROLOGY

## 2021-03-10 PROCEDURE — 250N000013 HC RX MED GY IP 250 OP 250 PS 637: Performed by: NURSE PRACTITIONER

## 2021-03-10 PROCEDURE — 87186 SC STD MICRODIL/AGAR DIL: CPT | Performed by: PSYCHIATRY & NEUROLOGY

## 2021-03-10 PROCEDURE — 250N000013 HC RX MED GY IP 250 OP 250 PS 637: Performed by: EMERGENCY MEDICINE

## 2021-03-10 PROCEDURE — 36416 COLLJ CAPILLARY BLOOD SPEC: CPT | Performed by: PSYCHIATRY & NEUROLOGY

## 2021-03-10 PROCEDURE — 85025 COMPLETE CBC W/AUTO DIFF WBC: CPT | Performed by: PSYCHIATRY & NEUROLOGY

## 2021-03-10 PROCEDURE — U0005 INFEC AGEN DETEC AMPLI PROBE: HCPCS | Performed by: PSYCHIATRY & NEUROLOGY

## 2021-03-10 PROCEDURE — U0003 INFECTIOUS AGENT DETECTION BY NUCLEIC ACID (DNA OR RNA); SEVERE ACUTE RESPIRATORY SYNDROME CORONAVIRUS 2 (SARS-COV-2) (CORONAVIRUS DISEASE [COVID-19]), AMPLIFIED PROBE TECHNIQUE, MAKING USE OF HIGH THROUGHPUT TECHNOLOGIES AS DESCRIBED BY CMS-2020-01-R: HCPCS | Performed by: PSYCHIATRY & NEUROLOGY

## 2021-03-10 PROCEDURE — 258N000003 HC RX IP 258 OP 636: Performed by: PHYSICIAN ASSISTANT

## 2021-03-10 PROCEDURE — 93010 ELECTROCARDIOGRAM REPORT: CPT | Mod: 76 | Performed by: INTERNAL MEDICINE

## 2021-03-10 PROCEDURE — 93005 ELECTROCARDIOGRAM TRACING: CPT

## 2021-03-10 PROCEDURE — 84300 ASSAY OF URINE SODIUM: CPT | Performed by: PHYSICIAN ASSISTANT

## 2021-03-10 PROCEDURE — 36415 COLL VENOUS BLD VENIPUNCTURE: CPT | Performed by: PHYSICIAN ASSISTANT

## 2021-03-10 PROCEDURE — 84295 ASSAY OF SERUM SODIUM: CPT | Performed by: PHYSICIAN ASSISTANT

## 2021-03-10 PROCEDURE — 124N000003 HC R&B MH SENIOR/ADOLESCENT

## 2021-03-10 PROCEDURE — 83935 ASSAY OF URINE OSMOLALITY: CPT | Performed by: PHYSICIAN ASSISTANT

## 2021-03-10 PROCEDURE — 83930 ASSAY OF BLOOD OSMOLALITY: CPT | Performed by: PSYCHIATRY & NEUROLOGY

## 2021-03-10 PROCEDURE — 87088 URINE BACTERIA CULTURE: CPT | Performed by: PSYCHIATRY & NEUROLOGY

## 2021-03-10 PROCEDURE — 99207 PR CDG-MDM COMPONENT: MEETS MODERATE - UP CODED: CPT | Performed by: PHYSICIAN ASSISTANT

## 2021-03-10 PROCEDURE — 81001 URINALYSIS AUTO W/SCOPE: CPT | Performed by: PHYSICIAN ASSISTANT

## 2021-03-10 PROCEDURE — 87086 URINE CULTURE/COLONY COUNT: CPT | Performed by: PSYCHIATRY & NEUROLOGY

## 2021-03-10 PROCEDURE — 99232 SBSQ HOSP IP/OBS MODERATE 35: CPT | Performed by: PHYSICIAN ASSISTANT

## 2021-03-10 PROCEDURE — 250N000013 HC RX MED GY IP 250 OP 250 PS 637: Performed by: PSYCHIATRY & NEUROLOGY

## 2021-03-10 PROCEDURE — 80053 COMPREHEN METABOLIC PANEL: CPT | Performed by: PSYCHIATRY & NEUROLOGY

## 2021-03-10 RX ORDER — OLANZAPINE 5 MG/1
5 TABLET ORAL AT BEDTIME
Status: DISCONTINUED | OUTPATIENT
Start: 2021-03-10 | End: 2021-03-12 | Stop reason: HOSPADM

## 2021-03-10 RX ADMIN — RIVASTIGMINE 1 PATCH: 4.6 PATCH, EXTENDED RELEASE TRANSDERMAL at 08:16

## 2021-03-10 RX ADMIN — DORZOLAMIDE HYDROCHLORIDE 1 DROP: 20 SOLUTION/ DROPS OPHTHALMIC at 20:05

## 2021-03-10 RX ADMIN — HYDRALAZINE HYDROCHLORIDE 25 MG: 25 TABLET ORAL at 08:18

## 2021-03-10 RX ADMIN — BRIMONIDINE TARTRATE 1 DROP: 1.5 SOLUTION OPHTHALMIC at 08:19

## 2021-03-10 RX ADMIN — DULOXETINE HYDROCHLORIDE 60 MG: 60 CAPSULE, DELAYED RELEASE ORAL at 08:18

## 2021-03-10 RX ADMIN — HYDROXYCHLOROQUINE SULFATE 200 MG: 200 TABLET, FILM COATED ORAL at 20:05

## 2021-03-10 RX ADMIN — ENALAPRIL MALEATE 20 MG: 20 TABLET ORAL at 08:18

## 2021-03-10 RX ADMIN — BUSPIRONE HYDROCHLORIDE 5 MG: 5 TABLET ORAL at 14:49

## 2021-03-10 RX ADMIN — HYDRALAZINE HYDROCHLORIDE 25 MG: 25 TABLET ORAL at 20:05

## 2021-03-10 RX ADMIN — SIMVASTATIN 10 MG: 10 TABLET, FILM COATED ORAL at 22:05

## 2021-03-10 RX ADMIN — CYANOCOBALAMIN TAB 1000 MCG 1000 MCG: 1000 TAB at 08:18

## 2021-03-10 RX ADMIN — SODIUM CHLORIDE 1000 ML: 9 INJECTION, SOLUTION INTRAVENOUS at 13:34

## 2021-03-10 RX ADMIN — ALPRAZOLAM 0.25 MG: 0.25 TABLET ORAL at 08:18

## 2021-03-10 RX ADMIN — BUSPIRONE HYDROCHLORIDE 5 MG: 5 TABLET ORAL at 20:05

## 2021-03-10 RX ADMIN — DORZOLAMIDE HYDROCHLORIDE 1 DROP: 20 SOLUTION/ DROPS OPHTHALMIC at 08:19

## 2021-03-10 RX ADMIN — LATANOPROST 1 DROP: 50 SOLUTION OPHTHALMIC at 22:05

## 2021-03-10 RX ADMIN — Medication 12.5 MG: at 08:17

## 2021-03-10 RX ADMIN — FERROUS GLUCONATE 324 MG: 324 TABLET ORAL at 18:45

## 2021-03-10 RX ADMIN — HYDROXYCHLOROQUINE SULFATE 200 MG: 200 TABLET, FILM COATED ORAL at 08:17

## 2021-03-10 RX ADMIN — MEMANTINE 10 MG: 10 TABLET ORAL at 08:17

## 2021-03-10 RX ADMIN — OLANZAPINE 5 MG: 5 TABLET, FILM COATED ORAL at 22:05

## 2021-03-10 RX ADMIN — PROPRANOLOL HYDROCHLORIDE 10 MG: 10 TABLET ORAL at 22:58

## 2021-03-10 RX ADMIN — FERROUS GLUCONATE 324 MG: 324 TABLET ORAL at 08:18

## 2021-03-10 RX ADMIN — BRIMONIDINE TARTRATE 1 DROP: 1.5 SOLUTION OPHTHALMIC at 20:05

## 2021-03-10 RX ADMIN — BUSPIRONE HYDROCHLORIDE 5 MG: 5 TABLET ORAL at 08:18

## 2021-03-10 RX ADMIN — ALPRAZOLAM 0.25 MG: 0.25 TABLET ORAL at 18:45

## 2021-03-10 ASSESSMENT — ACTIVITIES OF DAILY LIVING (ADL)
LAUNDRY: UNABLE TO COMPLETE
DRESS: INDEPENDENT
HYGIENE/GROOMING: INDEPENDENT
ORAL_HYGIENE: INDEPENDENT

## 2021-03-10 NOTE — PLAN OF CARE
Problem: Depressive Symptoms  Goal: Depressive Symptoms  Description: Signs and symptoms of listed problems will be absent or manageable.  Outcome: No Change  Flowsheets (Taken 3/10/2021 1528)  Depressive Symptoms Assessed: all  Depressive Symptoms Present:   affect   mood   thought process   insight   anxiety     Problem: Adult Inpatient Plan of Care  Goal: Absence of Hospital-Acquired Illness or Injury  Intervention: Prevent Skin Injury  Recent Flowsheet Documentation  Taken 3/10/2021 0922 by Veena Funes RN  Body Position: position changed independently     Problem: Activity and Energy Impairment (Anxiety Signs/Symptoms)  Goal: Optimized Energy Level (Anxiety Signs/Symptoms)  Intervention: Optimize Energy Level  Recent Flowsheet Documentation  Taken 3/10/2021 0922 by Veena Funes RN  Patient Performed Hygiene: teeth brushed  Activity (Behavioral Health): up ad sara   Pt verbalized continued weakness this AM. Pt reporting frequent diarrhea, however patient has been unable to show staff stool. Pt reports not feeling well and that she has not had an appetite. Pt refused breakfast and was focused on needing to stay close to the bathroom. Pt's affect is flat and  her eye contact is poor. Pt took her medications with applesauce and ginger ale. Pt denied nausea.  Dr. Ricardo was notified and labs ordered.   Jocelyn BONILLA notified of abnormal lab values.  EKG and further labs ordered.  IV placed and IV fluids running at approximately 1330.   Pt ate turkey meat from a sandwich and 120 ml of apple juice as well as a bowl of canned fruit. Pt has taken approximately 480 ml of fluids this shift. Pt has measured output of 400 ml.

## 2021-03-10 NOTE — PLAN OF CARE
Problem: Sleep Disturbance (Anxiety Signs/Symptoms)  Goal: Improved Sleep (Anxiety Signs/Symptoms)  Outcome: No Change     Woke up at 0345 complained of diarrhea, not witnessed but room was foul smelling, Encouraged not to flush toilet after having a BM.  0500, small mushy greenish - brown stool noted on pt's pad, approximately 1 Tbsp in amount as well as a small splatter on the floor. Fouls smelling.  Assisted pt with oracio care and cleaning of the toilet floor.  Pt denies any abdominal pain/ discomforts.  Fluids encouraged and provided.

## 2021-03-10 NOTE — PLAN OF CARE
Work Completed: Reviewed chart. Met with team to discuss pt progress. Pt continues to experience mental health and physical symptoms. Discharge will be delayed. New medications may be tried. Pt appears to be deteriorating.     Discharge plan or goal: Discharge to [facility] when mental health stabilizes.                 Barriers to discharge: Ongoing medical and mental health issues.

## 2021-03-10 NOTE — PLAN OF CARE
"  Problem: Depressive Symptoms  Goal: Depressive Symptoms  Description: Signs and symptoms of listed problems will be absent or manageable.  Outcome: Improving     Problem: Activity and Energy Impairment (Anxiety Signs/Symptoms)  Goal: Optimized Energy Level (Anxiety Signs/Symptoms)  Outcome: Improving   Pt in bed sleeping at beginning of shift. Came out around supper time and remained in milieu. She played cards with staff, watched TV.   Pt's affect flat and bunted, brightens during conversations. Pt endorsed and rated anxiety at 5-6/10, depression 5-6/10 (10 being the worst. Reports stressor is thinking about discharge. At some point, writer complimented pt for being up/out of room and engaging in an activity, pt replied \"I guess I am going home tomorrow then\".   Pt's goal today was to think more positively and to eat at least 25% of meal.   During super pt reported she is lactose intolerant and that she was sent food items that contained lactose. Writer called dietary and requested for a new menu for tomorrow and changed pt's diet in chat to no lactose diet. Once new menu was sent, pt said it did not include foods she like and would like me to change back her diet to regular and she will order things she can take. Writer called dietary again, requested another menu sent and changed order back to regular diet.     Appetite: Ate 25% of supper, and had a cup of apple source with every medication administration.    Intake PO: 360 ML + Pt on 0.9% sodium chloride bolus running at 100 ml/hr x 10hrs  Output: 400 ML  Lab: Na level at 1830: 128. IM notified, no new order, to continue with current plan and update with next result.   PRN:  Propranolol 10 mg at 2300 for anxiety.  Pt asked if she had a prescription anxiety about going home. Writer informed pt she had prescription to help with anxiety but not specifically for anxiety about going home. Pt encouraged yo equally utilize other coping skills. Pt verbalized " understanding.

## 2021-03-10 NOTE — PROGRESS NOTES
Brief Medicine Note    Swathi Dukes is a 85 year old female with a history of depression, anxiety, SLE, chronic pericardial effusion, aortic stenosis, mild CAD, HTN, HLD, glaucoma, recurrent UTIs, and recent falls at home resulting in closed head injury w/ scalp laceration and hematoma (seen at [facility] 1/16-1/18) admitted to station 3B for depression and SI. Medicine seeing patient today for hyponatremia of 125.     Contacted by nursing regarding patient's sodium level of 125 today. RN reports patient has been having a 3-4 day history of not feeling well including symptoms of nausea, poor oral intake, and reported diarrhea.     Upon assessment, patient reports she has been having epigastric discomfort and nausea. She ate meatloaf and applesauce yesterday. Tolerating a cup of water and ginger ale today. Patient reports having 10 loose BM yesterday and 12 today. However, RN reports patient has anxiety and that this has been unwitnessed and no signs of actual diarrhea. Patient denies fever, chills, chest pain or SOB. No fever or chills.     Per review of new medications:   Buspirone 5mg TID started 3/5  Duloxetine 60mg started 2/24  Rivastigmine started 3/9  Namenda started 3/3   Of note is on Enalapril BID chronically      Today's vital signs, medications, and nursing notes were reviewed.     ROUTINE IP LABS (Last four results)  Recent Labs   Lab 03/10/21  1043 03/06/21 1940   * 132*   POTASSIUM 4.4 4.1   CHLORIDE 96 101   CO2 23 26   ANIONGAP 6 5   * 117*   BUN 21 23   CR 0.89 0.72   FILIPE 9.6 9.2   PROTTOTAL 6.9 7.0   ALBUMIN 3.1* 3.5   BILITOTAL 0.3 0.2   ALKPHOS 90 84   AST 22 17   ALT 17 14     Recent Labs   Lab 03/10/21  1043 03/06/21 1940   WBC 9.6 8.5   RBC 3.50* 3.30*   HGB 10.7* 10.0*   HCT 32.7* 30.9*   MCV 93 94   MCH 30.6 30.3   MCHC 32.7 32.4   RDW 13.2 13.2    342     No lab results found in last 7 days.     Glucose Values Latest Ref Rng & Units 2/16/2021 2/27/2021 3/6/2021  3/10/2021   Bedside Glucose (mg/dl )  - -- -- -- --   GLUCOSE 70 - 99 mg/dL 101(H) 96 117(H) 103(H)   Some recent data might be hidden        All labs personally reviewed in [facility].  See A&P for additional results.     Unresulted Labs Ordered in the Past 30 Days of this Admission     Date and Time Order Name Status Description    3/10/2021 1145 Osmolality In process     3/10/2021 1145 Lipase In process                /57   Pulse 67   Temp 97.6  F (36.4  C) (Temporal)   Resp 16   Wt 61.7 kg (136 lb)   SpO2 96%   BMI 23.71 kg/m    GENERAL: Alert and oriented x 3. Appears comfortable. Answering questions appropriately.   HEENT: Anicteric sclera. Mucous membranes moist.   CV: RRR. S1, S2. Mild systolic murmur in RUSB appreciated.   RESPIRATORY: Effort normal on room air. Lungs CTAB with no wheezing, rales, rhonchi.   GI: Abdomen soft and non distended, bowel sounds present. No tenderness, rebound, guarding. Negative Johnson's sign.   MUSCULOSKELETAL: No joint swelling or tenderness.   NEUROLOGICAL: No focal deficits. Moves all extremities. CN III-XII grossly intact.   EXTREMITIES: No peripheral edema. Intact bilateral pedal pulses.   SKIN: No jaundice. No rashes.       A/P:  # Hyponatremia  # Nausea, epigastric discomfort  # History of recurrent UTI  # Reported loose stools:  Na 125 today, from 132 on 3/6 and 139 on 2/27. Asymptomatic, A&Ox3 and no focal neurological deficits on exam. Suspect hypovolemic hyponatremia given 3-4 day history of nausea, reported loose stools (unwitnessed by staff, unclear if actually having diarrhea) and poor PO intake. Additionally, could be multifactorial with component of SIADH on differential. New medications include Buspirone 5mg TID started 3/5, Duloxetine 60mg started 2/24, Rivastigmine started 3/9 and Namenda started 3/3. No Thiazide or diuretics, on ACEI chronically. VSS, afebrile. CBC w/o leukocytosis or anemia. LFTs WNL. Cr 0.8 at baseline, mildly increased to 0.89  today.   - Start Normal saline at 100cc/hr for total of 1L administration    - Will plan to check Na level in 6 hours to ensure improvement in Na level    - Goal for no more than 8 point correction of Na level in 24 hours (133)    - If sodium were to downtrend, would then discontinue fluids as this would indicate SIADH   - Psychiatry discontinuing Duloxetine today   - Obtain UA/UCx  - Obtain Urine OSM and Na  - Serum OSM   - Add on Lipase 700s but not 3x ULN therefore does not meet criteria for pancreatitis  - ECG   - Intake and output, daily weights  - Zofran PRN for nausea  - Please monitor loose stools - please notify medicine if actual diarrhea noted of >3 stools per day - would obtain C. diff at that time    - Trend BMP in AM     # mild CAD   # HTN  # HLD   # Hx of mild aortic stenosis   BPs stable. Has history of mild aortic stenosis and history of chronic pericardial effusion in setting of SLE. Was hospitalized 1/2021 and underwent cardiac catheterization for chest pain - and catheterization at that time with mild CAD without requirement of intervention: Left main: O% stenosis, prox LAD 10% stenosed, Mid LAD 15% stenosed, 5% stenosis in RCA. Denies any chest pain today.   - ECG today given epigastric nausea to ensure to acute changes    - ECG with poor quality upon multiple attempts. Discussed with Dr. Abdul. Reassuring patient had relatively normal cardiac catheterization 1/5/21 and asymptomatic. Do not suspect ischemic symptoms. Discussed no need for further work-up.   - Continue Amlodipine 5mg daily   - Hold Enalpril 20mg BID given hyponatremia as above   - Continue Hydralazine 25mg daily   - Continue simvastatin 10mg at HS     Case was discussed with Dr. Abdul, Internal Medicine attending physician who agrees with plan of care.     Jocelyn Isaac PA-C  Hospitalist Service  Pager [phone]

## 2021-03-10 NOTE — PLAN OF CARE
"  Pt isolative to room and withdrawn. Ate dinner in lounge. Afterwards c/o nausea and received Zofran 4 mg ODT PRN. Later reported it had not helped. \"That pill doesn't work on me.\" Sat in chair in room alone. Declined to attend therapeutic group. Denied anxiety. Reports overall feeling mood & anxiety are improved. \"It was the one drug that was making me feel bad.\" Feels good about recent medication changes. Cooperative. Med-compliant. Continue with current treatment plan and recommendations. Continue to monitor and reassess symptoms. Monitor response to medications. Monitor progress towards treatment goals. Encourage groups and participation.   "

## 2021-03-10 NOTE — PLAN OF CARE
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     Order Specific Question:   Rationale     Answer:   Patient States able to remain safe in hospital    Fall precautions    Occupational Therapy on the Unit     Order Specific Question:   Reason for Consult     Answer:   Eval of thought process, functional skills and behavior     Order Specific Question:   Course of Action:     Answer:   Eval & Treat as indicated     Order Specific Question:   Treatment Prescription:     Answer:   cognitive eval please    Routine Programming     As clinically indicated    Status 15     Every 15 minutes.     Plan: Pt will be discharge to [facility] when mental health stabilizes.   Rationale for change in precautions or plan: N/A

## 2021-03-10 NOTE — PROGRESS NOTES
Patient seen via telemedicine.  Care discussed with treatment team staff.  Blood pressure 134/57, pulse 67, temperature 97.6  F (36.4  C), temperature source Temporal, resp. rate 16, weight 61.7 kg (136 lb), SpO2 96 %, not currently breastfeeding.    Weakness, diarrhea continue.  Anxiety observed to be high.  C/o postural lightheadedness    General appearance: sad  Alert.   Affect: sad  Mood: fair    Speech:  normal.   Eye contact: low.    Psychomotor behavior: decreased  Gait: not observed  Abnormal movements: none  Delusions: none  Hallucinations:   none  Thoughts: logical  Associations: intact  Judgement: fair  Insight: fair  Cognitions: intact in conversation  Memory:  intact in conversation  Orientation: normal    Not suicidal.  20/30 MoCA    Plan: stop Seroquel, return to Zyprexa 5mg daily.  Discharge Friday if she tolerates this      Current Facility-Administered Medications:      acetaminophen (TYLENOL) tablet 650 mg, 650 mg, Oral, Q4H PRN, Gloria Martinez APRN CNP, 650 mg at 03/01/21 2016     ALPRAZolam (XANAX) tablet 0.25 mg, 0.25 mg, Oral, TID w/meals, Sha Ricardo MD, 0.25 mg at 03/10/21 0818     alum & mag hydroxide-simethicone (MAALOX) suspension 30 mL, 30 mL, Oral, Q4H PRN, Gloria Martinez APRN CNP, 30 mL at 03/08/21 1756     amLODIPine (NORVASC) tablet 5 mg, 5 mg, Oral, Daily, Marleni Lester PA-C, 5 mg at 03/09/21 0859     brimonidine (ALPHAGAN-P) 0.15 % ophthalmic solution 1 drop, 1 drop, Both Eyes, BID, Jey Mayo MD, 1 drop at 03/10/21 0819     busPIRone (BUSPAR) tablet 5 mg, 5 mg, Oral, TID, Sha Ricardo MD, 5 mg at 03/10/21 0818     cyanocobalamin (VITAMIN B-12) tablet 1,000 mcg, 1,000 mcg, Oral, Daily, Gloria Martinez APRN CNP, 1,000 mcg at 03/10/21 0818     cyclobenzaprine (FLEXERIL) tablet 5 mg, 5 mg, Oral, TID PRN, Xochitl Werner PA-C, 5 mg at 03/02/21 1120     diphenhydrAMINE (BENADRYL) capsule 25 mg, 25 mg, Oral, Q6H PRN, Alphonso  Xochitl OVALLE PA-C, 25 mg at 03/07/21 0015     dorzolamide (TRUSOPT) 2 % ophthalmic solution 1 drop, 1 drop, Both Eyes, BID, Jey Mayo MD, 1 drop at 03/10/21 0819     DULoxetine (CYMBALTA) DR capsule 60 mg, 60 mg, Oral, Daily, Gloria Martinez APRN CNP, 60 mg at 03/10/21 0818     enalapril (VASOTEC) tablet 20 mg, 20 mg, Oral, BID, Xochitl Werner PA-C, 20 mg at 03/10/21 0818     ferrous gluconate (FERGON) tablet 324 mg, 324 mg, Oral, BID w/meals, Gloria Martinez APRN CNP, 324 mg at 03/10/21 0818     hydrALAZINE (APRESOLINE) tablet 25 mg, 25 mg, Oral, TID, Xochitl Werner PA-C, 25 mg at 03/10/21 0818     hydroxychloroquine (PLAQUENIL) tablet 200 mg, 200 mg, Oral, BID, Tangela Vuong APRN CNP, 200 mg at 03/10/21 0817     latanoprost (XALATAN) 0.005 % ophthalmic solution 1 drop, 1 drop, Both Eyes, At Bedtime, Jey Mayo MD, 1 drop at 03/09/21 2055     Lidocaine (LIDOCARE) 4 % Patch 2 patch, 2 patch, Transdermal, Q24H, Xochitl Werner PA-C, 1 patch at 03/04/21 1223     lidocaine patch in PLACE, , Transdermal, Q8H, Xochitl Werner PA-C     loperamide (IMODIUM) capsule 2 mg, 2 mg, Oral, TID PRN, Tangela Vuong APRN CNP, 2 mg at 03/09/21 0524     memantine (NAMENDA) tablet 10 mg, 10 mg, Oral, Daily, Sha Ricardo MD, 10 mg at 03/10/21 0817     menthol (ICY HOT) 5 % patch 1 patch, 1 patch, Topical, Q8H PRN, 1 patch at 03/02/21 2111 **AND** menthol (ICY HOT) Patch in Place, , Transdermal, Q8H, Marleni Lester PA-C, Stopped at 03/04/21 1446     OLANZapine (zyPREXA) tablet 5 mg, 5 mg, Oral, At Bedtime, Sha Ricardo MD     ondansetron (ZOFRAN-ODT) ODT tab 4 mg, 4 mg, Oral, Q6H PRN, Manuel Valverde MD, 4 mg at 03/09/21 1810     polyethylene glycol (MIRALAX) Packet 17 g, 17 g, Oral, Daily PRN, Gloria Martinez APRN CNP     propranolol (INDERAL) tablet 10 mg, 10 mg, Oral, Q4H PRN, Xochitl Werner PA-C, 10 mg at 03/07/21 2122      rivastigmine (EXELON) 4.6 MG/24HR 24 hr patch 1 patch, 1 patch, Transdermal, Daily, Sha Ricardo MD, 1 patch at 03/10/21 0816     rivastigmine (EXELON) Patch in Place, , Transdermal, Q8H, Sha Ricardo MD     simvastatin (ZOCOR) tablet 10 mg, 10 mg, Oral, At Bedtime, Jey Mayo MD, 10 mg at 03/09/21 2009     traZODone (DESYREL) tablet 50 mg, 50 mg, Oral, At Bedtime PRN, Juan, Gloria Huff, APRN CNP, 50 mg at 03/07/21 3054  Recent Results (from the past 168 hour(s))   CBC with platelets differential    Collection Time: 03/06/21  7:40 PM   Result Value Ref Range    WBC 8.5 4.0 - 11.0 10e9/L    RBC Count 3.30 (L) 3.8 - 5.2 10e12/L    Hemoglobin 10.0 (L) 11.7 - 15.7 g/dL    Hematocrit 30.9 (L) 35.0 - 47.0 %    MCV 94 78 - 100 fl    MCH 30.3 26.5 - 33.0 pg    MCHC 32.4 31.5 - 36.5 g/dL    RDW 13.2 10.0 - 15.0 %    Platelet Count 342 150 - 450 10e9/L    Diff Method Automated Method     % Neutrophils 73.1 %    % Lymphocytes 14.8 %    % Monocytes 9.9 %    % Eosinophils 1.2 %    % Basophils 0.8 %    % Immature Granulocytes 0.2 %    Nucleated RBCs 0 0 /100    Absolute Neutrophil 6.2 1.6 - 8.3 10e9/L    Absolute Lymphocytes 1.3 0.8 - 5.3 10e9/L    Absolute Monocytes 0.8 0.0 - 1.3 10e9/L    Absolute Eosinophils 0.1 0.0 - 0.7 10e9/L    Absolute Basophils 0.1 0.0 - 0.2 10e9/L    Abs Immature Granulocytes 0.0 0 - 0.4 10e9/L    Absolute Nucleated RBC 0.0    Comprehensive metabolic panel    Collection Time: 03/06/21  7:40 PM   Result Value Ref Range    Sodium 132 (L) 133 - 144 mmol/L    Potassium 4.1 3.4 - 5.3 mmol/L    Chloride 101 94 - 109 mmol/L    Carbon Dioxide 26 20 - 32 mmol/L    Anion Gap 5 3 - 14 mmol/L    Glucose 117 (H) 70 - 99 mg/dL    Urea Nitrogen 23 7 - 30 mg/dL    Creatinine 0.72 0.52 - 1.04 mg/dL    GFR Estimate 77 >60 mL/min/[1.73_m2]    GFR Estimate If Black 89 >60 mL/min/[1.73_m2]    Calcium 9.2 8.5 - 10.1 mg/dL    Bilirubin Total 0.2 0.2 - 1.3 mg/dL    Albumin 3.5 3.4 - 5.0 g/dL     Protein Total 7.0 6.8 - 8.8 g/dL    Alkaline Phosphatase 84 40 - 150 U/L    ALT 14 0 - 50 U/L    AST 17 0 - 45 U/L

## 2021-03-11 LAB
ANION GAP SERPL CALCULATED.3IONS-SCNC: 6 MMOL/L (ref 3–14)
BUN SERPL-MCNC: 17 MG/DL (ref 7–30)
CALCIUM SERPL-MCNC: 9.4 MG/DL (ref 8.5–10.1)
CAPILLARY BLOOD COLLECTION: NORMAL
CHLORIDE SERPL-SCNC: 105 MMOL/L (ref 94–109)
CO2 SERPL-SCNC: 23 MMOL/L (ref 20–32)
CREAT SERPL-MCNC: 0.81 MG/DL (ref 0.52–1.04)
GFR SERPL CREATININE-BSD FRML MDRD: 67 ML/MIN/{1.73_M2}
GLUCOSE SERPL-MCNC: 109 MG/DL (ref 70–99)
INTERPRETATION ECG - MUSE: NORMAL
INTERPRETATION ECG - MUSE: NORMAL
POTASSIUM SERPL-SCNC: 4.6 MMOL/L (ref 3.4–5.3)
SODIUM SERPL-SCNC: 129 MMOL/L (ref 133–144)
SODIUM SERPL-SCNC: 134 MMOL/L (ref 133–144)

## 2021-03-11 PROCEDURE — 250N000013 HC RX MED GY IP 250 OP 250 PS 637: Performed by: NURSE PRACTITIONER

## 2021-03-11 PROCEDURE — 250N000013 HC RX MED GY IP 250 OP 250 PS 637: Performed by: PHYSICIAN ASSISTANT

## 2021-03-11 PROCEDURE — 36415 COLL VENOUS BLD VENIPUNCTURE: CPT | Performed by: PHYSICIAN ASSISTANT

## 2021-03-11 PROCEDURE — 99232 SBSQ HOSP IP/OBS MODERATE 35: CPT | Performed by: PHYSICIAN ASSISTANT

## 2021-03-11 PROCEDURE — 84295 ASSAY OF SERUM SODIUM: CPT | Performed by: PHYSICIAN ASSISTANT

## 2021-03-11 PROCEDURE — 250N000013 HC RX MED GY IP 250 OP 250 PS 637: Performed by: PSYCHIATRY & NEUROLOGY

## 2021-03-11 PROCEDURE — 36416 COLLJ CAPILLARY BLOOD SPEC: CPT | Performed by: PHYSICIAN ASSISTANT

## 2021-03-11 PROCEDURE — 124N000003 HC R&B MH SENIOR/ADOLESCENT

## 2021-03-11 PROCEDURE — 80048 BASIC METABOLIC PNL TOTAL CA: CPT | Performed by: PHYSICIAN ASSISTANT

## 2021-03-11 PROCEDURE — 250N000013 HC RX MED GY IP 250 OP 250 PS 637: Performed by: EMERGENCY MEDICINE

## 2021-03-11 PROCEDURE — H2032 ACTIVITY THERAPY, PER 15 MIN: HCPCS

## 2021-03-11 RX ORDER — PAROXETINE 10 MG/1
10 TABLET, FILM COATED ORAL DAILY
Status: DISCONTINUED | OUTPATIENT
Start: 2021-03-12 | End: 2021-03-12 | Stop reason: HOSPADM

## 2021-03-11 RX ORDER — PAROXETINE 10 MG/1
10 TABLET, FILM COATED ORAL DAILY
Qty: 30 TABLET | Refills: 1 | Status: SHIPPED | OUTPATIENT
Start: 2021-03-12 | End: 2022-05-12

## 2021-03-11 RX ORDER — OLANZAPINE 5 MG/1
5 TABLET ORAL AT BEDTIME
Qty: 30 TABLET | Refills: 1 | Status: SHIPPED | OUTPATIENT
Start: 2021-03-11 | End: 2021-03-12

## 2021-03-11 RX ADMIN — BUSPIRONE HYDROCHLORIDE 5 MG: 5 TABLET ORAL at 20:23

## 2021-03-11 RX ADMIN — BUSPIRONE HYDROCHLORIDE 5 MG: 5 TABLET ORAL at 14:26

## 2021-03-11 RX ADMIN — ALPRAZOLAM 0.25 MG: 0.25 TABLET ORAL at 08:47

## 2021-03-11 RX ADMIN — OLANZAPINE 5 MG: 5 TABLET, FILM COATED ORAL at 21:49

## 2021-03-11 RX ADMIN — HYDROXYCHLOROQUINE SULFATE 200 MG: 200 TABLET, FILM COATED ORAL at 20:22

## 2021-03-11 RX ADMIN — HYDRALAZINE HYDROCHLORIDE 25 MG: 25 TABLET ORAL at 08:47

## 2021-03-11 RX ADMIN — BUSPIRONE HYDROCHLORIDE 5 MG: 5 TABLET ORAL at 08:49

## 2021-03-11 RX ADMIN — MEMANTINE 10 MG: 10 TABLET ORAL at 08:47

## 2021-03-11 RX ADMIN — LATANOPROST 1 DROP: 50 SOLUTION OPHTHALMIC at 21:51

## 2021-03-11 RX ADMIN — ENALAPRIL MALEATE 20 MG: 20 TABLET ORAL at 20:22

## 2021-03-11 RX ADMIN — RIVASTIGMINE 1 PATCH: 4.6 PATCH, EXTENDED RELEASE TRANSDERMAL at 08:49

## 2021-03-11 RX ADMIN — FERROUS GLUCONATE 324 MG: 324 TABLET ORAL at 08:47

## 2021-03-11 RX ADMIN — ALPRAZOLAM 0.25 MG: 0.25 TABLET ORAL at 17:36

## 2021-03-11 RX ADMIN — SIMVASTATIN 10 MG: 10 TABLET, FILM COATED ORAL at 21:49

## 2021-03-11 RX ADMIN — LOPERAMIDE HYDROCHLORIDE 2 MG: 2 CAPSULE ORAL at 08:56

## 2021-03-11 RX ADMIN — DORZOLAMIDE HYDROCHLORIDE 1 DROP: 20 SOLUTION/ DROPS OPHTHALMIC at 20:23

## 2021-03-11 RX ADMIN — CYANOCOBALAMIN TAB 1000 MCG 1000 MCG: 1000 TAB at 08:46

## 2021-03-11 RX ADMIN — HYDRALAZINE HYDROCHLORIDE 25 MG: 25 TABLET ORAL at 20:23

## 2021-03-11 RX ADMIN — DORZOLAMIDE HYDROCHLORIDE 1 DROP: 20 SOLUTION/ DROPS OPHTHALMIC at 08:52

## 2021-03-11 RX ADMIN — ALPRAZOLAM 0.25 MG: 0.25 TABLET ORAL at 12:40

## 2021-03-11 RX ADMIN — FERROUS GLUCONATE 324 MG: 324 TABLET ORAL at 17:36

## 2021-03-11 RX ADMIN — BRIMONIDINE TARTRATE 1 DROP: 1.5 SOLUTION OPHTHALMIC at 08:51

## 2021-03-11 RX ADMIN — HYDROXYCHLOROQUINE SULFATE 200 MG: 200 TABLET, FILM COATED ORAL at 08:46

## 2021-03-11 RX ADMIN — BRIMONIDINE TARTRATE 1 DROP: 1.5 SOLUTION OPHTHALMIC at 20:23

## 2021-03-11 RX ADMIN — HYDRALAZINE HYDROCHLORIDE 25 MG: 25 TABLET ORAL at 14:27

## 2021-03-11 NOTE — PLAN OF CARE
Problem: Adult Inpatient Plan of Care  Goal: Absence of Hospital-Acquired Illness or Injury  Intervention: Prevent Skin Injury  Recent Flowsheet Documentation  Taken 3/11/2021 1258 by Veena Funes RN  Body Position: position changed independently     Problem: Depressive Symptoms  Goal: Depressive Symptoms  Description: Signs and symptoms of listed problems will be absent or manageable.  Outcome: Improving  Flowsheets (Taken 3/11/2021 1302)  Depressive Symptoms Assessed: all  Depressive Symptoms Present:    affect    mood    insight    memory deficits     Problem: Activity and Energy Impairment (Anxiety Signs/Symptoms)  Goal: Optimized Energy Level (Anxiety Signs/Symptoms)  Intervention: Optimize Energy Level  Recent Flowsheet Documentation  Taken 3/11/2021 1258 by Veena Funes, RN  Activity (Behavioral Health): up ad sara   Pt denies anxiety and depression this shift. Pt denies SI/SIB or wishes to be dead.  Pt has been cooperative with staying in her room due to enteric precautions. Pt has not had any further loose stools since this AM. Pt is aware that we need a stool specimen and collection hat is in the toilet. Pt continues to be on I and O as well. Pt has taken 600 ml of fluids this shift, ate a pancake and harrington for breakfast, declined lunch and has taken 3 containers of applesauce with medications. Pt voided 400 ml of measured urine. Collection container was placed back in the toilet after patient had removed it.  Pt's affect is neutral. Pt does brighten during interactions. Pt is reporting that she feels like she is close to being ready to go home and initially stated that she felt ready to go home today but is aware that staff is monitoring her medications changes and lab work.   Pt has denied nausea today but reports having a poor appetite.

## 2021-03-11 NOTE — PROGRESS NOTES
Cross cover:    Follow-up sodium 128, improving.  Continue current plan of care.    Magdiel Marin MD  [facility]  Contact information available via [facility] Paging/Directory

## 2021-03-11 NOTE — PROGRESS NOTES
"Brief Medicine Note    Medicine following for follow-up of hyponatremia. Patient was given 1L bolus over 10 hours on 3/10. Na improved from 125 -> 128 -> 129 -> 134 this AM over 22 hour period. RN staff noted one loose bowel movement this morning. No overnight events.    Upon assessment, patient states she is feeling \"much better.\" She reports ongoing loose stools but denies any abdominal pain or nausea. No chest pain or SOB. No dysuria.     Today's vital signs, medications, and nursing notes were reviewed.      BP (!) 155/63   Pulse 61   Temp 97.4  F (36.3  C) (Oral)   Resp 16   Wt 62.1 kg (136 lb 12.8 oz)   SpO2 97%   BMI 23.85 kg/m    GENERAL: Alert and oriented x 3. Appears comfortable. Answering questions appropriately.   HEENT: Anicteric sclera. Mucous membranes moist.   CV: RRR. S1, S2. Grade 2/6 systolic murmur appreciated.   RESPIRATORY: Effort normal on room air. Lungs CTAB with no wheezing, rales, rhonchi.   GI: Abdomen soft and non distended, bowel sounds present. No tenderness, rebound, guarding.   MUSCULOSKELETAL: No joint swelling or tenderness.   NEUROLOGICAL: No focal deficits. Moves all extremities.   EXTREMITIES: No peripheral edema. Intact bilateral pedal pulses.   SKIN: No jaundice. No rashes.     A/P:  # Hyponatremia, improved  Na 125 on 3/10, from 132 on 3/6 and 139 on 2/27. Asymptomatic. Serum Osm 266. Urine Osm ~200s. Suspect hypovolemic hyponatremia given 3-4 day history of nausea, reported loose stools and poor PO intake, with low urine Na of 15 and improvement with IV fluid challenge. Was given 1L bolus of NS over 10 hours on 3/10 and Na improved from 125 -> 128 -> 129 -> 134 this AM over ~22 hours. Duloxetine was also discontinued on 3/10 given new hyponatremia, therefore component of SIADH may have been contributing.   - Trend BMP in AM   - Psychiatry discontinued Duloxetine 3/10   - Intake/output  - OK to resume PTA Enalapril which was held 3/10   - OK to discontinue saline lock "     # Nausea, epigastric discomfort  # History of recurrent UTI  # Reported loose stools:  Reported 3-4 day history of nausea, reported loose stools and poor PO intake. BMP with hyponatremia as above on 3/10, LFTs WNL, Lipase 700s but not 3x ULN therefore does not meet criteria for pancreatitis and without significant pain. Reported loose stools (up to 10 per day) but unwitnessed by staff. Had one loose bowel movement today witnessed by staff - but none since. No leukocytosis. Abdominal exam benign. Today, reports pain and nausea has resolved. Has history of recurrent UTIs and UA from 3/10 w/ moderate LE, 12 WBC - UCx pending. Denies any urinary complaints. Afebrile   - C. Diff ordered, Enteric precautions   - Hold Imodium for now - if C. Diff ruled out OK to resume   - Trend BMP in AM   - Zofran PRN  - Holding treatment for UTI at this time pending UCx results - if growth will start antibiotic     # mild CAD   # HTN  # HLD   # Hx of mild aortic stenosis   Has history of mild aortic stenosis and history of chronic pericardial effusion in setting of SLE. Was hospitalized 1/2021 and underwent cardiac catheterization for chest pain - and catheterization at that time with mild CAD without requirement of intervention: Left main: O% stenosis, prox LAD 10% stenosed, Mid LAD 15% stenosed, 5% stenosis in RCA. Denies any chest pain today. ECG 3/10 obtained however, ECG with poor quality upon multiple attempts. Discussed with Dr. Abdul. Reassuring patient had relatively normal cardiac catheterization 1/5/21 and asymptomatic. Do not suspect ischemic symptoms. Discussed no need for further work-up.   - Continue Amlodipine 5mg daily   - Resume Enalapril 20mg BID    - Continue Hydralazine 25mg daily   - Continue simvastatin 10mg at HS     Medicine will follow for C. Diff, UCx and BMP in AM. Please notify medicine with any additional questions or concerns.     Jocelyn Isaac PA-C  Hospitalist Service  Pager [phone]

## 2021-03-11 NOTE — PROGRESS NOTES
"CLINICAL NUTRITION SERVICES - REASSESSMENT NOTE     Nutrition Prescription    RECOMMENDATIONS FOR MDs/PROVIDERS TO ORDER:  None today    Malnutrition Status:    Unable to determine    Recommendations already ordered by Registered Dietitian (RD):  Encouraged intakes of bland foods  Continue current snacks  Offered supplements     Future/Additional Recommendations:  Monitor intakes and wt  Snacks/supplemetns per pt preferences      REASON FOR REASSESSMENT  Swathi Dukes is an 85 year old female reassessed by the dietitian for provider order-\"decreased oral intake\"  EVALUATION OF THE PROGRESS TOWARD GOALS   Diet: Regular  Snacks: PM pretzels, HS baked chips  Intake: Initially very good (% of meals), now decreased        NEW FINDINGS   Pt reports decreased appetite over the past few days d/t nausea, abdominal pain and diarrhea. C.Diff pending. Stated symptoms improved this morning and she was able to eat pancakes and harrington. She stated she had lunch late, ~11, so she wasn't sure if she'd be hungry for lunch. Offered supplements until appetite improves however pt declined at this time. Received IVF d/t hyponatremia.   Per RN note: During super pt reported she is lactose intolerant and that she was sent food items that contained lactose. Writer called dietary and requested for a new menu for tomorrow and changed pt's diet in chat to no lactose diet. Once new menu was sent, pt said it did not include foods she like and would like me to change back her diet to regular and she will order things she can take. Writer called dietary again, requested another menu sent and changed order back to regular diet.   Wt stable since admit  03/04/21 62 kg (136 lb 11.2 oz)   02/25/21 63.5 kg (139 lb 14.4 oz)   02/16/21 61.9 kg (136 lb 6.4 oz)-admit wt   01/28/21 62.6 kg (138 lb)   01/16/21 62.8 kg (138 lb 6.4 oz)   01/13/21 65.2 kg (143 lb 11.2 oz)   01/11/21 64.9 kg (143 lb)   01/05/21 62.1 kg (136 lb 12.8 oz)   11/19/20 63 kg " (139 lb)   11/03/20 63.4 kg (139 lb 12.8 oz)   11/03/20 63 kg (139 lb)   07/14/20 63.5 kg (140 lb)   06/04/19 62.6 kg (138 lb)     MALNUTRITION  % Intake: Decreased intake does not meet criteria  % Weight Loss: Weight loss does not meet criteria  Subcutaneous Fat Loss: Unable to assess  Muscle Loss: Unable to assess  Fluid Accumulation/Edema: None noted  Malnutrition Diagnosis: Unable to determine due to no NFPA completed     Previous Goals   Patient to consume % of nutritionally adequate meal trays TID, or the equivalent with supplements/snacks.  Evaluation: Not met    Previous Nutrition Diagnosis  No nutrition diagnosis at this time   Evaluation: Declining    CURRENT NUTRITION DIAGNOSIS  Inadequate oral intake related to nausea, abdominal pain and diarrhea as evidenced by documented intakes/pt report.      INTERVENTIONS  Implementation  Discussed bland diet until symptoms improved. Offered supplements however pt declined,  Continue current snacks     Goals  Patient to consume % of nutritionally adequate meal trays TID, or the equivalent with supplements/snacks.    Monitoring/Evaluation  Progress toward goals will be monitored and evaluated per protocol.    Tayla Medina MS, RD, LDN  Unit Pager [phone]

## 2021-03-11 NOTE — PROGRESS NOTES
11:30 P.M. NSS was discontinued. Saline lock in place.    Patient's repeat Sodium level is 129. Hospitalist on-call, Dr. Bledsoe notified with call back. He said to wait for the BMP result this morning.    Patient slept well the whole night for 10 hours.

## 2021-03-11 NOTE — PROGRESS NOTES
Writer called [facility] to clarify enteric precautions on behavioral. Writer was referred to infection control.  [facility]/infection control was contacted and case discussed.  Pt may stay on unit if she is able to stay in her room and is fairly independent with cares,  and is able to stay in her room.   If patient needs assistance staff should gown and glove. Bleach wipes should be used wipe down items.

## 2021-03-11 NOTE — PLAN OF CARE
Work Completed: Reviewed chart. Met with team to discuss pt progress. Pt continues to experience medical symptoms.    Discharge plan or goal: Pt will discharge back to [facility] when mental health stabilizes                Barriers to discharge: Ongoing symptom management

## 2021-03-12 VITALS
OXYGEN SATURATION: 98 % | SYSTOLIC BLOOD PRESSURE: 168 MMHG | HEART RATE: 77 BPM | DIASTOLIC BLOOD PRESSURE: 65 MMHG | RESPIRATION RATE: 16 BRPM | TEMPERATURE: 97.7 F | WEIGHT: 134.4 LBS | BODY MASS INDEX: 23.43 KG/M2

## 2021-03-12 LAB
ANION GAP SERPL CALCULATED.3IONS-SCNC: 7 MMOL/L (ref 3–14)
BACTERIA SPEC CULT: ABNORMAL
BUN SERPL-MCNC: 15 MG/DL (ref 7–30)
CALCIUM SERPL-MCNC: 9.2 MG/DL (ref 8.5–10.1)
CHLORIDE SERPL-SCNC: 107 MMOL/L (ref 94–109)
CO2 SERPL-SCNC: 24 MMOL/L (ref 20–32)
CREAT SERPL-MCNC: 0.78 MG/DL (ref 0.52–1.04)
GFR SERPL CREATININE-BSD FRML MDRD: 71 ML/MIN/{1.73_M2}
GLUCOSE SERPL-MCNC: 99 MG/DL (ref 70–99)
Lab: ABNORMAL
POTASSIUM SERPL-SCNC: 4.8 MMOL/L (ref 3.4–5.3)
SODIUM SERPL-SCNC: 138 MMOL/L (ref 133–144)
SPECIMEN SOURCE: ABNORMAL

## 2021-03-12 PROCEDURE — 36416 COLLJ CAPILLARY BLOOD SPEC: CPT | Performed by: PHYSICIAN ASSISTANT

## 2021-03-12 PROCEDURE — 250N000013 HC RX MED GY IP 250 OP 250 PS 637: Performed by: PHYSICIAN ASSISTANT

## 2021-03-12 PROCEDURE — 250N000013 HC RX MED GY IP 250 OP 250 PS 637: Performed by: PSYCHIATRY & NEUROLOGY

## 2021-03-12 PROCEDURE — 250N000013 HC RX MED GY IP 250 OP 250 PS 637: Performed by: NURSE PRACTITIONER

## 2021-03-12 PROCEDURE — 80048 BASIC METABOLIC PNL TOTAL CA: CPT | Performed by: PHYSICIAN ASSISTANT

## 2021-03-12 RX ORDER — MEMANTINE HYDROCHLORIDE 10 MG/1
10 TABLET ORAL DAILY
Qty: 30 TABLET | Refills: 1 | Status: SHIPPED | OUTPATIENT
Start: 2021-03-12 | End: 2021-03-22

## 2021-03-12 RX ORDER — OLANZAPINE 5 MG/1
5 TABLET ORAL AT BEDTIME
Qty: 30 TABLET | Refills: 1 | Status: SHIPPED | OUTPATIENT
Start: 2021-03-12 | End: 2021-07-05

## 2021-03-12 RX ORDER — ALPRAZOLAM 0.25 MG
TABLET ORAL
Qty: 18 TABLET | Refills: 0 | Status: SHIPPED | OUTPATIENT
Start: 2021-03-12 | End: 2021-04-26

## 2021-03-12 RX ORDER — RIVASTIGMINE 4.6 MG/24H
1 PATCH, EXTENDED RELEASE TRANSDERMAL DAILY
Qty: 30 PATCH | Refills: 1 | Status: SHIPPED | OUTPATIENT
Start: 2021-03-12 | End: 2021-03-16

## 2021-03-12 RX ORDER — LIDOCAINE 4 G/G
2 PATCH TOPICAL EVERY 24 HOURS
Qty: 60 PATCH | Refills: 3 | Status: SHIPPED | OUTPATIENT
Start: 2021-03-12 | End: 2021-03-25

## 2021-03-12 RX ORDER — BUSPIRONE HYDROCHLORIDE 5 MG/1
5 TABLET ORAL 3 TIMES DAILY
Qty: 90 TABLET | Refills: 3 | Status: SHIPPED | OUTPATIENT
Start: 2021-03-12 | End: 2021-04-26

## 2021-03-12 RX ADMIN — AMLODIPINE BESYLATE 5 MG: 5 TABLET ORAL at 09:35

## 2021-03-12 RX ADMIN — FERROUS GLUCONATE 324 MG: 324 TABLET ORAL at 09:36

## 2021-03-12 RX ADMIN — RIVASTIGMINE 1 PATCH: 4.6 PATCH, EXTENDED RELEASE TRANSDERMAL at 09:43

## 2021-03-12 RX ADMIN — ALPRAZOLAM 0.25 MG: 0.25 TABLET ORAL at 09:36

## 2021-03-12 RX ADMIN — BRIMONIDINE TARTRATE 1 DROP: 1.5 SOLUTION OPHTHALMIC at 09:46

## 2021-03-12 RX ADMIN — ALPRAZOLAM 0.25 MG: 0.25 TABLET ORAL at 12:25

## 2021-03-12 RX ADMIN — ENALAPRIL MALEATE 20 MG: 20 TABLET ORAL at 09:36

## 2021-03-12 RX ADMIN — MEMANTINE 10 MG: 10 TABLET ORAL at 09:36

## 2021-03-12 RX ADMIN — HYDROXYCHLOROQUINE SULFATE 200 MG: 200 TABLET, FILM COATED ORAL at 09:35

## 2021-03-12 RX ADMIN — PAROXETINE HYDROCHLORIDE 10 MG: 10 TABLET, FILM COATED ORAL at 09:35

## 2021-03-12 RX ADMIN — BUSPIRONE HYDROCHLORIDE 5 MG: 5 TABLET ORAL at 13:18

## 2021-03-12 RX ADMIN — BUSPIRONE HYDROCHLORIDE 5 MG: 5 TABLET ORAL at 09:35

## 2021-03-12 RX ADMIN — HYDRALAZINE HYDROCHLORIDE 25 MG: 25 TABLET ORAL at 09:37

## 2021-03-12 RX ADMIN — DORZOLAMIDE HYDROCHLORIDE 1 DROP: 20 SOLUTION/ DROPS OPHTHALMIC at 09:47

## 2021-03-12 RX ADMIN — CYANOCOBALAMIN TAB 1000 MCG 1000 MCG: 1000 TAB at 09:37

## 2021-03-12 RX ADMIN — HYDRALAZINE HYDROCHLORIDE 25 MG: 25 TABLET ORAL at 13:18

## 2021-03-12 ASSESSMENT — ACTIVITIES OF DAILY LIVING (ADL)
DRESS: INDEPENDENT;SCRUBS (BEHAVIORAL HEALTH)
HYGIENE/GROOMING: INDEPENDENT
ORAL_HYGIENE: INDEPENDENT
LAUNDRY: UNABLE TO COMPLETE

## 2021-03-12 NOTE — PROGRESS NOTES
Brief Medicine Note    Followed up regarding recently resolved hyponatremia,  UTI hx, epigastric discomfort     Today's vital signs, medications, and nursing notes were reviewed. Labs reviewed    BP (!) 151/63   Pulse 64   Temp 98.4  F (36.9  C)   Resp 16   Wt 62.1 kg (136 lb 12.8 oz)   SpO2 97%   BMI 23.85 kg/m        A/P:  Hyponatremia (resolved)  Na improved from 125 -> 128 -> 129 -> 134 over 22 hour period and normalized to 138 today in the setting of EtOH abuse and withdrawal, suspect is 2/2 fluid retention    Hx UTI  Urine cx no growth, no need for Abx    Loose stools  In the setting of EtOH abuse/withdrawal, low suspicion C. diff  - C. Diff not collected  - ok to resume immodium prn on discharge    Medicine will sign off, please call with questions or concerns      Aishwarya Bueno PA-C  [facility]  Contact information available via [facility] Paging/Directory    Hospitalist Service

## 2021-03-12 NOTE — PLAN OF CARE
"  Problem: Depressive Symptoms  Goal: Depressive Symptoms  Description: Signs and symptoms of listed problems will be absent or manageable.  Outcome: No Change     Problem: Activity and Energy Impairment (Anxiety Signs/Symptoms)  Goal: Optimized Energy Level (Anxiety Signs/Symptoms)  Outcome: Improving  Intervention: Optimize Energy Level  Recent Flowsheet Documentation  Taken 3/11/2021 1900 by Elsa Gordon RN  Activity (Behavioral Health): up ad sara   Pt refused to stay in room pending lab for C-diff. Lab was not completed due to no specimen. Pt was reminded on the importance to stay in her room but she refused stating \"I do not have C-diff, if I had one I would know, I have had it before\". Pt endorsed depression at 6/10 and anxiety at 6/10 (10 being the worst. States she has been in her room for 5 hours and it made her depressed. Pt was able to do coloring for coping skills. Affect was brighter today during conversations. Pt denies SI/SIB, hallucinations at this time.   Appetite good, ate 75% of her meal.   Intake: 480 ML  Output: 300 ML  "

## 2021-03-12 NOTE — PROGRESS NOTES
Declined [place] Patch:  Writer was contacted by discharge pharmacy who indicated that pt would have to pay $300 out of pocket for the [place] Patch.    Writer spoke with Swathi and her son Brent who is her primary support. Brent and Swathi spoke, Swathi adamantly refused to pay to the patch. Brent indicated he would follow up with with Dr. Parada (who Swathi is being referred to for outpatient memory care) about Excelon or other options for ongoing care.

## 2021-03-12 NOTE — PLAN OF CARE
Work Completed: Reviewed chart. Met with team to discuss progress. Pt has improved significantly and will discharge today. Left voice message for Mary Lou ([phone]) at [facility] to determine if they need signed orders and where they need to be faxed to. Mary Lou reports that pt will need to arrive to the facility before 3pm today or she will have to discharge another day. Contacted pt's son to report that pt's medications are not on the unit yet and staff will contact him when they are ready. Spoke with nurse who reports that medications should be on the unit by 2pm. Faxed AVS to [facility] at [phone]. Contacted staff at [facility] to determine if pt would be accepted to the facility after 3pm in case medications are not on the unit in a timely manner. Staff reports that after 3pm would be fine.     Discharge plan or goal: Pt will discharge back to [facility] when mental health stabilizes                 Barriers to discharge: Possible delay due to medications being delivered on time and fact that pt needs to be at facility by 3pm.

## 2021-03-12 NOTE — DISCHARGE INSTRUCTIONS
..Behavioral Discharge Planning and Instructions    Summary: You were admitted on 2/16/2021  due to depression and anxiety. You were treated by Gloria Martinez APRN CNP and discharged on 03/12/2021 from [address] to assisted living facility.    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 This is an office visit.  [facility]  [address], [address]  [place] [id number] [phone] Phone  [phone] Fax    Resource:  [employer] phone line (free phone companionship)  [facility] [employer] [facility]  [phone] or [phone]    Attend all scheduled appointments with your outpatient providers. Call at least 24 hours in advance if you need to reschedule an appointment to ensure continued access to your outpatient providers.     Major Treatments, Procedures and Findings:  You were provided with: a psychiatric assessment, assessed for medical stability, medication evaluation and/or management, group therapy, individual therapy, CD evaluation/assessment, milieu management and medical interventions    Symptoms to Report: increased confusion, losing more sleep or mood getting worse    Early warning signs can include: increased depression or anxiety sleep disturbances    Safety and Wellness:  Take all medicines as directed.  Make no changes unless your doctor suggests them.      Follow treatment recommendations.  Refrain from alcohol and non-prescribed drugs.  If there is a concern for safety, call [phone].    Resources:   Crisis Intervention: 350.781.8360 or 902-713-0161 (TTY: 750.405.7097).  Call anytime for help.  National [place] on Mental Illness (www.[place].hallie.org): 931.223.4840 or 843-565-2318.  [place] Association for  Children's Mental Health (www.mac.org): 629.705.5656.  Alcoholics Anonymous (www.alcoholics-anonymous.org): Check your phone book for your local chapter.  Suicide Awareness Voices of Education (SAVE) (www.save.org): 998-700-DOIC (5693)  National Suicide Prevention Line (www.mentalhealthmn.org): 464-777-SENO (7134)  Mental Health Consumer/Survivor Network of [place] (www.mhcsn.net): 864.125.1957 or 737-682-3470  Mental Health Association of [place] (www.mentalhealth.org): 451.112.3821 or 325-270-7799  Self- Management and Recovery Training., [place]-- Toll free: 549.955.1509  www.[employer]  [facility] Crisis Response 378-893-7912    General Medication Instructions:   See your medication sheet(s) for instructions.   Take all medicines as directed.  Make no changes unless your doctor suggests them.   Go to all your doctor visits.  Be sure to have all your required lab tests. This way, your medicines can be refilled on time.  Do not use any drugs not prescribed by your doctor.  Avoid alcohol.    Advance Directives:   Scanned document on file with [facility]? No scanned doc  Is document scanned? Pt unable to confirm  Honoring Choices Your Rights Handout: Informed and given  Was more information offered? Facilitator need-Consult order    The Treatment team has appreciated the opportunity to work with you. If you have any questions or concerns about your recent admission, you can contact the unit which can receive your call 24 hours a day, 7 days a week. They will be able to get in touch with a Provider if needed. The unit number is [phone].

## 2021-03-12 NOTE — PLAN OF CARE
Problem: General Rehab Plan of Care  Goal: Therapeutic Recreation/Music Therapy Goal (Art Therapy)  Description: The patient and/or their representative will achieve their patient-specific goals related to the plan of care.  The patient-specific goals include: emotional expression  Outcome: No Change   Art Therapy directive is to create a drawing of a safe place and to identify five items within safe place that represent each of the five senses. Goals of directive: emotional expression, emotional regulation, mindfulness, trauma containment.  Pt was a positive participant, focused on task for the full duration of group. Pt finished drawing and briefly shared with group. Pt finished drawing and briefly shared with group. Pt mackenzie two of her former house and fruit and nut trees that she had grown in her yards. Pt did not share any additional details with group.  Pts mood was calm, pleasant participant.

## 2021-03-12 NOTE — DISCHARGE SUMMARY
"DATE OF ADMISSION: 2/16/2021                                     PATIENT'S [id number]   DATE OF SERVICE: 2/17/2021                                           PATIENT'S: 1935  ADMITTING PROVIDER: Tod Villanueva MD  ATTENDING PROVIDER: Gloria URIARTE CNP  LEGAL STATUS:  Voluntary  SOURCES OF INFORMATION: Information was obtained from the patient and available records.  CHIEF COMPLAIN: \"I have been so depressed I could not take a shower by myself\".  HISTORY F PRESENT ILLNESS: Per ED note: Swathi Dukes is a 85 year old female with history of lupus, hypertension, hyperlipidemia, aortic stenosis and report of longstanding psychiatric issues who presents with depression and passive suicidal ideation over the past month.  Patient was living independently previously, but suffered multiple falls and was transitioned to assisted living on a temporary basis for patient to recover.  Son states this decision was made in order to keep her safe, patient has been very unhappy about this choice given the financial cost, loss of independence, and the fact that she feels it is unnecessary.  Son states that they our staff is with [facility], which has aging in place programs and plan is to return patient back to her home once she is recovered better.  Son states that patient has had PT/OT evaluation and that they felt she could benefit from 24/7 care, patient is not ready for this.  Patient wants to return to independent living and her mood has been worsening over the past month.  She could barely take a shower because she was shaking so bad and her mind isn't working. She states she is feeling anxious, sad and frustrated, isn't functioning.  Son states that patient has had mental health since 1960s and required psychiatric hospitalizations, no psychiatric hospitalizations in past 10 years.  She did have a psychiatric assessment at [facility], was seen by [facility] crisis team and was referred to [facility]" "Psychiatry group whom she is still with. She is followed by a therapist whom she has seen 3 times in the past  3x, therapist isn't aware of her issues.  Patient feels she needs to be hospitalized. Patient states \"I just need help.\" States that she is suicidal but hasn't thought of how she'd commit suicide. She has not tried to kill herself before. No alcohol use. Son believes she is on Paxil as well as another anxiolytic. She takes alprazolam once a day, no doses yet today. She does feels she needs something stronger. Primary care doctor was concerned for her. Her primary care doctor doesn't prescribe benzodiazepines to geriatric patients, and had told patient that she would need to see a different provider if she wanted a benzodiazepine prescription.  Patient does see a nurse practitioner who is able to write for benzos for her.  She would like to see a psychiatrist.  She states that she does not have any plans to harm herself.  No homicidal ideation. Epic records reviewed, patient contacted her clinic complaining of urinary frequency and dysuria that started 3 days ago, got an appointment arranged for yesterday but missed it.  Clinic reached out to patient's son, Brent Dukes ([facility] trauma services director).  Today patient denies any urinary symptoms. No medical concerns or complaints. No chest pain, shortness of breath, nausea, vomiting. She has had history of recurrent urinary tract infections, had multiple positive urine cultures last year that grew out E. coli (resistant to ampicillin, ciprofloxacin and levofloxacin) as well as Enterococcus faecalis.  Last positive urine culture was from 1/16/2021 that grew out the resistant E. coli.  She was treated with 1 g of IV Rocephin as well as 1 capsule of Keflex 500 mg. Patient spoke with [facility] , please see her notes for details. Patient did divulge suicide plan to [facility] , stating that if she didn't reach out to her son she " "would have overdosed on her medications.   The patient is a fairly reliable historian.  She confirms information in the previous paragraph.  She is here because of severe depression and inability to take care of herself. She tried to take a shower but was not able to due to shakiness.  The current episode started about a year and a half ago when she broke up with a boyfriend of 7 years.  She used to live with him, but since the break-up, she moved into an assisted living about a year and a half ago.  She does not like the place and has been starting to slowly decompensate.  The Covid restrictions have also contributed to her depression.  She feels quite isolated.  She is allowed to attend one event  And have one visitor a week.  She feels bored.  She reads books and watches TV.  Reports feeling anxious, sad, and frustrated.  Depression is rated as \"bad\".  Sleep is \"okay\".  She takes catnaps throughout the day.  Lacks interest in everyday activities.  Energy is low, memory and concentration impaired, appetite is good.  Reports passive suicidal ideation with a plan to take a bottle of pills.  Protective factors are her children.  She feels hopeless, helpless, and worthless.  Reports ruminating thoughts.  Anxiety is high all day long.  Reports panic attacks 2-3 times a day that manifest as \"sadness and depression\".  Denies manic episodes.  Reports psychosis, primarily hearing a voice that is derogatory in nature.  She is not sure if this is her own voice or comes from outside her head.  Reports history of OCD that manifest as a constant cleaning.  Denies eating disorder, borderline personality disorder, suicide attempts, self injury behaviors.  Denies history of seizures.  To the patient reports that she fell twice in the last months.  During the second fall she hit her head but did not lose consciousness.  SUBSTANCE USE HISTORY:   Denies.  Drinks 2 diet Cokes a day.  Never smoked cigarettes.  Does not drink " coffee.     PSYCHIATRIC HISTORY:   The patient is a history of depression, anxiety, and OCD.  She reports 5 or 6 hospitalization in her lifetime, the last one about 10 years ago.  She had ECT treatment which made her symptoms worse.  Prior medication trials include Prozac, possibly Wellbutrin, Xanax for many years, Valium, hydroxyzine, Zyprexa, possibly Haldol.  She does not have a psychiatrist.  Her medications are prescribed by her Doctor.  No history of commitments or violence towards others.  PAST MEDICAL HISTORY:   Past Medical History        Past Medical History:   Diagnosis Date     Aortic stenosis       Glaucoma       HTN (hypertension)       Hyperlipidemia       Pericardial effusion       Systemic lupus erythematosus              Past Surgical History         Past Surgical History:   Procedure Laterality Date     CV HEART CATHETERIZATION WITH POSSIBLE INTERVENTION N/A 1/5/2021     Procedure: Heart Catheterization with Possible Intervention;  Surgeon: Hayden Bedoya MD;  Location: [facility] CARDIAC CATH LAB     CV LEFT VENTRICULOGRAM N/A 1/5/2021     Procedure: Left Ventriculogram;  Surgeon: Hayden Bedoay MD;  Location:  HEART CARDIAC CATH LAB            ALLERGIES:          Allergies   Allergen Reactions     Diclofenac Anaphylaxis     Iodine Anaphylaxis       Contrast  Pt states anaphylactic reaction to ct contrast about 20 years ago     Aspirin        FAMILY HISTORY:  Family history is negative for mental illness and chemical dependency.  Family History         Family History   Problem Relation Age of Onset     Diabetes Father              SOCIAL HISTORY:   The patient grew up in [place].  She has 3 brothers and 1 sister.  One of her brothers passed away about a month ago.  They were not close.  The patient has been  for many years.  She has 2 twin sons.  Currently lives in a assisted living.  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  She completed 2 years of  [facility].  Denies legal history.   MEDICAL REVIEW OF SYSTEM: Please refer to the review of systems done by Rico Davidson DO on 2/16/2021, which I reviewed and confirmed. The remaining 10-point systems review was negative based on my exam.   MEDICATIONS PRIOR TO ADMISSION:           Prior to Admission medications    Medication Sig Start Date End Date Taking? Authorizing Provider   ALPRAZolam (XANAX) 0.5 MG tablet Take 0.5 mg by mouth daily  8/6/19   Yes Reported, Patient   amLODIPine (NORVASC) 5 MG tablet Take 1 tablet (5 mg) by mouth daily 1/22/21   Yes Cortney Vanessa MD   brimonidine (ALPHAGAN P) 0.1 % ophthalmic solution Place 1 drop into both eyes 2 times daily 1/25/21   Yes Cortney Vanessa MD   cyanocobalamin (VITAMIN B-12) 1000 MCG tablet Take 1 tablet (1,000 mcg) by mouth daily 1/28/21   Yes Cortney Vanessa MD   dorzolamide (TRUSOPT) 2 % ophthalmic solution Place 1 drop into both eyes 2 times daily 1/25/21   Yes Cortney Vanessa MD   enalapril (VASOTEC) 20 MG tablet Take 1 tablet (20 mg) by mouth 2 times daily 1/22/21   Yes Cortney Vanessa MD   ferrous gluconate (FERGON) 324 (38 Fe) MG tablet Take 1 tablet (324 mg) by mouth 2 times daily (with meals) 1/28/21   Yes Cortney Vanessa MD   folic acid (FOLVITE) 1 MG tablet Take 1 mg by mouth daily     Yes Unknown, Entered By History   hydrALAZINE (APRESOLINE) 25 MG tablet Take 1 tablet (25 mg) by mouth 3 times daily 1/22/21   Yes Cortney Vanessa MD   hydroxychloroquine (PLAQUENIL) 200 MG tablet Take 200 mg by mouth 2 times daily     Yes Unknown, Entered By History   latanoprost (XALATAN) 0.005 % ophthalmic solution Place 1 drop into both eyes At Bedtime  8/6/19   Yes Reported, Patient   OLANZapine (ZYPREXA) 10 MG tablet Take 10 mg by mouth At Bedtime  6/8/19   Yes Reported, Patient   PARoxetine (PAXIL) 10 MG tablet Take 5 mg by mouth every morning With 20mg to = 25mg daily     Yes Unknown, Entered By History    PARoxetine (PAXIL) 20 MG tablet Take 20 mg by mouth daily Plus 5mg = 25mg daily 6/7/19   Yes Reported, Patient   simvastatin (ZOCOR) 10 MG tablet Take 1 tablet (10 mg) by mouth At Bedtime 1/22/21   Yes Cortney Vanessa MD     [facility] cpourse:  She scored 20/30 on the MoCA, and 4.8 on CPT.  Aricept was started and diarrhea developed. Aricept plus Zyprexa may have caused neck dystonia.  Zyprexa was stopped and seroquel was started, racing thoughts got worse and Zyprexa was restarted after Aricept was stopped.  Exelon patch was used to replace Aricept Paxil was changed to Cymbalta.  She felt weak and sodium dropped; she was placed back on Paxil; it was unclear if hyponatremia was due to diarrhea or Cymbalta.    She did well with stopping Xanax due to neurocognitive issues, until discharge was planned then she became panicked, Xanax was restarted with plan to taper after the transition to her assisted living.  Buspar was added for anxiety to try to replace Xanax.    Diagnosis:  MDD (possible bipolar II))  2.  Major neurocognitive, early    Current Outpatient Medications   Medication Instructions     ALPRAZolam (XANAX) 0.25 MG tablet Take one 3 times daily for 3 days, then one twice daily for 3 days, then one nightly for 3 day     amLODIPine (NORVASC) 5 mg, Oral, DAILY     brimonidine (ALPHAGAN P) 0.1 % ophthalmic solution 1 drop, Both Eyes, 2 TIMES DAILY     busPIRone (BUSPAR) 5 mg, Oral, 3 TIMES DAILY     cyanocobalamin (VITAMIN B-12) 1,000 mcg, Oral, DAILY     dorzolamide (TRUSOPT) 2 % ophthalmic solution 1 drop, Both Eyes, 2 TIMES DAILY     enalapril (VASOTEC) 20 mg, Oral, 2 TIMES DAILY     ferrous gluconate (FERGON) 324 mg, Oral, 2 TIMES DAILY WITH MEALS     folic acid (FOLVITE) 1 mg, Oral, DAILY     hydrALAZINE (APRESOLINE) 25 mg, Oral, 3 TIMES DAILY     hydroxychloroquine (PLAQUENIL) 200 mg, Oral, 2 TIMES DAILY     latanoprost (XALATAN) 0.005 % ophthalmic solution 1 drop, Both Eyes, AT BEDTIME      Lidocaine (LIDOCARE) 4 % Patch 2 patches, Transdermal, EVERY 24 HOURS, To prevent lidocaine toxicity, patient should be patch free for 12 hrs daily.     loperamide (IMODIUM) 2 mg, Oral, 3 TIMES DAILY PRN     memantine (NAMENDA) 10 mg, Oral, DAILY     OLANZapine (ZYPREXA) 5 mg, Oral, AT BEDTIME     PARoxetine (PAXIL) 10 mg, Oral, DAILY     propranolol (INDERAL) 10 mg, Oral, EVERY 4 HOURS PRN     rivastigmine (EXELON) 4.6 MG/24HR 24 hr patch 1 patch, Transdermal, DAILY     simvastatin (ZOCOR) 10 mg, Oral, AT BEDTIME     Recent Results (from the past 504 hour(s))   CRP inflammation    Collection Time: 02/27/21  7:57 AM   Result Value Ref Range    CRP Inflammation 3.4 0.0 - 8.0 mg/L   Erythrocyte sedimentation rate auto    Collection Time: 02/27/21  7:57 AM   Result Value Ref Range    Sed Rate 32 (H) 0 - 30 mm/h   Basic metabolic panel    Collection Time: 02/27/21  7:57 AM   Result Value Ref Range    Sodium 139 133 - 144 mmol/L    Potassium 4.4 3.4 - 5.3 mmol/L    Chloride 105 94 - 109 mmol/L    Carbon Dioxide 27 20 - 32 mmol/L    Anion Gap 7 3 - 14 mmol/L    Glucose 96 70 - 99 mg/dL    Urea Nitrogen 26 7 - 30 mg/dL    Creatinine 0.80 0.52 - 1.04 mg/dL    GFR Estimate 68 >60 mL/min/[1.73_m2]    GFR Estimate If Black 78 >60 mL/min/[1.73_m2]    Calcium 9.2 8.5 - 10.1 mg/dL   Asymptomatic SARS-CoV-2 COVID-19 Virus (Coronavirus) by PCR    Collection Time: 03/02/21  1:05 PM    Specimen: Nasopharyngeal   Result Value Ref Range    SARS-CoV-2 Virus Specimen Source Nasopharyngeal     SARS-CoV-2 PCR Result NEGATIVE     SARS-CoV-2 PCR Comment       Testing was performed using the Xpert Xpress SARS-CoV-2 Assay on the Cepheid Gene-Xpert   Instrument Systems. Additional information about this Emergency Use Authorization (EUA)   assay can be found via the Lab Guide.     CBC with platelets differential    Collection Time: 03/06/21  7:40 PM   Result Value Ref Range    WBC 8.5 4.0 - 11.0 10e9/L    RBC Count 3.30 (L) 3.8 - 5.2 10e12/L     Hemoglobin 10.0 (L) 11.7 - 15.7 g/dL    Hematocrit 30.9 (L) 35.0 - 47.0 %    MCV 94 78 - 100 fl    MCH 30.3 26.5 - 33.0 pg    MCHC 32.4 31.5 - 36.5 g/dL    RDW 13.2 10.0 - 15.0 %    Platelet Count 342 150 - 450 10e9/L    Diff Method Automated Method     % Neutrophils 73.1 %    % Lymphocytes 14.8 %    % Monocytes 9.9 %    % Eosinophils 1.2 %    % Basophils 0.8 %    % Immature Granulocytes 0.2 %    Nucleated RBCs 0 0 /100    Absolute Neutrophil 6.2 1.6 - 8.3 10e9/L    Absolute Lymphocytes 1.3 0.8 - 5.3 10e9/L    Absolute Monocytes 0.8 0.0 - 1.3 10e9/L    Absolute Eosinophils 0.1 0.0 - 0.7 10e9/L    Absolute Basophils 0.1 0.0 - 0.2 10e9/L    Abs Immature Granulocytes 0.0 0 - 0.4 10e9/L    Absolute Nucleated RBC 0.0    Comprehensive metabolic panel    Collection Time: 03/06/21  7:40 PM   Result Value Ref Range    Sodium 132 (L) 133 - 144 mmol/L    Potassium 4.1 3.4 - 5.3 mmol/L    Chloride 101 94 - 109 mmol/L    Carbon Dioxide 26 20 - 32 mmol/L    Anion Gap 5 3 - 14 mmol/L    Glucose 117 (H) 70 - 99 mg/dL    Urea Nitrogen 23 7 - 30 mg/dL    Creatinine 0.72 0.52 - 1.04 mg/dL    GFR Estimate 77 >60 mL/min/[1.73_m2]    GFR Estimate If Black 89 >60 mL/min/[1.73_m2]    Calcium 9.2 8.5 - 10.1 mg/dL    Bilirubin Total 0.2 0.2 - 1.3 mg/dL    Albumin 3.5 3.4 - 5.0 g/dL    Protein Total 7.0 6.8 - 8.8 g/dL    Alkaline Phosphatase 84 40 - 150 U/L    ALT 14 0 - 50 U/L    AST 17 0 - 45 U/L   CBC with platelets differential    Collection Time: 03/10/21 10:43 AM   Result Value Ref Range    WBC 9.6 4.0 - 11.0 10e9/L    RBC Count 3.50 (L) 3.8 - 5.2 10e12/L    Hemoglobin 10.7 (L) 11.7 - 15.7 g/dL    Hematocrit 32.7 (L) 35.0 - 47.0 %    MCV 93 78 - 100 fl    MCH 30.6 26.5 - 33.0 pg    MCHC 32.7 31.5 - 36.5 g/dL    RDW 13.2 10.0 - 15.0 %    Platelet Count 327 150 - 450 10e9/L    Diff Method Automated Method     % Neutrophils 77.0 %    % Lymphocytes 11.6 %    % Monocytes 9.8 %    % Eosinophils 0.7 %    % Basophils 0.6 %    % Immature  Granulocytes 0.3 %    Nucleated RBCs 0 0 /100    Absolute Neutrophil 7.4 1.6 - 8.3 10e9/L    Absolute Lymphocytes 1.1 0.8 - 5.3 10e9/L    Absolute Monocytes 0.9 0.0 - 1.3 10e9/L    Absolute Eosinophils 0.1 0.0 - 0.7 10e9/L    Absolute Basophils 0.1 0.0 - 0.2 10e9/L    Abs Immature Granulocytes 0.0 0 - 0.4 10e9/L    Absolute Nucleated RBC 0.0    Comprehensive metabolic panel    Collection Time: 03/10/21 10:43 AM   Result Value Ref Range    Sodium 125 (L) 133 - 144 mmol/L    Potassium 4.4 3.4 - 5.3 mmol/L    Chloride 96 94 - 109 mmol/L    Carbon Dioxide 23 20 - 32 mmol/L    Anion Gap 6 3 - 14 mmol/L    Glucose 103 (H) 70 - 99 mg/dL    Urea Nitrogen 21 7 - 30 mg/dL    Creatinine 0.89 0.52 - 1.04 mg/dL    GFR Estimate 59 (L) >60 mL/min/[1.73_m2]    GFR Estimate If Black 69 >60 mL/min/[1.73_m2]    Calcium 9.6 8.5 - 10.1 mg/dL    Bilirubin Total 0.3 0.2 - 1.3 mg/dL    Albumin 3.1 (L) 3.4 - 5.0 g/dL    Protein Total 6.9 6.8 - 8.8 g/dL    Alkaline Phosphatase 90 40 - 150 U/L    ALT 17 0 - 50 U/L    AST 22 0 - 45 U/L   Capillary Blood Collection    Collection Time: 03/10/21 10:43 AM   Result Value Ref Range    Capillary Blood Collection Capillary collection performed    Asymptomatic SARS-CoV-2 COVID-19 Virus (Coronavirus) by PCR    Collection Time: 03/10/21 12:12 PM    Specimen: Nasopharyngeal   Result Value Ref Range    SARS-CoV-2 Virus Specimen Source Nasopharyngeal     SARS-CoV-2 PCR Result NEGATIVE     SARS-CoV-2 PCR Comment       Testing was performed using the Xpert Xpress SARS-CoV-2 Assay on the Cepheid Gene-Xpert   Instrument Systems. Additional information about this Emergency Use Authorization (EUA)   assay can be found via the Lab Guide.     Lipase    Collection Time: 03/10/21 12:14 PM   Result Value Ref Range    Lipase 781 (H) 73 - 393 U/L   Osmolality    Collection Time: 03/10/21 12:14 PM   Result Value Ref Range    Osmolality 266 (L) 280 - 301 mmol/kg   Capillary Blood Collection    Collection Time: 03/10/21  12:14 PM   Result Value Ref Range    Capillary Blood Collection Capillary collection performed    EKG 12-lead, complete    Collection Time: 03/10/21 12:41 PM   Result Value Ref Range    Interpretation ECG Click View Image link to view waveform and result    EKG 12-lead, complete    Collection Time: 03/10/21  1:15 PM   Result Value Ref Range    Interpretation ECG Click View Image link to view waveform and result    Sodium random urine    Collection Time: 03/10/21  2:25 PM   Result Value Ref Range    Sodium Urine mmol/L 15 mmol/L   UA with Microscopic reflex to Culture    Collection Time: 03/10/21  2:25 PM    Specimen: Random Urine; Midstream Urine   Result Value Ref Range    Color Urine Light Yellow     Appearance Urine Clear     Glucose Urine Negative NEG^Negative mg/dL    Bilirubin Urine Negative NEG^Negative    Ketones Urine Negative NEG^Negative mg/dL    Specific Gravity Urine 1.007 1.003 - 1.035    Blood Urine Negative NEG^Negative    pH Urine 5.5 5.0 - 7.0 pH    Protein Albumin Urine 10 (A) NEG^Negative mg/dL    Urobilinogen mg/dL Normal 0.0 - 2.0 mg/dL    Nitrite Urine Negative NEG^Negative    Leukocyte Esterase Urine Moderate (A) NEG^Negative    Source Midstream Urine     WBC Urine 12 (H) 0 - 5 /HPF    RBC Urine 2 0 - 2 /HPF    Bacteria Urine Moderate (A) NEG^Negative /HPF    Mucous Urine Present (A) NEG^Negative /LPF    Hyaline Casts 4 (H) 0 - 2 /LPF   Osmolality urine    Collection Time: 03/10/21  2:25 PM   Result Value Ref Range    Urine Osmolality 228 100 - 1,200 mmol/kg   Urine Culture Aerobic Bacterial    Collection Time: 03/10/21  2:25 PM    Specimen: Midstream Urine   Result Value Ref Range    Specimen Description Midstream Urine     Special Requests Specimen received in preservative     Culture Micro (A)      50,000 to 100,000 colonies/mL  Enterococcus faecalis  Susceptibility testing in progress     Sodium    Collection Time: 03/10/21  6:32 PM   Result Value Ref Range    Sodium 128 (L) 133 - 144  mmol/L   Sodium    Collection Time: 03/11/21 12:48 AM   Result Value Ref Range    Sodium 129 (L) 133 - 144 mmol/L   Basic metabolic panel    Collection Time: 03/11/21  7:58 AM   Result Value Ref Range    Sodium 134 133 - 144 mmol/L    Potassium 4.6 3.4 - 5.3 mmol/L    Chloride 105 94 - 109 mmol/L    Carbon Dioxide 23 20 - 32 mmol/L    Anion Gap 6 3 - 14 mmol/L    Glucose 109 (H) 70 - 99 mg/dL    Urea Nitrogen 17 7 - 30 mg/dL    Creatinine 0.81 0.52 - 1.04 mg/dL    GFR Estimate 67 >60 mL/min/[1.73_m2]    GFR Estimate If Black 78 >60 mL/min/[1.73_m2]    Calcium 9.4 8.5 - 10.1 mg/dL   Capillary Blood Collection    Collection Time: 03/11/21  7:58 AM   Result Value Ref Range    Capillary Blood Collection Capillary collection performed    Basic metabolic panel    Collection Time: 03/12/21  6:48 AM   Result Value Ref Range    Sodium 138 133 - 144 mmol/L    Potassium 4.8 3.4 - 5.3 mmol/L    Chloride 107 94 - 109 mmol/L    Carbon Dioxide 24 20 - 32 mmol/L    Anion Gap 7 3 - 14 mmol/L    Glucose 99 70 - 99 mg/dL    Urea Nitrogen 15 7 - 30 mg/dL    Creatinine 0.78 0.52 - 1.04 mg/dL    GFR Estimate 71 >60 mL/min/[1.73_m2]    GFR Estimate If Black 82 >60 mL/min/[1.73_m2]    Calcium 9.2 8.5 - 10.1 mg/dL     3/12: Blood pressure (!) 165/70, pulse 89, temperature 97.7  F (36.5  C), temperature source Temporal, resp. rate 16, weight 61 kg (134 lb 6.4 oz), SpO2 98 %, not currently breastfeeding.    3/12:  General appearance: good  Alert.   Affect: fair  Mood: fair    Speech:  Slight decreased   Eye contact:  good.    Psychomotor behavior: normal  Gait: normal.    Abnormal movements: none  Delusions: none  Hallucinations:  none  Thoughts: logical  Associations: intact  Judgement: good  Insight: good  Cognitions: intact in conversation  Memory:  intact in conversation  Orientation: normal    Not suicidal.  Cognitive tests as above

## 2021-03-12 NOTE — PLAN OF CARE
Discharge Note:  Writer met with patient and her son Brent to review discharge instructions and answer questions. Pt received her belongings. Pt was also received her discharge medications (except Excelon patches which pt is refusing to take and pay for on an ongoing basis) to give to Assisted Living staff for set up and administration.    Swathi denies having suicidal or self harm thoughts. Pt's appetite and fluid intake has been WNL. Swathi is denying having any gastro-intestinal discomfort or issues at this time. Pt is voiding regularly without issues.     Writer escorted Patient to [facility], reviewed discharge instructions with her Son Brent and Pt again. Swathi discharged to Assisted Living at 2:30 pm.

## 2021-03-12 NOTE — PROGRESS NOTES
"Patient seen, chart reviewed, care discussed with staff.  Blood pressure (!) 183/69, pulse 64, temperature 98.4  F (36.9  C), resp. rate 16, weight 62.1 kg (136 lb 12.8 oz), SpO2 97 %, not currently breastfeeding.    By report, C-Diff pending, but mood better    She states, \"I am ready to go home\".        Current Facility-Administered Medications:      acetaminophen (TYLENOL) tablet 650 mg, 650 mg, Oral, Q4H PRN, Gloria Martinez APRN CNP, 650 mg at 03/01/21 2016     ALPRAZolam (XANAX) tablet 0.25 mg, 0.25 mg, Oral, TID w/meals, Sha Ricardo MD, 0.25 mg at 03/11/21 1736     alum & mag hydroxide-simethicone (MAALOX) suspension 30 mL, 30 mL, Oral, Q4H PRN, Gloria Martinez APRN CNP, 30 mL at 03/08/21 1756     amLODIPine (NORVASC) tablet 5 mg, 5 mg, Oral, Daily, Marleni Lester PA-C, 5 mg at 03/09/21 0859     brimonidine (ALPHAGAN-P) 0.15 % ophthalmic solution 1 drop, 1 drop, Both Eyes, BID, Jey Mayo MD, 1 drop at 03/11/21 0851     busPIRone (BUSPAR) tablet 5 mg, 5 mg, Oral, TID, Sha Ricardo MD, 5 mg at 03/11/21 1426     cyanocobalamin (VITAMIN B-12) tablet 1,000 mcg, 1,000 mcg, Oral, Daily, Gloria Martinez APRN CNP, 1,000 mcg at 03/11/21 0846     cyclobenzaprine (FLEXERIL) tablet 5 mg, 5 mg, Oral, TID PRN, Xochitl Werner PA-C, 5 mg at 03/02/21 1120     diphenhydrAMINE (BENADRYL) capsule 25 mg, 25 mg, Oral, Q6H PRN, Xochitl Werner PA-C, 25 mg at 03/07/21 0015     dorzolamide (TRUSOPT) 2 % ophthalmic solution 1 drop, 1 drop, Both Eyes, BID, Jey Mayo MD, 1 drop at 03/11/21 0852     enalapril (VASOTEC) tablet 20 mg, 20 mg, Oral, BID, Xochitl Werner PA-C, 20 mg at 03/10/21 0818     ferrous gluconate (FERGON) tablet 324 mg, 324 mg, Oral, BID w/meals, Gloria Martinez APRN CNP, 324 mg at 03/11/21 1736     hydrALAZINE (APRESOLINE) tablet 25 mg, 25 mg, Oral, TID, Xochitl Werner PA-C, 25 mg at 03/11/21 1427     hydroxychloroquine " (PLAQUENIL) tablet 200 mg, 200 mg, Oral, BID, Tangela Vuong APRN CNP, 200 mg at 03/11/21 0846     latanoprost (XALATAN) 0.005 % ophthalmic solution 1 drop, 1 drop, Both Eyes, At Bedtime, Jey Mayo MD, 1 drop at 03/10/21 2205     Lidocaine (LIDOCARE) 4 % Patch 2 patch, 2 patch, Transdermal, Q24H, Xochitl Werner PA-C, 1 patch at 03/04/21 1223     lidocaine patch in PLACE, , Transdermal, Q8H, Xochitl Werner PA-C     memantine (NAMENDA) tablet 10 mg, 10 mg, Oral, Daily, Sha Ricardo MD, 10 mg at 03/11/21 0847     menthol (ICY HOT) 5 % patch 1 patch, 1 patch, Topical, Q8H PRN, 1 patch at 03/02/21 2111 **AND** menthol (ICY HOT) Patch in Place, , Transdermal, Q8H, Marleni Lester PA-C, Stopped at 03/04/21 1446     OLANZapine (zyPREXA) tablet 5 mg, 5 mg, Oral, At Bedtime, Sha Ricardo MD, 5 mg at 03/10/21 2205     ondansetron (ZOFRAN-ODT) ODT tab 4 mg, 4 mg, Oral, Q6H PRN, Manuel Valverde MD, 4 mg at 03/09/21 1810     [START ON 3/12/2021] PARoxetine (PAXIL) tablet 10 mg, 10 mg, Oral, Daily, Sha Ricardo MD     polyethylene glycol (MIRALAX) Packet 17 g, 17 g, Oral, Daily PRN, Gloria Martinez APRN CNP     propranolol (INDERAL) tablet 10 mg, 10 mg, Oral, Q4H PRN, Xochitl Werner PA-C, 10 mg at 03/10/21 2258     rivastigmine (EXELON) 4.6 MG/24HR 24 hr patch 1 patch, 1 patch, Transdermal, Daily, Sha Ricardo MD, 1 patch at 03/11/21 0849     rivastigmine (EXELON) Patch in Place, , Transdermal, Q8H, Sha Ricardo MD     simvastatin (ZOCOR) tablet 10 mg, 10 mg, Oral, At Bedtime, Jey Mayo MD, 10 mg at 03/10/21 2205     traZODone (DESYREL) tablet 50 mg, 50 mg, Oral, At Bedtime PRN, Gloria Martinez APRN CNP, 50 mg at 03/07/21 2214  Recent Results (from the past 168 hour(s))   CBC with platelets differential    Collection Time: 03/06/21  7:40 PM   Result Value Ref Range    WBC 8.5 4.0 - 11.0 10e9/L    RBC Count 3.30 (L) 3.8 - 5.2 10e12/L     Hemoglobin 10.0 (L) 11.7 - 15.7 g/dL    Hematocrit 30.9 (L) 35.0 - 47.0 %    MCV 94 78 - 100 fl    MCH 30.3 26.5 - 33.0 pg    MCHC 32.4 31.5 - 36.5 g/dL    RDW 13.2 10.0 - 15.0 %    Platelet Count 342 150 - 450 10e9/L    Diff Method Automated Method     % Neutrophils 73.1 %    % Lymphocytes 14.8 %    % Monocytes 9.9 %    % Eosinophils 1.2 %    % Basophils 0.8 %    % Immature Granulocytes 0.2 %    Nucleated RBCs 0 0 /100    Absolute Neutrophil 6.2 1.6 - 8.3 10e9/L    Absolute Lymphocytes 1.3 0.8 - 5.3 10e9/L    Absolute Monocytes 0.8 0.0 - 1.3 10e9/L    Absolute Eosinophils 0.1 0.0 - 0.7 10e9/L    Absolute Basophils 0.1 0.0 - 0.2 10e9/L    Abs Immature Granulocytes 0.0 0 - 0.4 10e9/L    Absolute Nucleated RBC 0.0    Comprehensive metabolic panel    Collection Time: 03/06/21  7:40 PM   Result Value Ref Range    Sodium 132 (L) 133 - 144 mmol/L    Potassium 4.1 3.4 - 5.3 mmol/L    Chloride 101 94 - 109 mmol/L    Carbon Dioxide 26 20 - 32 mmol/L    Anion Gap 5 3 - 14 mmol/L    Glucose 117 (H) 70 - 99 mg/dL    Urea Nitrogen 23 7 - 30 mg/dL    Creatinine 0.72 0.52 - 1.04 mg/dL    GFR Estimate 77 >60 mL/min/[1.73_m2]    GFR Estimate If Black 89 >60 mL/min/[1.73_m2]    Calcium 9.2 8.5 - 10.1 mg/dL    Bilirubin Total 0.2 0.2 - 1.3 mg/dL    Albumin 3.5 3.4 - 5.0 g/dL    Protein Total 7.0 6.8 - 8.8 g/dL    Alkaline Phosphatase 84 40 - 150 U/L    ALT 14 0 - 50 U/L    AST 17 0 - 45 U/L   CBC with platelets differential    Collection Time: 03/10/21 10:43 AM   Result Value Ref Range    WBC 9.6 4.0 - 11.0 10e9/L    RBC Count 3.50 (L) 3.8 - 5.2 10e12/L    Hemoglobin 10.7 (L) 11.7 - 15.7 g/dL    Hematocrit 32.7 (L) 35.0 - 47.0 %    MCV 93 78 - 100 fl    MCH 30.6 26.5 - 33.0 pg    MCHC 32.7 31.5 - 36.5 g/dL    RDW 13.2 10.0 - 15.0 %    Platelet Count 327 150 - 450 10e9/L    Diff Method Automated Method     % Neutrophils 77.0 %    % Lymphocytes 11.6 %    % Monocytes 9.8 %    % Eosinophils 0.7 %    % Basophils 0.6 %    % Immature  Granulocytes 0.3 %    Nucleated RBCs 0 0 /100    Absolute Neutrophil 7.4 1.6 - 8.3 10e9/L    Absolute Lymphocytes 1.1 0.8 - 5.3 10e9/L    Absolute Monocytes 0.9 0.0 - 1.3 10e9/L    Absolute Eosinophils 0.1 0.0 - 0.7 10e9/L    Absolute Basophils 0.1 0.0 - 0.2 10e9/L    Abs Immature Granulocytes 0.0 0 - 0.4 10e9/L    Absolute Nucleated RBC 0.0    Comprehensive metabolic panel    Collection Time: 03/10/21 10:43 AM   Result Value Ref Range    Sodium 125 (L) 133 - 144 mmol/L    Potassium 4.4 3.4 - 5.3 mmol/L    Chloride 96 94 - 109 mmol/L    Carbon Dioxide 23 20 - 32 mmol/L    Anion Gap 6 3 - 14 mmol/L    Glucose 103 (H) 70 - 99 mg/dL    Urea Nitrogen 21 7 - 30 mg/dL    Creatinine 0.89 0.52 - 1.04 mg/dL    GFR Estimate 59 (L) >60 mL/min/[1.73_m2]    GFR Estimate If Black 69 >60 mL/min/[1.73_m2]    Calcium 9.6 8.5 - 10.1 mg/dL    Bilirubin Total 0.3 0.2 - 1.3 mg/dL    Albumin 3.1 (L) 3.4 - 5.0 g/dL    Protein Total 6.9 6.8 - 8.8 g/dL    Alkaline Phosphatase 90 40 - 150 U/L    ALT 17 0 - 50 U/L    AST 22 0 - 45 U/L   Capillary Blood Collection    Collection Time: 03/10/21 10:43 AM   Result Value Ref Range    Capillary Blood Collection Capillary collection performed    Asymptomatic SARS-CoV-2 COVID-19 Virus (Coronavirus) by PCR    Collection Time: 03/10/21 12:12 PM    Specimen: Nasopharyngeal   Result Value Ref Range    SARS-CoV-2 Virus Specimen Source Nasopharyngeal     SARS-CoV-2 PCR Result NEGATIVE     SARS-CoV-2 PCR Comment       Testing was performed using the Xpert Xpress SARS-CoV-2 Assay on the Cepheid Gene-Xpert   Instrument Systems. Additional information about this Emergency Use Authorization (EUA)   assay can be found via the Lab Guide.     Lipase    Collection Time: 03/10/21 12:14 PM   Result Value Ref Range    Lipase 781 (H) 73 - 393 U/L   Osmolality    Collection Time: 03/10/21 12:14 PM   Result Value Ref Range    Osmolality 266 (L) 280 - 301 mmol/kg   Capillary Blood Collection    Collection Time: 03/10/21  12:14 PM   Result Value Ref Range    Capillary Blood Collection Capillary collection performed    EKG 12-lead, complete    Collection Time: 03/10/21 12:41 PM   Result Value Ref Range    Interpretation ECG Click View Image link to view waveform and result    EKG 12-lead, complete    Collection Time: 03/10/21  1:15 PM   Result Value Ref Range    Interpretation ECG Click View Image link to view waveform and result    Sodium random urine    Collection Time: 03/10/21  2:25 PM   Result Value Ref Range    Sodium Urine mmol/L 15 mmol/L   UA with Microscopic reflex to Culture    Collection Time: 03/10/21  2:25 PM    Specimen: Random Urine; Midstream Urine   Result Value Ref Range    Color Urine Light Yellow     Appearance Urine Clear     Glucose Urine Negative NEG^Negative mg/dL    Bilirubin Urine Negative NEG^Negative    Ketones Urine Negative NEG^Negative mg/dL    Specific Gravity Urine 1.007 1.003 - 1.035    Blood Urine Negative NEG^Negative    pH Urine 5.5 5.0 - 7.0 pH    Protein Albumin Urine 10 (A) NEG^Negative mg/dL    Urobilinogen mg/dL Normal 0.0 - 2.0 mg/dL    Nitrite Urine Negative NEG^Negative    Leukocyte Esterase Urine Moderate (A) NEG^Negative    Source Midstream Urine     WBC Urine 12 (H) 0 - 5 /HPF    RBC Urine 2 0 - 2 /HPF    Bacteria Urine Moderate (A) NEG^Negative /HPF    Mucous Urine Present (A) NEG^Negative /LPF    Hyaline Casts 4 (H) 0 - 2 /LPF   Osmolality urine    Collection Time: 03/10/21  2:25 PM   Result Value Ref Range    Urine Osmolality 228 100 - 1,200 mmol/kg   Urine Culture Aerobic Bacterial    Collection Time: 03/10/21  2:25 PM    Specimen: Midstream Urine   Result Value Ref Range    Specimen Description Midstream Urine     Special Requests Specimen received in preservative     Culture Micro Culture in progress    Sodium    Collection Time: 03/10/21  6:32 PM   Result Value Ref Range    Sodium 128 (L) 133 - 144 mmol/L   Sodium    Collection Time: 03/11/21 12:48 AM   Result Value Ref Range     Sodium 129 (L) 133 - 144 mmol/L   Basic metabolic panel    Collection Time: 03/11/21  7:58 AM   Result Value Ref Range    Sodium 134 133 - 144 mmol/L    Potassium 4.6 3.4 - 5.3 mmol/L    Chloride 105 94 - 109 mmol/L    Carbon Dioxide 23 20 - 32 mmol/L    Anion Gap 6 3 - 14 mmol/L    Glucose 109 (H) 70 - 99 mg/dL    Urea Nitrogen 17 7 - 30 mg/dL    Creatinine 0.81 0.52 - 1.04 mg/dL    GFR Estimate 67 >60 mL/min/[1.73_m2]    GFR Estimate If Black 78 >60 mL/min/[1.73_m2]    Calcium 9.4 8.5 - 10.1 mg/dL   Capillary Blood Collection    Collection Time: 03/11/21  7:58 AM   Result Value Ref Range    Capillary Blood Collection Capillary collection performed      General appearance: good  Alert.   Affect: good  Mood: good  Speech:  normal.   Eye contact:  good.    Psychomotor behavior: normal  Gait: normal.    Abnormal movements: none  Delusions: none  Hallucinations:  none  Thoughts: logical  Associations: intact  Judgement: good  Insight: good  Cognitions: intact in conversation  Memory:  intact in conversation  Orientation: normal    Not suicidal.  [place]ove 20/30    Plan: resume Paxil.    Discharge tomorrow

## 2021-03-15 ENCOUNTER — TELEPHONE (OUTPATIENT)
Dept: FAMILY MEDICINE | Facility: CLINIC | Age: 86
End: 2021-03-15

## 2021-03-15 DIAGNOSIS — D62 ANEMIA DUE TO BLOOD LOSS, ACUTE: ICD-10-CM

## 2021-03-15 DIAGNOSIS — F32.A DEPRESSION, UNSPECIFIED DEPRESSION TYPE: ICD-10-CM

## 2021-03-15 DIAGNOSIS — R41.89 COGNITIVE IMPAIRMENT: Primary | ICD-10-CM

## 2021-03-15 RX ORDER — LANOLIN ALCOHOL/MO/W.PET/CERES
1000 CREAM (GRAM) TOPICAL DAILY
Qty: 90 TABLET | Refills: 0 | Status: SHIPPED | OUTPATIENT
Start: 2021-03-15 | End: 2021-04-26

## 2021-03-15 RX ORDER — ACETAMINOPHEN 500 MG
1000 TABLET ORAL EVERY 4 HOURS PRN
COMMUNITY
Start: 2021-03-15

## 2021-03-15 RX ORDER — FERROUS GLUCONATE 324(38)MG
324 TABLET ORAL 2 TIMES DAILY WITH MEALS
Qty: 180 TABLET | Refills: 0 | Status: SHIPPED | OUTPATIENT
Start: 2021-03-15 | End: 2021-04-26

## 2021-03-15 NOTE — TELEPHONE ENCOUNTER
As per Dr Hemphill Grief, told pt she needs another procedure on 12-18-18, and surgery scheduler will call her with further information about that  Letter faxed and referral faxed.  Phone number given to Brent to call    Siva Aguilar RN, BSN

## 2021-03-15 NOTE — TELEPHONE ENCOUNTER
ED / Discharge Outreach Protocol    Patient Contact    Attempt # 1    Was call answered?  Yes. Son will call PAL back    Siva Aguilar RN, BSN

## 2021-03-15 NOTE — TELEPHONE ENCOUNTER
Arielle with [facility] calling stating they need a PRN order for tylenol in order to give it to her and to check on the vitamin B12 and ferrous gluconate.  They have orders but no medications.  These were sent to the [facility] mail order on 1/28/21 for a 90 day supply.  Will ask Brent about these.    Phone: [phone]  Fax: [phone]    Siva Aguilar RN, BSN

## 2021-03-15 NOTE — TELEPHONE ENCOUNTER
"Need an order to have tylenol prn  Needs a referral for Dr Bhavin Parada in [facility] who is a geriatric psychiatrist.  She was suppose to get this referral as discharge but it wasn't placed.  Unsure how to place this referral but pended a memory referral with info    Pt is scheduled for 3/22/21 at 11:10    Letter pended as well (she has tylenol 500 mg)        Hospital/TCU/ED for chronic condition Discharge Protocol    \"Hi, my name is Siva Aguilar RN, a registered nurse, and I am calling from [facility].  I am calling to follow up and see how things are going for you after your recent emergency visit/hospital/TCU stay.\"    Tell me how you are doing now that you are home?\" per son she is doing ok but out of her element      Discharge Instructions    \"Let's review your discharge instructions.  What is/are the follow-up recommendations?  Pt. Response: follow up with PCP    \"Has an appointment with your primary care provider been scheduled?\"   No (schedule appointment)    \"When you see the provider, I would recommend that you bring your medications with you.\"    Medications    \"Tell me what changed about your medicines when you discharged?\"    Changes to chronic meds?    Pt is overwhelmed at the moment and have discussed MTM with son    \"What questions do you have about your medications?\"    Discussed with      New diagnoses of heart failure, COPD, diabetes, or MI?    No              Post Discharge Medication Reconciliation Status: discharge medications reconciled, continue medications without change.    Was MTM referral placed (*Make sure to put transitions as reason for referral)?   No    Call Summary    \"What questions or concerns do you have about your recent visit and your follow-up care?\"     none    \"If you have questions or things don't continue to improve, we encourage you contact us through the main clinic number (give number).  Even if the clinic is not open, triage nurses are " "available 24/7 to help you.     We would like you to know that our clinic has extended hours (provide information).  We also have urgent care (provide details on closest location and hours/contact info)\"      \"Thank you for your time and take care!\"    Siva Aguilar RN, BSN           "

## 2021-03-15 NOTE — LETTER
March 15, 2021      Swathi Dukes  [address]   [place] [address]        To Whom It May Concern,     Swathi Dukes ([id number]) may have acetaminophen 500-1000 mg every 8 hours as needed.       Sincerely,    Cortney Vanessa MD

## 2021-03-16 ENCOUNTER — TELEPHONE (OUTPATIENT)
Dept: FAMILY MEDICINE | Facility: CLINIC | Age: 86
End: 2021-03-16

## 2021-03-16 DIAGNOSIS — F03.90 MAJOR NEUROCOGNITIVE DISORDER (H): ICD-10-CM

## 2021-03-16 RX ORDER — RIVASTIGMINE 4.6 MG/24H
1 PATCH, EXTENDED RELEASE TRANSDERMAL DAILY
Qty: 30 PATCH | Refills: 1 | Status: SHIPPED | OUTPATIENT
Start: 2021-03-16 | End: 2021-03-22

## 2021-03-16 NOTE — TELEPHONE ENCOUNTER
Received VM from son stating that Domenic Haven tried to call pt's rheumatology office to get a VO to continue giving pt her methotrexate  but that office wouldn't do it as she needs an appointment.  Brent will be helping with this but is wondering if PCP is ok giving a VO to continue the methorexate until he can get her into rheumatology.      She is not on the Plaquenil and was changed to methotrexate.    Please advise    Siva Aguilar RN, BSN

## 2021-03-16 NOTE — TELEPHONE ENCOUNTER
Arielle with [facility] calling stating they can't take ranges for tylenol.  They give 1000mg q8hrs prn.    Siva Aguilar RN, BSN

## 2021-03-17 ENCOUNTER — TELEPHONE (OUTPATIENT)
Dept: FAMILY MEDICINE | Facility: CLINIC | Age: 86
End: 2021-03-17

## 2021-03-17 NOTE — TELEPHONE ENCOUNTER
RN at [facility] calling to request a refill of methotrexate that is prescribed by rheumatology.  Advised that this is not managed or prescribed by PCP but could ask if she can do a one time fill if she feels comfortable but no guarantee.  Advised PCP is out of office until Friday so that would be the soonest it could be addressed.    RN at [facility] will call son to see what pharmacy and if he has made an appointment for pt with rheum yet.    Siva Aguilar RN, BSN

## 2021-03-17 NOTE — TELEPHONE ENCOUNTER
Reason for Call:  Form, our goal is to have forms completed with 72 hours, however, some forms may require a visit or additional information.    Type of letter, form or note:  Orders    Who is the form from?: Home care    Where did the form come from: form was faxed in    What clinic location was the form placed at?: [facility]     Where the form was placed: Dr Vanessa- folder at [facility]  Box/Folder    What number is listed as a contact on the form?: [phone]       Additional comments: Please sign/date and fax to [phone]    Call taken on 3/17/2021 at 3:42 PM by Marge Degroot

## 2021-03-19 DIAGNOSIS — H40.003 GLAUCOMA SUSPECT, BILATERAL: ICD-10-CM

## 2021-03-19 RX ORDER — BRIMONIDINE TARTRATE 1 MG/ML
1 SOLUTION/ DROPS OPHTHALMIC 2 TIMES DAILY
Qty: 10 ML | Refills: 3 | Status: SHIPPED | OUTPATIENT
Start: 2021-03-19 | End: 2021-06-04

## 2021-03-19 NOTE — TELEPHONE ENCOUNTER
Prescription approved per [facility] Refill Protocol.    LM for son to return call - do they want to local pharmacy as she is out of RX now.     Valentin from the facility where the pt stays called to request refill of meds for brimonidine (ALPHAGAN P) 0.1 % ophthalmic solution. Thanks!     Ronda Miller RN     Brent son  returned call. Any Rx she needs right away should go to [facility] on [address]. So please send this there. If it can wait then it can go to mail order [facility].    Brent says Swathi has upcoming appointment next week and can discuss things further at that time.     Ronda Miller RN

## 2021-03-20 ENCOUNTER — HOSPITAL ENCOUNTER (EMERGENCY)
Facility: CLINIC | Age: 86
Discharge: HOME | End: 2021-03-21
Attending: EMERGENCY MEDICINE | Admitting: EMERGENCY MEDICINE
Payer: COMMERCIAL

## 2021-03-20 DIAGNOSIS — F41.9 ANXIETY: ICD-10-CM

## 2021-03-20 DIAGNOSIS — F32.A DEPRESSION, UNSPECIFIED DEPRESSION TYPE: ICD-10-CM

## 2021-03-20 DIAGNOSIS — F33.9 RECURRENT MAJOR DEPRESSION (H): ICD-10-CM

## 2021-03-20 PROCEDURE — 99285 EMERGENCY DEPT VISIT HI MDM: CPT | Mod: 25

## 2021-03-20 PROCEDURE — 90791 PSYCH DIAGNOSTIC EVALUATION: CPT

## 2021-03-20 PROCEDURE — 99283 EMERGENCY DEPT VISIT LOW MDM: CPT | Performed by: EMERGENCY MEDICINE

## 2021-03-20 ASSESSMENT — ENCOUNTER SYMPTOMS
ABDOMINAL PAIN: 0
APPETITE CHANGE: 1
FEVER: 0
VOMITING: 0
SLEEP DISTURBANCE: 1
FREQUENCY: 0
DYSURIA: 0
COUGH: 0
DIARRHEA: 0
NAUSEA: 0
SHORTNESS OF BREATH: 0
CHILLS: 0
DYSPHORIC MOOD: 1
HEMATURIA: 0

## 2021-03-21 VITALS
SYSTOLIC BLOOD PRESSURE: 122 MMHG | TEMPERATURE: 97.2 F | RESPIRATION RATE: 16 BRPM | OXYGEN SATURATION: 97 % | DIASTOLIC BLOOD PRESSURE: 39 MMHG | HEART RATE: 57 BPM

## 2021-03-21 NOTE — DISCHARGE INSTRUCTIONS
Thank you for your patience today.  Please follow-up with your regular doctor and counselor in the next 2-3 days for further evaluation and follow-up care.  Please call to schedule an appointment.  Please continue your own medications.  Someone will reach out and call you to help schedule therapy. Please return to the ER if you develop any worsening of your current symptoms.  It was a pleasure taking care of you today.  We hope you feel better soon.

## 2021-03-21 NOTE — ED PROVIDER NOTES
Emergency Department Patient Sign-out       Brief HPI:  This is a 85 year old female signed out to me by Dr. Flynn .  See initial ED Provider note for details of the presentation.       Exam:   Patient Vitals for the past 24 hrs:   BP Temp Temp src Pulse Resp SpO2   03/20/21 2149 (!) 147/82 97.2  F (36.2  C) Oral 76 18 100 %           ED RESULTS:   No results found for this visit on 03/20/21 (from the past 24 hour(s)).    ED MEDICATIONS:   Medications - No data to display      Impression:    ICD-10-CM    1. Depression, unspecified depression type  F32.9        Plan:    Pending studies include DEC assessment    Behavioral health  evaluated the patient and at this time will plan for discharge with the son and continue close outpatient management of her depression. Patient recently admitted and discharged to assisted living.  Patient has been in quarantine for 1 week and has 1 week left.  Patient feeling more depressed with quarantine.  Reports not eating as well, not reading, difficulty focusing. Patient reports passive suicide ideation about not wanting to be here.  No specific plan. Son is going to get her set up to get back to see her counselor and behavioral health will help get her back into therapy. Patient has good family support.  Patient and son feel safe going back to the assisted living and agreeable to outpatient follow-up.  Return precautions discussed if any worsening of her depression or worsening of her symptoms.  Patient and son understand agree with plan.  Please see behavioral health 's note for complete details.      MD Delta Swann Linsey Gail, MD  03/21/21 0058

## 2021-03-21 NOTE — ED NOTES
Pt refused to remove two small chain necklaces for sentimental reasons. She states she was allowed to keep them when she was here 3 weeks ago.

## 2021-03-21 NOTE — ED PROVIDER NOTES
"    [facility] EMERGENCY DEPARTMENT ([facility])   March 20, 2021   ED 20 10:11 PM    History     Chief Complaint   Patient presents with     Depression     The history is provided by the patient, medical records and a relative (Son).     Swathi Dukes is a 85 year old female with history of depression anxiety and OCD who presents with worsening depression. She is accompanied by her son, Brent Dukes, who provides history.  Patient has a history of prior hospitalization for depression with suicidal ideation.  Symptoms were felt to be due to neurocognitive disorder.  She was inpatient for 3 weeks, had trial of various medications including Aricept, Zyprexa, Seroquel, Exelon patch, Cymbalta, Paxil, BuSpar.  Medication trials were complicated by possible extrapyramidal symptoms, dystonic reaction, hyponatremia, sitter.  Ultimately she was discharged on BuSpar, Namenda, Exelon patch among other medications. She was discharged to [facility] living who are managing her medications given that she has voiced suicidal ideation with plan to overdose on medications. Patient at that time felt comfortable and strong enough to return to assisted living facility.  However upon arrival there she found that she had to be in quarantine for 2 weeks for COVID-19.  Patient has been feeling more depressed and isolated with this lengthy quarantine, feels restless, feels unable to really concentrate well enough to read a book. She called a friend, talked for an hour but still felt down.  She feels she has no communication to outside world, feels very isolated.  Son states that patient craves social interaction. She used to walk the halls but can't due to quarantine. She feels very, very depressed but not as depressed as when she had presented earlier last month. Patient feels she is not coping well, that she \"is going down the tubes again\" with depression. Patient states \"I don't know what you can do for me.\"  Son states " that the past 3 days have been difficult for patient that she was unable to name any coping skills other than deep breathing exercises, which does help.  In addition he states that her medications don't seem to be helping as much.  She has decreased appetite, has not slept in 3 days. No fevers, chills, chest pain, cough, shortness of breath, abdominal pain, nausea, vomiting, diarrhea, hematuria, or dysuria. Son states she has had diarrhea in the past but has history of IBS. Patient will be done with her quarantine period in 6 days. She denies suicidal ideation at this time.    PAST MEDICAL HISTORY:   Past Medical History:   Diagnosis Date     Aortic stenosis      Glaucoma      HTN (hypertension)      Hyperlipidemia      Pericardial effusion      Systemic lupus erythematosus        PAST SURGICAL HISTORY:   Past Surgical History:   Procedure Laterality Date     CV HEART CATHETERIZATION WITH POSSIBLE INTERVENTION N/A 1/5/2021    Procedure: Heart Catheterization with Possible Intervention;  Surgeon: Hayden Bedoya MD;  Location:  HEART CARDIAC CATH LAB     CV LEFT VENTRICULOGRAM N/A 1/5/2021    Procedure: Left Ventriculogram;  Surgeon: Hayden Bedoya MD;  Location:  HEART CARDIAC CATH LAB       Past medical history, past surgical history, medications, and allergies were reviewed with the patient. Additional pertinent items: None    FAMILY HISTORY:   Family History   Problem Relation Age of Onset     Diabetes Father        SOCIAL HISTORY:   Social History     Tobacco Use     Smoking status: Never Smoker     Smokeless tobacco: Never Used   Substance Use Topics     Alcohol use: Not Currently     Social history was reviewed with the patient. Additional pertinent items: None      Patient's Medications   New Prescriptions    No medications on file   Previous Medications    ACETAMINOPHEN (TYLENOL) 500 MG TABLET    Take 1-2 tablets (500-1,000 mg) by mouth every 6 hours as needed for mild pain    ALPRAZOLAM (XANAX)  0.25 MG TABLET    Take one 3 times daily for 3 days, then one twice daily for 3 days, then one nightly for 3 day    AMLODIPINE (NORVASC) 5 MG TABLET    Take 1 tablet (5 mg) by mouth daily    BRIMONIDINE (ALPHAGAN P) 0.1 % OPHTHALMIC SOLUTION    Place 1 drop into both eyes 2 times daily    BUSPIRONE (BUSPAR) 5 MG TABLET    Take 1 tablet (5 mg) by mouth 3 times daily    CYANOCOBALAMIN (VITAMIN B-12) 1000 MCG TABLET    Take 1 tablet (1,000 mcg) by mouth daily    DORZOLAMIDE (TRUSOPT) 2 % OPHTHALMIC SOLUTION    Place 1 drop into both eyes 2 times daily    ENALAPRIL (VASOTEC) 20 MG TABLET    Take 1 tablet (20 mg) by mouth 2 times daily    FERROUS GLUCONATE (FERGON) 324 (38 FE) MG TABLET    Take 1 tablet (324 mg) by mouth 2 times daily (with meals)    FOLIC ACID (FOLVITE) 1 MG TABLET    Take 1 mg by mouth daily    HYDRALAZINE (APRESOLINE) 25 MG TABLET    Take 1 tablet (25 mg) by mouth 3 times daily    LATANOPROST (XALATAN) 0.005 % OPHTHALMIC SOLUTION    Place 1 drop into both eyes At Bedtime     LIDOCAINE (LIDOCARE) 4 % PATCH    Place 2 patches onto the skin every 24 hours To prevent lidocaine toxicity, patient should be patch free for 12 hrs daily.    LOPERAMIDE (IMODIUM) 2 MG CAPSULE    Take 1 capsule (2 mg) by mouth 3 times daily as needed for diarrhea    MEMANTINE (NAMENDA) 10 MG TABLET    Take 1 tablet (10 mg) by mouth daily    METHOTREXATE 2.5 MG TABLET    Take 8 tablets (20 mg) by mouth every 7 days    OLANZAPINE (ZYPREXA) 5 MG TABLET    Take 1 tablet (5 mg) by mouth At Bedtime    PAROXETINE (PAXIL) 10 MG TABLET    Take 1 tablet (10 mg) by mouth daily    PROPRANOLOL (INDERAL) 10 MG TABLET    Take 1 tablet (10 mg) by mouth every 4 hours as needed (anxiety)    RIVASTIGMINE (EXELON) 4.6 MG/24HR 24 HR PATCH    Place 1 patch onto the skin daily    SIMVASTATIN (ZOCOR) 10 MG TABLET    Take 1 tablet (10 mg) by mouth At Bedtime   Modified Medications    No medications on file   Discontinued Medications    No medications on  file          Allergies   Allergen Reactions     Diclofenac Anaphylaxis     Iodine Anaphylaxis     Contrast  Pt states anaphylactic reaction to ct contrast about 20 years ago     Aspirin         Review of Systems   Constitutional: Positive for appetite change (poor). Negative for chills and fever.   Respiratory: Negative for cough and shortness of breath.    Cardiovascular: Negative for chest pain.   Gastrointestinal: Negative for abdominal pain, diarrhea, nausea and vomiting.   Genitourinary: Negative for dysuria, frequency, hematuria and urgency.   Psychiatric/Behavioral: Positive for dysphoric mood and sleep disturbance. Negative for suicidal ideas.     A complete review of systems was performed with pertinent positives and negatives noted in the HPI, and all other systems negative.    Physical Exam   BP: (!) 147/82  Pulse: 76  Temp: 97.2  F (36.2  C)  Resp: 18  SpO2: 100 %      Physical Exam  Constitutional:       General: She is not in acute distress.     Appearance: Normal appearance.   HENT:      Head: Normocephalic and atraumatic.      Nose: Nose normal.      Mouth/Throat:      Mouth: Mucous membranes are moist.   Eyes:      Extraocular Movements: Extraocular movements intact.      Pupils: Pupils are equal, round, and reactive to light.   Neck:      Musculoskeletal: Normal range of motion and neck supple.   Cardiovascular:      Rate and Rhythm: Normal rate and regular rhythm.      Pulses: Normal pulses.   Pulmonary:      Effort: Pulmonary effort is normal.      Breath sounds: Normal breath sounds.   Abdominal:      General: Abdomen is flat.      Palpations: Abdomen is soft.      Tenderness: There is no abdominal tenderness. There is no guarding or rebound.   Musculoskeletal: Normal range of motion.   Skin:     General: Skin is warm.   Neurological:      General: No focal deficit present.      Mental Status: She is alert and oriented to person, place, and time.         ED Course        Procedures                            No results found for this or any previous visit (from the past 24 hour(s)).  Medications - No data to display          Assessments & Plan (with Medical Decision Making)   Patient nontoxic in no acute distress presents for worsening depression.  She denies other physical symptoms at this time vitally stable.  Exam is benign.  She is stable for psychiatric assessment.  It seems that her quarantine at her assisted living has exacerbated her depression.  Unclear if she would really need readmission but there is also been talks about placing in a TCU.  Patient will be signed out to evening physician to follow-up on behavioral assessment.    I have reviewed the nursing notes.    I have reviewed the findings, diagnosis, plan and need for follow up with the patient.    New Prescriptions    No medications on file       Final diagnoses:   Depression, unspecified depression type     I, Joselyn Garcia, am serving as a trained medical scribe to document services personally performed by Marco A Flynn MD based on the provider's statements to me on March 20, 2021.  This document has been checked and approved by the attending provider.    I, Marco A Flynn MD, was physically present and have reviewed and verified the accuracy of this note documented by Joselyn Garcia, medical scribe.     3/20/2021   [facility] EMERGENCY DEPARTMENT     Marco A Flynn MD  03/21/21 0028

## 2021-03-21 NOTE — ED NOTES
discussed with MD and charge nurse pt's anhedonia and answers to suicide questions and are unable to provide a 1:1 at this time due to staffing, pt's son will be in room with pt. Agreed that pt is safe at this time.

## 2021-03-21 NOTE — ED TRIAGE NOTES
Pt states recently discharge after being treated for depression and her care center has her being isolated and her depression is worse.  Pt denies being SI at this time but states she doesn't want live like this anymore.

## 2021-03-22 ENCOUNTER — TELEPHONE (OUTPATIENT)
Dept: FAMILY MEDICINE | Facility: CLINIC | Age: 86
End: 2021-03-22

## 2021-03-22 ENCOUNTER — OFFICE VISIT (OUTPATIENT)
Dept: FAMILY MEDICINE | Facility: CLINIC | Age: 86
End: 2021-03-22
Payer: COMMERCIAL

## 2021-03-22 VITALS
RESPIRATION RATE: 18 BRPM | WEIGHT: 140 LBS | SYSTOLIC BLOOD PRESSURE: 128 MMHG | DIASTOLIC BLOOD PRESSURE: 64 MMHG | HEART RATE: 62 BPM | TEMPERATURE: 98.3 F | BODY MASS INDEX: 23.9 KG/M2 | HEIGHT: 64 IN

## 2021-03-22 DIAGNOSIS — M54.16 LUMBAR RADICULOPATHY: ICD-10-CM

## 2021-03-22 DIAGNOSIS — Z09 HOSPITAL DISCHARGE FOLLOW-UP: Primary | ICD-10-CM

## 2021-03-22 DIAGNOSIS — F33.1 MODERATE EPISODE OF RECURRENT MAJOR DEPRESSIVE DISORDER (H): ICD-10-CM

## 2021-03-22 DIAGNOSIS — R45.851 SUICIDAL IDEATION: ICD-10-CM

## 2021-03-22 DIAGNOSIS — E53.8 VITAMIN B12 DEFICIENCY (NON ANEMIC): ICD-10-CM

## 2021-03-22 DIAGNOSIS — Z79.899 POLYPHARMACY: ICD-10-CM

## 2021-03-22 DIAGNOSIS — R53.83 FATIGUE, UNSPECIFIED TYPE: ICD-10-CM

## 2021-03-22 PROBLEM — N17.9 ACUTE KIDNEY INJURY SUPERIMPOSED ON CKD (H): Status: RESOLVED | Noted: 2021-01-28 | Resolved: 2021-03-22

## 2021-03-22 PROBLEM — N18.9 ACUTE KIDNEY INJURY SUPERIMPOSED ON CKD (H): Status: RESOLVED | Noted: 2021-01-28 | Resolved: 2021-03-22

## 2021-03-22 PROBLEM — D62 ANEMIA DUE TO BLOOD LOSS, ACUTE: Status: RESOLVED | Noted: 2021-01-28 | Resolved: 2021-03-22
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
## 2021-03-22 PROBLEM — M54.2 NECK PAIN: Status: RESOLVED | Noted: 2021-01-04 | Resolved: 2021-03-22

## 2021-03-22 PROBLEM — N30.00 ACUTE CYSTITIS WITHOUT HEMATURIA: Status: RESOLVED | Noted: 2021-01-16 | Resolved: 2021-03-22

## 2021-03-22 PROBLEM — F32.A DEPRESSION, UNSPECIFIED DEPRESSION TYPE: Status: RESOLVED | Noted: 2021-02-17 | Resolved: 2021-03-22

## 2021-03-22 PROBLEM — S01.01XA LACERATION OF SCALP WITHOUT FOREIGN BODY, INITIAL ENCOUNTER: Status: RESOLVED | Noted: 2021-01-16 | Resolved: 2021-03-22

## 2021-03-22 LAB
ALBUMIN SERPL-MCNC: 3.5 G/DL (ref 3.4–5)
ALP SERPL-CCNC: 89 U/L (ref 40–150)
ALT SERPL W P-5'-P-CCNC: 23 U/L (ref 0–50)
ANION GAP SERPL CALCULATED.3IONS-SCNC: 8 MMOL/L (ref 3–14)
AST SERPL W P-5'-P-CCNC: 21 U/L (ref 0–45)
BILIRUB SERPL-MCNC: 0.3 MG/DL (ref 0.2–1.3)
BUN SERPL-MCNC: 17 MG/DL (ref 7–30)
CALCIUM SERPL-MCNC: 9.3 MG/DL (ref 8.5–10.1)
CHLORIDE SERPL-SCNC: 100 MMOL/L (ref 94–109)
CO2 SERPL-SCNC: 25 MMOL/L (ref 20–32)
CREAT SERPL-MCNC: 0.76 MG/DL (ref 0.52–1.04)
ERYTHROCYTE [DISTWIDTH] IN BLOOD BY AUTOMATED COUNT: 13.2 % (ref 10–15)
GFR SERPL CREATININE-BSD FRML MDRD: 72 ML/MIN/{1.73_M2}
GLUCOSE SERPL-MCNC: 95 MG/DL (ref 70–99)
HCT VFR BLD AUTO: 32.5 % (ref 35–47)
HGB BLD-MCNC: 10.3 G/DL (ref 11.7–15.7)
MCH RBC QN AUTO: 29.9 PG (ref 26.5–33)
MCHC RBC AUTO-ENTMCNC: 31.7 G/DL (ref 31.5–36.5)
MCV RBC AUTO: 94 FL (ref 78–100)
PLATELET # BLD AUTO: 295 10E9/L (ref 150–450)
POTASSIUM SERPL-SCNC: 4.4 MMOL/L (ref 3.4–5.3)
PROT SERPL-MCNC: 7.2 G/DL (ref 6.8–8.8)
RBC # BLD AUTO: 3.45 10E12/L (ref 3.8–5.2)
SODIUM SERPL-SCNC: 133 MMOL/L (ref 133–144)
VIT B12 SERPL-MCNC: 687 PG/ML (ref 193–986)
WBC # BLD AUTO: 6.4 10E9/L (ref 4–11)

## 2021-03-22 PROCEDURE — 36415 COLL VENOUS BLD VENIPUNCTURE: CPT | Performed by: FAMILY MEDICINE

## 2021-03-22 PROCEDURE — 96127 BRIEF EMOTIONAL/BEHAV ASSMT: CPT | Performed by: FAMILY MEDICINE

## 2021-03-22 PROCEDURE — 80053 COMPREHEN METABOLIC PANEL: CPT | Performed by: FAMILY MEDICINE

## 2021-03-22 PROCEDURE — 99214 OFFICE O/P EST MOD 30 MIN: CPT | Performed by: FAMILY MEDICINE

## 2021-03-22 PROCEDURE — 85027 COMPLETE CBC AUTOMATED: CPT | Performed by: FAMILY MEDICINE

## 2021-03-22 PROCEDURE — 82607 VITAMIN B-12: CPT | Performed by: FAMILY MEDICINE

## 2021-03-22 ASSESSMENT — MIFFLIN-ST. JEOR: SCORE: 1067.1

## 2021-03-22 ASSESSMENT — PATIENT HEALTH QUESTIONNAIRE - PHQ9
SUM OF ALL RESPONSES TO PHQ QUESTIONS 1-9: 15
10. IF YOU CHECKED OFF ANY PROBLEMS, HOW DIFFICULT HAVE THESE PROBLEMS MADE IT FOR YOU TO DO YOUR WORK, TAKE CARE OF THINGS AT HOME, OR GET ALONG WITH OTHER PEOPLE: SOMEWHAT DIFFICULT
SUM OF ALL RESPONSES TO PHQ QUESTIONS 1-9: 15

## 2021-03-22 NOTE — TELEPHONE ENCOUNTER
Spoke with Naren at [facility].  Pt does not have home care that they are aware of but they can check her vitals.  How often do you want them checked and reported?  Anyone in particular you DON'T need?    Siva Aguilar RN, BSN

## 2021-03-22 NOTE — TELEPHONE ENCOUNTER
LMTRC      Per PCP:    Please make sure home health is checking vitals.   MTM ordered today.   We stopped Namenda today, please share this info with her [facility].      Faxed letter stating to stop Kecia Aguilar RN, BSN

## 2021-03-22 NOTE — TELEPHONE ENCOUNTER
Brent gave me the impression that home care was visiting. If not, the [facility] does not need to check vitals. ANGELA

## 2021-03-22 NOTE — Clinical Note
Please make sure home health is checking vitals.   MTM ordered today.   We stopped Namenda today, please share this info with her group home.

## 2021-03-22 NOTE — TELEPHONE ENCOUNTER
"  ED for acute condition Discharge Protocol    \"Hi, my name is Siva Aguilar RN, a registered nurse, and I am calling from [facility].  I am calling to follow up and see how things are going for you after your recent emergency visit.\"    Tell me how you are doing now that you are home?\" spoke with son and pt is about the same      Discharge Instructions    \"Let's review your discharge instructions.  What is/are the follow-up recommendations?  Pt. Response: follow up with PCP    \"Has an appointment with your primary care provider been scheduled?\"  Yes. (confirm and remind to bring meds)    Medications    \"Tell me what changed about your medicines when you discharged?\"    none    \"What questions do you have about your medications?\"   None        Call Summary    \"What questions or concerns do you have about your recent visit and your follow-up care?\"     none    \"If you have questions or things don't continue to improve, we encourage you contact us through the main clinic number (give number).  Even if the clinic is not open, triage nurses are available 24/7 to help you.     We would like you to know that our clinic has extended hours (provide information).  We also have urgent care (provide details on closest location and hours/contact info)\"    \"Thank you for your time and take care!\"      Son is working on getting pt back in with her previous counselor at [facility] as well as Dr Tal Aguilar RN, BSN            "

## 2021-03-22 NOTE — PROGRESS NOTES
Assessment & Plan     Hospital discharge follow-up - stable    Moderate episode of recurrent major depressive disorder (H) - currently active, on zyprexa, paxil, and buspar. Will see Psychiatry near future.     Suicidal ideation - contracts for safety today    Polypharmacy - suggested MTM consult, she agrees. Looking for meds to eliminate, adjust to help with fatigue.   - MED THERAPY MANAGE REFERRAL    Fatigue, unspecified type - suspect related to medications. This is not a true weakness based on exam today.   - CBC with platelets  - Comprehensive metabolic panel (BMP + Alb, Alk Phos, ALT, AST, Total. Bili, TP)    Lumbar radiculopathy - she reports no back pain currently, can revisit if this returns.        Depression Screening Follow Up    PHQ 3/22/2021   PHQ-9 Total Score 15   Q9: Thoughts of better off dead/self-harm past 2 weeks Nearly every day   F/U: Thoughts of suicide or self-harm Yes   F/U: Self harm-plan Yes   F/U: Self-harm action No   F/U: Safety concerns No       No follow-ups on file.    Cortney Vanessa MD  [facility]UL[facility] is a 85 year old who presents for the following health issues     History of Present Illness       She eats 2-3 servings of fruits and vegetables daily.She consumes 1 sweetened beverage(s) daily.She exercises with enough effort to increase her heart rate 9 or less minutes per day.  She exercises with enough effort to increase her heart rate 3 or less days per week.   She is taking medications regularly.       Hospital Follow-up Visit:    Hospital/Nursing Home/IP Rehab Facility: [facility]  Date of Admission: 02/16/2021  Date of Discharge: 03/12/2021  Reason(s) for Admission: Behavioral/Mental health       Was your hospitalization related to COVID-19? No   Problems taking medications regularly:  None  Medication changes since discharge: None  Problems adhering to non-medication therapy:  None    Summary of  "hospitalization:  [facility] discharge summary reviewed  Diagnostic Tests/Treatments reviewed.  Follow up needed: none  Other Healthcare Providers Involved in Patient s Care:         Specialist appointment - Dr. Bhavin Parada, Psychiatrist  Update since discharge: stable.       Post Discharge Medication Reconciliation: discharge medications reconciled and changed, per note/orders.  Plan of care communicated with patient and family                Was suicidal yesterday. Not today. Unsure what changed.     Worried about feeling weak, attributes this to medications (new from the hospital).     Hasn't noticed a change since adding Namenda (neither has son). Exelon too expensive. Aricept caused diarrhea.    On Paxil, Zyprexa, buspar currently. Has Xanax, not using.      On vasotec, hydralazine, amlodipine, prn inderal for BP. Does not believe home health is checking BP.     Not having back pain currently.       Review of Systems   Constitutional, HEENT, cardiovascular, pulmonary, GI, , musculoskeletal, neuro, skin, endocrine and psych systems are negative, except as otherwise noted.      Objective    /64 (BP Location: Right arm, Patient Position: Chair, Cuff Size: Adult Large)   Pulse 62   Temp 98.3  F (36.8  C) (Oral)   Resp 18   Ht 1.613 m (5' 3.5\")   Wt 63.5 kg (140 lb)   BMI 24.41 kg/m    Body mass index is 24.41 kg/m .  Physical Exam   GENERAL: healthy, alert and no distress  RESP: lungs clear to auscultation - no rales, rhonchi or wheezes  CV: regular rate and rhythm, normal S1 S2, no S3 or S4, no murmur, click or rub, no peripheral edema and peripheral pulses strong  NEURO: Normal strength and tone, mentation intact and speech normal  PSYCH: mentation appears normal, affect normal/bright          Answers for HPI/ROS submitted by the patient on 3/22/2021   Chronic problems general questions HPI Form  If you checked off any problems, how difficult have these problems made it for you to do your " work, take care of things at home, or get along with other people?: Somewhat difficult  PHQ9 TOTAL SCORE: 15

## 2021-03-22 NOTE — LETTER
March 22, 2021      Swathi Dukes  [address]   [place] [address]        To Whom It May Concern,     Swathi Dukes (1935) was seen today and had a medication change.  She is to stop taking the Namenda as of today.      Sincerely,    Cortney Vanessa MD/Siva Aguilar RN, BSN

## 2021-03-22 NOTE — TELEPHONE ENCOUNTER
Spoke with Brent regarding Home Care and she does have a nurse 1x week coming in.  Will make sure they check vitals.    Paged Jaimie to call DARIO Aguilar RN, BSN

## 2021-03-23 ENCOUNTER — TELEPHONE (OUTPATIENT)
Dept: FAMILY MEDICINE | Facility: CLINIC | Age: 86
End: 2021-03-23

## 2021-03-23 ENCOUNTER — MEDICAL CORRESPONDENCE (OUTPATIENT)
Dept: HEALTH INFORMATION MANAGEMENT | Facility: CLINIC | Age: 86
End: 2021-03-23

## 2021-03-23 DIAGNOSIS — F41.9 ANXIETY: Primary | ICD-10-CM

## 2021-03-23 DIAGNOSIS — F33.1 MODERATE EPISODE OF RECURRENT MAJOR DEPRESSIVE DISORDER (H): ICD-10-CM

## 2021-03-23 DIAGNOSIS — F42.2 MIXED OBSESSIONAL THOUGHTS AND ACTS: ICD-10-CM

## 2021-03-23 DIAGNOSIS — R45.851 SUICIDAL IDEATION: ICD-10-CM

## 2021-03-23 ASSESSMENT — PATIENT HEALTH QUESTIONNAIRE - PHQ9: SUM OF ALL RESPONSES TO PHQ QUESTIONS 1-9: 15

## 2021-03-23 NOTE — TELEPHONE ENCOUNTER
Son calling stating pt is having a hard time getting patient into psychiatry.  He looked in the [facility] location and they might be able to get her in sooner than May.  Referral pended.    Pt had a helper over Monday for showering purposes and was very weak and almost fell completely down. Pt had a transfer belt on and did not sustain any injuries but this has made patient aware that she cannot shower alone.    While the aide was there patient expressed that she doesn't want to live and has a plan.  He states that she has had SI for over 37 years but in the last couple months it has gotten worse.  In the last month she has lost her SO to COVID and her brother that was 2 years older.  Pt's plan is to take pills but Brent states that she does not have access as her medication bottles have been taken away.    Please advise on referral and route to [facility] to fax for expedited faxing.    Siva Aguilar RN, BSN

## 2021-03-23 NOTE — TELEPHONE ENCOUNTER
Yes they have geropsyc specifically Marci Barnes which is why Brent is requesting the referral  Sivachristian Aguilar RN, BSN      Will call Brent with all info including lab    B12 levels in range.      E lytes, kidney, liver function all good.      Hemoglobin levels still low but stable. Will continue to monitor.

## 2021-03-23 NOTE — TELEPHONE ENCOUNTER
Edilia with [facility], OT, requesting orders to see patient 2x wk for 2 wk then 1x wk for 2 wk.    VO given    Siva Aguilar RN, BSN

## 2021-03-24 NOTE — PROGRESS NOTES
Medication Therapy Management (MTM) Encounter    ASSESSMENT:                            Medication Adherence/Access: No issues identified    Depression/Anxiety:  Much improved , meds working well, key for her is to maintain social interaction on daily basis.       Glaucoma:  Stable ; yes --that is correct --5 minutes between eye drops is correct way to administer them.     Lupus/arthritis pains:  Stable      Hyperlipidemia: Stable.        Hypertension: Stable.    Immunizations:  We discussed t-dap, pneumonia, shingles vaccines --she declined them all --only like yearly flu shot.     PLAN:                            1.  Please ask the staff at [facility] if they are giving you the drug propranolol regularly ? It can be used as needed for anxiety and blood pressure .     2. You are ok to use the 3 different eye drops at night : If you are putting in more than one drop or more than one type of eye drop, wait five minutes before putting the next drop in. This will keep the first drop from being washed out by the second before it has had time to work.    3. Drink enough  fluids /day --stay hydrated so you do not become weak, dizzy .         Your medications are working well--please stay on them all as we discussed.         Next MTM visit:  Follow-up with Dr. Vanessa as needed, call me anytime if you have any other questions about your medications.     SUBJECTIVE/OBJECTIVE:                          Swathi Dukes is a 85 year old female called for an initial visit. She was referred to me from Cortney Vanessa  .      Reason for visit:   Depression/sleep issues.  She wants to know what all her meds are for --is she over medicated?         Allergies/ADRs: Reviewed in chart  Tobacco: She reports that she has never smoked. She has never used smokeless tobacco.  Alcohol: not currently using  Caffeine: 2 cans coke /day or root beer.   Activity: not at all.   Past Medical History: Reviewed in chart  Social: Lives at [facility] -they administer her meds for her.   Personal Healthcare Goals: worried about beiung overmedicated.     Medication Adherence/Access: no issues reported; her staff at assisted living administer her meds for her.       Depression/Anxiety:  Current medications include: Paroxetine 10mg once daily and Buspirone 5mg 3 times daily. , propranolol 10mg every 4 hours as needed -unsure how often she takes it , Xanax prn panic attacks - rare use, olanzapine 5mg at bedtime - sleeps well.  Pt reports that depression symptoms are improved. Current depression symptoms include: she is doing well this this week- as she is no longer socially isolated --her place of living opened up game room, coffee cart, plays bingo, sparkle, feels way better now. Patient reports the following stressors: chronic health condition  PHQ-9 SCORE 12/5/2019 3/22/2021   PHQ-9 Total Score MyChart 2 (Minimal depression) 15 (Moderately severe depression)   PHQ-9 Total Score 2 15         Glaucoma:  Has 3 different eye drops --her concern is taking 3 at night --she was told to wait 5 minutes in between drops --is this correct she asks?       Lupus/arthritis pains:  She states stable on methotrexate weekly + FA , prn tylenol.  She doesn't want any more lidocaine patches so I discontinued them off her med list .      Hyperlipidemia: Current therapy includes : Simvastatin 10mg daily.  Patient reports no significant myalgias or other side effects.  The ASCVD Risk score (Merlynrosa ASHLEY Jr., et al., 2013) failed to calculate for the following reasons:    The 2013 ASCVD risk score is only valid for ages 40 to 79  Recent Labs   Lab Test 01/29/20  1144   CHOL 174   HDL 79   LDL 68   TRIG 136       Hypertension: Current medications include : amlodipine 5mg. /day + enalapril 20mg twice daily + hydralazine 25mg -3 x day .  Patient has blood pressure monitored by her assisted living staff once weekly.   Patient reports no current medication side effects.  She is making sure  she is well hydrated as well now.   BP Readings from Last 3 Encounters:   03/22/21 128/64   03/21/21 (!) 122/39   03/12/21 (!) 168/65           Immunizations:  She is ok with flu and covid vaccines , she state she declines t-dap. Pneumovax, shingles though.             ----------------  Post Discharge Medication Reconciliation Status: discharge medications reconciled, continue medications without change.    I spent 50 minutes with this patient today. All changes were made via collaborative practice agreement with Cortney Vanessa  . A copy of the visit note was provided to the patient's primary care provider.    The patient was mailed a summary of these recommendations.     Joe Radford [facility].  Medication Therapy Management Provider  [phone]      Telemedicine Visit Details  Type of service:  Telephone visit  Start Time: 10:30-am  End Time: 11;20am   Originating Location (patient location): [place]  Distant Location (provider location):  [facility]

## 2021-03-25 ENCOUNTER — VIRTUAL VISIT (OUTPATIENT)
Dept: PHARMACY | Facility: CLINIC | Age: 86
End: 2021-03-25
Payer: COMMERCIAL

## 2021-03-25 ENCOUNTER — TELEPHONE (OUTPATIENT)
Dept: FAMILY MEDICINE | Facility: CLINIC | Age: 86
End: 2021-03-25

## 2021-03-25 DIAGNOSIS — M16.12 PRIMARY OSTEOARTHRITIS OF LEFT HIP: ICD-10-CM

## 2021-03-25 DIAGNOSIS — F41.9 ANXIETY: ICD-10-CM

## 2021-03-25 DIAGNOSIS — F42.2 MIXED OBSESSIONAL THOUGHTS AND ACTS: ICD-10-CM

## 2021-03-25 DIAGNOSIS — F33.1 MODERATE EPISODE OF RECURRENT MAJOR DEPRESSIVE DISORDER (H): Primary | ICD-10-CM

## 2021-03-25 DIAGNOSIS — I10 ESSENTIAL HYPERTENSION: ICD-10-CM

## 2021-03-25 DIAGNOSIS — H40.003 GLAUCOMA SUSPECT, BILATERAL: ICD-10-CM

## 2021-03-25 PROCEDURE — 99605 MTMS BY PHARM NP 15 MIN: CPT | Mod: TEL | Performed by: PHARMACIST

## 2021-03-25 PROCEDURE — 99607 MTMS BY PHARM ADDL 15 MIN: CPT | Mod: TEL | Performed by: PHARMACIST

## 2021-03-25 RX ORDER — AMLODIPINE BESYLATE 5 MG/1
5 TABLET ORAL DAILY
Qty: 90 TABLET | Refills: 0 | OUTPATIENT
Start: 2021-03-25

## 2021-03-25 NOTE — PATIENT INSTRUCTIONS
Recommendations from today's MTM visit:                                                    MTM (medication therapy management) is a service provided by a clinical pharmacist designed to help you get the most of out of your medicines.   Today we reviewed what your medicines are for, how to know if they are working, that your medicines are safe and how to make your medicine regimen as easy as possible.      1.  Please ask the staff at [facility] if they are giving you the drug propranolol regularly ? It can be used as needed for anxiety and blood pressure .     2. You are ok to use the 3 different eye drops at night : If you are putting in more than one drop or more than one type of eye drop, wait five minutes before putting the next drop in. This will keep the first drop from being washed out by the second before it has had time to work.    3. Drink enough  fluids /day --stay hydrated so you do not become weak, dizzy .         Your medications are working well--please stay on them all as we discussed.         Next MTM visit:  Follow-up with Dr. Vanessa as needed, call me anytime if you have any other questions about your medications.     It was great to speak with you today.  I value your experience and would be very thankful for your time with providing feedback on our clinic survey. You may receive a survey via email or text message in the next few days.     To schedule another MTM appointment, please call the clinic directly or you may call the MTM scheduling line at [phone] or toll-free at [phone].     My Clinical Pharmacist's contact information:                                                      It was a pleasure talking with you today!  Please feel free to contact me with any questions or concerns you have.      Joe Radford Rph.  Medication Therapy Management Provider  [phone]

## 2021-03-25 NOTE — TELEPHONE ENCOUNTER
Forms/Letter Request    Name of form/letter: Home Care Orders     Have you been seen for this request: Yes NA     Do we have the form/letter: Yes: Placed in [employer] Folder at [employer]     When is form/letter needed by: ASAP     How would you like the form/letter returned: Fax    Patient Notified form requests are processed in 3-5 business days:No    Okay to leave a detailed message? No     Marge Degroot/

## 2021-03-25 NOTE — Clinical Note
Cortney--SATHYA--I spoke with Swathi today via phone for a medication review --she anted to know what her meds were for and if she takes too many of them --I reassured her that they were all appropriate and working well for her and the key to her mental health now is daily social interaction at her assisted living facility. She seemed to be doing quite well now after recent hospital stay .     Thx. For mtm referral.    Joe Radford [facility].  Medication Therapy Management Provider  [phone]

## 2021-03-29 ENCOUNTER — APPOINTMENT (OUTPATIENT)
Dept: GENERAL RADIOLOGY | Facility: CLINIC | Age: 86
End: 2021-03-29
Attending: EMERGENCY MEDICINE
Payer: COMMERCIAL

## 2021-03-29 ENCOUNTER — HOSPITAL ENCOUNTER (EMERGENCY)
Facility: CLINIC | Age: 86
Discharge: HOME | End: 2021-03-29
Attending: EMERGENCY MEDICINE | Admitting: EMERGENCY MEDICINE
Payer: COMMERCIAL

## 2021-03-29 ENCOUNTER — APPOINTMENT (OUTPATIENT)
Dept: CT IMAGING | Facility: CLINIC | Age: 86
End: 2021-03-29
Attending: EMERGENCY MEDICINE
Payer: COMMERCIAL

## 2021-03-29 VITALS
TEMPERATURE: 98.3 F | HEART RATE: 67 BPM | OXYGEN SATURATION: 94 % | SYSTOLIC BLOOD PRESSURE: 138 MMHG | RESPIRATION RATE: 18 BRPM | DIASTOLIC BLOOD PRESSURE: 52 MMHG

## 2021-03-29 DIAGNOSIS — J02.9 SORE THROAT: ICD-10-CM

## 2021-03-29 DIAGNOSIS — R06.02 SHORTNESS OF BREATH: ICD-10-CM

## 2021-03-29 LAB
ANION GAP SERPL CALCULATED.3IONS-SCNC: 9 MMOL/L (ref 3–14)
BASOPHILS # BLD AUTO: 0.1 10E9/L (ref 0–0.2)
BASOPHILS NFR BLD AUTO: 0.7 %
BUN SERPL-MCNC: 11 MG/DL (ref 7–30)
CALCIUM SERPL-MCNC: 9.1 MG/DL (ref 8.5–10.1)
CHLORIDE SERPL-SCNC: 100 MMOL/L (ref 94–109)
CO2 SERPL-SCNC: 25 MMOL/L (ref 20–32)
CREAT SERPL-MCNC: 0.73 MG/DL (ref 0.52–1.04)
DEPRECATED S PYO AG THROAT QL EIA: NEGATIVE
DIFFERENTIAL METHOD BLD: ABNORMAL
EOSINOPHIL # BLD AUTO: 0.1 10E9/L (ref 0–0.7)
EOSINOPHIL NFR BLD AUTO: 1.9 %
ERYTHROCYTE [DISTWIDTH] IN BLOOD BY AUTOMATED COUNT: 13.9 % (ref 10–15)
GFR SERPL CREATININE-BSD FRML MDRD: 76 ML/MIN/{1.73_M2}
GLUCOSE SERPL-MCNC: 123 MG/DL (ref 70–99)
HCT VFR BLD AUTO: 31.9 % (ref 35–47)
HGB BLD-MCNC: 10.3 G/DL (ref 11.7–15.7)
IMM GRANULOCYTES # BLD: 0 10E9/L (ref 0–0.4)
IMM GRANULOCYTES NFR BLD: 0.3 %
LABORATORY COMMENT REPORT: NORMAL
LYMPHOCYTES # BLD AUTO: 1.4 10E9/L (ref 0.8–5.3)
LYMPHOCYTES NFR BLD AUTO: 20.9 %
MCH RBC QN AUTO: 30.6 PG (ref 26.5–33)
MCHC RBC AUTO-ENTMCNC: 32.3 G/DL (ref 31.5–36.5)
MCV RBC AUTO: 95 FL (ref 78–100)
MONOCYTES # BLD AUTO: 0.6 10E9/L (ref 0–1.3)
MONOCYTES NFR BLD AUTO: 8.8 %
NEUTROPHILS # BLD AUTO: 4.6 10E9/L (ref 1.6–8.3)
NEUTROPHILS NFR BLD AUTO: 67.4 %
NRBC # BLD AUTO: 0 10*3/UL
NRBC BLD AUTO-RTO: 0 /100
NT-PROBNP SERPL-MCNC: 531 PG/ML (ref 0–1800)
PLATELET # BLD AUTO: 320 10E9/L (ref 150–450)
POTASSIUM SERPL-SCNC: 4.5 MMOL/L (ref 3.4–5.3)
RBC # BLD AUTO: 3.37 10E12/L (ref 3.8–5.2)
SARS-COV-2 RNA RESP QL NAA+PROBE: NEGATIVE
SODIUM SERPL-SCNC: 134 MMOL/L (ref 133–144)
SPECIMEN SOURCE: NORMAL
STREP GROUP A PCR: NOT DETECTED
TROPONIN I SERPL-MCNC: 0.03 UG/L (ref 0–0.04)
TROPONIN I SERPL-MCNC: 0.04 UG/L (ref 0–0.04)
WBC # BLD AUTO: 6.8 10E9/L (ref 4–11)

## 2021-03-29 PROCEDURE — 999N001174 HC STATISTIC STREP A RAPID: Performed by: EMERGENCY MEDICINE

## 2021-03-29 PROCEDURE — 83880 ASSAY OF NATRIURETIC PEPTIDE: CPT | Performed by: EMERGENCY MEDICINE

## 2021-03-29 PROCEDURE — 85025 COMPLETE CBC W/AUTO DIFF WBC: CPT | Performed by: EMERGENCY MEDICINE

## 2021-03-29 PROCEDURE — 250N000009 HC RX 250: Performed by: EMERGENCY MEDICINE

## 2021-03-29 PROCEDURE — 87651 STREP A DNA AMP PROBE: CPT | Performed by: EMERGENCY MEDICINE

## 2021-03-29 PROCEDURE — C9803 HOPD COVID-19 SPEC COLLECT: HCPCS

## 2021-03-29 PROCEDURE — 80048 BASIC METABOLIC PNL TOTAL CA: CPT | Performed by: EMERGENCY MEDICINE

## 2021-03-29 PROCEDURE — 84484 ASSAY OF TROPONIN QUANT: CPT | Performed by: EMERGENCY MEDICINE

## 2021-03-29 PROCEDURE — 70360 X-RAY EXAM OF NECK: CPT

## 2021-03-29 PROCEDURE — 250N000013 HC RX MED GY IP 250 OP 250 PS 637: Performed by: EMERGENCY MEDICINE

## 2021-03-29 PROCEDURE — 71250 CT THORAX DX C-: CPT

## 2021-03-29 PROCEDURE — 93005 ELECTROCARDIOGRAM TRACING: CPT

## 2021-03-29 PROCEDURE — 87635 SARS-COV-2 COVID-19 AMP PRB: CPT | Performed by: EMERGENCY MEDICINE

## 2021-03-29 PROCEDURE — 99285 EMERGENCY DEPT VISIT HI MDM: CPT | Mod: 25

## 2021-03-29 RX ADMIN — LIDOCAINE HYDROCHLORIDE 30 ML: 20 SOLUTION ORAL; TOPICAL at 18:25

## 2021-03-29 ASSESSMENT — ENCOUNTER SYMPTOMS
SHORTNESS OF BREATH: 1
VOICE CHANGE: 1
COUGH: 1
FEVER: 0
DYSURIA: 0
HEMATURIA: 0
UNEXPECTED WEIGHT CHANGE: 0
FREQUENCY: 0
TROUBLE SWALLOWING: 0
SORE THROAT: 1
DIFFICULTY URINATING: 0

## 2021-03-29 NOTE — ED TRIAGE NOTES
Patient presents to the ED reporting SOB over the past 2 days. States is getting progressively worse and is worse when she lays down.

## 2021-03-29 NOTE — ED PROVIDER NOTES
History   Chief Complaint:  Sore throat     HPI   Swathi Dukes is a 85 year old female with history of aortic stenosis, hypertension, hyperlipidemia, and peripheral effusion who presents with sore throat. Swathi states she had soreness of her throat a week ago and she ignored it until she last night, when at approximately 0030, she felt short of breath because of her throat feeling sore. Today, she noted an associated raspy voice and she has begun to sleep in her recliner four days ago because she has difficulty breathing lying flat due to pain in her throat. She denies difficulty swallowing her saliva, liquids or solids. She denies any chest pain or feeling like she can't get a breath into her lungs.  She has a mild cough but otherwise no change to her baseline. She denies fevers, leg swelling, or abnormal weight change. Denies urinary symptoms. She denies intake of medication for her sore throat. Patient has received her two doses of the COVID vaccine, her last dose two weeks ago.     Review of Systems   Constitutional: Negative for fever and unexpected weight change.   HENT: Positive for sore throat and voice change. Negative for trouble swallowing.    Respiratory: Positive for cough and shortness of breath.    Cardiovascular: Negative for chest pain and leg swelling.   Genitourinary: Negative for difficulty urinating, dysuria, frequency, hematuria and urgency.   All other systems reviewed and are negative.      Allergies:  Diclofenac  Iodine  Aspirin    Medications:  Xanax   Amlodipine   Buspar   Enalapril   Hydralazine   Imodium   Methotrexate   Zyprexa   Paxil   Inderal   Zocor     Past Medical History:    Aortic Stenosis   Glaucoma   Hypertension  Hyperlipidemia   Periferal Effusion   Systematic Lupus Erythematosus   Moderate MDD  Recurrent UTI  Lumbar Radiculopathy   Liver Lesion   DVT  Anxiety      Past Surgical History:    CV Heart Catheterzation with Possible Intervention   CV Left Ventriculogram      Family History:    Father - Diabetes     Social History:  Patient arrives unaccompanied.     Physical Exam     Patient Vitals for the past 24 hrs:   BP Temp Pulse Resp SpO2   03/29/21 1900 138/52 -- 67 -- 94 %   03/29/21 1830 (!) 158/59 -- 75 -- 96 %   03/29/21 1800 (!) 153/54 -- 72 -- 94 %   03/29/21 1700 (!) 142/49 -- 68 -- 96 %   03/29/21 1645 -- -- -- -- 96 %   03/29/21 1630 (!) 166/60 -- 70 -- 96 %   03/29/21 1625 -- -- -- -- 98 %   03/29/21 1433 (!) 182/61 98.3  F (36.8  C) 76 18 99 %       Physical Exam  General: Alert, no acute distress; speaking in full sentences  Neuro:  PERRL.  EOMI.  Gait stable, no focal deficits  HEENT:  Moist mucous membranes.  Posterior oropharynx clear, no exudates.  Conjunctiva normal. Uvula midline.  No posterior oropharyngeal erythema.  No tracheal tenderness.  No cervical lymphadenopathy.  CV:  RRR, no m/r/g, skin warm and well perfused  Pulm:  CTAB, no wheezes/ronchi/rales.  No acute distress, breathing comfortably  GI:  Soft, nontender, nondistended.  No rebound or guarding.  Normal bowel sounds  MSK:  Moving all extremities.  No focal areas of edema, erythema, or tenderness  Skin:  WWP, no rashes, no lower extremity edema, skin color normal, no diaphoresis  Psych:  Well-appearing, normal affect, regular speech    Emergency Department Course   ECG  ECG taken at 1624, ECG read at 1631  Normal sinus rhythm   Possible Left atrial enlargement   Left ventricular hypertrophy with repolarization abnormality   Abnormal ECG  No change as compared to prior, dated 3/10/21.  Rate 70 bpm. MI interval 204 ms. QRS duration 84 ms. QT/QTc 406/438 ms. P-R-T axes 61 -18 80.     Imaging:  Neck Soft Tissue XR  No appreciable soft tissue abnormality. The aerodigestive  tract appears patent. No prevertebral soft tissue swelling. No  convincing tonsillar enlargement. Moderate cervical spine degenerative change.  Reading per radiology    Chest CT w/o Contrast  1. Bibasilar pulmonary opacities,  likely atelectasis.  2. Cardiomegaly and small-to-moderate pericardial effusion, not  significantly changed as compared to 1/16/2021 exam.  3. Few scattered pulmonary nodules including 3 mm right lower lobe  nodule, as per Fleischner's society criteria, for low risk patient no  routine follow-up is recommended, and for high-risk patient, optional  chest CT in 12 months can be considered.  4. Moderate atherosclerotic vascular calcification of the coronary  arteries and thoracic aorta.  Reading per radiology    Laboratory:  CBC: WBC 6.8, HGB 10.3 (L),    BMP: Glucose 123 (H), o/w WNL (Creatinine: 0.73)    Troponin (Resulted 1605): 0.33  Troponin (Resulted  1908): 0.39    BNP: 531    Rapid Strep Test: Negative  Strep Culture: Pending  Asymptomatic COVID19 Virus PCR by nasopharyngeal swab: Negative     Emergency Department Course:    Reviewed:  I reviewed nursing notes, past medical history and care everywhere    Assessments:  1535 I obtained history and examined the patient as noted above.   1932 I rechecked the patient and explained findings.     Interventions:  1825 GI Cocktail (Maalox/Mylanta and viscous Lidocaine), 30 mL suspension, PO     Disposition:  The patient was discharged to home.     Impression & Plan     Medical Decision Making:  Swathi Dukes is a 85 year old female with history of aortic stenosis, hypertension, hyperlipidemia who presents to the ER for evaluation of sore throat making her feel short of breath.  Please see above for details of HPI and exam.  Her main complaint is sore throat and denies any chest pain or feeling like she cannot get up full breath into her lungs.  Due to her sore throat and raspy voice, she presents for further evaluation.  She denies any fevers or difficulty with swallowing or handling her secretions.  She is otherwise well-appearing and speaking in full sentences and is not in any respiratory distress.  Posterior pharynx without any evidence of pharyngitis.  No  signs to suggest PTA/RPA or Tristan angina.  Broad differential still was considered including URI, strep throat, epiglottitis, tracheitis, esophagitis, pneumonia, effusion, atypical ACS, CHF amongst other things.  Fortunately EKG shows sinus rhythm without any acute ST changes concerning for ischemia or infarct.  Troponin and 4-hour delta troponin within normal limits despite days of ongoing symptoms therefore I doubt ACS.  She denies any pain with breathing and I very low suspicion for PE.  CT chest shows atelectasis and stable pericardial effusion with noted pulmonary nodules which are discussed with the patient requiring outpatient follow-up and she understands and agrees.  Lateral soft tissue neck without evidence of soft tissue abnormality or signs of epiglottitis or prevertebral soft tissue swelling.  The rest of her lab studies are unremarkable.  Strep test, COVID-19 swabs were negative.  Unclear cause for patient's overall symptomatology but given overall reassuring work-up thus far, I feel she is safe to discharge home with close outpatient follow-up and she understands and agrees.  Reasons to return were extensively discussed with her and all questions were answered prior to discharge.    Covid-19  Swathi Dukes was evaluated during a global COVID-19 pandemic, which necessitated consideration that the patient might be at risk for infection with the SARS-CoV-2 virus that causes COVID-19.   Applicable protocols for evaluation were followed during the patient's care. COVID-19 was considered as part of the patient's evaluation. The plan for testing is: a test was obtained during this visit and returned negative.     Diagnosis:    ICD-10-CM    1. Sore throat  J02.9 Group A Streptococcus PCR Throat Swab     Troponin I   2. Shortness of breath  R06.02      Scribe Disclosure:  Satya MICHAUD, am serving as a scribe at 3:17 PM on 3/29/2021 to document services personally performed by Beny Collins  MD Rohit based on my observations and the provider's statements to me.            Beny Collins MD  03/30/21 0026

## 2021-03-29 NOTE — ED NOTES
Updated son, Brent, with timeframe of results.  Appreciative with receiving phone call. [phone] is son's phone number

## 2021-03-29 NOTE — DISCHARGE INSTRUCTIONS
Discharge Instructions  Sore Throat  You were seen today for a sore throat.  Most (>80%) sore throats are caused by a virus. Antibiotics do not help with viral infections, but you can fight off the virus on your own.  In this case, your sore throat would be treated with medications for your pain and fever.    Strep throat is a kind of sore throat caused by Group A streptococcus bacteria.  This type of sore throat is treated with antibiotics.  If you had a rapid test done today for strep throat and it did not show infection, a culture is done in some cases. The culture can take several days to complete. If the culture shows you have strep throat, we will call you and get you a prescription for antibiotics. We will not contact you with a negative culture result.  Generally, every Emergency Department visit should have a follow-up clinic visit with either a primary or a specialty clinic/provider. Please follow-up as instructed by your emergency provider today.  Return to the Emergency Department if:  If you have difficulty breathing.  If you are drooling because you are unable to swallow.  You become dehydrated due to difficulty drinking. Signs of dehydration include weakness, dry mouth, and urinating less than 3 times per day.  If you develop swelling of the neck or tongue.  If you develop a high fever with either severe or unusual headache or stiff neck.    Treatment:    Pain relief -- Non-prescription pain medications, such as Tylenol  (acetaminophen) or Motrin , Advil  (ibuprofen) are usually recommended for pain.  Do not use a medicine that you are allergic to, or if your provider has told you not to use it.  Soft or liquid diet. Concentrate on liquids to keep yourself hydrated. Cold liquids (popsicles, ice cream, etc.) may feel good on your throat.  If you were given a prescription for medicine here today, be sure to read all of the information (including the package insert) that comes with your prescription.   This will include important information about the medicine, its side effects, and any warnings that you need to know about.  The pharmacist who fills the prescription can provide more information and answer questions you may have about the medicine.  If you have questions or concerns that the pharmacist cannot address, please call or return to the Emergency Department.   Remember that you can always come back to the Emergency Department if you are not able to see your regular provider in the amount of time listed above, if you get any new symptoms, or if there is anything that worries you.

## 2021-03-30 LAB — INTERPRETATION ECG - MUSE: NORMAL

## 2021-03-30 NOTE — RESULT ENCOUNTER NOTE
Group A Streptococcus PCR is NEGATIVE  No treatment or change in treatment [facility] ED lab result Strep Group A protocol.

## 2021-03-31 ENCOUNTER — TELEPHONE (OUTPATIENT)
Dept: FAMILY MEDICINE | Facility: CLINIC | Age: 86
End: 2021-03-31

## 2021-03-31 NOTE — TELEPHONE ENCOUNTER
Pt's son called to get contact information for home care team as pt has been admitted to [facility].   He wanted to let them know and hold/suspend her home care.     Contact information given.     Ronda Miller RN

## 2021-04-01 ENCOUNTER — TELEPHONE (OUTPATIENT)
Dept: FAMILY MEDICINE | Facility: CLINIC | Age: 86
End: 2021-04-01

## 2021-04-01 NOTE — TELEPHONE ENCOUNTER
Had a long discussion with son and he has had discussion with [facility].   The MD's at [facility] feel this is the best treatment for her at this time.     Son just wanted PCP in the loop.     Ronda Miller RN

## 2021-04-01 NOTE — TELEPHONE ENCOUNTER
Pt's son calling to inform PCP that [facility] is going to be starting ECT with pt.   Plan is to do 10 treatments, giving 1 every other day.     Per son likely will need to then discharge to TCU for a period of time due to memory issues and such for the affects of the ECT.     Any concerns for this plan?    Ronda Miller, ALFREDO

## 2021-04-01 NOTE — TELEPHONE ENCOUNTER
I am not sure on the effects of ECT the elderly with depression but also dementia symptoms. Please make sure he discussed this with them. ANGELA

## 2021-04-07 ENCOUNTER — TRANSFERRED RECORDS (OUTPATIENT)
Dept: HEALTH INFORMATION MANAGEMENT | Facility: CLINIC | Age: 86
End: 2021-04-07

## 2021-04-09 ENCOUNTER — TELEPHONE (OUTPATIENT)
Dept: FAMILY MEDICINE | Facility: CLINIC | Age: 86
End: 2021-04-09

## 2021-04-09 NOTE — TELEPHONE ENCOUNTER
Reason for Call:  Form, our goal is to have forms completed with 72 hours, however, some forms may require a visit or additional information.    Type of letter, form or note:  orders    Who is the form from?: Home care    Where did the form come from: form was faxed in    What clinic location was the form placed at?: [facility]     Where the form was placed: Dr Vanessa Box/Folder    What number is listed as a contact on the form?: [phone]       Additional comments: please sign/date and fax orders to [phone]    Call taken on 4/9/2021 at 1:13 PM by Marge Degroot

## 2021-04-15 ENCOUNTER — TELEPHONE (OUTPATIENT)
Dept: FAMILY MEDICINE | Facility: CLINIC | Age: 86
End: 2021-04-15

## 2021-04-15 NOTE — TELEPHONE ENCOUNTER
Son Brent called regards to pt.  He said pt is doing well with ECT however, has experienced relentless bradycardia (50's) and HTN (up to 200 SBP).  They are having difficulty controlling B, with current medication regimen (son stated she's currently on hydralazin TID and lisinopril and another medication, but not sure what it is). He was wondering if she had been on metoprolol and if that is a good idea given bradycardia.    Son stated pharmacist will be calling to discuss BP medications and get recommendations on what BP medications may work for her better.     Ermelinda Mora RN

## 2021-04-16 DIAGNOSIS — Z53.9 DIAGNOSIS NOT YET DEFINED: Primary | ICD-10-CM

## 2021-04-16 PROCEDURE — G0179 MD RECERTIFICATION HHA PT: HCPCS | Performed by: FAMILY MEDICINE

## 2021-04-21 ENCOUNTER — TELEPHONE (OUTPATIENT)
Dept: FAMILY MEDICINE | Facility: CLINIC | Age: 86
End: 2021-04-21

## 2021-04-21 NOTE — PROGRESS NOTES
Pre-Visit Planning   Next 5 appointments (look out 90 days)    Apr 26, 2021 11:00 AM  (Arrive by 10:40 AM)  Office Visit with Cortney Vanessa MD  [facility] [address] [address] [id number] [phone]        Appointment Notes for this encounter:   hospital discharge    Questionnaires Reviewed/Assigned  No additional questionnaires are needed     Patient preferred phone number: [phone]    Patient contact not needed. Appointment scheduled with PAL by whitley.

## 2021-04-21 NOTE — TELEPHONE ENCOUNTER
Spoke with Brent regarding pt.  She has been discharged from [facility] and will be doing OP ECT.  She does have an appointment with Dr Parada on 4/27.    While in the hospital pt was found to have a small DVT in her right leg but she is not anticoagulated as of now.  After her ECT appts she will have an ultrasound to monitor this clot and if larger then she may need treatment.    She has been having bradycardia with hypertension so she has had medication changes.  She was take off her beta blocker and hydralazine and placed on hydrochlorothiazide.  She was also taken off her iron and B12 and when Brent asked why they didn't really say.     Pt would report that things have not changed but per Brent her mood and outlook are better since having ECT.    Brent will call [facility] directly to schedule a follow up with PCP.    PCP given oral update.    Brent will fax the discharge summary and try to get the records from [facility] sent to clinic to complete her record.    Siva Aguilar RN, BSN

## 2021-04-23 ENCOUNTER — TELEPHONE (OUTPATIENT)
Dept: FAMILY MEDICINE | Facility: CLINIC | Age: 86
End: 2021-04-23

## 2021-04-23 NOTE — TELEPHONE ENCOUNTER
Reason for call:  Other   Patient called regarding (reason for call): appointment and call back  Additional comments: [facility] called to request skilled nursing orders  PT/OT evaluation and treatment   Home Health Aid    Phone number to reach patient:  Cell number on file:    Telephone Information:   Mobile [phone]       Best Time:  any    Can we leave a detailed message on this number?  YES    Travel screening: Negative

## 2021-04-26 ENCOUNTER — OFFICE VISIT (OUTPATIENT)
Dept: FAMILY MEDICINE | Facility: CLINIC | Age: 86
End: 2021-04-26
Payer: COMMERCIAL

## 2021-04-26 VITALS
DIASTOLIC BLOOD PRESSURE: 44 MMHG | HEART RATE: 68 BPM | TEMPERATURE: 98.7 F | RESPIRATION RATE: 14 BRPM | WEIGHT: 135 LBS | BODY MASS INDEX: 23.05 KG/M2 | HEIGHT: 64 IN | SYSTOLIC BLOOD PRESSURE: 110 MMHG

## 2021-04-26 DIAGNOSIS — Z09 HOSPITAL DISCHARGE FOLLOW-UP: Primary | ICD-10-CM

## 2021-04-26 DIAGNOSIS — R79.9 ABNORMAL FINDING OF BLOOD CHEMISTRY, UNSPECIFIED: ICD-10-CM

## 2021-04-26 DIAGNOSIS — R09.89 LABILE HYPERTENSION: ICD-10-CM

## 2021-04-26 DIAGNOSIS — R11.0 NAUSEA: ICD-10-CM

## 2021-04-26 DIAGNOSIS — R63.4 ABNORMAL WEIGHT LOSS: ICD-10-CM

## 2021-04-26 DIAGNOSIS — I10 ESSENTIAL HYPERTENSION: ICD-10-CM

## 2021-04-26 DIAGNOSIS — R45.851 SUICIDAL IDEATION: ICD-10-CM

## 2021-04-26 DIAGNOSIS — R74.8 ABNORMAL LEVELS OF OTHER SERUM ENZYMES: ICD-10-CM

## 2021-04-26 DIAGNOSIS — G25.81 RESTLESS LEGS SYNDROME (RLS): ICD-10-CM

## 2021-04-26 LAB
ALBUMIN SERPL-MCNC: 3.8 G/DL (ref 3.4–5)
ALP SERPL-CCNC: 123 U/L (ref 40–150)
ALT SERPL W P-5'-P-CCNC: 24 U/L (ref 0–50)
ANION GAP SERPL CALCULATED.3IONS-SCNC: 9 MMOL/L (ref 3–14)
AST SERPL W P-5'-P-CCNC: 19 U/L (ref 0–45)
BILIRUB SERPL-MCNC: 0.3 MG/DL (ref 0.2–1.3)
BUN SERPL-MCNC: 18 MG/DL (ref 7–30)
CALCIUM SERPL-MCNC: 9.4 MG/DL (ref 8.5–10.1)
CHLORIDE SERPL-SCNC: 103 MMOL/L (ref 94–109)
CO2 SERPL-SCNC: 22 MMOL/L (ref 20–32)
CREAT SERPL-MCNC: 0.94 MG/DL (ref 0.52–1.04)
ERYTHROCYTE [DISTWIDTH] IN BLOOD BY AUTOMATED COUNT: 14.6 % (ref 10–15)
GFR SERPL CREATININE-BSD FRML MDRD: 56 ML/MIN/{1.73_M2}
GLUCOSE SERPL-MCNC: 94 MG/DL (ref 70–99)
HCT VFR BLD AUTO: 32.9 % (ref 35–47)
HGB BLD-MCNC: 10.5 G/DL (ref 11.7–15.7)
MCH RBC QN AUTO: 29.2 PG (ref 26.5–33)
MCHC RBC AUTO-ENTMCNC: 31.9 G/DL (ref 31.5–36.5)
MCV RBC AUTO: 92 FL (ref 78–100)
PLATELET # BLD AUTO: 282 10E9/L (ref 150–450)
POTASSIUM SERPL-SCNC: 4.1 MMOL/L (ref 3.4–5.3)
PROT SERPL-MCNC: 7.2 G/DL (ref 6.8–8.8)
RBC # BLD AUTO: 3.59 10E12/L (ref 3.8–5.2)
SODIUM SERPL-SCNC: 134 MMOL/L (ref 133–144)
T4 FREE SERPL-MCNC: 1.21 NG/DL (ref 0.76–1.46)
TSH SERPL DL<=0.005 MIU/L-ACNC: 0.92 MU/L (ref 0.4–4)
VIT B12 SERPL-MCNC: 669 PG/ML (ref 193–986)
WBC # BLD AUTO: 6.5 10E9/L (ref 4–11)

## 2021-04-26 PROCEDURE — 36415 COLL VENOUS BLD VENIPUNCTURE: CPT | Performed by: FAMILY MEDICINE

## 2021-04-26 PROCEDURE — 80053 COMPREHEN METABOLIC PANEL: CPT | Performed by: FAMILY MEDICINE

## 2021-04-26 PROCEDURE — 84443 ASSAY THYROID STIM HORMONE: CPT | Performed by: FAMILY MEDICINE

## 2021-04-26 PROCEDURE — 99214 OFFICE O/P EST MOD 30 MIN: CPT | Performed by: FAMILY MEDICINE

## 2021-04-26 PROCEDURE — 83540 ASSAY OF IRON: CPT | Performed by: FAMILY MEDICINE

## 2021-04-26 PROCEDURE — 84439 ASSAY OF FREE THYROXINE: CPT | Performed by: FAMILY MEDICINE

## 2021-04-26 PROCEDURE — 85027 COMPLETE CBC AUTOMATED: CPT | Performed by: FAMILY MEDICINE

## 2021-04-26 PROCEDURE — 82607 VITAMIN B-12: CPT | Performed by: FAMILY MEDICINE

## 2021-04-26 PROCEDURE — 82728 ASSAY OF FERRITIN: CPT | Performed by: FAMILY MEDICINE

## 2021-04-26 PROCEDURE — 83550 IRON BINDING TEST: CPT | Performed by: FAMILY MEDICINE

## 2021-04-26 RX ORDER — LISINOPRIL 20 MG/1
20 TABLET ORAL DAILY
COMMUNITY
Start: 2021-04-26 | End: 2021-05-20

## 2021-04-26 RX ORDER — AMLODIPINE BESYLATE 10 MG/1
10 TABLET ORAL DAILY
COMMUNITY
Start: 2021-04-26 | End: 2021-05-13

## 2021-04-26 RX ORDER — ONDANSETRON 4 MG/1
4 TABLET, FILM COATED ORAL EVERY 8 HOURS PRN
Qty: 30 TABLET | Refills: 3 | Status: SHIPPED | OUTPATIENT
Start: 2021-04-26 | End: 2021-06-04

## 2021-04-26 RX ORDER — HYDROCHLOROTHIAZIDE 12.5 MG/1
12.5 TABLET ORAL DAILY
COMMUNITY
Start: 2021-04-26 | End: 2021-05-13

## 2021-04-26 ASSESSMENT — MIFFLIN-ST. JEOR: SCORE: 1044.42

## 2021-04-26 NOTE — TELEPHONE ENCOUNTER
VO given for the orders below to Khoa, an RN with [facility].   Ermelinda Mora RN

## 2021-04-26 NOTE — PATIENT INSTRUCTIONS
[phone] [facility] - Dr Brent Pena    Start taking 1 zofran tablet in the AM - script sent to [facility] in [place]    Lab work for RLS today, results in a few days

## 2021-04-26 NOTE — PROGRESS NOTES
Assessment & Plan     Hospital discharge follow-up    Suicidal ideation - she reports she is feeling better following ECT, will continue with this, consult with psychiatry this week.    Labile hypertension - concern about making adjustments to meds while still undergoing ECT, will hold off for now as BP in range today, will consider adjustment to hydralazine in the future.     Essential hypertension - as above  - amLODIPine (NORVASC) 10 MG tablet; Take 1 tablet (10 mg) by mouth daily  - hydrochlorothiazide (HYDRODIURIL) 12.5 MG tablet; Take 1 tablet (12.5 mg) by mouth daily  - lisinopril (ZESTRIL) 20 MG tablet; Take 1 tablet (20 mg) by mouth daily    Nausea - will given zofran, will discuss with GI at her appt  - ondansetron (ZOFRAN) 4 MG tablet; Take 1 tablet (4 mg) by mouth every 8 hours as needed for nausea    Restless legs syndrome (RLS) - will run labs as below, hydrochlorothiazide is new med, could be contributing  - Iron and iron binding capacity  - Ferritin  - Vitamin B12  - CBC with platelets  - Comprehensive metabolic panel (BMP + Alb, Alk Phos, ALT, AST, Total. Bili, TP)  - TSH  - T4, free    Abnormal finding of blood chemistry, unspecified   - Iron and iron binding capacity    Abnormal levels of other serum enzymes   - Ferritin    Abnormal weight loss   - TSH  - T4, free    Return in about 3 months (around 7/26/2021) for Medication Recheck.    Cortney Vanessa MD  [facility] is a 85 year old who presents for the following health issues     HPI       Hospital Follow-up Visit:    Hospital/Nursing Home/IP Rehab Facility: [facility],   Date of Admission: 3-30-21  Date of Discharge: 4-7-21  Reason(s) for Admission: Suicide Ideation      Was your hospitalization related to COVID-19? No   Problems taking medications regularly:  None  Medication changes since discharge: None  Problems adhering to non-medication therapy:   Same-Day Surgery   Discharge Orders & Instructions For Your Child    For 24 hours after surgery:  1. Your child should get plenty of rest.  Avoid strenuous play.  Offer reading, coloring and other light activities.   2. Your child may go back to a regular diet.  Offer light meals at first.   3. If your child has nausea (feels sick to the stomach) or vomiting (throws up):  offer clear liquids such as apple juice, flat soda pop, Jell-O, Popsicles, Gatorade and clear soups.  Be sure your child drinks enough fluids.  Move to a normal diet as your child is able.   4. Your child may feel dizzy or sleepy.  He or she should avoid activities that required balance (riding a bike or skateboard, climbing stairs, skating).  5. A slight fever is normal.  Call the doctor if the fever is over 100 F (37.7 C) (taken under the tongue) or lasts longer than 24 hours.  6. Your child may have a dry mouth, flushed face, sore throat, muscle aches, or nightmares.  These should go away within 24 hours.  7. A responsible adult must stay with the child.  All caregivers should get a copy of these instructions.   Pain Management:      1. Take pain medication (if prescribed) for pain as directed by your physician.        2. WARNING: If the pain medication you have been prescribed contains Tylenol    (acetaminophen), DO NOT take additional doses of Tylenol (acetaminophen).    Call your doctor for any of the followin.   Signs of infection (fever, growing tenderness at the surgery site, severe pain, a large amount of drainage or bleeding, foul-smelling drainage, redness, swelling).    2.   It has been over 8 to 10 hours since surgery and your child is still not able to urinate (pee) or is complaining about not being able to urinate (pee).   To contact a doctor, call _____________________________________ or:      [phone] and ask for the Resident On Call for          __________________________________________ (answered 24 hours a day)       "None    Summary of hospitalization:  See outside records, reviewed and scanned  Diagnostic Tests/Treatments reviewed.  Follow up needed: continued ECT, needs BP addressed, home care already established  Other Healthcare Providers Involved in Patient s Care:         Specialist appointment - Psychiatry - 2 days from now  Update since discharge: stable.       Post Discharge Medication Reconciliation: discharge medications reconciled and changed, per note/orders.  Plan of care communicated with patient and family                Reports continued nausea. Present all day. Present before she takes her AM meds. Present past weeks. No recent med changes. Can eat normally without increased nausea. Would like pill to help with this.     Having twitching of her legs, begins later afternoon, stays all night. Past 4-5 days.     Reports loose stools. Has followed with GI for this in the past, was diagnosed with c diff, treated. Seems symptoms improved after this but have returned. Declines stool testing today. Would like to meet with GI again.       Review of Systems   Constitutional, HEENT, cardiovascular, pulmonary, gi and gu systems are negative, except as otherwise noted.      Objective    /44 (BP Location: Right arm, Patient Position: Chair, Cuff Size: Adult Regular)   Pulse 68   Temp 98.7  F (37.1  C) (Oral)   Resp 14   Ht 1.613 m (5' 3.5\")   Wt 61.2 kg (135 lb)   Breastfeeding No   BMI 23.54 kg/m    Body mass index is 23.54 kg/m .  Physical Exam   GENERAL: healthy, alert and no distress  RESP: lungs clear to auscultation - no rales, rhonchi or wheezes  CV: regular rate and rhythm, normal S1 S2, no S3 or S4, no murmur, click or rub, no peripheral edema and peripheral pulses strong  PSYCH: mentation appears normal, affect normal/bright          " Emergency Department:  [facility] Department: [phone]             Rev. 10/2014     [facility]  Kurt Cohen, *    Caring for Your Child after Tonsillectomy / Adenoidectomy    What to expect after surgery:    A low fever (below 101 F or 38.3 C, taken under the tongue).    A sore throat that lasts 7 to 10 days, or as long as 14 days.     Ear, jaw or neck pain. This may hurt the most about a week after surgery.    Yellow or white-gray tissue where the tonsils were removed.    A white film on the tongue. This will go away within 10 to 14 days.    Bad breath for many days as the throat heals. Gentle tooth brushing is allowed. Do not have your child gargle.    A change in the voice. This will go away in about three weeks.    Snoring. This will usually improve over time.    Stuffy nose: This is normal.    Care after surgery:    Your child may want to avoid solid food for the first week. Offer very soft, bland foods until your child feels better (macaroni, eggs, mashed potatoes, applesauce, cooked cereal, etc). Avoid rough or crunchy foods for at least 7 days.    Encourage plenty of fluids- at least 24 to 64 ounces per day. Cool or lukewarm liquids may feel better at first. Sports drinks are a good choice. Avoid orange juice (which may burn).    Young children may resist fluids because it hurts to drink or they need to feel in control.   To help children cope, involve them in decision-making as much as you can.    -Let your child pick out drinks and Popsicles at the grocery store.    -Invite your child to help make blended drinks, slushies and frozen pops.    -At first, offer small drinks in a medicine or Marycarmen cup. Slowly increase the cup size. You might also use a special cup or mug.     -Place stickers on a goal chart to reward your child for each sip of fluid.    If your child is old enough for chewing gum, this may help increase saliva and  ease pain.    Things to Avoid:    Do not have your child gargle.    Avoid rough or crunchy foods for at least 7 days.    Activity:    Your child should avoid heavy or strenuous activity for one week.    Keep your child home from school or  for at least 1 to 2 weeks. Your child may not return if he or she is still taking prescribed pain medicine.    Back at school, your child should be excused from gym class or recess for 10 to 14 days.    Pain:    Pain may start to get better and then get worse again, often peaking 3 to 7 days after surgery. This is common.    It will hurt to swallow at first. The more your child can swallow, the less it will hurt.    You may give prescribed pain medicine as needed. We will tell you how much to give and how often. Most children take this for several days after surgery, but some need it longer.    After two days, you may replace some or all of the prescribed medicine with liquid Tylenol. Use this as directed.    Talk to your doctor before giving ibuprofen (Motrin, Advil) or other medicines within 10 days following surgery. Some medicines will increase the risk of bleeding.    A humidifier may help ease a sore throat. You might also try an ice pack on the throat for 20 minutes. (Place a cloth between the skin and the ice pack.)    Follow up:    A nurse will call to check on your child in 2 to 3 weeks.    When to call us:    Bleeding: if your child has any bleeding, call your clinic right away. If it is after business hours, bring your child to the Emergency Room). Bleeding may occur up to 2 weeks after surgery. Most children will spit out the blood. Some will swallow the blood and then vomit.    Fever over 101 F (38.3 C), taken under the tongue, if the fever lasts more than 48 hours.     Nausea, vomiting or constipation, if symptoms last longer than 48 hours.    Too little urine. Your child should urinate at least twice every 24-hour period.    Breathing problems (more severe  than a stuffy nose): Call or go to the Emergency Room.     Important Phone Numbers:  [facility]    During office hours: [phone] (choose option 2)    After hours: [phone] (ask to page the ENT resident who is on-call)    Rev. 5/2014

## 2021-04-27 ENCOUNTER — TELEPHONE (OUTPATIENT)
Dept: FAMILY MEDICINE | Facility: CLINIC | Age: 86
End: 2021-04-27

## 2021-04-27 LAB
FERRITIN SERPL-MCNC: 63 NG/ML (ref 8–252)
IRON SATN MFR SERPL: 13 % (ref 15–46)
IRON SERPL-MCNC: 47 UG/DL (ref 35–180)
TIBC SERPL-MCNC: 370 UG/DL (ref 240–430)

## 2021-04-27 NOTE — TELEPHONE ENCOUNTER
Reason for call:  Order   Order or referral being requested: Either written or verbal orders for [facility] to administer Zofran to patient.   Reason for request: No orders at present to allow them to administer RX.  Date needed: as soon as possible  Has the patient been seen by the PCP for this problem? YES    Additional comments: Fax Number [phone] for written orders.     Phone number to reach patient:  Other phone number: [phone] for verbal orders, ph number belongs to Assisted Living    Best Time: ANY    Can we leave a detailed message on this number?  YES    Travel screening: Not Applicable

## 2021-04-27 NOTE — TELEPHONE ENCOUNTER
Reason for call:  Form   Our goal is to have forms completed within 72 hours, however some forms may require a visit or additional information.     Who is the form from? Home care  Where did the form come from? form was faxed in  What clinic location was the form placed at? [place]  Where was the form placed? Dr. Vanessa's Box/Folder  What number is listed as a contact on the form? N/A    Phone call message - patient request for a letter, form or note:     Date needed: within one week  Please fax to [phone]  Has the patient signed a consent form for release of information? Not Applicable    Additional comments:     Type of letter, form or note: Home Care Written Orders for OT, PT and HHA    Phone number to reach patient:  Home number on file [phone] (home)    Best Time: ANY    Can we leave a detailed message on this number?  YES    Travel screening: Not Applicable

## 2021-04-29 ENCOUNTER — TELEPHONE (OUTPATIENT)
Dept: FAMILY MEDICINE | Facility: CLINIC | Age: 86
End: 2021-04-29

## 2021-04-29 DIAGNOSIS — E61.1 LOW IRON: Primary | ICD-10-CM

## 2021-04-29 RX ORDER — FERROUS GLUCONATE 324(38)MG
324 TABLET ORAL
Qty: 90 TABLET | Refills: 0 | Status: SHIPPED | OUTPATIENT
Start: 2021-04-29 | End: 2021-05-28

## 2021-04-29 NOTE — TELEPHONE ENCOUNTER
----- Message from Cortney Vanessa MD sent at 4/29/2021  9:21 AM CDT -----  Please call - has had a decrease in kidney function since the addition of hydrochlorothiazide, should stop this. Can increase her lisinopril to 30 mg daily if her BP start climbing. For now, no increase in meds.    She has low normal iron levels  And would benefit from taking a daily iron supplement. Have sent this in. I believe her correction will need an order. JH

## 2021-04-29 NOTE — TELEPHONE ENCOUNTER
Spoke to pt, updated on lab results and notified to stop the hydrochlorothiazide, and notify PCP office if BP starts increasing.  Also let pt know that iron levels were low, so she is to start iron supplement, which the script was sent in.      LVM for [facility] to call back with updates on medications.  Printed and faxed iron order to [facility].     Ermelinda Mora RN

## 2021-04-29 NOTE — LETTER
May 13, 2021      Swathi Dukes  [address]   [place] [address]        To Whom It May Concern,     Swathi Dukes [birth year] has the following changes to her medications:    Stop hydrochlorothiazide and notify PCP office if BP increases  Pt is to start iron supplement which has which has been sent to the pharmacy    If you have questions or concerns please contact the office.      Sincerely,          Cortney Vanessa MD

## 2021-04-30 ENCOUNTER — TELEPHONE (OUTPATIENT)
Dept: FAMILY MEDICINE | Facility: CLINIC | Age: 86
End: 2021-04-30

## 2021-04-30 NOTE — TELEPHONE ENCOUNTER
VO given to Edilia from [facility] for:    OT 1 x a week for 2 weeks.     Ermelinda Mora RN

## 2021-05-03 ENCOUNTER — TELEPHONE (OUTPATIENT)
Dept: FAMILY MEDICINE | Facility: CLINIC | Age: 86
End: 2021-05-03

## 2021-05-03 NOTE — TELEPHONE ENCOUNTER
Reason for Call:  Form, our goal is to have forms completed with 72 hours, however, some forms may require a visit or additional information.    Type of letter, form or note:  orders    Who is the form from?: Home care    Where did the form come from: form was faxed in    What clinic location was the form placed at?: [facility]     Where the form was placed: Dr Vanessa  Box/Folder    What number is listed as a contact on the form?: [phone]       Additional comments: please sign date and fax to [phone]    Call taken on 5/3/2021 at 10:57 AM by Marge Degroot

## 2021-05-05 DIAGNOSIS — H40.003 GLAUCOMA SUSPECT, BILATERAL: ICD-10-CM

## 2021-05-06 DIAGNOSIS — H40.003 GLAUCOMA SUSPECT, BILATERAL: Primary | ICD-10-CM

## 2021-05-06 RX ORDER — LATANOPROST 50 UG/ML
1 SOLUTION/ DROPS OPHTHALMIC AT BEDTIME
Qty: 7.5 ML | Refills: 1 | Status: SHIPPED | OUTPATIENT
Start: 2021-05-06 | End: 2021-06-04

## 2021-05-06 RX ORDER — DORZOLAMIDE HCL 20 MG/ML
1 SOLUTION/ DROPS OPHTHALMIC 2 TIMES DAILY
Qty: 10 ML | Refills: 0 | Status: SHIPPED | OUTPATIENT
Start: 2021-05-06 | End: 2021-06-04

## 2021-05-06 NOTE — TELEPHONE ENCOUNTER
Routing refill request to provider for review/approval because:  Medication is reported/historical    Ermelinda Mora RN

## 2021-05-12 DIAGNOSIS — F42.2 MIXED OBSESSIONAL THOUGHTS AND ACTS: ICD-10-CM

## 2021-05-12 DIAGNOSIS — Z11.52 ENCOUNTER FOR SCREENING LABORATORY TESTING FOR SEVERE ACUTE RESPIRATORY SYNDROME CORONAVIRUS 2 (SARS-COV-2): ICD-10-CM

## 2021-05-12 DIAGNOSIS — I10 ESSENTIAL HYPERTENSION: ICD-10-CM

## 2021-05-12 RX ORDER — HYDROCHLOROTHIAZIDE 12.5 MG/1
12.5 TABLET ORAL DAILY
Status: CANCELLED | OUTPATIENT
Start: 2021-05-12

## 2021-05-12 RX ORDER — LISINOPRIL 20 MG/1
20 TABLET ORAL DAILY
Status: CANCELLED | OUTPATIENT
Start: 2021-05-12

## 2021-05-12 NOTE — TELEPHONE ENCOUNTER
"Routing refill request to provider for review/approval because:  Medication is reported/historical  See 4/26/21 visit, \"hold off BP meds for now\" please confirm  Mail order asking, ok for 90 day on others?  Swathi [place] RN, BSN  Message handled by CLINIC NURSE.          "

## 2021-05-13 ENCOUNTER — TELEPHONE (OUTPATIENT)
Dept: FAMILY MEDICINE | Facility: CLINIC | Age: 86
End: 2021-05-13

## 2021-05-13 DIAGNOSIS — R11.0 NAUSEA: ICD-10-CM

## 2021-05-13 DIAGNOSIS — Z11.52 ENCOUNTER FOR SCREENING LABORATORY TESTING FOR SEVERE ACUTE RESPIRATORY SYNDROME CORONAVIRUS 2 (SARS-COV-2): ICD-10-CM

## 2021-05-13 RX ORDER — PAROXETINE 10 MG/1
10 TABLET, FILM COATED ORAL DAILY
Qty: 30 TABLET | Refills: 1 | OUTPATIENT
Start: 2021-05-13

## 2021-05-13 RX ORDER — OLANZAPINE 5 MG/1
5 TABLET ORAL AT BEDTIME
Qty: 30 TABLET | Refills: 1 | OUTPATIENT
Start: 2021-05-13

## 2021-05-13 RX ORDER — AMLODIPINE BESYLATE 10 MG/1
10 TABLET ORAL DAILY
Qty: 90 TABLET | Refills: 3 | Status: SHIPPED | OUTPATIENT
Start: 2021-05-13 | End: 2021-06-04

## 2021-05-13 NOTE — TELEPHONE ENCOUNTER
Spoke to pt to see if she discontinued her hydrochlorothiazide.  Pt was unsure what medications on.  Pt approved RN to call son to update.     Talked to Hodan erazo RN at [facility].  Stated they never received the orders to discontinue the hydrochlorothiazide or the orders to start iron supplement.  RN stated they have not been checking blood pressure on pt.      Orders faxed to RN, and RN stated they will take pt's BP daily for the next couple weeks, and will fax readings over to PCP office. They will notify PCP office if increase in BP after stopping hydrochlorothiazide today.     LVM for son to call RN PAL to see if pt's psychiatrist () had discussed Zyprexa and Paxil medications at last visit.     Ermelinda Mora RN

## 2021-05-13 NOTE — TELEPHONE ENCOUNTER
Please call to ensure she has stopped hydrochlorothiazide. Has BP been checked since stopping? Readings?    Also, am I filling her zyprexa and paxil even though she is established with psychiatry? Please verify. JH

## 2021-05-13 NOTE — TELEPHONE ENCOUNTER
Spoke with son, Zyprexa and Paxil should be managed by Dr. Parada.      See telephone encounter regarding BP and hydrochlorathiazide.     Routing refill request to provider for review/approval because:  Medication is reported/historical(amlodipine)    Ermelinda Mora RN

## 2021-05-13 NOTE — TELEPHONE ENCOUNTER
Pt's son called RN and clarified that the Zyprexa and the Paxil should be addressed by pt's psychiatrist Dr. Parada.  Next appt with him is 5/17/21.    Ermelinda Mora RN

## 2021-05-14 RX ORDER — PAROXETINE 10 MG/1
10 TABLET, FILM COATED ORAL DAILY
Qty: 30 TABLET | Refills: 1 | OUTPATIENT
Start: 2021-05-14

## 2021-05-14 RX ORDER — ONDANSETRON 4 MG/1
4 TABLET, FILM COATED ORAL EVERY 8 HOURS PRN
Qty: 30 TABLET | Refills: 3 | OUTPATIENT
Start: 2021-05-14

## 2021-05-14 NOTE — TELEPHONE ENCOUNTER
Paxil needs to be filled by Dr Parada and renate just sent to pharmacy  E-Prescribing Status: Receipt confirmed by pharmacy (4/26/2021 11:34 AM CDT)    Siva Aguilar RN, BSN

## 2021-05-17 ENCOUNTER — TRANSFERRED RECORDS (OUTPATIENT)
Dept: HEALTH INFORMATION MANAGEMENT | Facility: CLINIC | Age: 86
End: 2021-05-17

## 2021-05-18 DIAGNOSIS — E61.1 LOW IRON: ICD-10-CM

## 2021-05-18 DIAGNOSIS — I10 ESSENTIAL HYPERTENSION: ICD-10-CM

## 2021-05-18 RX ORDER — FERROUS GLUCONATE 324(38)MG
324 TABLET ORAL
Qty: 90 TABLET | Refills: 0 | OUTPATIENT
Start: 2021-05-18

## 2021-05-18 NOTE — TELEPHONE ENCOUNTER
Medication refused. Should have enough medication, script sent for 90 day supply 4/29/21.    Ermelinda Mora RN

## 2021-05-18 NOTE — TELEPHONE ENCOUNTER
Son called stating that [facility] needs refill for lisinopril.     Routing refill request to provider for review/approval because:  Medication is reported/historical    Ermelinda Mora RN

## 2021-05-20 RX ORDER — LISINOPRIL 20 MG/1
20 TABLET ORAL DAILY
Qty: 90 TABLET | Refills: 3 | Status: SHIPPED | OUTPATIENT
Start: 2021-05-20 | End: 2021-06-04

## 2021-05-22 ENCOUNTER — RECORDS - HEALTHEAST (OUTPATIENT)
Dept: LAB | Facility: CLINIC | Age: 86
End: 2021-05-22

## 2021-05-22 LAB
SARS-COV-2 PCR COMMENT: NORMAL
SARS-COV-2 RNA SPEC QL NAA+PROBE: NEGATIVE
SARS-COV-2 VIRUS SPECIMEN SOURCE: NORMAL

## 2021-05-25 ENCOUNTER — MEDICAL CORRESPONDENCE (OUTPATIENT)
Dept: HEALTH INFORMATION MANAGEMENT | Facility: CLINIC | Age: 86
End: 2021-05-25

## 2021-05-26 ENCOUNTER — TELEPHONE (OUTPATIENT)
Dept: FAMILY MEDICINE | Facility: CLINIC | Age: 86
End: 2021-05-26

## 2021-05-26 ENCOUNTER — TRANSFERRED RECORDS (OUTPATIENT)
Dept: HEALTH INFORMATION MANAGEMENT | Facility: CLINIC | Age: 86
End: 2021-05-26

## 2021-05-26 NOTE — TELEPHONE ENCOUNTER
Aide nurse sent over recent BP's for pt:    5/14/21 0853: 160/69  5/15/21 0847: 146/54  5/16/21 0919: 144/65  5/18/21 0954: 136/78  5/19/21 1211: 136/65  5/20/21 0817: 155/61  5/21/21 1037: 146/68  5/23/21 1347: 148/61  5/25/21 0846: 147/59    Ermelinda Mora RN

## 2021-05-26 NOTE — LETTER
May 28, 2021      Swahti Dukes  [address]   [place] [address]        To Whom It May Concern,      Swathi Steven's has the following changes:    Check blood pressure twice weekly for the next month.     Please fax data at the end of June to # [phone]      Sincerely,          Cortney Vanessa MD

## 2021-05-27 ENCOUNTER — TRANSFERRED RECORDS (OUTPATIENT)
Dept: HEALTH INFORMATION MANAGEMENT | Facility: CLINIC | Age: 86
End: 2021-05-27

## 2021-05-27 NOTE — TELEPHONE ENCOUNTER
Most BP in range for her age.     Please have them check twice weekly over the next month.     Is she feeling well?    JH

## 2021-05-28 DIAGNOSIS — E61.1 LOW IRON: ICD-10-CM

## 2021-05-28 RX ORDER — FERROUS GLUCONATE 324(38)MG
324 TABLET ORAL
Qty: 90 TABLET | Refills: 0 | Status: SHIPPED | OUTPATIENT
Start: 2021-05-28 | End: 2021-06-04

## 2021-05-28 NOTE — TELEPHONE ENCOUNTER
Called pt to check in, pt stated that she is feeling much better today.  She had a couple panic attacks this week, and will have follow up with Dr. Parada in a couple weeks.  She said overall health she is feeling better.      Pt did mention that an RN from [facility] did a visit with her on 5/17/21.  The nurse said that she noticed a blockage in her neck when she listened with stethoscope.  The RN also noted that her murmer has gotten stronger.     Called [employer] talked with Claudia PAVON to see if they could send over records from RN visit, they stated that it could take 3-4 weeks for us to received RN notes.      Spoke with Aide Hollins RN, and asked them to take BP twice weekly for the next month.  Faxed orders to  #[phone].    Please advise on RN visit above.    Ermelinda Mora RN

## 2021-05-28 NOTE — TELEPHONE ENCOUNTER
Prescription approved per [facility] Refill Protocol.  See previous note.     Ermelinda Mora RN

## 2021-05-28 NOTE — TELEPHONE ENCOUNTER
Talked with Gunjan FUENTES from [facility].  They need a new written order to start the ferrous gluconate.      Order faxed to [facility] at [phone].     RN also stated that they need another refill of the ferrous gluconate, according to the the RN the son does not recall if he dropped them off at the facility, and may have misplaced them.     Ermelinda Mora RN

## 2021-06-01 DIAGNOSIS — K52.9 CHRONIC DIARRHEA: ICD-10-CM

## 2021-06-01 RX ORDER — LOPERAMIDE HCL 2 MG
2 CAPSULE ORAL 3 TIMES DAILY PRN
Qty: 30 CAPSULE | Refills: 1 | Status: SHIPPED | OUTPATIENT
Start: 2021-06-01 | End: 2021-06-04

## 2021-06-01 NOTE — TELEPHONE ENCOUNTER
Routing refill request to provider for review/approval because:  Drug not on the [facility] refill protocol     Yesika Gil RN   [facility] -- Triage Nurse

## 2021-06-04 ENCOUNTER — TELEPHONE (OUTPATIENT)
Dept: FAMILY MEDICINE | Facility: CLINIC | Age: 86
End: 2021-06-04

## 2021-06-04 DIAGNOSIS — E78.2 MIXED HYPERLIPIDEMIA: ICD-10-CM

## 2021-06-04 DIAGNOSIS — E61.1 LOW IRON: ICD-10-CM

## 2021-06-04 DIAGNOSIS — K52.9 CHRONIC DIARRHEA: ICD-10-CM

## 2021-06-04 DIAGNOSIS — H40.003 GLAUCOMA SUSPECT, BILATERAL: ICD-10-CM

## 2021-06-04 DIAGNOSIS — R11.0 NAUSEA: ICD-10-CM

## 2021-06-04 DIAGNOSIS — I10 ESSENTIAL HYPERTENSION: ICD-10-CM

## 2021-06-04 RX ORDER — FERROUS GLUCONATE 324(38)MG
324 TABLET ORAL
Qty: 90 TABLET | Refills: 0 | Status: SHIPPED | OUTPATIENT
Start: 2021-06-04 | End: 2021-08-09

## 2021-06-04 RX ORDER — HYDRALAZINE HYDROCHLORIDE 25 MG/1
25 TABLET, FILM COATED ORAL 3 TIMES DAILY
Qty: 270 TABLET | Refills: 1 | Status: SHIPPED | OUTPATIENT
Start: 2021-06-04 | End: 2021-11-02

## 2021-06-04 RX ORDER — LISINOPRIL 20 MG/1
20 TABLET ORAL DAILY
Qty: 90 TABLET | Refills: 3 | Status: SHIPPED | OUTPATIENT
Start: 2021-06-04 | End: 2022-05-12

## 2021-06-04 RX ORDER — LOPERAMIDE HCL 2 MG
2 CAPSULE ORAL 3 TIMES DAILY PRN
Qty: 30 CAPSULE | Refills: 1 | Status: SHIPPED | OUTPATIENT
Start: 2021-06-04 | End: 2023-01-26

## 2021-06-04 RX ORDER — SIMVASTATIN 10 MG
10 TABLET ORAL AT BEDTIME
Qty: 90 TABLET | Refills: 1 | Status: SHIPPED | OUTPATIENT
Start: 2021-06-04 | End: 2022-05-12

## 2021-06-04 RX ORDER — LATANOPROST 50 UG/ML
1 SOLUTION/ DROPS OPHTHALMIC AT BEDTIME
Qty: 7.5 ML | Refills: 1 | Status: SHIPPED | OUTPATIENT
Start: 2021-06-04 | End: 2022-03-24

## 2021-06-04 RX ORDER — BRIMONIDINE TARTRATE 1 MG/ML
1 SOLUTION/ DROPS OPHTHALMIC 2 TIMES DAILY
Qty: 10 ML | Refills: 2 | Status: SHIPPED | OUTPATIENT
Start: 2021-06-04 | End: 2022-11-09

## 2021-06-04 RX ORDER — AMLODIPINE BESYLATE 10 MG/1
10 TABLET ORAL DAILY
Qty: 90 TABLET | Refills: 3 | Status: SHIPPED | OUTPATIENT
Start: 2021-06-04 | End: 2022-05-12

## 2021-06-04 RX ORDER — ONDANSETRON 4 MG/1
4 TABLET, FILM COATED ORAL EVERY 8 HOURS PRN
Qty: 30 TABLET | Refills: 3 | Status: SHIPPED | OUTPATIENT
Start: 2021-06-04 | End: 2022-05-12

## 2021-06-04 RX ORDER — DORZOLAMIDE HCL 20 MG/ML
1 SOLUTION/ DROPS OPHTHALMIC 2 TIMES DAILY
Qty: 10 ML | Refills: 0 | Status: SHIPPED | OUTPATIENT
Start: 2021-06-04 | End: 2021-06-22

## 2021-06-04 NOTE — TELEPHONE ENCOUNTER
Sha from [facility] called and lvm stating that pt would like to change pharmacy to [facility] on [address].  They requested that we send new scripts to the pharmacy, because they would like them switched over by Thursday.      New scripts sent to new pharmacy per pt request.     Ermelinda Mora RN

## 2021-06-09 ENCOUNTER — TELEPHONE (OUTPATIENT)
Dept: FAMILY MEDICINE | Facility: CLINIC | Age: 86
End: 2021-06-09

## 2021-06-09 NOTE — TELEPHONE ENCOUNTER
"Son Brent called DARIO RN.  He stated that his mom mentioned this week that \"she has had UTI for the last month\"      He stated that she doesn't exhibit any symptoms.  She has a history of chronic UTI's and is curious what medications could be used due to her history of stomach upset/Clostridium difficile in the past.       Recommended e-visit or virtual visit.  Son stated those are difficult for pt. Scheduled OV for 6/15/21.      Advised to call clinic for sooner visit or go to urgent care if pt develops any symptoms prior to appt.  Son agreeable to this.     Otherwise son sated pt is doing well with new psychiatrist and started a new medication last week.  Discontinued the Zoloft. Follows with Dr. Parada every couple weeks.     Ermelinda Mora RN     "

## 2021-06-09 NOTE — PROGRESS NOTES
Pre-Visit Planning   Next 5 appointments (look out 90 days)    Joel 15, 2021  1:00 PM  (Arrive by 12:40 PM)  Office Visit with Cortney Vanessa MD  [facility] ([facility] ) [address] [address] [phone]        Appointment Notes for this encounter:   Urinary symptoms, UA needed?    Questionnaires Reviewed/Assigned  No additional questionnaires are needed  Patient preferred phone number: [phone]    Patient contact not needed.  ALFREDO BISHOP made appt.   Ermelinda Mora RN

## 2021-06-15 ENCOUNTER — OFFICE VISIT (OUTPATIENT)
Dept: FAMILY MEDICINE | Facility: CLINIC | Age: 86
End: 2021-06-15
Payer: COMMERCIAL

## 2021-06-15 VITALS
BODY MASS INDEX: 22.98 KG/M2 | HEART RATE: 67 BPM | HEIGHT: 64 IN | TEMPERATURE: 97.7 F | WEIGHT: 134.6 LBS | SYSTOLIC BLOOD PRESSURE: 148 MMHG | DIASTOLIC BLOOD PRESSURE: 42 MMHG | RESPIRATION RATE: 16 BRPM | OXYGEN SATURATION: 98 %

## 2021-06-15 DIAGNOSIS — J34.89 NASAL LESION: ICD-10-CM

## 2021-06-15 DIAGNOSIS — I10 ESSENTIAL HYPERTENSION: ICD-10-CM

## 2021-06-15 DIAGNOSIS — I35.0 NONRHEUMATIC AORTIC VALVE STENOSIS: ICD-10-CM

## 2021-06-15 DIAGNOSIS — R30.0 DYSURIA: Primary | ICD-10-CM

## 2021-06-15 LAB
ALBUMIN UR-MCNC: NEGATIVE MG/DL
APPEARANCE UR: CLEAR
BILIRUB UR QL STRIP: NEGATIVE
COLOR UR AUTO: YELLOW
GLUCOSE UR STRIP-MCNC: NEGATIVE MG/DL
HGB UR QL STRIP: NEGATIVE
KETONES UR STRIP-MCNC: NEGATIVE MG/DL
LEUKOCYTE ESTERASE UR QL STRIP: NEGATIVE
NITRATE UR QL: NEGATIVE
PH UR STRIP: 5 PH (ref 5–7)
SOURCE: NORMAL
SP GR UR STRIP: 1.02 (ref 1–1.03)
UROBILINOGEN UR STRIP-ACNC: 0.2 EU/DL (ref 0.2–1)

## 2021-06-15 PROCEDURE — 81003 URINALYSIS AUTO W/O SCOPE: CPT | Performed by: FAMILY MEDICINE

## 2021-06-15 PROCEDURE — 99214 OFFICE O/P EST MOD 30 MIN: CPT | Performed by: FAMILY MEDICINE

## 2021-06-15 RX ORDER — NITROFURANTOIN 25; 75 MG/1; MG/1
100 CAPSULE ORAL 2 TIMES DAILY
Qty: 28 CAPSULE | Refills: 0 | Status: CANCELLED | OUTPATIENT
Start: 2021-06-15 | End: 2021-06-29

## 2021-06-15 ASSESSMENT — MIFFLIN-ST. JEOR: SCORE: 1042.6

## 2021-06-15 NOTE — PROGRESS NOTES
Assessment & Plan     Dysuria - normal UA today, advised no abx needed today. Can come in for UA if symptoms become more severe.   - *UA reflex to Microscopic and Culture ([facility] (except [place] and [place])  - *UA reflex to Microscopic and Culture ([facility] (except [place] and [place]); Standing    Nasal lesion - suggested derm referral as did not improve with cryotherapy  - ADULT DERMATOLOGY REFERRAL; Future    Essential hypertension - in range today, although we discussed DBP. Will continue to monitor.     Nonrheumatic aortic valve stenosis - stable, will follow with echo annually.     Return in about 3 months (around 9/15/2021) for Medication Recheck.    Cortney Vanessa MD  [facility] DEANEGLO Echevarria is a 85 year old who presents for the following health issues     HPI     Genitourinary - Female  Onset/Duration: 3 weeks ago   Description:   Painful urination (Dysuria): YES - a little            Frequency: YES  Blood in urine (Hematuria): no  Delay in urine (Hesitency): no  Intensity: severe  Progression of Symptoms:  worsening  Accompanying Signs & Symptoms:  Fever/chills: no  Flank pain: no  Nausea and vomiting: no  Vaginal symptoms: none  Abdominal/Pelvic Pain: no  History:   History of frequent UTI s: YES  History of kidney stones: no  Sexually Active:   Possibility of pregnancy:   Precipitating or alleviating factors:   Therapies tried and outcome: Cranberry juice prn (contraindicated in Coumadin patients)        Her cloudy urine was a concern, now resolved. Notes some discomfort with passing urine at baseline, doesn't feel like this is worse. Feels increase in urinary frequency, but drinking more water.     Nasal lesion, we froze previous, didn't resolve. Scaly, nasal bridge.       Review of Systems   Constitutional, HEENT, cardiovascular, pulmonary, gi and gu systems are negative, except as otherwise noted.      Objective  "   BP (!) 148/42 (BP Location: Right arm, Patient Position: Chair, Cuff Size: Adult Regular)   Pulse 67   Temp 97.7  F (36.5  C) (Oral)   Resp 16   Ht 1.613 m (5' 3.5\")   Wt 61.1 kg (134 lb 9.6 oz)   SpO2 98%   BMI 23.47 kg/m    Body mass index is 23.47 kg/m .  Physical Exam   GENERAL: healthy, alert and no distress  NECK: no adenopathy, no asymmetry, masses, or scars and thyroid normal to palpation  RESP: lungs clear to auscultation - no rales, rhonchi or wheezes  CV: 2/6 systolic flow murmur, best appreciated LUSB, regular rate and rhythm, normal S1 S2, no S3 or S4, no murmur, click or rub, no peripheral edema and peripheral pulses strong  PSYCH: mentation appears normal, affect normal/bright  SKIN: 1 cm scaly erythematous lesion on the right nasal bridge      Results for orders placed or performed in visit on 06/15/21   *UA reflex to Microscopic and Culture ([facility] (except [place] and [place])     Status: None    Specimen: Midstream Urine   Result Value Ref Range    Color Urine Yellow     Appearance Urine Clear     Glucose Urine Negative NEG^Negative mg/dL    Bilirubin Urine Negative NEG^Negative    Ketones Urine Negative NEG^Negative mg/dL    Specific Gravity Urine 1.020 1.003 - 1.035    Blood Urine Negative NEG^Negative    pH Urine 5.0 5.0 - 7.0 pH    Protein Albumin Urine Negative NEG^Negative mg/dL    Urobilinogen Urine 0.2 0.2 - 1.0 EU/dL    Nitrite Urine Negative NEG^Negative    Leukocyte Esterase Urine Negative NEG^Negative    Source Midstream Urine                "

## 2021-06-22 ENCOUNTER — TELEPHONE (OUTPATIENT)
Dept: FAMILY MEDICINE | Facility: CLINIC | Age: 86
End: 2021-06-22

## 2021-06-22 DIAGNOSIS — H40.003 GLAUCOMA SUSPECT, BILATERAL: ICD-10-CM

## 2021-06-22 RX ORDER — DORZOLAMIDE HCL 20 MG/ML
1 SOLUTION/ DROPS OPHTHALMIC 2 TIMES DAILY
Qty: 10 ML | Refills: 0 | Status: SHIPPED | OUTPATIENT
Start: 2021-06-22 | End: 2021-09-02

## 2021-06-22 NOTE — TELEPHONE ENCOUNTER
Suggest she can continue to administer but should have an observer at the time to ensure she is able. If this doesn't go well, can contact me for an order to have staff administer.     JH

## 2021-06-22 NOTE — LETTER
2021      Swathi Dukes  [address]   [place] [address]        Order for Swathi Dukes,  1935      Pt can self administer latanoprost (XALATAN) 0.005% eye drops 1 drop in both eyes at 10 pm.  Staff can observe and if pt unable to administer drops properly can call back for new order to have staff administer.                   Cortney Vanessa MD      2021

## 2021-06-22 NOTE — TELEPHONE ENCOUNTER
[facility] calling    Pt is requesting to self administer latanoprost eye drops at 10 pm.   They do not feel pt is able to do this due to anxiety and confusion.     They would like order to continue giving eye drops at 8 pm with her other evening medications.   If provider feels pt can self administer they need order for that otherwise.    Please advise and letter/order can be faxed to [phone]    Ronda Miller RN

## 2021-06-30 DIAGNOSIS — F42.2 MIXED OBSESSIONAL THOUGHTS AND ACTS: ICD-10-CM

## 2021-06-30 RX ORDER — METHOCARBAMOL 750 MG/1
1 TABLET ORAL WEEKLY
COMMUNITY
Start: 2021-05-26 | End: 2021-06-30

## 2021-06-30 NOTE — TELEPHONE ENCOUNTER
Patient's [facility] calling for refill on olanzapine and vitamin D3 medications. Routing to provider to review and advise.     Shankar VELOZ RN

## 2021-07-01 RX ORDER — METHOCARBAMOL 750 MG/1
1250 TABLET ORAL WEEKLY
Qty: 90 CAPSULE | Refills: 1 | Status: SHIPPED | OUTPATIENT
Start: 2021-07-01 | End: 2022-07-07

## 2021-07-01 RX ORDER — OLANZAPINE 5 MG/1
5 TABLET ORAL AT BEDTIME
Qty: 30 TABLET | Refills: 1 | OUTPATIENT
Start: 2021-07-01

## 2021-07-01 NOTE — TELEPHONE ENCOUNTER
Called Arielle from the LTC and let them know to call the psychiatrist to refill the olanzapine.      Ermelinda Mora RN

## 2021-07-01 NOTE — TELEPHONE ENCOUNTER
I sent in refill for the VD, but she sees a psychiatrist that should be filling meds like zyprexa. I was just maintaining until she could get in with specialist.     JH

## 2021-07-05 DIAGNOSIS — F42.2 MIXED OBSESSIONAL THOUGHTS AND ACTS: ICD-10-CM

## 2021-07-05 RX ORDER — OLANZAPINE 5 MG/1
5 TABLET ORAL AT BEDTIME
Qty: 30 TABLET | Refills: 0 | Status: SHIPPED | OUTPATIENT
Start: 2021-07-05

## 2021-07-05 NOTE — TELEPHONE ENCOUNTER
Pt's son called requesting for a refill of olanzapine for a couple of days until he can get a hold of pt's psychiatrist to order.       She is completely out of medication and is experiencing high anxiety since being out since 7/1/21.  The psychiatrist's office is closed today, so pt would just need a couple pills to get her through until the office is open again.      Please advise on refill, PCP is out of office.      Ermelinda Mora RN

## 2021-07-08 ENCOUNTER — TELEPHONE (OUTPATIENT)
Dept: FAMILY MEDICINE | Facility: CLINIC | Age: 86
End: 2021-07-08

## 2021-07-08 NOTE — TELEPHONE ENCOUNTER
Reason for call:  Form   Our goal is to have forms completed within 72 hours, however some forms may require a visit or additional information.     Who is the form from? Home care  Where did the form come from? form was faxed in  What clinic location was the form placed at? [facility]  Where was the form placed? Dr. Vanessa's Box/Folder  What number is listed as a contact on the form? Nursing    Phone call message - patient request for a letter, form or note:     Date needed: within one week  Please fax to [phone]  Has the patient signed a consent form for release of information? Not Applicable    Additional comments:     Type of letter, form or note: medical    Phone number to reach patient:  Cell number on file:    Telephone Information:   Mobile [phone]       Best Time: ANY    Can we leave a detailed message on this number?  YES    Travel screening: Not Applicable

## 2021-07-09 NOTE — TELEPHONE ENCOUNTER
Form signed by physician. Faxed by  to number listed below. Copy kept at station.     ~Virginia MCQUEEN,

## 2021-07-12 ENCOUNTER — TELEPHONE (OUTPATIENT)
Dept: FAMILY MEDICINE | Facility: CLINIC | Age: 86
End: 2021-07-12

## 2021-07-12 NOTE — TELEPHONE ENCOUNTER
Reason for call:  Form   Our goal is to have forms completed within 72 hours, however some forms may require a visit or additional information.     Who is the form from? Home care  Where did the form come from? form was faxed in  What clinic location was the form placed at? [facility]  Where was the form placed? Dr. Vanessa's Box/Folder  What number is listed as a contact on the form? NA    Phone call message - patient request for a letter, form or note:     Date needed: within one week  Please fax to [phone]  Has the patient signed a consent form for release of information? Not Applicable    Additional comments:     Type of letter, form or note: medical    Phone number to reach patient:  Home number on file [phone] (home)    Best Time: ANY    Can we leave a detailed message on this number?  YES    Travel screening: Not Applicable

## 2021-07-19 ENCOUNTER — TELEPHONE (OUTPATIENT)
Dept: FAMILY MEDICINE | Facility: CLINIC | Age: 86
End: 2021-07-19

## 2021-07-19 NOTE — TELEPHONE ENCOUNTER
Pt called saying she has been have jaw pain that goes into left ear, worse when eating for about a week.  She went to the dentist and they did not see that it is related to her teeth at all and they recommended f/u with PCP.  No other symptoms noted, has been taking Tylenol along with alternating heat and ice, but has continued pain.     Appointment made with provider 7/21/21.      Ermelinda Mora RN

## 2021-07-21 ENCOUNTER — OFFICE VISIT (OUTPATIENT)
Dept: FAMILY MEDICINE | Facility: CLINIC | Age: 86
End: 2021-07-21
Payer: COMMERCIAL

## 2021-07-21 VITALS
SYSTOLIC BLOOD PRESSURE: 132 MMHG | WEIGHT: 134 LBS | OXYGEN SATURATION: 98 % | RESPIRATION RATE: 16 BRPM | TEMPERATURE: 97.9 F | DIASTOLIC BLOOD PRESSURE: 54 MMHG | BODY MASS INDEX: 23.36 KG/M2 | HEART RATE: 58 BPM

## 2021-07-21 DIAGNOSIS — M26.622 TMJ TENDERNESS, LEFT: Primary | ICD-10-CM

## 2021-07-21 PROCEDURE — 99213 OFFICE O/P EST LOW 20 MIN: CPT | Performed by: FAMILY MEDICINE

## 2021-07-21 RX ORDER — CYCLOBENZAPRINE HCL 5 MG
5 TABLET ORAL AT BEDTIME
Qty: 14 TABLET | Refills: 0 | Status: SHIPPED | OUTPATIENT
Start: 2021-07-21 | End: 2022-05-12

## 2021-07-21 RX ORDER — PREDNISONE 10 MG/1
10 TABLET ORAL EVERY MORNING
Qty: 5 TABLET | Refills: 0 | Status: SHIPPED | OUTPATIENT
Start: 2021-07-21 | End: 2022-02-19

## 2021-07-21 ASSESSMENT — ENCOUNTER SYMPTOMS: FEVER: 0

## 2021-07-21 NOTE — PATIENT INSTRUCTIONS
Patient Education     Understanding Temporomandibular Disorders (TMD)    Do you have pain in your face, jaw, or teeth? Do you have trouble chewing? Does your jaw make clicking or popping noises? These symptoms can be caused by temporomandibular disorders (TMD). This term describes a group of problems related to the temporomandibular joint (TMJ) and nearby muscles. Your symptoms may be painful and frustrating. But don t worry. Your healthcare team can help you treat TMD and prevent future problems.  What s wrong?  TMD causes many kinds of symptoms. That s part of the reason it can be hard to diagnose. You may have headaches, tooth pain, or muscle aches. Your pain may be constant. Or it may come and go without any apparent reason. TMD-related problems include:    Tight muscles    Joint inflammation    Joint damage    Teeth grinding or clenching  What can you do?  If you are having TMD symptoms, don t wait. Call your dentist or healthcare provider right away. You don t have to live with pain or discomfort. TMD can be treated. In fact, a key part of treatment is learning to manage your condition at home.  Which treatment is right for you?  Treatment helps rest the muscles and joint. It also helps relieve symptoms and restore function. Depending on the type of problem you have, your treatment plan may include:    Short-term (temporary) diet changes    New habits for managing stress and maintaining the health of your jaw    Medicine to reduce pain and inflammation    Therapy to reduce pressure on the joint and restore function    Dental treatment to reduce pressure on the joint  How can you prevent future problems?  Treatment can help relieve your current condition. But TMD symptoms may return over time. You may prevent future problems by maintaining the health of your jaw:    Stay away from foods and habits that make your symptoms worse.    Lower the stress level in your life.    Follow your treatment plan.    Pay  attention to your body and get help if symptoms return.  [employer] last reviewed this educational content on 8/1/2020 2000-2021 [employer]. All rights reserved. This information is not intended as a substitute for professional medical care. Always follow your healthcare professional's instructions.

## 2021-07-21 NOTE — PROGRESS NOTES
Assessment & Plan     TMJ tenderness, left  Acute problem.  No evidence of otitis media, externa or mastoiditis.  Antibiotic treatment currently not indicated.  Symptoms likely related to temporomandibular joint dysfunction as it only occurs when her jaw is widely open.  She did recently see her dentist there is no evidence of new dental caries or periodontal disease.  She cannot tolerate NSAIDs due to anaphylaxis.  Going to start her on a very low-dose of oral prednisone, Flexeril.  Start daily massage over the area.  If not improving, recommend referral to the TMJ physical therapy.  - cyclobenzaprine (FLEXERIL) 5 MG tablet  Dispense: 14 tablet; Refill: 0  - predniSONE (DELTASONE) 10 MG tablet  Dispense: 5 tablet; Refill: 0        Return in about 1 week (around 7/28/2021) for If symptoms do not improve or gets worse..    Aidan Parra MD  [facility]    Sugar Echevarria is a 85 year old who presents for the following health issues     HPI     Concern - Ear pain  Onset: 1 week  Description: left ear pain  Intensity: moderate  Progression of Symptoms:  worsening  Accompanying Signs & Symptoms: Pain radiates to the jaw  Previous history of similar problem: no  Precipitating factors:        Worsened by: Opening her jaw wide, such as when eating and chewing  Alleviating factors:        Improved by: Not opening her jaw  Therapies tried and outcome: heat - some relief        Review of Systems   Constitutional: Negative for fever.   HENT: Positive for ear pain.             Objective    /54 (Cuff Size: Adult Regular)   Pulse 58   Temp 97.9  F (36.6  C) (Oral)   Resp 16   Wt 60.8 kg (134 lb)   SpO2 98%   BMI 23.36 kg/m    Body mass index is 23.36 kg/m .  Physical Exam  HENT:      Right Ear: Tympanic membrane, ear canal and external ear normal.      Left Ear: Tympanic membrane, ear canal and external ear normal.      Ears:      Comments: Reproducible tenderness over the left TMJ when  her jaw's open.    Bilateral mastoids are normal.  No periauricular adenopathy

## 2021-08-03 ENCOUNTER — TELEPHONE (OUTPATIENT)
Dept: FAMILY MEDICINE | Facility: CLINIC | Age: 86
End: 2021-08-03

## 2021-08-03 NOTE — TELEPHONE ENCOUNTER
Forms/Letter Request    Name of form/letter: Refill Authorization Request    Have you been seen for this request: N/A    Do we have the form/letter: Yes: Given to Dr. Vanessa    When is form/letter needed by: ASAP    How would you like the form/letter returned: Fax    Patient Notified form requests are processed in 3-5 business days:Yes    Okay to leave a detailed message? Yes Cell number on file:    Telephone Information:   Mobile [phone]

## 2021-08-05 ENCOUNTER — TELEPHONE (OUTPATIENT)
Dept: FAMILY MEDICINE | Facility: CLINIC | Age: 86
End: 2021-08-05

## 2021-08-05 NOTE — TELEPHONE ENCOUNTER
Checking in on patient since last appt for jaw pain.  She said that she completed the prednisone and the flexeril with no relief.  Her son is currently working on getting her in to the dentist to do imaging and further investigate.      Let pt know that Dr. Parra recommended TMJ physical therapy if there was no improvement.  Pt declined referral at this time, prefers to see the dentist first.  Advised to call clinic back if symptoms worsen or persist.  Pt agreeable to plan.     Ermelinda Mora RN ;

## 2021-08-07 DIAGNOSIS — E61.1 LOW IRON: ICD-10-CM

## 2021-08-09 RX ORDER — FERROUS GLUCONATE 324(38)MG
324 TABLET ORAL
Qty: 90 TABLET | Refills: 0 | Status: SHIPPED | OUTPATIENT
Start: 2021-08-09 | End: 2021-11-02

## 2021-08-09 RX ORDER — GABAPENTIN 100 MG/1
100 CAPSULE ORAL 3 TIMES DAILY
Qty: 105 CAPSULE | Refills: 11 | Status: SHIPPED | OUTPATIENT
Start: 2021-08-09 | End: 2021-08-10

## 2021-08-09 RX ORDER — FOLIC ACID 1 MG/1
1 TABLET ORAL DAILY
Qty: 30 TABLET | Refills: 11 | Status: SHIPPED | OUTPATIENT
Start: 2021-08-09 | End: 2022-07-12

## 2021-08-09 NOTE — TELEPHONE ENCOUNTER
Routing refill request to provider for review/approval because:  Drug not on the [facility] refill protocol   Drug not active on patient's medication list  Medication is reported/historical    Shankar VELOZ RN

## 2021-08-10 ENCOUNTER — TELEPHONE (OUTPATIENT)
Dept: FAMILY MEDICINE | Facility: CLINIC | Age: 86
End: 2021-08-10

## 2021-08-10 DIAGNOSIS — E61.1 LOW IRON: ICD-10-CM

## 2021-08-10 RX ORDER — GABAPENTIN 100 MG/1
200 CAPSULE ORAL 3 TIMES DAILY
Qty: 105 CAPSULE | Refills: 11 | COMMUNITY
Start: 2021-08-10

## 2021-08-10 NOTE — TELEPHONE ENCOUNTER
Pt calls to get her Rx for the gabapentin to be changed to once daily. The TID is an error. Dr. Parada says no medication changes should be made without his permission.     Please send new Rx for gabapentin 100mg daily. Helena Arenas RN on 8/10/2021 at 4:42 PM

## 2021-09-02 DIAGNOSIS — H40.003 GLAUCOMA SUSPECT, BILATERAL: ICD-10-CM

## 2021-09-02 RX ORDER — DORZOLAMIDE HCL 20 MG/ML
1 SOLUTION/ DROPS OPHTHALMIC 2 TIMES DAILY
Qty: 10 ML | Refills: 0 | Status: SHIPPED | OUTPATIENT
Start: 2021-09-02 | End: 2021-10-19

## 2021-09-05 DIAGNOSIS — F42.2 MIXED OBSESSIONAL THOUGHTS AND ACTS: ICD-10-CM

## 2021-09-07 ENCOUNTER — TRANSFERRED RECORDS (OUTPATIENT)
Dept: HEALTH INFORMATION MANAGEMENT | Facility: CLINIC | Age: 86
End: 2021-09-07

## 2021-09-07 NOTE — TELEPHONE ENCOUNTER
Patient has upcoming appointment w/ PCP 9/30/21. Routing refill request to provider for review/approval because:  Drug not active on patient's medication list  Labs not current:  ARRON VELOZ RN

## 2021-09-09 ENCOUNTER — TRANSFERRED RECORDS (OUTPATIENT)
Dept: HEALTH INFORMATION MANAGEMENT | Facility: CLINIC | Age: 86
End: 2021-09-09

## 2021-09-09 RX ORDER — OLANZAPINE 5 MG/1
5 TABLET ORAL AT BEDTIME
Qty: 31 TABLET | Refills: 11 | OUTPATIENT
Start: 2021-09-09

## 2021-09-09 RX ORDER — DULOXETIN HYDROCHLORIDE 20 MG/1
20 CAPSULE, DELAYED RELEASE ORAL 2 TIMES DAILY
Qty: 62 CAPSULE | Refills: 11 | OUTPATIENT
Start: 2021-09-09

## 2021-09-24 NOTE — PROGRESS NOTES
Pre-Visit Planning   Next 5 appointments (look out 90 days)    Sep 30, 2021  3:30 PM  (Arrive by 3:10 PM)  Office Visit with Cortney Vanessa MD  [facility] [address] [facility] [address] [phone]        Appointment Notes for this encounter:   Med check    Questionnaires Reviewed/Assigned  Additional questionnaires assigned  Patient preferred phone number: [phone]    Unable to reach. Left voicemail. Advised patient to call clinic back at [phone].  Ermelinda Mora RN

## 2021-09-28 ENCOUNTER — TRANSFERRED RECORDS (OUTPATIENT)
Dept: HEALTH INFORMATION MANAGEMENT | Facility: CLINIC | Age: 86
End: 2021-09-28

## 2021-09-30 ENCOUNTER — OFFICE VISIT (OUTPATIENT)
Dept: FAMILY MEDICINE | Facility: CLINIC | Age: 86
End: 2021-09-30
Payer: COMMERCIAL

## 2021-09-30 VITALS
RESPIRATION RATE: 18 BRPM | OXYGEN SATURATION: 96 % | TEMPERATURE: 98.8 F | DIASTOLIC BLOOD PRESSURE: 56 MMHG | SYSTOLIC BLOOD PRESSURE: 128 MMHG | HEART RATE: 66 BPM | BODY MASS INDEX: 23.78 KG/M2 | WEIGHT: 136.4 LBS

## 2021-09-30 DIAGNOSIS — M25.562 CHRONIC PAIN OF LEFT KNEE: ICD-10-CM

## 2021-09-30 DIAGNOSIS — I10 ESSENTIAL HYPERTENSION: ICD-10-CM

## 2021-09-30 DIAGNOSIS — R79.0 LOW IRON STORES: ICD-10-CM

## 2021-09-30 DIAGNOSIS — G89.29 CHRONIC PAIN OF LEFT KNEE: ICD-10-CM

## 2021-09-30 DIAGNOSIS — M54.16 LUMBAR RADICULOPATHY: Primary | ICD-10-CM

## 2021-09-30 PROCEDURE — 99214 OFFICE O/P EST MOD 30 MIN: CPT | Performed by: FAMILY MEDICINE

## 2021-09-30 ASSESSMENT — PATIENT HEALTH QUESTIONNAIRE - PHQ9
SUM OF ALL RESPONSES TO PHQ QUESTIONS 1-9: 0
SUM OF ALL RESPONSES TO PHQ QUESTIONS 1-9: 0
10. IF YOU CHECKED OFF ANY PROBLEMS, HOW DIFFICULT HAVE THESE PROBLEMS MADE IT FOR YOU TO DO YOUR WORK, TAKE CARE OF THINGS AT HOME, OR GET ALONG WITH OTHER PEOPLE: NOT DIFFICULT AT ALL

## 2021-09-30 NOTE — PROGRESS NOTES
Assessment & Plan     Lumbar radiculopathy - discussed posture, working on core strengthening over the next few months. Discussed could pursue VIRI if needed in the future.     Essential hypertension - in range, continue current    Chronic pain of left knee - discussed likely patellar pain, reviewed XR from earlier this year. Advised use topicals, although cannot use diclofenac due to previous reaction.     Low iron stores - suggested surveillance labs in November.   - Iron and iron binding capacity; Future  - CBC with platelets; Future  - Ferritin; Future      Return in about 6 months (around 3/30/2022) for Yearly Preventive Exam, Medication Recheck.    Cortney Vanessa MD  [facility] is a 85 year old who presents for the following health issues  accompanied by her son Brent PIMENTEL     Hypertension Follow-up      Do you check your blood pressure regularly outside of the clinic? Yes     Are you following a low salt diet? No    Are your blood pressures ever more than 140 on the top number (systolic) OR more   than 90 on the bottom number (diastolic), for example 140/90? No      How many servings of fruits and vegetables do you eat daily?  4 or more    On average, how many sweetened beverages do you drink each day (Examples: soda, juice, sweet tea, etc.  Do NOT count diet or artificially sweetened beverages)?   0    How many days per week do you exercise enough to make your heart beat faster? 3 or less    How many minutes a day do you exercise enough to make your heart beat faster? 30 - 60    How many days per week do you miss taking your medication? 0      Years of mid to low back pain, bilateral. Typically after sitting for a bit of time, then getting up. Hunching her shoulders helps relieve the pain.     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 Typically when she is seated for more than 30 minutes. Will get a strong ache in the knee. Straightening the  knee helps relieve the pain.         Review of Systems   Constitutional, HEENT, cardiovascular, pulmonary, gi and gu systems are negative, except as otherwise noted.      Objective    /56   Pulse 66   Temp 98.8  F (37.1  C) (Tympanic)   Resp 18   Wt 61.9 kg (136 lb 6.4 oz)   SpO2 96%   BMI 23.78 kg/m    Body mass index is 23.78 kg/m .  Physical Exam   GENERAL: healthy, alert and no distress  RESP: lungs clear to auscultation - no rales, rhonchi or wheezes  CV: regular rate and rhythm, normal S1 S2, no S3 or S4, no murmur, click or rub, no peripheral edema and peripheral pulses strong  PSYCH: mentation appears normal, affect normal/bright

## 2021-09-30 NOTE — PATIENT INSTRUCTIONS
For the knee:  Biofreeze  Topical lidocaine    For the back:  Consistent posture  3 set of 20 reps abdominal squeezes

## 2021-10-15 ENCOUNTER — TELEPHONE (OUTPATIENT)
Dept: FAMILY MEDICINE | Facility: CLINIC | Age: 86
End: 2021-10-15
Payer: COMMERCIAL

## 2021-10-15 NOTE — TELEPHONE ENCOUNTER
Reason for Call:  Form, our goal is to have forms completed with 72 hours, however, some forms may require a visit or additional information.    Type of letter, form or note:  medication     Who is the form from?: pharmacy  (if other please explain)    Where did the form come from: form was faxed in    What clinic location was the form placed at?: [facility]     Where the form was placed: Dr Vanessa Box/Folder    What number is listed as a contact on the form?: [phone]       Additional comments: please sign/date and fax to [phone]    Call taken on 10/15/2021 at 1:16 PM by Marge Degroot

## 2021-10-19 ENCOUNTER — TELEPHONE (OUTPATIENT)
Dept: FAMILY MEDICINE | Facility: CLINIC | Age: 86
End: 2021-10-19

## 2021-10-19 DIAGNOSIS — H40.003 GLAUCOMA SUSPECT, BILATERAL: ICD-10-CM

## 2021-10-19 RX ORDER — DORZOLAMIDE HCL 20 MG/ML
1 SOLUTION/ DROPS OPHTHALMIC 2 TIMES DAILY
Qty: 10 ML | Refills: 0 | Status: SHIPPED | OUTPATIENT
Start: 2021-10-19 | End: 2022-09-02

## 2021-10-19 NOTE — TELEPHONE ENCOUNTER
Routing refill request to provider for review/approval because:  Drug not on the [facility] refill protocol     Shankar VELOZ RN

## 2021-10-19 NOTE — TELEPHONE ENCOUNTER
Reason for Call:  Form, our goal is to have forms completed with 72 hours, however, some forms may require a visit or additional information.    Type of letter, form or note:  medication     Who is the form from?: pharmacy  (if other please explain)    Where did the form come from: form was faxed in    What clinic location was the form placed at?: [facility]     Where the form was placed: Dr Vanessa  Box/Folder    What number is listed as a contact on the form?: [phone]       Additional comments: please sign.date and fax to [phone]    Call taken on 10/19/2021 at 4:41 PM by Marge Degroot

## 2021-10-19 NOTE — TELEPHONE ENCOUNTER
Reason for Call:  Other prescription    Detailed comments: PT NEEDS REFILL FOR DORSOLAMIDE, SHE IS OUT.    Phone Number Patient can be reached at: Other phone number:  [phone]    Best Time: ANYTIME, ASK FOR IISHA (SOUNDS LIKE EYE-EE-SHA) CAMACHO    Can we leave a detailed message on this number? YES    Call taken on 10/19/2021 at 2:25 PM by Kina Brewer

## 2021-10-25 NOTE — TELEPHONE ENCOUNTER
Form faxed to [phone].  Jose Carlton, [facility]  [phone]  October 25, 2021 1:14 PM

## 2021-10-26 ENCOUNTER — APPOINTMENT (OUTPATIENT)
Dept: CT IMAGING | Facility: CLINIC | Age: 86
End: 2021-10-26
Attending: EMERGENCY MEDICINE
Payer: COMMERCIAL

## 2021-10-26 ENCOUNTER — HOSPITAL ENCOUNTER (EMERGENCY)
Facility: CLINIC | Age: 86
Discharge: HOME | End: 2021-10-26
Attending: EMERGENCY MEDICINE | Admitting: EMERGENCY MEDICINE
Payer: COMMERCIAL

## 2021-10-26 ENCOUNTER — APPOINTMENT (OUTPATIENT)
Dept: GENERAL RADIOLOGY | Facility: CLINIC | Age: 86
End: 2021-10-26
Attending: EMERGENCY MEDICINE
Payer: COMMERCIAL

## 2021-10-26 VITALS
TEMPERATURE: 97.9 F | DIASTOLIC BLOOD PRESSURE: 62 MMHG | WEIGHT: 133 LBS | RESPIRATION RATE: 18 BRPM | HEART RATE: 74 BPM | HEIGHT: 64 IN | SYSTOLIC BLOOD PRESSURE: 144 MMHG | BODY MASS INDEX: 22.71 KG/M2 | OXYGEN SATURATION: 99 %

## 2021-10-26 DIAGNOSIS — E04.1 THYROID NODULE: ICD-10-CM

## 2021-10-26 DIAGNOSIS — J01.90 ACUTE NON-RECURRENT SINUSITIS, UNSPECIFIED LOCATION: ICD-10-CM

## 2021-10-26 DIAGNOSIS — J38.7 EPIGLOTTIC LESION: ICD-10-CM

## 2021-10-26 LAB
ANION GAP SERPL CALCULATED.3IONS-SCNC: 5 MMOL/L (ref 3–14)
ATRIAL RATE - MUSE: 64 BPM
BASOPHILS # BLD AUTO: 0.1 10E3/UL (ref 0–0.2)
BASOPHILS NFR BLD AUTO: 2 %
BUN SERPL-MCNC: 23 MG/DL (ref 7–30)
CALCIUM SERPL-MCNC: 9.6 MG/DL (ref 8.5–10.1)
CHLORIDE BLD-SCNC: 109 MMOL/L (ref 94–109)
CO2 SERPL-SCNC: 25 MMOL/L (ref 20–32)
CREAT SERPL-MCNC: 0.8 MG/DL (ref 0.52–1.04)
DIASTOLIC BLOOD PRESSURE - MUSE: NORMAL MMHG
EOSINOPHIL # BLD AUTO: 0.3 10E3/UL (ref 0–0.7)
EOSINOPHIL NFR BLD AUTO: 5 %
ERYTHROCYTE [DISTWIDTH] IN BLOOD BY AUTOMATED COUNT: 14.6 % (ref 10–15)
FLUAV RNA SPEC QL NAA+PROBE: NEGATIVE
FLUBV RNA RESP QL NAA+PROBE: NEGATIVE
GFR SERPL CREATININE-BSD FRML MDRD: 68 ML/MIN/1.73M2
GLUCOSE BLD-MCNC: 110 MG/DL (ref 70–99)
HCT VFR BLD AUTO: 31.3 % (ref 35–47)
HGB BLD-MCNC: 10.3 G/DL (ref 11.7–15.7)
HOLD SPECIMEN: NORMAL
IMM GRANULOCYTES # BLD: 0 10E3/UL
IMM GRANULOCYTES NFR BLD: 0 %
INTERPRETATION ECG - MUSE: NORMAL
LYMPHOCYTES # BLD AUTO: 1.2 10E3/UL (ref 0.8–5.3)
LYMPHOCYTES NFR BLD AUTO: 23 %
MCH RBC QN AUTO: 32.3 PG (ref 26.5–33)
MCHC RBC AUTO-ENTMCNC: 32.9 G/DL (ref 31.5–36.5)
MCV RBC AUTO: 98 FL (ref 78–100)
MONOCYTES # BLD AUTO: 0.5 10E3/UL (ref 0–1.3)
MONOCYTES NFR BLD AUTO: 10 %
NEUTROPHILS # BLD AUTO: 3.3 10E3/UL (ref 1.6–8.3)
NEUTROPHILS NFR BLD AUTO: 60 %
NRBC # BLD AUTO: 0 10E3/UL
NRBC BLD AUTO-RTO: 0 /100
P AXIS - MUSE: 55 DEGREES
PLATELET # BLD AUTO: 250 10E3/UL (ref 150–450)
POTASSIUM BLD-SCNC: 4.3 MMOL/L (ref 3.4–5.3)
PR INTERVAL - MUSE: 222 MS
QRS DURATION - MUSE: 86 MS
QT - MUSE: 428 MS
QTC - MUSE: 441 MS
R AXIS - MUSE: -20 DEGREES
RBC # BLD AUTO: 3.19 10E6/UL (ref 3.8–5.2)
RSV RNA SPEC NAA+PROBE: NEGATIVE
SARS-COV-2 RNA RESP QL NAA+PROBE: NEGATIVE
SODIUM SERPL-SCNC: 139 MMOL/L (ref 133–144)
SYSTOLIC BLOOD PRESSURE - MUSE: NORMAL MMHG
T AXIS - MUSE: 79 DEGREES
TROPONIN I SERPL-MCNC: 0.02 UG/L (ref 0–0.04)
TROPONIN I SERPL-MCNC: 0.02 UG/L (ref 0–0.04)
VENTRICULAR RATE- MUSE: 64 BPM
WBC # BLD AUTO: 5.4 10E3/UL (ref 4–11)

## 2021-10-26 PROCEDURE — 93005 ELECTROCARDIOGRAM TRACING: CPT

## 2021-10-26 PROCEDURE — 94640 AIRWAY INHALATION TREATMENT: CPT

## 2021-10-26 PROCEDURE — 80048 BASIC METABOLIC PNL TOTAL CA: CPT | Performed by: EMERGENCY MEDICINE

## 2021-10-26 PROCEDURE — 99285 EMERGENCY DEPT VISIT HI MDM: CPT | Mod: 25

## 2021-10-26 PROCEDURE — 71046 X-RAY EXAM CHEST 2 VIEWS: CPT

## 2021-10-26 PROCEDURE — 70490 CT SOFT TISSUE NECK W/O DYE: CPT

## 2021-10-26 PROCEDURE — 85025 COMPLETE CBC W/AUTO DIFF WBC: CPT | Performed by: EMERGENCY MEDICINE

## 2021-10-26 PROCEDURE — 250N000013 HC RX MED GY IP 250 OP 250 PS 637: Performed by: EMERGENCY MEDICINE

## 2021-10-26 PROCEDURE — 87637 SARSCOV2&INF A&B&RSV AMP PRB: CPT | Performed by: EMERGENCY MEDICINE

## 2021-10-26 PROCEDURE — 36415 COLL VENOUS BLD VENIPUNCTURE: CPT | Performed by: EMERGENCY MEDICINE

## 2021-10-26 PROCEDURE — C9803 HOPD COVID-19 SPEC COLLECT: HCPCS

## 2021-10-26 PROCEDURE — 84484 ASSAY OF TROPONIN QUANT: CPT | Performed by: EMERGENCY MEDICINE

## 2021-10-26 RX ORDER — ALBUTEROL SULFATE 0.83 MG/ML
2.5 SOLUTION RESPIRATORY (INHALATION)
Status: ACTIVE | OUTPATIENT
Start: 2021-10-26 | End: 2021-10-26

## 2021-10-26 RX ORDER — ALBUTEROL SULFATE 90 UG/1
6 AEROSOL, METERED RESPIRATORY (INHALATION) ONCE
Status: COMPLETED | OUTPATIENT
Start: 2021-10-26 | End: 2021-10-26

## 2021-10-26 RX ADMIN — ALBUTEROL SULFATE 6 PUFF: 90 AEROSOL, METERED RESPIRATORY (INHALATION) at 05:16

## 2021-10-26 ASSESSMENT — ENCOUNTER SYMPTOMS
SHORTNESS OF BREATH: 1
SORE THROAT: 0
TROUBLE SWALLOWING: 0
COUGH: 0
FEVER: 0
VOICE CHANGE: 1

## 2021-10-26 ASSESSMENT — MIFFLIN-ST. JEOR: SCORE: 1043.28

## 2021-10-26 NOTE — ED PROVIDER NOTES
History   Chief Complaint:  Shortness of breath     HPI   Swathi Dukes is a 85 year old female with history of aortic stenosis, hypertension, lupus, and anxiety who presents with shortness of breath and the sensation like she has cotton stuck in her throat for the last 2 weeks. Notes no issues with inhaling but has difficulty exhaling. Also mentions nasal congestion and she is blowing out worsening yellow and bloody mucous. Her son reports that her voice has been more hoarse than usual but adds that this varies back to normal. She has been using cough drops and hot tea with no improvement. Tonight, she was laying in bed when her shortness of breath became worse prompting her evaluation. She is scheduled for a Mohs procedure on her nose this afternoon but does not think she can breath well enough for this. Does mentions diarrhea but notes that this is normal for her. Denies difficulty swallowing, post nasal drip, sore throat, chest pain/congestion, cough, or fever. The patient mentions a history of asthma but has not had issues with this for about 10 years. She no longer uses inhalers or nebulizers. No history of lung disease. She is not on blood thinners.     Review of Systems   Constitutional: Negative for fever.   HENT: Positive for congestion and voice change. Negative for postnasal drip, sore throat and trouble swallowing.    Respiratory: Positive for shortness of breath. Negative for cough.    Cardiovascular: Negative for chest pain.   All other systems reviewed and are negative.      Allergies:  Diclofenac  Iodine  Aspirin  Donepezil    Medications:  Norvasc  Flexeril  Apresoline  Zestril  Imodium  Methotrexate  Zyprexa  Zofran  Paxil  Deltasone  Zocor  Neurontin    Past Medical History:     Aortic stenosis  Glaucoma  Hypertension  Pericardial effusion  Systemic lupus erythematosus  Anxiety  DVT  Suicidal ideation  Lumbar radiculopathy    Past Surgical History:    Heart catheterization   Left  "ventriculogram  Left knee surgery  Removal of root tip  Pericardial window for effusion  Appendectomy    Family History:    Father: Diabetes    Social History:  The patient presents to the ED with son.     Physical Exam     Patient Vitals for the past 24 hrs:   BP Temp Temp src Pulse Resp SpO2 Height Weight   10/26/21 0027 (!) 168/71 97.9  F (36.6  C) Temporal 66 18 98 % 1.626 m (5' 4\") 60.3 kg (133 lb)       Physical Exam    HENT:    Mouth/Throat: Oropharynx is without swelling or erythema. Oral mucosa moist.  No difficulty swallowing or handling secretions.  No obvious voice changes.   Eyes: Conjunctivae are normal. No scleral icterus.  Neck: Neck supple. No cervical adenopathy  Cardiovascular: Normal rate, regular rhythm and intact distal pulses.    Pulmonary/Chest: Effort normal and breath sounds normal.   Abdominal: Soft.  No distension. There is no tenderness.   Musculoskeletal:  No edema, No calf tenderness  Neurological:Alert. Coordination normal.   Skin: Skin is warm and dry.   Psychiatric: Normal mood and affect.     Emergency Department Course   ECG  ECG obtained at 0127, ECG read at 0131  Sinus rhythm with 1st degree AV block. Left ventricular hypertrophy with repolarization abnormality. Abnormal ECG.   Rate 64 bpm. KS interval 222 ms. QRS duration 86 ms. QT/QTc 428/441 ms. P-R-T axes 55 -20 79.     Imaging:  CT Soft Tissue Neck w/o Contrast   Final Result   Addendum 1 of 1   Dr Jose Manuel Miranda notified DR CHANTEL DE JESUS at 6:50 AM 10/26/2021.      Final   IMPRESSION:    1.  Right epiglottis lingual surface demonstrates an oval-shaped nodular lesion described above. Findings may reflect a benign polyp or squamous cell carcinoma. Recommend nonemergent ENT referral.   2.  Right thyroid lobe nodule. Recommend nonemergent thyroid ultrasound based upon ACR white paper criteria.    3.  No lymphadenopathy based on size criteria.      Chest XR,  PA & LAT   Final Result   IMPRESSION: Stable cardiomediastinal " silhouette. No focal consolidation or pleural effusion. Atherosclerotic aorta. Slight wedging lower thoracic spine.        Report per radiology    Laboratory:  Labs Ordered and Resulted from Time of ED Arrival Up to the Time of Departure from the ED   BASIC METABOLIC PANEL - Abnormal; Notable for the following components:       Result Value    Glucose 110 (*)     All other components within normal limits   CBC WITH PLATELETS AND DIFFERENTIAL - Abnormal; Notable for the following components:    RBC Count 3.19 (*)     Hemoglobin 10.3 (*)     Hematocrit 31.3 (*)     All other components within normal limits   TROPONIN I - Normal   INFLUENZA A/B & SARS-COV2 PCR MULTIPLEX - Normal    Narrative:     Testing was performed using the Xpert Xpress CoV2/Flu/RSV Assay on the Cepheid GeneXpert Instrument. This test should be ordered for the detection of SARS-CoV-2 and influenza viruses in individuals who meet clinical and/or epidemiological criteria. Test performance is unknown in asymptomatic patients. This test is for in vitro diagnostic use under the FDA EUA for laboratories certified under CLIA to perform high or moderate complexity testing. This test has not been FDA cleared or approved. A negative result does not rule out the presence of PCR inhibitors in the specimen or target RNA in concentration below the limit of detection for the assay. If only one viral target is positive but coinfection with multiple targets is suspected, the sample should be re-tested with another FDA cleared, approved, or authorized test, if coinfection would change clinical management. This test was validated by the [facility]. These laboratories are certified under the Clinical  Laboratory Improvement Amendments of 1988 (CLIA-88) as qualified to perform high complexity laboratory testing.   TROPONIN I - Normal   EXTRA RED TOP TUBE   EXTRA GREEN TOP (LITHIUM HEPARIN) TUBE   EXTRA PURPLE TOP TUBE   EXTRA BLUE TOP TUBE    PERIPHERAL IV CATHETER   CBC WITH PLATELETS & DIFFERENTIAL    Narrative:     The following orders were created for panel order CBC with Platelets & Differential.  Procedure                               Abnormality         Status                     ---------                               -----------         ------                     CBC with platelets and d...[[id number]]  Abnormal            Final result                 Please view results for these tests on the individual orders.   EXTRA TUBE    Narrative:     The following orders were created for panel order Extra Tube ([place] Draw).  Procedure                               Abnormality         Status                     ---------                               -----------         ------                     Extra Red Top Tube[[id number]]                               Final result               Extra Green Top (Lithium...[[id number]]                      Final result               Extra Purple Top Tube[[id number]]                            Final result                 Please view results for these tests on the individual orders.   EXTRA TUBE    Narrative:     The following orders were created for panel order Extra Tube ([place] Draw).  Procedure                               Abnormality         Status                     ---------                               -----------         ------                     Extra Blue Top Tube[[id number]]                              Final result                 Please view results for these tests on the individual orders.      Procedures  None.    Emergency Department Course:  Reviewed:  I reviewed nursing notes, vitals, past medical history and Care Everywhere    Assessments:  6451 I obtained history and examined the patient as noted above.     0708 I rechecked the patient and explained findings.     Interventions:  0516 Albuterol 6 puffs inhalation    Disposition:  The patient was discharged to home.     Impression & Plan          Medical Decision Making:  Swathi Dukes is a 85 year old female who presents with 2 weeks of worsening nasal congestion but also a feeling of what she states is a cotton in her throat and some shortness of breath that is most pronounced when she lies down.  She denies chest pain or a sense that the shortness of breath is related to her chest but rather that it is related to her throat.  EKG was unchanged from baseline labs were reassuring.  Initial troponin was measurable but not abnormal.  I did repeat a troponin which was not significantly changed and as her symptoms are not at all typical for dyspnea on exertion or cardiac symptom feel that no further evaluation is indicated.  I did however feel that her symptoms seem most consistent with a throat issue.  For this reason a CT scan of her neck was obtained which revealed a lesion on her epiglottis.  There are no findings of airway compromise or findings concerning for an infectious etiology at this time.  I spoke with the radiologist who recommended nonemergent referral to ENT for evaluation.  An incidental thyroid nodule was also identified.  She understands to follow-up with primary care for evaluation of this.  Due to the worsening sinus symptoms over weeks we agreed with the plan for initiating antibiotics.  I recommended a probiotic.  She understands to return with new or worsening symptoms, arrange follow-up as outlined above, and follow-up with primary care physician for ongoing evaluation and management.    Diagnosis:    ICD-10-CM    1. Acute non-recurrent sinusitis, unspecified location  J01.90    2. Epiglottic lesion  J38.7    3. Thyroid nodule  E04.1        Discharge Medications:  New Prescriptions    AMOXICILLIN-CLAVULANATE (AUGMENTIN) 875-125 MG TABLET    Take 1 tablet by mouth 2 times daily for 5 days       Scribe Disclosure:  Sheldon MICHAUD, am serving as a scribe at 4:25 AM on 10/26/2021 to document services personally performed by  Taya Hurst MD based on my observations and the provider's statements to me.     Scribe Disclosure:  I, Munira Lozano, am serving as a scribe at 7:02 AM on 10/26/2021 to document services personally performed by Taya Hurst MD based on my observations and the provider's statements to me.             Taya Hurst MD  10/26/21 0743

## 2021-10-26 NOTE — ED TRIAGE NOTES
Here for concern of sob. Stated chills, intermittent sob, coughing, lightheadedness, blood mucus when blowing her nose, and generalized weakness started about 2 weeks ago. Stated that her sob is worsening, having a hard time breathing when trying to sleep. ABCs intact.

## 2021-10-26 NOTE — Clinical Note
Swathi Dukes was seen and treated in our emergency department on 10/26/2021.         Sincerely,     [facility] Emergency Dept

## 2021-10-26 NOTE — DISCHARGE INSTRUCTIONS
Discharge Instructions  Sinus Infection    You have acute sinusitis, or an infection of the sinuses. The sinuses are the hollow areas within the facial bones that are connected to the nasal opening. The most common cause of acute sinusitis is a virus infection associated with the common cold. Bacterial sinusitis occurs much less commonly, usually as a complication of viral sinusitis. Experts say that most sinusitis is caused by a virus within the first 7-10 days of illness. Antibiotics do nothing to help with virus infections, so most people do not need antibiotics for acute sinusitis.     Generally, every Emergency Department visit should have a follow-up clinic visit with either a primary or a specialty clinic/provider. Please follow-up as instructed by your emergency provider today.    Return to the Emergency Department if:  Your vision changes.  You are confused or have difficulty thinking clearly.  You have swelling around your eye.  You develop a severe headache or neck stiffness.  Your symptoms get worse and you are unable to see your primary provider.      Treatment:  Pain relief -- Non-prescription pain medications, such as Tylenol  (acetaminophen) or Motrin  or Advil  (ibuprofen) are recommended for pain.  Do not use a medicine that you are allergic to, or if your provider has told you not to use it.     Nasal irrigation -- Flushing the nose and sinuses with a saline solution several times per day can help to decrease pain caused by congestion.  Nasal decongestants -- Nasal decongestant sprays, including Afrin  (oxymetazoline) and Uziel-Synephrine  (phenylephrine) can be used to temporarily treat congestion. However, these sprays should not be used for more than two to three days due to the risk of rebound congestion (when the nose is congested constantly unless the medication is used repeatedly).  Nasal glucocorticoids -- These are prescription steroids delivered by a nasal spray that can help to reduce  swelling inside the nose, usually within two to three days. These drugs have few side effects and dramatically relieve symptoms in most people.  If you use these in conjunction with Afrin  you will need to use at least 15 minutes prior to the nasal decongestant.    Antibiotic? -- Rarely antibiotics are used along with the above treatments.    If you were given a prescription for medicine here today, be sure to read all of the information (including the package insert) that comes with your prescription.  This will include important information about the medicine, its side effects, and any warnings that you need to know about.  The pharmacist who fills the prescription can provide more information and answer questions you may have about the medicine.  If you have questions or concerns that the pharmacist cannot address, please call or return to the Emergency Department.   Remember that you can always come back to the Emergency Department if you are not able to see your regular provider in the amount of time listed above, if you get any new symptoms, or if there is anything that worries you.  Follow-up with ENT for further evaluation of the throat lesion.  Arrange for follow-up of thyroid gland through your primary care clinic.

## 2021-10-27 ENCOUNTER — TELEPHONE (OUTPATIENT)
Dept: FAMILY MEDICINE | Facility: CLINIC | Age: 86
End: 2021-10-27

## 2021-10-27 NOTE — TELEPHONE ENCOUNTER
Son called back, scheduled ED f/u 11/8/21.  He said he has an ENT that he is looking into getting pt scheduled to f/u on epiglottic lesion.      Ermelinda Mora RN

## 2021-10-27 NOTE — TELEPHONE ENCOUNTER
Called pt to check in after going to ED yesterday.  Pt says that she is feeling better today, and taking her antibiotics.  Let pt know COVID test was negative.  Also per pt request LVM for son to set up a f/u visit with PCP to follow up on ED visit and incidental thyroid nodule.     Ermelinda Mora RN

## 2021-10-30 DIAGNOSIS — E61.1 LOW IRON: ICD-10-CM

## 2021-10-30 DIAGNOSIS — I10 ESSENTIAL HYPERTENSION: ICD-10-CM

## 2021-11-01 NOTE — TELEPHONE ENCOUNTER
Routing refill request to provider for review/approval because:  Labs out of range:  BP    BP Readings from Last 3 Encounters:   10/26/21 (!) 144/62   09/30/21 128/56   07/21/21 132/54     Shankar VELOZ RN

## 2021-11-02 ENCOUNTER — NURSE TRIAGE (OUTPATIENT)
Dept: FAMILY MEDICINE | Facility: CLINIC | Age: 86
End: 2021-11-02

## 2021-11-02 RX ORDER — FERROUS GLUCONATE 324(38)MG
324 TABLET ORAL
Qty: 90 TABLET | Refills: 0 | Status: SHIPPED | OUTPATIENT
Start: 2021-11-02 | End: 2022-01-26

## 2021-11-02 RX ORDER — HYDRALAZINE HYDROCHLORIDE 25 MG/1
25 TABLET, FILM COATED ORAL 3 TIMES DAILY
Qty: 270 TABLET | Refills: 1 | Status: SHIPPED | OUTPATIENT
Start: 2021-11-02 | End: 2022-04-28

## 2021-11-02 NOTE — TELEPHONE ENCOUNTER
Pt's son called RN PAL asking for an appointment for pt.  Pt has been having diarrhea the last several days, feeling fun down, not able to eat and moderate jaw pain.      She is going to a head/neck specialist to get her jaw looked at today.      No OV available at clinic, son will take pt to urgent care today.      He expressed understanding and acceptance of the plan. No further questions at this time.  Advised can call back to clinic at any time with concerns.       Reason for Disposition    MODERATE diarrhea (e.g., 4-6 times / day more than normal) and age > 70 years    Protocols used: DIARRHEA-A-OH    Ermelinda Mora RN

## 2021-11-04 ENCOUNTER — LAB (OUTPATIENT)
Dept: LAB | Facility: CLINIC | Age: 86
End: 2021-11-04
Payer: COMMERCIAL

## 2021-11-04 DIAGNOSIS — R79.0 LOW IRON STORES: ICD-10-CM

## 2021-11-04 LAB
ERYTHROCYTE [DISTWIDTH] IN BLOOD BY AUTOMATED COUNT: 14.1 % (ref 10–15)
HCT VFR BLD AUTO: 30.3 % (ref 35–47)
HGB BLD-MCNC: 9.8 G/DL (ref 11.7–15.7)
MCH RBC QN AUTO: 31.9 PG (ref 26.5–33)
MCHC RBC AUTO-ENTMCNC: 32.3 G/DL (ref 31.5–36.5)
MCV RBC AUTO: 99 FL (ref 78–100)
PLATELET # BLD AUTO: 221 10E3/UL (ref 150–450)
RBC # BLD AUTO: 3.07 10E6/UL (ref 3.8–5.2)
WBC # BLD AUTO: 5 10E3/UL (ref 4–11)

## 2021-11-04 PROCEDURE — 36415 COLL VENOUS BLD VENIPUNCTURE: CPT

## 2021-11-04 PROCEDURE — 83550 IRON BINDING TEST: CPT

## 2021-11-04 PROCEDURE — 85027 COMPLETE CBC AUTOMATED: CPT

## 2021-11-04 PROCEDURE — 82728 ASSAY OF FERRITIN: CPT

## 2021-11-05 ENCOUNTER — PATIENT OUTREACH (OUTPATIENT)
Dept: ONCOLOGY | Facility: CLINIC | Age: 86
End: 2021-11-05

## 2021-11-05 DIAGNOSIS — D64.9 NORMOCYTIC ANEMIA: Primary | ICD-10-CM

## 2021-11-05 LAB
FERRITIN SERPL-MCNC: 71 NG/ML (ref 8–252)
IRON SATN MFR SERPL: 29 % (ref 15–46)
IRON SERPL-MCNC: 81 UG/DL (ref 35–180)
TIBC SERPL-MCNC: 278 UG/DL (ref 240–430)

## 2021-11-05 NOTE — PROGRESS NOTES
Pre-Visit Planning   11/8/21 at 11:15 AM with Dr. Vanessa  Appointment Notes for this encounter:   ED f/u and discuss thyroid nodule    Questionnaires Reviewed/Assigned  No additional questionnaires are needed    Patient preferred phone number: [phone]    Unable to reach. Left voicemail. Advised patient to call clinic back at [phone].  Ermelinda Mora RN

## 2021-11-05 NOTE — PROGRESS NOTES
Writer received referral for anemia. Reviewed for urgency based on labs and symptomology. Appropriate scheduling instructions added and referral sent to New Patient Scheduling for completion.

## 2021-11-08 ENCOUNTER — OFFICE VISIT (OUTPATIENT)
Dept: FAMILY MEDICINE | Facility: CLINIC | Age: 86
End: 2021-11-08
Payer: COMMERCIAL

## 2021-11-08 VITALS
WEIGHT: 137.1 LBS | SYSTOLIC BLOOD PRESSURE: 138 MMHG | HEIGHT: 63 IN | DIASTOLIC BLOOD PRESSURE: 54 MMHG | HEART RATE: 63 BPM | BODY MASS INDEX: 24.29 KG/M2 | OXYGEN SATURATION: 97 % | RESPIRATION RATE: 18 BRPM | TEMPERATURE: 98.3 F

## 2021-11-08 DIAGNOSIS — Z23 NEED FOR COVID-19 VACCINE: ICD-10-CM

## 2021-11-08 DIAGNOSIS — J38.7 EPIGLOTTIC LESION: ICD-10-CM

## 2021-11-08 DIAGNOSIS — E04.1 THYROID NODULE: Primary | ICD-10-CM

## 2021-11-08 PROCEDURE — 99213 OFFICE O/P EST LOW 20 MIN: CPT | Mod: 25 | Performed by: FAMILY MEDICINE

## 2021-11-08 PROCEDURE — 0064A COVID-19,PF,MODERNA (18+ YRS BOOSTER .25ML): CPT | Performed by: FAMILY MEDICINE

## 2021-11-08 PROCEDURE — 91306 COVID-19,PF,MODERNA (18+ YRS BOOSTER .25ML): CPT | Performed by: FAMILY MEDICINE

## 2021-11-08 ASSESSMENT — MIFFLIN-ST. JEOR: SCORE: 1046.01

## 2021-11-08 NOTE — PROGRESS NOTES
"  Assessment & Plan     Thyroid nodule - at 1.5 cm, needs biopsy, ordered  - US Biopsy Thyroid Fine Needle Aspiration; Future    Epiglottic lesion - discussed ENT consult near future. family has ENT they will use.     Need for COVID-19 vaccine  - COVID-19,PF,MODERNA (18+ YRS BOOSTER .25ML)      Return in about 6 months (around 5/8/2022) for Medication Recheck.    Cortney Vanessa MD  [facility] DEANGELO Echevarria is a 85 year old who presents for the following health issues     HPI     In the ER with sinusitis.   CT neck soft tissue showed an epiglottic lesion and thyroid nodule.   Denies previous knowledge of both.     No trouble with swallowing.       Review of Systems   Constitutional, HEENT, cardiovascular, pulmonary, gi and gu systems are negative, except as otherwise noted.      Objective    /54 (BP Location: Right arm, Patient Position: Sitting, Cuff Size: Adult Regular)   Pulse 63   Temp 98.3  F (36.8  C) (Oral)   Resp 18   Ht 1.6 m (5' 3\")   Wt 62.2 kg (137 lb 1.6 oz)   SpO2 97%   Breastfeeding No   BMI 24.29 kg/m    Body mass index is 24.29 kg/m .  Physical Exam   GENERAL: healthy, alert and no distress  PSYCH: mentation appears normal, affect normal/bright      IMPRESSION:   1.  Right epiglottis lingual surface demonstrates an oval-shaped nodular lesion described above. Findings may reflect a benign polyp or squamous cell carcinoma. Recommend nonemergent ENT referral.  2.  Right thyroid lobe nodule. Recommend nonemergent thyroid ultrasound based upon ACR white paper criteria.   3.  No lymphadenopathy based on size criteria.  "

## 2021-11-09 ENCOUNTER — TELEPHONE (OUTPATIENT)
Dept: FAMILY MEDICINE | Facility: CLINIC | Age: 86
End: 2021-11-09
Payer: COMMERCIAL

## 2021-11-09 DIAGNOSIS — Z11.59 ENCOUNTER FOR SCREENING FOR OTHER VIRAL DISEASES: ICD-10-CM

## 2021-11-09 NOTE — TELEPHONE ENCOUNTER
Gibson samuel called and asking if clinic can send CT neck image report to TMJ dentist at [email or web address].      RN PAL called son back and let him know that we need a ARACELY for us to be able to send imaging information to dentist.     Faxed ARACELY to son and he will drop off the paper work to clinic.  Once received, ALFREDO BISHOP will fax documents to dentist.      Ermelinda Mora RN

## 2021-11-16 ENCOUNTER — LAB (OUTPATIENT)
Dept: LAB | Facility: CLINIC | Age: 86
End: 2021-11-16
Payer: COMMERCIAL

## 2021-11-16 DIAGNOSIS — Z11.59 ENCOUNTER FOR SCREENING FOR OTHER VIRAL DISEASES: ICD-10-CM

## 2021-11-16 LAB — SARS-COV-2 RNA RESP QL NAA+PROBE: NEGATIVE

## 2021-11-16 PROCEDURE — U0003 INFECTIOUS AGENT DETECTION BY NUCLEIC ACID (DNA OR RNA); SEVERE ACUTE RESPIRATORY SYNDROME CORONAVIRUS 2 (SARS-COV-2) (CORONAVIRUS DISEASE [COVID-19]), AMPLIFIED PROBE TECHNIQUE, MAKING USE OF HIGH THROUGHPUT TECHNOLOGIES AS DESCRIBED BY CMS-2020-01-R: HCPCS

## 2021-11-16 PROCEDURE — U0005 INFEC AGEN DETEC AMPLI PROBE: HCPCS

## 2021-11-18 ENCOUNTER — HOSPITAL ENCOUNTER (OUTPATIENT)
Dept: ULTRASOUND IMAGING | Facility: CLINIC | Age: 86
Discharge: HOME | End: 2021-11-18
Attending: FAMILY MEDICINE | Admitting: FAMILY MEDICINE
Payer: COMMERCIAL

## 2021-11-18 VITALS — SYSTOLIC BLOOD PRESSURE: 152 MMHG | HEART RATE: 67 BPM | RESPIRATION RATE: 16 BRPM | DIASTOLIC BLOOD PRESSURE: 59 MMHG

## 2021-11-18 DIAGNOSIS — E04.1 THYROID NODULE: ICD-10-CM

## 2021-11-18 PROCEDURE — 88305 TISSUE EXAM BY PATHOLOGIST: CPT | Mod: TC | Performed by: FAMILY MEDICINE

## 2021-11-18 PROCEDURE — 10005 FNA BX W/US GDN 1ST LES: CPT

## 2021-11-18 PROCEDURE — 272N000431 US BIOPSY THYROID FINE NEEDLE ASPIRATION

## 2021-11-18 PROCEDURE — 250N000009 HC RX 250: Performed by: RADIOLOGY

## 2021-11-18 RX ADMIN — LIDOCAINE HYDROCHLORIDE 10 ML: 10 INJECTION, SOLUTION EPIDURAL; INFILTRATION; INTRACAUDAL; PERINEURAL at 09:01

## 2021-11-18 NOTE — PROGRESS NOTES
Assisted MD in FNA of right thyroid. Pt tolerated procedure well with local anesthetic. Specimen obtained and sent to lab. Pt left with son in stable condition.

## 2021-11-19 LAB
PATH REPORT.COMMENTS IMP SPEC: NORMAL
PATH REPORT.FINAL DX SPEC: NORMAL
PATH REPORT.GROSS SPEC: NORMAL
PATH REPORT.MICROSCOPIC SPEC OTHER STN: NORMAL
PATH REPORT.RELEVANT HX SPEC: NORMAL

## 2021-11-19 PROCEDURE — 88305 TISSUE EXAM BY PATHOLOGIST: CPT | Mod: 26 | Performed by: PATHOLOGY

## 2021-11-19 PROCEDURE — 88173 CYTOPATH EVAL FNA REPORT: CPT | Mod: 26 | Performed by: PATHOLOGY

## 2021-11-29 ENCOUNTER — TELEPHONE (OUTPATIENT)
Dept: FAMILY MEDICINE | Facility: CLINIC | Age: 86
End: 2021-11-29
Payer: COMMERCIAL

## 2021-11-29 NOTE — TELEPHONE ENCOUNTER
Reason for Call:  Form, our goal is to have forms completed with 72 hours, however, some forms may require a visit or additional information.    Type of letter, form or note:  medication     Who is the form from?: pharmacy (if other please explain)    Where did the form come from: form was faxed in    What clinic location was the form placed at?: [facility]     Where the form was placed: Dr Vanessa  Box/Folder    What number is listed as a contact on the form?: [phone]       Additional comments: please sign.date and fax to [phone]    Call taken on 11/29/2021 at 9:38 AM by Marge Degroot

## 2021-12-03 ENCOUNTER — TELEPHONE (OUTPATIENT)
Dept: FAMILY MEDICINE | Facility: CLINIC | Age: 86
End: 2021-12-03
Payer: COMMERCIAL

## 2021-12-03 NOTE — TELEPHONE ENCOUNTER
Reason for Call:  Form, our goal is to have forms completed with 72 hours, however, some forms may require a visit or additional information.    Type of letter, form or note:  orders    Who is the form from?: Home care    Where did the form come from: form was faxed in    What clinic location was the form placed at?: [facility]     Where the form was placed: Dr Vanessa  Box/Folder    What number is listed as a contact on the form?: none        Additional comments: please sign.date and send back     Call taken on 12/3/2021 at 9:26 AM by Marge Degroot

## 2021-12-14 ENCOUNTER — TRANSFERRED RECORDS (OUTPATIENT)
Dept: HEALTH INFORMATION MANAGEMENT | Facility: CLINIC | Age: 86
End: 2021-12-14
Payer: COMMERCIAL

## 2022-01-13 ENCOUNTER — LAB REQUISITION (OUTPATIENT)
Dept: LAB | Facility: CLINIC | Age: 87
End: 2022-01-13
Payer: COMMERCIAL

## 2022-01-13 ENCOUNTER — TELEPHONE (OUTPATIENT)
Dept: FAMILY MEDICINE | Facility: CLINIC | Age: 87
End: 2022-01-13
Payer: COMMERCIAL

## 2022-01-13 DIAGNOSIS — U07.1 COVID-19: ICD-10-CM

## 2022-01-13 PROCEDURE — U0003 INFECTIOUS AGENT DETECTION BY NUCLEIC ACID (DNA OR RNA); SEVERE ACUTE RESPIRATORY SYNDROME CORONAVIRUS 2 (SARS-COV-2) (CORONAVIRUS DISEASE [COVID-19]), AMPLIFIED PROBE TECHNIQUE, MAKING USE OF HIGH THROUGHPUT TECHNOLOGIES AS DESCRIBED BY CMS-2020-01-R: HCPCS | Mod: ORL | Performed by: FAMILY MEDICINE

## 2022-01-13 NOTE — TELEPHONE ENCOUNTER
Received VM from son requesting recommendations for a new rheumatologist.  Called son back and LVM asking if they need a new referral.  Gave [facility] contact information.      Ermelinda CLARKE RN

## 2022-01-13 NOTE — TELEPHONE ENCOUNTER
Son called back, gave [facility] and [facility] information. Pt is unable to get into [facility] until March.    Son does not think she needs a new referral, but will call if anything is needed.     Ermelinda CLARKE RN

## 2022-01-14 ENCOUNTER — LAB REQUISITION (OUTPATIENT)
Dept: LAB | Facility: CLINIC | Age: 87
End: 2022-01-14
Payer: COMMERCIAL

## 2022-01-14 DIAGNOSIS — U07.1 COVID-19: ICD-10-CM

## 2022-01-14 LAB — SARS-COV-2 RNA RESP QL NAA+PROBE: NEGATIVE

## 2022-01-20 PROCEDURE — U0005 INFEC AGEN DETEC AMPLI PROBE: HCPCS | Mod: ORL | Performed by: FAMILY MEDICINE

## 2022-01-21 ENCOUNTER — LAB REQUISITION (OUTPATIENT)
Dept: LAB | Facility: CLINIC | Age: 87
End: 2022-01-21
Payer: COMMERCIAL

## 2022-01-21 DIAGNOSIS — U07.1 COVID-19: ICD-10-CM

## 2022-01-22 LAB — SARS-COV-2 RNA RESP QL NAA+PROBE: NEGATIVE

## 2022-01-24 DIAGNOSIS — E61.1 LOW IRON: ICD-10-CM

## 2022-01-26 ENCOUNTER — TELEPHONE (OUTPATIENT)
Dept: FAMILY MEDICINE | Facility: CLINIC | Age: 87
End: 2022-01-26
Payer: COMMERCIAL

## 2022-01-26 RX ORDER — FERROUS GLUCONATE 324(38)MG
324 TABLET ORAL
Qty: 90 TABLET | Refills: 2 | Status: SHIPPED | OUTPATIENT
Start: 2022-01-26 | End: 2022-11-08

## 2022-01-26 NOTE — TELEPHONE ENCOUNTER
Received message for ALFREDO Guzmán at [facility] requesting VO for patient to self administer medications.      Arielle says pt wants set up and take medications independently.  Arielle is going to evaluate if pt knows medications and their purpose.  She also will call son to clarify that it is okay for pt to self administer.     Please advise on if pt should be self-administering medication.     Call Back number: [phone]    Ermelinda CLARKE RN

## 2022-01-26 NOTE — LETTER
January 27, 2022      Swathi Dukes  [address]   [place] [address]        Orders for Swathi Dukes (1935)    Check UA with reflex to culture and sensitivity for possible UTI     Please fax results to [phone].      Sincerely,        Cortney Vanessa MD

## 2022-01-27 ENCOUNTER — TELEPHONE (OUTPATIENT)
Dept: FAMILY MEDICINE | Facility: CLINIC | Age: 87
End: 2022-01-27

## 2022-01-27 ENCOUNTER — VIRTUAL VISIT (OUTPATIENT)
Dept: FAMILY MEDICINE | Facility: CLINIC | Age: 87
End: 2022-01-27
Payer: COMMERCIAL

## 2022-01-27 DIAGNOSIS — N30.00 ACUTE CYSTITIS WITHOUT HEMATURIA: Primary | ICD-10-CM

## 2022-01-27 DIAGNOSIS — F33.1 MODERATE EPISODE OF RECURRENT MAJOR DEPRESSIVE DISORDER (H): ICD-10-CM

## 2022-01-27 PROCEDURE — 81001 URINALYSIS AUTO W/SCOPE: CPT | Mod: ORL | Performed by: FAMILY MEDICINE

## 2022-01-27 PROCEDURE — 99213 OFFICE O/P EST LOW 20 MIN: CPT | Mod: 95 | Performed by: FAMILY MEDICINE

## 2022-01-27 RX ORDER — NITROFURANTOIN 25; 75 MG/1; MG/1
100 CAPSULE ORAL 2 TIMES DAILY
Qty: 14 CAPSULE | Refills: 0 | Status: SHIPPED | OUTPATIENT
Start: 2022-01-27 | End: 2022-01-28

## 2022-01-27 NOTE — TELEPHONE ENCOUNTER
Spoke to Brent, pt's son earlier, and he is working on getting the medication tonight so that pt can start right away.      RN PAL will check with pt and Aide in morning to see if pt was able to get medication from [facility].    Ermelinda CLARKE RN

## 2022-01-27 NOTE — TELEPHONE ENCOUNTER
If she is able to set up her own medications and her son feels she can do so, then we can try this. I worry that she's not able to safely do this.     JH

## 2022-01-27 NOTE — PROGRESS NOTES
"Swathi is a 86 year old who is being evaluated via a billable telephone visit.      What phone number would you like to be contacted at? [phone]  How would you like to obtain your AVS? Mail a copy    Assessment & Plan     Acute cystitis without hematuria  - will treat presumptively, advised urine specimen today to ensure proper abx therapy.   - nitroFURantoin macrocrystal-monohydrate (MACROBID) 100 MG capsule; Take 1 capsule (100 mg) by mouth 2 times daily for 7 days    Return in about 3 months (around 4/27/2022) for Yearly Preventive Exam.    Cortney Vanessa MD  [facility]      Subjective   Swathi is a 86 year old who presents for the following health issues     UTI    Genitourinary - Female  Onset/Duration: 01/27/22  Description:   Painful urination (Dysuria): YES           Frequency: no  Blood in urine (Hematuria): no  Delay in urine (Hesitency): no  Intensity: severe  Progression of Symptoms:  worsening  Accompanying Signs & Symptoms:  Fever/chills: YES  Flank pain: no  Nausea and vomiting: no  Vaginal symptoms: odor  Abdominal/Pelvic Pain: no  History:   History of frequent UTI s: YES  History of kidney stones: no  Sexually Active: no  Possibility of pregnancy: No  Precipitating or alleviating factors: None  Therapies tried and outcome: none      Painful urination this AM. Strong cramping sensation.   Previous history of UTI, enterococcus.     Review of Systems   Constitutional, HEENT, cardiovascular, pulmonary, gi and gu systems are negative, except as otherwise noted.      Objective    Vitals - Patient Reported  Weight (Patient Reported): 61.2 kg (135 lb)  Height (Patient Reported): 161.3 cm (5' 3.5\")  BMI (Based on Pt Reported Ht/Wt): 23.54        Physical Exam   PSYCH: Alert and oriented times 3; coherent speech, normal   rate and volume, able to articulate logical thoughts, able   to abstract reason, no tangential thoughts, no hallucinations   or delusions  Her affect is " normal  RESP: No cough, no audible wheezing, able to talk in full sentences  Remainder of exam unable to be completed due to telephone visits            Phone call duration: 7 minutes

## 2022-01-27 NOTE — TELEPHONE ENCOUNTER
Arielle from [facility] stating Swathi had told Dr. Vanessa the wrong pharmacy for her medication from todays visit. Please resend to pended pharmacy-[facility] pharmacy.

## 2022-01-27 NOTE — Clinical Note
Can you call Brent and see if someone in the family can  [facility]

## 2022-01-27 NOTE — TELEPHONE ENCOUNTER
Called Arielle at [facility] and relayed MD note--see below.  She has not yet talked with son,but will attmept to call again.      Also pt scheduled a phone visit for UTI, faxed UA order per PCP request to [facility] ([phone]). Called pt and let her know that she will need to provide urine sample.     Ermelinda CLARKE RN

## 2022-01-28 ENCOUNTER — LAB REQUISITION (OUTPATIENT)
Dept: LAB | Facility: CLINIC | Age: 87
End: 2022-01-28
Payer: COMMERCIAL

## 2022-01-28 DIAGNOSIS — N30.00 ACUTE CYSTITIS WITHOUT HEMATURIA: ICD-10-CM

## 2022-01-28 DIAGNOSIS — N39.0 URINARY TRACT INFECTION, SITE NOT SPECIFIED: ICD-10-CM

## 2022-01-28 LAB
ALBUMIN UR-MCNC: 20 MG/DL
APPEARANCE UR: ABNORMAL
BACTERIA #/AREA URNS HPF: ABNORMAL /HPF
BILIRUB UR QL STRIP: NEGATIVE
COLOR UR AUTO: YELLOW
GLUCOSE UR STRIP-MCNC: NEGATIVE MG/DL
HGB UR QL STRIP: ABNORMAL
KETONES UR STRIP-MCNC: NEGATIVE MG/DL
LEUKOCYTE ESTERASE UR QL STRIP: ABNORMAL
NITRATE UR QL: NEGATIVE
PH UR STRIP: 5.5 [PH] (ref 5–7)
RBC URINE: 1 /HPF
SP GR UR STRIP: 1.01 (ref 1–1.03)
UROBILINOGEN UR STRIP-MCNC: <2 MG/DL
WBC CLUMPS #/AREA URNS HPF: PRESENT /HPF
WBC URINE: >182 /HPF

## 2022-01-28 RX ORDER — NITROFURANTOIN 25; 75 MG/1; MG/1
100 CAPSULE ORAL 2 TIMES DAILY
Qty: 14 CAPSULE | Refills: 0 | Status: SHIPPED | OUTPATIENT
Start: 2022-01-28 | End: 2022-02-19

## 2022-01-28 NOTE — TELEPHONE ENCOUNTER
Arielle from [facility] still would like medication to be sent to [facility].     Called son and he said it is okay to send medication to [facility] this morning, because he was unable to  last night.  If pt gets medications from outside source, the [facility] charges 350 dollars for set up.     Called pharmacy requesting stat delivery, pharmacist says that [facility] needs to call pharmacy not PCP office.  She will call [facility] and talk to nurse there.     Ermelinda CLARKE RN

## 2022-01-28 NOTE — TELEPHONE ENCOUNTER
LVPATRICKB with Aide RN to see if they still need script sent to [facility].    Ermelinda CLARKE RN

## 2022-01-31 NOTE — RESULT ENCOUNTER NOTE
Can we please contact the patient and see if they need anything as it looks like she may need antibiotics however needs a visit associated with this order and what symptoms are going on.  I am not sure what is going on with this urinalysis order and if somebody is covering it or not.     DORIS

## 2022-02-05 ENCOUNTER — LAB REQUISITION (OUTPATIENT)
Dept: LAB | Facility: CLINIC | Age: 87
End: 2022-02-05
Payer: COMMERCIAL

## 2022-02-05 PROCEDURE — U0003 INFECTIOUS AGENT DETECTION BY NUCLEIC ACID (DNA OR RNA); SEVERE ACUTE RESPIRATORY SYNDROME CORONAVIRUS 2 (SARS-COV-2) (CORONAVIRUS DISEASE [COVID-19]), AMPLIFIED PROBE TECHNIQUE, MAKING USE OF HIGH THROUGHPUT TECHNOLOGIES AS DESCRIBED BY CMS-2020-01-R: HCPCS | Mod: ORL | Performed by: FAMILY MEDICINE

## 2022-02-06 LAB — SARS-COV-2 RNA RESP QL NAA+PROBE: NEGATIVE

## 2022-02-19 ENCOUNTER — OFFICE VISIT (OUTPATIENT)
Dept: URGENT CARE | Facility: URGENT CARE | Age: 87
End: 2022-02-19
Payer: COMMERCIAL

## 2022-02-19 ENCOUNTER — NURSE TRIAGE (OUTPATIENT)
Dept: NURSING | Facility: CLINIC | Age: 87
End: 2022-02-19
Payer: COMMERCIAL

## 2022-02-19 VITALS
OXYGEN SATURATION: 96 % | TEMPERATURE: 97.8 F | SYSTOLIC BLOOD PRESSURE: 127 MMHG | WEIGHT: 133.6 LBS | DIASTOLIC BLOOD PRESSURE: 44 MMHG | BODY MASS INDEX: 23.67 KG/M2 | HEART RATE: 65 BPM

## 2022-02-19 DIAGNOSIS — R19.7 DIARRHEA, UNSPECIFIED TYPE: Primary | ICD-10-CM

## 2022-02-19 LAB
ALBUMIN SERPL-MCNC: 4 G/DL (ref 3.4–5)
ALP SERPL-CCNC: 83 U/L (ref 40–150)
ALT SERPL W P-5'-P-CCNC: 17 U/L (ref 0–50)
ANION GAP SERPL CALCULATED.3IONS-SCNC: 9 MMOL/L (ref 3–14)
AST SERPL W P-5'-P-CCNC: 24 U/L (ref 0–45)
BASOPHILS # BLD AUTO: 0 10E3/UL (ref 0–0.2)
BASOPHILS NFR BLD AUTO: 1 %
BILIRUB SERPL-MCNC: 0.5 MG/DL (ref 0.2–1.3)
BUN SERPL-MCNC: 15 MG/DL (ref 7–30)
CALCIUM SERPL-MCNC: 10.3 MG/DL (ref 8.5–10.1)
CHLORIDE BLD-SCNC: 108 MMOL/L (ref 94–109)
CO2 SERPL-SCNC: 25 MMOL/L (ref 20–32)
CREAT SERPL-MCNC: 0.9 MG/DL (ref 0.52–1.04)
EOSINOPHIL # BLD AUTO: 0.1 10E3/UL (ref 0–0.7)
EOSINOPHIL NFR BLD AUTO: 2 %
ERYTHROCYTE [DISTWIDTH] IN BLOOD BY AUTOMATED COUNT: 14.5 % (ref 10–15)
GFR SERPL CREATININE-BSD FRML MDRD: 63 ML/MIN/1.73M2
GLUCOSE BLD-MCNC: 102 MG/DL (ref 70–99)
HCT VFR BLD AUTO: 34 % (ref 35–47)
HGB BLD-MCNC: 10.9 G/DL (ref 11.7–15.7)
LYMPHOCYTES # BLD AUTO: 1 10E3/UL (ref 0.8–5.3)
LYMPHOCYTES NFR BLD AUTO: 13 %
MCH RBC QN AUTO: 31.4 PG (ref 26.5–33)
MCHC RBC AUTO-ENTMCNC: 32.1 G/DL (ref 31.5–36.5)
MCV RBC AUTO: 98 FL (ref 78–100)
MONOCYTES # BLD AUTO: 0.8 10E3/UL (ref 0–1.3)
MONOCYTES NFR BLD AUTO: 11 %
NEUTROPHILS # BLD AUTO: 5.6 10E3/UL (ref 1.6–8.3)
NEUTROPHILS NFR BLD AUTO: 74 %
PLATELET # BLD AUTO: 250 10E3/UL (ref 150–450)
POTASSIUM BLD-SCNC: 4.9 MMOL/L (ref 3.4–5.3)
PROT SERPL-MCNC: 7.5 G/DL (ref 6.8–8.8)
RBC # BLD AUTO: 3.47 10E6/UL (ref 3.8–5.2)
SODIUM SERPL-SCNC: 142 MMOL/L (ref 133–144)
WBC # BLD AUTO: 7.5 10E3/UL (ref 4–11)

## 2022-02-19 PROCEDURE — 36415 COLL VENOUS BLD VENIPUNCTURE: CPT | Performed by: FAMILY MEDICINE

## 2022-02-19 PROCEDURE — 80053 COMPREHEN METABOLIC PANEL: CPT | Performed by: FAMILY MEDICINE

## 2022-02-19 PROCEDURE — U0003 INFECTIOUS AGENT DETECTION BY NUCLEIC ACID (DNA OR RNA); SEVERE ACUTE RESPIRATORY SYNDROME CORONAVIRUS 2 (SARS-COV-2) (CORONAVIRUS DISEASE [COVID-19]), AMPLIFIED PROBE TECHNIQUE, MAKING USE OF HIGH THROUGHPUT TECHNOLOGIES AS DESCRIBED BY CMS-2020-01-R: HCPCS | Performed by: FAMILY MEDICINE

## 2022-02-19 PROCEDURE — 99214 OFFICE O/P EST MOD 30 MIN: CPT | Performed by: FAMILY MEDICINE

## 2022-02-19 PROCEDURE — 85025 COMPLETE CBC W/AUTO DIFF WBC: CPT | Performed by: FAMILY MEDICINE

## 2022-02-19 PROCEDURE — U0005 INFEC AGEN DETEC AMPLI PROBE: HCPCS | Performed by: FAMILY MEDICINE

## 2022-02-19 NOTE — PROGRESS NOTES
SUBJECTIVE  HPI:  Swathi Dukes is a 86 year old female who presents with the CC of diarrhea X 3 days    Loose, watery stools - started on Thursday - 6-7 episodes, did improve slightly after Friday.  Tried taking imodium and did not help.  This morning, symptoms started up again.  No blood in stools.  Denies any abdominal pain.    Has not been around anyone with similar symptoms.  No fever.  Able to keep fluids and food down.  Endorsed more lightheaded and dizziness this morning.    Had history of C.diff before, last time was about 2 years ago.  Patient did take antibiotic recently for UTI.  Patient has had diarrhea - chronic issue per son.    Completed Moderna COVID vaccinations in March 2021, booster 11/8/2021      Past Medical History:   Diagnosis Date     Aortic stenosis      Glaucoma      HTN (hypertension)      Hyperlipidemia      Pericardial effusion      Systemic lupus erythematosus      Current Outpatient Medications   Medication Sig Dispense Refill     brimonidine (ALPHAGAN P) 0.1 % ophthalmic solution Place 1 drop into both eyes 2 times daily 10 mL 2     cyclobenzaprine (FLEXERIL) 5 MG tablet Take 1 tablet (5 mg) by mouth At Bedtime 14 tablet 0     D3-50 1.25 MG (53923 UT) capsule Take 1 capsule (1,250 mcg) by mouth once a week 90 capsule 1     dorzolamide (TRUSOPT) 2 % ophthalmic solution Place 1 drop into both eyes 2 times daily 10 mL 0     ferrous gluconate (FERGON) 324 (38 Fe) MG tablet Take 1 tablet (324 mg) by mouth daily (with breakfast) 90 tablet 2     folic acid (FOLVITE) 1 MG tablet Take 1 tablet (1 mg) by mouth daily 30 tablet 11     gabapentin (NEURONTIN) 100 MG capsule Take 1 capsule (100 mg) by mouth 3 times daily 105 capsule 11     hydrALAZINE (APRESOLINE) 25 MG tablet Take 1 tablet (25 mg) by mouth 3 times daily 270 tablet 1     latanoprost (XALATAN) 0.005 % ophthalmic solution Place 1 drop into both eyes At Bedtime 7.5 mL 1     lisinopril (ZESTRIL) 20 MG tablet Take 1 tablet (20 mg) by  mouth daily 90 tablet 3     loperamide (IMODIUM) 2 MG capsule Take 1 capsule (2 mg) by mouth 3 times daily as needed for diarrhea 30 capsule 1     methotrexate 2.5 MG tablet Take 8 tablets (20 mg) by mouth every 7 days 34 tablet 11     OLANZapine (ZYPREXA) 5 MG tablet Take 1 tablet (5 mg) by mouth At Bedtime 30 tablet 0     PARoxetine (PAXIL) 10 MG tablet Take 1 tablet (10 mg) by mouth daily 30 tablet 1     simvastatin (ZOCOR) 10 MG tablet Take 1 tablet (10 mg) by mouth At Bedtime 90 tablet 1     acetaminophen (TYLENOL) 500 MG tablet Take 1-2 tablets (500-1,000 mg) by mouth every 6 hours as needed for mild pain       amLODIPine (NORVASC) 10 MG tablet Take 1 tablet (10 mg) by mouth daily 90 tablet 3     melatonin 5 MG tablet Take 1 tablet (5 mg) by mouth nightly as needed for sleep (Patient not taking: Reported on 2/19/2022)       ondansetron (ZOFRAN) 4 MG tablet Take 1 tablet (4 mg) by mouth every 8 hours as needed for nausea (Patient not taking: Reported on 2/19/2022) 30 tablet 3     Social History     Tobacco Use     Smoking status: Never Smoker     Smokeless tobacco: Never Used   Substance Use Topics     Alcohol use: Not Currently       ROS:  Review of systems negative except as stated above.    OBJECTIVE:  /44   Pulse 65   Temp 97.8  F (36.6  C)   Wt 60.6 kg (133 lb 9.6 oz)   SpO2 96%   BMI 23.67 kg/m    GENERAL APPEARANCE: healthy, alert and no distress  PSYCH: mentation appears normal and affect normal/bright    Results for orders placed or performed in visit on 02/19/22   Comprehensive metabolic panel (BMP + Alb, Alk Phos, ALT, AST, Total. Bili, TP)     Status: Abnormal   Result Value Ref Range    Sodium 142 133 - 144 mmol/L    Potassium 4.9 3.4 - 5.3 mmol/L    Chloride 108 94 - 109 mmol/L    Carbon Dioxide (CO2) 25 20 - 32 mmol/L    Anion Gap 9 3 - 14 mmol/L    Urea Nitrogen 15 7 - 30 mg/dL    Creatinine 0.90 0.52 - 1.04 mg/dL    Calcium 10.3 (H) 8.5 - 10.1 mg/dL    Glucose 102 (H) 70 - 99 mg/dL     Alkaline Phosphatase 83 40 - 150 U/L    AST 24 0 - 45 U/L    ALT 17 0 - 50 U/L    Protein Total 7.5 6.8 - 8.8 g/dL    Albumin 4.0 3.4 - 5.0 g/dL    Bilirubin Total 0.5 0.2 - 1.3 mg/dL    GFR Estimate 63 >60 mL/min/1.73m2   CBC with platelets and differential     Status: Abnormal   Result Value Ref Range    WBC Count 7.5 4.0 - 11.0 10e3/uL    RBC Count 3.47 (L) 3.80 - 5.20 10e6/uL    Hemoglobin 10.9 (L) 11.7 - 15.7 g/dL    Hematocrit 34.0 (L) 35.0 - 47.0 %    MCV 98 78 - 100 fL    MCH 31.4 26.5 - 33.0 pg    MCHC 32.1 31.5 - 36.5 g/dL    RDW 14.5 10.0 - 15.0 %    Platelet Count 250 150 - 450 10e3/uL    % Neutrophils 74 %    % Lymphocytes 13 %    % Monocytes 11 %    % Eosinophils 2 %    % Basophils 1 %    Absolute Neutrophils 5.6 1.6 - 8.3 10e3/uL    Absolute Lymphocytes 1.0 0.8 - 5.3 10e3/uL    Absolute Monocytes 0.8 0.0 - 1.3 10e3/uL    Absolute Eosinophils 0.1 0.0 - 0.7 10e3/uL    Absolute Basophils 0.0 0.0 - 0.2 10e3/uL   CBC with platelets and differential     Status: Abnormal    Narrative    The following orders were created for panel order CBC with platelets and differential.  Procedure                               Abnormality         Status                     ---------                               -----------         ------                     CBC with platelets and d...[[id number]]  Abnormal            Final result                 Please view results for these tests on the individual orders.         ASSESSMENT/PLAN:  (R19.7) Diarrhea, unspecified type  (primary encounter diagnosis)  Plan: Symptomatic; Unknown COVID-19 Virus         (Coronavirus) by PCR Nose, CBC with platelets         and differential, Comprehensive metabolic panel        (BMP + Alb, Alk Phos, ALT, AST, Total. Bili,         TP), Enteric Bacteria and Virus Panel by [facility]         Stool, Clostridium difficile Toxin B PCR              Reassurance given, encourage to push fluids due to diarrhea symptoms.  Labs obtained and stool testing - C.diff  and enteric and viral stools to determine if diarrhea is infections etiology, will follow up on labs and treat as appropriate.  COVID screen obtained as symptoms overlap with COVID infection.    Follow up with primary provider if no improvement of symptoms in 1 week    Dillon Appiah MD  February 19, 2022 12:45 PM

## 2022-02-19 NOTE — TELEPHONE ENCOUNTER
Triage call:     Patient is calling with diarrhea that started on Thursday 2/17.   She has taken 4 tablets of immodium the last 24 hours.   She reports 8 loose stools in the last 24 hours   Drinking pedialyte and urinating.   She reports occasionally feeling lightheaded. At the time of the call she is sitting on the toilet and denies lightheadedness.   Per protocol, patient was advised to be seen in [facility] at 9am. Care advice given. She verbalizes understanding and agreement with this plan. She will have her son, Brent drive her. Encouraged patient to call back if symptoms change or worsen.     Mirna Houston RN   02/19/22 7:29 AM  [facility] Nurse Advisor    Reason for Disposition    SEVERE diarrhea (e.g., 7 or more times / day more than normal) and age > 60 years    Additional Information    Negative: Shock suspected (e.g., cold/pale/clammy skin, too weak to stand, low BP, rapid pulse)    Negative: Difficult to awaken or acting confused (e.g., disoriented, slurred speech)    Negative: Sounds like a life-threatening emergency to the triager    Negative: SEVERE abdominal pain (e.g., excruciating) and present > 1 hour    Negative: SEVERE abdominal pain and age > 60    Negative: Bloody, black, or tarry bowel movements (Exception: chronic-unchanged black-grey bowel movements and is taking iron pills or Pepto-bismol)    Protocols used: DIARRHEA-A-OH  COVID 19 Nurse Triage Plan/Patient Instructions    Please be aware that novel coronavirus (COVID-19) may be circulating in the community. If you develop symptoms such as fever, cough, or SOB or if you have concerns about the presence of another infection including coronavirus (COVID-19), please contact your health care provider or visit [email or web address].     Disposition/Instructions    In-Person Visit with provider recommended. Reference Visit Selection Guide.    Thank you for taking steps to prevent the spread of this virus.  o Limit your contact with  others.  o Wear a simple mask to cover your cough.  o Wash your hands well and often.    Resources    [facility]: About COVID-19: [email or web address]    CDC: What to Do If You're Sick: www.cdc.gov/coronavirus/2019-ncov/about/steps-when-sick.html    CDC: Ending Home Isolation: www.cdc.gov/coronavirus/2019-ncov/hcp/disposition-in-home-patients.html     CDC: Caring for Someone: www.cdc.gov/coronavirus/2019-ncov/if-you-are-sick/care-for-someone.html     [facility]: Interim Guidance for Hospital Discharge to Home: www.[facility].[place]/diseases/coronavirus/hcp/hospdischarge.pdf    [facility] clinical trials (COVID-19 research studies): clinicalaffairs.[facility]-clinical-trials     Below are the COVID-19 hotlines at the [place] [facility] ([facility]). Interpreters are available.   o For health questions: Call 160-286-5328 or 1-110.587.3669 (7 a.m. to 7 p.m.)  o For questions about schools and childcare: Call 229-226-7873 or 1-980.374.5668 (7 a.m. to 7 p.m.)

## 2022-02-20 LAB — SARS-COV-2 RNA RESP QL NAA+PROBE: NEGATIVE

## 2022-02-21 ENCOUNTER — TELEPHONE (OUTPATIENT)
Dept: FAMILY MEDICINE | Facility: CLINIC | Age: 87
End: 2022-02-21
Payer: COMMERCIAL

## 2022-02-21 NOTE — TELEPHONE ENCOUNTER
Swathi calling to see if there is any wiggle room for her appointment on Friday. She depends on rides and her ride wont be able to get her here until 11:00 (her check in time is 10:40). (She was seen at urgent care and was given stool containers, she is unsure when she will be able to get these back to clinic)    Patient will gone from 2:00-4:00-she would like a detailed message left if she is not home.  Marci Zabala,

## 2022-02-21 NOTE — TELEPHONE ENCOUNTER
Can cancel Friday appointment. Need stool samples back. Can she bring them to [facility], which is closer to her home (I think)    JH

## 2022-02-22 LAB — C DIFF TOX B STL QL: NEGATIVE

## 2022-02-22 PROCEDURE — 87506 IADNA-DNA/RNA PROBE TQ 6-11: CPT | Mod: 59 | Performed by: FAMILY MEDICINE

## 2022-02-22 PROCEDURE — 87493 C DIFF AMPLIFIED PROBE: CPT | Performed by: FAMILY MEDICINE

## 2022-03-03 ENCOUNTER — TRANSFERRED RECORDS (OUTPATIENT)
Dept: HEALTH INFORMATION MANAGEMENT | Facility: CLINIC | Age: 87
End: 2022-03-03
Payer: COMMERCIAL

## 2022-03-24 DIAGNOSIS — H40.003 GLAUCOMA SUSPECT, BILATERAL: ICD-10-CM

## 2022-03-24 RX ORDER — LATANOPROST 50 UG/ML
1 SOLUTION/ DROPS OPHTHALMIC AT BEDTIME
Qty: 7.5 ML | Refills: 1 | Status: SHIPPED | OUTPATIENT
Start: 2022-03-24 | End: 2022-07-14

## 2022-03-28 ENCOUNTER — OFFICE VISIT (OUTPATIENT)
Dept: OTHER | Facility: CLINIC | Age: 87
End: 2022-03-28
Payer: COMMERCIAL

## 2022-03-28 VITALS
DIASTOLIC BLOOD PRESSURE: 60 MMHG | SYSTOLIC BLOOD PRESSURE: 138 MMHG | HEART RATE: 60 BPM | HEIGHT: 64 IN | WEIGHT: 133 LBS | OXYGEN SATURATION: 97 % | TEMPERATURE: 98.1 F | BODY MASS INDEX: 22.71 KG/M2 | RESPIRATION RATE: 18 BRPM

## 2022-03-28 DIAGNOSIS — Z00.00 ENCOUNTER FOR MEDICARE ANNUAL WELLNESS EXAM: Primary | ICD-10-CM

## 2022-03-28 PROCEDURE — G0438 PPPS, INITIAL VISIT: HCPCS | Performed by: FAMILY MEDICINE

## 2022-03-28 ASSESSMENT — ACTIVITIES OF DAILY LIVING (ADL)
CURRENT_FUNCTION: MONEY MANAGEMENT REQUIRES ASSISTANCE
CURRENT_FUNCTION: TRANSPORTATION REQUIRES ASSISTANCE
CURRENT_FUNCTION: PREPARING MEALS REQUIRES ASSISTANCE

## 2022-03-28 ASSESSMENT — PAIN SCALES - GENERAL: PAINLEVEL: NO PAIN (0)

## 2022-03-28 ASSESSMENT — PATIENT HEALTH QUESTIONNAIRE - PHQ9: SUM OF ALL RESPONSES TO PHQ QUESTIONS 1-9: 0

## 2022-03-28 NOTE — PATIENT INSTRUCTIONS
Patient Education   Personalized Prevention Plan  You are due for the preventive services outlined below.  Your care team is available to assist you in scheduling these services.  If you have already completed any of these items, please share that information with your care team to update in your medical record.  Health Maintenance Due   Topic Date Due     Diptheria Tetanus Pertussis (DTAP/TDAP/TD) Vaccine (1 - Tdap) Never done     Zoster (Shingles) Vaccine (1 of 2) Never done     Pneumococcal Vaccine (1 of 1 - PPSV23) Never done     COVID-19 Vaccine (3 - Moderna risk 4-dose series) 12/06/2021     ANNUAL REVIEW OF HM ORDERS  01/28/2022

## 2022-03-28 NOTE — PROGRESS NOTES
"Assessment & Plan     ICD-10-CM    1. Encounter for Medicare annual wellness exam  Z00.00        Follow Up/Next Steps    Return in about 53 weeks (around 4/3/2023) for Annual Wellness Visit.  Recommended that patient follow up with PCP to address the following : Patient is overdue for Tdap, zoster and pneumococcal vaccines. Patient staes that she had all three of her COVID-19 vaccine already. Continue to follow up with PCP for preventive care and chronic conditions.     Counseling and Education  Reviewed preventive health counseling, as reflected in patient instructions        Appropriate preventive services were discussed with this patient, including applicable screening as appropriate for cardiovascular disease, diabetes, osteopenia/osteoporosis, and glaucoma.  As appropriate for age/gender, discussed screening for colorectal cancer, prostate cancer, breast cancer, and cervical cancer. Checklist reviewing preventive services available has been given to the patient.    Reviewed patients plan of care and provided an AVS. The Basic Care Plan (routine screening as documented in Health Maintenance) for Swathi meets the Care Plan requirement. This Care Plan has been established and reviewed with the Patient.    Visit Provider: Dawson Gilmore RN  Supervising Provider: July Schneider MD, MD  [facility]   Swathi is a 86 year old who is being seen for an Annual Wellness Visit  accompanied by her None.    Healthy Habits:     In general, how would you rate your overall health?  Good    Frequency of exercise:  2-3 days/week    Duration of exercise:  30-45 minutes    Do you usually eat at least 4 servings of fruit and vegetables a day, include whole grains    & fiber and avoid regularly eating high fat or \"junk\" foods?  Yes    Taking medications regularly:  Yes    Medication side effects:  None    Ability to successfully perform activities of daily living:  " Transportation requires assistance, preparing meals requires assistance and money management requires assistance    Home Safety:  No safety concerns identified    Hearing Impairment:  No hearing concerns    In the past 6 months, have you been bothered by leaking of urine?  No    In general, how would you rate your overall mental or emotional health?  Fair      PHQ-2 Total Score: 0    Additional concerns today:  No    Do you feel safe in your environment? Yes    Have you ever done Advance Care Planning? (For example, a Health Directive, POLST, or a discussion with a medical provider or your loved ones about your wishes): Yes, patient states has an Advance Care Planning document and will bring a copy to the clinic.    Fall risk  Fallen 2 or more times in the past year?: No  Any fall with injury in the past year?: No  Cognitive Screening   1) Repeat 3 items (Leader, Season, Table)    2) Clock draw: NORMAL  3) 3 item recall: Recalls 2 objects   Results: NORMAL clock, 1-2 items recalled: COGNITIVE IMPAIRMENT LESS LIKELY    Mini-CogTM Copyright SUDHA Paredes. Licensed by the author for use in [facility]; reprinted with permission (anne [email or web address]). All rights reserved.      Do you have sleep apnea, excessive snoring or daytime drowsiness?: no    Reviewed and updated as needed this visit by clinical staff    Allergies  Meds  Problems             Social History     Tobacco Use     Smoking status: Never Smoker     Smokeless tobacco: Never Used   Substance Use Topics     Alcohol use: Not Currently       Current providers sharing in care for this patient include:   Patient Care Team:  Cortney Vanessa MD as PCP - General (Family Practice)  Cortney Vanessa MD as Assigned PCP  Luis Manuel Davis MD as Assigned Heart and Vascular Provider  Joe Radford [facility] as Pharmacist (Pharmacist)  Ermelinda Mora RN as Personal Advocate & Liaison (PAL) (Family Medicine)    The following health maintenance items are  "reviewed in Epic and correct as of today:  Health Maintenance Due   Topic Date Due     DTAP/TDAP/TD IMMUNIZATION (1 - Tdap) Never done     ZOSTER IMMUNIZATION (1 of 2) Never done     Pneumococcal Vaccine: 65+ Years (1 of 1 - PPSV23) Never done     COVID-19 Vaccine (3 - Moderna risk 4-dose series) 12/06/2021     ANNUAL REVIEW OF  ORDERS  01/28/2022       Patient states she has not had mammogram for a long time and agrees to discuss it with PCP during next visit.  Pertinent mammograms are reviewed under the imaging tab.        Objective    /60 (BP Location: Right arm, Patient Position: Sitting, Cuff Size: Adult Regular)   Pulse 60   Temp 98.1  F (36.7  C) (Temporal)   Resp 18   Ht 1.613 m (5' 3.5\")   Wt 60.3 kg (133 lb)   SpO2 97%   BMI 23.19 kg/m   Estimated body mass index is 23.19 kg/m  as calculated from the following:    Height as of this encounter: 1.613 m (5' 3.5\").    Weight as of this encounter: 60.3 kg (133 lb).  Physical Exam  Patient appears comfortable and in no acute distress.        Identified Health Risks:  "

## 2022-04-07 ENCOUNTER — TRANSFERRED RECORDS (OUTPATIENT)
Dept: HEALTH INFORMATION MANAGEMENT | Facility: CLINIC | Age: 87
End: 2022-04-07
Payer: COMMERCIAL

## 2022-04-12 ENCOUNTER — TELEPHONE (OUTPATIENT)
Dept: FAMILY MEDICINE | Facility: CLINIC | Age: 87
End: 2022-04-12
Payer: COMMERCIAL

## 2022-04-12 NOTE — TELEPHONE ENCOUNTER
Called pt to check in. 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  She is now seeing a Dr. Ham for rheumatology, and is very happy with her care there.     She is wondering if she can get some lab work done at next office visit, PAL updated appt notes.  Pt scheduled for med check 5/12/22.     Pt had no further questions or concerns at this time, advised to call back as needed.     Ermelinda CLARKE RN

## 2022-04-28 DIAGNOSIS — I10 ESSENTIAL HYPERTENSION: ICD-10-CM

## 2022-04-28 RX ORDER — HYDRALAZINE HYDROCHLORIDE 25 MG/1
25 TABLET, FILM COATED ORAL 3 TIMES DAILY
Qty: 270 TABLET | Refills: 0 | Status: SHIPPED | OUTPATIENT
Start: 2022-04-28 | End: 2022-05-12

## 2022-04-28 NOTE — TELEPHONE ENCOUNTER
Patient has upcoming appointment 5/12/22. Prescription approved per [facility] Refill Protocol.  Shankar VELOZ RN

## 2022-05-11 NOTE — PROGRESS NOTES
Pre-Visit Planning   Next 5 appointments (look out 90 days)    May 12, 2022  3:30 PM  (Arrive by 3:10 PM)  Provider Visit with Cortney Vanessa MD  [facility] ([facility] ) [address] [address] [phone]        Appointment Notes for this encounter:   med f/u. labs?    Questionnaires Reviewed/Assigned  No additional questionnaires are needed  Patient preferred phone number: [phone]      Contacted patient via phone/[employer]. Are there any additional questions or concerns you'd like to review with your provider during your visit? No    Visit is not preventive.    Meds  Refills pended    Entered patient-preferred pharmacy.     Current Outpatient Medications   Medication     acetaminophen (TYLENOL) 500 MG tablet     amLODIPine (NORVASC) 10 MG tablet     brimonidine (ALPHAGAN P) 0.1 % ophthalmic solution     cyclobenzaprine (FLEXERIL) 5 MG tablet     D3-50 1.25 MG (49251 UT) capsule     dorzolamide (TRUSOPT) 2 % ophthalmic solution     ferrous gluconate (FERGON) 324 (38 Fe) MG tablet     folic acid (FOLVITE) 1 MG tablet     gabapentin (NEURONTIN) 100 MG capsule     hydrALAZINE (APRESOLINE) 25 MG tablet     latanoprost (XALATAN) 0.005 % ophthalmic solution     lisinopril (ZESTRIL) 20 MG tablet     loperamide (IMODIUM) 2 MG capsule     melatonin 5 MG tablet     methotrexate 2.5 MG tablet     OLANZapine (ZYPREXA) 5 MG tablet     ondansetron (ZOFRAN) 4 MG tablet     PARoxetine (PAXIL) 10 MG tablet     simvastatin (ZOCOR) 10 MG tablet     No current facility-administered medications for this visit.       [facility]   Health Maintenance Due   Topic Date Due     Pneumococcal Vaccine: 65+ Years (1 - PCV) Never done     DTAP/TDAP/TD IMMUNIZATION (1 - Tdap) Never done     ZOSTER IMMUNIZATION (1 of 2) Never done     COVID-19 Vaccine (3 - Moderna risk series) 12/06/2021     ANNUAL REVIEW OF HM ORDERS  01/28/2022       [facility]  reviewed and Health Maintenance orders pended    MyChart  Patient is not active on MyChart. Encouraged MyChart activation.    Call Summary  Advised patient to call back at [phone] if needed.   Ermelinda CLARKE RN

## 2022-05-12 ENCOUNTER — OFFICE VISIT (OUTPATIENT)
Dept: FAMILY MEDICINE | Facility: CLINIC | Age: 87
End: 2022-05-12
Payer: COMMERCIAL

## 2022-05-12 VITALS
BODY MASS INDEX: 24.76 KG/M2 | OXYGEN SATURATION: 97 % | SYSTOLIC BLOOD PRESSURE: 132 MMHG | WEIGHT: 142 LBS | HEART RATE: 66 BPM | RESPIRATION RATE: 16 BRPM | DIASTOLIC BLOOD PRESSURE: 40 MMHG | TEMPERATURE: 98 F

## 2022-05-12 DIAGNOSIS — I10 ESSENTIAL HYPERTENSION: Primary | ICD-10-CM

## 2022-05-12 DIAGNOSIS — M54.50 BILATERAL LOW BACK PAIN WITHOUT SCIATICA, UNSPECIFIED CHRONICITY: ICD-10-CM

## 2022-05-12 DIAGNOSIS — E78.2 MIXED HYPERLIPIDEMIA: ICD-10-CM

## 2022-05-12 DIAGNOSIS — N18.2 CKD (CHRONIC KIDNEY DISEASE) STAGE 2, GFR 60-89 ML/MIN: ICD-10-CM

## 2022-05-12 DIAGNOSIS — R30.0 DYSURIA: ICD-10-CM

## 2022-05-12 LAB
ERYTHROCYTE [DISTWIDTH] IN BLOOD BY AUTOMATED COUNT: 13.2 % (ref 10–15)
HCT VFR BLD AUTO: 34.8 % (ref 35–47)
HGB BLD-MCNC: 11.3 G/DL (ref 11.7–15.7)
MCH RBC QN AUTO: 31.3 PG (ref 26.5–33)
MCHC RBC AUTO-ENTMCNC: 32.5 G/DL (ref 31.5–36.5)
MCV RBC AUTO: 96 FL (ref 78–100)
PLATELET # BLD AUTO: 281 10E3/UL (ref 150–450)
RBC # BLD AUTO: 3.61 10E6/UL (ref 3.8–5.2)
WBC # BLD AUTO: 8.2 10E3/UL (ref 4–11)

## 2022-05-12 PROCEDURE — 36415 COLL VENOUS BLD VENIPUNCTURE: CPT | Performed by: FAMILY MEDICINE

## 2022-05-12 PROCEDURE — 99214 OFFICE O/P EST MOD 30 MIN: CPT | Performed by: FAMILY MEDICINE

## 2022-05-12 PROCEDURE — 80053 COMPREHEN METABOLIC PANEL: CPT | Performed by: FAMILY MEDICINE

## 2022-05-12 PROCEDURE — 85027 COMPLETE CBC AUTOMATED: CPT | Performed by: FAMILY MEDICINE

## 2022-05-12 RX ORDER — AMLODIPINE BESYLATE 10 MG/1
10 TABLET ORAL DAILY
Qty: 90 TABLET | Refills: 3 | Status: SHIPPED | OUTPATIENT
Start: 2022-05-12 | End: 2023-05-15

## 2022-05-12 RX ORDER — HYDRALAZINE HYDROCHLORIDE 25 MG/1
25 TABLET, FILM COATED ORAL 3 TIMES DAILY
Qty: 270 TABLET | Refills: 0 | Status: SHIPPED | OUTPATIENT
Start: 2022-05-12 | End: 2022-07-26

## 2022-05-12 RX ORDER — PAROXETINE 10 MG/1
10 TABLET, FILM COATED ORAL DAILY
Qty: 30 TABLET | Refills: 1 | Status: CANCELLED | OUTPATIENT
Start: 2022-05-12

## 2022-05-12 RX ORDER — BUSPIRONE HYDROCHLORIDE 5 MG/1
TABLET ORAL
COMMUNITY
Start: 2021-06-22 | End: 2022-05-12

## 2022-05-12 RX ORDER — SIMVASTATIN 10 MG
10 TABLET ORAL AT BEDTIME
Qty: 90 TABLET | Refills: 3 | Status: SHIPPED | OUTPATIENT
Start: 2022-05-12 | End: 2023-05-16

## 2022-05-12 RX ORDER — OLANZAPINE 5 MG/1
5 TABLET ORAL AT BEDTIME
Qty: 30 TABLET | Refills: 0 | Status: CANCELLED | OUTPATIENT
Start: 2022-05-12

## 2022-05-12 RX ORDER — LISINOPRIL 20 MG/1
20 TABLET ORAL DAILY
Qty: 90 TABLET | Refills: 3 | Status: SHIPPED | OUTPATIENT
Start: 2022-05-12 | End: 2023-05-15

## 2022-05-12 RX ORDER — DULOXETIN HYDROCHLORIDE 60 MG/1
60 CAPSULE, DELAYED RELEASE ORAL DAILY
COMMUNITY
Start: 2021-12-02

## 2022-05-12 NOTE — Clinical Note
Please call next week for BP readings - she is going to check twice over the next week - concern about DBP

## 2022-05-12 NOTE — LETTER
May 13, 2022      Swathi Dukes  [address]   [place] [address]        Dear ,    We are writing to inform you of your test results.    Your hemoglobin levels have improved compared to previous.     Your kidney function is stable compared to previous.     As always, please let us know if you have any questions. Our clinic number is [phone].     My best,   Cortney Vanessa MD   [facility]      Resulted Orders   CBC with platelets   Result Value Ref Range    WBC Count 8.2 4.0 - 11.0 10e3/uL    RBC Count 3.61 (L) 3.80 - 5.20 10e6/uL    Hemoglobin 11.3 (L) 11.7 - 15.7 g/dL    Hematocrit 34.8 (L) 35.0 - 47.0 %    MCV 96 78 - 100 fL    MCH 31.3 26.5 - 33.0 pg    MCHC 32.5 31.5 - 36.5 g/dL    RDW 13.2 10.0 - 15.0 %    Platelet Count 281 150 - 450 10e3/uL   Comprehensive metabolic panel (BMP + Alb, Alk Phos, ALT, AST, Total. Bili, TP)   Result Value Ref Range    Sodium 141 133 - 144 mmol/L    Potassium 4.5 3.4 - 5.3 mmol/L    Chloride 111 (H) 94 - 109 mmol/L    Carbon Dioxide (CO2) 23 20 - 32 mmol/L    Anion Gap 7 3 - 14 mmol/L    Urea Nitrogen 26 7 - 30 mg/dL    Creatinine 0.77 0.52 - 1.04 mg/dL    Calcium 9.3 8.5 - 10.1 mg/dL    Glucose 143 (H) 70 - 99 mg/dL    Alkaline Phosphatase 81 40 - 150 U/L    AST 11 0 - 45 U/L    ALT 18 0 - 50 U/L    Protein Total 7.4 6.8 - 8.8 g/dL    Albumin 3.6 3.4 - 5.0 g/dL    Bilirubin Total 0.2 0.2 - 1.3 mg/dL    GFR Estimate 76 >60 mL/min/1.73m2      Comment:      Effective December 21, 2021 eGFRcr in adults is calculated using the 2021 CKD-EPI creatinine equation which includes age and gender (Irene et al., NEJM, DOI: 10.1056/QTGJkd3798434)       If you have any questions or concerns, please call the clinic at the number listed above.       Sincerely,      Cortney Vanessa MD

## 2022-05-12 NOTE — PROGRESS NOTES
Assessment & Plan     Essential hypertension - controlled, low DBP, but this is her pattern. Denies symptoms. Refills.   - lisinopril (ZESTRIL) 20 MG tablet; Take 1 tablet (20 mg) by mouth daily  - amLODIPine (NORVASC) 10 MG tablet; Take 1 tablet (10 mg) by mouth daily  - hydrALAZINE (APRESOLINE) 25 MG tablet; Take 1 tablet (25 mg) by mouth 3 times daily  - Comprehensive metabolic panel (BMP + Alb, Alk Phos, ALT, AST, Total. Bili, TP); Future  - Comprehensive metabolic panel (BMP + Alb, Alk Phos, ALT, AST, Total. Bili, TP)    Mixed hyperlipidemia - on statin, continue  - simvastatin (ZOCOR) 10 MG tablet; Take 1 tablet (10 mg) by mouth At Bedtime    CKD (chronic kidney disease) stage 2, GFR 60-89 ml/min - surveillance labs today  - CBC with platelets; Future  - CBC with platelets    Bilateral low back pain without sciatica, unspecified chronicity - suggested ortho consult to discuss options. We can also have a conversation about med options if she declines other interventions.   - Spine Referral; Future    Dysuria  - UA reflex to Microscopic and Culture    Return in about 6 months (around 11/12/2022) for Medication Recheck, Yearly Preventive Exam.    Cortney Vanessa MD  [facility]    Sugar Echevarria is a 86 year old who presents for the following health issues     HPI     Hypertension Follow-up      Do you check your blood pressure regularly outside of the clinic? Not at home, but goes to a lot of apts and has it checked often.    Are you following a low salt diet? No    Are your blood pressures ever more than 140 on the top number (systolic) OR more   than 90 on the bottom number (diastolic), for example 140/90? No      Low back pain, MRI done last year - had other health issues come up so didn't follow with Ortho. Open to doing this now.     Following with Psychiatry, on cymbalta, olanzapine.         Review of Systems   Constitutional, HEENT, cardiovascular, pulmonary, gi and gu  systems are negative, except as otherwise noted.      Objective    /40   Pulse 66   Temp 98  F (36.7  C) (Oral)   Resp 16   Wt 64.4 kg (142 lb)   SpO2 97%   BMI 24.76 kg/m    Body mass index is 24.76 kg/m .  Physical Exam   GENERAL: healthy, alert and no distress  RESP: lungs clear to auscultation - no rales, rhonchi or wheezes  CV: regular rate and rhythm, normal S1 S2, no S3 or S4, no murmur, click or rub, no peripheral edema and peripheral pulses strong  PSYCH: mentation appears normal, affect normal/bright

## 2022-05-13 LAB
ALBUMIN SERPL-MCNC: 3.6 G/DL (ref 3.4–5)
ALP SERPL-CCNC: 81 U/L (ref 40–150)
ALT SERPL W P-5'-P-CCNC: 18 U/L (ref 0–50)
ANION GAP SERPL CALCULATED.3IONS-SCNC: 7 MMOL/L (ref 3–14)
AST SERPL W P-5'-P-CCNC: 11 U/L (ref 0–45)
BILIRUB SERPL-MCNC: 0.2 MG/DL (ref 0.2–1.3)
BUN SERPL-MCNC: 26 MG/DL (ref 7–30)
CALCIUM SERPL-MCNC: 9.3 MG/DL (ref 8.5–10.1)
CHLORIDE BLD-SCNC: 111 MMOL/L (ref 94–109)
CO2 SERPL-SCNC: 23 MMOL/L (ref 20–32)
CREAT SERPL-MCNC: 0.77 MG/DL (ref 0.52–1.04)
GFR SERPL CREATININE-BSD FRML MDRD: 76 ML/MIN/1.73M2
GLUCOSE BLD-MCNC: 143 MG/DL (ref 70–99)
POTASSIUM BLD-SCNC: 4.5 MMOL/L (ref 3.4–5.3)
PROT SERPL-MCNC: 7.4 G/DL (ref 6.8–8.8)
SODIUM SERPL-SCNC: 141 MMOL/L (ref 133–144)

## 2022-05-18 ENCOUNTER — DOCUMENTATION ONLY (OUTPATIENT)
Dept: OTHER | Facility: CLINIC | Age: 87
End: 2022-05-18
Payer: COMMERCIAL

## 2022-05-19 ENCOUNTER — TELEPHONE (OUTPATIENT)
Dept: FAMILY MEDICINE | Facility: CLINIC | Age: 87
End: 2022-05-19
Payer: COMMERCIAL

## 2022-05-19 NOTE — LETTER
May 31, 2022      Swathi Dukes  [address]   [place] [address]        Dear ,    We are writing to inform you of your test results.    {results letter list:[id number]}    No results found from the In Basket message.    If you have any questions or concerns, please call the clinic at the number listed above.       Sincerely,

## 2022-05-19 NOTE — TELEPHONE ENCOUNTER
"Call to pt per PCP to follow up on home BP check     Pt has not checked \"I forgot\"  She is feeling well but will check through the weekend and PAL will follow up on Monday.     Ronda Miller RN     "

## 2022-05-23 NOTE — TELEPHONE ENCOUNTER
Pt notified she will check BP this week and call back to PAL with more readings.     Ronda Miller RN

## 2022-05-23 NOTE — TELEPHONE ENCOUNTER
Pt returning call with home BP:     5/20 - /74 HR 65  5/23 - /47 HR 56    Pt states she only took her BP these two times will route to PCP.     Fabien MORA RN

## 2022-05-25 ENCOUNTER — TRANSFERRED RECORDS (OUTPATIENT)
Dept: HEALTH INFORMATION MANAGEMENT | Facility: CLINIC | Age: 87
End: 2022-05-25
Payer: COMMERCIAL

## 2022-05-28 ENCOUNTER — NURSE TRIAGE (OUTPATIENT)
Dept: NURSING | Facility: CLINIC | Age: 87
End: 2022-05-28
Payer: COMMERCIAL

## 2022-05-28 NOTE — TELEPHONE ENCOUNTER
"S-(situation): bruising    B-(background):     Caller: [place] RN - Assisted Living nurse  Phone #: [phone]    Pt woke up today with bruises on R arm  No known injury  Pt not taking any blood thinners  Pt recently seen at the clinic on 5/12/22    A-(assessment):     Bruise on R elbow 5x4 cm  Bruise on R forearm 3x2 cm  Pain rated 5/10\  Slightly raised bruise, but not getting any larger  Full range of motion on R upper extremity    VS:  /49  CT 65  Temperature 97.5F  RR 16  O2 sat 97% on room air      R-(recommendations): Per protocol, advised clinic visit within 3 days.   FNA attempted to call pt, no answer. Per [place] RN, pt might be attending an activity.    FNA will attempt to call pt between 3:30 - 4PM.     Outbound call 3:40 PM  FNA spoke to pt  Pt surprised that [facility] is calling her, states that she told [place] RN not to call clinic.  \"My arm looks like it was hit by a baseball bat.\"  FNA recommended clinic visit within 3 days, or possible that PCP might order labs.  Pt declined both options.   She plans to call [facility] next week and schedule an MRI.    FNA advised that I will send a message to PCP/Care Team. She verbalized agreement.    Penny FUENTES aware of pt's plan.    To Care team: If needing to follow up, please call pt or [facility] RN at [phone]      Abbey Garcia RN/[place] Nurse Advisor                  Additional Information    Negative: Shock suspected (e.g., cold/pale/clammy skin, too weak to stand, low BP, rapid pulse)    Negative: Sounds like a life-threatening emergency to the triager    Negative: [1] Bruise on head/face, chest, or abdomen AND [2]  taking Coumadin (warfarin) or other strong blood thinner, or known bleeding disorder (e.g., thrombocytopenia)    Negative: Unexplained bleeding from another site (e.g., gums, nose, urine) as well    Negative: Patient sounds very sick or weak to the triager    Negative: [1] Not caused by an injury AND [2] 5 or more bruises now    " Negative: [1] Raised bruise AND [2] size > 2 inches (5 cm) AND [3] getting bigger    Negative: [1] SEVERE pain AND [2] not improved 2 hours after pain medicine/ice packs    Negative: Suspicious history for the injury    Negative: Taking Coumadin (warfarin) or other strong blood thinner, or known bleeding disorder (e.g., thrombocytopenia)    [1] Not caused by an injury AND [2] < 5 unexplained bruises    Protocols used: BRUISES-A-AH

## 2022-05-30 ENCOUNTER — TELEPHONE (OUTPATIENT)
Dept: FAMILY MEDICINE | Facility: CLINIC | Age: 87
End: 2022-05-30
Payer: COMMERCIAL

## 2022-05-30 NOTE — TELEPHONE ENCOUNTER
Reason for Call:  Other appointment    Detailed comments: TE from FNA discussing brusign on arm. Pt would like a call back to discuss whether she needs to be seen or not?    Phone Number Patient can be reached at: Home number on file [phone] (home)    Best Time: any    Can we leave a detailed message on this number? NO    Call taken on 5/30/2022 at 10:23 AM by Tatum Marie

## 2022-05-31 NOTE — TELEPHONE ENCOUNTER
Patient also has some bruising noted in another encounter see that also.     BP  Readings as below:    5/23/22 135/47  5/25/22 137/53  5/26/22 151/52  5/27/22 138/48  5/30/22 137/49    She says she has pain at her right elbow on inner side where arm bends, no redness or swelling.Feels a mild pain when bending but fells okay if straight. Pain started 2 days ago, denies injury but is same arm where has bruising noted.      Says she will be out of appointment from 12-4 pm today.     Keily Thomas R.N.

## 2022-05-31 NOTE — TELEPHONE ENCOUNTER
I returned call to Swathi about her blood pressure readings. That encounter also routed but she also gave me further information on her right arm.     She says she has pain at her right elbow on inner side where arm bends, no redness or swelling. Feels a mild pain when bending but fells okay if straight. Pain started 2 days ago, denies injury but is same arm where has bruising noted.    Keily Thomas R.N.

## 2022-05-31 NOTE — TELEPHONE ENCOUNTER
Routing to provider to advise. Also patient had called over weekend regarding blood pressure readings she wants to report. I will be calling her back.     Also please see other telephone encounter from yesterday 5/30/22 on this same issue.     Dr. Vanessa please advise if you feel patient should be seen or any other recommendations.     Thank you,  Keily Thomas R.N.

## 2022-05-31 NOTE — TELEPHONE ENCOUNTER
This is a duplicate encounter. See further details triage from 5/28/22. That message has been routed to PCP. Keily Thomas R.N.

## 2022-05-31 NOTE — TELEPHONE ENCOUNTER
If she's not having any other bleeding, SOB or dizziness, it is fine if she sees [facility] for this but I'm not sure they will schedule an MRI without an assessment.

## 2022-05-31 NOTE — TELEPHONE ENCOUNTER
I am not getting a reply, since is end of day will route to other [facility] providers to review.     Please see entire message triage note at bottom    Thank you,  Keily Thomas R.N.

## 2022-05-31 NOTE — TELEPHONE ENCOUNTER
Attempted call to Swathi, unable to get through or leave message. Will await review from Dr. Vanessa. Keily Thomas R.N.

## 2022-05-31 NOTE — TELEPHONE ENCOUNTER
"Pt returning call, states that the bruising has improved but is having pain \"on the inside of my elbow, like my inner right arm.\" Informed pt that someone will reach out after provider reviews.    Fabien MORA RN    "

## 2022-06-01 NOTE — TELEPHONE ENCOUNTER
Routing for provider review. Please see updated blood pressure readings and advise.    Keily Thomas R.N.

## 2022-06-01 NOTE — TELEPHONE ENCOUNTER
Call to [facility] at number listed in triage first note answering service. I was able to reach their  personnel Mariposa as they gave me her personal number thinking it was main phone number. She says to call back at 8 am main number [phone] and she can take a message for nursing at that time. I will call back to update facility.     Keily Thomas R.N.

## 2022-06-01 NOTE — TELEPHONE ENCOUNTER
Called and spoke with pt regarding provider message below. Pt states she was already seen by [facility] and she has an MRI scheduled this afternooon. No further questions.     Fabien MORA RN

## 2022-06-01 NOTE — TELEPHONE ENCOUNTER
Call back to [facility], I spoke with nurse Penny FUENTES. I gave [place] message from provider below, and informed her of discussion below with Swathi by ALFREDO Conn. Virginia did not seem to know what medications were on patient medication list and asked me if she is on a blood thinner. I reported our records does not reflect any medication of that class, no ASA, NSAISD, warfarin or DOAC/NOAC noted.     To call us back if any further concerns.    Keily Thomas R.N.

## 2022-06-02 ENCOUNTER — TELEPHONE (OUTPATIENT)
Dept: FAMILY MEDICINE | Facility: CLINIC | Age: 87
End: 2022-06-02
Payer: COMMERCIAL

## 2022-06-02 NOTE — TELEPHONE ENCOUNTER
Received call back from pt  Relayed message from suresh    Pt reports that her elbow still hurts, but it's okay  She had her MRI last night at [facility]    Routing to PCP    Soco Ignacio RN on 6/2/2022 at 4:14 PM

## 2022-06-02 NOTE — TELEPHONE ENCOUNTER
BPs overall look good. No need to continue checking.     As far as elbow pain, she should keep an eye on it. If it remains unchanged or worsens, she should be seen.

## 2022-06-06 NOTE — TELEPHONE ENCOUNTER
"PAL called to pt to follow up on below    She did have some diarrhea today but \"that is not unusually for me\"      Bruising and wrist and elbow has improved.      She reports more pain in the last week.  \"I have pain from my waist down I just don't know anymore\"  She does have a follow up with [facility] on 6/8 for back pain. Tylenol does give some relief.     Pt is going out to town at the end of the month.  She would like to see PCP for a \"check in\" before she goes.       Scheduled with PCP for 6/16.  With any worsening symptoms or concerns call back to PAL for sooner appt. Pt expressed understanding and acceptance of the plan. had no further questions at this time.  Advised can call back to clinic at any time with concerns.     Ronda Miller RN     "

## 2022-06-08 ENCOUNTER — TRANSFERRED RECORDS (OUTPATIENT)
Dept: HEALTH INFORMATION MANAGEMENT | Facility: CLINIC | Age: 87
End: 2022-06-08

## 2022-06-14 ENCOUNTER — OFFICE VISIT (OUTPATIENT)
Dept: FAMILY MEDICINE | Facility: CLINIC | Age: 87
End: 2022-06-14
Payer: COMMERCIAL

## 2022-06-14 VITALS
HEART RATE: 65 BPM | TEMPERATURE: 98.1 F | RESPIRATION RATE: 16 BRPM | SYSTOLIC BLOOD PRESSURE: 138 MMHG | WEIGHT: 141.3 LBS | OXYGEN SATURATION: 97 % | BODY MASS INDEX: 24.12 KG/M2 | DIASTOLIC BLOOD PRESSURE: 60 MMHG | HEIGHT: 64 IN

## 2022-06-14 DIAGNOSIS — J06.9 VIRAL URI: Primary | ICD-10-CM

## 2022-06-14 DIAGNOSIS — M19.90 INFLAMMATORY OSTEOARTHRITIS: ICD-10-CM

## 2022-06-14 DIAGNOSIS — R06.02 SHORTNESS OF BREATH: ICD-10-CM

## 2022-06-14 PROCEDURE — 99213 OFFICE O/P EST LOW 20 MIN: CPT | Performed by: FAMILY MEDICINE

## 2022-06-14 RX ORDER — ALBUTEROL SULFATE 90 UG/1
2 AEROSOL, METERED RESPIRATORY (INHALATION) EVERY 4 HOURS PRN
Qty: 18 G | Refills: 0 | Status: SHIPPED | OUTPATIENT
Start: 2022-06-14 | End: 2023-05-15

## 2022-06-14 RX ORDER — INHALER, ASSIST DEVICES
SPACER (EA) MISCELLANEOUS
Qty: 1 EACH | Refills: 0 | Status: SHIPPED | OUTPATIENT
Start: 2022-06-14 | End: 2023-01-12

## 2022-06-14 NOTE — PROGRESS NOTES
"  Assessment & Plan     Viral URI - discussed likely diagnosis based on her symptoms, benign exam    Shortness of breath - will provide inhaler to help with her perception of dyspnea.   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       No follow-ups on file.    Cortney Vanessa MD  [facility] DEANGELO Echevarria is a 86 year old who presents for the following health issues  accompanied by her son.    History of Present Illness       Hypertension: She presents for follow up of hypertension.  She does check blood pressure  regularly outside of the clinic. Outside blood pressures have been over 140/90. She does not follow a low salt diet.         1. Cold symptoms the past few days. Hoarse voice and some shortness of breath. No cough. No fevers. Cough drops help with sore throat.     2. Right hip pain. Present for months. Following with orthopedics.     3. Cramping of the bilateral hands at times. Will typically happen at rest. Lasts less than a minute, then resolves. Has known osteoarthritis. Has consulted with Rheumatology in the past.       Review of Systems   Constitutional, HEENT, cardiovascular, pulmonary, gi and gu systems are negative, except as otherwise noted.      Objective    /60   Pulse 65   Temp 98.1  F (36.7  C) (Oral)   Resp 16   Ht 1.613 m (5' 3.5\")   Wt 64.1 kg (141 lb 4.8 oz)   BMI 24.64 kg/m    Body mass index is 24.64 kg/m .  Physical Exam   GENERAL: healthy, alert and no distress  HENT: no pharyngeal erythema  NECK: no adenopathy, no asymmetry, masses, or scars and thyroid normal to palpation  RESP: lungs clear to " auscultation - no rales, rhonchi or wheezes  CV: harsh systolic flow murmur, regular rate and rhythm, normal S1 S2, no S3 or S4

## 2022-06-17 ENCOUNTER — OFFICE VISIT (OUTPATIENT)
Dept: URGENT CARE | Facility: URGENT CARE | Age: 87
End: 2022-06-17
Payer: COMMERCIAL

## 2022-06-17 VITALS
OXYGEN SATURATION: 97 % | DIASTOLIC BLOOD PRESSURE: 50 MMHG | TEMPERATURE: 98.6 F | BODY MASS INDEX: 24.59 KG/M2 | RESPIRATION RATE: 20 BRPM | SYSTOLIC BLOOD PRESSURE: 131 MMHG | HEART RATE: 68 BPM | WEIGHT: 141 LBS

## 2022-06-17 DIAGNOSIS — N30.00 ACUTE CYSTITIS WITHOUT HEMATURIA: Primary | ICD-10-CM

## 2022-06-17 DIAGNOSIS — R30.0 DYSURIA: ICD-10-CM

## 2022-06-17 DIAGNOSIS — R06.00 DYSPNEA, UNSPECIFIED TYPE: ICD-10-CM

## 2022-06-17 LAB
ALBUMIN UR-MCNC: NEGATIVE MG/DL
APPEARANCE UR: CLEAR
BACTERIA #/AREA URNS HPF: ABNORMAL /HPF
BILIRUB UR QL STRIP: NEGATIVE
COLOR UR AUTO: YELLOW
GLUCOSE UR STRIP-MCNC: NEGATIVE MG/DL
HGB UR QL STRIP: NEGATIVE
KETONES UR STRIP-MCNC: NEGATIVE MG/DL
LEUKOCYTE ESTERASE UR QL STRIP: ABNORMAL
NITRATE UR QL: POSITIVE
PH UR STRIP: 5 [PH] (ref 5–7)
RBC #/AREA URNS AUTO: ABNORMAL /HPF
SP GR UR STRIP: <=1.005 (ref 1–1.03)
SQUAMOUS #/AREA URNS AUTO: ABNORMAL /LPF
UROBILINOGEN UR STRIP-ACNC: 0.2 E.U./DL
WBC #/AREA URNS AUTO: ABNORMAL /HPF

## 2022-06-17 PROCEDURE — 81001 URINALYSIS AUTO W/SCOPE: CPT

## 2022-06-17 PROCEDURE — 87186 SC STD MICRODIL/AGAR DIL: CPT | Performed by: FAMILY MEDICINE

## 2022-06-17 PROCEDURE — 99214 OFFICE O/P EST MOD 30 MIN: CPT | Performed by: FAMILY MEDICINE

## 2022-06-17 PROCEDURE — 87086 URINE CULTURE/COLONY COUNT: CPT | Performed by: FAMILY MEDICINE

## 2022-06-17 RX ORDER — NITROFURANTOIN 25; 75 MG/1; MG/1
100 CAPSULE ORAL 2 TIMES DAILY
Qty: 14 CAPSULE | Refills: 0 | Status: SHIPPED | OUTPATIENT
Start: 2022-06-17 | End: 2022-06-24

## 2022-06-18 NOTE — PATIENT INSTRUCTIONS
Go to the emergency room if you feel worsening shortness of breath.  Also go there if the shortness fails to improve within the next 24 hours.      Follow up with your primary care provider within the next week.

## 2022-06-18 NOTE — PROGRESS NOTES
SUBJECTIVE:   Swathi Dukes is a 86 year old female presenting with a chief complaint increased confusion and urinary incontinence since last night.      Patient has responded well to Macrobid in the past without complications such as worsening diarrhea and C difficile.      Patient's main complaint is shortness of breath for the past three days, worse since yesterday.  She feels like there is a sensation of shortness of breath at the base of the throat and nasal congestion and hoarseness.  Patient was evaluated by her primary care provider on June 14, 2022, for symptoms of shortness of breath.  Patient was given a Rx for Albuterol MDI.  No leg swelling.  No chest pain.  No coughing.    No recent long car trips.  No recent surgeries/leg injuries.  No swelling in the legs.  Patient did have DVTs in the past but no pulmonary embolus.        Past Medical History:   Diagnosis Date     Aortic stenosis      Glaucoma      HTN (hypertension)      Hyperlipidemia      Pericardial effusion      Systemic lupus erythematosus      Current Outpatient Medications   Medication Sig Dispense Refill     acetaminophen (TYLENOL) 500 MG tablet Take 1-2 tablets (500-1,000 mg) by mouth every 6 hours as needed for mild pain       albuterol (PROAIR HFA/PROVENTIL HFA/VENTOLIN HFA) 108 (90 Base) MCG/ACT inhaler Inhale 2 puffs into the lungs every 4 hours as needed for shortness of breath / dyspnea or wheezing 18 g 0     amLODIPine (NORVASC) 10 MG tablet Take 1 tablet (10 mg) by mouth daily 90 tablet 3     brimonidine (ALPHAGAN P) 0.1 % ophthalmic solution Place 1 drop into both eyes 2 times daily 10 mL 2     D3-50 1.25 MG (26336 UT) capsule Take 1 capsule (1,250 mcg) by mouth once a week 90 capsule 1     dorzolamide (TRUSOPT) 2 % ophthalmic solution Place 1 drop into both eyes 2 times daily 10 mL 0     DULoxetine (CYMBALTA) 20 MG capsule        ferrous gluconate (FERGON) 324 (38 Fe) MG tablet Take 1 tablet (324 mg) by mouth daily (with  breakfast) 90 tablet 2     folic acid (FOLVITE) 1 MG tablet Take 1 tablet (1 mg) by mouth daily 30 tablet 11     gabapentin (NEURONTIN) 100 MG capsule Take 1 capsule (100 mg) by mouth 3 times daily 105 capsule 11     hydrALAZINE (APRESOLINE) 25 MG tablet Take 1 tablet (25 mg) by mouth 3 times daily 270 tablet 0     latanoprost (XALATAN) 0.005 % ophthalmic solution Place 1 drop into both eyes At Bedtime 7.5 mL 1     lisinopril (ZESTRIL) 20 MG tablet Take 1 tablet (20 mg) by mouth daily 90 tablet 3     loperamide (IMODIUM) 2 MG capsule Take 1 capsule (2 mg) by mouth 3 times daily as needed for diarrhea 30 capsule 1     OLANZapine (ZYPREXA) 5 MG tablet Take 1 tablet (5 mg) by mouth At Bedtime 30 tablet 0     simvastatin (ZOCOR) 10 MG tablet Take 1 tablet (10 mg) by mouth At Bedtime 90 tablet 3     spacer (OPTICHAMBER KATIUSKA) holding chamber Use with inhaler as needed 1 each 0     Social History     Tobacco Use     Smoking status: Never Smoker     Smokeless tobacco: Never Used   Substance Use Topics     Alcohol use: Not Currently        ROS:  CONSTITUTIONAL:negative for fevers.    ear nose throat:  Positive for nasal congestion, hoarseness  RESP: positive for dyspnea sensation  :  Positive for urinary incontinence.      OBJECTIVE:  /50 (BP Location: Left arm)   Pulse 68   Temp 98.6  F (37  C) (Tympanic)   Resp 20   Wt 64 kg (141 lb)   SpO2 97%   BMI 24.59 kg/m    GENERAL APPEARANCE: healthy, alert and no distress.  Patient can speak in full sentences without getting short of breath.  No cough.    NECK: no increased jugular vein distension.   RESP: lungs clear to auscultation - no rales, rhonchi or wheezes  CV: regular rates and rhythm, normal S1 S2, no murmur noted  LEGS:  No edema.    SKIN: no cyanosis/pallor.     LABS:    Results for orders placed or performed in visit on 06/17/22   UA Macro with Reflex to Micro and Culture - lab collect     Status: Abnormal    Specimen: Urine, Clean Catch   Result  Value Ref Range    Color Urine Yellow Colorless, Straw, Light Yellow, Yellow    Appearance Urine Clear Clear    Glucose Urine Negative Negative mg/dL    Bilirubin Urine Negative Negative    Ketones Urine Negative Negative mg/dL    Specific Gravity Urine <=1.005 1.003 - 1.035    Blood Urine Negative Negative    pH Urine 5.0 5.0 - 7.0    Protein Albumin Urine Negative Negative mg/dL    Urobilinogen Urine 0.2 0.2, 1.0 E.U./dL    Nitrite Urine Positive (A) Negative    Leukocyte Esterase Urine Large (A) Negative   Urine Microscopic Exam     Status: Abnormal   Result Value Ref Range    Bacteria Urine Many (A) None Seen /HPF    RBC Urine 0-2 0-2 /HPF /HPF    WBC Urine  (A) 0-5 /HPF /HPF    Squamous Epithelials Urine Few (A) None Seen /LPF     chest x-ray:  I viewed all X-ray views during this clinic encounter.  The chest x-ray showed no acute infiltrates/effusions/consolidation.  There is mild cardiomegaly present.  No abnormal masses.        ASSESSMENT:  Acute Cystitis without Hematuria    Acute Dyspnea  Today's chest x-ray showed no effusions/infiltrates/consolidation.  No evidence of pneumonia.  .  Patient has no leg edema, no crackles, no signs of jugular venous distension to suggest cardiac failure.  I cannot rule out pulmonary embolus, however, patient does not have obvious risk factors that could make pulmonary embolus likely.          PLAN:  For the Acute Dyspnea:  Go to the emergency room if you feel worsening shortness of breath.  Also go there if the shortness fails to improve within the next 24 hours.    Follow up with your primary care provider within the next week.      For the Acute Cystitis without Hematuria  Rx:  Macrobid.    Pending lab:  Urine Culture  follow up with the primary care provider within the next week.      Mati Christensen MD

## 2022-06-19 ENCOUNTER — NURSE TRIAGE (OUTPATIENT)
Dept: NURSING | Facility: CLINIC | Age: 87
End: 2022-06-19
Payer: COMMERCIAL

## 2022-06-19 ENCOUNTER — HOSPITAL ENCOUNTER (OUTPATIENT)
Facility: CLINIC | Age: 87
Setting detail: OBSERVATION
Discharge: HOME | End: 2022-06-19
Attending: EMERGENCY MEDICINE | Admitting: EMERGENCY MEDICINE
Payer: COMMERCIAL

## 2022-06-19 ENCOUNTER — APPOINTMENT (OUTPATIENT)
Dept: GENERAL RADIOLOGY | Facility: CLINIC | Age: 87
End: 2022-06-19
Attending: EMERGENCY MEDICINE
Payer: COMMERCIAL

## 2022-06-19 ENCOUNTER — TELEPHONE (OUTPATIENT)
Dept: NURSING | Facility: CLINIC | Age: 87
End: 2022-06-19
Payer: COMMERCIAL

## 2022-06-19 DIAGNOSIS — N39.0 UTI (URINARY TRACT INFECTION), BACTERIAL: ICD-10-CM

## 2022-06-19 DIAGNOSIS — U07.1 INFECTION DUE TO 2019 NOVEL CORONAVIRUS: ICD-10-CM

## 2022-06-19 DIAGNOSIS — U07.1 LAB TEST POSITIVE FOR DETECTION OF COVID-19 VIRUS: ICD-10-CM

## 2022-06-19 DIAGNOSIS — M62.81 GENERALIZED MUSCLE WEAKNESS: ICD-10-CM

## 2022-06-19 DIAGNOSIS — A49.9 UTI (URINARY TRACT INFECTION), BACTERIAL: ICD-10-CM

## 2022-06-19 DIAGNOSIS — M62.81 MUSCLE WEAKNESS (GENERALIZED): ICD-10-CM

## 2022-06-19 LAB
ANION GAP SERPL CALCULATED.3IONS-SCNC: 4 MMOL/L (ref 3–14)
BACTERIA UR CULT: ABNORMAL
BASOPHILS # BLD AUTO: 0 10E3/UL (ref 0–0.2)
BASOPHILS NFR BLD AUTO: 1 %
BUN SERPL-MCNC: 26 MG/DL (ref 7–30)
CALCIUM SERPL-MCNC: 8.8 MG/DL (ref 8.5–10.1)
CHLORIDE BLD-SCNC: 108 MMOL/L (ref 94–109)
CO2 SERPL-SCNC: 28 MMOL/L (ref 20–32)
CREAT SERPL-MCNC: 0.9 MG/DL (ref 0.52–1.04)
EOSINOPHIL # BLD AUTO: 0.3 10E3/UL (ref 0–0.7)
EOSINOPHIL NFR BLD AUTO: 5 %
ERYTHROCYTE [DISTWIDTH] IN BLOOD BY AUTOMATED COUNT: 13.3 % (ref 10–15)
FLUAV RNA SPEC QL NAA+PROBE: NEGATIVE
FLUBV RNA RESP QL NAA+PROBE: NEGATIVE
GFR SERPL CREATININE-BSD FRML MDRD: 63 ML/MIN/1.73M2
GLUCOSE BLD-MCNC: 94 MG/DL (ref 70–99)
HCT VFR BLD AUTO: 34.7 % (ref 35–47)
HGB BLD-MCNC: 11 G/DL (ref 11.7–15.7)
IMM GRANULOCYTES # BLD: 0 10E3/UL
IMM GRANULOCYTES NFR BLD: 0 %
LACTATE SERPL-SCNC: 0.7 MMOL/L (ref 0.7–2)
LYMPHOCYTES # BLD AUTO: 1.4 10E3/UL (ref 0.8–5.3)
LYMPHOCYTES NFR BLD AUTO: 27 %
MCH RBC QN AUTO: 30.1 PG (ref 26.5–33)
MCHC RBC AUTO-ENTMCNC: 31.7 G/DL (ref 31.5–36.5)
MCV RBC AUTO: 95 FL (ref 78–100)
MONOCYTES # BLD AUTO: 0.8 10E3/UL (ref 0–1.3)
MONOCYTES NFR BLD AUTO: 16 %
NEUTROPHILS # BLD AUTO: 2.5 10E3/UL (ref 1.6–8.3)
NEUTROPHILS NFR BLD AUTO: 51 %
NRBC # BLD AUTO: 0 10E3/UL
NRBC BLD AUTO-RTO: 0 /100
PLATELET # BLD AUTO: 223 10E3/UL (ref 150–450)
POTASSIUM BLD-SCNC: 4.5 MMOL/L (ref 3.4–5.3)
RBC # BLD AUTO: 3.65 10E6/UL (ref 3.8–5.2)
RSV RNA SPEC NAA+PROBE: NEGATIVE
SARS-COV-2 RNA RESP QL NAA+PROBE: POSITIVE
SODIUM SERPL-SCNC: 140 MMOL/L (ref 133–144)
WBC # BLD AUTO: 5 10E3/UL (ref 4–11)

## 2022-06-19 PROCEDURE — 36415 COLL VENOUS BLD VENIPUNCTURE: CPT | Performed by: EMERGENCY MEDICINE

## 2022-06-19 PROCEDURE — 93005 ELECTROCARDIOGRAM TRACING: CPT | Performed by: EMERGENCY MEDICINE

## 2022-06-19 PROCEDURE — 93010 ELECTROCARDIOGRAM REPORT: CPT | Performed by: EMERGENCY MEDICINE

## 2022-06-19 PROCEDURE — 99285 EMERGENCY DEPT VISIT HI MDM: CPT | Mod: CS,25 | Performed by: EMERGENCY MEDICINE

## 2022-06-19 PROCEDURE — 87040 BLOOD CULTURE FOR BACTERIA: CPT | Performed by: EMERGENCY MEDICINE

## 2022-06-19 PROCEDURE — 999N000285 HC STATISTIC VASC ACCESS LAB DRAW WITH PIV START

## 2022-06-19 PROCEDURE — 85025 COMPLETE CBC W/AUTO DIFF WBC: CPT | Performed by: EMERGENCY MEDICINE

## 2022-06-19 PROCEDURE — 71046 X-RAY EXAM CHEST 2 VIEWS: CPT

## 2022-06-19 PROCEDURE — 71046 X-RAY EXAM CHEST 2 VIEWS: CPT | Mod: 26 | Performed by: STUDENT IN AN ORGANIZED HEALTH CARE EDUCATION/TRAINING PROGRAM

## 2022-06-19 PROCEDURE — 99285 EMERGENCY DEPT VISIT HI MDM: CPT | Mod: CS | Performed by: EMERGENCY MEDICINE

## 2022-06-19 PROCEDURE — 83605 ASSAY OF LACTIC ACID: CPT | Performed by: EMERGENCY MEDICINE

## 2022-06-19 PROCEDURE — 80048 BASIC METABOLIC PNL TOTAL CA: CPT | Performed by: EMERGENCY MEDICINE

## 2022-06-19 PROCEDURE — G0378 HOSPITAL OBSERVATION PER HR: HCPCS

## 2022-06-19 PROCEDURE — 999N000127 HC STATISTIC PERIPHERAL IV START W US GUIDANCE

## 2022-06-19 PROCEDURE — C9803 HOPD COVID-19 SPEC COLLECT: HCPCS | Performed by: EMERGENCY MEDICINE

## 2022-06-19 PROCEDURE — 87637 SARSCOV2&INF A&B&RSV AMP PRB: CPT | Performed by: EMERGENCY MEDICINE

## 2022-06-19 NOTE — TELEPHONE ENCOUNTER
Entered encounter to get LPN at assisted living phone number for call back. See telephone encounter for today.  Ailyn Cabral RN   06/19/22 12:01 PM  [facility] Nurse Advisor

## 2022-06-19 NOTE — ED PROVIDER NOTES
"ED Provider Note  [facility]      History     Chief Complaint   Patient presents with     Shortness of Breath     HPI  Swathi Dukes is a 86 year old female who presents for generalized weakness and shortness of breath.  Patient reports that she started to feel \"sick\" this past Tuesday she states she saw her doctor and was given albuterol for this.  Later in the week she was having urinary incontinence and confusion and was seen at urgent care and diagnosed with a urinary tract infection and started on antibiotics.  She came in this evening has she reports that since Tuesday she continues to have generalized weakness and feels more short of breath.  She denies any fevers.  She endorses chills.  She denies any cough sputum production or chest pain.  She denies any abdominal pain nausea vomiting or diarrhea.  She came in for further evaluation due to her persistent symptoms.  Chart review shows urinalysis is growing E. Coli.  She also reports a home positive COVID test recently    Past Medical History  Past Medical History:   Diagnosis Date     Aortic stenosis      Glaucoma      HTN (hypertension)      Hyperlipidemia      Pericardial effusion      Systemic lupus erythematosus      Past Surgical History:   Procedure Laterality Date     CV HEART CATHETERIZATION WITH POSSIBLE INTERVENTION N/A 1/5/2021    Procedure: Heart Catheterization with Possible Intervention;  Surgeon: Hadyen Bedoya MD;  Location:  HEART CARDIAC CATH LAB     CV LEFT VENTRICULOGRAM N/A 1/5/2021    Procedure: Left Ventriculogram;  Surgeon: Hayden Bedoya MD;  Location:  HEART CARDIAC CATH LAB     acetaminophen (TYLENOL) 500 MG tablet  albuterol (PROAIR HFA/PROVENTIL HFA/VENTOLIN HFA) 108 (90 Base) MCG/ACT inhaler  amLODIPine (NORVASC) 10 MG tablet  brimonidine (ALPHAGAN P) 0.1 % ophthalmic solution  D3-50 1.25 MG (14953 UT) capsule  dorzolamide (TRUSOPT) 2 % ophthalmic solution  DULoxetine (CYMBALTA) 20 MG " capsule  ferrous gluconate (FERGON) 324 (38 Fe) MG tablet  folic acid (FOLVITE) 1 MG tablet  gabapentin (NEURONTIN) 100 MG capsule  hydrALAZINE (APRESOLINE) 25 MG tablet  latanoprost (XALATAN) 0.005 % ophthalmic solution  lisinopril (ZESTRIL) 20 MG tablet  loperamide (IMODIUM) 2 MG capsule  nitroFURantoin macrocrystal-monohydrate (MACROBID) 100 MG capsule  OLANZapine (ZYPREXA) 5 MG tablet  simvastatin (ZOCOR) 10 MG tablet  spacer (OPTICHAMBER KATIUSKA) holding chamber      Allergies   Allergen Reactions     Diclofenac Anaphylaxis     Iodine Anaphylaxis     Contrast  Pt states anaphylactic reaction to ct contrast about 20 years ago     Aspirin      Family History  Family History   Problem Relation Age of Onset     Diabetes Father      Social History   Social History     Tobacco Use     Smoking status: Never Smoker     Smokeless tobacco: Never Used   Vaping Use     Vaping Use: Never used   Substance Use Topics     Alcohol use: Not Currently     Drug use: Never      Past medical history, past surgical history, medications, allergies, family history, and social history were reviewed with the patient. No additional pertinent items.       Review of Systems  A complete review of systems was performed with pertinent positives and negatives noted in the HPI, and all other systems negative.    Physical Exam   BP: 132/56  Pulse: 64  Temp: 98.2  F (36.8  C)  Resp: 16  SpO2: 96 %  Physical Exam  Constitutional:       General: She is not in acute distress.     Appearance: She is well-developed.   HENT:      Head: Normocephalic and atraumatic.      Mouth/Throat:      Mouth: Mucous membranes are moist.   Eyes:      Extraocular Movements: Extraocular movements intact.      Pupils: Pupils are equal, round, and reactive to light.   Cardiovascular:      Rate and Rhythm: Normal rate and regular rhythm.   Pulmonary:      Effort: Pulmonary effort is normal. No respiratory distress.      Breath sounds: No wheezing, rhonchi or rales.    Abdominal:      Palpations: Abdomen is soft.      Tenderness: There is no abdominal tenderness. There is no guarding or rebound.   Musculoskeletal:         General: Normal range of motion.      Cervical back: Normal range of motion.   Skin:     General: Skin is warm.   Neurological:      General: No focal deficit present.      Mental Status: She is alert and oriented to person, place, and time.       ED Course      Procedures            EKG Interpretation:      Interpreted by Marco A Flynn MD  Time reviewed: 1722  Symptoms at time of EKG: SOB   Rhythm: normal sinus   Rate: 60  Axis: Normal  Ectopy: none  Conduction: normal  ST Segments/ T Waves: Non-specific ST-T wave changes  Q Waves: none  Comparison to prior: Unchanged    Clinical Impression: no acute changes     Results for orders placed or performed during the hospital encounter of 06/19/22   XR Chest 2 Views     Status: None    Narrative    EXAM: XR CHEST 2 VW  6/19/2022 4:32 PM      HISTORY: sob    COMPARISON: 6/17/2022    FINDINGS: Two views of the chest. Trachea is midline. Borderline  cardiomegaly. No pleural effusion, or pneumothorax. Left lower zone  opacity      Impression    IMPRESSION: Left lower zone opacities which may represent atelectasis  versus infection.    I have personally reviewed the examination and initial interpretation  and I agree with the findings.    OZZIE TOBIAS MD         SYSTEM ID:  [id number]   Basic metabolic panel     Status: Normal   Result Value Ref Range    Sodium 140 133 - 144 mmol/L    Potassium 4.5 3.4 - 5.3 mmol/L    Chloride 108 94 - 109 mmol/L    Carbon Dioxide (CO2) 28 20 - 32 mmol/L    Anion Gap 4 3 - 14 mmol/L    Urea Nitrogen 26 7 - 30 mg/dL    Creatinine 0.90 0.52 - 1.04 mg/dL    Calcium 8.8 8.5 - 10.1 mg/dL    Glucose 94 70 - 99 mg/dL    GFR Estimate 63 >60 mL/min/1.73m2   Lactic acid whole blood     Status: Normal   Result Value Ref Range    Lactic Acid 0.7 0.7 - 2.0 mmol/L   Symptomatic; Yes; 6/14/2022  Influenza A/B & SARS-CoV2 (COVID-19) Virus PCR Multiplex Nasopharyngeal     Status: Abnormal    Specimen: Nasopharyngeal; Swab   Result Value Ref Range    Influenza A PCR Negative Negative    Influenza B PCR Negative Negative    RSV PCR Negative Negative    SARS CoV2 PCR Positive (A) Negative, Testing sent to reference lab. Results will be returned via unsolicited result    Narrative    Testing was performed using the Xpert Xpress CoV2/Flu/RSV Assay on the [employer] GeneXpert Instrument. This test should be ordered for the detection of SARS-CoV-2 and influenza viruses in individuals who meet clinical and/or epidemiological criteria. Test performance is unknown in asymptomatic patients. This test is for in vitro diagnostic use under the FDA EUA for laboratories certified under CLIA to perform high or moderate complexity testing. This test has not been FDA cleared or approved. A negative result does not rule out the presence of PCR inhibitors in the specimen or target RNA in concentration below the limit of detection for the assay. If only one viral target is positive but coinfection with multiple targets is suspected, the sample should be re-tested with another FDA cleared, approved, or authorized test, if coinfection would change clinical management. This test was validated by the [facility]. These laboratories are certified under the Clinical  Laboratory Improvement Amendments of 1988 (CLIA-88) as qualified to perform high complexity laboratory testing.   CBC with platelets and differential     Status: Abnormal   Result Value Ref Range    WBC Count 5.0 4.0 - 11.0 10e3/uL    RBC Count 3.65 (L) 3.80 - 5.20 10e6/uL    Hemoglobin 11.0 (L) 11.7 - 15.7 g/dL    Hematocrit 34.7 (L) 35.0 - 47.0 %    MCV 95 78 - 100 fL    MCH 30.1 26.5 - 33.0 pg    MCHC 31.7 31.5 - 36.5 g/dL    RDW 13.3 10.0 - 15.0 %    Platelet Count 223 150 - 450 10e3/uL    % Neutrophils 51 %    % Lymphocytes 27 %    % Monocytes 16 %    %  Eosinophils 5 %    % Basophils 1 %    % Immature Granulocytes 0 %    NRBCs per 100 WBC 0 <1 /100    Absolute Neutrophils 2.5 1.6 - 8.3 10e3/uL    Absolute Lymphocytes 1.4 0.8 - 5.3 10e3/uL    Absolute Monocytes 0.8 0.0 - 1.3 10e3/uL    Absolute Eosinophils 0.3 0.0 - 0.7 10e3/uL    Absolute Basophils 0.0 0.0 - 0.2 10e3/uL    Absolute Immature Granulocytes 0.0 <=0.4 10e3/uL    Absolute NRBCs 0.0 10e3/uL   EKG 12-lead, tracing only     Status: None (Preliminary result)   Result Value Ref Range    Systolic Blood Pressure  mmHg    Diastolic Blood Pressure  mmHg    Ventricular Rate 60 BPM    Atrial Rate 60 BPM    SC Interval 208 ms    QRS Duration 84 ms     ms    QTc 426 ms    P Axis 59 degrees    R AXIS -26 degrees    T Axis 69 degrees    Interpretation ECG       Sinus rhythm  Voltage criteria for left ventricular hypertrophy  Abnormal ECG     CBC with platelets differential     Status: Abnormal    Narrative    The following orders were created for panel order CBC with platelets differential.  Procedure                               Abnormality         Status                     ---------                               -----------         ------                     CBC with platelets and d...[[id number]]  Abnormal            Final result                 Please view results for these tests on the individual orders.     Medications - No data to display     Assessments & Plan (with Medical Decision Making)   Patient presents for evaluation for generalized weakness and shortness of breath.  This is in the setting of a recent positive home COVID test along with recent diagnosis of UTI.  She was started on antibiotic day or 2 prior for this.  However she started feeling unwell about 6 days prior with shortness of breath.  She denies any cough or fevers.  She was referred and is seek evaluation given her symptoms.  Here she is vitally stable she is in no respiratory distress.  She does not require oxygen support.   Infectious work-up was obtained her labs are benign lactate is negative no white count.  COVID did come back positive and flu is negative.  I reviewed her urine culture which is positive from a couple days prior.  She is out of the time window for Paxilovid, and also discussed risk benefits of this medication with her and family and they understand and agree with no treatment.  She may be still within the window for monoclonal antibodies.  She does not meet admission criteria or need further treatment from a COVID standpoint however I will not be able to have her obtain monoclonal antibodies tomorrow but did inquire with the triage hospitalist for observation admission if they could obtain treatment for monoclonal's for her.  In regards to her UTI her laboratory work and vitals are benign and outpatient treatment seems to be appropriate at this time as well.  I discussed with the patient the only indication for admission/observation would be is if she could obtain monoclonal antibodies tomorrow given that she is within the 7-day window.  After further investigation the hospitalist would not be able to obtain monoclonal antibodies tomorrow also due to government holiday and they can consider possibly extending it till Tuesday.  I discussed this with patient and family.  She will be discharged and will need to continue her antibiotic for her UTI.  Discussed signs and symptoms of worsening respiratory status to return for reevaluation.  Patient will be discharged in stable condition and advised to monitor her vitals and respiratory status at home and to return if these are worsening or if she is becoming hypoxic otherwise follow-up with primary care physician to ensure improvement in her treatment and we will see if she would be a candidate for monoclonal bodies next week.    I have reviewed the nursing notes. I have reviewed the findings, diagnosis, plan and need for follow up with the patient.    New Prescriptions     No medications on file       Final diagnoses:   UTI (urinary tract infection), bacterial   Generalized muscle weakness   Infection due to 2019 novel coronavirus       --  Marco A Flynn MD  [facility] EMERGENCY DEPARTMENT  6/19/2022     Marco A Flynn MD  06/19/22 2996

## 2022-06-19 NOTE — TELEPHONE ENCOUNTER
Patient calling. She was contacted by a nurse at [facility] [place] and told to get oral anti-viral treatment. Patient called the test to treat call center and they directed her to call PCP. Patient is not understanding what to do next.  Patient reports that she has had cold symptoms for a couple days prior to 6/14/2022 which would put her beyond the 5 days for oral antiviral therapy. Patient woke up feeling ok but has been getting worse with more labored breathing. Patient is feeling shortness of breath at rest.   Protocol recommends ED now for evaluation.   Patient reports she will return to , corrected patient to go to ED at the hospital and not urgent care. Patient will call son for a ride to the hospital or call 911 with worsening symptoms.   Ailyn Cabral RN   06/19/22 1:18 PM  [facility] Nurse Advisor    COVID 19 Nurse Triage Plan/Patient Instructions    Please be aware that novel coronavirus (COVID-19) may be circulating in the community. If you develop symptoms such as fever, cough, or SOB or if you have concerns about the presence of another infection including coronavirus (COVID-19), please contact your health care provider or visit [email or web address].     Disposition/Instructions    ED Visit recommended. Follow protocol based instructions.     Bring Your Own Device:  Please also bring your smart device(s) (smart phones, tablets, laptops) and their charging cables for your personal use and to communicate with your care team during your visit.    Thank you for taking steps to prevent the spread of this virus.  o Limit your contact with others.  o Wear a simple mask to cover your cough.  o Wash your hands well and often.    Resources    [facility]: About COVID-19: [email or web address]    CDC: What to Do If You're Sick: www.cdc.gov/coronavirus/2019-ncov/about/steps-when-sick.html    CDC: Ending Home Isolation:  www.cdc.gov/coronavirus/2019-ncov/hcp/disposition-in-home-patients.html     CDC: Caring for Someone: www.cdc.gov/coronavirus/2019-ncov/if-you-are-sick/care-for-someone.html     [facility]: Interim Guidance for Hospital Discharge to Home: www.health.[facility].[place]/diseases/coronavirus/hcp/hospdischarge.pdf    [facility] clinical trials (COVID-19 research studies): [email or web address]-clinical-trials     Below are the COVID-19 hotlines at the [place] Department of Health ([facility]). Interpreters are available.   o For health questions: Call 064-817-3767 or 1-330.828.8201 (7 a.m. to 7 p.m.)  o For questions about schools and childcare: Call 058-467-7585 or 1-738.931.3017 (7 a.m. to 7 p.m.)     Additional Information    Negative: SEVERE difficulty breathing (e.g., struggling for each breath, speaks in single words)    Negative: Difficult to awaken or acting confused (e.g., disoriented, slurred speech)    Negative: Bluish (or gray) lips or face now    Negative: Shock suspected (e.g., cold/pale/clammy skin, too weak to stand, low BP, rapid pulse)    Negative: Sounds like a life-threatening emergency to the triager    Negative: [1] Diagnosed or suspected COVID-19 AND [2] symptoms lasting 3 or more weeks    Negative: [1] COVID-19 exposure AND [2] no symptoms    Negative: COVID-19 vaccine reaction suspected (e.g., fever, headache, muscle aches) occurring 1 to 3 days after getting vaccine    Negative: COVID-19 vaccine, questions about    Negative: [1] Lives with someone known to have influenza (flu test positive) AND [2] flu-like symptoms (e.g., cough, runny nose, sore throat, SOB; with or without fever)    Negative: [1] Adult with possible COVID-19 symptoms AND [2] triager concerned about severity of symptoms or other causes    Negative: COVID-19 and breastfeeding, questions about    Protocols used: CORONAVIRUS (COVID-19) DIAGNOSED OR [id number] 1.18.2022

## 2022-06-19 NOTE — TELEPHONE ENCOUNTER
Call is LPN from patient's [facility]; patient tested Covid positive today; was negative on 06/15. [facility] does not have a protocl  that applies to patient with a  personal PCP and not covered by in house care.  Advised that   nurse  needs to speak with on call PCP and may be then provided with care or referred to care team  covering antiviral treatment via V V.   LPN understands and and was assisted  In access to paging    Mylene CANTU       Reason for Disposition    HIGH RISK for severe COVID complications (e.g., weak immune system, age > 64 years, obesity with BMI > 25, pregnant, chronic lung disease or other chronic medical condition)  (Exception: Already seen by PCP and no new or worsening symptoms.)    Additional Information    Negative: [1] Diagnosed or suspected COVID-19 AND [2] symptoms lasting 3 or more weeks    Negative: [1] COVID-19 exposure AND [2] no symptoms    Negative: COVID-19 vaccine reaction suspected (e.g., fever, headache, muscle aches) occurring 1 to 3 days after getting vaccine    Negative: COVID-19 vaccine, questions about    Negative: [1] Lives with someone known to have influenza (flu test positive) AND [2] flu-like symptoms (e.g., cough, runny nose, sore throat, SOB; with or without fever)    Negative: [1] Adult with possible COVID-19 symptoms AND [2] triager concerned about severity of symptoms or other causes    Negative: COVID-19 and breastfeeding, questions about    Negative: SEVERE or constant chest pain or pressure  (Exception: Mild central chest pain, present only when coughing.)    Negative: MODERATE difficulty breathing (e.g., speaks in phrases, SOB even at rest, pulse 100-120)    Negative: [1] Headache AND [2] stiff neck (can't touch chin to chest)    Negative: Oxygen level (e.g., pulse oximetry) 90 percent or lower    Negative: Chest pain or pressure    Negative: Patient sounds very sick or weak to the triager    Negative: MILD difficulty breathing (e.g., minimal/no SOB  at rest, SOB with walking, pulse <100)    Negative: Fever > 103 F (39.4 C)    Negative: [1] Fever > 101 F (38.3 C) AND [2] age > 60 years    Negative: [1] Fever > 100.0 F (37.8 C) AND [2] bedridden (e.g., nursing home patient, CVA, chronic illness, recovering from surgery)    Protocols used: CORONAVIRUS (COVID-19) DIAGNOSED OR [id number] 1.18.2022  COVID 19 Nurse Triage Plan/Patient Instructions    Please be aware that novel coronavirus (COVID-19) may be circulating in the community. If you develop symptoms such as fever, cough, or SOB or if you have concerns about the presence of another infection including coronavirus (COVID-19), please contact your health care provider or visit [email or web address].     Disposition/Instructions      Speak with PCP now    Thank you for taking steps to prevent the spread of this virus.  o Limit your contact with others.  o Wear a simple mask to cover your cough.  o Wash your hands well and often.    Resources    [facility]: About COVID-19: [email or web address]    CDC: What to Do If You're Sick: www.cdc.gov/coronavirus/2019-ncov/about/steps-when-sick.html    CDC: Ending Home Isolation: www.cdc.gov/coronavirus/2019-ncov/hcp/disposition-in-home-patients.html     CDC: Caring for Someone: www.cdc.gov/coronavirus/2019-ncov/if-you-are-sick/care-for-someone.html     [facility]: Interim Guidance for Hospital Discharge to Home: www.health.[facility].[place]/diseases/coronavirus/hcp/hospdischarge.pdf    [facility] clinical trials (COVID-19 research studies): clinicalaffairs.[facility]/um-clinical-trials     Below are the COVID-19 hotlines at the [place] Department of Health ([facility]). Interpreters are available.   o For health questions: Call 834-555-6479 or 1-840.263.8491 (7 a.m. to 7 p.m.)  o For questions about schools and childcare: Call 867-137-6792 or 1-741.815.6978 (7 a.m. to 7 p.m.)

## 2022-06-19 NOTE — TELEPHONE ENCOUNTER
Dr. Herman Ca calling back to verify that she has not missed anything regarding current patient condition with her covid. Wanting to verify that patient is stable.   Chart review from triage encounter today does not mention any severe symptoms or instability.   Provider gave recommendation for patient to schedule via [employer] Informed provider that Covid treatment appointments are booked out until tomorrow and possible Tuesday.  Patient is on day 5 and needing an appointment today.   This RN will call back assisted living and direct staff to the Test to treat call center at [phone] for help accessing a community resource for treatment today.   Call to assisted living and spoke to LPN who asked if I could call the patient back directly.   Call to patient who is asking for a call back as she is occupied at this time. Informed patient I will call back in 30 minutes.   Return to call to patient. Gave patient the Test to treat call center at [phone]. Patient will call to get virtual visit today for treatment.   Ailyn Cabral RN   06/19/22 12:41 PM  [facility] Nurse Advisor

## 2022-06-19 NOTE — ED TRIAGE NOTES
Pt BIBA from a home apartment for increased weakness and shortness of breath. Patient tested positive for COVID-19 on Friday. Patient is currently taking antibiotics for a UTI. Patient vitals are stable on room air.

## 2022-06-20 ENCOUNTER — PATIENT OUTREACH (OUTPATIENT)
Dept: CARE COORDINATION | Facility: CLINIC | Age: 87
End: 2022-06-20
Payer: COMMERCIAL

## 2022-06-20 ENCOUNTER — TELEPHONE (OUTPATIENT)
Dept: FAMILY MEDICINE | Facility: CLINIC | Age: 87
End: 2022-06-20

## 2022-06-20 ENCOUNTER — VIRTUAL VISIT (OUTPATIENT)
Dept: FAMILY MEDICINE | Facility: CLINIC | Age: 87
End: 2022-06-20
Payer: COMMERCIAL

## 2022-06-20 ENCOUNTER — NURSE TRIAGE (OUTPATIENT)
Dept: NURSING | Facility: CLINIC | Age: 87
End: 2022-06-20
Payer: COMMERCIAL

## 2022-06-20 VITALS
HEART RATE: 63 BPM | SYSTOLIC BLOOD PRESSURE: 130 MMHG | TEMPERATURE: 98.8 F | RESPIRATION RATE: 16 BRPM | DIASTOLIC BLOOD PRESSURE: 63 MMHG | OXYGEN SATURATION: 94 %

## 2022-06-20 DIAGNOSIS — N30.00 ACUTE CYSTITIS WITHOUT HEMATURIA: ICD-10-CM

## 2022-06-20 DIAGNOSIS — M54.50 LOW BACK PAIN RADIATING TO RIGHT LEG: Primary | ICD-10-CM

## 2022-06-20 DIAGNOSIS — Z71.89 OTHER SPECIFIED COUNSELING: ICD-10-CM

## 2022-06-20 DIAGNOSIS — U07.1 INFECTION DUE TO 2019 NOVEL CORONAVIRUS: ICD-10-CM

## 2022-06-20 DIAGNOSIS — M79.604 LOW BACK PAIN RADIATING TO RIGHT LEG: Primary | ICD-10-CM

## 2022-06-20 LAB
ATRIAL RATE - MUSE: 60 BPM
DIASTOLIC BLOOD PRESSURE - MUSE: NORMAL MMHG
INTERPRETATION ECG - MUSE: NORMAL
P AXIS - MUSE: 59 DEGREES
PR INTERVAL - MUSE: 208 MS
QRS DURATION - MUSE: 84 MS
QT - MUSE: 426 MS
QTC - MUSE: 426 MS
R AXIS - MUSE: -26 DEGREES
SYSTOLIC BLOOD PRESSURE - MUSE: NORMAL MMHG
T AXIS - MUSE: 69 DEGREES
VENTRICULAR RATE- MUSE: 60 BPM

## 2022-06-20 PROCEDURE — 99214 OFFICE O/P EST MOD 30 MIN: CPT | Mod: 95 | Performed by: PHYSICIAN ASSISTANT

## 2022-06-20 NOTE — PROGRESS NOTES
Swathi is a 86 year old who is being evaluated via a billable telephone visit.      What phone number would you like to be contacted at? [phone]  How would you like to obtain your AVS? Mail a copy      Visit was performed   Assessment & Plan     Low back pain radiating to right leg  In regards to ongoing back pain and symptoms starting possibly after her injection with [facility], recommending calling [facility] for inquiry on possible related concern or result of injection. Given virtual nature of visit today, unable to perform exam, but based on description, seems possibly related to her original visit with [facility].     Infection due to 2019 novel coronavirus  New diagnosis though appears has had symptoms for 10 days at least. Based on this, outside of treatment window for paxlovid/molnuprivir and monoclonal abx. No cough making DXM and tessalon not needed. No evidence to suggest/recommend ICS outside of trial use per [employer]. Given normal ER evaluation yesterday, no evidence to suggest need for hospitalization given stability. Recommending instead close follow up with pcp team. Recommending recheck in 2-3 days virtual. Patient informed if worsening symptoms, recommending calling nurse line. If severe shortness of breath or chest pains develop, return to ER.     Acute cystitis without hematuria  Current treatment. If symptoms fail to improve at check in in 2-3 days, would recommend repeating UA to assess for treatment of infection.           Return in about 2 days (around 6/22/2022) for with pcp/care team. .    ANA [facility]    Subjective   Swathi is a 86 year old   presenting for the following health issues:  Hip Pain      HPI     Pain History:  When did you first notice your pain? - Acute Pain   Have you seen anyone else for your pain? No  Where in your body do you have pain? Back Pain  Onset/Duration: week-unsure if started before or after her injection  Description:    Location of pain: low back right  Character of pain: sharp, dull ache and intermittent  Pain radiation: radiates into the right buttocks  New numbness or weakness in legs, not attributed to pain: no   Intensity: moderate  Progression of Symptoms: same  History:   Specific cause: none  Pain interferes with job: not applicable  History of back problems: compression fracture in the spine-[facility] did give cortisone injection last Tuesday  Any previous MRI or X-rays: Yes--at [facility].  Date 6/14/2022  Sees a specialist for back pain: No  Alleviating factors:   Improved by: nothing    Precipitating factors:  Worsened by: Nothing  Therapies tried and outcome: none    Accompanying Signs & Symptoms:  Risk of Fracture: Age >64  Risk of Cauda Equina: None  Risk of Infection: None  Risk of Cancer: None        ED/UC Followup:    Facility:  [facility]  Date of visit: 6/19/22  Reason for visit: shortness of breath   Current Status: unchanged.     In summary, patient states she presented to pcp's office on 6/14 for shortness of breath for 3 days. Was given albuterol inhaler  Without improvement. Was found to have UTI at  on 6/17 and started on macrobid which she has continued.     Yesterday presented to ER with ongoing shortness of breath and found to be positive for Covid-19. Given ~9-10 days of symptoms, no outpatient treatment was started.     Patient feels unchanged from yesterday. Albuterol does not aid in symptoms. Denies any cough, fever or chills. Feels weak and short of breath. Per EMR review, labs were all WNL including bmp, lactic acid, and blood cultures negative after 12 hours. Anemia was present on CBC though stable compared to past. No leukocytosis or penia.       Review of Systems   Constitutional, HEENT, cardiovascular, pulmonary, GI, , musculoskeletal, neuro, skin, endocrine and psych systems are negative, except as otherwise noted.      Objective           Vitals:  No vitals were obtained  today due to virtual visit.    Physical Exam   alert and no distress  PSYCH: Alert and oriented times 3; coherent speech, normal   rate and volume, able to articulate logical thoughts, able   to abstract reason, no tangential thoughts, no hallucinations   or delusions  Her affect is normal  RESP: No cough, no audible wheezing, able to talk in full sentences  Remainder of exam unable to be completed due to telephone visits          Phone call duration: 13 minutes    .  ..

## 2022-06-20 NOTE — TELEPHONE ENCOUNTER
The CXR were read differently but don't look different.     Let's continue home care - please call in a couple days to check in.     Thanks. JH

## 2022-06-20 NOTE — TELEPHONE ENCOUNTER
"PAL call to pt for ER follow up    Pt testes COVID positive on 6/19 and also being treated for UTI     Pt is now out of treatment range as today is day 7.     She states she still feels \"like crap\"  Mentally does not feel \"clear\" she is having some mild chest pain and SOB.  Pt does sound congested on the phone.  Denies fever.      O2 Stat in ED was 96%       Pt has albuterol MDI but does not find this helpful.  Being treated with Macrobid for UTI and using tylenol prn.   All other medications remain the same.     Please review CXR as appears there is change from 6/17 to 6/19     Other than home cares at this time any other options for treatment?    Ronda Miller, RN     "

## 2022-06-20 NOTE — DISCHARGE INSTRUCTIONS
Please make an appointment to follow up with Your Primary Care Provider and Primary Care Center (phone: [phone]) as soon as possible.

## 2022-06-20 NOTE — PROGRESS NOTES
"Clinic Care Coordination Contact  [facility]: Post-Discharge Note  SITUATION                                                      Admission:    Admission Date: 06/19/22   Reason for Admission: Shortness of breath  Discharge:   Discharge Date: 06/19/22  Discharge Diagnosis: UTI (urinary tract infection), bacterial, Generalized muscle weakness    Infection due to 2019 novel coronavirus    BACKGROUND                                                      Per hospital discharge summary and inpatient provider notes:    Swathi Dukes is a 86 year old female who presents for generalized weakness and shortness of breath.  Patient reports that she started to feel \"sick\" this past Tuesday she states she saw her doctor and was given albuterol for this.  Later in the week she was having urinary incontinence and confusion and was seen at urgent care and diagnosed with a urinary tract infection and started on antibiotics.  She came in this evening has she reports that since Tuesday she continues to have generalized weakness and feels more short of breath.  She denies any fevers.  She endorses chills.  She denies any cough sputum production or chest pain.  She denies any abdominal pain nausea vomiting or diarrhea.  She came in for further evaluation due to her persistent symptoms.  Chart review shows urinalysis is growing E. Coli.  She also reports a home positive COVID test recently    ASSESSMENT      Enrollment  Primary Care Care Coordination Status: Declined    Discharge Assessment  How are you doing now that you are home?: \"Well I have covid so not too good. I saw the [facility] doctors yesterday at the emergency room.\"  How are your symptoms? (Red Flag symptoms escalate to triage hotline per guidelines): Unchanged  Do you feel your condition is stable enough to be safe at home until your provider visit?: Yes  Does the patient have their discharge instructions? : Yes  Does the patient have questions regarding their " discharge instructions? : No  Were you started on any new medications or were there changes to any of your previous medications? : Yes  Does the patient have all of their medications?: Yes  Do you have questions regarding any of your medications? : No  Do you have all of your needed medical supplies or equipment (DME)?  (i.e. oxygen tank, CPAP, cane, etc.): Yes  Discharge follow-up appointment scheduled within 14 calendar days? : Yes  Discharge Follow Up Appointment Date: 06/28/22  Discharge Follow Up Appointment Scheduled with?: Primary Care Provider    Post-op ([facility] Only)  If the patient had a surgery or procedure, do they have any questions for a nurse?: Yes (see comment) (Patient is experiencing a sharp pain around her right hip. [facility] called in Nurse Triage line.)    PLAN                                                      Outpatient Plan:      Please make an appointment to follow up with  Your Primary Care Provider and Primary Care  Center (phone: [phone]) as soon as  possible.    Future Appointments   Date Time Provider Department Center   6/28/2022  1:00 PM Cortney Vanessa MD [facility]         For any urgent concerns, please contact our 24 hour nurse triage line: [phone] ([phone])         KIRSTIN Acosta  [phone]  [facility]

## 2022-06-20 NOTE — TELEPHONE ENCOUNTER
Triage call:     Patient calling complaining of right sided hip and back pain   Symptoms started yesterday- today worsening   Sharp pain that lasts 10 seconds and then goes away.   Comes and goes   No injury or history of hip pain.   She reports last Tuesday she had a cortisone shot in her back.  Denies numbness, tingling, she is able to walk and do her daily activities.  Per protocol, home care is appropriate. Care advice given. Set up with virtual appointment to discuss symptoms with a provider.     Mirna Houston RN   06/20/22 11:18 AM  [facility] Nurse Advisor    Reason for Disposition    Hip pain    Additional Information    Negative: Looks like a broken bone or dislocated joint (e.g., crooked or deformed)    Negative: Sounds like a life-threatening emergency to the triager    Negative: Can't stand (bear weight) or walk    Negative: SEVERE pain (e.g., excruciating, unable to do any normal activities) and fever    Negative: Fever and red area (or area very tender to touch)    Negative: Patient sounds very sick or weak to the triager    Negative: SEVERE pain (e.g., excruciating, unable to do any normal activities)    Negative: Red area or streak and large (> 2 in. or 5 cm)    Negative: Painful rash with multiple small blisters grouped together (i.e., dermatomal distribution or 'band' or 'stripe')    Negative: Looks like a boil, infected sore, deep ulcer, or other infected rash (spreading redness, pus)    Negative: Localized rash is very painful (no fever)    Negative: Patient wants to be seen    Negative: Numbness in a leg or foot (i.e., loss of sensation)    Negative: MODERATE pain (e.g., interferes with normal activities, limping) and present > 3 days    Negative: MILD pain (e.g., does not interfere with normal activities) and present > 7 days    Negative: Hip pain is a chronic symptom (recurrent or ongoing AND present > 4 weeks)    Protocols used: HIP PAIN-A-[place]    COVID 19 Nurse Triage Plan/Patient  Instructions    Please be aware that novel coronavirus (COVID-19) may be circulating in the community. If you develop symptoms such as fever, cough, or SOB or if you have concerns about the presence of another infection including coronavirus (COVID-19), please contact your health care provider or visit [email or web address].     Disposition/Instructions    Virtual Visit with provider recommended. Reference Visit Selection Guide.    Thank you for taking steps to prevent the spread of this virus.  o Limit your contact with others.  o Wear a simple mask to cover your cough.  o Wash your hands well and often.    Resources    [facility]: About COVID-19: [email or web address]    CDC: What to Do If You're Sick: www.cdc.gov/coronavirus/2019-ncov/about/steps-when-sick.html    CDC: Ending Home Isolation: www.cdc.gov/coronavirus/2019-ncov/hcp/disposition-in-home-patients.html     CDC: Caring for Someone: www.cdc.gov/coronavirus/2019-ncov/if-you-are-sick/care-for-someone.html     [facility]: Interim Guidance for Hospital Discharge to Home: www.health.[facility].[place]/diseases/coronavirus/hcp/hospdischarge.pdf    [facility] clinical trials (COVID-19 research studies): [email or web address].[facility]-clinical-trials     Below are the COVID-19 hotlines at the [place] Department of Health ([facility]). Interpreters are available.   o For health questions: Call 617-834-9855 or 1-463.382.8181 (7 a.m. to 7 p.m.)  o For questions about schools and childcare: Call 583-289-6914 or 1-487.446.6623 (7 a.m. to 7 p.m.)

## 2022-06-20 NOTE — TELEPHONE ENCOUNTER
Pt is outside of treatment range as below    Advised, Add Neti-pot or sinus wash 2-4 X day.  Over the counter Mucinex for congestion.  Tylenol not to exceed 4000 mg/24 hours. Warm compress to the face, you can place a damp wash cloth or dish towel in the microwave for about 30 seconds to do this.  Make sure you are getting plenty of fluids. Can add OTC Nasalcort AQ or Flonase nasal sprays.      PAL will call and follow up in 48 hours. Pt should call if worsening.   She is going to call her son and see if he can  the above     Pt expressed understanding and acceptance of the plan. had no further questions at this time.  Advised can call back to clinic at any time with concerns.       Ronda Miller RN

## 2022-06-21 NOTE — TELEPHONE ENCOUNTER
"Pt states she feels a bit better.   Took hot shower in am which helped a lot and have been napping.      Has not needed any tylenol in over 12 hours.   \"Today I feel like I am going to live\"     She did not add any other medications as below.  PAL will follow up in 48 hours.     Ronda Miller RN     "

## 2022-06-23 ENCOUNTER — TELEPHONE (OUTPATIENT)
Dept: GERIATRICS | Facility: CLINIC | Age: 87
End: 2022-06-23

## 2022-06-23 ENCOUNTER — TELEPHONE (OUTPATIENT)
Dept: FAMILY MEDICINE | Facility: CLINIC | Age: 87
End: 2022-06-23

## 2022-06-23 DIAGNOSIS — R11.0 NAUSEA: ICD-10-CM

## 2022-06-23 RX ORDER — ONDANSETRON 4 MG/1
4 TABLET, FILM COATED ORAL EVERY 8 HOURS PRN
Qty: 30 TABLET | Refills: 3 | Status: SHIPPED | OUTPATIENT
Start: 2022-06-23 | End: 2023-05-15

## 2022-06-23 NOTE — TELEPHONE ENCOUNTER
Patient calling stating she needs to cancel her ED follow up for next Tuesday. She relies on a ride to get her here and this wont work. Offered other options and she is not able to due to transportation.   As of right not I have not canceled appointment on 6/28 as was not sure if virtual visit would be an option. Patient said she is not even sure what the follow up is for and would like to talk through this with her PAL.    Marci Zabala,

## 2022-06-23 NOTE — TELEPHONE ENCOUNTER
Assisted living home is requesting a refill of Ondansetron 4mg tabs. Patient takes 1 tab every 8 hours as needed. Can we please have a new RX for this?    Thank you,    Hussain Clifford  Pharmacy Technician  [facility]  [address], [place] [address]  Phone: [phone]  Fax: [phone]

## 2022-06-24 LAB — BACTERIA BLD CULT: NO GROWTH

## 2022-06-24 NOTE — TELEPHONE ENCOUNTER
"PAL call to pt-  She is continuing to feel a \"bit\" better every day.       She is breathing more comfortably and feels more clear headed.   Pt denies any lingering UTI symptoms.     She had to cancel appt with PCP scheduled 6/28 as she is unable to get ride to clinic other than in am.   No appointment available with PCP in the next few weeks that would work for pt    She did have virtual visit on 6/20 with Edmond Diane       PAL will call and follow up week for the next few weeks to make sure pt is continuing to improve.       Any concern with this plan?      Ronda Miller, RN         "

## 2022-06-29 NOTE — PROGRESS NOTES
OFFICE VISIT    Chief Complaint   Patient presents with   • Office Visit   • Follow-up     Per pt f/u on issues due to muscle and nerve pain. Per pt would like to get off sertraline   • ER F/U     2 days ago due to fainting, has happened 4 times within 6 months        SUBJECTIVE:   HISTORY OF PRESENT ILLNESS:  Nguyen Martinez is a 46 year old female who presents today for  Mammo/US DENSE breast 7/30/21 (family h/o)  Pap 6/18  Colonoscopy 11/21 hyperplastic polyp; ASCUS rectal pap? ([facility])  Needs yearly cardiolipin ab sec to  TIA; ASA 81mg x2; consider platelet function study    Covid #3? Yes, she had it    Seen at [facility] on 6/27/22 for a syncopal episode.  Was on her way to work and felt like she was going to faint.  She sat down and fainted.  Did not hit her head.  Bystanders called 911 and she was brought to [facility].  Her work-up was normal and she was discharged home.  This has happened to her before.  This is the 4th time in the last 6 months.  Has f/u next week with a cardiologist there.  Happens more in the AM.  Sometimes she doesn't eat enough.    Labs and CXR reviewed    Has chronic nerve pain.    Interested in getting off the sertraline.  Feeling good now.  Last Juen she was always exhausted/run-down/depressed.      Was seeing a chiropractor who did adjustement/massage/traction/PT exercises.  She felt really good after all of that.    Still feeling good.  No tearfulness or feelings of depression.    Still with back pain, but stretches and that helps her feels better.      Stopped her Gabapentin.  Has trigger points in her right side, worse in her butt area.      Has a standing desk at work, is more mindful of her posture.      Using ice/heat and massager.    Continues to do her exercises.    Is in a good place physically and feels like she doesn't really need some of her medication.    Doing some pilates on the internet.  Wants to start strength training.       PAST MEDICAL  Pre-Visit Planning   Next 5 appointments (look out 90 days)    Jun 14, 2022  1:00 PM  (Arrive by 12:40 PM)  Provider Visit with Cortney Vanessa MD  [facility] ([facility] ) [address] [address] [phone]        Appointment Notes for this encounter:   pain and bruising    Questionnaires Reviewed/Assigned  No additional questionnaires are needed     Contacted patient via phone/Hytlehart. Are there any additional questions or concerns you'd like to review with your provider during your visit? No    Visit is not preventive.    Meds  Home Meds reviewed and updated    Entered patient-preferred pharmacy.     Current Outpatient Medications   Medication     acetaminophen (TYLENOL) 500 MG tablet     amLODIPine (NORVASC) 10 MG tablet     brimonidine (ALPHAGAN P) 0.1 % ophthalmic solution     D3-50 1.25 MG (66509 UT) capsule     dorzolamide (TRUSOPT) 2 % ophthalmic solution     DULoxetine (CYMBALTA) 20 MG capsule     ferrous gluconate (FERGON) 324 (38 Fe) MG tablet     folic acid (FOLVITE) 1 MG tablet     gabapentin (NEURONTIN) 100 MG capsule     hydrALAZINE (APRESOLINE) 25 MG tablet     latanoprost (XALATAN) 0.005 % ophthalmic solution     lisinopril (ZESTRIL) 20 MG tablet     loperamide (IMODIUM) 2 MG capsule     OLANZapine (ZYPREXA) 5 MG tablet     simvastatin (ZOCOR) 10 MG tablet     No current facility-administered medications for this visit.     Hytlehart  not using     Call Summary  Advised patient to call back at [phone] if needed.     Ronda Miller RN        HISTORY:  Past Medical History:   Diagnosis Date   • Mucous retention cyst of lip 9/13/2016   • TOA (tubo-ovarian abscess) 2/6/2019   • Transient ischemic attack 3/30/2013       PAST SURGICAL HISTORY:  Past Surgical History:   Procedure Laterality Date   • Appendectomy  2019   • Eye surgery     • Removal of ovary/tube(s) Right     Sec to ruptured cyst and peritonitis   • Sinus surgery         FAMILY HISTORY:  Family History   Problem Relation Age of Onset   • Cancer, Colon Mother    • Cancer, Breast Mother    • Rheumatoid Arthritis Father    • Hypertension Sister    • Diabetes Brother    • Dementia/Alzheimers Paternal Grandfather    • Cancer, Breast Maternal Aunt    • Cancer, Pancreatic Paternal Aunt        SOCIAL HISTORY:  Social History     Tobacco Use   • Smoking status: Never Smoker   • Smokeless tobacco: Never Used   Substance Use Topics   • Alcohol use: Not Currently     Alcohol/week: 1.0 - 2.0 standard drink     Types: 1 - 2 Standard drinks or equivalent per week     Comment: once a month    • Drug use: Never       Patient Active Problem List   Diagnosis   • Allergic rhinitis   • Anxiety   • Atopic dermatitis   • Cervical radiculopathy   • Contraception management   • Dense breast tissue   • DAVID positive for HSV   • Encounter for screening mammogram for breast cancer   • Inconclusive mammogram due to dense breasts   • Neck pain   • Retinal defect   • Well female exam with routine gynecological exam   • Routine screening for STI (sexually transmitted infection)   • History of TIA (transient ischemic attack)   • Health maintenance examination   • Family history of colon cancer   • Syncope       ALLERGIES:  ALLERGIES:   Allergen Reactions   • Kiwi   (Food Or Med) HIVES and ANAPHYLAXIS       MEDICATIONS:  Current Outpatient Medications   Medication Sig Dispense Refill   • Omega-3 Fatty Acids (Fish Oil) 1200 MG capsule Take 1,200 mg by mouth 2 times daily.     • sertraline (ZOLOFT) 50 MG tablet TAKE 1 TABLET BY  MOUTH DAILY 30 tablet 3   • aspirin 81 MG tablet Take by mouth daily.     • Elastic Bandages & Supports (MEDICAL COMPRESSION STOCKINGS) Misc 1 Package daily. Wear stockings daily 1 each 1     No current facility-administered medications for this visit.       Review of Systems   All other systems reviewed and are negative.       OBJECTIVE:     Visit Vitals  /78 (BP Location: RUE - Right upper extremity, Patient Position: Sitting, Cuff Size: Regular)   Pulse (!) 105   Temp 97 °F (36.1 °C) (Temporal)   Resp 18   Ht 5' 7.95\" (1.726 m)   Wt 87.1 kg (192 lb)   LMP 06/06/2022 (Exact Date)   BMI 29.23 kg/m²       Physical Exam  Vitals and nursing note reviewed.   Constitutional:       General: She is not in acute distress.     Appearance: She is well-developed.   HENT:      Head: Normocephalic and atraumatic.      Right Ear: External ear normal.      Left Ear: External ear normal.      Nose: Nose normal.   Eyes:      Conjunctiva/sclera: Conjunctivae normal.      Pupils: Pupils are equal, round, and reactive to light.   Neck:      Thyroid: No thyromegaly.   Cardiovascular:      Rate and Rhythm: Normal rate and regular rhythm.      Heart sounds: Normal heart sounds.   Pulmonary:      Effort: Pulmonary effort is normal. No respiratory distress.      Breath sounds: Normal breath sounds. No wheezing.   Abdominal:      Palpations: Abdomen is soft.   Musculoskeletal:         General: No deformity. Normal range of motion.      Cervical back: Normal range of motion and neck supple.   Lymphadenopathy:      Cervical: No cervical adenopathy.   Skin:     General: Skin is warm and dry.      Findings: No rash.   Neurological:      Mental Status: She is alert and oriented to person, place, and time.      Deep Tendon Reflexes: Reflexes are normal and symmetric.   Psychiatric:         Behavior: Behavior normal.         Thought Content: Thought content normal.         Judgment: Judgment normal.         DIAGNOSTIC STUDIES:   LAB  RESULTS:          ASSESSMENT AND PLAN:     Problem List Items Addressed This Visit     Cervical radiculopathy     Has stopped Gabapentin  With continued chiropractic/massage treatments, she is feeling better  Would also like to stop her sertraline that was being used for chronic pain.    Clear instructions given how to wean off this medication    Will monitor           History of TIA (transient ischemic attack)     Cont ASA  Check yearly cardiolipin ab             Relevant Orders    CARDIOLIPIN ANTIBODY PANEL    LIPID PANEL WITH REFLEX    Syncope     Vasovagal most likely  All labs and imaging studies from ER visit reviewed  Has apt with cardiology next week  Rec increase water consumption daily             Other Visit Diagnoses     Encounter for screening mammogram for malignant neoplasm of breast    -  Primary    Relevant Orders    MAMMO SCREENING BILATERAL W [place]    Dense breasts        Relevant Orders    US BREAST SCREENING 4 QUADRANTS BILATERAL            Return for 2-3 months.    Instructions provided as documented in the AVS.    The patient indicated understanding of the diagnosis, agreed with the plan of care and agreed to call or return with any new or worsening symptoms.    TIME: I spent 38 min preparing to see patent (including chart review and preparation), obtaining and or reviewing additional medical history, performing a physical exam and evaluation, documenting clinical information in the electronic health record, independently interpreting results, communicating results to the patient, family or caregiver, and/or coordinating care on the date of service.    Luba Benjamin,   6/30/2022

## 2022-07-06 DIAGNOSIS — F42.2 MIXED OBSESSIONAL THOUGHTS AND ACTS: ICD-10-CM

## 2022-07-06 NOTE — TELEPHONE ENCOUNTER
Routing refill request to provider for review/approval because:  Requires review as high dose     Ronda Miller, RN

## 2022-07-07 RX ORDER — METHOCARBAMOL 750 MG/1
1250 TABLET ORAL WEEKLY
Qty: 90 CAPSULE | Refills: 1 | Status: ON HOLD | OUTPATIENT
Start: 2022-07-07 | End: 2023-06-24

## 2022-07-11 DIAGNOSIS — E61.1 LOW IRON: ICD-10-CM

## 2022-07-12 RX ORDER — FOLIC ACID 1 MG/1
TABLET ORAL
Qty: 30 TABLET | Refills: 2 | Status: SHIPPED | OUTPATIENT
Start: 2022-07-12 | End: 2022-11-29

## 2022-07-12 NOTE — TELEPHONE ENCOUNTER
Prescription approved per [facility] Refill Protocol.    Fabien MORA RN

## 2022-07-14 DIAGNOSIS — H40.003 GLAUCOMA SUSPECT, BILATERAL: ICD-10-CM

## 2022-07-14 RX ORDER — LATANOPROST 50 UG/ML
1 SOLUTION/ DROPS OPHTHALMIC AT BEDTIME
Qty: 7.5 ML | Refills: 11 | Status: SHIPPED | OUTPATIENT
Start: 2022-07-14 | End: 2023-07-20

## 2022-07-18 ENCOUNTER — TELEPHONE (OUTPATIENT)
Dept: FAMILY MEDICINE | Facility: CLINIC | Age: 87
End: 2022-07-18

## 2022-07-18 NOTE — TELEPHONE ENCOUNTER
LM with information below for staff RN at [facility]  Call back with questions.    Ronda Miller RN

## 2022-07-18 NOTE — TELEPHONE ENCOUNTER
Meg calling to report patient took extra dose of Hydralazine and Gabapentin.    This AM between 9 & 10, pt took (2) 25 MG tablets vs 1 Hydralazine, sig 1 tablet three times daily.    Took Gabapentin 100 mg (2) tablets vs 1, sig 1 tablet three times daily.    Facility checked BP at 1050 168/61. Patient reports no symptoms at this time.     Writer suggested holding afternoon dose for both medications. Will route to provider to see about evening dose. Check BP a couple more times this afternoon and call back to clinic with any abnormal results.    Please advise if above plan is appropriate, should patient take PM dose?    Mary Anthony RN/Ronda Miller RN

## 2022-07-22 ENCOUNTER — NURSE TRIAGE (OUTPATIENT)
Dept: FAMILY MEDICINE | Facility: CLINIC | Age: 87
End: 2022-07-22

## 2022-07-22 ENCOUNTER — OFFICE VISIT (OUTPATIENT)
Dept: URGENT CARE | Facility: URGENT CARE | Age: 87
End: 2022-07-22
Payer: COMMERCIAL

## 2022-07-22 VITALS
SYSTOLIC BLOOD PRESSURE: 142 MMHG | HEART RATE: 74 BPM | DIASTOLIC BLOOD PRESSURE: 56 MMHG | OXYGEN SATURATION: 96 % | TEMPERATURE: 99.3 F

## 2022-07-22 DIAGNOSIS — R30.0 DYSURIA: Primary | ICD-10-CM

## 2022-07-22 DIAGNOSIS — N89.8 VAGINAL DISCHARGE: ICD-10-CM

## 2022-07-22 LAB
ANION GAP SERPL CALCULATED.3IONS-SCNC: 8 MMOL/L (ref 3–14)
BUN SERPL-MCNC: 27 MG/DL (ref 7–30)
CALCIUM SERPL-MCNC: 9.7 MG/DL (ref 8.5–10.1)
CHLORIDE BLD-SCNC: 101 MMOL/L (ref 94–109)
CLUE CELLS: ABNORMAL
CO2 SERPL-SCNC: 25 MMOL/L (ref 20–32)
CREAT SERPL-MCNC: 1 MG/DL (ref 0.52–1.04)
GFR SERPL CREATININE-BSD FRML MDRD: 55 ML/MIN/1.73M2
GLUCOSE BLD-MCNC: 136 MG/DL (ref 70–99)
POTASSIUM BLD-SCNC: 4.7 MMOL/L (ref 3.4–5.3)
SODIUM SERPL-SCNC: 134 MMOL/L (ref 133–144)
TRICHOMONAS, WET PREP: ABNORMAL
WBC # BLD AUTO: 7.9 10E3/UL (ref 4–11)
WBC'S/HIGH POWER FIELD, WET PREP: ABNORMAL
YEAST, WET PREP: ABNORMAL

## 2022-07-22 PROCEDURE — 85048 AUTOMATED LEUKOCYTE COUNT: CPT | Performed by: PHYSICIAN ASSISTANT

## 2022-07-22 PROCEDURE — 81001 URINALYSIS AUTO W/SCOPE: CPT | Performed by: PHYSICIAN ASSISTANT

## 2022-07-22 PROCEDURE — 87210 SMEAR WET MOUNT SALINE/INK: CPT | Performed by: PHYSICIAN ASSISTANT

## 2022-07-22 PROCEDURE — 36415 COLL VENOUS BLD VENIPUNCTURE: CPT | Performed by: PHYSICIAN ASSISTANT

## 2022-07-22 PROCEDURE — 99214 OFFICE O/P EST MOD 30 MIN: CPT | Performed by: PHYSICIAN ASSISTANT

## 2022-07-22 PROCEDURE — 80048 BASIC METABOLIC PNL TOTAL CA: CPT | Performed by: PHYSICIAN ASSISTANT

## 2022-07-22 RX ORDER — CEPHALEXIN 500 MG/1
500 CAPSULE ORAL 2 TIMES DAILY
Qty: 14 CAPSULE | Refills: 0 | Status: SHIPPED | OUTPATIENT
Start: 2022-07-22 | End: 2022-07-22

## 2022-07-22 RX ORDER — NITROFURANTOIN 25; 75 MG/1; MG/1
100 CAPSULE ORAL 2 TIMES DAILY
Qty: 14 CAPSULE | Refills: 0 | Status: SHIPPED | OUTPATIENT
Start: 2022-07-22 | End: 2022-07-29

## 2022-07-22 ASSESSMENT — PAIN SCALES - GENERAL: PAINLEVEL: SEVERE PAIN (7)

## 2022-07-22 NOTE — TELEPHONE ENCOUNTER
Spoke with pt's son, he is not able to bring her in today but he will reach out to his daughter to see if she can bring her in to UC    PAL discuss possible ability to collect UA/UC at [facility].  LM for nursing staff to call back and see if this can be done.      Per PCP -  Likely would not get UA/UC results back from the [facility] before end of day.   With symptoms as reported and pt has recently been in ER with UTI symptoms pt should be seen in UC/ER today.      Call back to pt's son, Brent, he will reach out to his daughter and have her bring pt to one of the  UC's today.       PAL will monitor and f/u next week.     Pt;s sonexpressed understanding and acceptance of the plan. had no further questions at this time.  Advised can call back to clinic at any time with concerns.     Ronda Miller RN     Message handled by Nurse Triage with [employer]uddle - provider name: Cortney Vanessa MD .

## 2022-07-22 NOTE — TELEPHONE ENCOUNTER
"S-(situation): Pt calling in regarding potential UTI.     B-(background): Pt states has had buring with urination and sx are worsening, requesting appt with PCP.    A-(assessment): Pt states she has had sx x2 weeks but \"I have just been ignoring it.\" Reporting burning with urination, urine odor, abnormal discharge, RLQ pain that radiates up right flank into back. Pt is reporting pain 7/10 at baseline. States she also has frequency. No fever, is able to fully empty bladder, no nausea or vomiting.     R-(recommendations): Reviewed advice under care tab, pt verbalized understanding. Pt states she needs an appt only on Tuesdays and Wednesdays due to transportation issues. Informed pt that it is recommended that pt be seen today due to sx and how long they have been occcuring for, informed pt should be seen in . States she does not have transportation. Huddled with covering provider in clinic, states if pt is not able to get transportation to  needs to go to ED via EMS due to possible kidney sx. Pt verbalized understanding and agreeable to plan. No further questions.       Reason for Disposition    Vaginal discharge    Side (flank) or lower back pain present    Additional Information    Negative: Shock suspected (e.g., cold/pale/clammy skin, too weak to stand, low BP, rapid pulse)    Negative: Sounds like a life-threatening emergency to the triager    Negative: Followed a genital area injury    Negative: Followed a genital area injury (penis, scrotum)    Pain or burning with passing urine (urination) is main symptom    Negative: Shock suspected (e.g., cold/pale/clammy skin, too weak to stand, low BP, rapid pulse)    Negative: Sounds like a life-threatening emergency to the triager    Negative: Unable to urinate (or only a few drops) and bladder feels very full    Negative: Vomiting    Negative: Patient sounds very sick or weak to the triager    Negative: SEVERE pain with urination    Negative: Fever > 100.4 F (38.0 " C)    Protocols used: URINARY SYMPTOMS-A-OH, URINATION PAIN - FEMALE-A-OH, VAGINAL [id number]    Fabien MORA RN

## 2022-07-23 LAB
ALBUMIN UR-MCNC: NEGATIVE MG/DL
APPEARANCE UR: CLEAR
BACTERIA #/AREA URNS HPF: ABNORMAL /HPF
BILIRUB UR QL STRIP: NEGATIVE
COLOR UR AUTO: YELLOW
GLUCOSE UR STRIP-MCNC: NEGATIVE MG/DL
HGB UR QL STRIP: NEGATIVE
KETONES UR STRIP-MCNC: NEGATIVE MG/DL
LEUKOCYTE ESTERASE UR QL STRIP: ABNORMAL
NITRATE UR QL: NEGATIVE
PH UR STRIP: 5.5 [PH] (ref 5–7)
RBC #/AREA URNS AUTO: ABNORMAL /HPF
SP GR UR STRIP: <=1.005 (ref 1–1.03)
SQUAMOUS #/AREA URNS AUTO: ABNORMAL /LPF
UROBILINOGEN UR STRIP-ACNC: 0.2 E.U./DL
WBC #/AREA URNS AUTO: ABNORMAL /HPF

## 2022-07-23 PROCEDURE — 87086 URINE CULTURE/COLONY COUNT: CPT | Performed by: PHYSICIAN ASSISTANT

## 2022-07-23 PROCEDURE — 87186 SC STD MICRODIL/AGAR DIL: CPT | Performed by: PHYSICIAN ASSISTANT

## 2022-07-23 NOTE — PATIENT INSTRUCTIONS
Collect urine sample and that will be sent in for culture  After collecting urine sample, OK to start antibiotic for a UTI  Fluids     TO ER for fever, chills, flank pain, vomiting or weakness.

## 2022-07-23 NOTE — PROGRESS NOTES
Assessment & Plan     1. Dysuria  - UA reflex to Microscopic and Culture    2. Vaginal discharge  - Wet preparation    Patient was unable to leave urine sample (missed hat twice)  She will bring in urine tomorrow AM to be cultured.  Patient concerned about kidney function, it is at baseline today.  Back pain is chronic and her presentation is not consistent with pyelonephritis.   She was given RX for Macrobid, at her families request. Would prefer to do Keflex or Cefdinir but patient has done well on Macrobid in the past, and is very concerned about re-vinny C. Diff.   Encouraged fluids  Discussed return precautions and indications to follow-up in ER including fever, chills, vomiting, weakness, confusion etc.     Return in about 2 days (around 7/24/2022), or if symptoms worsen or fail to improve.    Diagnosis and treatment plan was reviewed with patient and/or family.   We went over any labs or imaging. Discussed worsening symptoms or little to no relief despite treatment plan to follow-up with PCP or return to clinic.  Patient verbalizes understanding. All questions were addressed and answered.     Kina Mendoza PA-C  [facility]    CHIEF COMPLAINT:   Chief Complaint   Patient presents with     Urgent Care     Dysuria, flank pain, discharge x 2 weeks.      Sugar Echevarria is a 86 year old female who presents to clinic today for evaluation of dysuria and urinary frequency. Started about 2 weeks ago.   Has noted some vaginal discharge, no itching. Denies having fever, chills, flank pain, nausea, vomiting, hematuria or weakness.   Lower back pain X 2-3 months, rated as a 7/10. Located in the right lower back, worse with movement.   Patient denies fever, chills, fatigue, weight loss, fecal or urinary incontinence, lower extremity numbness or tingling, or lower extremity weakness.      Past Medical History:   Diagnosis Date     Aortic stenosis      Glaucoma      HTN (hypertension)       Hyperlipidemia      Pericardial effusion      Systemic lupus erythematosus      Past Surgical History:   Procedure Laterality Date     CV HEART CATHETERIZATION WITH POSSIBLE INTERVENTION N/A 1/5/2021    Procedure: Heart Catheterization with Possible Intervention;  Surgeon: Hayden Bedoya MD;  Location:  HEART CARDIAC CATH LAB     CV LEFT VENTRICULOGRAM N/A 1/5/2021    Procedure: Left Ventriculogram;  Surgeon: Hayden Bedoya MD;  Location:  HEART CARDIAC CATH LAB     Social History     Tobacco Use     Smoking status: Never Smoker     Smokeless tobacco: Never Used   Substance Use Topics     Alcohol use: Not Currently     Current Outpatient Medications   Medication     acetaminophen (TYLENOL) 500 MG tablet     albuterol (PROAIR HFA/PROVENTIL HFA/VENTOLIN HFA) 108 (90 Base) MCG/ACT inhaler     amLODIPine (NORVASC) 10 MG tablet     brimonidine (ALPHAGAN P) 0.1 % ophthalmic solution     D3-50 1.25 MG (64116 UT) capsule     dorzolamide (TRUSOPT) 2 % ophthalmic solution     DULoxetine (CYMBALTA) 20 MG capsule     ferrous gluconate (FERGON) 324 (38 Fe) MG tablet     folic acid (FOLVITE) 1 MG tablet     gabapentin (NEURONTIN) 100 MG capsule     hydrALAZINE (APRESOLINE) 25 MG tablet     latanoprost (XALATAN) 0.005 % ophthalmic solution     lisinopril (ZESTRIL) 20 MG tablet     loperamide (IMODIUM) 2 MG capsule     nitroFURantoin macrocrystal-monohydrate (MACROBID) 100 MG capsule     simvastatin (ZOCOR) 10 MG tablet     spacer (OPTICHAMBER KATIUSKA) holding chamber     OLANZapine (ZYPREXA) 5 MG tablet     ondansetron (ZOFRAN) 4 MG tablet     No current facility-administered medications for this visit.     Allergies   Allergen Reactions     Diclofenac Anaphylaxis     Iodine Anaphylaxis     Contrast  Pt states anaphylactic reaction to ct contrast about 20 years ago     Aspirin        10 point ROS of systems were all negative except for pertinent positives noted in my HPI.      Exam:   BP (!) 142/56   Pulse 74    Temp 99.3  F (37.4  C) (Tympanic)   SpO2 96%   Constitutional: healthy, alert and no distress  ENT: MMM  Cardiovascular: RRR  Respiratory: CTA bilaterally, no rhonchi or rales  Gastrointestinal: soft and nontender  Back: No CVA tenderness B/L  Right lower back pain. Strength and sensation intact B/L in lower extremities.   Skin: no rashes      Results for orders placed or performed in visit on 07/22/22   Basic metabolic panel  (Ca, Cl, CO2, Creat, Gluc, K, Na, BUN)     Status: Abnormal   Result Value Ref Range    Sodium 134 133 - 144 mmol/L    Potassium 4.7 3.4 - 5.3 mmol/L    Chloride 101 94 - 109 mmol/L    Carbon Dioxide (CO2) 25 20 - 32 mmol/L    Anion Gap 8 3 - 14 mmol/L    Urea Nitrogen 27 7 - 30 mg/dL    Creatinine 1.00 0.52 - 1.04 mg/dL    Calcium 9.7 8.5 - 10.1 mg/dL    Glucose 136 (H) 70 - 99 mg/dL    GFR Estimate 55 (L) >60 mL/min/1.73m2   WBC count     Status: Normal   Result Value Ref Range    WBC Count 7.9 4.0 - 11.0 10e3/uL   Wet preparation     Status: Abnormal    Specimen: Vagina; Swab   Result Value Ref Range    Trichomonas Absent Absent    Yeast Absent Absent    Clue Cells Absent Absent    WBCs/high power field 1+ (A) None

## 2022-07-24 LAB — BACTERIA UR CULT: ABNORMAL

## 2022-07-25 ENCOUNTER — TELEPHONE (OUTPATIENT)
Dept: FAMILY MEDICINE | Facility: CLINIC | Age: 87
End: 2022-07-25

## 2022-07-25 NOTE — TELEPHONE ENCOUNTER
"Pt son Brent is unsure if pt was able to obtain an actual swab for vaginal discharge. Swab at  on 7/22 came back neg.     Brent states his wife took pt to  and pt swabbed herself and is unsure if the swab was in the correct spot. Brent stated that pt states her discharge is \"white\".     Of note, pt does wear depends. Brent is unsure if this is okay to address at visit on 8/9/22. Advised pt can be seen earlier or can try OTC monistat.        Mary Anthony RN/Ronda Miller RN  "

## 2022-07-25 NOTE — TELEPHONE ENCOUNTER
"Called to check in regarding new Macrobid and dx of UTI.     Pt states \"Im doing fine, I think\". Pt states she has back pain and is going to do some exercises. States the back pain is nothing new.    Advised pt to call back with any further questions/concerns. Pt agrees.    Mary Anthony RN/Ronda Miller RN  "

## 2022-07-26 ENCOUNTER — MYC MEDICAL ADVICE (OUTPATIENT)
Dept: FAMILY MEDICINE | Facility: CLINIC | Age: 87
End: 2022-07-26

## 2022-07-26 DIAGNOSIS — I10 ESSENTIAL HYPERTENSION: ICD-10-CM

## 2022-07-26 RX ORDER — HYDRALAZINE HYDROCHLORIDE 25 MG/1
25 TABLET, FILM COATED ORAL 3 TIMES DAILY
Qty: 270 TABLET | Refills: 0 | Status: SHIPPED | OUTPATIENT
Start: 2022-07-26 | End: 2022-11-01

## 2022-07-26 NOTE — PROGRESS NOTES
Pre-Visit Planning   Next 5 appointments (look out 90 days)    Aug 09, 2022  1:00 PM  (Arrive by 12:40 PM)  Provider Visit with Cortney Vanessa MD  [facility] ([facility] ) [address] [address] [phone]        Appointment Notes for this encounter:   BP f/u    Questionnaires Reviewed/Assigned  No additional questionnaires are needed     Contacted patient via phone  Are there any additional questions or concerns you'd like to review with your provider during your visit? No    Visit is not preventive.    Meds  Home Meds reviewed and updated    Entered patient-preferred pharmacy.     Current Outpatient Medications   Medication     acetaminophen (TYLENOL) 500 MG tablet     albuterol (PROAIR HFA/PROVENTIL HFA/VENTOLIN HFA) 108 (90 Base) MCG/ACT inhaler     amLODIPine (NORVASC) 10 MG tablet     brimonidine (ALPHAGAN P) 0.1 % ophthalmic solution     D3-50 1.25 MG (04905 UT) capsule     dorzolamide (TRUSOPT) 2 % ophthalmic solution     DULoxetine (CYMBALTA) 20 MG capsule     ferrous gluconate (FERGON) 324 (38 Fe) MG tablet     folic acid (FOLVITE) 1 MG tablet     gabapentin (NEURONTIN) 100 MG capsule     hydrALAZINE (APRESOLINE) 25 MG tablet     latanoprost (XALATAN) 0.005 % ophthalmic solution     lisinopril (ZESTRIL) 20 MG tablet     loperamide (IMODIUM) 2 MG capsule     OLANZapine (ZYPREXA) 5 MG tablet     ondansetron (ZOFRAN) 4 MG tablet     simvastatin (ZOCOR) 10 MG tablet     spacer (OPTICHAMBER KATIUSKA) holding chamber     No current facility-administered medications for this visit.     [email or web address]  Patient is active on [employer].    Call Summary  Advised patient to call back at [phone] if needed.     Ronda Miller RN

## 2022-07-26 NOTE — TELEPHONE ENCOUNTER
Routing refill request to provider for review/approval because:  Labs out of range:  BP     BP Readings from Last 3 Encounters:   07/22/22 (!) 142/56   06/19/22 130/63   06/17/22 131/50     Fabien MORA RN

## 2022-08-02 ENCOUNTER — TRANSFERRED RECORDS (OUTPATIENT)
Dept: HEALTH INFORMATION MANAGEMENT | Facility: CLINIC | Age: 87
End: 2022-08-02

## 2022-08-09 ENCOUNTER — OFFICE VISIT (OUTPATIENT)
Dept: FAMILY MEDICINE | Facility: CLINIC | Age: 87
End: 2022-08-09
Payer: COMMERCIAL

## 2022-08-09 VITALS
RESPIRATION RATE: 14 BRPM | SYSTOLIC BLOOD PRESSURE: 132 MMHG | BODY MASS INDEX: 24.41 KG/M2 | HEART RATE: 65 BPM | DIASTOLIC BLOOD PRESSURE: 57 MMHG | HEIGHT: 64 IN | TEMPERATURE: 98.2 F | WEIGHT: 143 LBS

## 2022-08-09 DIAGNOSIS — N89.8 VAGINAL DISCHARGE: ICD-10-CM

## 2022-08-09 DIAGNOSIS — N18.2 CKD (CHRONIC KIDNEY DISEASE) STAGE 2, GFR 60-89 ML/MIN: ICD-10-CM

## 2022-08-09 DIAGNOSIS — E78.2 MIXED HYPERLIPIDEMIA: ICD-10-CM

## 2022-08-09 DIAGNOSIS — N39.0 RECURRENT UTI: Primary | ICD-10-CM

## 2022-08-09 LAB
CLUE CELLS: ABNORMAL
TRICHOMONAS, WET PREP: ABNORMAL
WBC'S/HIGH POWER FIELD, WET PREP: ABNORMAL
YEAST, WET PREP: ABNORMAL

## 2022-08-09 PROCEDURE — 99213 OFFICE O/P EST LOW 20 MIN: CPT | Mod: 25 | Performed by: FAMILY MEDICINE

## 2022-08-09 PROCEDURE — 91301 COVID-19,PF,MODERNA (18+ YRS PRIMARY SERIES .5ML): CPT | Performed by: FAMILY MEDICINE

## 2022-08-09 PROCEDURE — 0013A COVID-19,PF,MODERNA (18+ YRS PRIMARY SERIES .5ML): CPT | Performed by: FAMILY MEDICINE

## 2022-08-09 PROCEDURE — 87210 SMEAR WET MOUNT SALINE/INK: CPT | Performed by: FAMILY MEDICINE

## 2022-08-09 RX ORDER — NITROFURANTOIN 25; 75 MG/1; MG/1
100 CAPSULE ORAL 2 TIMES DAILY
Qty: 14 CAPSULE | Refills: 0 | Status: SHIPPED | OUTPATIENT
Start: 2022-08-09 | End: 2023-05-15

## 2022-08-09 NOTE — PROGRESS NOTES
"  Assessment & Plan     Recurrent UTI - will cautiously monitor. We can continue to use macrobid for UTIs as long as she continues to tolerate well, which she prefers. If UTIs continue frequently, will discuss safer daily preventive.     Vaginal discharge - as above, await wet prep results.   - nitroFURantoin macrocrystal-monohydrate (MACROBID) 100 MG capsule; Take 1 capsule (100 mg) by mouth 2 times daily for 7 days  - Wet prep - Clinic Collect    CKD (chronic kidney disease) stage 2, GFR 60-89 ml/min - stable surveillance labs      Return in about 6 months (around 2/9/2023) for Medication Recheck.    Cortney Vanessa MD  [facility]UL[facility] is a 86 year old, presenting for the following health issues:  Urinary Problem      History of Present Illness       Reason for visit:  Follow up UTI, need 4th booster.   Moderna    She eats 0-1 servings of fruits and vegetables daily.She consumes 2 sweetened beverage(s) daily.She exercises with enough effort to increase her heart rate 9 or less minutes per day.  She exercises with enough effort to increase her heart rate 3 or less days per week.   She is taking medications regularly.       Treated with macrobid for UTI 2 weeks back. Asymptomatic currently.   Has failed alternative abx in the past.   Also has history of c diff.   Has tolerated macrobid well in the past.     UTI in June 2022, July 2022. Prior to this, last one was Jan 2022.     Has vaginal discharge for the past couple of weeks as well. No itching, burning, odor. White to yellow in color.       Review of Systems   Constitutional, HEENT, cardiovascular, pulmonary, gi and gu systems are negative, except as otherwise noted.      Objective    /57 (BP Location: Right arm, Patient Position: Sitting, Cuff Size: Adult Regular)   Pulse 65   Temp 98.2  F (36.8  C) (Oral)   Resp 14   Ht 1.613 m (5' 3.5\")   Wt 64.9 kg (143 lb)   Breastfeeding No   BMI 24.93 kg/m    Body " mass index is 24.93 kg/m .  Physical Exam   GENERAL: healthy, alert and no distress   (female): normal female external genitalia, normal urethral meatus, vaginal mucosa, no discharge            .  ..

## 2022-08-09 NOTE — PATIENT INSTRUCTIONS
Macrobid script to be started as needed for urinary symptoms  If having UTI more frequently than every 2 months, would need to discuss daily preventive antibiotic dosing  Vaginal swab - will send treatment if needed

## 2022-08-15 ENCOUNTER — HOSPITAL ENCOUNTER (EMERGENCY)
Facility: CLINIC | Age: 87
Discharge: HOME | End: 2022-08-15
Attending: PHYSICIAN ASSISTANT | Admitting: PHYSICIAN ASSISTANT
Payer: COMMERCIAL

## 2022-08-15 ENCOUNTER — NURSE TRIAGE (OUTPATIENT)
Dept: FAMILY MEDICINE | Facility: CLINIC | Age: 87
End: 2022-08-15

## 2022-08-15 ENCOUNTER — APPOINTMENT (OUTPATIENT)
Dept: ULTRASOUND IMAGING | Facility: CLINIC | Age: 87
End: 2022-08-15
Attending: PHYSICIAN ASSISTANT
Payer: COMMERCIAL

## 2022-08-15 VITALS
HEART RATE: 70 BPM | BODY MASS INDEX: 23.39 KG/M2 | TEMPERATURE: 97.9 F | WEIGHT: 132 LBS | OXYGEN SATURATION: 97 % | HEIGHT: 63 IN | RESPIRATION RATE: 20 BRPM | SYSTOLIC BLOOD PRESSURE: 144 MMHG | DIASTOLIC BLOOD PRESSURE: 52 MMHG

## 2022-08-15 DIAGNOSIS — S76.911A MUSCLE STRAIN OF THIGH, RIGHT, INITIAL ENCOUNTER: ICD-10-CM

## 2022-08-15 DIAGNOSIS — M71.21 POPLITEAL CYST, RIGHT: ICD-10-CM

## 2022-08-15 PROCEDURE — 250N000013 HC RX MED GY IP 250 OP 250 PS 637: Performed by: PHYSICIAN ASSISTANT

## 2022-08-15 PROCEDURE — 93971 EXTREMITY STUDY: CPT | Mod: RT

## 2022-08-15 PROCEDURE — 99284 EMERGENCY DEPT VISIT MOD MDM: CPT | Mod: 25

## 2022-08-15 RX ORDER — ACETAMINOPHEN 500 MG
1000 TABLET ORAL ONCE
Status: COMPLETED | OUTPATIENT
Start: 2022-08-15 | End: 2022-08-15

## 2022-08-15 RX ADMIN — ACETAMINOPHEN 1000 MG: 500 TABLET, FILM COATED ORAL at 17:29

## 2022-08-15 ASSESSMENT — ACTIVITIES OF DAILY LIVING (ADL): ADLS_ACUITY_SCORE: 36

## 2022-08-15 NOTE — ED TRIAGE NOTES
Pt c/o pain in right thigh, states skin is cool to the touch. Denies shortness of breath. Unsure when it started, pt thinks it may be 1-2 weeks.      Triage Assessment     Row Name 08/15/22 1300       Triage Assessment (Adult)    Airway WDL WDL       Respiratory WDL    Respiratory WDL WDL       Skin Circulation/Temperature WDL    Skin Circulation/Temperature WDL --  skin feels cool on right thigh       Cardiac WDL    Cardiac WDL WDL       Peripheral/Neurovascular WDL    Peripheral Neurovascular WDL WDL       Cognitive/Neuro/Behavioral WDL    Cognitive/Neuro/Behavioral WDL WDL

## 2022-08-15 NOTE — TELEPHONE ENCOUNTER
"S-(situation): Pt calling in regarding right thigh pain.     B-(background): Pt states pain started about x2 week ago, worsened in past week. Concerned about DVT.     A-(assessment): Pt states pain is 8/10, pain is located \"between my groin and my knee, my thigh is cooler to the touch than my right.\" Denies any discoloration on leg or coolness of lower leg. No swelling noted by pt. Denies recent injuries, no chest pain, no SOB, no difficulty breathing. Pt states she can walk \"but it hurts.\" No fever noted. Pt has a hx of DVT.     R-(recommendations): Reviewed advice under care tab, will route to PCP to review and advise. Pt verbalized understanding and agreeable to plan.       Reason for Disposition    History of prior 'blood clot' in leg or lungs (i.e., deep vein thrombosis, pulmonary embolism)    Additional Information    Negative: Looks like a broken bone or dislocated joint (e.g., crooked or deformed)    Negative: Sounds like a life-threatening emergency to the triager    Negative: Followed a hip injury    Negative: Followed a knee injury    Negative: Followed an ankle or foot injury    Negative: Back pain radiating (shooting) into leg(s)    Negative: Foot pain is main symptom    Negative: Ankle pain is main symptom    Negative: Knee pain is main symptom    Negative: Leg swelling is main symptom    Negative: Chest pain    Negative: Difficulty breathing    Negative: Entire foot is cool or blue in comparison to other side    Negative: Unable to walk    Negative: Thigh, calf, or ankle swelling in only one leg    Negative: Thigh or calf pain in only one leg and present > 1 hour    Negative: Swollen joint and fever    Negative: Fever and red area (or area very tender to touch)    Negative: Thigh, calf, or ankle swelling in both legs, but one side is definitely more swollen    Protocols used: LEG PAIN-A-OH    Fabien MORA RN    "

## 2022-08-15 NOTE — TELEPHONE ENCOUNTER
"Pt called back to speak with PAL- see triage note below     She is reporting 10/10 pain in right leg.  She does states \"it really is ice cold\" she denies any color change to the leg.    She can bare weight \"but it hurts like hell to do so\"     She reports she had \"clot\" in that let before and was in the hospital for 3-4 days when she was in [place].     Pt states she has had this pain for about a week but it has progressively gotten worse.     Denies SOB, not dizziness, HA or other symptoms as this time.     She does not know how to feel for pulse in leg, denies swelling.  Advised to be seen in  now.  She states she will call her son to discuss ride to .      Ronda Miller RN         "

## 2022-08-15 NOTE — ED PROVIDER NOTES
History     Chief Complaint:  Leg Pain       HPI   Swathi Dukes is a 86 year old female who presents with a cute onset of right medial posterior thigh pain that started about 3 to 4 days ago.  She feels that the pain radiates to about the top of her knee.  She is concerned she may have a blood clot as she has had previous blood clots in the past.  Her last blood clot she was in the hospital.  She is not in any anticoagulants.  The patient reports about a week ago she had some right ankle pain that shot up to her thigh but now the ankle pain is resolved.  She notes she has chronic lower back pain and moving her back sometimes causes the pain in the right thigh.  She denies any recent falls or trauma to her back hip or knee.  No weakness or numbness of her lower extremity.  No pain in her abdomen or chest.  No trouble breathing or shortness of breath..    ROS:  Review of Systems  Pertinent positives and negatives are in the HPI with the rest of the ROS negative.    Allergies:  Diclofenac  Iodine  Aspirin     Medications:    acetaminophen (TYLENOL) 500 MG tablet  albuterol (PROAIR HFA/PROVENTIL HFA/VENTOLIN HFA) 108 (90 Base) MCG/ACT inhaler  amLODIPine (NORVASC) 10 MG tablet  brimonidine (ALPHAGAN P) 0.1 % ophthalmic solution  D3-50 1.25 MG (04267 UT) capsule  dorzolamide (TRUSOPT) 2 % ophthalmic solution  DULoxetine (CYMBALTA) 20 MG capsule  ferrous gluconate (FERGON) 324 (38 Fe) MG tablet  folic acid (FOLVITE) 1 MG tablet  gabapentin (NEURONTIN) 100 MG capsule  hydrALAZINE (APRESOLINE) 25 MG tablet  latanoprost (XALATAN) 0.005 % ophthalmic solution  lisinopril (ZESTRIL) 20 MG tablet  loperamide (IMODIUM) 2 MG capsule  OLANZapine (ZYPREXA) 5 MG tablet  ondansetron (ZOFRAN) 4 MG tablet  simvastatin (ZOCOR) 10 MG tablet  spacer (OPTICHAMBER KATIUSKA) holding chamber        Past Medical History:    Past Medical History:   Diagnosis Date     Aortic stenosis      Glaucoma      HTN (hypertension)      Hyperlipidemia       Pericardial effusion      Systemic lupus erythematosus      Patient Active Problem List   Diagnosis     Essential hypertension     Anxiety     Glaucoma suspect, bilateral     Personal history of DVT (deep vein thrombosis)     Mixed obsessional thoughts and acts     Age-related osteoporosis without current pathological fracture     Nonrheumatic aortic valve insufficiency     Chronic diarrhea     Pain in both hands     Primary osteoarthritis of left hip     Suicidal ideation     Pericardial effusion     Abnormal cardiovascular stress test     Recurrent UTI     Lumbar radiculopathy     Liver lesion     Moderate episode of recurrent major depressive disorder (H)     Nonrheumatic aortic valve stenosis     Chronic pain of left knee     Thyroid nodule     Epiglottic lesion     CKD (chronic kidney disease) stage 2, GFR 60-89 ml/min     Inflammatory osteoarthritis     Generalized muscle weakness     UTI (urinary tract infection), bacterial     Infection due to 2019 novel coronavirus        Past Surgical History:    Past Surgical History:   Procedure Laterality Date     CV HEART CATHETERIZATION WITH POSSIBLE INTERVENTION N/A 1/5/2021    Procedure: Heart Catheterization with Possible Intervention;  Surgeon: Hayden Bedoya MD;  Location:  [facility]     CV LEFT VENTRICULOGRAM N/A 1/5/2021    Procedure: Left Ventriculogram;  Surgeon: Hayden Bedoya MD;  Location:  [facility]        Family History:    family history includes Diabetes in her father.    Social History:  PCP: Cortney Vanessa     Physical Exam     No data found.     Physical Exam  General:Vitals signs reviewed  Psych: Normal Affect  Eyes: Non icteric , non inject  Psych: Normal Affect  Lungs: Non labored breathing  Skin: No erythema, bruising the skin of the thigh.  Normal appearance.  Neuro: Normal mentation, Sensation intact distal to injury. Normal strength of of the distal right leg.  Vascular: Cap refill < 2 sec  distal to injury, right PT and DP pulses 2+.  Musculoskeletal: Patient has acute tenderness over the back of the thigh without bony tenderness or swelling or bruising.  There is no palpable knot.  Right knee: Full range of motion of the right knee with minimal pain.  No swelling bruising or erythema of the knee.      Emergency Department Course       Imaging:  US Lower Extremity Venous Duplex Right   Final Result   IMPRESSION:   1.  No deep venous thrombosis in the right lower extremity.      2.  Small simple right popliteal fossa cyst         Report per radiology    Laboratory:  Labs Ordered and Resulted from Time of ED Arrival to Time of ED Departure - No data to display     Procedures       Emergency Department Course:             Reviewed:  I reviewed nursing notes, vitals and past medical history    Assessments/Consults:   ED Course as of 08/18/22 1448   Mon Aug 15, 2022   1817 Patient's results discussed and pain has significantly improved with tylenol         Interventions:  Medications   acetaminophen (TYLENOL) tablet 1,000 mg (1,000 mg Oral Given 8/15/22 1729)        Disposition:  The patient was discharged to home.     Impression & Plan    CMS Diagnoses: None        Medical Decision Making:    This is a 86-year-old female who presents to the emergency department with acute posterior right thigh pain.  She was concerned about a blood clot.  Doppler ultrasound was obtained negative for DVT due to note she has a popliteal cyst of the right knee.  I suspect her pain is more likely ligament strain of the back of the thigh near her hamstrings.  Her pain significantly improved with administration of Tylenol today and I recommended continued use of anti-inflammatories ice and heat application with close follow-up with primary care.  Return back to the emergency department if her pain worsens or her overall condition of her right leg worsens.      My impression of today's diagnosis is:     ICD-10-CM    1. Muscle  strain of thigh, right, initial encounter  S76.911A    2. Popliteal cyst, right  M71.21      Discharge Medications:  Discharge Medication List as of 8/15/2022  6:18 PM           8/15/2022   Drake Espitia, Drake Morales, ANA  08/18/22 1449

## 2022-08-17 ENCOUNTER — TELEPHONE (OUTPATIENT)
Dept: FAMILY MEDICINE | Facility: CLINIC | Age: 87
End: 2022-08-17

## 2022-08-17 NOTE — TELEPHONE ENCOUNTER
"PAL outreach to pt after ER visit for leg pain    She is still having leg pain.  \"I don't think it is any better'    Offered appt with PCP to discuss pt declined at this time.     Advised continue with ice, Tylenol not to exceed 4000 mg/24 hr.  Ambulate as able and elevated when you can.     Follow up if not improving.     Pt expressed understanding and acceptance of the plan. had no further questions at this time.  Advised can call back to clinic at any time with concerns.       Ronda Miller RN     "

## 2022-08-29 ENCOUNTER — TELEPHONE (OUTPATIENT)
Dept: FAMILY MEDICINE | Facility: CLINIC | Age: 87
End: 2022-08-29

## 2022-08-29 NOTE — TELEPHONE ENCOUNTER
"Call from RN at [facility] pt's nursing home    Pt has been having diarrhea and taking loperamide but they noted that they RX  in     Pt has seen [facility] and had appt on  they recommended daily fiber 1 tsp Citracal and continued Imodium.  Pt is to have flex sig done.  Unsure if this has been scheduled.   Advised RN to call [facility] and follow up on this.     Pt has her own \"stash\" in her apartment and asked abut taking that. As she has already taken 3 doses today she should NOT take any further doses today.    Pt can either use her own OTC loperamide or call for RX should not have both in case of over use.     RN will discuss with pt and call back if RX is needed.    Ronda Miller RN     "

## 2022-09-02 DIAGNOSIS — H40.003 GLAUCOMA SUSPECT, BILATERAL: ICD-10-CM

## 2022-09-02 RX ORDER — DORZOLAMIDE HCL 20 MG/ML
1 SOLUTION/ DROPS OPHTHALMIC 2 TIMES DAILY
Qty: 10 ML | Refills: 11 | Status: SHIPPED | OUTPATIENT
Start: 2022-09-02 | End: 2023-09-19

## 2022-09-15 ENCOUNTER — TRANSFERRED RECORDS (OUTPATIENT)
Dept: HEALTH INFORMATION MANAGEMENT | Facility: CLINIC | Age: 87
End: 2022-09-15

## 2022-09-22 ENCOUNTER — TRANSFERRED RECORDS (OUTPATIENT)
Dept: HEALTH INFORMATION MANAGEMENT | Facility: CLINIC | Age: 87
End: 2022-09-22

## 2022-11-01 DIAGNOSIS — I10 ESSENTIAL HYPERTENSION: ICD-10-CM

## 2022-11-01 RX ORDER — HYDRALAZINE HYDROCHLORIDE 25 MG/1
25 TABLET, FILM COATED ORAL 3 TIMES DAILY
Qty: 270 TABLET | Refills: 0 | Status: SHIPPED | OUTPATIENT
Start: 2022-11-01 | End: 2023-02-07

## 2022-11-01 NOTE — TELEPHONE ENCOUNTER
Routing refill request to provider for review/approval because:  Labs out of range:  BP  Pt was recently in the hospital.     BP Readings from Last 3 Encounters:   08/15/22 (!) 144/52   08/09/22 132/57   07/22/22 (!) 142/56     Fabien MORA RN

## 2022-11-08 DIAGNOSIS — H40.003 GLAUCOMA SUSPECT, BILATERAL: ICD-10-CM

## 2022-11-08 DIAGNOSIS — E61.1 LOW IRON: ICD-10-CM

## 2022-11-08 RX ORDER — FERROUS GLUCONATE 324(38)MG
324 TABLET ORAL
Qty: 90 TABLET | Refills: 0 | Status: SHIPPED | OUTPATIENT
Start: 2022-11-08 | End: 2023-01-31

## 2022-11-08 NOTE — TELEPHONE ENCOUNTER
Routing refill request to provider for review/approval because:  Drug not on the [facility] refill protocol     Pt is out of medication     Ronda Miller RN

## 2022-11-09 RX ORDER — BRIMONIDINE TARTRATE 1 MG/ML
1 SOLUTION/ DROPS OPHTHALMIC 2 TIMES DAILY
Qty: 10 ML | Refills: 2 | Status: SHIPPED | OUTPATIENT
Start: 2022-11-09 | End: 2023-04-11

## 2022-11-21 ENCOUNTER — NURSE TRIAGE (OUTPATIENT)
Dept: FAMILY MEDICINE | Facility: CLINIC | Age: 87
End: 2022-11-21

## 2022-11-21 ENCOUNTER — OFFICE VISIT (OUTPATIENT)
Dept: URGENT CARE | Facility: URGENT CARE | Age: 87
End: 2022-11-21
Payer: COMMERCIAL

## 2022-11-21 ENCOUNTER — ANCILLARY PROCEDURE (OUTPATIENT)
Dept: GENERAL RADIOLOGY | Facility: CLINIC | Age: 87
End: 2022-11-21
Attending: PHYSICIAN ASSISTANT
Payer: COMMERCIAL

## 2022-11-21 VITALS
HEART RATE: 62 BPM | BODY MASS INDEX: 23.38 KG/M2 | TEMPERATURE: 98.7 F | WEIGHT: 132 LBS | SYSTOLIC BLOOD PRESSURE: 128 MMHG | RESPIRATION RATE: 14 BRPM | OXYGEN SATURATION: 96 % | DIASTOLIC BLOOD PRESSURE: 66 MMHG

## 2022-11-21 DIAGNOSIS — R05.1 ACUTE COUGH: ICD-10-CM

## 2022-11-21 DIAGNOSIS — J06.9 VIRAL UPPER RESPIRATORY TRACT INFECTION WITH COUGH: Primary | ICD-10-CM

## 2022-11-21 PROCEDURE — 71046 X-RAY EXAM CHEST 2 VIEWS: CPT | Mod: TC | Performed by: RADIOLOGY

## 2022-11-21 PROCEDURE — U0003 INFECTIOUS AGENT DETECTION BY NUCLEIC ACID (DNA OR RNA); SEVERE ACUTE RESPIRATORY SYNDROME CORONAVIRUS 2 (SARS-COV-2) (CORONAVIRUS DISEASE [COVID-19]), AMPLIFIED PROBE TECHNIQUE, MAKING USE OF HIGH THROUGHPUT TECHNOLOGIES AS DESCRIBED BY CMS-2020-01-R: HCPCS | Performed by: PHYSICIAN ASSISTANT

## 2022-11-21 PROCEDURE — U0005 INFEC AGEN DETEC AMPLI PROBE: HCPCS | Performed by: PHYSICIAN ASSISTANT

## 2022-11-21 PROCEDURE — 99214 OFFICE O/P EST MOD 30 MIN: CPT | Mod: CS | Performed by: PHYSICIAN ASSISTANT

## 2022-11-21 NOTE — PROGRESS NOTES
URGENT CARE VISIT:    SUBJECTIVE:   Swathi Dukes is a 87 year old female presenting with a chief complaint of stuffy nose, sore throat, and cough - productive. At times nose has bloody discharge.  Onset was 6 day(s) ago.   She denies the following symptoms: shortness of breath, vomiting and diarrhea  Course of illness is same.    Treatment measures tried include None tried with no relief of symptoms.  Predisposing factors include None.    PMH:   Past Medical History:   Diagnosis Date     Aortic stenosis      Glaucoma      HTN (hypertension)      Hyperlipidemia      Pericardial effusion      Systemic lupus erythematosus      Allergies: Diclofenac, Iodine, and Aspirin   Medications:   Current Outpatient Medications   Medication Sig Dispense Refill     albuterol (PROAIR HFA/PROVENTIL HFA/VENTOLIN HFA) 108 (90 Base) MCG/ACT inhaler Inhale 2 puffs into the lungs every 4 hours as needed for shortness of breath / dyspnea or wheezing 18 g 0     amLODIPine (NORVASC) 10 MG tablet Take 1 tablet (10 mg) by mouth daily 90 tablet 3     brimonidine (ALPHAGAN P) 0.1 % ophthalmic solution Place 1 drop into both eyes 2 times daily 10 mL 2     D3-50 1.25 MG (09820 UT) capsule Take 1 capsule (1,250 mcg) by mouth once a week 90 capsule 1     dorzolamide (TRUSOPT) 2 % ophthalmic solution Place 1 drop into both eyes 2 times daily 10 mL 11     DULoxetine (CYMBALTA) 20 MG capsule        ferrous gluconate (FERGON) 324 (38 Fe) MG tablet Take 1 tablet (324 mg) by mouth daily (with breakfast) 90 tablet 0     folic acid (FOLVITE) 1 MG tablet TAKE 1 TABLET BY MOUTH ONCE DAILY **VIAL FILL** 30 tablet 2     gabapentin (NEURONTIN) 100 MG capsule Take 1 capsule (100 mg) by mouth 3 times daily 105 capsule 11     hydrALAZINE (APRESOLINE) 25 MG tablet Take 1 tablet (25 mg) by mouth 3 times daily 270 tablet 0     latanoprost (XALATAN) 0.005 % ophthalmic solution Place 1 drop into both eyes At Bedtime 7.5 mL 11     lisinopril (ZESTRIL) 20 MG tablet  Take 1 tablet (20 mg) by mouth daily 90 tablet 3     loperamide (IMODIUM) 2 MG capsule Take 1 capsule (2 mg) by mouth 3 times daily as needed for diarrhea 30 capsule 1     simvastatin (ZOCOR) 10 MG tablet Take 1 tablet (10 mg) by mouth At Bedtime 90 tablet 3     spacer (OPTICHAMBER KATIUSKA) holding chamber Use with inhaler as needed 1 each 0     acetaminophen (TYLENOL) 500 MG tablet Take 1-2 tablets (500-1,000 mg) by mouth every 6 hours as needed for mild pain (Patient not taking: Reported on 11/21/2022)       OLANZapine (ZYPREXA) 5 MG tablet Take 1 tablet (5 mg) by mouth At Bedtime (Patient not taking: Reported on 7/22/2022) 30 tablet 0     ondansetron (ZOFRAN) 4 MG tablet Take 1 tablet (4 mg) by mouth every 8 hours as needed for nausea (Patient not taking: Reported on 7/22/2022) 30 tablet 3     Social History:   Social History     Tobacco Use     Smoking status: Never     Smokeless tobacco: Never   Substance Use Topics     Alcohol use: Not Currently       ROS:  Review of systems negative except as stated above.    OBJECTIVE:  /66 (BP Location: Right arm, Patient Position: Chair, Cuff Size: Adult Regular)   Pulse 62   Temp 98.7  F (37.1  C) (Oral)   Resp 14   Wt 59.9 kg (132 lb)   SpO2 96%   BMI 23.38 kg/m    GENERAL APPEARANCE: healthy, alert and no distress  EYES: EOMI,  PERRL, conjunctiva clear  HENT: ear canals and TM's normal.  Nose and mouth without ulcers, erythema or lesions  NECK: supple, nontender, no lymphadenopathy  RESP: lungs clear to auscultation - no rales, rhonchi or wheezes  CV: regular rates and rhythm, normal S1 S2, no murmur noted  SKIN: no suspicious lesions or rashes    Labs:    Results for orders placed or performed in visit on 11/21/22   XR Chest 2 Views     Status: None    Narrative    XR CHEST 2 VIEWS  11/21/2022 3:41 PM       INDICATION: Acute cough  COMPARISON: 6/19/2022       Impression    IMPRESSION: Borderline heart size. No acute infiltrate or  significant  change.    RENA VERDUGO MD         SYSTEM ID:  [id number]       ASSESSMENT:    ICD-10-CM    1. Viral upper respiratory tract infection with cough  J06.9 XR Chest 2 Views     Symptomatic; Yes; 11/18/2022 COVID-19 Virus (Coronavirus) by PCR Nose          PLAN:  30 minutes spent on the date of the encounter doing chart review, review of outside records, review of test results, interpretation of tests, patient visit and documentation   Patient Instructions   Patient was educated on the natural course of viral illness which typically lasts up to 10 days.  Chest x-ray was negative. Conservative measures discussed including increased fluids, nasal saline irrigation (neti pot), warm steamy shower, Afrin nasal spray for 3 days only then use nasal saline spray. See your primary care provider if symptoms worsen or do not improve in 7 days. Seek emergency care if you develop fever over 104 or shortness of breath.    Patient verbalized understanding and is agreeable to plan. The patient was discharged ambulatory and in stable condition.    Maggie Pedersen PA-C ....................  11/21/2022   4:14 PM

## 2022-11-21 NOTE — TELEPHONE ENCOUNTER
"    Reason for Disposition    Patient sounds very sick or weak to the triager    Additional Information    Negative: SEVERE difficulty breathing (e.g., struggling for each breath, speaks in single words, pulse > 120)    Negative: Breathing stopped and hasn't returned    Negative: Choking on something    Negative: Bluish (or gray) lips or face    Negative: Difficult to awaken or acting confused (e.g., disoriented, slurred speech)    Negative: Passed out (i.e., fainted, collapsed and was not responding)    Negative: Wheezing started suddenly after medicine, an allergic food, or bee sting    Negative: Stridor    Negative: Slow, shallow and weak breathing    Negative: Sounds like a life-threatening emergency to the triager    Negative: Chest pain    Negative: Wheezing (high pitched whistling sound) and previous asthma attacks or use of asthma medicines    Negative: Difficulty breathing and within 14 days of COVID-19 Exposure    Negative: Difficulty breathing and only present when coughing    Negative: Difficulty breathing and only from stuffy nose    Negative: Difficulty breathing and only from stuffy nose or runny nose from common cold    Negative: MODERATE difficulty breathing (e.g., speaks in phrases, SOB even at rest, pulse 100-120) of new-onset or worse than normal    Negative: Oxygen level (e.g., pulse oximetry) 90 percent or lower    Negative: Wheezing can be heard across the room    Negative: Drooling or spitting out saliva (because can't swallow)    Negative: Any history of prior \"blood clot\" in leg or lungs    Negative: Illness requiring prolonged bedrest in past month (e.g., immobilization, long hospital stay)    Negative: Hip or leg fracture (broken bone) in past month (or had cast on leg or ankle in past month)    Negative: Major surgery in the past month    Negative: Long-distance travel in past month (e.g., car, bus, train, plane; with trip lasting 6 or more hours)    Negative: Cancer treatment in past " "six months (or has cancer now)    Negative: Extra heart beats OR irregular heart beating (i.e., \"palpitations\")    Answer Assessment - Initial Assessment Questions  1. RESPIRATORY STATUS: \"Describe your breathing?\" (e.g., wheezing, shortness of breath, unable to speak, severe coughing)       Shortness of breath and sinus congestion-    2. ONSET: \"When did this breathing problem begin?\"       5-6 days   3. PATTERN \"Does the difficult breathing come and go, or has it been constant since it started?\"       Seems to be \"most of the time\"  Pt states it started with just nasal/sinus congestion.   4. SEVERITY: \"How bad is your breathing?\" (e.g., mild, moderate, severe)     - MILD: No SOB at rest, mild SOB with walking, speaks normally in sentences, can lie down, no retractions, pulse < 100.     - MODERATE: SOB at rest, SOB with minimal exertion and prefers to sit, cannot lie down flat, speaks in phrases, mild retractions, audible wheezing, pulse 100-120.     - SEVERE: Very SOB at rest, speaks in single words, struggling to breathe, sitting hunched forward, retractions, pulse > 120       Mild to mod -  Pt does sound SOB on the phone.  No wheezing or coughing heard   DENIES chest pain.   She was able to talk in full sentences   5. RECURRENT SYMPTOM: \"Have you had difficulty breathing before?\" If Yes, ask: \"When was the last time?\" and \"What happened that time?\"       No   6. CARDIAC HISTORY: \"Do you have any history of heart disease?\" (e.g., heart attack, angina, bypass surgery, angioplasty)       See chart   7. LUNG HISTORY: \"Do you have any history of lung disease?\"  (e.g., pulmonary embolus, asthma, emphysema)      See chart   8. CAUSE: \"What do you think is causing the breathing problem?\"       Unsure - no known sick exposures   9. OTHER SYMPTOMS: \"Do you have any other symptoms? (e.g., dizziness, runny nose, cough, chest pain, fever)      Runny/stuff nose with \"some streaks of blood when I blow it\"   Tad sinus " "pressure/pain. Chills and feels warm unable to check temp.  \"I feel bubbly like shaky in my body\"   10. O2 SATURATION MONITOR:  \"Do you use an oxygen saturation monitor (pulse oximeter) at home?\" If Yes, \"What is your reading (oxygen level) today?\" \"What is your usual oxygen saturation reading?\" (e.g., 95%)        NA   11. PREGNANCY: \"Is there any chance you are pregnant?\" \"When was your last menstrual period?\"        NA   12. TRAVEL: \"Have you traveled out of the country in the last month?\" (e.g., travel history, exposures)        No recent travel    Protocols used: BREATHING DIFFICULTY-A-OH      "

## 2022-11-21 NOTE — PATIENT INSTRUCTIONS
Patient was educated on the natural course of viral illness which typically lasts up to 10 days.  Conservative measures discussed including increased fluids, nasal saline irrigation (neti pot), warm steamy shower, Afrin nasal spray for 3 days only then use nasal saline spray. See your primary care provider if symptoms worsen or do not improve in 7 days. Seek emergency care if you develop fever over 104 or shortness of breath.

## 2022-11-22 LAB — SARS-COV-2 RNA RESP QL NAA+PROBE: NEGATIVE

## 2022-11-26 ENCOUNTER — NURSE TRIAGE (OUTPATIENT)
Dept: NURSING | Facility: CLINIC | Age: 87
End: 2022-11-26

## 2022-11-27 ENCOUNTER — MYC MEDICAL ADVICE (OUTPATIENT)
Dept: FAMILY MEDICINE | Facility: CLINIC | Age: 87
End: 2022-11-27

## 2022-11-27 NOTE — TELEPHONE ENCOUNTER
"Swathi reports several concerning symptoms    #1 - New onset fecal incontinence without sensation of need to have a bowel movement.   - She woke with loss of stool and it has happened repeatedly throughout the day without urge or sensation to go  - Hx of chronic diarrhea but she always has sensation/urge to go  - Walking feels different today - \"Like I'm walking on air\" \"Like I'm bouncing or light\"  - Back pain    #2 - Rt Hand swelling   - Woke w/ Rt hand swollen \"3x the size of the Lt hand\"  - Swelling decreased but hand is still tight  - Decreased sensation to Rt hand    #3 - Return of URI symptoms  - Seen in [facility] on Mon, 11/21/22  - Feels like symptoms are returning today  - Sensation like something in her throat - \"like breathing over a balloon\"    ER advised  She will call her son    Rosalia Kernchristianoalex, RN  [facility]      Reason for Disposition    Patient sounds very sick or weak to the triager    [1] Loss of bladder or bowel control (urine or bowel incontinence; wetting self, leaking stool) AND [2] new-onset    Difficulty breathing at rest    Additional Information    Negative: Shock suspected (e.g., cold/pale/clammy skin, too weak to stand, low BP, rapid pulse)    Negative: Difficult to awaken or acting confused (e.g., disoriented, slurred speech)    Negative: Sounds like a life-threatening emergency to the triager    Negative: [1] SEVERE abdominal pain (e.g., excruciating) AND [2] present > 1 hour    Negative: [1] SEVERE abdominal pain AND [2] age > 60 years    Negative: [1] Blood in the stool AND [2] moderate or large amount of blood    Negative: Black or tarry bowel movements (Exception: chronic-unchanged black-grey bowel movements AND is taking iron pills or Pepto-bismol)    Negative: [1] Drinking very little AND [2] dehydration suspected (e.g., no urine > 12 hours, very dry mouth, very lightheaded)    Negative: Passed out (i.e., lost consciousness, collapsed and was not responding)    " Negative: Shock suspected (e.g., cold/pale/clammy skin, too weak to stand, low BP, rapid pulse)    Negative: Sounds like a life-threatening emergency to the triager    Negative: [1] SEVERE back pain (e.g., excruciating) AND [2] sudden onset AND [3] age > 60 years    Negative: [1] Unable to urinate (or only a few drops) > 4 hours AND [2] bladder feels very full (e.g., palpable bladder or strong urge to urinate)    Negative: SEVERE difficulty breathing (e.g., struggling for each breath, speaks in single words)    Negative: Sounds like a life-threatening emergency to the triager    Protocols used: DIARRHEA-A-AH, BACK PAIN-A-AH, HAND SWELLING-A-AH

## 2022-11-28 NOTE — TELEPHONE ENCOUNTER
PAL called to pt-   She reports still feeling SOB and feeling weak.       She did sound somewhat SOB on the phone call.     Advised should be seen in ER this am.       Ronda Miller RN

## 2022-11-29 DIAGNOSIS — E61.1 LOW IRON: ICD-10-CM

## 2022-11-29 RX ORDER — FOLIC ACID 1 MG/1
1 TABLET ORAL DAILY
Qty: 30 TABLET | Refills: 2 | Status: SHIPPED | OUTPATIENT
Start: 2022-11-29 | End: 2023-02-28

## 2022-12-09 NOTE — TELEPHONE ENCOUNTER
General Call:   Who is calling:  Patient   Reason for Call:  Pt stated that she is not able to get an appt with a rheumatologist until Sept. Pt did not make an appt. Concerned that she needs to be seen sooner than Sept.  Looking for advice.   What are your questions or concerns:  Is it ok to wait until Sept?   Date of last appointment with provider: 05/29/2020  Okay to leave a detailed message:No at Home number on file [phone] (home)               
Advised to call back and schedule with Rheum for Sept and ask to be placed on cancellation list.     Scheduled with PCP for 7/7    Pt expressed understanding and acceptance of the plan. had no further questions at this time.  Advised can call back to clinic at any time with concerns.       \Ronda Miller RN    
Please advise  Siva Aguilar RN, BSN    
She needs to make that appt but she can follow-up with pcp in the meantime to discuss adding a steroid to calm things down   
Yes - the patient is able to be screened

## 2022-12-11 ENCOUNTER — NURSE TRIAGE (OUTPATIENT)
Dept: NURSING | Facility: CLINIC | Age: 87
End: 2022-12-11

## 2022-12-12 NOTE — TELEPHONE ENCOUNTER
When patient got up this morning she had sharp pain on the right side of chest.  Patient continues to have this sharp pain right now.  Patient called her EMS son and he said to put heat on it and take tylenol.    Patient wants to try it and see what happens.  She asks what else could it be.    Patient states pain is 10/10.  Patient is congested but no breathing issues, no confusion, no visible sweat dripping from her face.    Advised patient she should go in to be seen.  She states she wants to try the heat and tylenol for 2 hours.  I said try it for 1 hour if not helping call us or go in to be seen.  Patient agreed.    Cortney Clemons RN   12/11/22 7:23 PM  [facility] Nurse Advisor      Reason for Disposition    SEVERE chest pain    Additional Information    Negative: SEVERE difficulty breathing (e.g., struggling for each breath, speaks in single words)    Negative: Difficult to awaken or acting confused (e.g., disoriented, slurred speech)    Negative: Shock suspected (e.g., cold/pale/clammy skin, too weak to stand, low BP, rapid pulse)    Negative: Passed out (i.e., lost consciousness, collapsed and was not responding)    Negative: [1] Chest pain lasts > 5 minutes AND [2] age > 44    Negative: [1] Chest pain lasts > 5 minutes AND [2] age > 30 AND [3] one or more cardiac risk factors (e.g., diabetes, high blood pressure, high cholesterol, smoker, or strong family history of heart disease)    Negative: [1] Chest pain lasts > 5 minutes AND [2] history of heart disease (i.e., angina, heart attack, heart failure, bypass surgery, takes nitroglycerin)    Negative: [1] Chest pain lasts > 5 minutes AND [2] described as crushing, pressure-like, or heavy    Negative: Heart beating < 50 beats per minute OR > 140 beats per minute    Negative: Visible sweat on face or sweat dripping down face    Negative: Sounds like a life-threatening emergency to the triager    Negative: Followed a chest injury    Protocols used:  CHEST PAIN-A-AH

## 2022-12-19 ENCOUNTER — TRANSFERRED RECORDS (OUTPATIENT)
Dept: HEALTH INFORMATION MANAGEMENT | Facility: CLINIC | Age: 87
End: 2022-12-19

## 2022-12-30 ENCOUNTER — NURSE TRIAGE (OUTPATIENT)
Dept: NURSING | Facility: CLINIC | Age: 87
End: 2022-12-30

## 2022-12-30 NOTE — TELEPHONE ENCOUNTER
"Pt reports \"L leg, ankle and foot swollen, three inches up from ankle\". Pt denies recent prolonged immobility. Pt reports she also has R side neck pain since last night \"sharp/dull pain, constant\". Pt rates pain \"6\". Pt reports she had loss of bowel control episode today as well and states even though she has a history of diarrhea this was different.  See full assessment below.     Advised pt she should have another adult drive her to the ER now per protocol. If chest pain, difficulty breathing or severe weakness occur call 911. Elevate L leg as able. Advised pt delaying care could affect outcome adversely.    Pt verbalizes understanding of recommendation and declines ER visit. Pt states she is going to wait and see how she does. Pt agrees she will call 911 if any of the above symptoms.     Reason for Disposition    Problems with bowel or bladder control    Additional Information    Negative: Chest pain    Negative: Followed an ankle injury    Negative: Ankle pain is main symptom    Negative: Difficulty breathing    Negative: Entire foot is cool or blue in comparison to other side    Negative: Swelling of both ankles (i.e., pedal edema)    Negative: Swelling of calf or leg    Negative: Small area of LOCALIZED swelling followed an insect bite to the area    Negative: Fever    Negative: Patient sounds very sick or weak to the triager    Negative: [1] SEVERE pain (e.g., excruciating, unable to walk) AND [2] not improved after 2 hours of pain medicine    Negative: [1] Can't move swollen ankle at all AND [2] no fever    Negative: [1] Redness AND [2] painful when touched AND [3] no fever    Negative: [1] Red area or streak [2] large (> 2 in. or 5 cm)    Negative: [1] Thigh or calf pain AND [2] only 1 side AND [3] present > 1 hour    Negative: Shock suspected (e.g., cold/pale/clammy skin, too weak to stand, low BP, rapid pulse)    Negative: Difficult to awaken or acting confused (e.g., disoriented, slurred speech)    " "Negative: [1] Similar pain previously AND [2] it was from \"heart attack\"    Negative: [1] Similar pain previously AND [2] it was from \"angina\" AND [3] not relieved by nitroglycerin    Negative: Sounds like a life-threatening emergency to the triager    Negative: Followed a neck injury (e.g., MVA, sports, impact or collision)    Negative: Chest pain    Negative: Lymph node in the neck is swollen or painful to the touch    Negative: Sore throat is main symptom    Negative: Difficulty breathing or unusual sweating (e.g., sweating without exertion)    Negative: [1] Stiff neck (can't put chin to chest) AND [2] headache    Negative: [1] Stiff neck (can't put chin to chest) AND [2] fever    Negative: Weakness of an arm or hand    Protocols used: NECK PAIN OR [id number]-TQ, ANKLE SWELLING-A-AH      "

## 2023-01-03 ENCOUNTER — NURSE TRIAGE (OUTPATIENT)
Dept: NURSING | Facility: CLINIC | Age: 88
End: 2023-01-03

## 2023-01-04 NOTE — TELEPHONE ENCOUNTER
Nurse Triage SBAR    Situation: Diarrhea     Background: Patient calling. Congestion for the last month. Took tylenol and medication for congestion.     Assessment: She now has diarrhea - watery stools. Diarrhea started 2-3 days ago. 2 watery stools today. She is taking a lot of imodium. No vomiting. Drinking fluids. Nausea. Abdominal pain - 5/10. Dizziness. Urinated this afternoon. Some shortness of breath due to congestion - started about 12 month ago.     Protocol Recommended Disposition: See Physician within 4 hours (Or PCP Triage)    Recommendation: According to the protocol, Patient should be seen within 4 hours (Or PCP Triage). Advised Patient to wait for a call-back after nurse speaks with the on-call Provider. Care advice given. Patient verbalizes understanding and agrees with plan of care.     Paging on call Dr Kamila Steen. Per MD - Patient needs to be seen in the ED.     Provider Recommendation Follow Up:   Reached patient/caregiver. Informed of provider's recommendations. Patient verbalized understanding and does not agree with the plan. Patient wants to be seen in clinic. RN advised ED but patient continues to decline. Patient connected with scheduling.       Татьяна Jackson RN Nursing Advisor 1/3/2023 8:00 PM     Reason for Disposition    [1] Constant abdominal pain AND [2] present > 2 hours    Additional Information    Negative: Shock suspected (e.g., cold/pale/clammy skin, too weak to stand, low BP, rapid pulse)    Negative: Difficult to awaken or acting confused (e.g., disoriented, slurred speech)    Negative: Sounds like a life-threatening emergency to the triager    Negative: Vomiting also present and worse than the diarrhea    Negative: [1] Blood in stool AND [2] without diarrhea    Negative: Diarrhea in a cancer patient who is currently (or recently) receiving chemotherapy or radiation therapy, or cancer patient who has metastatic or end-stage cancer and is receiving palliative care    Negative:  [1] SEVERE abdominal pain (e.g., excruciating) AND [2] present > 1 hour    Negative: [1] SEVERE abdominal pain AND [2] age > 60 years    Negative: [1] Blood in the stool AND [2] moderate or large amount of blood    Negative: Black or tarry bowel movements (Exception: chronic-unchanged black-grey bowel movements AND is taking iron pills or Pepto-bismol)    Negative: [1] Drinking very little AND [2] dehydration suspected (e.g., no urine > 12 hours, very dry mouth, very lightheaded)    Negative: Patient sounds very sick or weak to the triager    Protocols used: DIARRHEA-A-AH

## 2023-01-06 ENCOUNTER — APPOINTMENT (OUTPATIENT)
Dept: CT IMAGING | Facility: CLINIC | Age: 88
DRG: 311 | End: 2023-01-06
Attending: EMERGENCY MEDICINE
Payer: COMMERCIAL

## 2023-01-06 ENCOUNTER — OFFICE VISIT (OUTPATIENT)
Dept: INTERNAL MEDICINE | Facility: CLINIC | Age: 88
End: 2023-01-06
Payer: COMMERCIAL

## 2023-01-06 ENCOUNTER — TELEPHONE (OUTPATIENT)
Dept: INTERNAL MEDICINE | Facility: CLINIC | Age: 88
End: 2023-01-06

## 2023-01-06 ENCOUNTER — HOSPITAL ENCOUNTER (INPATIENT)
Facility: CLINIC | Age: 88
LOS: 2 days | Discharge: HOME | DRG: 311 | End: 2023-01-08
Attending: EMERGENCY MEDICINE | Admitting: INTERNAL MEDICINE
Payer: COMMERCIAL

## 2023-01-06 VITALS
RESPIRATION RATE: 22 BRPM | HEIGHT: 63 IN | HEART RATE: 62 BPM | BODY MASS INDEX: 25.87 KG/M2 | DIASTOLIC BLOOD PRESSURE: 62 MMHG | TEMPERATURE: 98.1 F | WEIGHT: 146 LBS | OXYGEN SATURATION: 96 % | SYSTOLIC BLOOD PRESSURE: 116 MMHG

## 2023-01-06 DIAGNOSIS — R06.02 SHORTNESS OF BREATH AT REST: ICD-10-CM

## 2023-01-06 DIAGNOSIS — I20.0 UNSTABLE ANGINA (H): ICD-10-CM

## 2023-01-06 DIAGNOSIS — R10.829 REBOUND ABDOMINAL TENDERNESS: ICD-10-CM

## 2023-01-06 DIAGNOSIS — R06.09 DOE (DYSPNEA ON EXERTION): ICD-10-CM

## 2023-01-06 DIAGNOSIS — R06.09 DYSPNEA ON EXERTION: ICD-10-CM

## 2023-01-06 DIAGNOSIS — R06.02 SOB (SHORTNESS OF BREATH): ICD-10-CM

## 2023-01-06 DIAGNOSIS — F33.1 MODERATE EPISODE OF RECURRENT MAJOR DEPRESSIVE DISORDER (H): ICD-10-CM

## 2023-01-06 DIAGNOSIS — R10.32 LLQ ABDOMINAL PAIN: Primary | ICD-10-CM

## 2023-01-06 DIAGNOSIS — R19.7 DIARRHEA, UNSPECIFIED TYPE: ICD-10-CM

## 2023-01-06 LAB
ALBUMIN SERPL BCG-MCNC: 4.2 G/DL (ref 3.5–5.2)
ALP SERPL-CCNC: 100 U/L (ref 35–104)
ALT SERPL W P-5'-P-CCNC: 9 U/L (ref 10–35)
ANION GAP SERPL CALCULATED.3IONS-SCNC: 11 MMOL/L (ref 7–15)
AST SERPL W P-5'-P-CCNC: 20 U/L (ref 10–35)
BASOPHILS # BLD AUTO: 0.1 10E3/UL (ref 0–0.2)
BASOPHILS NFR BLD AUTO: 1 %
BILIRUB SERPL-MCNC: 0.2 MG/DL
BUN SERPL-MCNC: 29 MG/DL (ref 8–23)
CALCIUM SERPL-MCNC: 9.8 MG/DL (ref 8.8–10.2)
CHLORIDE SERPL-SCNC: 103 MMOL/L (ref 98–107)
CREAT SERPL-MCNC: 0.97 MG/DL (ref 0.51–0.95)
CRP SERPL-MCNC: <3 MG/L
D DIMER PPP FEU-MCNC: 0.9 UG/ML FEU (ref 0–0.5)
DEPRECATED HCO3 PLAS-SCNC: 25 MMOL/L (ref 22–29)
EOSINOPHIL # BLD AUTO: 0.2 10E3/UL (ref 0–0.7)
EOSINOPHIL NFR BLD AUTO: 4 %
ERYTHROCYTE [DISTWIDTH] IN BLOOD BY AUTOMATED COUNT: 13.2 % (ref 10–15)
FLUAV RNA SPEC QL NAA+PROBE: NEGATIVE
FLUBV RNA RESP QL NAA+PROBE: NEGATIVE
GFR SERPL CREATININE-BSD FRML MDRD: 57 ML/MIN/1.73M2
GLUCOSE SERPL-MCNC: 108 MG/DL (ref 70–99)
HCT VFR BLD AUTO: 33.7 % (ref 35–47)
HGB BLD-MCNC: 10.9 G/DL (ref 11.7–15.7)
IMM GRANULOCYTES # BLD: 0 10E3/UL
IMM GRANULOCYTES NFR BLD: 0 %
LIPASE SERPL-CCNC: 16 U/L (ref 13–60)
LYMPHOCYTES # BLD AUTO: 1.4 10E3/UL (ref 0.8–5.3)
LYMPHOCYTES NFR BLD AUTO: 24 %
MAGNESIUM SERPL-MCNC: 2.1 MG/DL (ref 1.7–2.3)
MCH RBC QN AUTO: 30 PG (ref 26.5–33)
MCHC RBC AUTO-ENTMCNC: 32.3 G/DL (ref 31.5–36.5)
MCV RBC AUTO: 93 FL (ref 78–100)
MONOCYTES # BLD AUTO: 0.8 10E3/UL (ref 0–1.3)
MONOCYTES NFR BLD AUTO: 13 %
NEUTROPHILS # BLD AUTO: 3.4 10E3/UL (ref 1.6–8.3)
NEUTROPHILS NFR BLD AUTO: 58 %
NT-PROBNP SERPL-MCNC: 435 PG/ML (ref 0–1800)
PLATELET # BLD AUTO: 241 10E3/UL (ref 150–450)
POTASSIUM SERPL-SCNC: 4.7 MMOL/L (ref 3.4–5.3)
PROT SERPL-MCNC: 6.9 G/DL (ref 6.4–8.3)
RBC # BLD AUTO: 3.63 10E6/UL (ref 3.8–5.2)
RSV RNA SPEC NAA+PROBE: NEGATIVE
SARS-COV-2 RNA RESP QL NAA+PROBE: NEGATIVE
SODIUM SERPL-SCNC: 139 MMOL/L (ref 136–145)
TROPONIN T SERPL HS-MCNC: 20 NG/L
TROPONIN T SERPL HS-MCNC: 21 NG/L
TROPONIN T SERPL HS-MCNC: 26 NG/L
TROPONIN T SERPL HS-MCNC: 33 NG/L
WBC # BLD AUTO: 5.8 10E3/UL (ref 4–11)

## 2023-01-06 PROCEDURE — 85025 COMPLETE CBC W/AUTO DIFF WBC: CPT | Performed by: NURSE PRACTITIONER

## 2023-01-06 PROCEDURE — 99223 1ST HOSP IP/OBS HIGH 75: CPT | Mod: AI | Performed by: INTERNAL MEDICINE

## 2023-01-06 PROCEDURE — 85379 FIBRIN DEGRADATION QUANT: CPT | Performed by: NURSE PRACTITIONER

## 2023-01-06 PROCEDURE — 87637 SARSCOV2&INF A&B&RSV AMP PRB: CPT | Performed by: INTERNAL MEDICINE

## 2023-01-06 PROCEDURE — 96361 HYDRATE IV INFUSION ADD-ON: CPT

## 2023-01-06 PROCEDURE — 36415 COLL VENOUS BLD VENIPUNCTURE: CPT | Performed by: NURSE PRACTITIONER

## 2023-01-06 PROCEDURE — 84484 ASSAY OF TROPONIN QUANT: CPT | Performed by: EMERGENCY MEDICINE

## 2023-01-06 PROCEDURE — 258N000003 HC RX IP 258 OP 636: Performed by: EMERGENCY MEDICINE

## 2023-01-06 PROCEDURE — 84484 ASSAY OF TROPONIN QUANT: CPT | Performed by: NURSE PRACTITIONER

## 2023-01-06 PROCEDURE — 36415 COLL VENOUS BLD VENIPUNCTURE: CPT | Performed by: EMERGENCY MEDICINE

## 2023-01-06 PROCEDURE — 250N000011 HC RX IP 250 OP 636: Performed by: INTERNAL MEDICINE

## 2023-01-06 PROCEDURE — 85520 HEPARIN ASSAY: CPT | Performed by: EMERGENCY MEDICINE

## 2023-01-06 PROCEDURE — 99207 PR INPT ADMISSION FROM CLINIC: CPT | Performed by: NURSE PRACTITIONER

## 2023-01-06 PROCEDURE — 36415 COLL VENOUS BLD VENIPUNCTURE: CPT | Performed by: INTERNAL MEDICINE

## 2023-01-06 PROCEDURE — 93000 ELECTROCARDIOGRAM COMPLETE: CPT | Performed by: NURSE PRACTITIONER

## 2023-01-06 PROCEDURE — 80053 COMPREHEN METABOLIC PANEL: CPT | Performed by: NURSE PRACTITIONER

## 2023-01-06 PROCEDURE — 83690 ASSAY OF LIPASE: CPT | Performed by: NURSE PRACTITIONER

## 2023-01-06 PROCEDURE — 93005 ELECTROCARDIOGRAM TRACING: CPT

## 2023-01-06 PROCEDURE — 83735 ASSAY OF MAGNESIUM: CPT | Performed by: INTERNAL MEDICINE

## 2023-01-06 PROCEDURE — 86140 C-REACTIVE PROTEIN: CPT | Performed by: NURSE PRACTITIONER

## 2023-01-06 PROCEDURE — 250N000009 HC RX 250: Performed by: EMERGENCY MEDICINE

## 2023-01-06 PROCEDURE — 84484 ASSAY OF TROPONIN QUANT: CPT | Performed by: INTERNAL MEDICINE

## 2023-01-06 PROCEDURE — 96375 TX/PRO/DX INJ NEW DRUG ADDON: CPT

## 2023-01-06 PROCEDURE — 99291 CRITICAL CARE FIRST HOUR: CPT | Mod: 25

## 2023-01-06 PROCEDURE — 74177 CT ABD & PELVIS W/CONTRAST: CPT

## 2023-01-06 PROCEDURE — 250N000011 HC RX IP 250 OP 636: Performed by: EMERGENCY MEDICINE

## 2023-01-06 PROCEDURE — 96374 THER/PROPH/DIAG INJ IV PUSH: CPT

## 2023-01-06 PROCEDURE — 83880 ASSAY OF NATRIURETIC PEPTIDE: CPT | Performed by: NURSE PRACTITIONER

## 2023-01-06 PROCEDURE — 120N000001 HC R&B MED SURG/OB

## 2023-01-06 PROCEDURE — 250N000013 HC RX MED GY IP 250 OP 250 PS 637: Performed by: INTERNAL MEDICINE

## 2023-01-06 RX ORDER — AMLODIPINE BESYLATE 10 MG/1
10 TABLET ORAL DAILY
Status: DISCONTINUED | OUTPATIENT
Start: 2023-01-07 | End: 2023-01-08 | Stop reason: HOSPADM

## 2023-01-06 RX ORDER — DORZOLAMIDE HCL 20 MG/ML
1 SOLUTION/ DROPS OPHTHALMIC 2 TIMES DAILY
Status: DISCONTINUED | OUTPATIENT
Start: 2023-01-06 | End: 2023-01-08 | Stop reason: HOSPADM

## 2023-01-06 RX ORDER — SIMVASTATIN 10 MG
10 TABLET ORAL AT BEDTIME
Status: DISCONTINUED | OUTPATIENT
Start: 2023-01-06 | End: 2023-01-08 | Stop reason: HOSPADM

## 2023-01-06 RX ORDER — GABAPENTIN 100 MG/1
100 CAPSULE ORAL 3 TIMES DAILY
Status: DISCONTINUED | OUTPATIENT
Start: 2023-01-06 | End: 2023-01-08 | Stop reason: HOSPADM

## 2023-01-06 RX ORDER — PROCHLORPERAZINE MALEATE 5 MG
5 TABLET ORAL EVERY 6 HOURS PRN
Status: DISCONTINUED | OUTPATIENT
Start: 2023-01-06 | End: 2023-01-08 | Stop reason: HOSPADM

## 2023-01-06 RX ORDER — METHYLPREDNISOLONE SODIUM SUCCINATE 125 MG/2ML
125 INJECTION, POWDER, LYOPHILIZED, FOR SOLUTION INTRAMUSCULAR; INTRAVENOUS ONCE
Status: COMPLETED | OUTPATIENT
Start: 2023-01-06 | End: 2023-01-06

## 2023-01-06 RX ORDER — ONDANSETRON 4 MG/1
4 TABLET, ORALLY DISINTEGRATING ORAL EVERY 6 HOURS PRN
Status: DISCONTINUED | OUTPATIENT
Start: 2023-01-06 | End: 2023-01-08 | Stop reason: HOSPADM

## 2023-01-06 RX ORDER — ONDANSETRON 4 MG/1
4 TABLET, FILM COATED ORAL EVERY 8 HOURS PRN
Status: DISCONTINUED | OUTPATIENT
Start: 2023-01-06 | End: 2023-01-06

## 2023-01-06 RX ORDER — ACETAMINOPHEN 650 MG/1
650 SUPPOSITORY RECTAL EVERY 6 HOURS PRN
Status: DISCONTINUED | OUTPATIENT
Start: 2023-01-06 | End: 2023-01-08 | Stop reason: HOSPADM

## 2023-01-06 RX ORDER — HYDRALAZINE HYDROCHLORIDE 25 MG/1
25 TABLET, FILM COATED ORAL 3 TIMES DAILY
Status: DISCONTINUED | OUTPATIENT
Start: 2023-01-06 | End: 2023-01-08 | Stop reason: HOSPADM

## 2023-01-06 RX ORDER — IOPAMIDOL 755 MG/ML
120 INJECTION, SOLUTION INTRAVASCULAR ONCE
Status: COMPLETED | OUTPATIENT
Start: 2023-01-06 | End: 2023-01-06

## 2023-01-06 RX ORDER — HEPARIN SODIUM 10000 [USP'U]/100ML
0-5000 INJECTION, SOLUTION INTRAVENOUS CONTINUOUS
Status: DISCONTINUED | OUTPATIENT
Start: 2023-01-06 | End: 2023-01-08 | Stop reason: HOSPADM

## 2023-01-06 RX ORDER — BRIMONIDINE TARTRATE 2 MG/ML
1 SOLUTION/ DROPS OPHTHALMIC 3 TIMES DAILY
Status: DISCONTINUED | OUTPATIENT
Start: 2023-01-06 | End: 2023-01-07

## 2023-01-06 RX ORDER — LIDOCAINE 40 MG/G
CREAM TOPICAL
Status: DISCONTINUED | OUTPATIENT
Start: 2023-01-06 | End: 2023-01-08 | Stop reason: HOSPADM

## 2023-01-06 RX ORDER — DULOXETIN HYDROCHLORIDE 20 MG/1
20 CAPSULE, DELAYED RELEASE ORAL DAILY
Status: DISCONTINUED | OUTPATIENT
Start: 2023-01-07 | End: 2023-01-07

## 2023-01-06 RX ORDER — ACETAMINOPHEN 325 MG/1
650 TABLET ORAL EVERY 6 HOURS PRN
Status: DISCONTINUED | OUTPATIENT
Start: 2023-01-06 | End: 2023-01-08 | Stop reason: HOSPADM

## 2023-01-06 RX ORDER — FUROSEMIDE 10 MG/ML
40 INJECTION INTRAMUSCULAR; INTRAVENOUS EVERY 12 HOURS
Status: DISCONTINUED | OUTPATIENT
Start: 2023-01-06 | End: 2023-01-08 | Stop reason: HOSPADM

## 2023-01-06 RX ORDER — MAGNESIUM HYDROXIDE/ALUMINUM HYDROXICE/SIMETHICONE 120; 1200; 1200 MG/30ML; MG/30ML; MG/30ML
30 SUSPENSION ORAL EVERY 4 HOURS PRN
Status: DISCONTINUED | OUTPATIENT
Start: 2023-01-06 | End: 2023-01-08 | Stop reason: HOSPADM

## 2023-01-06 RX ORDER — DIPHENHYDRAMINE HYDROCHLORIDE 50 MG/ML
25 INJECTION INTRAMUSCULAR; INTRAVENOUS ONCE
Status: COMPLETED | OUTPATIENT
Start: 2023-01-06 | End: 2023-01-06

## 2023-01-06 RX ORDER — LATANOPROST 50 UG/ML
1 SOLUTION/ DROPS OPHTHALMIC AT BEDTIME
Status: DISCONTINUED | OUTPATIENT
Start: 2023-01-06 | End: 2023-01-08 | Stop reason: HOSPADM

## 2023-01-06 RX ORDER — LISINOPRIL 20 MG/1
20 TABLET ORAL DAILY
Status: DISCONTINUED | OUTPATIENT
Start: 2023-01-07 | End: 2023-01-08 | Stop reason: HOSPADM

## 2023-01-06 RX ORDER — OLANZAPINE 2.5 MG/1
5 TABLET, FILM COATED ORAL AT BEDTIME
Status: DISCONTINUED | OUTPATIENT
Start: 2023-01-06 | End: 2023-01-08 | Stop reason: HOSPADM

## 2023-01-06 RX ORDER — ONDANSETRON 2 MG/ML
4 INJECTION INTRAMUSCULAR; INTRAVENOUS EVERY 6 HOURS PRN
Status: DISCONTINUED | OUTPATIENT
Start: 2023-01-06 | End: 2023-01-08 | Stop reason: HOSPADM

## 2023-01-06 RX ORDER — NITROGLYCERIN 0.4 MG/1
0.4 TABLET SUBLINGUAL EVERY 5 MIN PRN
Status: DISCONTINUED | OUTPATIENT
Start: 2023-01-06 | End: 2023-01-08 | Stop reason: HOSPADM

## 2023-01-06 RX ORDER — FOLIC ACID 1 MG/1
1 TABLET ORAL DAILY
Status: DISCONTINUED | OUTPATIENT
Start: 2023-01-07 | End: 2023-01-08 | Stop reason: HOSPADM

## 2023-01-06 RX ORDER — PROCHLORPERAZINE 25 MG
12.5 SUPPOSITORY, RECTAL RECTAL EVERY 12 HOURS PRN
Status: DISCONTINUED | OUTPATIENT
Start: 2023-01-06 | End: 2023-01-08 | Stop reason: HOSPADM

## 2023-01-06 RX ADMIN — FUROSEMIDE 40 MG: 10 INJECTION, SOLUTION INTRAMUSCULAR; INTRAVENOUS at 22:29

## 2023-01-06 RX ADMIN — IOPAMIDOL 63 ML: 755 INJECTION, SOLUTION INTRAVENOUS at 16:54

## 2023-01-06 RX ADMIN — METHYLPREDNISOLONE SODIUM SUCCINATE 125 MG: 125 INJECTION, POWDER, FOR SOLUTION INTRAMUSCULAR; INTRAVENOUS at 15:54

## 2023-01-06 RX ADMIN — OLANZAPINE 5 MG: 5 TABLET, FILM COATED ORAL at 22:30

## 2023-01-06 RX ADMIN — SODIUM CHLORIDE 85 ML: 9 INJECTION, SOLUTION INTRAVENOUS at 16:54

## 2023-01-06 RX ADMIN — SODIUM CHLORIDE 500 ML: 9 INJECTION, SOLUTION INTRAVENOUS at 14:37

## 2023-01-06 RX ADMIN — HEPARIN SODIUM 800 UNITS/HR: 10000 INJECTION, SOLUTION INTRAVENOUS at 18:24

## 2023-01-06 RX ADMIN — HYDRALAZINE HYDROCHLORIDE 25 MG: 25 TABLET, FILM COATED ORAL at 22:29

## 2023-01-06 RX ADMIN — DIPHENHYDRAMINE HYDROCHLORIDE 25 MG: 50 INJECTION, SOLUTION INTRAMUSCULAR; INTRAVENOUS at 15:55

## 2023-01-06 ASSESSMENT — ACTIVITIES OF DAILY LIVING (ADL)
ADLS_ACUITY_SCORE: 38

## 2023-01-06 ASSESSMENT — ENCOUNTER SYMPTOMS
VOMITING: 0
NAUSEA: 1
ABDOMINAL PAIN: 1
DIARRHEA: 0
SHORTNESS OF BREATH: 1

## 2023-01-06 ASSESSMENT — PATIENT HEALTH QUESTIONNAIRE - PHQ9
SUM OF ALL RESPONSES TO PHQ QUESTIONS 1-9: 0
10. IF YOU CHECKED OFF ANY PROBLEMS, HOW DIFFICULT HAVE THESE PROBLEMS MADE IT FOR YOU TO DO YOUR WORK, TAKE CARE OF THINGS AT HOME, OR GET ALONG WITH OTHER PEOPLE: NOT DIFFICULT AT ALL
SUM OF ALL RESPONSES TO PHQ QUESTIONS 1-9: 0

## 2023-01-06 NOTE — TELEPHONE ENCOUNTER
Troponin and d dimer high   Called [facility] and decision patient should be seen in ER   Called patient home and cell- which is her sons phone and left message to go to ER   Called [facility] and let them know messages left but they did not answer and may arrive at 130 pm to their office and they can direct them to the ER

## 2023-01-06 NOTE — PROGRESS NOTES
Patient answers are not available for this visit.  Assessment & Plan     LLQ abdominal pain  Needs further evaluation for diverticulitis    Lab and [facility] referral   - CBC with platelets and differential; Future  - Comprehensive metabolic panel (BMP + Alb, Alk Phos, ALT, AST, Total. Bili, TP); Future  - Lipase; Future  - Referral to Acute and Diagnostic Services (Day of diagnostic / First order acute); Future    Rebound abdominal tenderness  As above   - CBC with platelets and differential; Future  - Comprehensive metabolic panel (BMP + Alb, Alk Phos, ALT, AST, Total. Bili, TP); Future  - CRP, inflammation; Future  - Referral to Acute and Diagnostic Services (Day of diagnostic / First order acute); Future    Moderate episode of recurrent major depressive disorder (H)  Stable     Diarrhea, unspecified type  For past month off and on   - CBC with platelets and differential; Future  - Comprehensive metabolic panel (BMP + Alb, Alk Phos, ALT, AST, Total. Bili, TP); Future  - CRP, inflammation; Future  - Referral to Acute and Diagnostic Services (Day of diagnostic / First order acute); Future    SOB (shortness of breath)  More SOB and THAO in past month   - EKG 12-lead complete w/read - Clinics  - CBC with platelets and differential; Future  - Comprehensive metabolic panel (BMP + Alb, Alk Phos, ALT, AST, Total. Bili, TP); Future  - CRP, inflammation; Future  - D dimer, quantitative; Future  - Troponin I; Future  - BNP-N terminal pro; Future  - Referral to Acute and Diagnostic Services (Day of diagnostic / First order acute); Future    THAO (dyspnea on exertion)    - EKG 12-lead complete w/read - Clinics  - CBC with platelets and differential; Future  - Comprehensive metabolic panel (BMP + Alb, Alk Phos, ALT, AST, Total. Bili, TP); Future  - CRP, inflammation; Future  - D dimer, quantitative; Future  - Troponin I; Future  - BNP-N terminal pro; Future  - Referral to Acute and Diagnostic Services (Day of diagnostic / First order  "acute); Future      42 minutes spent on the date of the encounter doing chart review, history and exam, documentation and further activities per the note       BMI:   Estimated body mass index is 25.86 kg/m  as calculated from the following:    Height as of this encounter: 1.6 m (5' 3\").    Weight as of this encounter: 66.2 kg (146 lb).       Patient Instructions   Lab in [address]    ADS at 130 pm    No eating or drinking prior to appointment        Return in about 3 months (around 4/6/2023).    CHAPITO Lorenzo CNP  [facility] DICK Echevarria is a 87 year old accompanied by her son, presenting for the following health issues:  Chief Complaint   Patient presents with     Follow Up     URI and SOB with exertion         History of Present Illness       Reason for visit:  Follow up upper resp/diff breathing/sob exertion    She eats 2-3 servings of fruits and vegetables daily.She consumes 0 sweetened beverage(s) daily.She exercises with enough effort to increase her heart rate 10 to 19 minutes per day.  She exercises with enough effort to increase her heart rate 4 days per week.   She is taking medications regularly.    Today's PHQ-9         PHQ-9 Total Score: 0    PHQ-9 Q9 Thoughts of better off dead/self-harm past 2 weeks :   Not at all    How difficult have these problems made it for you to do your work, take care of things at home, or get along with other people: Not difficult at all       Seen urgent care 1-2 times in past   Using mucinex OTC   More SOB on exertion for a month   Congestion - feel sin her throat     Exhausted     Previous cardiac window placed   Angiogram 2 1/1 years ago ok     Diarrhea off an on all month - imodium helps with diarrhea for a couple days   Pain in stomach in past week 6/10 for past week   Sick to stomach       Review of Systems   Constitutional, HEENT, cardiovascular, pulmonary, GI, , musculoskeletal, neuro, skin, endocrine and psych systems " "are negative, except as otherwise noted.      Objective    /62   Pulse 62   Temp 98.1  F (36.7  C) (Oral)   Resp 22   Ht 1.6 m (5' 3\")   Wt 66.2 kg (146 lb)   SpO2 96%   BMI 25.86 kg/m    Body mass index is 25.86 kg/m .  Physical Exam   GENERAL: alert and 6/10 abdominal pain generalized   HENT: ear canals and TM's normal- some cerumen bilaterally , nose and mouth without ulcers or lesions  RESP: lungs clear to auscultation - no rales, rhonchi or wheezes even with forced exhalation  CV: regular rate and rhythm, no murmur   ABDOMEN: soft, tender upper abdomen on left side and LLQ pain with rebound tenderness  and bowel sounds normal  NEURO: Normal strength and tone, mentation intact and speech normal  PSYCH: mentation appears normal, affect normal/bright- son present and provides much of history - he is paramedic     Lab   EKG   unchanged from previous             Troponin and d dimer high   Called [facility] and decision patient should be seen in ER   Called patient home and cell- which is her sons phone and left message to go to ER   Called [facility] and let them know messages left but they did not answer and may arrive at 130 pm to their office and they can direct them to the ER           "

## 2023-01-06 NOTE — NURSING NOTE
"Chief Complaint   Patient presents with     Follow Up     URI and SOB with exertion     initial /62   Pulse 62   Temp 98.1  F (36.7  C) (Oral)   Resp 22   Ht 1.6 m (5' 3\")   Wt 66.2 kg (146 lb)   SpO2 96%   BMI 25.86 kg/m   Estimated body mass index is 25.86 kg/m  as calculated from the following:    Height as of this encounter: 1.6 m (5' 3\").    Weight as of this encounter: 66.2 kg (146 lb)..  bp completed using cuff size regular  MALIK KEITH LPN  "

## 2023-01-06 NOTE — ED PROVIDER NOTES
"  History     Chief Complaint:  Abnormal Labs, Abdominal Pain, and Shortness of Breath       HPI   Swathi Dukes is a 87 year old female with history of HTN and hyperlipidemia who presents with dyspnea on exertion.  She says this has been worsening over the last several weeks.  No chest pain.  She went to see a clinic provider today and was noted to have elevated D-dimer troponin so she was sent to the ED for evaluation.  No known history of CAD.    Patient also complains of few days of \"GI upset.\"  She has felt nauseated but had no vomiting.  No diarrhea.  She noted some left lower quadrant abdominal pain when her abdomen was palpated earlier at the clinic.      Independent Historian: Patient and son    Review of External Notes: I reviewed the patient's Care Everywhere.      ROS:  Review of Systems   Respiratory: Positive for shortness of breath.    Cardiovascular: Negative for chest pain.   Gastrointestinal: Positive for abdominal pain and nausea. Negative for diarrhea and vomiting.   All other systems reviewed and are negative.    Allergies:  Aspirin  [employer]  Diclofenac  Ibuprofen  Iodine  Donepezil     Medications:    Pro-air  Norvasc  Cymbalta  Neurontin  Apresoline  Zestril  Imodium  Zyprexa  Zofran  Zocor    Past Medical History:    Aortic stenosis  Glaucoma  HTN  Hyperlipidemia   Pericardia effusion  Systemic lupus erythematosus   Acute DVT  Thromboembolism    Depression   Anemia     Past Surgical History:    Heart catheterization with possible intervention  Left ventriculogram    Pericardial window for effusion  Root tip removal    Family History:    Diabetes: Father    Social History:  Patient reports that she has never smoked. She has never used smokeless tobacco. She reports that she does not currently use alcohol. She reports that she does not use drugs.  PCP: Cortney Vanessa MD, Family Medicine     Physical Exam     Patient Vitals for the past 24 hrs:   BP Temp Temp src " Pulse Resp SpO2   01/06/23 1500 (!) 154/63 -- -- 64 14 98 %   01/06/23 1445 (!) 152/62 -- -- 63 16 97 %   01/06/23 1402 -- -- -- 64 -- 96 %   01/06/23 1401 -- -- -- -- 20 --   01/06/23 1246 (!) 162/73 97.3  F (36.3  C) Temporal 69 19 97 %        Physical Exam  General: Laying on the ED bed, no distress  HEENT: Normocephalic, atraumatic  Cardiac: Radial pulses 2+, regular rate and rhythm  Pulm: Breathing comfortably, no accessory muscle usage, no conversational dyspnea, and lungs clear bilaterally  GI: Abdomen soft, very mildly tender to palpation left lower quadrant, no rigidity or guarding  MSK: No deformities, tender to palpation of the anterior left leg, no calf tenderness bilaterally, trace pitting edema BLE without asymmetry  Skin: Warm and dry  Neuro: Moves all extremities  Psych: Normal mood and affect     Emergency Department Course   ECG:  Normal sinus rhythm with a rate of 65 bpm, normal axis, first-degree AV block, no acute ST-T changes.  No prior for comparison.    Imaging:  CT Chest (PE) Abdomen Pelvis w Contrast   Final Result   IMPRESSION:   1.  No acute pulmonary embolism or aortopathy.   2.  No acute lung, airway, or pleural inflammatory process.   3.  No acute inflammatory process in the abdomen or pelvis. Sigmoid diverticulosis but no diverticulitis.         Report per radiology    Laboratory:  Labs Ordered and Resulted from Time of ED Arrival to Time of ED Departure   TROPONIN T, HIGH SENSITIVITY - Abnormal       Result Value    Troponin T, High Sensitivity 26 (*)         Emergency Department Course & Assessments:       Interventions:  Medications   heparin 25,000 units in 0.45% NaCl 250 mL ANTICOAGULANT infusion (800 Units/hr Intravenous New Bag 1/6/23 4521)   0.9% sodium chloride BOLUS (0 mLs Intravenous Stopped 1/6/23 1555)   diphenhydrAMINE (BENADRYL) injection 25 mg (25 mg Intravenous Given 1/6/23 1555)   methylPREDNISolone sodium succinate (solu-MEDROL) injection 125 mg (125 mg Intravenous  Given 23 1554)   iopamidol (ISOVUE-370) solution 120 mL (63 mLs Intravenous Given 23 165)   Saline CT scan flush (85 mLs Intravenous Given 23)        Independent Interpretation (X-rays, CTs, rhythm strip):  I independently interpreted the imaging and EKG.      Consultations/Discussion of Management or Tests:   I spoke with Dr. Lopes, hospitalist, who accepts the patient for observation.     Social Determinants of Health affecting care:  None    Disposition:  The patient was admitted to the hospital under the care of Dr. Lopes.     Impression & Plan    CMS Diagnoses: None    Medical Decision Makin-year-old female presents with worsening dyspnea on exertion as described above.  Her D-dimer is elevated and a CT PE was therefore ordered and showed no signs of PE. No signs of fluid overload on labs or exam, low suspicion for CHF exacerbation as a cause of her symptoms.  Given her abdominal pain, a CT of the abdomen/pelvis was ordered in conjunction with the PE scan above and showed no acute abdominal process.  On reassessment the patient is still having some shortness of breath at rest while lying in the bed.  I am overall suspicious for unstable angina at this point with dyspnea as a manifestation of the heart disease.  A heparin drip was therefore ordered and the patient was given a full aspirin.  Plan at this time is for admission to the hospitalist service for further care and cardiac evaluation.  The patient does have what appears to be a small posterior pericardial effusion on CT of the chest and I suspect this will be better evaluated by ultrasound.  Unclear if this is related to the presentation above at this time.      Diagnosis:    ICD-10-CM    1. Dyspnea on exertion  R06.09       2. Shortness of breath at rest  R06.02       3. Unstable angina (H)  I20.0                Scribe Disclosure:  I, Tayler Burrows, am serving as a scribe at 2:00PM on 2023 to document services personally  performed by Jose Fernando MD based on my observations and the provider's statements to me.       1/6/2023   Jose Fernando MD King, Colin, MD  01/06/23 1831

## 2023-01-06 NOTE — ED TRIAGE NOTES
Intermittent SOB and abdominal pain over the past month, see in clinic this AM, Dimer and troponin are elevated. Referred to ED. VSS on RA.

## 2023-01-07 ENCOUNTER — APPOINTMENT (OUTPATIENT)
Dept: OCCUPATIONAL THERAPY | Facility: CLINIC | Age: 88
DRG: 311 | End: 2023-01-07
Attending: INTERNAL MEDICINE
Payer: COMMERCIAL

## 2023-01-07 ENCOUNTER — APPOINTMENT (OUTPATIENT)
Dept: CARDIOLOGY | Facility: CLINIC | Age: 88
DRG: 311 | End: 2023-01-07
Attending: INTERNAL MEDICINE
Payer: COMMERCIAL

## 2023-01-07 LAB
ANION GAP SERPL CALCULATED.3IONS-SCNC: 22 MMOL/L (ref 7–15)
ATRIAL RATE - MUSE: 65 BPM
BUN SERPL-MCNC: 31.8 MG/DL (ref 8–23)
CALCIUM SERPL-MCNC: 10.1 MG/DL (ref 8.8–10.2)
CHLORIDE SERPL-SCNC: 99 MMOL/L (ref 98–107)
CREAT SERPL-MCNC: 0.98 MG/DL (ref 0.51–0.95)
DEPRECATED HCO3 PLAS-SCNC: 16 MMOL/L (ref 22–29)
DIASTOLIC BLOOD PRESSURE - MUSE: NORMAL MMHG
ERYTHROCYTE [DISTWIDTH] IN BLOOD BY AUTOMATED COUNT: 13.3 % (ref 10–15)
GFR SERPL CREATININE-BSD FRML MDRD: 56 ML/MIN/1.73M2
GLUCOSE SERPL-MCNC: 211 MG/DL (ref 70–99)
HCT VFR BLD AUTO: 38.3 % (ref 35–47)
HGB BLD-MCNC: 11.6 G/DL (ref 11.7–15.7)
INTERPRETATION ECG - MUSE: NORMAL
LVEF ECHO: NORMAL
MAGNESIUM SERPL-MCNC: 2 MG/DL (ref 1.7–2.3)
MCH RBC QN AUTO: 29.1 PG (ref 26.5–33)
MCHC RBC AUTO-ENTMCNC: 30.3 G/DL (ref 31.5–36.5)
MCV RBC AUTO: 96 FL (ref 78–100)
P AXIS - MUSE: 66 DEGREES
PLATELET # BLD AUTO: 312 10E3/UL (ref 150–450)
POTASSIUM SERPL-SCNC: 4.3 MMOL/L (ref 3.4–5.3)
PR INTERVAL - MUSE: 222 MS
QRS DURATION - MUSE: 84 MS
QT - MUSE: 420 MS
QTC - MUSE: 436 MS
R AXIS - MUSE: -16 DEGREES
RBC # BLD AUTO: 3.99 10E6/UL (ref 3.8–5.2)
SODIUM SERPL-SCNC: 137 MMOL/L (ref 136–145)
SYSTOLIC BLOOD PRESSURE - MUSE: NORMAL MMHG
T AXIS - MUSE: 62 DEGREES
UFH PPP CHRO-ACNC: 0.2 IU/ML
UFH PPP CHRO-ACNC: 0.28 IU/ML
UFH PPP CHRO-ACNC: 0.57 IU/ML
VENTRICULAR RATE- MUSE: 65 BPM
WBC # BLD AUTO: 8.8 10E3/UL (ref 4–11)

## 2023-01-07 PROCEDURE — 85520 HEPARIN ASSAY: CPT | Performed by: INTERNAL MEDICINE

## 2023-01-07 PROCEDURE — 99232 SBSQ HOSP IP/OBS MODERATE 35: CPT | Performed by: INTERNAL MEDICINE

## 2023-01-07 PROCEDURE — 250N000013 HC RX MED GY IP 250 OP 250 PS 637: Performed by: INTERNAL MEDICINE

## 2023-01-07 PROCEDURE — 36415 COLL VENOUS BLD VENIPUNCTURE: CPT | Performed by: INTERNAL MEDICINE

## 2023-01-07 PROCEDURE — 85027 COMPLETE CBC AUTOMATED: CPT | Performed by: INTERNAL MEDICINE

## 2023-01-07 PROCEDURE — 250N000009 HC RX 250: Performed by: INTERNAL MEDICINE

## 2023-01-07 PROCEDURE — 93306 TTE W/DOPPLER COMPLETE: CPT

## 2023-01-07 PROCEDURE — 97535 SELF CARE MNGMENT TRAINING: CPT | Mod: GO

## 2023-01-07 PROCEDURE — 80048 BASIC METABOLIC PNL TOTAL CA: CPT | Performed by: INTERNAL MEDICINE

## 2023-01-07 PROCEDURE — 120N000001 HC R&B MED SURG/OB

## 2023-01-07 PROCEDURE — 97165 OT EVAL LOW COMPLEX 30 MIN: CPT | Mod: GO

## 2023-01-07 PROCEDURE — 83735 ASSAY OF MAGNESIUM: CPT | Performed by: INTERNAL MEDICINE

## 2023-01-07 PROCEDURE — 250N000011 HC RX IP 250 OP 636: Performed by: EMERGENCY MEDICINE

## 2023-01-07 PROCEDURE — 99222 1ST HOSP IP/OBS MODERATE 55: CPT | Mod: 25 | Performed by: INTERNAL MEDICINE

## 2023-01-07 PROCEDURE — 250N000011 HC RX IP 250 OP 636: Performed by: INTERNAL MEDICINE

## 2023-01-07 PROCEDURE — 93306 TTE W/DOPPLER COMPLETE: CPT | Mod: 26 | Performed by: INTERNAL MEDICINE

## 2023-01-07 RX ORDER — DULOXETIN HYDROCHLORIDE 60 MG/1
60 CAPSULE, DELAYED RELEASE ORAL DAILY
Status: DISCONTINUED | OUTPATIENT
Start: 2023-01-07 | End: 2023-01-08 | Stop reason: HOSPADM

## 2023-01-07 RX ORDER — BRIMONIDINE TARTRATE 2 MG/ML
1 SOLUTION/ DROPS OPHTHALMIC 2 TIMES DAILY
Status: DISCONTINUED | OUTPATIENT
Start: 2023-01-07 | End: 2023-01-08 | Stop reason: HOSPADM

## 2023-01-07 RX ORDER — AMLODIPINE BESYLATE 5 MG/1
5 TABLET ORAL ONCE
Status: DISCONTINUED | OUTPATIENT
Start: 2023-01-07 | End: 2023-01-07

## 2023-01-07 RX ORDER — LOPERAMIDE HCL 2 MG
2 CAPSULE ORAL 4 TIMES DAILY PRN
Status: DISCONTINUED | OUTPATIENT
Start: 2023-01-07 | End: 2023-01-08 | Stop reason: HOSPADM

## 2023-01-07 RX ORDER — LISINOPRIL 10 MG/1
10 TABLET ORAL ONCE
Status: DISCONTINUED | OUTPATIENT
Start: 2023-01-07 | End: 2023-01-07

## 2023-01-07 RX ADMIN — GABAPENTIN 100 MG: 100 CAPSULE ORAL at 20:31

## 2023-01-07 RX ADMIN — FUROSEMIDE 40 MG: 10 INJECTION, SOLUTION INTRAMUSCULAR; INTRAVENOUS at 10:46

## 2023-01-07 RX ADMIN — LISINOPRIL 20 MG: 20 TABLET ORAL at 10:51

## 2023-01-07 RX ADMIN — DULOXETINE HYDROCHLORIDE 60 MG: 60 CAPSULE, DELAYED RELEASE ORAL at 14:16

## 2023-01-07 RX ADMIN — HYDRALAZINE HYDROCHLORIDE 25 MG: 25 TABLET, FILM COATED ORAL at 10:44

## 2023-01-07 RX ADMIN — FOLIC ACID 1 MG: 1 TABLET ORAL at 10:45

## 2023-01-07 RX ADMIN — HYDRALAZINE HYDROCHLORIDE 25 MG: 25 TABLET, FILM COATED ORAL at 16:26

## 2023-01-07 RX ADMIN — HEPARIN SODIUM 750 UNITS/HR: 10000 INJECTION, SOLUTION INTRAVENOUS at 18:17

## 2023-01-07 RX ADMIN — GABAPENTIN 100 MG: 100 CAPSULE ORAL at 14:16

## 2023-01-07 RX ADMIN — SIMVASTATIN 10 MG: 10 TABLET, FILM COATED ORAL at 22:01

## 2023-01-07 RX ADMIN — AMLODIPINE BESYLATE 10 MG: 10 TABLET ORAL at 10:51

## 2023-01-07 RX ADMIN — BRIMONIDINE TARTRATE 1 DROP: 2 SOLUTION/ DROPS OPHTHALMIC at 20:38

## 2023-01-07 RX ADMIN — LATANOPROST 1 DROP: 50 SOLUTION/ DROPS OPHTHALMIC at 22:02

## 2023-01-07 RX ADMIN — DORZOLAMIDE HYDROCHLORIDE 1 DROP: 20 SOLUTION/ DROPS OPHTHALMIC at 20:44

## 2023-01-07 RX ADMIN — HYDRALAZINE HYDROCHLORIDE 25 MG: 25 TABLET, FILM COATED ORAL at 22:02

## 2023-01-07 RX ADMIN — NITROGLYCERIN 0.4 MG: 0.4 TABLET SUBLINGUAL at 01:58

## 2023-01-07 RX ADMIN — FUROSEMIDE 40 MG: 10 INJECTION, SOLUTION INTRAMUSCULAR; INTRAVENOUS at 20:27

## 2023-01-07 RX ADMIN — LOPERAMIDE HYDROCHLORIDE 2 MG: 2 CAPSULE ORAL at 18:11

## 2023-01-07 ASSESSMENT — ACTIVITIES OF DAILY LIVING (ADL)
ADLS_ACUITY_SCORE: 38
ADLS_ACUITY_SCORE: 38
ADLS_ACUITY_SCORE: 43
ADLS_ACUITY_SCORE: 38
ADLS_ACUITY_SCORE: 41
ADLS_ACUITY_SCORE: 38

## 2023-01-07 NOTE — ED NOTES
Pt informed nurse that she has a history of C Diff and has been having diarrhea all of today and on and off for the past few weeks. Pt is worried she has C Diff again. RN informed pt she would put a note in her chart.

## 2023-01-07 NOTE — ED NOTES
"[facility]  ED Nurse Handoff Report    Swathi Dukes is a 87 year old female   ED Chief complaint: Abnormal Labs, Abdominal Pain, and Shortness of Breath  . ED Diagnosis:   Final diagnoses:   Dyspnea on exertion   Shortness of breath at rest   Unstable angina (H)     Allergies:   Allergies   Allergen Reactions     Aspirin Anaphylaxis     Diagnostic X-Ray Materials Anaphylaxis     Diclofenac Anaphylaxis     Ibuprofen Anaphylaxis     Iodine Anaphylaxis     Contrast  Pt states anaphylactic reaction to ct contrast about 20 years ago  Pt premedicated with prednisone and benadryl before iv isouve-370 CT contrast on 1/6/23 and exhibited no signs of reaction     Donepezil      Other reaction(s): Other (see comments)  Cervical dystonia       Code Status: Full Code  Activity level - Baseline/Home:  Independent. Activity Level - Current:   Independent. Lift room needed: No. Bariatric: No   Needed: No   Isolation: No. Infection: Not Applicable.     Vital Signs:   Vitals:    01/06/23 1402 01/06/23 1445 01/06/23 1500 01/06/23 1901   BP:  (!) 152/62 (!) 154/63    Pulse: 64 63 64    Resp:  16 14    Temp:       TempSrc:       SpO2: 96% 97% 98%    Weight:    65.9 kg (145 lb 3.2 oz)       Cardiac Rhythm:  ,      Pain level:    Patient confused: No. Patient Falls Risk: No.   Elimination Status: Has voided   Patient Report - Initial Complaint: SOB, abnormal labs. Focused Assessment: MD note: Swathi Dukes is a 87 year old female with history of HTN and hyperlipidemia who presents with dyspnea on exertion.  She says this has been worsening over the last several weeks.  No chest pain.  She went to see a clinic provider today and was noted to have elevated D-dimer troponin so she was sent to the ED for evaluation.  No known history of CAD.     Patient also complains of few days of \"GI upset.\"  She has felt nauseated but had no vomiting.  No diarrhea.  She noted some left lower quadrant abdominal pain when her " abdomen was palpated earlier at the clinic.   Tests Performed:   Labs Ordered and Resulted from Time of ED Arrival to Time of ED Departure   TROPONIN T, HIGH SENSITIVITY - Abnormal       Result Value    Troponin T, High Sensitivity 26 (*)      CT Chest (PE) Abdomen Pelvis w Contrast   Final Result   IMPRESSION:   1.  No acute pulmonary embolism or aortopathy.   2.  No acute lung, airway, or pleural inflammatory process.   3.  No acute inflammatory process in the abdomen or pelvis. Sigmoid diverticulosis but no diverticulitis.        . Abnormal Results: see above.   Treatments provided: heparin gtt  Family Comments: son at bedside  OBS brochure/video discussed/provided to patient:  N/A  ED Medications:   Medications   heparin 25,000 units in 0.45% NaCl 250 mL ANTICOAGULANT infusion (800 Units/hr Intravenous New Bag 1/6/23 5744)   0.9% sodium chloride BOLUS (0 mLs Intravenous Stopped 1/6/23 1555)   diphenhydrAMINE (BENADRYL) injection 25 mg (25 mg Intravenous Given 1/6/23 1555)   methylPREDNISolone sodium succinate (solu-MEDROL) injection 125 mg (125 mg Intravenous Given 1/6/23 1554)   iopamidol (ISOVUE-370) solution 120 mL (63 mLs Intravenous Given 1/6/23 1654)   Saline CT scan flush (85 mLs Intravenous Given 1/6/23 1654)     Drips infusing:  Yes  For the majority of the shift, the patient's behavior Green. Interventions performed were N/A.    Sepsis treatment initiated: No     Patient tested for COVID 19 prior to admission: NO    ED Nurse Name/Phone Number: Valerie Gonzalez RN,   7:10 PM      Christine Valladares RN on 1/7/2023 at 8:22 PM

## 2023-01-07 NOTE — PROGRESS NOTES
"[facility]    Medicine Progress Note - Hospitalist Service    Date of Admission:  1/6/2023    Assessment & Plan   87-year-old female with history of pericarditis and pericardial effusion s/p pericardial window.    Dyspnea and respiratory normalities suspect secondary to acute exacerbation of chronic congestive heart failure.  Patient currently on room air and saturating okay.  Dyspnea has improved.  Troponin level elevated.  History of nonocclusive CAD.  Cardiology is consulted.  Patient is kept n.p.o. for coronary angiogram today.  She is on heparin drip.  We will follow-up cardiology recommendation.  Home aspirin continued.  Chronic kidney disease stage III.  Stable at baseline  Hypertension on lisinopril, amlodipine and hydralazine continued.  Make sure that dyslipidemia on a statin  Chronic back pain  Major depressive disorder with previous SI ideation stable at this moment  Restless leg syndrome, PTA meds continued     Diet: Combination Diet Low Saturated Fat Na <2400mg Diet, No Caffeine Diet    DVT Prophylaxis: Heparin drip  Momin Catheter: Not present  Lines: None     Cardiac Monitoring: ACTIVE order. Indication: Acute decompensated heart failure (48 hours)  Code Status: No CPR- Do NOT Intubate      Clinically Significant Risk Factors Present on Admission             # Anion Gap Metabolic Acidosis: Highest Anion Gap = 22 mmol/L in last 2 days, will monitor and treat as appropriate      # Hypertension: home medication list includes antihypertensive(s)      # Overweight: Estimated body mass index is 25.72 kg/m  as calculated from the following:    Height as of an earlier encounter on 1/6/23: 1.6 m (5' 3\").    Weight as of this encounter: 65.9 kg (145 lb 3.2 oz).           Disposition Plan Home     Expected Discharge Date: 01/08/2023                  Katelin Barreto MD  Hospitalist Service  [facility]  Securely message with [employer] (more info)  Text page via [employer] " Aaroning/y   ______________________________________________________________________    Interval History   Dyspnea with exertion prior to admission.  No chest pain.  No palpitations or dizzy spells.  Troponin is elevated felt to be due to demand versus coronary ischemia.  Cardiology consulted and patient scheduled to have coronary angiogram.  She is on heparin drip.  Home medications reviewed and continued as appropriate.  She is currently on room air.  No wheezes, no crackles.  No significant edema on the lower extremities  Dissipating discharge tomorrow home with cardiology recommendations pending    Physical Exam   Vital Signs: Temp: 97.9  F (36.6  C) Temp src: Oral BP: (!) 144/73 Pulse: 71   Resp: 16 SpO2: 94 % O2 Device: None (Room air)    Weight: 145 lbs 3.2 oz        Medical Decision Making   [facility] level 2  Data     I have personally reviewed the following data over the past 24 hrs:    8.8  \   11.6 (L)   / 312     137 99 31.8 (H) /  211 (H)   4.3 16 (L) 0.98 (H) \       Trop: 21 (H) BNP: N/A       Imaging results reviewed over the past 24 hrs:   Recent Results (from the past 24 hour(s))   CT Chest (PE) Abdomen Pelvis w Contrast    Narrative    EXAM: CT CHEST PE ABDOMEN PELVIS W CONTRAST  LOCATION: [facility]  DATE/TIME: 1/6/2023 5:00 PM    INDICATION: Worsening THAO, positive dimer, LLQ pain  COMPARISON: CT chest without contrast 3/29/2021; CT abdomen pelvis without contrast 1/18/2021  TECHNIQUE: CT chest pulmonary angiogram and routine CT abdomen pelvis with IV contrast. Arterial phase through the chest and venous phase through the abdomen and pelvis. Multiplanar reformats and MIP reconstructions were performed. Dose reduction   techniques were used.   CONTRAST:  63mL Isovue 370    FINDINGS:  ANGIOGRAM CHEST: Pulmonary arteries are normal caliber and negative for pulmonary emboli. Thoracic aorta is negative for dissection.    LUNGS AND PLEURA: Limited bands of bandlike atelectasis  are present along the diaphragmatic pleura and in the lingula/left lower lobe adjacent to the mediastinal pleura. There are no airspace opacities or new interlobular septal thickening. Trachea and   central airways are patent and normal caliber. No pleural effusion.    MEDIASTINUM: Small pericardial effusion is not increased. Mitral annular calcifications. No enlarged mediastinal or hilar lymph nodes. Esophagus is decompressed.    CORONARY ARTERY CALCIFICATION: Mild.    HEPATOBILIARY: Stable benign-appearing lesion with some peripheral nodular calcifications in the posterior left lobe of the liver measures 17 x 25 mm (series 11, image 41). There are no new liver lesions. Smooth liver capsule. Normal gallbladder and bile   ducts.    PANCREAS: Fatty replacement of the pancreas parenchyma    SPLEEN: Normal.    ADRENAL GLANDS: Normal.    KIDNEYS/BLADDER: Normal.    BOWEL: Small gastric diverticulum off the cardia. Nondistended stomach. Normal caliber small bowel. Prior appendectomy. No bowel wall thickening. There are small number of sigmoid diverticula. No inflammatory stranding in the mesentery. No peritoneal   thickening or ascites.    LYMPH NODES: Normal.    VASCULATURE: Normal caliber abdominal aorta and iliac arteries with sparse atheromatous calcification.    PELVIC ORGANS: Age concordant uterine atrophy. No solid mass or free fluid.    MUSCULOSKELETAL: Previous ORIF of the proximal right femur. Hip joints are normally aligned. Thoracolumbar spondylosis with disc space narrowing and marginal osteophytes. Mild anterior wedge deformity of T10. No aggressive or destructive bone lesions.      Impression    IMPRESSION:  1.  No acute pulmonary embolism or aortopathy.  2.  No acute lung, airway, or pleural inflammatory process.  3.  No acute inflammatory process in the abdomen or pelvis. Sigmoid diverticulosis but no diverticulitis.

## 2023-01-07 NOTE — CONSULTS
Consult Date: 01/07/2023    REFERRING PHYSICIAN:  Chano Lopes MD    REASON FOR REFERRAL:  Chest pain and shortness of breath with history of pericardial effusion.    HISTORY OF PRESENT ILLNESS:  I have been asked to evaluate this pleasant 87-year-old female for the above.  In initially talking with her, I did realize that her memory was significantly impaired and therefore, most of the information is obtained from the chart.  She presented to the Emergency Room with abdominal pain and shortness of breath.  She denied any chest pain symptoms.  She is being admitted for dyspnea on exertion and unstable angina.  Her initial troponin levels were very minimally elevated at 26 and subsequent 21.  I did review her EKG from admission, which demonstrates a sinus rhythm with a left axis deviation.  There are minor ST segment changes in aVL and the presence of motion artifact as well.  All other areas of ST segments look normal.  SARS COVID testing, influenza, and RSV are negative.    PAST MEDICAL HISTORY:  History of aortic stenosis.  History of pericardial effusion, which is chronic.  She had a pericardial window about 10 years ago, and she has had a chronic persistent localized pericardial effusion dating back at least until 2018.  She was seen by Dr. Gann in 2021 and Dr. Davis about a couple of weeks later.  This was in 01/2021 for some shortness of breath and chest discomfort.  They did not feel it was related to her chronic pericardial effusion.  She had a cardiac catheterization in 01/2021 showing trivial coronary disease and a trivial gradient across the aortic valve.  History of systemic lupus.    PAST SURGICAL HISTORY:  The cardiac catheterization.    SOCIAL HISTORY:  Lifelong nonsmoker.    FAMILY HISTORY:  No family history of diabetes.      ALLERGIES:  She has several allergies.  Please see H and P.    MEDICATINS:  Extensive medication list.  Please see H and P.    REVIEW OF SYSTEMS:  Cannot obtain as the  patient has very significant short-term memory impairment.  She did remember she came in with abdominal pain, but does not remember much else.  She does recognize that she is having significant short-term memory impairment.    PHYSICAL EXAMINATION:    VITAL SIGNS:  Her blood pressure is ranging anywhere from 121-162 systolic over 56-73 diastolic, heart rate is in the 60s-70s.  Respiratory rate 16.  She is oxygenating at least 92% on room air.  She is afebrile.  GENERAL:  She is an elderly female in no apparent distress.  HEENT:  Head is atraumatic.  Pupils are equal and small.  Dentition is poor.  NECK:  Supple.  I cannot appreciate for jugular venous distention.  SKIN:  Warm, dry, without rash or jaundice.  CARDIOVASCULAR:  Tones are regular with a soft systolic murmur.   RESPIRATORY:  Lung fields are diminished, but otherwise clear.  ABDOMEN:  Soft.  EXTREMITIES:  She has no peripheral edema.  NEUROLOGIC:  She is moving all extremities.  She has significant short-term memory, some cognitive deficits, but no gross focal neurologic abnormalities.    DIAGNOSTIC DATA:  EKG as described above.  Echo is pending at this time.  Troponins as above.  Electrolyte panel was fairly normal.  She does appear to have intravascular depletion with a BUN and creatinine ratio greater than 20.    ASSESSMENT AND PLAN:  This is an 87-year-old female brought in with abdominal pain and shortness of breath.  She had minimally elevated troponin levels that are flat and not consistent with an acute coronary syndrome.  She has an underlying history of chronic pericardial effusion that has been stable over several years now and trivial-to-mild aortic stenosis.  I do not feel she has any cardiac symptoms here.  We can certainly consider repeating a limited echo to reassess the pericardial effusion, as it has been over a year.  She had an angiogram a year ago showing only trivial disease.  The risk of acute coronary syndrome is very low here.  I  would explore other etiologies for her symptoms.  I will follow up with the results of the echocardiogram, and she can consider following up with Dr. Gann or Dr. Davis whom she has seen previously.    Please feel free to contact me with any questions you have in regard to her care.    Jerri Segovia DO        D: 2023   T: 2023   MT: DION    Name:     RYLEE BRICE  MRN:      [id number]        Account:      [id number]   :      1935           Consult Date: 2023     Document: [id number]

## 2023-01-07 NOTE — ED NOTES
1728: Provider page - Pt lactose intolerant. Has diarrhea after taking pudding. Requesting Imodium.   1731: Order placed. Imodium given

## 2023-01-07 NOTE — PROGRESS NOTES
01/07/23 1300   Appointment Info   Signing Clinician's Name / Credentials (OT) Jenni Ricardo, OTR/L   Rehab Comments (OT) CHF   Living Environment   People in Home alone   Current Living Arrangements assisted living   Home Accessibility no concerns   Transportation Anticipated family or friend will provide   Living Environment Comments Pt has walk in shower with shower chair and grab bars. Pt has comfort height toilet with grab bars.   Self-Care   Usual Activity Tolerance good   Current Activity Tolerance moderate   Equipment Currently Used at Home walker, rolling   Fall history within last six months no   Activity/Exercise/Self-Care Comment Pt reports indep with ADLs at baseline. She reports family provides her rides/transportation as she does not drive anymore. She recieves assist with meds and meals at [facility].   General Information   Onset of Illness/Injury or Date of Surgery 01/06/23   Referring Physician Chano Lopes MD   Patient/Family Therapy Goal Statement (OT) return home with assist of family   Additional Occupational Profile Info/Pertinent History of Current Problem Swathi Dukes is a 87 year old  female with a significant past medical history of major depressive disorder and hypertension, who presents with shortness of breath with dyspnea on exertion for the last 1 month.  Patient was initially seen at primary care clinic for multiple symptoms including abdominal pain, shortness of breath and diarrhea.   Existing Precautions/Restrictions cardiac   Cognitive Status Examination   Cognitive Status Comments Pt is orient to situation; no cog impairment noted during today's conversation however in notes mention of cog impairment at baseline   Pain Assessment   Patient Currently in Pain No   Bed Mobility   Bed Mobility supine-sit;sit-supine   Supine-Sit Mobile (Bed Mobility) supervision   Sit-Supine Mobile (Bed Mobility) supervision   Transfers   Transfers toilet transfer   Toilet  Transfer   [place] Level (Toilet Transfer) supervision   Activities of Daily Living   BADL Assessment/Intervention lower body dressing   Lower Body Dressing Assessment/Training   H[place]erson Level (Lower Body Dressing) supervision   Clinical Impression   Criteria for Skilled Therapeutic Interventions Met (OT) Yes, treatment indicated   OT Diagnosis shortness of breath   OT Problem List-Impairments impacting ADL activity tolerance impaired   Assessment of Occupational Performance 5 or more Performance Deficits   Identified Performance Deficits dressing, toileting, showering, functional mobility and IADLs   Planned Therapy Interventions (OT) ADL retraining;IADL retraining;progressive activity/exercise;home program guidelines   Clinical Decision Making Complexity (OT) low complexity   Risk & Benefits of therapy have been explained evaluation/treatment results reviewed;care plan/treatment goals reviewed;risks/benefits reviewed;current/potential barriers reviewed;participants voiced agreement with care plan;participants included;patient;son   Clinical Impression Comments Pt appears to be below baseline and would benefit from ongoing OT   OT Total Evaluation Time   OT Eval, Low Complexity Minutes (47381) 10   OT Goals   Therapy Frequency (OT) Daily   OT Predicted Duration/Target Date for Goal Attainment 01/14/23   OT Goals Hygiene/Grooming;Upper Body Dressing;Lower Body Dressing;Toilet Transfer/Toileting;Cardiac Phase 1   OT: Hygiene/Grooming supervision/stand-by assist   OT: Upper Body Dressing Supervision/stand-by assist   OT: Lower Body Dressing Supervision/stand-by assist   OT: Toilet Transfer/Toileting Supervision/stand-by assist   OT: Understanding of cardiac education to maximize quality of life, condition management, and health outcomes Patient   OT: Perform aerobic activity with stable cardiovascular response 10 minutes   OT: Functional/aerobic ambulation tolerance with stable cardiovascular response in  order to return to home and community environment Supervision/SBA   OT: Navigation of stairs simulating home set up with stable cardiovascular response in order to return to home and community environment Completed  (pt does not have stairs)   Interventions   Interventions Quick Adds Self-Care/Home Management   Self-Care/Home Management   Self-Care/Home Mgmt/ADL, Compensatory, Meal Prep Minutes (84268) 20   Treatment Detail/Skilled Intervention Pt's SBA with bed mobility from supine<>sit. Pt is SBA with BSC transfer. Pt is SBA with doffing and donning socks. Pt is educated on HF stop light tool, low sodium diet, and education on cardiac rehab vs HHOT. Pt is also educated on energy conservation. Pt does not feel she would be able to get rides to cardiac rehab however is open to HHOT. Pt has no concerns with d/c home at this time as she can call staff at [facility] for more assistance as needed.   OT Discharge Planning   OT Plan endurance with ADLs   OT Discharge Recommendation (DC Rec) home with home care occupational therapy   OT Rationale for DC Rec Recommending HHOT as pt is below baseline and would benefit from safety assessment and ADL retraining due to decreased activity tolerance.   OT Brief overview of current status SBA with toilet transfer   Total Session Time   Timed Code Treatment Minutes 20   Total Session Time (sum of timed and untimed services) 30

## 2023-01-07 NOTE — H&P
[facility]    Hospitalist Admission Note    Name: Swathi Dukes    MRN: [id number]  YOB: 1935    Age: 87 year old  Date of admission: 1/6/2023  Primary care provider: Cortney Vanessa  Admitting physician : Chano Lopes M.D. ,M.B.A.       Brief summary of admission assessment/MDM:    Swathi Dukes is a 87 year old  female with a significant past medical history of major depressive disorder and hypertension, who presents with shortness of breath with dyspnea on exertion for the last 1 month.  Patient was initially seen at primary care clinic for multiple symptoms including abdominal pain, shortness of breath and diarrhea.  Her D-dimer and troponin was elevated and patient was referred to emergency department for evaluation.    Here in the emergency department, patient denies chest pain but she has shortness of breath with exertion and at rest.  EKG showed normal sinus rhythm without acute ischemic changes.  Troponin was mildly elevated at 26 trending down from 33 earlier in the clinic.  BNP was 435.  Chest x-ray and CT of the chest unremarkable for acute pathology.    ER provider suspected unstable angina and patient was started on heparin drip and admission was requested.      Assessment and Plan       #1.  Shortness of breath, dyspnea on exertion, subacute over the last 4 weeks now.  -- Etiology not entirely unclear.  Evidence of mild bilateral lower extremity edema, elevated troponin.  Concern for acute CHF although BNP is normal.  Echocardiogram back in January 2021 showed preserved EF with EF of 60-65%.  She has mild to moderate aortic regurgitation, mild aortic stenosis at that time.  She had localized moderate pericardial effusion.  Of note patient has previous history of pericardial effusion of unclear etiology and required pericardial window many years ago per her son at bedside.  Small pericardial effusion noted on CT of the chest with  no pulm embolism or acute abnormality.   -- will start  IV Lasix at 40 mg IV every 12 hours, intake and output monitoring, electrolyte monitoring and replacement as needed, daily weight, 2D echo for further evaluation of LV function  And pericardial effusion.    #2.  Elevated troponin: Minimal elevation of high-sensitivity troponin.  Initially 33 at 10:51 AM in clinic, trended down to 26 at 1554.  -- EKG unremarkable for acute ischemic change.  I doubt she has acute coronary syndrome or unstable angina.  I favor demand ischemia from CHF. coronary angiogram January 2021 revealed trivial coronary artery disease.  -- Continue heparin for now, aspirin, telemetry and echocardiogram.  Cardiology consulted for further assessment and recommendation      #3.  Respiratory symptoms with congestion, nausea, abdominal pain: Suspect recent viral infection  -- We will get influenza, COVID and RSV screen      Chronic  comorbid medical conditions:      Chronic kidney disease stage II    Hypertension on lisinopril, amlodipine, hydralazine-continue for now    Mixed hyperlipidemia on statin    Chronic back pain    Major depressive disorder with previous suicidal ideation    Restless leg syndrome      # Admission Status: Will admit patient to hospitalist service as inpatient as patient likely need over two mid night stay  in the hospital.    # Diet:Diet advanced    # Activity:Advance activity as tolerated    # Education/Counseling :Discussed treatment plan with the patient    # Consults:Inpatient consult with cardiology    # VTE prophylactic measures:prophylaxis against venous thromboembolism with heparin      # Code Status:DNR / DNI    # Disposition:anticipate discharge to home and Anticipate discharge in 3 days        Disclaimer: This note consists of symbols derived from keyboarding, dictation and/or voice recognition software. As a result, there may be errors in the script that have gone undetected. Please consider this when  interpreting information found in this chart.             Chief Complaint:     Shortness of breath  History is obtained from the patient, electronic health record, emergency department physician and patient's family          History of Present Illness:      This patient is a 87 year old  female with a significant past medical history of hypertension, dyslipidemia who presents with the following condition requiring a hospital admission:      Shortness of breath and elevated troponin    Patient is a 87-year-old female who is here with her son Brent who stated that she was at her primary care clinic today for shortness of breath and abdominal pain.  She was found to have elevated troponin and sent to emergency department for evaluation.  Here she was complaining of shortness of breath especially with exertion but also at rest.  She denies any orthopnea or paroxysmal nocturnal dyspnea.  Denies any fever or chills but has been having some nausea and nasal congestion.  She denies any diarrhea or vomiting.  Evaluation in the emergency department revealed mildly elevated troponin and negative PE study.  Patient was started on heparin and admission was requested.  Of note patient has history of previous pericardial effusion for which she required pericardial window many years ago.  She had also coronary angiogram 2 years ago which was unremarkable.         Past Medical History:     Past Medical History:   Diagnosis Date     Aortic stenosis      Glaucoma      HTN (hypertension)      Hyperlipidemia      Pericardial effusion      Systemic lupus erythematosus             Past Surgical History:     Past Surgical History:   Procedure Laterality Date     CV HEART CATHETERIZATION WITH POSSIBLE INTERVENTION N/A 1/5/2021    Procedure: Heart Catheterization with Possible Intervention;  Surgeon: Hayden Bedoya MD;  Location: [facility] CARDIAC CATH LAB     CV LEFT VENTRICULOGRAM N/A 1/5/2021    Procedure: Left Ventriculogram;  Surgeon:  Hayden Bedoya MD;  Location:  HEART CARDIAC CATH LAB             Social History:     Social History     Tobacco Use     Smoking status: Never     Smokeless tobacco: Never   Substance Use Topics     Alcohol use: Not Currently             Family History:     Family History   Problem Relation Age of Onset     Diabetes Father             Allergies:     Allergies   Allergen Reactions     Aspirin Anaphylaxis     Diagnostic X-Ray Materials Anaphylaxis     Diclofenac Anaphylaxis     Ibuprofen Anaphylaxis     Iodine Anaphylaxis     Contrast  Pt states anaphylactic reaction to ct contrast about 20 years ago  Pt premedicated with prednisone and benadryl before iv isouve-370 CT contrast on 1/6/23 and exhibited no signs of reaction     Donepezil      Other reaction(s): Other (see comments)  Cervical dystonia             Medications:        Prior to Admission medications    Medication Sig Last Dose Taking? Auth Provider Long Term End Date   acetaminophen (TYLENOL) 500 MG tablet Take 500-1,000 mg by mouth every 6 hours as needed for mild pain   Cortney Vanessa MD     albuterol (PROAIR HFA/PROVENTIL HFA/VENTOLIN HFA) 108 (90 Base) MCG/ACT inhaler Inhale 2 puffs into the lungs every 4 hours as needed for shortness of breath / dyspnea or wheezing  Patient not taking: Reported on 1/6/2023   Cortney Vanessa MD Yes    amLODIPine (NORVASC) 10 MG tablet Take 1 tablet (10 mg) by mouth daily   Cortney Vanessa MD Yes    brimonidine (ALPHAGAN P) 0.1 % ophthalmic solution Place 1 drop into both eyes 2 times daily   Aaseby-Aguilera, Ramona Ann, PA-C     D3-50 1.25 MG (53089 UT) capsule Take 1 capsule (1,250 mcg) by mouth once a week  Patient not taking: Reported on 1/6/2023   Cortney Vanessa MD     dorzolamide (TRUSOPT) 2 % ophthalmic solution Place 1 drop into both eyes 2 times daily   Cortney Vanessa MD     DULoxetine (CYMBALTA) 20 MG capsule    Reported, Patient No    ferrous gluconate (FERGON) 324 (38  Fe) MG tablet Take 1 tablet (324 mg) by mouth daily (with breakfast)  Patient not taking: Reported on 1/6/2023   Cortney Vanessa MD     folic acid (FOLVITE) 1 MG tablet Take 1 tablet (1 mg) by mouth daily   Cortney Vanessa MD     gabapentin (NEURONTIN) 100 MG capsule Take 1 capsule (100 mg) by mouth 3 times daily   Cortney Vanessa MD Yes    hydrALAZINE (APRESOLINE) 25 MG tablet Take 1 tablet (25 mg) by mouth 3 times daily   Cortney Vanessa MD Yes    latanoprost (XALATAN) 0.005 % ophthalmic solution Place 1 drop into both eyes At Bedtime   Cortney Vanessa MD Yes    lisinopril (ZESTRIL) 20 MG tablet Take 1 tablet (20 mg) by mouth daily   Cortney Vanessa MD Yes    loperamide (IMODIUM) 2 MG capsule Take 1 capsule (2 mg) by mouth 3 times daily as needed for diarrhea   Cortney Vanessa MD     OLANZapine (ZYPREXA) 5 MG tablet Take 1 tablet (5 mg) by mouth At Bedtime   Yolis Steen MD Yes    ondansetron (ZOFRAN) 4 MG tablet Take 1 tablet (4 mg) by mouth every 8 hours as needed for nausea   Cortney Vanessa MD     simvastatin (ZOCOR) 10 MG tablet Take 1 tablet (10 mg) by mouth At Bedtime   Cortney Vanessa MD Yes    spacer (OPTICHAMBER KATIUSKA) holding chamber Use with inhaler as needed  Patient not taking: Reported on 1/6/2023   Cortney Vanessa MD            Review of Systems:     A Comprehensive greater than 10 system review of systems was carried out.  Pertinent positives and negatives are noted above in HPI.  Otherwise negative for contributory information.           Physical Exam:     Vital signs were reviewed    Temp:  [97.3  F (36.3  C)-98.1  F (36.7  C)] 97.3  F (36.3  C)  Pulse:  [62-69] 64  Resp:  [14-22] 14  BP: (116-162)/(62-73) 154/63  SpO2:  [96 %-98 %] 98 %        GEN: awake, alert, cooperative, no apparent distress, oriented x 3    NECK:Supple ,no mass or thyromegaly     HEENT:  Normocephalic/atraumatic, no scleral icterus, no nasal discharge, mouth  moist.    CV:  Regular rate and rhythm, no murmur or JVD.  S1 + S2 noted, no S3 or S4.    LUNGS:  Clear to auscultation bilaterally without rales/rhonchi/wheezing/retractions.  Symmetric chest rise on inhalation noted.    ABD:  Active bowel sounds, soft, non-tender/non-distended.  No rebound/guarding/rigidity.    EXT: 1-2+ pedal pretibial edema bilaterally  LGS: No cervical or axillary lymphadenopathy     SKIN:  Dry to touch, warm ,no exanthems noted in the visualized areas.    Neurologic:Grossly intact,non focal . No acute focal neurologic deficit     Psychaitric exam: Mood and affect normal                  Data:       All laboratory and imaging data in the past 24 hours reviewed     Results for orders placed or performed during the hospital encounter of 01/06/23   CT Chest (PE) Abdomen Pelvis w Contrast     Status: None    Narrative    EXAM: CT CHEST PE ABDOMEN PELVIS W CONTRAST  LOCATION: [facility]  DATE/TIME: 1/6/2023 5:00 PM    INDICATION: Worsening THAO, positive dimer, LLQ pain  COMPARISON: CT chest without contrast 3/29/2021; CT abdomen pelvis without contrast 1/18/2021  TECHNIQUE: CT chest pulmonary angiogram and routine CT abdomen pelvis with IV contrast. Arterial phase through the chest and venous phase through the abdomen and pelvis. Multiplanar reformats and MIP reconstructions were performed. Dose reduction   techniques were used.   CONTRAST:  63mL Isovue 370    FINDINGS:  ANGIOGRAM CHEST: Pulmonary arteries are normal caliber and negative for pulmonary emboli. Thoracic aorta is negative for dissection.    LUNGS AND PLEURA: Limited bands of bandlike atelectasis are present along the diaphragmatic pleura and in the lingula/left lower lobe adjacent to the mediastinal pleura. There are no airspace opacities or new interlobular septal thickening. Trachea and   central airways are patent and normal caliber. No pleural effusion.    MEDIASTINUM: Small pericardial effusion is not  increased. Mitral annular calcifications. No enlarged mediastinal or hilar lymph nodes. Esophagus is decompressed.    CORONARY ARTERY CALCIFICATION: Mild.    HEPATOBILIARY: Stable benign-appearing lesion with some peripheral nodular calcifications in the posterior left lobe of the liver measures 17 x 25 mm (series 11, image 41). There are no new liver lesions. Smooth liver capsule. Normal gallbladder and bile   ducts.    PANCREAS: Fatty replacement of the pancreas parenchyma    SPLEEN: Normal.    ADRENAL GLANDS: Normal.    KIDNEYS/BLADDER: Normal.    BOWEL: Small gastric diverticulum off the cardia. Nondistended stomach. Normal caliber small bowel. Prior appendectomy. No bowel wall thickening. There are small number of sigmoid diverticula. No inflammatory stranding in the mesentery. No peritoneal   thickening or ascites.    LYMPH NODES: Normal.    VASCULATURE: Normal caliber abdominal aorta and iliac arteries with sparse atheromatous calcification.    PELVIC ORGANS: Age concordant uterine atrophy. No solid mass or free fluid.    MUSCULOSKELETAL: Previous ORIF of the proximal right femur. Hip joints are normally aligned. Thoracolumbar spondylosis with disc space narrowing and marginal osteophytes. Mild anterior wedge deformity of T10. No aggressive or destructive bone lesions.      Impression    IMPRESSION:  1.  No acute pulmonary embolism or aortopathy.  2.  No acute lung, airway, or pleural inflammatory process.  3.  No acute inflammatory process in the abdomen or pelvis. Sigmoid diverticulosis but no diverticulitis.   Troponin T, High Sensitivity     Status: Abnormal   Result Value Ref Range    Troponin T, High Sensitivity 26 (H) <=14 ng/L   EKG 12-lead, tracing only     Status: None (Preliminary result)   Result Value Ref Range    Systolic Blood Pressure  mmHg    Diastolic Blood Pressure  mmHg    Ventricular Rate 65 BPM    Atrial Rate 65 BPM    CT Interval 222 ms    QRS Duration 84 ms     ms    QTc 436 ms     P Axis 66 degrees    R AXIS -16 degrees    T Axis 62 degrees    Interpretation ECG       Sinus rhythm with 1st degree A-V block  Left ventricular hypertrophy with repolarization abnormality  Abnormal ECG  When compared with ECG of 19-JUN-2022 16:43,  No significant change was found     Results for orders placed or performed in visit on 01/06/23   Comprehensive metabolic panel (BMP + Alb, Alk Phos, ALT, AST, Total. Bili, TP)     Status: Abnormal   Result Value Ref Range    Sodium 139 136 - 145 mmol/L    Potassium 4.7 3.4 - 5.3 mmol/L    Chloride 103 98 - 107 mmol/L    Carbon Dioxide (CO2) 25 22 - 29 mmol/L    Anion Gap 11 7 - 15 mmol/L    Urea Nitrogen 29.0 (H) 8.0 - 23.0 mg/dL    Creatinine 0.97 (H) 0.51 - 0.95 mg/dL    Calcium 9.8 8.8 - 10.2 mg/dL    Glucose 108 (H) 70 - 99 mg/dL    Alkaline Phosphatase 100 35 - 104 U/L    AST 20 10 - 35 U/L    ALT 9 (L) 10 - 35 U/L    Protein Total 6.9 6.4 - 8.3 g/dL    Albumin 4.2 3.5 - 5.2 g/dL    Bilirubin Total 0.2 <=1.2 mg/dL    GFR Estimate 57 (L) >60 mL/min/1.73m2   Lipase     Status: Normal   Result Value Ref Range    Lipase 16 13 - 60 U/L   CRP, inflammation     Status: Normal   Result Value Ref Range    CRP Inflammation <3.00 <5.00 mg/L   D dimer, quantitative     Status: Abnormal   Result Value Ref Range    D-Dimer Quantitative 0.90 (H) 0.00 - 0.50 ug/mL FEU    Narrative    This D-dimer assay is intended for use in conjunction with a clinical pretest probability assessment model to exclude pulmonary embolism (PE) and deep venous thrombosis (DVT) in outpatients suspected of PE or DVT. The cut-off value is 0.50 ug/mL FEU.   BNP-N terminal pro     Status: Normal   Result Value Ref Range    N Terminal Pro BNP Outpatient 435 0 - 1,800 pg/mL   Troponin T, High Sensitivity     Status: Abnormal   Result Value Ref Range    Troponin T, High Sensitivity 33 (H) <=14 ng/L   CBC with platelets and differential     Status: Abnormal   Result Value Ref Range    WBC Count 5.8 4.0 -  11.0 10e3/uL    RBC Count 3.63 (L) 3.80 - 5.20 10e6/uL    Hemoglobin 10.9 (L) 11.7 - 15.7 g/dL    Hematocrit 33.7 (L) 35.0 - 47.0 %    MCV 93 78 - 100 fL    MCH 30.0 26.5 - 33.0 pg    MCHC 32.3 31.5 - 36.5 g/dL    RDW 13.2 10.0 - 15.0 %    Platelet Count 241 150 - 450 10e3/uL    % Neutrophils 58 %    % Lymphocytes 24 %    % Monocytes 13 %    % Eosinophils 4 %    % Basophils 1 %    % Immature Granulocytes 0 %    Absolute Neutrophils 3.4 1.6 - 8.3 10e3/uL    Absolute Lymphocytes 1.4 0.8 - 5.3 10e3/uL    Absolute Monocytes 0.8 0.0 - 1.3 10e3/uL    Absolute Eosinophils 0.2 0.0 - 0.7 10e3/uL    Absolute Basophils 0.1 0.0 - 0.2 10e3/uL    Absolute Immature Granulocytes 0.0 <=0.4 10e3/uL   CBC with platelets and differential     Status: Abnormal    Narrative    The following orders were created for panel order CBC with platelets and differential.  Procedure                               Abnormality         Status                     ---------                               -----------         ------                     CBC with platelets and d...[[id number]]  Abnormal            Final result                 Please view results for these tests on the individual orders.        Recent Results (from the past 48 hour(s))   CT Chest (PE) Abdomen Pelvis w Contrast    Narrative    EXAM: CT CHEST PE ABDOMEN PELVIS W CONTRAST  LOCATION: [facility]  DATE/TIME: 1/6/2023 5:00 PM    INDICATION: Worsening THAO, positive dimer, LLQ pain  COMPARISON: CT chest without contrast 3/29/2021; CT abdomen pelvis without contrast 1/18/2021  TECHNIQUE: CT chest pulmonary angiogram and routine CT abdomen pelvis with IV contrast. Arterial phase through the chest and venous phase through the abdomen and pelvis. Multiplanar reformats and MIP reconstructions were performed. Dose reduction   techniques were used.   CONTRAST:  63mL Isovue 370    FINDINGS:  ANGIOGRAM CHEST: Pulmonary arteries are normal caliber and negative for pulmonary  emboli. Thoracic aorta is negative for dissection.    LUNGS AND PLEURA: Limited bands of bandlike atelectasis are present along the diaphragmatic pleura and in the lingula/left lower lobe adjacent to the mediastinal pleura. There are no airspace opacities or new interlobular septal thickening. Trachea and   central airways are patent and normal caliber. No pleural effusion.    MEDIASTINUM: Small pericardial effusion is not increased. Mitral annular calcifications. No enlarged mediastinal or hilar lymph nodes. Esophagus is decompressed.    CORONARY ARTERY CALCIFICATION: Mild.    HEPATOBILIARY: Stable benign-appearing lesion with some peripheral nodular calcifications in the posterior left lobe of the liver measures 17 x 25 mm (series 11, image 41). There are no new liver lesions. Smooth liver capsule. Normal gallbladder and bile   ducts.    PANCREAS: Fatty replacement of the pancreas parenchyma    SPLEEN: Normal.    ADRENAL GLANDS: Normal.    KIDNEYS/BLADDER: Normal.    BOWEL: Small gastric diverticulum off the cardia. Nondistended stomach. Normal caliber small bowel. Prior appendectomy. No bowel wall thickening. There are small number of sigmoid diverticula. No inflammatory stranding in the mesentery. No peritoneal   thickening or ascites.    LYMPH NODES: Normal.    VASCULATURE: Normal caliber abdominal aorta and iliac arteries with sparse atheromatous calcification.    PELVIC ORGANS: Age concordant uterine atrophy. No solid mass or free fluid.    MUSCULOSKELETAL: Previous ORIF of the proximal right femur. Hip joints are normally aligned. Thoracolumbar spondylosis with disc space narrowing and marginal osteophytes. Mild anterior wedge deformity of T10. No aggressive or destructive bone lesions.      Impression    IMPRESSION:  1.  No acute pulmonary embolism or aortopathy.  2.  No acute lung, airway, or pleural inflammatory process.  3.  No acute inflammatory process in the abdomen or pelvis. Sigmoid diverticulosis  but no diverticulitis.       EKG results: Normal sinus rhythm, no ST segment elevation or ischemic depression         All imaging studies reviewed by me.         Patient`s old medical records reviewed and case discussed with the ED physician.    ED course-Reviewed  and care plan discussed with Dr. Fernando

## 2023-01-08 ENCOUNTER — APPOINTMENT (OUTPATIENT)
Dept: OCCUPATIONAL THERAPY | Facility: CLINIC | Age: 88
DRG: 311 | End: 2023-01-08
Payer: COMMERCIAL

## 2023-01-08 VITALS
BODY MASS INDEX: 25.72 KG/M2 | TEMPERATURE: 97.9 F | HEART RATE: 71 BPM | OXYGEN SATURATION: 94 % | DIASTOLIC BLOOD PRESSURE: 46 MMHG | SYSTOLIC BLOOD PRESSURE: 122 MMHG | WEIGHT: 145.2 LBS | RESPIRATION RATE: 18 BRPM

## 2023-01-08 LAB
MAGNESIUM SERPL-MCNC: 2.1 MG/DL (ref 1.7–2.3)
POTASSIUM SERPL-SCNC: 3.7 MMOL/L (ref 3.4–5.3)
UFH PPP CHRO-ACNC: 0.23 IU/ML
UFH PPP CHRO-ACNC: 0.52 IU/ML

## 2023-01-08 PROCEDURE — 36415 COLL VENOUS BLD VENIPUNCTURE: CPT | Performed by: INTERNAL MEDICINE

## 2023-01-08 PROCEDURE — 250N000011 HC RX IP 250 OP 636: Performed by: INTERNAL MEDICINE

## 2023-01-08 PROCEDURE — 99238 HOSP IP/OBS DSCHRG MGMT 30/<: CPT | Performed by: INTERNAL MEDICINE

## 2023-01-08 PROCEDURE — 97535 SELF CARE MNGMENT TRAINING: CPT | Mod: GO

## 2023-01-08 PROCEDURE — 97530 THERAPEUTIC ACTIVITIES: CPT | Mod: GO

## 2023-01-08 PROCEDURE — 250N000011 HC RX IP 250 OP 636: Performed by: EMERGENCY MEDICINE

## 2023-01-08 PROCEDURE — 84132 ASSAY OF SERUM POTASSIUM: CPT | Performed by: INTERNAL MEDICINE

## 2023-01-08 PROCEDURE — 83735 ASSAY OF MAGNESIUM: CPT | Performed by: INTERNAL MEDICINE

## 2023-01-08 PROCEDURE — 85520 HEPARIN ASSAY: CPT | Performed by: INTERNAL MEDICINE

## 2023-01-08 PROCEDURE — 250N000013 HC RX MED GY IP 250 OP 250 PS 637: Performed by: INTERNAL MEDICINE

## 2023-01-08 RX ADMIN — DULOXETINE HYDROCHLORIDE 60 MG: 60 CAPSULE, DELAYED RELEASE ORAL at 08:07

## 2023-01-08 RX ADMIN — AMLODIPINE BESYLATE 10 MG: 10 TABLET ORAL at 08:06

## 2023-01-08 RX ADMIN — LISINOPRIL 20 MG: 20 TABLET ORAL at 08:07

## 2023-01-08 RX ADMIN — FOLIC ACID 1 MG: 1 TABLET ORAL at 08:07

## 2023-01-08 RX ADMIN — DORZOLAMIDE HYDROCHLORIDE 1 DROP: 20 SOLUTION/ DROPS OPHTHALMIC at 09:24

## 2023-01-08 RX ADMIN — HYDRALAZINE HYDROCHLORIDE 25 MG: 25 TABLET, FILM COATED ORAL at 08:06

## 2023-01-08 RX ADMIN — GABAPENTIN 100 MG: 100 CAPSULE ORAL at 08:07

## 2023-01-08 RX ADMIN — HEPARIN SODIUM 550 UNITS/HR: 10000 INJECTION, SOLUTION INTRAVENOUS at 03:45

## 2023-01-08 RX ADMIN — BRIMONIDINE TARTRATE 1 DROP: 2 SOLUTION/ DROPS OPHTHALMIC at 09:24

## 2023-01-08 RX ADMIN — GABAPENTIN 100 MG: 100 CAPSULE ORAL at 14:22

## 2023-01-08 RX ADMIN — FUROSEMIDE 40 MG: 10 INJECTION, SOLUTION INTRAMUSCULAR; INTRAVENOUS at 08:07

## 2023-01-08 ASSESSMENT — ACTIVITIES OF DAILY LIVING (ADL)
ADLS_ACUITY_SCORE: 43

## 2023-01-08 NOTE — ED NOTES
Pt A/O x4. Son at the bedside. Up to commode with SBA. Given Imodium for diarrhea r/t pudding intake per patient.   Son requesting C.diff testing if diarrhea continues. Heparin infusing 7.5 ml/hr. Transferred to [facility]

## 2023-01-08 NOTE — CONSULTS
NUTRITION ASSESSMENT      REASON FOR NUTRITION CONSULT:  Provider Order  -  2 gram Na diet education.      ASSESSMENT:  Unable to complete nutrition education as patient was in ED yesterday and now RD is off-site Sundays.      FOLLOW UP:   Will follow up when on-site as able.      Rima Lord RDN, LD  Clinical Dietitian  3rd floor/ICU: [phone]  All other floors: [phone]  Weekend/holiday: [phone]  Office: [phone]

## 2023-01-08 NOTE — CONSULTS
CM consult for CHF teaching. RN CC no longer does CHF teachings. Pt is low URR (18%) and discharging home today. Per chart review, there are no new medications and no CM needs at this time.     ERNESTINA Bains, [facility]  Casual    [facility]  [phone]

## 2023-01-08 NOTE — PROGRESS NOTES
Patient Transfer Information    Patient tolerated transfer: Yes    Patient connected to monitoring equipment on arrival (if yes, what equipment): Yes: telemetry    Safety equipment at bedside (if applicable): No -N/A     Patient connected to wall oxygen on arrival (if applicable): No - N/A    Belongings: Transferred with patient    Report received from Macrina Claros RN.  Macrina (transporter) physically handed patient off to receiving RN: Yes    I have reviewed the Medications, Allergies, Past Medical and Surgical History, and Social History in the Epic system. I have reviewed pending test results and orders. No further questions at this time.      *See flowsheets for vital signs and focused assessment of admission/transfer*     Christine Valladares RN on 1/7/2023 at 9:57 PM

## 2023-01-08 NOTE — PROGRESS NOTES
Most Recent VS: /46 (BP Location: Right arm)   Pulse 76   Temp 98.3  F (36.8  C) (Oral)   Resp 18   Wt 65.9 kg (145 lb 3.2 oz)   SpO2 97%   BMI 25.72 kg/m      Shift Updates: Pt admitted from ED around 2100 with heparin gtt, placed on telemetry. Per pt, no SOB or THAO at time of transfer. Slept on and off during night; received lasix in evening, had urinary frequency after.     Orientation: A&O x4, forgetful   Neuro: WDL, has glasses   Respiratory: LS dim in bases, crackles heard in RLL. No supplemental O2 needed.   Cardiac: Tele - 1st degree AVB, occasional PVCs, long QTc.   GI/: Cardiac diet, strict I&Os. Continent.   Skin: thin, fragile.   IV: RFA - heparin gtt @ 550 unit(s)/hr  Lab/Protocols: heparin Xa, recheck @ 0815. K/Mg, rechecks with AM labs.

## 2023-01-08 NOTE — PHARMACY-ADMISSION MEDICATION HISTORY
Admission medication history interview status for this patient is complete. See [facility] admission navigator for allergy information, prior to admission medications and immunization status.     Medication history interview done, indicate source(s): Patient not interviewable  Medication history resources (including written lists, pill bottles, clinic record):epic list and MARs from [facility]  Pharmacy:     Changes made to [facility] medication list:  Added: None  Changed: tylenol, gabapentin  Reported as Not Taking: None  Removed: None    Actions taken by pharmacist (provider contacted, etc):None     Additional medication history information:None    Medication reconciliation/reorder completed by provider prior to medication history?  Y   (Y/N)     Prior to Admission medications    Medication Sig Last Dose Taking? Auth Provider Long Term End Date   acetaminophen (TYLENOL) 500 MG tablet Take 1,000 mg by mouth every 8 hours as needed for mild pain  Yes Cortney Vanessa MD     albuterol (PROAIR HFA/PROVENTIL HFA/VENTOLIN HFA) 108 (90 Base) MCG/ACT inhaler Inhale 2 puffs into the lungs every 4 hours as needed for shortness of breath / dyspnea or wheezing  Yes Cortney Vanessa MD Yes    amLODIPine (NORVASC) 10 MG tablet Take 1 tablet (10 mg) by mouth daily  Yes Cortney Vanessa MD Yes    brimonidine (ALPHAGAN P) 0.1 % ophthalmic solution Place 1 drop into both eyes 2 times daily  Yes Aaseby-Aguilera, Ramona Ann, PA-C     D3-50 1.25 MG (15202 UT) capsule Take 1 capsule (1,250 mcg) by mouth once a week  Patient taking differently: Take 1,250 mcg by mouth once a week On Mondays  Yes Cortney Vanessa MD     dorzolamide (TRUSOPT) 2 % ophthalmic solution Place 1 drop into both eyes 2 times daily  Yes Cortney Vanessa MD     DULoxetine (CYMBALTA) 60 MG capsule Take 60 mg by mouth daily  Yes Reported, Patient No    ferrous gluconate (FERGON) 324 (38 Fe) MG tablet Take 1 tablet (324 mg) by mouth daily (with breakfast)  Yes  Cortney Vanessa MD     folic acid (FOLVITE) 1 MG tablet Take 1 tablet (1 mg) by mouth daily  Yes Cortney Vanessa MD     gabapentin (NEURONTIN) 100 MG capsule Take 200 mg by mouth 3 times daily  Yes Cortney Vanessa MD Yes    hydrALAZINE (APRESOLINE) 25 MG tablet Take 1 tablet (25 mg) by mouth 3 times daily  Yes Cortney Vanessa MD Yes    latanoprost (XALATAN) 0.005 % ophthalmic solution Place 1 drop into both eyes At Bedtime  Yes Cortney Vanessa MD Yes    lisinopril (ZESTRIL) 20 MG tablet Take 1 tablet (20 mg) by mouth daily  Yes Cortney Vanessa MD Yes    loperamide (IMODIUM) 2 MG capsule Take 1 capsule (2 mg) by mouth 3 times daily as needed for diarrhea  Yes Cortney Vanessa MD     OLANZapine (ZYPREXA) 5 MG tablet Take 1 tablet (5 mg) by mouth At Bedtime  Yes Yolis Steen MD Yes    ondansetron (ZOFRAN) 4 MG tablet Take 1 tablet (4 mg) by mouth every 8 hours as needed for nausea  Yes Cortney Vanessa MD     simvastatin (ZOCOR) 10 MG tablet Take 1 tablet (10 mg) by mouth At Bedtime  Yes Cortney Vanessa MD Yes    spacer (OPTICHAMBER KATIUSKA) holding chamber Use with inhaler as needed  Patient not taking: Reported on 1/6/2023   Cortney Vanessa MD

## 2023-01-08 NOTE — DISCHARGE SUMMARY
[facility]  Hospitalist Discharge Summary      Date of Admission:  1/6/2023  Date of Discharge:  1/8/2023  Discharging Provider: Katelin Barreto MD  Discharge Service: Hospitalist Service    Discharge Diagnoses     Troponin level elevated  Dyspnea on exertion  Abdominal pain  Aortic stenosis  Moderate aortic regurgitation  Coronary artery disease, trivial by [facility] a year ago.  Pericardial effusion  Diastolic dysfunction  Hear failure rule out    Follow-ups Needed After Discharge   Follow-up Appointments     Follow-up and recommended labs and tests       Follow up with primary care provider, Cortney Vanessa, within 7 days   for hospital follow- up.  No follow up labs or test are needed.             Unresulted Labs Ordered in the Past 30 Days of this Admission       No orders found from 12/7/2022 to 1/7/2023.        These results will be followed up by  PCP and cardiology     Discharge Disposition   Discharged to home  Condition at discharge: Good      Hospital Course     87-year-old female with history of pericarditis and pericardial effusion s/p pericardial window.     Dyspnea and respiratory normalities , not due to CHF acute exacerbation.   Patient currently on room air and saturating okay.  Dyspnea has improved.  Troponin level elevated.  History of nonocclusive CAD.  Cardiology is consulted.   She was heparin drip. She does not have chest pain or dyspnea. Cardiology consulted and echo completed, LVEF is preserved 17561% with grad 1 DD.   Home aspirin continued. Troponin elevation due to demand and not due to ACS  Hx of aortic stenosis and pericardial effusion. No PE by echo, no significant valvular changes on echo. Cardiology recommending routine follow up with her established Cardiologist.   Chronic kidney disease stage III.  Stable at baseline  Hypertension on lisinopril, amlodipine and hydralazine continued.  Make sure that dyslipidemia on a statin  Chronic back pain  Major depressive  disorder with previous SI ideation stable at this moment  Restless leg syndrome, PTA meds continued    Consultations This Hospital Stay   PHARMACY IP CONSULT  PHARMACY IP CONSULT  CORE CLINIC EVALUATION IP CONSULT  OCCUPATIONAL THERAPY ADULT IP CONSULT  NUTRITION SERVICES ADULT IP CONSULT  CARE MANAGEMENT / SOCIAL WORK IP CONSULT  CARDIOLOGY IP CONSULT    Code Status   No CPR- Do NOT Intubate    Time Spent on this Encounter   I, Katelin Barreto MD, personally saw the patient today and spent less than or equal to 30 minutes discharging this patient.       Katelin Barreto MD  [place] [address] [facility]  [address] [place] [address]  Phone: [phone]  Fax: [phone]  ______________________________________________________________________    Physical Exam   Vital Signs: Temp: 97.5  F (36.4  C) Temp src: Oral BP: (!) 144/47 Pulse: 73   Resp: 18 SpO2: 94 % O2 Device: None (Room air)    Weight: 145 lbs 3.2 oz  General Appearance: Awake and alert and oriented  Respiratory: CTA  Cardiovascular: No JVD, RRR, systolic murmur, no gallop  GI: soft NT, and ND  Skin: no rashes  Other: Normal affect, no anxiety or agitation         Primary Care Physician   Cortney Vanessa    Discharge Orders      Reason for your hospital stay    Abdominal pain, troponin level elevated     Follow-up and recommended labs and tests     Follow up with primary care provider, Cortney Vanessa, within 7 days for hospital follow- up.  No follow up labs or test are needed.     Activity    Your activity upon discharge: activity as tolerated     Diet    Follow this diet upon discharge: Orders Placed This Encounter      Combination Diet Low Saturated Fat Na <2400mg Diet, No Caffeine Diet       Significant Results and Procedures   Most Recent 3 CBC's:  Recent Labs   Lab Test 01/07/23  0852 01/06/23  1051 07/22/22  1923 06/19/22  1702   WBC 8.8 5.8 7.9 5.0   HGB 11.6* 10.9*  --  11.0*   MCV 96 93  --  95    241   --  223     Most Recent 3 BMP's:  Recent Labs   Lab Test 01/08/23  0703 01/07/23  0852 01/06/23  1051 07/22/22  1923   NA  --  137 139 134   POTASSIUM 3.7 4.3 4.7 4.7   CHLORIDE  --  99 103 101   CO2  --  16* 25 25   BUN  --  31.8* 29.0* 27   CR  --  0.98* 0.97* 1.00   ANIONGAP  --  22* 11 8   FILIPE  --  10.1 9.8 9.7   GLC  --  211* 108* 136*     Most Recent 2 LFT's:  Recent Labs   Lab Test 01/06/23  1051 05/12/22  1631   AST 20 11   ALT 9* 18   ALKPHOS 100 81   BILITOTAL 0.2 0.2     Most Recent 3 Troponin's:  Recent Labs   Lab Test 10/26/21  0431 10/26/21  0310 03/29/21  1843 03/29/21  1435 01/16/21  1244 01/02/21  2214 08/27/19  2310   TROPI  --   --  0.039 0.033 0.019   < >  --    TROPONIN 0.021 0.016  --   --   --   --  0.01    < > = values in this interval not displayed.     Most Recent TSH and T4:  Recent Labs   Lab Test 04/26/21  1154   TSH 0.92   T4 1.21     Most Recent Hemoglobin A1c:  Recent Labs   Lab Test 02/05/20  1145   A1C 5.5   ,   Results for orders placed or performed during the hospital encounter of 01/06/23   CT Chest (PE) Abdomen Pelvis w Contrast    Narrative    EXAM: CT CHEST PE ABDOMEN PELVIS W CONTRAST  LOCATION: [facility]  DATE/TIME: 1/6/2023 5:00 PM    INDICATION: Worsening THAO, positive dimer, LLQ pain  COMPARISON: CT chest without contrast 3/29/2021; CT abdomen pelvis without contrast 1/18/2021  TECHNIQUE: CT chest pulmonary angiogram and routine CT abdomen pelvis with IV contrast. Arterial phase through the chest and venous phase through the abdomen and pelvis. Multiplanar reformats and MIP reconstructions were performed. Dose reduction   techniques were used.   CONTRAST:  63mL Isovue 370    FINDINGS:  ANGIOGRAM CHEST: Pulmonary arteries are normal caliber and negative for pulmonary emboli. Thoracic aorta is negative for dissection.    LUNGS AND PLEURA: Limited bands of bandlike atelectasis are present along the diaphragmatic pleura and in the lingula/left lower  lobe adjacent to the mediastinal pleura. There are no airspace opacities or new interlobular septal thickening. Trachea and   central airways are patent and normal caliber. No pleural effusion.    MEDIASTINUM: Small pericardial effusion is not increased. Mitral annular calcifications. No enlarged mediastinal or hilar lymph nodes. Esophagus is decompressed.    CORONARY ARTERY CALCIFICATION: Mild.    HEPATOBILIARY: Stable benign-appearing lesion with some peripheral nodular calcifications in the posterior left lobe of the liver measures 17 x 25 mm (series 11, image 41). There are no new liver lesions. Smooth liver capsule. Normal gallbladder and bile   ducts.    PANCREAS: Fatty replacement of the pancreas parenchyma    SPLEEN: Normal.    ADRENAL GLANDS: Normal.    KIDNEYS/BLADDER: Normal.    BOWEL: Small gastric diverticulum off the cardia. Nondistended stomach. Normal caliber small bowel. Prior appendectomy. No bowel wall thickening. There are small number of sigmoid diverticula. No inflammatory stranding in the mesentery. No peritoneal   thickening or ascites.    LYMPH NODES: Normal.    VASCULATURE: Normal caliber abdominal aorta and iliac arteries with sparse atheromatous calcification.    PELVIC ORGANS: Age concordant uterine atrophy. No solid mass or free fluid.    MUSCULOSKELETAL: Previous ORIF of the proximal right femur. Hip joints are normally aligned. Thoracolumbar spondylosis with disc space narrowing and marginal osteophytes. Mild anterior wedge deformity of T10. No aggressive or destructive bone lesions.      Impression    IMPRESSION:  1.  No acute pulmonary embolism or aortopathy.  2.  No acute lung, airway, or pleural inflammatory process.  3.  No acute inflammatory process in the abdomen or pelvis. Sigmoid diverticulosis but no diverticulitis.   Echocardiogram Complete     Value    LVEF  60-65%    [facility]    [id number]  [id number]  [id number]  [id number]^[email or web address]^E     [facility]  [facility]  Echocardiography Laboratory  [address] [place] [address]     Name: RYLEE BRICE  MRN: [id number]  : 1935  Study Date: 2023 11:05 AM  Age: 87 yrs  Gender: Female  Patient Location: [facility]  Reason For Study: Heart Failure  Ordering Physician: JENNIE MACHADO  Referring Physician: Cortney Vanessa  Performed By: Manjit Florence RDCS     BSA: 1.7 m2  Height: 63 in  Weight: 145 lb  HR: 79  BP: 144/66 mmHg  ______________________________________________________________________________  Procedure  Complete Portable Echo Adult.  ______________________________________________________________________________  Interpretation Summary     Left ventricular systolic function is normal.  The visual ejection fraction is 60-65%.  No regional wall motion abnormalities noted.  Mild valvular aortic stenosis.  There is moderate (2+) aortic regurgitation.  Small posterior pericardial effusion  In comparison to previous study, 2021, pericardial effusion is smaller  ______________________________________________________________________________  Left Ventricle  The left ventricle is normal in size. Left ventricular systolic function is  normal. The visual ejection fraction is 60-65%. Grade I or early diastolic  dysfunction. No regional wall motion abnormalities noted.     Right Ventricle  The right ventricle is normal in structure, function and size.     Atria  Normal left atrial size. Right atrial size is normal.     Mitral Valve  The mitral valve is normal in structure and function. There is no mitral valve  stenosis.     Tricuspid Valve  The tricuspid valve is not well visualized, but is grossly normal.     Aortic Valve  The aortic valve is not well visualized. There is moderate (2+) aortic  regurgitation. The peak AoV pressure gradient is 24.0 mmHg. The mean AoV  pressure gradient is 14.0 mmHg. The calculated aortic valve are is 1.7 cm^2.  Mild valvular aortic stenosis.     Pulmonic Valve  The  pulmonic valve is not well visualized. Normal pulmonic valve velocity.     Vessels  Normal size aorta.     Pericardium  The pericardium is not well visualized. Small posterior pericardial effusion.     ______________________________________________________________________________  MMode/2D Measurements & Calculations  IVSd: 1.1 cm  LVIDd: 4.2 cm  LVIDs: 2.4 cm  LVPWd: 1.1 cm  FS: 42.1 %  LV mass(C)d: 151.1 grams  LV mass(C)dI: 89.6 grams/m2     Ao root diam: 2.5 cm  LVOT diam: 1.8 cm  LVOT area: 2.5 cm2  LA Volume (BP): 38.3 ml  LA Volume Index (BP): 22.7 ml/m2  RWT: 0.53     Doppler Measurements & Calculations  MV E max kike: 82.3 cm/sec  MV A max kike: 128.0 cm/sec  MV E/A: 0.64  MV max P.6 mmHg  MV mean P.0 mmHg  MV V2 VTI: 31.9 cm  MVA(VTI): 2.6 cm2  MV dec time: 0.27 sec     Ao V2 max: 246.0 cm/sec  Ao max P.0 mmHg  Ao V2 mean: 177.0 cm/sec  Ao mean P.0 mmHg  Ao V2 VTI: 49.4 cm  TRISTIN(I,D): 1.7 cm2  TRISTIN(V,D): 1.7 cm2  AI P1/2t: 430.7 msec  LV V1 max P.2 mmHg  LV V1 max: 167.0 cm/sec  LV V1 VTI: 32.2 cm  SV(LVOT): 81.9 ml  SI(LVOT): 48.6 ml/m2  PA acc time: 0.07 sec  AV Kike Ratio (DI): 0.68  TRISTIN Index (cm2/m2): 0.98  E/E' av.6  Lateral E/e': 6.9  Medial E/e': 10.4     ______________________________________________________________________________  Report approved by: Erin Medrano 2023 03:19 PM               Discharge Medications   Current Discharge Medication List        CONTINUE these medications which have NOT CHANGED    Details   acetaminophen (TYLENOL) 500 MG tablet Take 500-1,000 mg by mouth every 6 hours as needed for mild pain  Qty:        albuterol (PROAIR HFA/PROVENTIL HFA/VENTOLIN HFA) 108 (90 Base) MCG/ACT inhaler Inhale 2 puffs into the lungs every 4 hours as needed for shortness of breath / dyspnea or wheezing  Qty: 18 g, Refills: 0    Comments: Pharmacy may dispense brand covered by insurance (Proair, or proventil or ventolin or generic albuterol  inhaler)  Associated Diagnoses: Shortness of breath      amLODIPine (NORVASC) 10 MG tablet Take 1 tablet (10 mg) by mouth daily  Qty: 90 tablet, Refills: 3    Associated Diagnoses: Essential hypertension      brimonidine (ALPHAGAN P) 0.1 % ophthalmic solution Place 1 drop into both eyes 2 times daily  Qty: 10 mL, Refills: 2    Associated Diagnoses: Glaucoma suspect, bilateral      D3-50 1.25 MG (44648 UT) capsule Take 1 capsule (1,250 mcg) by mouth once a week  Qty: 90 capsule, Refills: 1    Associated Diagnoses: Mixed obsessional thoughts and acts      dorzolamide (TRUSOPT) 2 % ophthalmic solution Place 1 drop into both eyes 2 times daily  Qty: 10 mL, Refills: 11    Associated Diagnoses: Glaucoma suspect, bilateral      DULoxetine (CYMBALTA) 60 MG capsule Take 60 mg by mouth daily      ferrous gluconate (FERGON) 324 (38 Fe) MG tablet Take 1 tablet (324 mg) by mouth daily (with breakfast)  Qty: 90 tablet, Refills: 0    Associated Diagnoses: Low iron      folic acid (FOLVITE) 1 MG tablet Take 1 tablet (1 mg) by mouth daily  Qty: 30 tablet, Refills: 2    Associated Diagnoses: Low iron      gabapentin (NEURONTIN) 100 MG capsule Take 1 capsule (100 mg) by mouth 3 times daily  Qty: 105 capsule, Refills: 11    Comments: Dr. Parada prescribes this medication for patient  Associated Diagnoses: Low iron      hydrALAZINE (APRESOLINE) 25 MG tablet Take 1 tablet (25 mg) by mouth 3 times daily  Qty: 270 tablet, Refills: 0    Associated Diagnoses: Essential hypertension      latanoprost (XALATAN) 0.005 % ophthalmic solution Place 1 drop into both eyes At Bedtime  Qty: 7.5 mL, Refills: 11    Associated Diagnoses: Glaucoma suspect, bilateral      lisinopril (ZESTRIL) 20 MG tablet Take 1 tablet (20 mg) by mouth daily  Qty: 90 tablet, Refills: 3    Associated Diagnoses: Essential hypertension      loperamide (IMODIUM) 2 MG capsule Take 1 capsule (2 mg) by mouth 3 times daily as needed for diarrhea  Qty: 30 capsule, Refills: 1     Associated Diagnoses: Chronic diarrhea      OLANZapine (ZYPREXA) 5 MG tablet Take 1 tablet (5 mg) by mouth At Bedtime  Qty: 30 tablet, Refills: 0    Associated Diagnoses: Mixed obsessional thoughts and acts      ondansetron (ZOFRAN) 4 MG tablet Take 1 tablet (4 mg) by mouth every 8 hours as needed for nausea  Qty: 30 tablet, Refills: 3    Associated Diagnoses: Nausea      simvastatin (ZOCOR) 10 MG tablet Take 1 tablet (10 mg) by mouth At Bedtime  Qty: 90 tablet, Refills: 3    Associated Diagnoses: Mixed hyperlipidemia      spacer (OPTICHAMBER KATIUSKA) holding chamber Use with inhaler as needed  Qty: 1 each, Refills: 0    Associated Diagnoses: Shortness of breath           Allergies   Allergies   Allergen Reactions    Aspirin Anaphylaxis    [employer] Anaphylaxis    Diclofenac Anaphylaxis    Ibuprofen Anaphylaxis    Iodine Anaphylaxis     Contrast  Pt states anaphylactic reaction to ct contrast about 20 years ago  Pt premedicated with prednisone and benadryl before iv isouve-370 CT contrast on 1/6/23 and exhibited no signs of reaction    Donepezil      Other reaction(s): Other (see comments)  Cervical dystonia    Lactose Diarrhea

## 2023-01-09 ENCOUNTER — PATIENT OUTREACH (OUTPATIENT)
Dept: CARE COORDINATION | Facility: CLINIC | Age: 88
End: 2023-01-09

## 2023-01-09 ENCOUNTER — TELEPHONE (OUTPATIENT)
Dept: FAMILY MEDICINE | Facility: CLINIC | Age: 88
End: 2023-01-09

## 2023-01-09 NOTE — TELEPHONE ENCOUNTER
"PAL call to pt- for IP discharge     Pt states this am while going down to breakfast, \"I felt a like I might faint\" she sat down and rested for a bit \"then I felt better\"   Pt has gone down for lunch and has done \"OK\" since then, but \"I just don't feel myself yet\"   PAL suggested she be seen back in ER if not doing well, \"no it's not that bad I feel I am fine to wait for Thursday\"     She has not yet heard back from cardiology about scheduling visit.   She is NOT drinking any caffeine, watching her diet as directed      PAL suggested she call cardiology to schedule \"no I will just wait for them to call\"     She has not checked her BP since home but will do so and if to low or to high will call back to PAL.   She does continue all medications as prescribed.       Advised if symptoms worsen needs to return to ER otherwise follow up as planned on Thursday 1/12    Please advise as to any other cares?    Ronda Miller, RN      "

## 2023-01-09 NOTE — PROGRESS NOTES
KIRSTIN created a new program for Primary Care-Care Coordination for patient who is established within the [facility] system and is eligible for Clinic Care Coordination.    [facility]    Background: Transitional Care Management program identified per system criteria and reviewed by [facility] team for possible outreach.    Assessment: Upon chart review, [facility] Team member will not proceed with patient outreach related to this episode of Transitional Care Management program due to reason below:    Patient has active communication with a nurse, provider or care team for reason of post-hospital follow up plan.  Outreach call by [facility] team not indicated to minimize duplicative efforts.      Patient was contacted today by a Registered Nurse from [facility] for Pal  IP discharge. RN also contacted patient's son, Brent, today. No W outreach call needed at this time.     Plan: Transitional Care Management episode addressed appropriately per reason noted above.      KIRSTIN Acosta  [phone]  [facility]     *Connected Care Resource Team does NOT follow patient ongoing. Referrals are identified based on internal discharge reports and the outreach is to ensure patient has an understanding of their discharge instructions.

## 2023-01-09 NOTE — TELEPHONE ENCOUNTER
Perhaps a call to her son would be in order - to check in and make sure these symptoms are being monitored and appts are being scheduled. JH

## 2023-01-09 NOTE — PLAN OF CARE
A/o x4. VSS. IV removed. Tele removed. Discharge instructions gone over and understanding verbalized. Son to transport home.   
End of shift summary: ED BOARDER [id number]  Dx: SOB and abnormal labs  A/O: Alert and Oriented x4, forgetful  Diet: Cardiac  Fluids: Hep gtt infusing at 800 units/hr  Transfer: SBA  Bathroom: Voiding in bathroom  Pain: denying pain.  Telemetry Monitoring: Yes - SR  Treatment: Hep gtt, K+ & Mg protocol, Lasix, Cardiology consult, ECHO today.   Discharge Plans: tbd          
Occupational Therapy Discharge Summary    Reason for therapy discharge:    Discharged to home.    Progress towards therapy goal(s). See goals on Care Plan in [facility] electronic health record for goal details.  Goals partially met.  Barriers to achieving goals:   discharge from facility.    Therapy recommendation(s):    OT recommended HHOT for home safety eval and increasing activity tolerance.                             
69

## 2023-01-09 NOTE — TELEPHONE ENCOUNTER
"Called to pt's son-  He is going to call and speak with her today.        He is going to check in on her today.. He states she was \"really good\" when he dropped her off yesterday.  She had dinner with all her friends at the [facility].        He states she is bad about her fluid intake and this may be part of her issue.   He is going to call cardiology and schedule follow up with them.         If son feels pt is not doing well will bring back to UC or ER. PAL will follow up on Wednesday.    Son, Brent, .expressed understanding and acceptance of the plan. had no further questions at this time.  Advised can call back to clinic at any time with concerns.     FYI to PCP    Ronda Miller RN       "

## 2023-01-10 NOTE — PROGRESS NOTES
Pre-Visit Planning     Appointment Notes for this encounter:   IP follow up    Questionnaires Reviewed/Assigned  No additional questionnaires are needed       Contacted patient via phone . Are there any additional questions or concerns you'd like to review with your provider during your visit? No    Visit is not preventive.    Meds  Home Meds reviewed and updated    Entered patient-preferred pharmacy.     Current Outpatient Medications   Medication     acetaminophen (TYLENOL) 500 MG tablet     albuterol (PROAIR HFA/PROVENTIL HFA/VENTOLIN HFA) 108 (90 Base) MCG/ACT inhaler     amLODIPine (NORVASC) 10 MG tablet     brimonidine (ALPHAGAN P) 0.1 % ophthalmic solution     D3-50 1.25 MG (48276 UT) capsule     dorzolamide (TRUSOPT) 2 % ophthalmic solution     DULoxetine (CYMBALTA) 60 MG capsule     ferrous gluconate (FERGON) 324 (38 Fe) MG tablet     folic acid (FOLVITE) 1 MG tablet     gabapentin (NEURONTIN) 100 MG capsule     hydrALAZINE (APRESOLINE) 25 MG tablet     latanoprost (XALATAN) 0.005 % ophthalmic solution     lisinopril (ZESTRIL) 20 MG tablet     loperamide (IMODIUM) 2 MG capsule     OLANZapine (ZYPREXA) 5 MG tablet     ondansetron (ZOFRAN) 4 MG tablet     simvastatin (ZOCOR) 10 MG tablet     spacer (OPTICHAMBER KATIUSKA) holding chamber     No current facility-administered medications for this visit.     [employer]  Patient is active on [employer].    Call Summary  Advised patient to call back at [phone] if needed.     Ronda Miller RN

## 2023-01-11 ENCOUNTER — PATIENT OUTREACH (OUTPATIENT)
Dept: CARE COORDINATION | Facility: CLINIC | Age: 88
End: 2023-01-11

## 2023-01-11 NOTE — PROGRESS NOTES
Clinic Care Coordination Contact  Community Health Worker Initial Outreach    CHW Initial Information Gathering:  Referral Source: Other, specify (CCRC)  Current living arrangement:: I live alone  Type of residence:: Assisted living  Community Resources: Housekeeping/Chore Agency  Informal Support system:: Children (Son)  No PCP office visit in Past Year: No       Patient accepts CC: No, Pt declined. Patient will be sent Care Coordination introduction letter for future reference.     1-11, CHW:    Writer was able to connect with the Pt and introduce self/care coordination and intent of call.    Pt shares she does feel supported at home. She lives in an [facility] and can access meals and other services. Pt did hire a house keeper to assist around their residents.    At this time, Pt declined CC services. Writer encouraged Pt to reach out in the future with any questions or concerns; Pt agreeable with plan.         DUANE Michelle. Public Health  Community Health Worker  [facility]  Clinic Care Coordination  [phone]

## 2023-01-11 NOTE — TELEPHONE ENCOUNTER
Pt feeling better today     She does plan to keep appt as planned for tomorrow with PCP     Ronda Miller RN

## 2023-01-11 NOTE — LETTER
[facility] CARE COORDINATION  [facility]  January 11, 2023    Swathi NEERAJ [address]  [address]   [facility] [address]      Dear Swathi,        I am a clinic community health worker who works with Cortney Vanessa MD with the [facility]. I wanted to thank you for spending the time to talk with me.  Below is a description of clinic care coordination and how I can further assist you.       The clinic care coordination team is made up of a registered nurse, , financial resource worker and community health worker who understand the health care system. The goal of clinic care coordination is to help you manage your health and improve access to the health care system. Our team works alongside your provider to assist you in determining your health and social needs. We can help you obtain health care and community resources, providing you with necessary information and education. We can work with you through any barriers and develop a care plan that helps coordinate and strengthen the communication between you and your care team.    Please feel free to contact me with any questions or concerns regarding care coordination and what we can offer.      We are focused on providing you with the highest-quality healthcare experience possible.    Sincerely,       DUANE Michelle. Public Health  Community Health Worker  [facility] [place] [facility]  Clinic Care Coordination  [phone]

## 2023-01-12 ENCOUNTER — TRANSFERRED RECORDS (OUTPATIENT)
Dept: HEALTH INFORMATION MANAGEMENT | Facility: CLINIC | Age: 88
End: 2023-01-12

## 2023-01-12 ENCOUNTER — OFFICE VISIT (OUTPATIENT)
Dept: FAMILY MEDICINE | Facility: CLINIC | Age: 88
End: 2023-01-12
Payer: COMMERCIAL

## 2023-01-12 VITALS
HEART RATE: 64 BPM | HEIGHT: 63 IN | DIASTOLIC BLOOD PRESSURE: 54 MMHG | RESPIRATION RATE: 18 BRPM | BODY MASS INDEX: 25.59 KG/M2 | SYSTOLIC BLOOD PRESSURE: 136 MMHG | WEIGHT: 144.4 LBS | TEMPERATURE: 98 F | OXYGEN SATURATION: 94 %

## 2023-01-12 DIAGNOSIS — R06.02 SHORTNESS OF BREATH AT REST: ICD-10-CM

## 2023-01-12 DIAGNOSIS — E78.2 MIXED HYPERLIPIDEMIA: ICD-10-CM

## 2023-01-12 DIAGNOSIS — R19.5 CHANGE IN CONSISTENCY OF STOOL: ICD-10-CM

## 2023-01-12 DIAGNOSIS — N18.2 CKD (CHRONIC KIDNEY DISEASE) STAGE 2, GFR 60-89 ML/MIN: ICD-10-CM

## 2023-01-12 DIAGNOSIS — R79.89 D-DIMER, ELEVATED: ICD-10-CM

## 2023-01-12 DIAGNOSIS — R10.84 ABDOMINAL PAIN, GENERALIZED: Primary | ICD-10-CM

## 2023-01-12 PROCEDURE — 0134A COVID-19 VACCINE BIVALENT BOOSTER 18+ (MODERNA): CPT | Performed by: FAMILY MEDICINE

## 2023-01-12 PROCEDURE — 99495 TRANSJ CARE MGMT MOD F2F 14D: CPT | Performed by: FAMILY MEDICINE

## 2023-01-12 PROCEDURE — 91313 COVID-19 VACCINE BIVALENT BOOSTER 18+ (MODERNA): CPT | Performed by: FAMILY MEDICINE

## 2023-01-12 RX ORDER — FUROSEMIDE 20 MG
20 TABLET ORAL DAILY
Qty: 7 TABLET | Refills: 0 | Status: SHIPPED | OUTPATIENT
Start: 2023-01-12 | End: 2023-05-15

## 2023-01-12 ASSESSMENT — PAIN SCALES - GENERAL: PAINLEVEL: MODERATE PAIN (5)

## 2023-01-12 NOTE — PROGRESS NOTES
Assessment & Plan     Abdominal pain, generalized - unclear cause, suspect acute gastroenteritis given abrupt onset and stool color change in setting of liver normal labs.     Change in consistency of stool - labs as below  - C. difficile Toxin B PCR with reflex to C. difficile Antigen and Toxins A/B EIA; Future  - Fecal Lactoferrin; Future  - C. difficile Toxin B PCR with reflex to C. difficile Antigen and Toxins A/B EIA  - Fecal Lactoferrin    Shortness of breath at rest - will trial lasix to see if this improves her symptoms. If so, will consult with cardiology on future plan.   - furosemide (LASIX) 20 MG tablet; Take 1 tablet (20 mg) by mouth daily    D-dimer, elevated - unclear cause, suspect related to acute process. If normal in a month, no need to work up.   - D dimer, quantitative; Future    CKD (chronic kidney disease) stage 2, GFR 60-89 ml/min    Mixed hyperlipidemia    Return in about 1 year (around 1/12/2024) for as needed.    Cortney Vanessa MD  [facility] DEANGELO Echevarria is a 87 year old accompanied by her son, presenting for the following health issues:  Hospital F/U    [facility]     Hospital Follow-up Visit:    Hospital/Nursing Home/IP Rehab Facility: [facility]  Date of Admission: 1/6/2023  Date of Discharge: 1/8/2023  Reason(s) for Admission: Unstable angina, Dyspnea on exertion, Shortness of breath at rest    Was your hospitalization related to COVID-19? No   Problems taking medications regularly:  None  Medication changes since discharge: None  Problems adhering to non-medication therapy:  None    Summary of hospitalization:  [facility] discharge summary reviewed  Diagnostic Tests/Treatments reviewed.  Follow up needed: none  Other Healthcare Providers Involved in Patient s Care:         Specialist appointment - March 2023  Update since discharge: stable.     Plan of care communicated with patient and family       1. Abdominal  "pain - persisting for the past 2-3 weeks. Had worked up in ER. Negative LFTs, CBC/diff, CT abdomen. Reports yellow stool since abd pain began. No recent abx.     2. Dyspnea on exertion - continues to have, reports got getter in hospital when they were giving her lasix, although CXR did not reveal a pleural effusion. Denies wheezing, no indoor allergies. Diagnosed with unstable angina in ER, appt with cardiology March 2023.     3. D-dimer elevated - CT chest negative for PE, d-dimer normal 11/2022.       Review of Systems   Constitutional, HEENT, cardiovascular, pulmonary, gi and gu systems are negative, except as otherwise noted.      Objective    /54   Pulse 64   Temp 98  F (36.7  C) (Oral)   Resp 18   Ht 1.6 m (5' 3\")   Wt 65.5 kg (144 lb 6.4 oz)   SpO2 94%   BMI 25.58 kg/m    Body mass index is 25.58 kg/m .  Physical Exam   GENERAL: healthy, alert and no distress  RESP: lungs clear to auscultation - no rales, rhonchi or wheezes  CV: regular rate and rhythm, normal S1 S2, no S3 or S4, no murmur, click or rub, no peripheral edema and peripheral pulses strong  ABDOMEN: soft, nontender, no hepatosplenomegaly, no masses and bowel sounds normal            "

## 2023-01-19 ENCOUNTER — TELEPHONE (OUTPATIENT)
Dept: FAMILY MEDICINE | Facility: CLINIC | Age: 88
End: 2023-01-19
Payer: COMMERCIAL

## 2023-01-19 NOTE — TELEPHONE ENCOUNTER
"PAL call to pt to follow up on SOB and lasix    Pt states \"It is not helping at all\"  She is still having SOB with any activity.    No symptoms as rest.  She is sleeping through the night without issue.     Denies chest pain, wheezing,  Denies weight gain or ELISEO    She is urinating more often.     Please advise as to next step    Ronda Miller RN     "

## 2023-01-19 NOTE — TELEPHONE ENCOUNTER
Please call Brent for his impression as he usually has a pulse on what's going on with his mom.     Has already had negative chest CT. If lasix not helpful, not related to CHF. They should consult with cardiology.     Can stop the lasix.     JH

## 2023-01-20 ENCOUNTER — TELEPHONE (OUTPATIENT)
Dept: CARDIOLOGY | Facility: CLINIC | Age: 88
End: 2023-01-20
Payer: COMMERCIAL

## 2023-01-20 NOTE — TELEPHONE ENCOUNTER
Received a call from dr Vanessa's nurse concerned about patients symptoms and would like to get in sooner than March.  Will contact scheduling and nurse is aware the appt will be with a different provider.  Dr. Davis goes to [place] once a month.  Other alternatives are Jennifer, and CLAUDINE.  Melinda Castro, RN on 1/20/2023 at 9:49 AM

## 2023-01-20 NOTE — TELEPHONE ENCOUNTER
"Spoke with son,  He has not noticed much improvement but he does feel like she can take more steps without getting SOB.  \"She can walk the hallway now without stopping to rest\"    Per son \"she is no where near where she was in clinic\"    Per son he does not notice and \"retention of fluid either\"     Advised no need to continue with Lasix at this time per PCP- see below.      PAL called to cardiology - Dr Davis to see if possible for sooner appt-   RN will have staff reach out to see they can move appt up with a different provider or location     Ronda Miller RN           "

## 2023-01-25 DIAGNOSIS — K52.9 CHRONIC DIARRHEA: ICD-10-CM

## 2023-01-25 NOTE — TELEPHONE ENCOUNTER
Routing refill request to provider for review/approval because:  Drug not on the [facility] refill protocol     Ronda Miller RN

## 2023-01-26 RX ORDER — LOPERAMIDE HCL 2 MG
2 CAPSULE ORAL 3 TIMES DAILY PRN
Qty: 30 CAPSULE | Refills: 1 | Status: SHIPPED | OUTPATIENT
Start: 2023-01-26 | End: 2023-03-24

## 2023-01-31 DIAGNOSIS — E61.1 LOW IRON: ICD-10-CM

## 2023-01-31 RX ORDER — FERROUS GLUCONATE 324(38)MG
324 TABLET ORAL
Qty: 90 TABLET | Refills: 3 | Status: ON HOLD | OUTPATIENT
Start: 2023-01-31 | End: 2023-06-24

## 2023-02-03 NOTE — TELEPHONE ENCOUNTER
Assumed are of pt from [facility]. Wound Care RN Jolene Dupree saw patient and is going to apply dressings after ultrasound is done with patient. Faxed completed forms.    Kari Islas,

## 2023-02-07 ENCOUNTER — OFFICE VISIT (OUTPATIENT)
Dept: CARDIOLOGY | Facility: CLINIC | Age: 88
End: 2023-02-07
Payer: COMMERCIAL

## 2023-02-07 VITALS
WEIGHT: 143.1 LBS | BODY MASS INDEX: 25.36 KG/M2 | HEIGHT: 63 IN | SYSTOLIC BLOOD PRESSURE: 146 MMHG | OXYGEN SATURATION: 100 % | DIASTOLIC BLOOD PRESSURE: 50 MMHG | HEART RATE: 66 BPM

## 2023-02-07 DIAGNOSIS — I10 ESSENTIAL HYPERTENSION: ICD-10-CM

## 2023-02-07 DIAGNOSIS — I10 ESSENTIAL HYPERTENSION: Primary | ICD-10-CM

## 2023-02-07 DIAGNOSIS — I35.1 NONRHEUMATIC AORTIC VALVE INSUFFICIENCY: ICD-10-CM

## 2023-02-07 DIAGNOSIS — I35.0 NONRHEUMATIC AORTIC VALVE STENOSIS: ICD-10-CM

## 2023-02-07 PROCEDURE — 99215 OFFICE O/P EST HI 40 MIN: CPT | Performed by: INTERNAL MEDICINE

## 2023-02-07 RX ORDER — HYDRALAZINE HYDROCHLORIDE 25 MG/1
25 TABLET, FILM COATED ORAL 3 TIMES DAILY
Qty: 270 TABLET | Refills: 0 | Status: SHIPPED | OUTPATIENT
Start: 2023-02-07 | End: 2023-05-16

## 2023-02-07 NOTE — LETTER
2/7/2023    Cortney Vanessa MD  [address]  [facility] [address]    RE: Swathi Dukes       Dear Colleague,     I had the pleasure of seeing Swathi Dukes in the [facility].  HISTORY:    Swathi Dukes is a very pleasant 87-year-old female accompanied by her son who is an EMT.  I saw her in January 2020 after she moved from [place], to [facility].  She had a history of hypertension, diastolic dysfunction, and a chronic pericardial effusion with previous pericardial window.  She also had a history of hyperlipidemia.  At that time I reviewed her echo which showed an ejection fraction of 69% with moderate aortic insufficiency and mild to moderate pericardial effusion without tamponade.  She had an abnormal nuclear stress test done in January 2021 followed by a heart cath which showed normal coronary arteries.    Swathi was recently admitted to [facility] with worsening dyspnea.  This had been progressive for a month or 2 according to her son today.  She had a mildly elevated D-dimer as well as a mildly elevated troponin.  Her proBNP was normal.  She was given a small amount of diuretic.  Cause of her dyspnea was never really elucidated.  She underwent an echocardiogram showing normal ejection fraction, grade 1 diastolic dysfunction, moderate aortic insufficiency, mild aortic stenosis (images reviewed by myself).  She also has a mild to moderate size pericardial effusion without evidence of tamponade physiology.    Today Swathi reports that she feels that she gets short of breath after walking about 20 feet.  She reports that prior to her recent hospitalization she would walk down the [place] and others would comment that she looks short of breath but she did not feel short of breath.  Now she states that she feels short of breath but others, including her son, stated that she does not look short of breath.  She also tells me that she lives in an assisted living home and walks the long  "hallway briskly on a regular basis and feels somewhat short of breath but does not pay any attention to it and keeps walking briskly.  She also goes to exercise classes 2 days/week.    Swathi denies PND/orthopnea, palpitations, any type of exertional chest arm neck shoulder or jaw discomfort, strokelike symptoms, orthostasis, or symptoms of claudication.  She does have some peripheral edema left greater than right, dependent.  Overall she states that she \"wants to rest all the time\".  She feels fatigued but she states that she sleeps very well at night.      ASSESSMENT/PLAN:    1.  Mild aortic stenosis, not severe enough to be the cause of her unexplained dyspnea.  2.  Moderate aortic insufficiency with normal size left ventricle, also not severe enough to be the cause of her dyspnea.  3.  Mild diastolic dysfunction, probably normal estimated left heart pressures, unlikely to be the cause of her dyspnea.  4.  Trivial coronary artery disease just 3 years ago, unlikely to be cause of dyspnea  5.  Mild hypertension.  Blood pressure today is a little on the high side but most of her values have been normal, no medication changes made today.    Thank you for inviting me to participate in the care of your patient.  Please don't hesitate to call if I can be of further assistance.  45 minutes were spent today reviewing the chart and other records, interviewing and examining the patient, and documenting our visit.  No cardiac cause of dyspnea is apparent.  I have asked her to call me if her dyspnea worsens and we will plan a 6-month follow-up visit.    Chart documentation was completed, in part, with [employer] voice-recognition software. Even though reviewed, some grammatical, spelling, and word errors may remain.       Orders Placed This Encounter   Procedures     Follow-Up with Cardiology LOKI     No orders of the defined types were placed in this encounter.    There are no discontinued medications.    10 year ASCVD risk: The " ASCVD Risk score (Iker READ, et al., 2019) failed to calculate for the following reasons:    The 2019 ASCVD risk score is only valid for ages 40 to 79    Encounter Diagnoses   Name Primary?     Essential hypertension Yes     Nonrheumatic aortic valve insufficiency      Nonrheumatic aortic valve stenosis        CURRENT MEDICATIONS:  Current Outpatient Medications   Medication Sig Dispense Refill     acetaminophen (TYLENOL) 500 MG tablet Take 1,000 mg by mouth every 8 hours as needed for mild pain       albuterol (PROAIR HFA/PROVENTIL HFA/VENTOLIN HFA) 108 (90 Base) MCG/ACT inhaler Inhale 2 puffs into the lungs every 4 hours as needed for shortness of breath / dyspnea or wheezing 18 g 0     amLODIPine (NORVASC) 10 MG tablet Take 1 tablet (10 mg) by mouth daily 90 tablet 3     brimonidine (ALPHAGAN P) 0.1 % ophthalmic solution Place 1 drop into both eyes 2 times daily 10 mL 2     D3-50 1.25 MG (16219 UT) capsule Take 1 capsule (1,250 mcg) by mouth once a week (Patient taking differently: Take 1,250 mcg by mouth once a week On Mondays) 90 capsule 1     dorzolamide (TRUSOPT) 2 % ophthalmic solution Place 1 drop into both eyes 2 times daily 10 mL 11     DULoxetine (CYMBALTA) 60 MG capsule Take 60 mg by mouth daily       ferrous gluconate (FERGON) 324 (38 Fe) MG tablet Take 1 tablet (324 mg) by mouth daily (with breakfast) 90 tablet 3     folic acid (FOLVITE) 1 MG tablet Take 1 tablet (1 mg) by mouth daily 30 tablet 2     furosemide (LASIX) 20 MG tablet Take 1 tablet (20 mg) by mouth daily 7 tablet 0     gabapentin (NEURONTIN) 100 MG capsule Take 200 mg by mouth 3 times daily 105 capsule 11     hydrALAZINE (APRESOLINE) 25 MG tablet Take 1 tablet (25 mg) by mouth 3 times daily 270 tablet 0     latanoprost (XALATAN) 0.005 % ophthalmic solution Place 1 drop into both eyes At Bedtime 7.5 mL 11     lisinopril (ZESTRIL) 20 MG tablet Take 1 tablet (20 mg) by mouth daily 90 tablet 3     loperamide (IMODIUM) 2 MG capsule Take 1  capsule (2 mg) by mouth 3 times daily as needed for diarrhea 30 capsule 1     OLANZapine (ZYPREXA) 5 MG tablet Take 1 tablet (5 mg) by mouth At Bedtime 30 tablet 0     ondansetron (ZOFRAN) 4 MG tablet Take 1 tablet (4 mg) by mouth every 8 hours as needed for nausea 30 tablet 3     simvastatin (ZOCOR) 10 MG tablet Take 1 tablet (10 mg) by mouth At Bedtime 90 tablet 3       ALLERGIES     Allergies   Allergen Reactions     Aspirin Anaphylaxis     Diagnostic X-Ray Materials Anaphylaxis     Diclofenac Anaphylaxis     Ibuprofen Anaphylaxis     Iodine Anaphylaxis     Contrast  Pt states anaphylactic reaction to ct contrast about 20 years ago  Pt premedicated with prednisone and benadryl before iv isouve-370 CT contrast on 1/6/23 and exhibited no signs of reaction     Donepezil      Other reaction(s): Other (see comments)  Cervical dystonia     Lactose Diarrhea       PAST MEDICAL HISTORY:  Past Medical History:   Diagnosis Date     Aortic stenosis      Glaucoma      HTN (hypertension)      Hyperlipidemia      Pericardial effusion      Systemic lupus erythematosus        PAST SURGICAL HISTORY:  Past Surgical History:   Procedure Laterality Date     CV HEART CATHETERIZATION WITH POSSIBLE INTERVENTION N/A 1/5/2021    Procedure: Heart Catheterization with Possible Intervention;  Surgeon: Hayden Bedoya MD;  Location:  HEART CARDIAC CATH LAB     CV LEFT VENTRICULOGRAM N/A 1/5/2021    Procedure: Left Ventriculogram;  Surgeon: Hayden Bedoya MD;  Location:  HEART CARDIAC CATH LAB       FAMILY HISTORY:  Family History   Problem Relation Age of Onset     Diabetes Father        SOCIAL HISTORY:  Social History     Socioeconomic History     Marital status:      Spouse name: None     Number of children: None     Years of education: None     Highest education level: None   Tobacco Use     Smoking status: Never     Smokeless tobacco: Never   Vaping Use     Vaping Use: Never used   Substance and Sexual Activity      "Alcohol use: Not Currently     Drug use: Never     Sexual activity: Not Currently     Social Determinants of Health     Financial Resource Strain: Low Risk      Difficulty of Paying Living Expenses: Not hard at all   Food Insecurity: No Food Insecurity     Worried About Running Out of Food in the Last Year: Never true     Ran Out of Food in the Last Year: Never true   Transportation Needs: No Transportation Needs     Lack of Transportation (Medical): No     Lack of Transportation (Non-Medical): No   Physical Activity: Insufficiently Active     Days of Exercise per Week: 5 days     Minutes of Exercise per Session: 20 min   Stress: No Stress Concern Present     Feeling of Stress : Not at all   Social Connections: Moderately Isolated     Frequency of Communication with Friends and Family: More than three times a week     Frequency of Social Gatherings with Friends and Family: More than three times a week     Attends [employer] Services: More than 4 times per year     Active Member of Clubs or Organizations: No     Marital Status:    Housing Stability: Low Risk      Unable to Pay for Housing in the Last Year: No     Number of Places Lived in the Last Year: 1     Unstable Housing in the Last Year: No       Review of Systems:  Skin:  Negative     Eyes:  Positive for glasses  ENT:  Negative    Respiratory:  Positive for dyspnea on exertion;shortness of breath  Cardiovascular:  Negative    Gastroenterology: Negative    Genitourinary:  Negative    Musculoskeletal:  Negative    Neurologic:  Negative    Psychiatric:       Heme/Lymph/Imm:       Endocrine:         Physical Exam:  Vitals: BP (!) 146/50 (BP Location: Right arm, Patient Position: Sitting, Cuff Size: Adult Regular)   Pulse 66   Ht 1.6 m (5' 3\")   Wt 64.9 kg (143 lb 1.6 oz)   SpO2 100%   BMI 25.35 kg/m      Constitutional:  cooperative, alert and oriented, well developed, well nourished, in no acute distress        Skin:  warm and dry to the touch, no " apparent skin lesions or masses noted        Head:  normocephalic, no masses or lesions        Eyes:  pupils equal and round, conjunctivae and lids unremarkable, sclera white, no xanthalasma, EOMS intact, no nystagmus        ENT:  no pallor or cyanosis   masked    Neck:  carotid pulses are full and equal bilaterally, JVP normal, no carotid bruit        Chest:  normal breath sounds, clear to auscultation, normal A-P diameter, normal symmetry, normal respiratory excursion, no use of accessory muscles        Cardiac: regular rhythm;normal S1 and S2;no S3 or S4;apical impulse not displaced       systolic murmur;grade 2;RUSB;radiation to the carotid          Abdomen:  abdomen soft;BS normoactive        Vascular: pulses full and equal                                      Extremities and Muscular Skeletal:        bilateral LE edema;trace;1+;L greater than R     Neurological:  no gross motor deficits        Psych:  affect appropriate, oriented to time, person and place     Recent Lab Results:  LIPID RESULTS:  Lab Results   Component Value Date    CHOL 174 01/29/2020    HDL 79 01/29/2020    LDL 68 01/29/2020    TRIG 136 01/29/2020       LIVER ENZYME RESULTS:  Lab Results   Component Value Date    AST 20 01/06/2023    AST 19 04/26/2021    ALT 9 (L) 01/06/2023    ALT 24 04/26/2021       CBC RESULTS:  Lab Results   Component Value Date    WBC 8.8 01/07/2023    WBC 6.5 04/26/2021    RBC 3.99 01/07/2023    RBC 3.59 (L) 04/26/2021    HGB 11.6 (L) 01/07/2023    HGB 10.5 (L) 04/26/2021    HCT 38.3 01/07/2023    HCT 32.9 (L) 04/26/2021    MCV 96 01/07/2023    MCV 92 04/26/2021    MCH 29.1 01/07/2023    MCH 29.2 04/26/2021    MCHC 30.3 (L) 01/07/2023    MCHC 31.9 04/26/2021    RDW 13.3 01/07/2023    RDW 14.6 04/26/2021     01/07/2023     04/26/2021       BMP RESULTS:  Lab Results   Component Value Date     01/07/2023     04/26/2021    POTASSIUM 3.7 01/08/2023    POTASSIUM 4.7 07/22/2022    POTASSIUM 4.1  04/26/2021    CHLORIDE 99 01/07/2023    CHLORIDE 101 07/22/2022    CHLORIDE 103 04/26/2021    CO2 16 (L) 01/07/2023    CO2 25 07/22/2022    CO2 22 04/26/2021    ANIONGAP 22 (H) 01/07/2023    ANIONGAP 8 07/22/2022    ANIONGAP 9 04/26/2021     (H) 01/07/2023     (H) 07/22/2022    GLC 94 04/26/2021    BUN 31.8 (H) 01/07/2023    BUN 27 07/22/2022    BUN 18 04/26/2021    CR 0.98 (H) 01/07/2023    CR 0.94 04/26/2021    GFRESTIMATED 56 (L) 01/07/2023    GFRESTIMATED 56 (L) 04/26/2021    GFRESTBLACK 64 04/26/2021    FILIPE 10.1 01/07/2023    FILIPE 9.4 04/26/2021        A1C RESULTS:  Lab Results   Component Value Date    A1C 5.5 02/05/2020       INR RESULTS:  Lab Results   Component Value Date    INR 0.97 01/03/2021         Luis Manuel Davis MD, [facility]    CC  No referring provider defined for this encounter.    Thank you for allowing me to participate in the care of your patient.      Sincerely,     Luis Manuel Davis MD     [facility]

## 2023-02-07 NOTE — TELEPHONE ENCOUNTER
Prescription approved per [facility] Refill Protocol.    Mary CHRISTIE RN

## 2023-02-07 NOTE — PROGRESS NOTES
HISTORY:    Swathi Dukes is a very pleasant 87-year-old female accompanied by her son who is an EMT.  I saw her in January 2020 after she moved from [place], to [facility].  She had a history of hypertension, diastolic dysfunction, and a chronic pericardial effusion with previous pericardial window.  She also had a history of hyperlipidemia.  At that time I reviewed her echo which showed an ejection fraction of 69% with moderate aortic insufficiency and mild to moderate pericardial effusion without tamponade.  She had an abnormal nuclear stress test done in January 2021 followed by a heart cath which showed normal coronary arteries.    Swathi was recently admitted to [facility] with worsening dyspnea.  This had been progressive for a month or 2 according to her son today.  She had a mildly elevated D-dimer as well as a mildly elevated troponin.  Her proBNP was normal.  She was given a small amount of diuretic.  Cause of her dyspnea was never really elucidated.  She underwent an echocardiogram showing normal ejection fraction, grade 1 diastolic dysfunction, moderate aortic insufficiency, mild aortic stenosis (images reviewed by myself).  She also has a mild to moderate size pericardial effusion without evidence of tamponade physiology.    Today Swathi reports that she feels that she gets short of breath after walking about 20 feet.  She reports that prior to her recent hospitalization she would walk down the [place] and others would comment that she looks short of breath but she did not feel short of breath.  Now she states that she feels short of breath but others, including her son, stated that she does not look short of breath.  She also tells me that she lives in an assisted living home and walks the long hallway briskly on a regular basis and feels somewhat short of breath but does not pay any attention to it and keeps walking briskly.  She also goes to exercise classes 2 days/week.    Swathi denies  "PND/orthopnea, palpitations, any type of exertional chest arm neck shoulder or jaw discomfort, strokelike symptoms, orthostasis, or symptoms of claudication.  She does have some peripheral edema left greater than right, dependent.  Overall she states that she \"wants to rest all the time\".  She feels fatigued but she states that she sleeps very well at night.      ASSESSMENT/PLAN:    1.  Mild aortic stenosis, not severe enough to be the cause of her unexplained dyspnea.  2.  Moderate aortic insufficiency with normal size left ventricle, also not severe enough to be the cause of her dyspnea.  3.  Mild diastolic dysfunction, probably normal estimated left heart pressures, unlikely to be the cause of her dyspnea.  4.  Trivial coronary artery disease just 3 years ago, unlikely to be cause of dyspnea  5.  Mild hypertension.  Blood pressure today is a little on the high side but most of her values have been normal, no medication changes made today.    Thank you for inviting me to participate in the care of your patient.  Please don't hesitate to call if I can be of further assistance.  45 minutes were spent today reviewing the chart and other records, interviewing and examining the patient, and documenting our visit.  No cardiac cause of dyspnea is apparent.  I have asked her to call me if her dyspnea worsens and we will plan a 6-month follow-up visit.    Chart documentation was completed, in part, with [employer] voice-recognition software. Even though reviewed, some grammatical, spelling, and word errors may remain.       Orders Placed This Encounter   Procedures     Follow-Up with Cardiology LOKI     No orders of the defined types were placed in this encounter.    There are no discontinued medications.    10 year ASCVD risk: The ASCVD Risk score (Iker DK, et al., 2019) failed to calculate for the following reasons:    The 2019 ASCVD risk score is only valid for ages 40 to 79    Encounter Diagnoses   Name Primary?     " Essential hypertension Yes     Nonrheumatic aortic valve insufficiency      Nonrheumatic aortic valve stenosis        CURRENT MEDICATIONS:  Current Outpatient Medications   Medication Sig Dispense Refill     acetaminophen (TYLENOL) 500 MG tablet Take 1,000 mg by mouth every 8 hours as needed for mild pain       albuterol (PROAIR HFA/PROVENTIL HFA/VENTOLIN HFA) 108 (90 Base) MCG/ACT inhaler Inhale 2 puffs into the lungs every 4 hours as needed for shortness of breath / dyspnea or wheezing 18 g 0     amLODIPine (NORVASC) 10 MG tablet Take 1 tablet (10 mg) by mouth daily 90 tablet 3     brimonidine (ALPHAGAN P) 0.1 % ophthalmic solution Place 1 drop into both eyes 2 times daily 10 mL 2     D3-50 1.25 MG (76412 UT) capsule Take 1 capsule (1,250 mcg) by mouth once a week (Patient taking differently: Take 1,250 mcg by mouth once a week On Mondays) 90 capsule 1     dorzolamide (TRUSOPT) 2 % ophthalmic solution Place 1 drop into both eyes 2 times daily 10 mL 11     DULoxetine (CYMBALTA) 60 MG capsule Take 60 mg by mouth daily       ferrous gluconate (FERGON) 324 (38 Fe) MG tablet Take 1 tablet (324 mg) by mouth daily (with breakfast) 90 tablet 3     folic acid (FOLVITE) 1 MG tablet Take 1 tablet (1 mg) by mouth daily 30 tablet 2     furosemide (LASIX) 20 MG tablet Take 1 tablet (20 mg) by mouth daily 7 tablet 0     gabapentin (NEURONTIN) 100 MG capsule Take 200 mg by mouth 3 times daily 105 capsule 11     hydrALAZINE (APRESOLINE) 25 MG tablet Take 1 tablet (25 mg) by mouth 3 times daily 270 tablet 0     latanoprost (XALATAN) 0.005 % ophthalmic solution Place 1 drop into both eyes At Bedtime 7.5 mL 11     lisinopril (ZESTRIL) 20 MG tablet Take 1 tablet (20 mg) by mouth daily 90 tablet 3     loperamide (IMODIUM) 2 MG capsule Take 1 capsule (2 mg) by mouth 3 times daily as needed for diarrhea 30 capsule 1     OLANZapine (ZYPREXA) 5 MG tablet Take 1 tablet (5 mg) by mouth At Bedtime 30 tablet 0     ondansetron (ZOFRAN) 4 MG  tablet Take 1 tablet (4 mg) by mouth every 8 hours as needed for nausea 30 tablet 3     simvastatin (ZOCOR) 10 MG tablet Take 1 tablet (10 mg) by mouth At Bedtime 90 tablet 3       ALLERGIES     Allergies   Allergen Reactions     Aspirin Anaphylaxis     Diagnostic X-[employer] Materials Anaphylaxis     Diclofenac Anaphylaxis     Ibuprofen Anaphylaxis     Iodine Anaphylaxis     Contrast  Pt states anaphylactic reaction to ct contrast about 20 years ago  Pt premedicated with prednisone and benadryl before iv isouve-370 CT contrast on 1/6/23 and exhibited no signs of reaction     Donepezil      Other reaction(s): Other (see comments)  Cervical dystonia     Lactose Diarrhea       PAST MEDICAL HISTORY:  Past Medical History:   Diagnosis Date     Aortic stenosis      Glaucoma      HTN (hypertension)      Hyperlipidemia      Pericardial effusion      Systemic lupus erythematosus        PAST SURGICAL HISTORY:  Past Surgical History:   Procedure Laterality Date     CV HEART CATHETERIZATION WITH POSSIBLE INTERVENTION N/A 1/5/2021    Procedure: Heart Catheterization with Possible Intervention;  Surgeon: Hayden Bedoya MD;  Location:  HEART CARDIAC CATH LAB     CV LEFT VENTRICULOGRAM N/A 1/5/2021    Procedure: Left Ventriculogram;  Surgeon: Hayden Bedoya MD;  Location:  HEART CARDIAC CATH LAB       FAMILY HISTORY:  Family History   Problem Relation Age of Onset     Diabetes Father        SOCIAL HISTORY:  Social History     Socioeconomic History     Marital status:      Spouse name: None     Number of children: None     Years of education: None     Highest education level: None   Tobacco Use     Smoking status: Never     Smokeless tobacco: Never   Vaping Use     Vaping Use: Never used   Substance and Sexual Activity     Alcohol use: Not Currently     Drug use: Never     Sexual activity: Not Currently     Social Determinants of Health     Financial Resource Strain: Low Risk      Difficulty of Paying Living Expenses:  "Not hard at all   Food Insecurity: No Food Insecurity     Worried About Running Out of Food in the Last Year: Never true     Ran Out of Food in the Last Year: Never true   Transportation Needs: No Transportation Needs     Lack of Transportation (Medical): No     Lack of Transportation (Non-Medical): No   Physical Activity: Insufficiently Active     Days of Exercise per Week: 5 days     Minutes of Exercise per Session: 20 min   Stress: No Stress Concern Present     Feeling of Stress : Not at all   Social Connections: Moderately Isolated     Frequency of Communication with Friends and Family: More than three times a week     Frequency of Social Gatherings with Friends and Family: More than three times a week     Attends Taoism Services: More than 4 times per year     Active Member of Clubs or Organizations: No     Marital Status:    Housing Stability: Low Risk      Unable to Pay for Housing in the Last Year: No     Number of Places Lived in the Last Year: 1     Unstable Housing in the Last Year: No       Review of Systems:  Skin:  Negative     Eyes:  Positive for glasses  ENT:  Negative    Respiratory:  Positive for dyspnea on exertion;shortness of breath  Cardiovascular:  Negative    Gastroenterology: Negative    Genitourinary:  Negative    Musculoskeletal:  Negative    Neurologic:  Negative    Psychiatric:       Heme/Lymph/Imm:       Endocrine:         Physical Exam:  Vitals: BP (!) 146/50 (BP Location: Right arm, Patient Position: Sitting, Cuff Size: Adult Regular)   Pulse 66   Ht 1.6 m (5' 3\")   Wt 64.9 kg (143 lb 1.6 oz)   SpO2 100%   BMI 25.35 kg/m      Constitutional:  cooperative, alert and oriented, well developed, well nourished, in no acute distress        Skin:  warm and dry to the touch, no apparent skin lesions or masses noted        Head:  normocephalic, no masses or lesions        Eyes:  pupils equal and round, conjunctivae and lids unremarkable, sclera white, no xanthalasma, EOMS " intact, no nystagmus        ENT:  no pallor or cyanosis   masked    Neck:  carotid pulses are full and equal bilaterally, JVP normal, no carotid bruit        Chest:  normal breath sounds, clear to auscultation, normal A-P diameter, normal symmetry, normal respiratory excursion, no use of accessory muscles        Cardiac: regular rhythm;normal S1 and S2;no S3 or S4;apical impulse not displaced       systolic murmur;grade 2;RUSB;radiation to the carotid          Abdomen:  abdomen soft;BS normoactive        Vascular: pulses full and equal                                      Extremities and Muscular Skeletal:        bilateral LE edema;trace;1+;L greater than R     Neurological:  no gross motor deficits        Psych:  affect appropriate, oriented to time, person and place     Recent Lab Results:  LIPID RESULTS:  Lab Results   Component Value Date    CHOL 174 01/29/2020    HDL 79 01/29/2020    LDL 68 01/29/2020    TRIG 136 01/29/2020       LIVER ENZYME RESULTS:  Lab Results   Component Value Date    AST 20 01/06/2023    AST 19 04/26/2021    ALT 9 (L) 01/06/2023    ALT 24 04/26/2021       CBC RESULTS:  Lab Results   Component Value Date    WBC 8.8 01/07/2023    WBC 6.5 04/26/2021    RBC 3.99 01/07/2023    RBC 3.59 (L) 04/26/2021    HGB 11.6 (L) 01/07/2023    HGB 10.5 (L) 04/26/2021    HCT 38.3 01/07/2023    HCT 32.9 (L) 04/26/2021    MCV 96 01/07/2023    MCV 92 04/26/2021    MCH 29.1 01/07/2023    MCH 29.2 04/26/2021    MCHC 30.3 (L) 01/07/2023    MCHC 31.9 04/26/2021    RDW 13.3 01/07/2023    RDW 14.6 04/26/2021     01/07/2023     04/26/2021       BMP RESULTS:  Lab Results   Component Value Date     01/07/2023     04/26/2021    POTASSIUM 3.7 01/08/2023    POTASSIUM 4.7 07/22/2022    POTASSIUM 4.1 04/26/2021    CHLORIDE 99 01/07/2023    CHLORIDE 101 07/22/2022    CHLORIDE 103 04/26/2021    CO2 16 (L) 01/07/2023    CO2 25 07/22/2022    CO2 22 04/26/2021    ANIONGAP 22 (H) 01/07/2023    ANIONGAP 8  07/22/2022    ANIONGAP 9 04/26/2021     (H) 01/07/2023     (H) 07/22/2022    GLC 94 04/26/2021    BUN 31.8 (H) 01/07/2023    BUN 27 07/22/2022    BUN 18 04/26/2021    CR 0.98 (H) 01/07/2023    CR 0.94 04/26/2021    GFRESTIMATED 56 (L) 01/07/2023    GFRESTIMATED 56 (L) 04/26/2021    GFRESTBLACK 64 04/26/2021    FILIPE 10.1 01/07/2023    FILIPE 9.4 04/26/2021        A1C RESULTS:  Lab Results   Component Value Date    A1C 5.5 02/05/2020       INR RESULTS:  Lab Results   Component Value Date    INR 0.97 01/03/2021         Luis Manuel Davis MD, [facility]    CC  No referring provider defined for this encounter.

## 2023-02-28 DIAGNOSIS — E61.1 LOW IRON: ICD-10-CM

## 2023-02-28 RX ORDER — FOLIC ACID 1 MG/1
1 TABLET ORAL DAILY
Qty: 30 TABLET | Refills: 2 | Status: SHIPPED | OUTPATIENT
Start: 2023-02-28 | End: 2023-05-30

## 2023-03-15 ENCOUNTER — NURSE TRIAGE (OUTPATIENT)
Dept: FAMILY MEDICINE | Facility: CLINIC | Age: 88
End: 2023-03-15
Payer: COMMERCIAL

## 2023-03-15 NOTE — TELEPHONE ENCOUNTER
Reason for Disposition    All other urine symptoms    Additional Information    Negative: Shock suspected (e.g., cold/pale/clammy skin, too weak to stand, low BP, rapid pulse)    Negative: Sounds like a life-threatening emergency to the triager    Negative: Followed a female genital area injury (e.g., vagina, vulva)    Negative: Followed a male genital area injury (penis, scrotum)    Negative: Vaginal discharge    Negative: Pus (white, yellow) or bloody discharge from end of penis    Negative: Pain or burning with passing urine (urination) and pregnant    Negative: Pain or burning with passing urine (urination) and female    Negative: Pain or burning with passing urine (urination) and male    Negative: Pain or itching in the vulvar area    Negative: Pain in scrotum is main symptom    Negative: Blood in the urine is main symptom    Negative: Symptoms arising from use of a urinary catheter (e.g., coude, Momin)    Negative: Unable to urinate (or only a few drops) > 4 hours and bladder feels very full (e.g., palpable bladder or strong urge to urinate)    Negative: Decreased urination and drinking very little and dehydration suspected (e.g., dark urine, no urine > 12 hours, very dry mouth, very lightheaded)    Negative: Patient sounds very sick or weak to the triager    Negative: Fever > 100.4 F  (38.0 C)    Negative: Side (flank) or lower back pain present    Negative: Can't control passage of urine (i.e., urinary incontinence) and new-onset (< 2 weeks) or worsening    Negative: Urinating more frequently than usual (i.e., frequency)    Negative: Bad or foul-smelling urine    Negative: Patient wants to be seen    Negative: Can't control passage of urine (i.e., urinary incontinence, wetting self) and present > 2 weeks    Negative: Urination is difficult to start (i.e., hesitancy) or straining    Negative: Dribbling (losing urine) just after finishing urination (i.e., post-void dribbling)    Negative: Has to get out of  "bed to urinate > 2 times a night (i.e., nocturia)    Answer Assessment - Initial Assessment Questions  1. SYMPTOM: \"What's the main symptom you're concerned about?\" (e.g., frequency, incontinence)      Odor, occasionally dark in color   2. ONSET: \"When did the odor start?\"      2 weeks, seems to come and go   3. PAIN: \"Is there any pain?\" If Yes, ask: \"How bad is it?\" (Scale: 1-10; mild, moderate, severe)      Denies all pain, no back, flank, abdominal pain.  No burning or pain with urination   4. CAUSE: \"What do you think is causing the symptoms?\"      \"I just don't know\"  5. OTHER SYMPTOMS: \"Do you have any other symptoms?\" (e.g., fever, flank pain, blood in urine, pain with urination)      None just as above -DENIES - frequency, urgency, No burning with urination  6. PREGNANCY: \"Is there any chance you are pregnant?\" \"When was your last menstrual period?\"      NA    Protocols used: URINARY SYMPTOMS-A-OH      "

## 2023-03-16 ENCOUNTER — TRANSFERRED RECORDS (OUTPATIENT)
Dept: HEALTH INFORMATION MANAGEMENT | Facility: CLINIC | Age: 88
End: 2023-03-16

## 2023-03-24 DIAGNOSIS — K52.9 CHRONIC DIARRHEA: ICD-10-CM

## 2023-03-24 RX ORDER — LOPERAMIDE HCL 2 MG
2 CAPSULE ORAL 3 TIMES DAILY PRN
Qty: 30 CAPSULE | Refills: 1 | Status: SHIPPED | OUTPATIENT
Start: 2023-03-24 | End: 2023-05-22

## 2023-03-24 NOTE — TELEPHONE ENCOUNTER
"See below pt calling back continues to have \"Odor to my urine'  She continues to deny back, flank or abdominal pain.  No fever.  No burning with urination.  No frequency, urgency or hesitation.  She is able to void a full steam while urinating.   No blood or discoloration to the urine, \"although I think sometimes it looks cloudy\"    Pt advised at this time should be seen.   Advised UC pt declined.  She also did not want to do virtual visit.  Scheduled with PT on 3/29 advised sooner visit with any of the above symptoms.  Pt expressed understanding and acceptance of the plan. had no further questions at this time.  Advised can call back to clinic at any time with concerns.     Ronda Miller, RN           "

## 2023-03-28 NOTE — PROGRESS NOTES
"  Assessment & Plan   See after visit summary and result note from studies for helpful information and advice given to patient.    Acute cystitis without hematuria    - cephALEXin (KEFLEX) 500 MG capsule  Dispense: 14 capsule; Refill: 0    Abnormal urine odor               BMI:   Estimated body mass index is 25.33 kg/m  as calculated from the following:    Height as of this encounter: 1.6 m (5' 3\").    Weight as of this encounter: 64.9 kg (143 lb).           Manjit Sousa DO  [facility] is a 87 year old, presenting for the following health issues:  UTI  No flowsheet data found.  History of Present Illness       Reason for visit:  UTI  Symptom onset:  1-3 days ago  Symptoms include:  Pressure  Symptom intensity:  Moderate  Symptom progression:  Staying the same  Had these symptoms before:  Yes  Has tried/received treatment for these symptoms:  Yes  Previous treatment was successful:  Yes  Prior treatment description:  Abx    She eats 2-3 servings of fruits and vegetables daily.She consumes 1 sweetened beverage(s) daily.She exercises with enough effort to increase her heart rate 20 to 29 minutes per day.  She exercises with enough effort to increase her heart rate 4 days per week.   She is taking medications regularly.               Review of Systems   Check in questions and answers reviewed. Patient is seen for chief concern of possible urinary tract infection.    Has had a strong, offending urine smell for about 3-4 weeks.     Does not have urinary urgency or dysuria. No recent fever.         Objective    BP (!) 126/36   Pulse 64   Temp 97.6  F (36.4  C) (Oral)   Resp 22   Ht 1.6 m (5' 3\")   Wt 64.9 kg (143 lb)   SpO2 96%   BMI 25.33 kg/m    Body mass index is 25.33 kg/m .  Physical Exam   Vital signs reviewed.  Patient is in no acute appearing distress.  Breathing appears nonlabored.  Patient is alert and oriented ×3.  Patient is very pleasant, making good eye " contact and responding with clear fluent speech.    Heart: Heart rate is regular without murmur.    Lungs: Lungs are clear to auscultation with good airflow bilaterally.    Back: No areas of tenderness.    Skin: Warm and dry.    Results for orders placed or performed in visit on 03/29/23   UA Macro with Reflex to Micro and Culture - lab collect     Status: Abnormal    Specimen: Urine, Clean Catch   Result Value Ref Range    Color Urine Yellow Colorless, Straw, Light Yellow, Yellow    Appearance Urine Slightly Cloudy (A) Clear    Glucose Urine Negative Negative mg/dL    Bilirubin Urine Negative Negative    Ketones Urine Negative Negative mg/dL    Specific Gravity Urine 1.015 1.003 - 1.035    Blood Urine Negative Negative    pH Urine 5.0 5.0 - 7.0    Protein Albumin Urine Negative Negative mg/dL    Urobilinogen Urine 0.2 0.2, 1.0 E.U./dL    Nitrite Urine Positive (A) Negative    Leukocyte Esterase Urine Small (A) Negative   Albumin Random Urine Quantitative with Creat Ratio     Status: Abnormal   Result Value Ref Range    Creatinine Urine mg/dL 99.2 mg/dL    Albumin Urine mg/L 33.7 mg/L    Albumin Urine mg/g Cr 33.97 (H) 0.00 - 25.00 mg/g Cr   Urine Microscopic Exam     Status: Abnormal   Result Value Ref Range    Bacteria Urine Many (A) None Seen /HPF    RBC Urine 0-2 0-2 /HPF /HPF    WBC Urine 25-50 (A) 0-5 /HPF /HPF    Squamous Epithelials Urine Few (A) None Seen /LPF    Mucus Urine Present (A) None Seen /LPF   Urine Culture     Status: Abnormal    Specimen: Urine, Clean Catch   Result Value Ref Range    Culture >100,000 CFU/mL Escherichia coli (A)        Susceptibility    Escherichia coli - ELIF     Ampicillin <=2 Susceptible ug/mL     Ampicillin/ Sulbactam <=2 Susceptible ug/mL     Piperacillin/Tazobactam <=4 Susceptible ug/mL     Cefazolin* <=4 Susceptible ug/mL      * Cefazolin ELIF breakpoints are for the treatment of uncomplicated urinary tract infections. For the treatment of systemic infections, please  contact the laboratory for additional testing.     Cefoxitin <=4 Susceptible ug/mL     Ceftazidime <=1 Susceptible ug/mL     Ceftriaxone <=1 Susceptible ug/mL     Cefepime <=1 Susceptible ug/mL     Gentamicin <=1 Susceptible ug/mL     Tobramycin <=1 Susceptible ug/mL     Ciprofloxacin <=0.25 Susceptible ug/mL     Levofloxacin <=0.12 Susceptible ug/mL     Nitrofurantoin <=16 Susceptible ug/mL     Trimethoprim/Sulfamethoxazole <=1/19 Susceptible ug/mL

## 2023-03-29 ENCOUNTER — OFFICE VISIT (OUTPATIENT)
Dept: FAMILY MEDICINE | Facility: CLINIC | Age: 88
End: 2023-03-29
Payer: COMMERCIAL

## 2023-03-29 VITALS
WEIGHT: 143 LBS | RESPIRATION RATE: 22 BRPM | HEIGHT: 63 IN | TEMPERATURE: 97.6 F | BODY MASS INDEX: 25.34 KG/M2 | DIASTOLIC BLOOD PRESSURE: 36 MMHG | HEART RATE: 64 BPM | OXYGEN SATURATION: 96 % | SYSTOLIC BLOOD PRESSURE: 126 MMHG

## 2023-03-29 DIAGNOSIS — N30.00 ACUTE CYSTITIS WITHOUT HEMATURIA: Primary | ICD-10-CM

## 2023-03-29 DIAGNOSIS — R82.90 ABNORMAL URINE ODOR: ICD-10-CM

## 2023-03-29 LAB
ALBUMIN UR-MCNC: NEGATIVE MG/DL
APPEARANCE UR: ABNORMAL
BACTERIA #/AREA URNS HPF: ABNORMAL /HPF
BILIRUB UR QL STRIP: NEGATIVE
COLOR UR AUTO: YELLOW
CREAT UR-MCNC: 99.2 MG/DL
GLUCOSE UR STRIP-MCNC: NEGATIVE MG/DL
HGB UR QL STRIP: NEGATIVE
KETONES UR STRIP-MCNC: NEGATIVE MG/DL
LEUKOCYTE ESTERASE UR QL STRIP: ABNORMAL
MICROALBUMIN UR-MCNC: 33.7 MG/L
MICROALBUMIN/CREAT UR: 33.97 MG/G CR (ref 0–25)
MUCOUS THREADS #/AREA URNS LPF: PRESENT /LPF
NITRATE UR QL: POSITIVE
PH UR STRIP: 5 [PH] (ref 5–7)
RBC #/AREA URNS AUTO: ABNORMAL /HPF
SP GR UR STRIP: 1.01 (ref 1–1.03)
SQUAMOUS #/AREA URNS AUTO: ABNORMAL /LPF
UROBILINOGEN UR STRIP-ACNC: 0.2 E.U./DL
WBC #/AREA URNS AUTO: ABNORMAL /HPF

## 2023-03-29 PROCEDURE — 87186 SC STD MICRODIL/AGAR DIL: CPT | Performed by: FAMILY MEDICINE

## 2023-03-29 PROCEDURE — 99213 OFFICE O/P EST LOW 20 MIN: CPT | Performed by: FAMILY MEDICINE

## 2023-03-29 PROCEDURE — 87086 URINE CULTURE/COLONY COUNT: CPT | Performed by: FAMILY MEDICINE

## 2023-03-29 PROCEDURE — 81001 URINALYSIS AUTO W/SCOPE: CPT | Performed by: FAMILY MEDICINE

## 2023-03-29 PROCEDURE — 82570 ASSAY OF URINE CREATININE: CPT | Performed by: FAMILY MEDICINE

## 2023-03-29 PROCEDURE — 82043 UR ALBUMIN QUANTITATIVE: CPT | Performed by: FAMILY MEDICINE

## 2023-03-29 RX ORDER — DORZOLAMIDE HCL 20 MG/ML
1 SOLUTION/ DROPS OPHTHALMIC EVERY 8 HOURS
COMMUNITY
Start: 2022-09-15 | End: 2023-03-29

## 2023-03-29 RX ORDER — BRIMONIDINE TARTRATE 1 MG/ML
1 SOLUTION/ DROPS OPHTHALMIC EVERY 8 HOURS
COMMUNITY
Start: 2022-09-15 | End: 2023-03-29

## 2023-03-29 RX ORDER — BRINZOLAMIDE 10 MG/ML
SUSPENSION/ DROPS OPHTHALMIC
Status: ON HOLD | COMMUNITY
End: 2023-06-24

## 2023-03-29 NOTE — PATIENT INSTRUCTIONS
I will review your studies via [employer] when they are available. If you have any questions or concerns please let me know via [employer], or call the clinic at  [phone].

## 2023-03-30 LAB — BACTERIA UR CULT: ABNORMAL

## 2023-03-31 RX ORDER — CEPHALEXIN 500 MG/1
500 CAPSULE ORAL 2 TIMES DAILY
Qty: 14 CAPSULE | Refills: 0 | Status: SHIPPED | OUTPATIENT
Start: 2023-03-31 | End: 2023-04-07

## 2023-04-10 ENCOUNTER — VIRTUAL VISIT (OUTPATIENT)
Dept: FAMILY MEDICINE | Facility: CLINIC | Age: 88
End: 2023-04-10
Payer: COMMERCIAL

## 2023-04-10 ENCOUNTER — TELEPHONE (OUTPATIENT)
Dept: FAMILY MEDICINE | Facility: CLINIC | Age: 88
End: 2023-04-10
Payer: COMMERCIAL

## 2023-04-10 DIAGNOSIS — N89.8 VAGINAL DISCHARGE: Primary | ICD-10-CM

## 2023-04-10 DIAGNOSIS — R35.0 URINARY FREQUENCY: ICD-10-CM

## 2023-04-10 PROCEDURE — 99214 OFFICE O/P EST MOD 30 MIN: CPT | Mod: 95 | Performed by: FAMILY MEDICINE

## 2023-04-10 NOTE — TELEPHONE ENCOUNTER
"Pt calling she would like repeat UA    She is still having some urine leaking but also having \"discharge\"  She states the discharge comes and goes every few weeks.     Denies fever, back or flank pain.   No vaginal itching.   No odor or dark color to urine.  No urgency or frequency at this time.      PAL advised she need to do visit may have more than just UTI -   Pt states she can not do E visit.   She is going to try and get into [place] and if unable will likely be seen in UC at [place].     PAL advised to call back to PAL if need appt at , expressed understanding and acceptance of the plan. had no further questions at this time.  Advised can call back to clinic at any time with concerns.        Ronda Miller, RN         "

## 2023-04-10 NOTE — PROGRESS NOTES
Swathi is a 87 year old who is being evaluated via a billable telephone visit.      What phone number would you like to be contacted at? [phone]   How would you like to obtain your AVS? MyChart    Distant Location (provider location):  On-site    Assessment & Plan       ICD-10-CM    1. Vaginal discharge  N89.8 Wet prep - Clinic Collect     UA with Microscopic reflex to Culture - lab collect     Urogenital Ureaplasma and Mycoplasma Species by PCR      2. Urinary frequency  R35.0 Wet prep - Clinic Collect     UA with Microscopic reflex to Culture - lab collect     Urogenital Ureaplasma and Mycoplasma Species by PCR        Patient was recently diagnosed with acute urinary tract infection and was treated with Keflex.  She reports vaginal discharge associated with urinary frequency.  Urinalysis, wet prep and Ureaplasma/mycoplasma labs were ordered.  Advised patient to schedule an appointment with her primary care provider for pelvic examination.      Michelle Rodriguez MD  [facility]   Swathi is a 87 year old, presenting for the following health issues:  UTI    HPI     Genitourinary - Female  Onset/Duration: About 3 weeks ago she had suspicions of a UTI. She went into a provider, and the dx was confirmed. Swathi is concerned for persistent infection and concerned for discharge.   Description:   Painful urination (Dysuria): No           Frequency: No  Blood in urine (Hematuria): No  Delay in urine (Hesitency): No  Intensity: mild  Progression of Symptoms:  improving  Accompanying Signs & Symptoms:  Fever/chills: No  Flank pain: YES- but related to musculoskeletal concerns  Nausea and vomiting: No  Vaginal symptoms: discharge  Abdominal/Pelvic Pain: No  History:   History of frequent UTI s: YES  History of kidney stones: No  Sexually Active: No  Possibility of pregnancy: No  Precipitating or alleviating factors: None  Therapies tried and outcome:  abx     Patient had an odor in the urine  for three weeks.  She was seen and evaluated on March 29 and had a urinalysis which was consistent with acute urinary tract infection.  Culture results revealed E. Coli.   The patient reports that urine order resolved after completing the course of antibiotics.  She is noticing a clear/white vaginal discharge. It is associated with urinary urgency.  Patient recalls having an episode of vaginal discharge in the past but it normally resolves spontaneously. This time, the discharge persisted and it is causing urinary frequency/urgency.      Denies any itching or pain. Denies any pelvic pressure or flank pain. Denies fevers or chills. Denies any blood in the urine.     Review of Systems   Constitutional, HEENT, cardiovascular, pulmonary, gi and gu systems are negative, except as otherwise noted.      Objective           Vitals:  No vitals were obtained today due to virtual visit.    Physical Exam   healthy, alert and no distress  PSYCH: Alert and oriented times 3; coherent speech, normal   rate and volume, able to articulate logical thoughts, able   to abstract reason, no tangential thoughts, no hallucinations   or delusions  Her affect is normal  RESP: No cough, no audible wheezing, able to talk in full sentences  Remainder of exam unable to be completed due to telephone visits    No results found for any visits on 04/10/23.          Phone call duration: 14 minutes

## 2023-04-11 ENCOUNTER — TRANSFERRED RECORDS (OUTPATIENT)
Dept: HEALTH INFORMATION MANAGEMENT | Facility: CLINIC | Age: 88
End: 2023-04-11

## 2023-04-11 ENCOUNTER — LAB (OUTPATIENT)
Dept: LAB | Facility: CLINIC | Age: 88
End: 2023-04-11
Payer: COMMERCIAL

## 2023-04-11 DIAGNOSIS — E78.2 MIXED HYPERLIPIDEMIA: Primary | ICD-10-CM

## 2023-04-11 DIAGNOSIS — R79.89 D-DIMER, ELEVATED: ICD-10-CM

## 2023-04-11 DIAGNOSIS — N89.8 VAGINAL DISCHARGE: ICD-10-CM

## 2023-04-11 DIAGNOSIS — R35.0 URINARY FREQUENCY: ICD-10-CM

## 2023-04-11 DIAGNOSIS — H40.003 GLAUCOMA SUSPECT, BILATERAL: ICD-10-CM

## 2023-04-11 LAB
ALBUMIN UR-MCNC: NEGATIVE MG/DL
APPEARANCE UR: CLEAR
BACTERIA #/AREA URNS HPF: ABNORMAL /HPF
BILIRUB UR QL STRIP: NEGATIVE
CHOLEST SERPL-MCNC: 162 MG/DL
CLUE CELLS: ABNORMAL
COLOR UR AUTO: YELLOW
D DIMER PPP FEU-MCNC: 0.66 UG/ML FEU (ref 0–0.5)
GLUCOSE UR STRIP-MCNC: NEGATIVE MG/DL
HDLC SERPL-MCNC: 83 MG/DL
HGB UR QL STRIP: ABNORMAL
KETONES UR STRIP-MCNC: NEGATIVE MG/DL
LDLC SERPL CALC-MCNC: 64 MG/DL
LEUKOCYTE ESTERASE UR QL STRIP: ABNORMAL
NITRATE UR QL: NEGATIVE
NONHDLC SERPL-MCNC: 79 MG/DL
PH UR STRIP: 5.5 [PH] (ref 5–7)
RBC #/AREA URNS AUTO: ABNORMAL /HPF
SP GR UR STRIP: 1.01 (ref 1–1.03)
SQUAMOUS #/AREA URNS AUTO: ABNORMAL /LPF
TRICHOMONAS, WET PREP: ABNORMAL
TRIGL SERPL-MCNC: 77 MG/DL
UROBILINOGEN UR STRIP-ACNC: 0.2 E.U./DL
WBC #/AREA URNS AUTO: ABNORMAL /HPF
WBC'S/HIGH POWER FIELD, WET PREP: ABNORMAL
YEAST, WET PREP: ABNORMAL

## 2023-04-11 PROCEDURE — 85379 FIBRIN DEGRADATION QUANT: CPT

## 2023-04-11 PROCEDURE — 36415 COLL VENOUS BLD VENIPUNCTURE: CPT

## 2023-04-11 PROCEDURE — 87210 SMEAR WET MOUNT SALINE/INK: CPT

## 2023-04-11 PROCEDURE — 87798 DETECT AGENT NOS DNA AMP: CPT | Mod: 90

## 2023-04-11 PROCEDURE — 87563 M. GENITALIUM AMP PROBE: CPT | Mod: 90

## 2023-04-11 PROCEDURE — 80061 LIPID PANEL: CPT

## 2023-04-11 PROCEDURE — 81001 URINALYSIS AUTO W/SCOPE: CPT

## 2023-04-11 PROCEDURE — 99000 SPECIMEN HANDLING OFFICE-LAB: CPT

## 2023-04-11 RX ORDER — BRIMONIDINE TARTRATE 1 MG/ML
SOLUTION/ DROPS OPHTHALMIC
Qty: 5 ML | Refills: 97 | Status: SHIPPED | OUTPATIENT
Start: 2023-04-11 | End: 2024-04-21

## 2023-04-12 ENCOUNTER — NURSE TRIAGE (OUTPATIENT)
Dept: NURSING | Facility: CLINIC | Age: 88
End: 2023-04-12
Payer: COMMERCIAL

## 2023-04-12 DIAGNOSIS — R30.0 DYSURIA: ICD-10-CM

## 2023-04-12 DIAGNOSIS — L75.0 URINARY BODY ODOR: Primary | ICD-10-CM

## 2023-04-12 NOTE — TELEPHONE ENCOUNTER
Nurse Triage SBAR    Is this a 2nd Level Triage? NO    Situation: Patient calling with vaginal sx of itching .  Consent: not needed    Background: Pt states since yesterday now her vagina and outside itches like heck.  Wondering if she has a yeast infection     Assessment:   Pt denies frequent urination, Denies burning with urination  Pt states she called Dr Eckert nurse a few days ago - noted a strong odor with urination at the time.  States it had been going on for 2 weeks when she called.  She states she spoke with a provider who ordered labs and this did show:      Since this lab was done, pt now reporting itching both inside and outside of vagina.  States this began yesterday.    Denies pain.    Denies any urinary or vaginal bleeding.    Denies fever.      Pt states she worries she may have the start of a yeast infection as it's not itching.  States this started yesterday - pt will wait for lab culture to return to know if it's a UTI or something else.      Protocol Recommended Disposition:   Home Care for possible Yeast Infection     Recommendation: Advised patient to do home care. Home care reviewed.  . Reviewed concerning symptoms and when to call back.       Michelle Raman RN [facility] 4/12/2023 9:56 AM    Reason for Disposition    MODERATE-SEVERE itching (i.e., interferes with school, work, or sleep)    Additional Information    Negative: Sounds like a life-threatening emergency to the triager    Negative: Followed a genital area injury (e.g., vagina, vulva)    Negative: Vaginal bleeding is main symptom    Negative: Vaginal discharge is main symptom    Negative: Pain or burning with passing urine (urination) is main symptom    Negative: Menstrual cramps is main symptom    Negative: Abdomen pain is main symptom    Negative: Pubic lice suspected    Negative: Itching or rash of external female genital area (vulva)    Negative: Labor suspected    Negative: Patient sounds very sick or weak  to the triager    Negative: SEVERE pain and not improved 2 hours after pain medicine    Negative: Genital area looks infected (e.g., draining sore, spreading redness) and fever    Negative: Something is hanging out of the vagina and can't easily be pushed back inside    Negative: MILD-MODERATE pain and present > 24 hours  (Exception: Chronic pain.)    Negative: Genital area looks infected (e.g., draining sore, spreading redness)    Negative: Rash with painful tiny water blisters    Protocols used: VAGINAL SYMPTOMS-A-OH

## 2023-04-12 NOTE — RESULT ENCOUNTER NOTE
Dear Swathi,   Your urine sample was positive for blood and leukocyte esterase. This is consistent with an acute urinary tract infection. I placed an order for urine culture. We will contact you as soon as we have your culture results.     Best Regards  Michelle Rodriguez MD

## 2023-04-13 ENCOUNTER — TELEPHONE (OUTPATIENT)
Dept: FAMILY MEDICINE | Facility: CLINIC | Age: 88
End: 2023-04-13
Payer: COMMERCIAL

## 2023-04-13 LAB
M GENITALIUM DNA SPEC QL NAA+PROBE: NOT DETECTED
M HOMINIS DNA SPEC QL NAA+PROBE: NOT DETECTED
U PARVUM DNA SPEC QL NAA+PROBE: NOT DETECTED
U UREALYTICUM DNA SPEC QL NAA+PROBE: NOT DETECTED

## 2023-04-13 NOTE — TELEPHONE ENCOUNTER
Spoke with lab,   Unable to do the UC as the provider added it too lat and did not order as add on.   Test done in urgent care on 4/11 pt was having symptoms.     Can you treat without or do you want her to come back and leave another urine?    Ronda Miller, RN

## 2023-04-13 NOTE — TELEPHONE ENCOUNTER
I spoke with the patient over the phone. She already treated the yeast infection with Monistat. Declined a prescription for Diflucan.  For the UA, she desires to submit another sample. New orders placed and she was scheduled for an appointment tomorrow.   MD Mk

## 2023-04-13 NOTE — TELEPHONE ENCOUNTER
Patient  Called.  She is looking for results of her blood and urin tests.    Gave patient the following message:    D-dimer is almost back to normal. This does not appear to be related to a blood clot, but please let me know of any concerning symptoms.      JH    Explained some other labs are pending.    At that moment our phone got disconnected.  Tried calling the patient back but it went straight to a message that I couldn't leave a VM.    Will route note to clinic.  Patient is wanting to know about the urine culture.    Cortney Clemons RN   04/12/23 7:28 PM  [facility] Nurse [facility]    Reason for Disposition    Caller requesting routine or non-urgent lab result    Additional Information    Negative: Lab calling with strep throat test results and triager can call in prescription    Negative: Lab calling with urinalysis test results and triager can call in prescription    Negative: Medication questions    Negative: Medication renewal and refill questions    Negative: Pre-operative or pre-procedural questions    Negative: ED call to PCP (i.e., primary care provider; doctor, NP, or PA)    Negative: Doctor (or NP/PA) call to PCP    Negative: Call about patient who is currently hospitalized    Negative: Lab or radiology calling with CRITICAL test results    Negative: [1] Follow-up call from patient regarding patient's clinical status AND [2] information urgent    Negative: [1] Caller requests to speak ONLY to PCP AND [2] URGENT question    Negative: [1] Caller requests to speak to PCP now AND [2] won't tell us reason for call  (Exception: If 10 pm to 6 am, caller must first discuss reason for the call.)    Negative: Notification of hospital admission    Negative: Notification of death    Negative: Caller requesting lab results  (Exception: Routine or non-urgent lab result.)    Negative: Lab or radiology calling with test results    Negative: [1] Follow-up call from patient regarding patient's clinical status  AND [2] information NON-URGENT    Negative: [1] Caller requests to speak ONLY to PCP AND [2] NON-URGENT question    Negative: Caller requesting an appointment, triage offered and declined    Protocols used: PCP CALL - NO TRIAGE-A-AH

## 2023-04-14 ENCOUNTER — LAB (OUTPATIENT)
Dept: LAB | Facility: CLINIC | Age: 88
End: 2023-04-14
Payer: COMMERCIAL

## 2023-04-14 DIAGNOSIS — R35.0 URINARY FREQUENCY: ICD-10-CM

## 2023-04-14 DIAGNOSIS — R30.0 DYSURIA: ICD-10-CM

## 2023-04-14 LAB
ALBUMIN UR-MCNC: NEGATIVE MG/DL
APPEARANCE UR: CLEAR
BACTERIA #/AREA URNS HPF: ABNORMAL /HPF
BILIRUB UR QL STRIP: NEGATIVE
COLOR UR AUTO: YELLOW
GLUCOSE UR STRIP-MCNC: NEGATIVE MG/DL
HGB UR QL STRIP: NEGATIVE
KETONES UR STRIP-MCNC: NEGATIVE MG/DL
LEUKOCYTE ESTERASE UR QL STRIP: NEGATIVE
NITRATE UR QL: NEGATIVE
PH UR STRIP: 5.5 [PH] (ref 5–7)
RBC #/AREA URNS AUTO: ABNORMAL /HPF
SP GR UR STRIP: 1.01 (ref 1–1.03)
SQUAMOUS #/AREA URNS AUTO: ABNORMAL /LPF
UROBILINOGEN UR STRIP-ACNC: 0.2 E.U./DL
WBC #/AREA URNS AUTO: ABNORMAL /HPF

## 2023-04-14 PROCEDURE — 81001 URINALYSIS AUTO W/SCOPE: CPT

## 2023-04-14 PROCEDURE — 87086 URINE CULTURE/COLONY COUNT: CPT

## 2023-04-16 LAB — BACTERIA UR CULT: NO GROWTH

## 2023-04-17 ENCOUNTER — TELEPHONE (OUTPATIENT)
Dept: FAMILY MEDICINE | Facility: CLINIC | Age: 88
End: 2023-04-17
Payer: COMMERCIAL

## 2023-04-17 NOTE — TELEPHONE ENCOUNTER
LM on home phone  with negative  UA/UC results and call back to PAL with questions.     Ronda Miller RN

## 2023-04-17 NOTE — RESULT ENCOUNTER NOTE
Dear Swathi,   Here are your recent results. All results are within normal limits.  Your urine analysis does not reveal any signs of infection.  A few bacteria and epithelial cells are likely a contaminant.    Best Regards   Michelle Rodriguez MD

## 2023-04-17 NOTE — TELEPHONE ENCOUNTER
Urine analysis microscopy and cultures does not reveal any signs of infection at this time.  A few bacteria and epithelial cells are likely a contaminant.  There is no need for any medications at this time.    MD Jennifer

## 2023-04-17 NOTE — TELEPHONE ENCOUNTER
"Pt called back she states she is shocked no UTI as \"it burns when I urinate\"      PAL suggested urology \"I thinking for now I will just watch and see what happens\"       Ronda Miller RN     "

## 2023-04-17 NOTE — TELEPHONE ENCOUNTER
Patient contacting clinic requesting follow up on results. No result not in chart currently. See also nurse triage encounter 4/12/23.  Please review and advise.     Shankar VELOZ RN 4/17/2023 at 9:02 AM

## 2023-04-20 ENCOUNTER — PATIENT OUTREACH (OUTPATIENT)
Dept: CARE COORDINATION | Facility: CLINIC | Age: 88
End: 2023-04-20
Payer: COMMERCIAL

## 2023-05-12 ENCOUNTER — TELEPHONE (OUTPATIENT)
Dept: FAMILY MEDICINE | Facility: CLINIC | Age: 88
End: 2023-05-12
Payer: COMMERCIAL

## 2023-05-12 NOTE — TELEPHONE ENCOUNTER
"Pt calls back. She didn't remember the conversation with Gunjan the nurse today, until reminded by this writer.     She reports she feels \"weak\".   She was at [facility] in [place] for her \"nerves\".     She can only schedule on a Monday, Tuesday or Wednesday.     Scheduled her on Monday at 11 AM with Dr Vanessa.     Updated Care everywhere.     Will route back to PAL RN as FYI.       "

## 2023-05-12 NOTE — TELEPHONE ENCOUNTER
"VM received on PAL's phone for an FYI from Gunjan, nurse at [facility] stating pt recently returned from stay in [place] for . Pt advised to Gunjan \"not feeling well\". Gunjan assessed pt and advised pt to make appt with PCP within next week and advised to present to ER if feeling got worse. /50 P 70.    LVMTCB to schedule follow up with PCP.    Mary CHRISTIE RN    "

## 2023-05-15 ENCOUNTER — OFFICE VISIT (OUTPATIENT)
Dept: FAMILY MEDICINE | Facility: CLINIC | Age: 88
End: 2023-05-15
Payer: COMMERCIAL

## 2023-05-15 VITALS
WEIGHT: 144 LBS | SYSTOLIC BLOOD PRESSURE: 114 MMHG | RESPIRATION RATE: 16 BRPM | BODY MASS INDEX: 25.52 KG/M2 | OXYGEN SATURATION: 97 % | DIASTOLIC BLOOD PRESSURE: 44 MMHG | TEMPERATURE: 97.8 F | HEART RATE: 55 BPM | HEIGHT: 63 IN

## 2023-05-15 DIAGNOSIS — N30.00 ACUTE CYSTITIS WITHOUT HEMATURIA: ICD-10-CM

## 2023-05-15 DIAGNOSIS — F41.9 ANXIETY: ICD-10-CM

## 2023-05-15 DIAGNOSIS — F33.1 MODERATE EPISODE OF RECURRENT MAJOR DEPRESSIVE DISORDER (H): ICD-10-CM

## 2023-05-15 DIAGNOSIS — I10 ESSENTIAL HYPERTENSION: ICD-10-CM

## 2023-05-15 DIAGNOSIS — Z09 HOSPITAL DISCHARGE FOLLOW-UP: Primary | ICD-10-CM

## 2023-05-15 DIAGNOSIS — E78.2 MIXED HYPERLIPIDEMIA: ICD-10-CM

## 2023-05-15 DIAGNOSIS — N89.8 VAGINAL DISCHARGE: ICD-10-CM

## 2023-05-15 DIAGNOSIS — R30.0 DYSURIA: ICD-10-CM

## 2023-05-15 LAB
ALBUMIN UR-MCNC: ABNORMAL MG/DL
APPEARANCE UR: ABNORMAL
BACTERIA #/AREA URNS HPF: ABNORMAL /HPF
BILIRUB UR QL STRIP: NEGATIVE
COLOR UR AUTO: YELLOW
GLUCOSE UR STRIP-MCNC: NEGATIVE MG/DL
HGB UR QL STRIP: NEGATIVE
KETONES UR STRIP-MCNC: NEGATIVE MG/DL
LEUKOCYTE ESTERASE UR QL STRIP: ABNORMAL
NITRATE UR QL: POSITIVE
PH UR STRIP: 5.5 [PH] (ref 5–7)
RBC #/AREA URNS AUTO: ABNORMAL /HPF
SP GR UR STRIP: 1.01 (ref 1–1.03)
SQUAMOUS #/AREA URNS AUTO: ABNORMAL /LPF
UROBILINOGEN UR STRIP-ACNC: 0.2 E.U./DL
WBC #/AREA URNS AUTO: ABNORMAL /HPF
WBC CLUMPS #/AREA URNS HPF: PRESENT /HPF

## 2023-05-15 PROCEDURE — 81001 URINALYSIS AUTO W/SCOPE: CPT | Performed by: FAMILY MEDICINE

## 2023-05-15 PROCEDURE — 87086 URINE CULTURE/COLONY COUNT: CPT | Performed by: FAMILY MEDICINE

## 2023-05-15 PROCEDURE — 99214 OFFICE O/P EST MOD 30 MIN: CPT | Performed by: FAMILY MEDICINE

## 2023-05-15 PROCEDURE — 87186 SC STD MICRODIL/AGAR DIL: CPT | Performed by: FAMILY MEDICINE

## 2023-05-15 RX ORDER — AMLODIPINE BESYLATE 10 MG/1
10 TABLET ORAL DAILY
Qty: 90 TABLET | Refills: 3 | Status: SHIPPED | OUTPATIENT
Start: 2023-05-15

## 2023-05-15 RX ORDER — LISINOPRIL 20 MG/1
20 TABLET ORAL DAILY
Qty: 90 TABLET | Refills: 3 | Status: SHIPPED | OUTPATIENT
Start: 2023-05-15 | End: 2024-05-11

## 2023-05-15 RX ORDER — NITROFURANTOIN 25; 75 MG/1; MG/1
100 CAPSULE ORAL 2 TIMES DAILY
Qty: 14 CAPSULE | Refills: 0 | Status: ON HOLD | OUTPATIENT
Start: 2023-05-15 | End: 2023-06-24

## 2023-05-15 NOTE — PROGRESS NOTES
"  Assessment & Plan     Hospital discharge follow-up    Anxiety - doing well, working on setting better boundaries with neighbors    Moderate episode of recurrent major depressive disorder (H) - following with psychiatry    Dysuria - she is having some pain with urination  - UA Macroscopic with reflex to Microscopic and Culture; Future  - UA Macroscopic with reflex to Microscopic and Culture    Essential hypertension - controlled, continue current  - amLODIPine (NORVASC) 10 MG tablet; Take 1 tablet (10 mg) by mouth daily  - lisinopril (ZESTRIL) 20 MG tablet; Take 1 tablet (20 mg) by mouth daily      Post Medication Reconciliation Status:  Discharge medications reconciled, continue medications without change    Cortney Vanessa MD  [facility] DEANGELO Echevarria is a 87 year old, presenting for the following health issues:  Hospital F/U (Anxiety)         View : No data to display.              \[facility] [place] [facility]\""   Hospital Follow-up Visit:    Hospital/Nursing Home/IP Rehab Facility: [facility]  Date of Admission: 05/09/2023  Date of Discharge: 05/12/2023  Reason(s) for Admission: Anxiety    Was your hospitalization related to COVID-19? No   Problems taking medications regularly:  None  Medication changes since discharge: None  Problems adhering to non-medication therapy:  None    Summary of hospitalization:  CareEverywhere information obtained and reviewed  Diagnostic Tests/Treatments reviewed.  Follow up needed: none  Other Healthcare Providers Involved in Patient s Care:         None  Update since discharge: improved.         Plan of care communicated with patient and family               Review of Systems   Constitutional, HEENT, cardiovascular, pulmonary, gi and gu systems are negative, except as otherwise noted.      Objective    /44 (Cuff Size: Adult Regular)   Pulse 55   Temp 97.8  F (36.6  C) (Oral)   Resp 16   Ht 1.6 m (5' 3\")   Wt 65.3 kg (144 lb)   SpO2 97%   BMI 25.51 kg/m  "   Body mass index is 25.51 kg/m .  Physical Exam   GENERAL: healthy, alert and no distress  PSYCH: mentation appears normal, affect normal/bright

## 2023-05-16 ENCOUNTER — TELEPHONE (OUTPATIENT)
Dept: FAMILY MEDICINE | Facility: CLINIC | Age: 88
End: 2023-05-16
Payer: COMMERCIAL

## 2023-05-16 LAB — BACTERIA UR CULT: ABNORMAL

## 2023-05-16 RX ORDER — SIMVASTATIN 10 MG
10 TABLET ORAL AT BEDTIME
Qty: 90 TABLET | Refills: 3 | Status: SHIPPED | OUTPATIENT
Start: 2023-05-16 | End: 2024-05-22

## 2023-05-16 RX ORDER — HYDRALAZINE HYDROCHLORIDE 25 MG/1
25 TABLET, FILM COATED ORAL 3 TIMES DAILY
Qty: 270 TABLET | Refills: 2 | Status: SHIPPED | OUTPATIENT
Start: 2023-05-16 | End: 2024-03-28

## 2023-05-16 NOTE — TELEPHONE ENCOUNTER
Routing refill request to provider for review/approval because:  Drug interaction warning    Ronda Miller, RN

## 2023-05-16 NOTE — TELEPHONE ENCOUNTER
Pt called to confirm she has UTI and needs to start antibiotic     Review results and advised treatment.  Will follow up based on UC and PAL will check in later this week    Pt expressed understanding and acceptance of the plan. had no further questions at this time.  Advised can call back to clinic at any time with concerns.       Ronda Miller RN

## 2023-05-22 DIAGNOSIS — K52.9 CHRONIC DIARRHEA: ICD-10-CM

## 2023-05-22 RX ORDER — LOPERAMIDE HCL 2 MG
2 CAPSULE ORAL 3 TIMES DAILY PRN
Qty: 30 CAPSULE | Refills: 1 | Status: SHIPPED | OUTPATIENT
Start: 2023-05-22 | End: 2023-07-17

## 2023-05-30 ENCOUNTER — TELEPHONE (OUTPATIENT)
Dept: FAMILY MEDICINE | Facility: CLINIC | Age: 88
End: 2023-05-30
Payer: COMMERCIAL

## 2023-05-30 DIAGNOSIS — R35.0 URINARY FREQUENCY: Primary | ICD-10-CM

## 2023-05-30 DIAGNOSIS — E61.1 LOW IRON: ICD-10-CM

## 2023-05-30 RX ORDER — FOLIC ACID 1 MG/1
1 TABLET ORAL DAILY
Qty: 30 TABLET | Refills: 11 | Status: SHIPPED | OUTPATIENT
Start: 2023-05-30 | End: 2024-05-30

## 2023-05-30 NOTE — TELEPHONE ENCOUNTER
"Pt calling she has noted increased urine incontinence recently.   She has been wearing a pad for years but not \"it's driving me batty and I have to change it more often\"    She denies any UTI symptoms,  No odor, color change or pain.  No itching or other vaginal symptoms     She states about 20 years ago she saw urology \"and he did some kind of surgery for incontinence and I had been good for a long time\"    Ok for referral to urology?   Referral pended.    Ronda Mliler RN      "

## 2023-05-31 NOTE — TELEPHONE ENCOUNTER
Spoke with pt and advised of referral being sent. Expressed understanding and acceptance of the plan. Had no further questions at this time.  Advised can call back to clinic at any time with concerns.     Mary CHRISTIE RN

## 2023-06-02 ENCOUNTER — HEALTH MAINTENANCE LETTER (OUTPATIENT)
Age: 88
End: 2023-06-02

## 2023-06-23 ENCOUNTER — NURSE TRIAGE (OUTPATIENT)
Dept: FAMILY MEDICINE | Facility: CLINIC | Age: 88
End: 2023-06-23
Payer: COMMERCIAL

## 2023-06-23 ENCOUNTER — NURSE TRIAGE (OUTPATIENT)
Dept: NURSING | Facility: CLINIC | Age: 88
End: 2023-06-23
Payer: COMMERCIAL

## 2023-06-23 NOTE — TELEPHONE ENCOUNTER
Reason for Disposition    Chest pain or 'angina' comes and goes and is happening more often (increasing in frequency) or getting worse (increasing in severity) (Exception: chest pains that last only a few seconds)    Additional Information    Negative: SEVERE difficulty breathing (e.g., struggling for each breath, speaks in single words)    Negative: Passed out (i.e., fainted, collapsed and was not responding)    Negative: Difficult to awaken or acting confused (e.g., disoriented, slurred speech)    Negative: Shock suspected (e.g., cold/pale/clammy skin, too weak to stand, low BP, rapid pulse)    Negative: Chest pain lasting longer than 5 minutes and ANY of the following:* Over 44 years old* Over 30 years old and at least one cardiac risk factor (e.g., diabetes mellitus, high blood pressure, high cholesterol, smoker, or strong family history of heart disease)* History of heart disease (i.e., angina, heart attack, heart failure, bypass surgery, takes nitroglycerin)* Pain is crushing, pressure-like, or heavy    Negative: Heart beating < 50 beats per minute OR > 140 beats per minute    Negative: Visible sweat on face or sweat dripping down face    Negative: Sounds like a life-threatening emergency to the triager    Negative: Followed an injury to chest    Negative: SEVERE chest pain    Negative: Pain also in shoulder(s) or arm(s) or jaw    Negative: Difficulty breathing    Negative: Cocaine use within last 3 days    Negative: Major surgery in the past month    Negative: Hip or leg fracture (broken bone) in past month (or had cast on leg or ankle in past month)    Negative: Illness requiring prolonged bedrest in past month (e.g., immobilization, long hospital stay)    Negative: Long-distance travel in past month (e.g., car, bus, train, plane; with trip lasting 6 or more hours)    Negative: History of prior 'blood clot' in leg or lungs (i.e., deep vein thrombosis, pulmonary embolism)    Negative: History of inherited  "increased risk of blood clots (e.g., Factor 5 Leiden, Anti-thrombin 3, Protein C or Protein S deficiency, Prothrombin mutation)    Negative: Cancer treatment in the past two months (or has cancer now)    Negative: Heart beating irregularly or very rapidly    Answer Assessment - Initial Assessment Questions  1. LOCATION: \"Where does it hurt?\"        Right side about breast -   2. RADIATION: \"Does the pain go anywhere else?\" (e.g., into neck, jaw, arms, back)      Into shoulder arm   3. ONSET: \"When did the chest pain begin?\" (Minutes, hours or days)       3 days   4. PATTERN \"Does the pain come and go, or has it been constant since it started?\"  \"Does it get worse with exertion?\"      Comes and goes but worse over the last 24 hours  5. DURATION: \"How long does it last\" (e.g., seconds, minutes, hours)      Several minutes   6. SEVERITY: \"How bad is the pain?\"  (e.g., Scale 1-10; mild, moderate, or severe)     - MILD (1-3): doesn't interfere with normal activities      - MODERATE (4-7): interferes with normal activities or awakens from sleep     - SEVERE (8-10): excruciating pain, unable to do any normal activities        Mild to Mod   7. CARDIAC RISK FACTORS: \"Do you have any history of heart problems or risk factors for heart disease?\" (e.g., angina, prior heart attack; diabetes, high blood pressure, high cholesterol, smoker, or strong family history of heart disease)      See chart   8. PULMONARY RISK FACTORS: \"Do you have any history of lung disease?\"  (e.g., blood clots in lung, asthma, emphysema, birth control pills)      See chart   9. CAUSE: \"What do you think is causing the chest pain?\"      GUSTAVO injury no recent illness    10. OTHER SYMPTOMS: \"Do you have any other symptoms?\" (e.g., dizziness, nausea, vomiting, sweating, fever, difficulty breathing, cough)        \"I am having some SOB\"   11. PREGNANCY: \"Is there any chance you are pregnant?\" \"When was your last menstrual period?\"        NA    Protocols used: " CHEST PAIN-A-OH

## 2023-06-23 NOTE — TELEPHONE ENCOUNTER
"Pt spoke to son and will not be coming to UC-   She is going to try tylenol and heat and if not improving states \"I will come in if I have too\"      She states son does not feel she needs to come in at this time    FYI to PCP     Ronda Miller RN     "

## 2023-06-24 ENCOUNTER — APPOINTMENT (OUTPATIENT)
Dept: CT IMAGING | Facility: CLINIC | Age: 88
End: 2023-06-24
Attending: EMERGENCY MEDICINE
Payer: COMMERCIAL

## 2023-06-24 ENCOUNTER — HOSPITAL ENCOUNTER (OUTPATIENT)
Facility: CLINIC | Age: 88
Setting detail: OBSERVATION
Discharge: HOME | End: 2023-06-25
Attending: EMERGENCY MEDICINE | Admitting: INTERNAL MEDICINE
Payer: COMMERCIAL

## 2023-06-24 DIAGNOSIS — R07.9 CHEST PAIN, UNSPECIFIED TYPE: ICD-10-CM

## 2023-06-24 DIAGNOSIS — N39.0 UTI (URINARY TRACT INFECTION), BACTERIAL: Primary | ICD-10-CM

## 2023-06-24 DIAGNOSIS — J18.9 PNEUMONIA OF BOTH LUNGS DUE TO INFECTIOUS ORGANISM, UNSPECIFIED PART OF LUNG: ICD-10-CM

## 2023-06-24 DIAGNOSIS — A49.9 UTI (URINARY TRACT INFECTION), BACTERIAL: Primary | ICD-10-CM

## 2023-06-24 LAB
ALBUMIN SERPL BCG-MCNC: 4.2 G/DL (ref 3.5–5.2)
ALBUMIN UR-MCNC: NEGATIVE MG/DL
ALP SERPL-CCNC: 103 U/L (ref 35–104)
ALT SERPL W P-5'-P-CCNC: 9 U/L (ref 0–50)
ANION GAP SERPL CALCULATED.3IONS-SCNC: 10 MMOL/L (ref 7–15)
APPEARANCE UR: CLEAR
AST SERPL W P-5'-P-CCNC: 16 U/L (ref 0–45)
BASOPHILS # BLD AUTO: 0.1 10E3/UL (ref 0–0.2)
BASOPHILS NFR BLD AUTO: 1 %
BILIRUB SERPL-MCNC: 0.2 MG/DL
BILIRUB UR QL STRIP: NEGATIVE
BUN SERPL-MCNC: 22.7 MG/DL (ref 8–23)
CALCIUM SERPL-MCNC: 9.8 MG/DL (ref 8.8–10.2)
CHLORIDE SERPL-SCNC: 101 MMOL/L (ref 98–107)
COLOR UR AUTO: ABNORMAL
CREAT SERPL-MCNC: 0.86 MG/DL (ref 0.51–0.95)
CRP SERPL-MCNC: 9.28 MG/L
DEPRECATED HCO3 PLAS-SCNC: 25 MMOL/L (ref 22–29)
EOSINOPHIL # BLD AUTO: 0.3 10E3/UL (ref 0–0.7)
EOSINOPHIL NFR BLD AUTO: 3 %
ERYTHROCYTE [DISTWIDTH] IN BLOOD BY AUTOMATED COUNT: 13.3 % (ref 10–15)
ERYTHROCYTE [SEDIMENTATION RATE] IN BLOOD BY WESTERGREN METHOD: 32 MM/HR (ref 0–30)
GFR SERPL CREATININE-BSD FRML MDRD: 66 ML/MIN/1.73M2
GLUCOSE SERPL-MCNC: 109 MG/DL (ref 70–99)
GLUCOSE UR STRIP-MCNC: NEGATIVE MG/DL
HCT VFR BLD AUTO: 34.3 % (ref 35–47)
HGB BLD-MCNC: 11.1 G/DL (ref 11.7–15.7)
HGB UR QL STRIP: NEGATIVE
HOLD SPECIMEN: NORMAL
HOLD SPECIMEN: NORMAL
IMM GRANULOCYTES # BLD: 0 10E3/UL
IMM GRANULOCYTES NFR BLD: 0 %
KETONES UR STRIP-MCNC: NEGATIVE MG/DL
LEUKOCYTE ESTERASE UR QL STRIP: ABNORMAL
LYMPHOCYTES # BLD AUTO: 1.4 10E3/UL (ref 0.8–5.3)
LYMPHOCYTES NFR BLD AUTO: 17 %
MCH RBC QN AUTO: 30.2 PG (ref 26.5–33)
MCHC RBC AUTO-ENTMCNC: 32.4 G/DL (ref 31.5–36.5)
MCV RBC AUTO: 93 FL (ref 78–100)
MONOCYTES # BLD AUTO: 0.9 10E3/UL (ref 0–1.3)
MONOCYTES NFR BLD AUTO: 11 %
NEUTROPHILS # BLD AUTO: 5.6 10E3/UL (ref 1.6–8.3)
NEUTROPHILS NFR BLD AUTO: 68 %
NITRATE UR QL: NEGATIVE
NRBC # BLD AUTO: 0 10E3/UL
NRBC BLD AUTO-RTO: 0 /100
NT-PROBNP SERPL-MCNC: 462 PG/ML (ref 0–1800)
PH UR STRIP: 6.5 [PH] (ref 5–7)
PLATELET # BLD AUTO: 266 10E3/UL (ref 150–450)
POTASSIUM SERPL-SCNC: 4.4 MMOL/L (ref 3.4–5.3)
PROCALCITONIN SERPL IA-MCNC: 0.07 NG/ML
PROT SERPL-MCNC: 7.4 G/DL (ref 6.4–8.3)
RBC # BLD AUTO: 3.68 10E6/UL (ref 3.8–5.2)
RBC URINE: 1 /HPF
SODIUM SERPL-SCNC: 136 MMOL/L (ref 136–145)
SP GR UR STRIP: 1 (ref 1–1.03)
SQUAMOUS EPITHELIAL: <1 /HPF
TROPONIN T SERPL HS-MCNC: 18 NG/L
TROPONIN T SERPL HS-MCNC: 21 NG/L
TROPONIN T SERPL HS-MCNC: 24 NG/L
UROBILINOGEN UR STRIP-MCNC: NORMAL MG/DL
WBC # BLD AUTO: 8.2 10E3/UL (ref 4–11)
WBC URINE: 47 /HPF

## 2023-06-24 PROCEDURE — G0378 HOSPITAL OBSERVATION PER HR: HCPCS

## 2023-06-24 PROCEDURE — 99291 CRITICAL CARE FIRST HOUR: CPT | Mod: 25

## 2023-06-24 PROCEDURE — 71275 CT ANGIOGRAPHY CHEST: CPT

## 2023-06-24 PROCEDURE — 85652 RBC SED RATE AUTOMATED: CPT | Performed by: EMERGENCY MEDICINE

## 2023-06-24 PROCEDURE — 96375 TX/PRO/DX INJ NEW DRUG ADDON: CPT

## 2023-06-24 PROCEDURE — 258N000003 HC RX IP 258 OP 636: Performed by: EMERGENCY MEDICINE

## 2023-06-24 PROCEDURE — 36415 COLL VENOUS BLD VENIPUNCTURE: CPT | Performed by: EMERGENCY MEDICINE

## 2023-06-24 PROCEDURE — 87086 URINE CULTURE/COLONY COUNT: CPT | Performed by: EMERGENCY MEDICINE

## 2023-06-24 PROCEDURE — 250N000009 HC RX 250: Performed by: EMERGENCY MEDICINE

## 2023-06-24 PROCEDURE — 250N000011 HC RX IP 250 OP 636: Performed by: EMERGENCY MEDICINE

## 2023-06-24 PROCEDURE — 87040 BLOOD CULTURE FOR BACTERIA: CPT | Mod: XS | Performed by: EMERGENCY MEDICINE

## 2023-06-24 PROCEDURE — 84145 PROCALCITONIN (PCT): CPT | Performed by: EMERGENCY MEDICINE

## 2023-06-24 PROCEDURE — 96374 THER/PROPH/DIAG INJ IV PUSH: CPT | Mod: 59

## 2023-06-24 PROCEDURE — 86140 C-REACTIVE PROTEIN: CPT | Performed by: EMERGENCY MEDICINE

## 2023-06-24 PROCEDURE — 85025 COMPLETE CBC W/AUTO DIFF WBC: CPT | Performed by: EMERGENCY MEDICINE

## 2023-06-24 PROCEDURE — 250N000013 HC RX MED GY IP 250 OP 250 PS 637: Performed by: INTERNAL MEDICINE

## 2023-06-24 PROCEDURE — 84484 ASSAY OF TROPONIN QUANT: CPT | Performed by: EMERGENCY MEDICINE

## 2023-06-24 PROCEDURE — 81001 URINALYSIS AUTO W/SCOPE: CPT | Performed by: EMERGENCY MEDICINE

## 2023-06-24 PROCEDURE — 99223 1ST HOSP IP/OBS HIGH 75: CPT | Mod: AI | Performed by: INTERNAL MEDICINE

## 2023-06-24 PROCEDURE — 93005 ELECTROCARDIOGRAM TRACING: CPT

## 2023-06-24 PROCEDURE — 83880 ASSAY OF NATRIURETIC PEPTIDE: CPT | Performed by: EMERGENCY MEDICINE

## 2023-06-24 PROCEDURE — 80053 COMPREHEN METABOLIC PANEL: CPT | Performed by: EMERGENCY MEDICINE

## 2023-06-24 RX ORDER — IOPAMIDOL 755 MG/ML
500 INJECTION, SOLUTION INTRAVASCULAR ONCE
Status: COMPLETED | OUTPATIENT
Start: 2023-06-24 | End: 2023-06-24

## 2023-06-24 RX ORDER — CEFTRIAXONE 1 G/1
1 INJECTION, POWDER, FOR SOLUTION INTRAMUSCULAR; INTRAVENOUS ONCE
Status: COMPLETED | OUTPATIENT
Start: 2023-06-24 | End: 2023-06-24

## 2023-06-24 RX ORDER — ONDANSETRON 2 MG/ML
4 INJECTION INTRAMUSCULAR; INTRAVENOUS EVERY 6 HOURS PRN
Status: DISCONTINUED | OUTPATIENT
Start: 2023-06-24 | End: 2023-06-25 | Stop reason: HOSPADM

## 2023-06-24 RX ORDER — AMLODIPINE BESYLATE 5 MG/1
10 TABLET ORAL DAILY
Status: DISCONTINUED | OUTPATIENT
Start: 2023-06-24 | End: 2023-06-25 | Stop reason: HOSPADM

## 2023-06-24 RX ORDER — LIDOCAINE 4 G/G
1 PATCH TOPICAL
Status: DISCONTINUED | OUTPATIENT
Start: 2023-06-24 | End: 2023-06-25 | Stop reason: HOSPADM

## 2023-06-24 RX ORDER — OXYCODONE HYDROCHLORIDE 5 MG/1
5 TABLET ORAL EVERY 4 HOURS PRN
Status: DISCONTINUED | OUTPATIENT
Start: 2023-06-24 | End: 2023-06-25 | Stop reason: HOSPADM

## 2023-06-24 RX ORDER — LOPERAMIDE HCL 2 MG
2 CAPSULE ORAL 3 TIMES DAILY PRN
Status: DISCONTINUED | OUTPATIENT
Start: 2023-06-24 | End: 2023-06-25 | Stop reason: HOSPADM

## 2023-06-24 RX ORDER — DULOXETIN HYDROCHLORIDE 30 MG/1
60 CAPSULE, DELAYED RELEASE ORAL DAILY
Status: DISCONTINUED | OUTPATIENT
Start: 2023-06-25 | End: 2023-06-25 | Stop reason: HOSPADM

## 2023-06-24 RX ORDER — LEVOFLOXACIN 500 MG/1
500 TABLET, FILM COATED ORAL DAILY
Status: DISCONTINUED | OUTPATIENT
Start: 2023-06-24 | End: 2023-06-25 | Stop reason: HOSPADM

## 2023-06-24 RX ORDER — ACETAMINOPHEN 325 MG/1
975 TABLET ORAL EVERY 8 HOURS
Status: DISCONTINUED | OUTPATIENT
Start: 2023-06-24 | End: 2023-06-25 | Stop reason: HOSPADM

## 2023-06-24 RX ORDER — LISINOPRIL 10 MG/1
20 TABLET ORAL DAILY
Status: DISCONTINUED | OUTPATIENT
Start: 2023-06-24 | End: 2023-06-25 | Stop reason: HOSPADM

## 2023-06-24 RX ORDER — FOLIC ACID 1 MG/1
1 TABLET ORAL DAILY
Status: DISCONTINUED | OUTPATIENT
Start: 2023-06-25 | End: 2023-06-25 | Stop reason: HOSPADM

## 2023-06-24 RX ORDER — METHYLPREDNISOLONE SODIUM SUCCINATE 125 MG/2ML
125 INJECTION, POWDER, LYOPHILIZED, FOR SOLUTION INTRAMUSCULAR; INTRAVENOUS ONCE
Status: COMPLETED | OUTPATIENT
Start: 2023-06-24 | End: 2023-06-24

## 2023-06-24 RX ORDER — ONDANSETRON 4 MG/1
4 TABLET, ORALLY DISINTEGRATING ORAL EVERY 6 HOURS PRN
Status: DISCONTINUED | OUTPATIENT
Start: 2023-06-24 | End: 2023-06-25 | Stop reason: HOSPADM

## 2023-06-24 RX ORDER — HYDRALAZINE HYDROCHLORIDE 25 MG/1
25 TABLET, FILM COATED ORAL 3 TIMES DAILY
Status: DISCONTINUED | OUTPATIENT
Start: 2023-06-24 | End: 2023-06-25 | Stop reason: HOSPADM

## 2023-06-24 RX ORDER — DIPHENHYDRAMINE HYDROCHLORIDE 50 MG/ML
25 INJECTION INTRAMUSCULAR; INTRAVENOUS ONCE
Status: COMPLETED | OUTPATIENT
Start: 2023-06-24 | End: 2023-06-24

## 2023-06-24 RX ORDER — OXYCODONE HYDROCHLORIDE 5 MG/1
5 TABLET ORAL ONCE
Status: COMPLETED | OUTPATIENT
Start: 2023-06-24 | End: 2023-06-24

## 2023-06-24 RX ORDER — AZITHROMYCIN 500 MG/5ML
500 INJECTION, POWDER, LYOPHILIZED, FOR SOLUTION INTRAVENOUS EVERY 24 HOURS
Status: DISCONTINUED | OUTPATIENT
Start: 2023-06-24 | End: 2023-06-25 | Stop reason: HOSPADM

## 2023-06-24 RX ORDER — OLANZAPINE 5 MG/1
5 TABLET ORAL AT BEDTIME
Status: DISCONTINUED | OUTPATIENT
Start: 2023-06-24 | End: 2023-06-25 | Stop reason: HOSPADM

## 2023-06-24 RX ORDER — SIMVASTATIN 10 MG
10 TABLET ORAL AT BEDTIME
Status: DISCONTINUED | OUTPATIENT
Start: 2023-06-24 | End: 2023-06-25 | Stop reason: HOSPADM

## 2023-06-24 RX ADMIN — LEVOFLOXACIN 500 MG: 500 TABLET, FILM COATED ORAL at 17:29

## 2023-06-24 RX ADMIN — OLANZAPINE 5 MG: 5 TABLET, FILM COATED ORAL at 21:42

## 2023-06-24 RX ADMIN — LIDOCAINE PATCH 4% 1 PATCH: 40 PATCH TOPICAL at 19:53

## 2023-06-24 RX ADMIN — SIMVASTATIN 10 MG: 10 TABLET, FILM COATED ORAL at 21:42

## 2023-06-24 RX ADMIN — AMLODIPINE BESYLATE 10 MG: 5 TABLET ORAL at 19:53

## 2023-06-24 RX ADMIN — AZITHROMYCIN MONOHYDRATE 500 MG: 500 INJECTION, POWDER, LYOPHILIZED, FOR SOLUTION INTRAVENOUS at 18:17

## 2023-06-24 RX ADMIN — ACETAMINOPHEN 975 MG: 325 TABLET, FILM COATED ORAL at 17:29

## 2023-06-24 RX ADMIN — DIPHENHYDRAMINE HYDROCHLORIDE 25 MG: 50 INJECTION INTRAMUSCULAR; INTRAVENOUS at 09:21

## 2023-06-24 RX ADMIN — IOPAMIDOL 54 ML: 755 INJECTION, SOLUTION INTRAVENOUS at 13:48

## 2023-06-24 RX ADMIN — HYDRALAZINE HYDROCHLORIDE 25 MG: 25 TABLET, FILM COATED ORAL at 19:53

## 2023-06-24 RX ADMIN — LISINOPRIL 20 MG: 10 TABLET ORAL at 19:53

## 2023-06-24 RX ADMIN — METHYLPREDNISOLONE SODIUM SUCCINATE 125 MG: 125 INJECTION INTRAMUSCULAR; INTRAVENOUS at 09:18

## 2023-06-24 RX ADMIN — SODIUM CHLORIDE 75 ML: 9 INJECTION, SOLUTION INTRAVENOUS at 13:48

## 2023-06-24 RX ADMIN — CEFTRIAXONE 1 G: 1 INJECTION, POWDER, FOR SOLUTION INTRAMUSCULAR; INTRAVENOUS at 16:31

## 2023-06-24 ASSESSMENT — ACTIVITIES OF DAILY LIVING (ADL)
ADLS_ACUITY_SCORE: 38
ADLS_ACUITY_SCORE: 25
ADLS_ACUITY_SCORE: 38
ADLS_ACUITY_SCORE: 25
ADLS_ACUITY_SCORE: 38
ADLS_ACUITY_SCORE: 25
ADLS_ACUITY_SCORE: 38
ADLS_ACUITY_SCORE: 38

## 2023-06-24 NOTE — ED NOTES
[facility]  ED Nurse Handoff Report    ED Chief complaint: Chest Pain  . ED Diagnosis:   Final diagnoses:   Pneumonia of both lungs due to infectious organism, unspecified part of lung   Chest pain, unspecified type       Allergies:   Allergies   Allergen Reactions     Aspirin Anaphylaxis     Diclofenac Anaphylaxis     Ibuprofen Anaphylaxis     Iodinated Contrast Media Anaphylaxis     Iodine Anaphylaxis     Contrast  Pt states anaphylactic reaction to ct contrast about 20 years ago  Pt premedicated with prednisone and benadryl before iv isouve-370 CT contrast on 1/6/23 and exhibited no signs of reaction     Donepezil      Other reaction(s): Other (see comments)  Cervical dystonia     Lactose Diarrhea       Code Status: Full Code    Activity level - Baseline/Home:  independent.  Activity Level - Current:   assist of 1.   Lift room needed: No.   Bariatric: No   Needed: No   Isolation: No.   Infection: Not Applicable.     Respiratory status: Room air    Vital Signs (within 30 minutes):   Vitals:    06/24/23 1312 06/24/23 1330 06/24/23 1400 06/24/23 1515   BP:   (!) 193/77 (!) 175/78   Pulse: 79 75 80 84   Resp: 16 16 13 14   Temp:       SpO2: 94% 95%  94%   Weight:           Cardiac Rhythm:  ,      Pain level:    Patient confused: No.   Patient Falls Risk: nonskid shoes/slippers when out of bed, arm band in place and patient and family education.   Elimination Status: Has voided     Patient Report - Initial Complaint: cp.   Focused Assessment: pnx     Abnormal Results:   Labs Ordered and Resulted from Time of ED Arrival to Time of ED Departure   COMPREHENSIVE METABOLIC PANEL - Abnormal       Result Value    Sodium 136      Potassium 4.4      Chloride 101      Carbon Dioxide (CO2) 25      Anion Gap 10      Urea Nitrogen 22.7      Creatinine 0.86      Calcium 9.8      Glucose 109 (*)     Alkaline Phosphatase 103      AST 16      ALT 9      Protein Total 7.4      Albumin 4.2       Bilirubin Total 0.2      GFR Estimate 66     TROPONIN T, HIGH SENSITIVITY - Abnormal    Troponin T, High Sensitivity 24 (*)    ROUTINE UA WITH MICROSCOPIC REFLEX TO CULTURE - Abnormal    Color Urine Straw      Appearance Urine Clear      Glucose Urine Negative      Bilirubin Urine Negative      Ketones Urine Negative      Specific Gravity Urine 1.005      Blood Urine Negative      pH Urine 6.5      Protein Albumin Urine Negative      Urobilinogen Urine Normal      Nitrite Urine Negative      Leukocyte Esterase Urine Large (*)     RBC Urine 1      WBC Urine 47 (*)     Squamous Epithelials Urine <1     CBC WITH PLATELETS AND DIFFERENTIAL - Abnormal    WBC Count 8.2      RBC Count 3.68 (*)     Hemoglobin 11.1 (*)     Hematocrit 34.3 (*)     MCV 93      MCH 30.2      MCHC 32.4      RDW 13.3      Platelet Count 266      % Neutrophils 68      % Lymphocytes 17      % Monocytes 11      % Eosinophils 3      % Basophils 1      % Immature Granulocytes 0      NRBCs per 100 WBC 0      Absolute Neutrophils 5.6      Absolute Lymphocytes 1.4      Absolute Monocytes 0.9      Absolute Eosinophils 0.3      Absolute Basophils 0.1      Absolute Immature Granulocytes 0.0      Absolute NRBCs 0.0     TROPONIN T, HIGH SENSITIVITY - Abnormal    Troponin T, High Sensitivity 21 (*)    TROPONIN T, HIGH SENSITIVITY - Abnormal    Troponin T, High Sensitivity 18 (*)    NT PROBNP INPATIENT - Normal    N terminal Pro BNP Inpatient 462     CRP INFLAMMATION   ERYTHROCYTE SEDIMENTATION RATE AUTO   PROCALCITONIN   URINE CULTURE   BLOOD CULTURE   BLOOD CULTURE        CT Chest Pulmonary Embolism w Contrast   Final Result   IMPRESSION:   1.  No evidence of pulmonary embolism.   2.  Mild cardiomegaly and small pericardial effusion.   3.  Moderate atherosclerotic vascular calcification of the coronary arteries.   4.  Basilar pulmonary opacities, left slightly more than right, could be atelectatic or infectious.   5.  2.7 cm partially calcified area in the  left hepatic lobe, not significantly changed as compared to 03/29/2021 exam, although remains indeterminate, likely benign given its interval stability.          Treatments provided: iv abx  Family Comments:   OBS brochure/video discussed/provided to patient:  Yes  ED Medications:   Medications   cefTRIAXone (ROCEPHIN) 1 g vial to attach to  mL bag for ADULTS or NS 50 mL bag for PEDS (has no administration in time range)   azithromycin 500 mg (ZITHROMAX) in 0.9% NaCl 250 mL intermittent infusion 500 mg (has no administration in time range)   methylPREDNISolone sodium succinate (solu-MEDROL) injection 125 mg (125 mg Intravenous $Given 6/24/23 0918)   diphenhydrAMINE (BENADRYL) injection 25 mg (25 mg Intravenous $Given 6/24/23 0921)   sodium chloride for CT scan flush use (75 mLs Intravenous $Given 6/24/23 1348)   iopamidol (ISOVUE-370) solution 500 mL (54 mLs Intravenous $Given 6/24/23 1348)   oxyCODONE (ROXICODONE) tablet 5 mg (5 mg Oral Not Given 6/24/23 1523)       Drips infusing:  Yes  For the majority of the shift this patient was Green.   Interventions performed were .    Sepsis treatment initiated: No    Cares/treatment/interventions/medications to be completed following ED care:     ED Nurse Name: Carina Anguiano RN  3:36 PM  RECEIVING UNIT ED HANDOFF REVIEW    Above ED Nurse Handoff Report was reviewed: Yes  Reviewed by: Sandi Gordon RN on June 24, 2023 at 3:54 PM

## 2023-06-24 NOTE — TELEPHONE ENCOUNTER
Nurse Triage SBAR    Is this a 2nd Level Triage? NO    Situation: Right sided chest pain    Background: Patient began having right-sided chest pain above her breast 3 days ago.  Patient states the pain has become increasingly worse.  Patient now complains of becoming hoarse.  Patient states that the pain radiates into her arm with movement.  Patient has tried a heat pad and Tylenol both have been ineffective for relief      Assessment: Right-sided chest pain    Protocol Recommended Disposition:   Call  Now    Recommendation: Patient verbalized understanding of care advice and states that she will call 911    Negra Burns RN on 6/23/2023 at 10:37 PM      Does the patient meet one of the following criteria for ADS visit consideration? No  Reason for Disposition    [1] Chest pain lasts > 5 minutes AND [2] age > 44    Additional Information    Negative: SEVERE difficulty breathing (e.g., struggling for each breath, speaks in single words)    Negative: Difficult to awaken or acting confused (e.g., disoriented, slurred speech)    Negative: Shock suspected (e.g., cold/pale/clammy skin, too weak to stand, low BP, rapid pulse)    Negative: Passed out (i.e., lost consciousness, collapsed and was not responding)    Protocols used: CHEST PAIN-A-

## 2023-06-24 NOTE — H&P
[facility]    History and Physical - Hospitalist Service       Date of Admission:  6/24/2023    Assessment & Plan      Swathi Dukes is a 87 year old female admitted on 6/24/2023. 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  She is presenting to the emergency department with right-sided reproducible chest wall pain    1/.  Chest wall pain appears to be consistent with costochondritis at this point in time, coincidental with her lifting weights for her new exercise program.  We will treat her with topical lidocaine patch, scheduled Tylenol and as needed oxycodone.  NSAIDs are not an option given her severe anaphylactic reaction to those medication family.  We will also refrain from using topical diclofenac gel.  2/.  Possible community-acquired pneumonia: She has a slight bump in her procalcitonin without any other evidence of pneumonia.  She has received antibiotics I think for good measure it would be not unreasonable to treat her with a few days of levofloxacin.  3/.  Possible UTI, she did have pansensitive E. coli UTI about couple months ago so we are going to administer levofloxacin for presumed pneumonia that should take care of the UTI as well.  /.  Logistics: Patient is being admitted under observation status to a medicine bed.  Does not require serial troponins.  Anticipate a short stay in early discharge possibly tomorrow     Diet: Regular Diet Adult    DVT Prophylaxis: Low Risk/Ambulatory with no VTE prophylaxis indicated  Momin Catheter: Not present  Lines: None     Cardiac Monitoring: None  Code Status: No CPR- Do NOT Intubate      Clinically Significant Risk Factors Present on Admission                  # Hypertension: Noted on problem list                Disposition Plan      Expected Discharge Date: 06/25/2023                  Kate Mcclendon MD  Hospitalist Service  [facility]  Securely message with [employer] (more info)  Text page via [employer] Paging/Directory     ______________________________________________________________________    Chief Complaint   Right-sided chest wall pain    History is obtained from the patient, electronic health record, emergency department physician and patient's family    History of Present Illness   Swathi Dukes is a 87 year old female who has a past medical history of aortic stenosis, hypertension, anxiety disorder requiring hospitalization, DNR/DNI status, glaucoma, 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  Patient is being admitted through the emergency department with above issue which has been ongoing for the last 4 days.  According to available information which was obtained from the patient and her daughter-in-law present at the bedside in addition to the ER provider and electronic records, patient states that she has been having right sided localized chest pain at the edge of her right breastbone for last 4 days.  It is worse with movement and worse when she tries to take a deep breath.  On further questioning she tells me that she had started a new exercise regimen which involves lifting small amount of weights to strengthen her upper body.  The last time she did to his exercise was on Wednesday morning and since that evening she has been having pain.  Patient has multiple drug allergies including anaphylactic reactions to NSAIDs.  Patient has taken nothing for her pain prior to arrival in the ER.  Work-up in the ER was very extensive which included ruling out ACS, she underwent a CT chest PE run after premedication, blood work is essentially unremarkable.  No evidence of pulmonary embolism there were  questionable some opacities  Patient was  pancultured and given IV ceftriaxone and azithromycin requested for admission.  Patient is not febrile, has a dry not new cough, she is not tachycardic or hypotensive.  She is not hypoxic.  At the time of my evaluation of the patient she only complains of the localized nonradiating chest pain which is bothersome to her      Past Medical History    Past Medical History:   Diagnosis Date     Aortic stenosis      Glaucoma      HTN (hypertension)      Hyperlipidemia      Pericardial effusion      Systemic lupus erythematosus        Past Surgical History   Past Surgical History:   Procedure Laterality Date     CV HEART CATHETERIZATION WITH POSSIBLE INTERVENTION N/A 2021    Procedure: Heart Catheterization with Possible Intervention;  Surgeon: Hayden Bedoya MD;  Location:  HEART CARDIAC CATH LAB     CV LEFT VENTRICULOGRAM N/A 2021    Procedure: Left Ventriculogram;  Surgeon: Hayden Bedoya MD;  Location:  HEART CARDIAC CATH LAB       Prior to Admission Medications   Prior to Admission Medications   Prescriptions Last Dose Informant Patient Reported? Taking?   ALPHAGAN P 0.1 % ophthalmic solution   No No   Sig: INSTILL ONE DROP IN EACH EYE TWICE DAILY   D3-50 1.25 MG (99566 UT) capsule   No No   Sig: Take 1 capsule (1,250 mcg) by mouth once a week   Patient taking differently: Take 1,250 mcg by mouth once a week On    DULoxetine (CYMBALTA) 60 MG capsule   Yes No   Sig: Take 60 mg by mouth daily   OLANZapine (ZYPREXA) 5 MG tablet   No No   Sig: Take 1 tablet (5 mg) by mouth At Bedtime   acetaminophen (TYLENOL) 500 MG tablet   Yes No   Sig: Take 1,000 mg by mouth every 8 hours as needed for mild pain   amLODIPine (NORVASC) 10 MG tablet   No No   Sig: Take 1 tablet (10 mg) by mouth daily   brinzolamide (AZOPT) 1 % ophthalmic suspension   Yes No   Si drop into affected eye   dorzolamide (TRUSOPT) 2 % ophthalmic solution   No No   Sig: Place 1 drop into  both eyes 2 times daily   ferrous gluconate (FERGON) 324 (38 Fe) MG tablet   No No   Sig: Take 1 tablet (324 mg) by mouth daily (with breakfast)   folic acid (FOLVITE) 1 MG tablet   No No   Sig: Take 1 tablet (1 mg) by mouth daily   gabapentin (NEURONTIN) 100 MG capsule   Yes No   Sig: Take 200 mg by mouth 3 times daily   hydrALAZINE (APRESOLINE) 25 MG tablet   No No   Sig: Take 1 tablet (25 mg) by mouth 3 times daily   latanoprost (XALATAN) 0.005 % ophthalmic solution   No No   Sig: Place 1 drop into both eyes At Bedtime   lisinopril (ZESTRIL) 20 MG tablet   No No   Sig: Take 1 tablet (20 mg) by mouth daily   loperamide (IMODIUM) 2 MG capsule   No No   Sig: Take 1 capsule (2 mg) by mouth 3 times daily as needed for diarrhea   nitroFURantoin macrocrystal-monohydrate (MACROBID) 100 MG capsule   No No   Sig: Take 1 capsule (100 mg) by mouth 2 times daily   simvastatin (ZOCOR) 10 MG tablet   No No   Sig: Take 1 tablet (10 mg) by mouth At Bedtime      Facility-Administered Medications: None        Review of Systems    The 10 point Review of Systems is negative other than noted in the HPI or here.     Social History   I have reviewed this patient's social history and updated it with pertinent information if needed.  Social History     Tobacco Use     Smoking status: Never     Smokeless tobacco: Never   Vaping Use     Vaping Use: Never used   Substance Use Topics     Alcohol use: Not Currently     Drug use: Never       Family History   I have reviewed this patient's family history and updated it with pertinent information if needed.  Family History   Problem Relation Age of Onset     Diabetes Father        Allergies   Allergies   Allergen Reactions     Aspirin Anaphylaxis     Diclofenac Anaphylaxis     Ibuprofen Anaphylaxis     Iodinated Contrast Media Anaphylaxis     Iodine Anaphylaxis     Contrast  Pt states anaphylactic reaction to ct contrast about 20 years ago  Pt premedicated with prednisone and benadryl before iv  isouve-370 CT contrast on 1/6/23 and exhibited no signs of reaction     Donepezil      Other reaction(s): Other (see comments)  Cervical dystonia     Lactose Diarrhea        Physical Exam   Vital Signs: Temp: 97.2  F (36.2  C)   BP: (!) 175/78 Pulse: 84   Resp: 14 SpO2: 94 %      Weight: 143 lbs 11.84 oz    General Appearance: Alert awake oriented x3 appears younger than stated age  Respiratory: Clear to auscultation  Cardiovascular: S1-S2  GI: Soft nontender bowel sounds are present  Skin: No rash  Other: No obvious neurological deficit  Point tenderness at the right costochondral junction at the right anterior fifth rib    Medical Decision Making       75 MINUTES SPENT BY ME on the date of service doing chart review, history, exam, documentation & further activities per the note.      Data   ------------------------- PAST 24 HR DATA REVIEWED -----------------------------------------------    I have personally reviewed the following data over the past 24 hrs:    8.2  \   11.1 (L)   / 266     136 101 22.7 /  109 (H)   4.4 25 0.86 \       ALT: 9 AST: 16 AP: 103 TBILI: 0.2   ALB: 4.2 TOT PROTEIN: 7.4 LIPASE: N/A       Trop: 18 (H) BNP: 462       Procal: 0.07 (H) CRP: 9.28 (H) Lactic Acid: N/A         Imaging results reviewed over the past 24 hrs:   Recent Results (from the past 24 hour(s))   CT Chest Pulmonary Embolism w Contrast    Narrative    EXAM: CT CHEST PULMONARY EMBOLISM W CONTRAST  LOCATION: [facility]  DATE: 6/24/2023    INDICATION: Pleuritic pain.  COMPARISON: CT chest abdomen pelvis on 01/06/2023 and chest CT on 03/29/2021  TECHNIQUE: CT chest pulmonary angiogram during arterial phase injection of IV contrast. Multiplanar reformats and MIP reconstructions were performed. Dose reduction techniques were used.   CONTRAST: 54mL Isovue 370    FINDINGS:  ANGIOGRAM CHEST: No evidence of pulmonary embolism.    LUNGS AND PLEURA: No pleural effusion or pneumothorax. Basilar pulmonary  opacities, left slightly more than right, could be atelectatic or infectious.    MEDIASTINUM/AXILLAE: Mild cardiomegaly and small pericardial effusion. No significant mediastinal or hilar lymphadenopathy.    CORONARY ARTERY CALCIFICATION: Moderate.    UPPER ABDOMEN: Limited motion of the upper abdomen due to lack of coverage and timing of contrast. There is approximately 2.7 x 1.7 cm partially calcified hypoattenuating area in the posterior aspect of the left hepatic lobe not significantly changed as   compared to 03/29/2021 exam.    MUSCULOSKELETAL: Multilevel degenerative changes of the spine. Mild compression deformity of T7 vertebral body with approximately 30% vertebral height loss.      Impression    IMPRESSION:  1.  No evidence of pulmonary embolism.  2.  Mild cardiomegaly and small pericardial effusion.  3.  Moderate atherosclerotic vascular calcification of the coronary arteries.  4.  Basilar pulmonary opacities, left slightly more than right, could be atelectatic or infectious.  5.  2.7 cm partially calcified area in the left hepatic lobe, not significantly changed as compared to 03/29/2021 exam, although remains indeterminate, likely benign given its interval stability.

## 2023-06-24 NOTE — PHARMACY-ADMISSION MEDICATION HISTORY
Pharmacist Admission Medication History    Admission medication history is complete. The information provided in this note is only as accurate as the sources available at the time of the update.    Medication reconciliation/reorder completed by provider prior to medication history? Yes    Information Source(s): Patient and Family member via in-person    Pertinent Information: None    Changes made to PTA medication list:    Added: None    Deleted: brinzolamide (not taking), vit D3 (discontinued), iron (discontinued), nitrofurantoin (complete)    Changed: None    Medication Affordability:       Allergies reviewed with patient and updates made in EHR: yes    Medication History Completed By: Jt Lamar [facility] 6/24/2023 6:45 PM    Prior to Admission medications    Medication Sig Last Dose Taking? Auth Provider Long Term End Date   acetaminophen (TYLENOL) 500 MG tablet Take 1,000 mg by mouth every 4 hours as needed for mild pain 6/23/2023 Yes Cortney Vanessa MD     ALPHAGAN P 0.1 % ophthalmic solution INSTILL ONE DROP IN EACH EYE TWICE DAILY 6/23/2023 at PM Yes Cortney Vanessa MD     amLODIPine (NORVASC) 10 MG tablet Take 1 tablet (10 mg) by mouth daily 6/23/2023 at AM Yes Cortney Vanessa MD Yes    dorzolamide (TRUSOPT) 2 % ophthalmic solution Place 1 drop into both eyes 2 times daily 6/23/2023 at PM Yes Cortney Vanessa MD     DULoxetine (CYMBALTA) 60 MG capsule Take 60 mg by mouth daily 6/23/2023 at AM Yes Reported, Patient No    folic acid (FOLVITE) 1 MG tablet Take 1 tablet (1 mg) by mouth daily 6/23/2023 at AM Yes Cortney Vanessa MD     gabapentin (NEURONTIN) 100 MG capsule Take 200 mg by mouth 3 times daily 6/23/2023 Yes Cortney Vanessa MD Yes    hydrALAZINE (APRESOLINE) 25 MG tablet Take 1 tablet (25 mg) by mouth 3 times daily 6/23/2023 Yes Cortney Vanessa MD Yes    latanoprost (XALATAN) 0.005 % ophthalmic solution Place 1 drop into both eyes At Bedtime 6/23/2023 at PM Yes  Cortney Vanessa MD Yes    lisinopril (ZESTRIL) 20 MG tablet Take 1 tablet (20 mg) by mouth daily 6/23/2023 at AM Yes Cortney Vanessa MD Yes    loperamide (IMODIUM) 2 MG capsule Take 1 capsule (2 mg) by mouth 3 times daily as needed for diarrhea 6/24/2023 at AM Yes Cotrney Vanessa MD     OLANZapine (ZYPREXA) 5 MG tablet Take 1 tablet (5 mg) by mouth At Bedtime 6/23/2023 at PM Yes Yolis Steen MD Yes    simvastatin (ZOCOR) 10 MG tablet Take 1 tablet (10 mg) by mouth At Bedtime 6/23/2023 at PM Yes Cortney Vanessa MD Yes

## 2023-06-24 NOTE — ED PROVIDER NOTES
History     Chief Complaint:  Chest Pain       The history is provided by the patient and a relative.      Swathi Dukes is a 87 year old female with a history of pulmonary effusion, DVT, hypertension, unstable angina, and aortic valve stenosis who presents with worsening right sided chest pain since 4 days ago. There no injuries, falls, or recent meals around the time of onset. It tends to be exacerbated by breathing, and radiates down her right arm. She has been taking Tylenol every 4 hours with no relief. She is not familiar with this pain, but had a bout of chest pain in January. According to her son, she was seen at the time and was noted to have an elevated Ddimer and Troponin. She was followed by cardiologist afterwards, and but did not have any significant findings. She does not have a history of pulmonary embolism, asthma, or COPD. She denies recent changes to medications, activity, exercise, or diet. She further denies back pain, fever, cough, abdominal pain, rash, or leg swelling.     Independent Historian:   the son    Review of External Notes:   Outpatient note reviewed from March 29, 2023 when the patient was treated with Keflex for cystitis.    Medications:    Norvasc   Cymbalta   Fergon  Gabapentin   Apresoline   Zestril   Imodium   Macrobid   Zyprexa   Zocor     Past Medical History:    Glaucoma   HTN   HLD   Pericardial effusion   Systemic lupus erythematosus   Anxiety   DVT   Mixed obsessional thoughts and acts  Age-related osteoporosis without current pathological fracture  Nonrheumatic aortic valve insufficiency  Chronic diarrhea  Suicidal ideation  Pericardial effusion  Lumbar radiculopathy  Liver lesion  Moderate episode of recurrent major depressive disorder (H)  Nonrheumatic aortic valve stenosis  Thyroid nodule  Epiglottic lesion  CKD (chronic kidney disease) stage 2, GFR 60-89 ml/min  Inflammatory osteoarthritis  Unstable angina     Past Surgical History:    Heart catheterization   "    Physical Exam     Patient Vitals for the past 24 hrs:   BP Temp Temp src Pulse Resp SpO2 Height Weight   06/24/23 1624 (!) 169/68 98  F (36.7  C) Oral 79 15 95 % 1.6 m (5' 2.99\") 64.9 kg (143 lb)   06/24/23 1515 (!) 175/78 -- -- 84 14 94 % -- --   06/24/23 1400 (!) 193/77 -- -- 80 13 -- -- --   06/24/23 1330 -- -- -- 75 16 95 % -- --   06/24/23 1312 -- -- -- 79 16 94 % -- --   06/24/23 1307 -- -- -- 98 27 94 % -- --   06/24/23 1302 -- -- -- 94 18 95 % -- --   06/24/23 1230 (!) 153/75 -- -- 70 11 94 % -- --   06/24/23 1115 (!) 174/85 -- -- 72 -- 97 % -- --   06/24/23 1030 (!) 167/72 -- -- 66 13 -- -- --   06/24/23 0914 (!) 162/69 -- -- 65 26 -- -- --   06/24/23 0823 (!) 164/66 -- -- 62 17 97 % -- --   06/24/23 0750 (!) 179/70 97.2  F (36.2  C) -- 66 20 98 % -- 65.2 kg (143 lb 11.8 oz)        Physical Exam  Constitutional:       General: She is not in acute distress.     Appearance: Normal appearance. She is not diaphoretic.   HENT:      Head: Atraumatic.      Right Ear: Tympanic membrane, ear canal and external ear normal.      Left Ear: Tympanic membrane, ear canal and external ear normal.      Mouth/Throat:      Mouth: Mucous membranes are moist.      Pharynx: Oropharynx is clear.   Eyes:      General: No scleral icterus.     Conjunctiva/sclera: Conjunctivae normal.      Pupils: Pupils are equal, round, and reactive to light.   Cardiovascular:      Rate and Rhythm: Normal rate and regular rhythm.      Comments: Systolic murmur heard across the precordium but most prominent at the left sternal border.  Pulmonary:      Effort: No respiratory distress.      Breath sounds: Normal breath sounds.   Abdominal:      General: Abdomen is flat. There is no distension.      Tenderness: There is no abdominal tenderness.   Musculoskeletal:      Cervical back: Neck supple.      Right lower leg: No edema.      Left lower leg: No edema.      Comments: There is mild tenderness of the right costosternal border.  No overlying " rash.  No crepitance.   Skin:     General: Skin is warm.      Capillary Refill: Capillary refill takes less than 2 seconds.      Findings: No rash.   Neurological:      General: No focal deficit present.      Mental Status: She is alert and oriented to person, place, and time.   Psychiatric:         Mood and Affect: Mood normal.         Behavior: Behavior normal.           Emergency Department Course   ECG  ECG taken at 0756, ECG read at 0758  Sinus rhythm with 1st degree AV block  Moderate voltage criteria for LVH, may be normal variant  Borderline ECG   No significant change as compared to prior, dated 1/6/2023.  Rate 65 bpm. AL interval 238 ms. QRS duration 84 ms. QT/QTc 420/436 ms. P-R-T axes 67 -21 70.     Imaging:  CT Chest Pulmonary Embolism w Contrast  Final Result  IMPRESSION:  1.  No evidence of pulmonary embolism.  2.  Mild cardiomegaly and small pericardial effusion.  3.  Moderate atherosclerotic vascular calcification of the coronary arteries.  4.  Basilar pulmonary opacities, left slightly more than right, could be atelectatic or infectious.  5.  2.7 cm partially calcified area in the left hepatic lobe, not significantly changed as compared to 03/29/2021 exam, although remains indeterminate, likely benign given its interval stability.     Report per radiology    Laboratory:  Labs Ordered and Resulted from Time of ED Arrival to Time of ED Departure   COMPREHENSIVE METABOLIC PANEL - Abnormal       Result Value    Sodium 136      Potassium 4.4      Chloride 101      Carbon Dioxide (CO2) 25      Anion Gap 10      Urea Nitrogen 22.7      Creatinine 0.86      Calcium 9.8      Glucose 109 (*)     Alkaline Phosphatase 103      AST 16      ALT 9      Protein Total 7.4      Albumin 4.2      Bilirubin Total 0.2      GFR Estimate 66     TROPONIN T, HIGH SENSITIVITY - Abnormal    Troponin T, High Sensitivity 24 (*)    ROUTINE UA WITH MICROSCOPIC REFLEX TO CULTURE - Abnormal    Color Urine Straw      Appearance  Urine Clear      Glucose Urine Negative      Bilirubin Urine Negative      Ketones Urine Negative      Specific Gravity Urine 1.005      Blood Urine Negative      pH Urine 6.5      Protein Albumin Urine Negative      Urobilinogen Urine Normal      Nitrite Urine Negative      Leukocyte Esterase Urine Large (*)     RBC Urine 1      WBC Urine 47 (*)     Squamous Epithelials Urine <1     CBC WITH PLATELETS AND DIFFERENTIAL - Abnormal    WBC Count 8.2      RBC Count 3.68 (*)     Hemoglobin 11.1 (*)     Hematocrit 34.3 (*)     MCV 93      MCH 30.2      MCHC 32.4      RDW 13.3      Platelet Count 266      % Neutrophils 68      % Lymphocytes 17      % Monocytes 11      % Eosinophils 3      % Basophils 1      % Immature Granulocytes 0      NRBCs per 100 WBC 0      Absolute Neutrophils 5.6      Absolute Lymphocytes 1.4      Absolute Monocytes 0.9      Absolute Eosinophils 0.3      Absolute Basophils 0.1      Absolute Immature Granulocytes 0.0      Absolute NRBCs 0.0     TROPONIN T, HIGH SENSITIVITY - Abnormal    Troponin T, High Sensitivity 21 (*)    TROPONIN T, HIGH SENSITIVITY - Abnormal    Troponin T, High Sensitivity 18 (*)    CRP INFLAMMATION - Abnormal    CRP Inflammation 9.28 (*)    PROCALCITONIN - Abnormal    Procalcitonin 0.07 (*)    NT PROBNP INPATIENT - Normal    N terminal Pro BNP Inpatient 462     URINE CULTURE   BLOOD CULTURE   BLOOD CULTURE     Emergency Department Course & Assessments:    Interventions:  Medications   cefTRIAXone (ROCEPHIN) 1 g vial to attach to  mL bag for ADULTS or NS 50 mL bag for PEDS (1 g Intravenous $New Bag 6/24/23 1631)   azithromycin 500 mg (ZITHROMAX) in 0.9% NaCl 250 mL intermittent infusion 500 mg (has no administration in time range)   amLODIPine (NORVASC) tablet 10 mg (has no administration in time range)   folic acid (FOLVITE) tablet 1 mg (has no administration in time range)   hydrALAZINE (APRESOLINE) tablet 25 mg (has no administration in time range)   lisinopril  (ZESTRIL) tablet 20 mg (has no administration in time range)   OLANZapine (zyPREXA) tablet 5 mg (has no administration in time range)   simvastatin (ZOCOR) tablet 10 mg (has no administration in time range)   loperamide (IMODIUM) capsule 2 mg (has no administration in time range)   DULoxetine (CYMBALTA) DR capsule 60 mg (has no administration in time range)   melatonin tablet 1 mg (has no administration in time range)   ondansetron (ZOFRAN ODT) ODT tab 4 mg (has no administration in time range)     Or   ondansetron (ZOFRAN) injection 4 mg (has no administration in time range)   acetaminophen (TYLENOL) tablet 975 mg (has no administration in time range)   Lidocaine (LIDOCARE) 4 % Patch 1 patch (has no administration in time range)     And   lidocaine patch in PLACE (has no administration in time range)   levofloxacin (LEVAQUIN) tablet 500 mg (has no administration in time range)   oxyCODONE (ROXICODONE) tablet 5 mg (has no administration in time range)   methylPREDNISolone sodium succinate (solu-MEDROL) injection 125 mg (125 mg Intravenous $Given 6/24/23 0918)   diphenhydrAMINE (BENADRYL) injection 25 mg (25 mg Intravenous $Given 6/24/23 0921)   sodium chloride for CT scan flush use (75 mLs Intravenous $Given 6/24/23 1348)   iopamidol (ISOVUE-370) solution 500 mL (54 mLs Intravenous $Given 6/24/23 1348)   oxyCODONE (ROXICODONE) tablet 5 mg (5 mg Oral Not Given 6/24/23 1523)      Assessments:  0802 I obtained history and examined the patient as noted above.  1505 I rechecked the patient and explained findings. I discussed plan for admission to the hospital.    Consultations/Discussion of Management or Tests:  1522 I spoke with Dr. Mcclendon from [facility], who accepts the patient for admission.    Disposition:  The patient was admitted to the hospital under the care of Dr. Mcclendon.     Impression & Plan    Medical Decision Making:  This patient presents with pleuritic right chest pain that is worsening over  the course of the last 4 days and is becoming increasingly prominent.  She initially declined pain medication but now has been treated with oxycodone.    Differential diagnosis is broad.  EKG does not show ischemic changes.  Troponins have been flat.  The patient has a history of prior large pericardial effusion with pericardial window.  CT scan with a small effusion.  This is consistent with what was seen on the echocardiogram in January.    Given the patient's prior history of DVT I considered a pulmonary embolism.  She has a contrast allergy listed but has been able to tolerate CT contrast with pretreatment.  Therefore she received Solu-Medrol and Benadryl.  She had a CT scan without complication.  This does not demonstrate a pulmonary embolism.  She does have bilateral opacities consistent with possible pneumonia.  The patient had not been coughing on arrival but now I do note that she is coughing when I discussed her test results with her.  She was treated empirically for commune acquired pneumonia.  Blood cultures were obtained and she received ceftriaxone and azithromycin.    The patient is hemodynamically stable.  We discussed potentially managing at home.  The patient feels her pain remains strong and so she will be admitted to observe her while we initiate treatment for pneumonia.    Diagnosis:    ICD-10-CM    1. Pneumonia of both lungs due to infectious organism, unspecified part of lung  J18.9       2. Chest pain, unspecified type  R07.9              Scribe Disclosure:  I, Viktor Waters, am serving as a scribe at 8:51 AM on 6/24/2023 to document services personally performed by Benito Alexandra MD based on my observations and the provider's statements to me.   6/24/2023   Benito Alexandra MD McRoberts, Sean Edward, MD  06/24/23 1640

## 2023-06-24 NOTE — ED NOTES
Multiple attempts at IV access with US and 2 attempts by CT to run scan but unable to run contrast. Vascular access team paged and no return call as of now.  Second nurse attempting IV access with US at this time.

## 2023-06-24 NOTE — PLAN OF CARE
ROOM # 206-2    Living Situation (if not independent, order SW consult): [facility] in [place]  Facility name: NA  : Son Brent    Activity level at baseline: Indep  Activity level on admit: Assist x 1    Who will be transporting you at discharge: Brent    Patient registered to observation; given Patient Bill of Rights; given the opportunity to ask questions about observation status and their plan of care.  Patient has been oriented to the observation room, bathroom and call light is in place.    Discussed discharge goals and expectations with patient/family.  Yes

## 2023-06-24 NOTE — ED TRIAGE NOTES
Patient presents to the ED reporting right sided chest pain that radiates to the right arm x 4 days. States pain is worse with deep breathing.

## 2023-06-25 VITALS
RESPIRATION RATE: 16 BRPM | DIASTOLIC BLOOD PRESSURE: 48 MMHG | OXYGEN SATURATION: 96 % | SYSTOLIC BLOOD PRESSURE: 134 MMHG | WEIGHT: 143 LBS | TEMPERATURE: 98 F | HEIGHT: 63 IN | HEART RATE: 76 BPM | BODY MASS INDEX: 25.34 KG/M2

## 2023-06-25 LAB
BASOPHILS # BLD AUTO: 0 10E3/UL (ref 0–0.2)
BASOPHILS NFR BLD AUTO: 0 %
EOSINOPHIL # BLD AUTO: 0 10E3/UL (ref 0–0.7)
EOSINOPHIL NFR BLD AUTO: 0 %
ERYTHROCYTE [DISTWIDTH] IN BLOOD BY AUTOMATED COUNT: 13.5 % (ref 10–15)
HCT VFR BLD AUTO: 32.6 % (ref 35–47)
HGB BLD-MCNC: 10.4 G/DL (ref 11.7–15.7)
HOLD SPECIMEN: NORMAL
IMM GRANULOCYTES # BLD: 0 10E3/UL
IMM GRANULOCYTES NFR BLD: 0 %
LYMPHOCYTES # BLD AUTO: 1.1 10E3/UL (ref 0.8–5.3)
LYMPHOCYTES NFR BLD AUTO: 14 %
MCH RBC QN AUTO: 29.6 PG (ref 26.5–33)
MCHC RBC AUTO-ENTMCNC: 31.9 G/DL (ref 31.5–36.5)
MCV RBC AUTO: 93 FL (ref 78–100)
MONOCYTES # BLD AUTO: 0.9 10E3/UL (ref 0–1.3)
MONOCYTES NFR BLD AUTO: 12 %
NEUTROPHILS # BLD AUTO: 5.6 10E3/UL (ref 1.6–8.3)
NEUTROPHILS NFR BLD AUTO: 74 %
NRBC # BLD AUTO: 0 10E3/UL
NRBC BLD AUTO-RTO: 0 /100
PLATELET # BLD AUTO: 269 10E3/UL (ref 150–450)
RBC # BLD AUTO: 3.51 10E6/UL (ref 3.8–5.2)
WBC # BLD AUTO: 7.7 10E3/UL (ref 4–11)

## 2023-06-25 PROCEDURE — 250N000013 HC RX MED GY IP 250 OP 250 PS 637: Performed by: PHYSICIAN ASSISTANT

## 2023-06-25 PROCEDURE — G0378 HOSPITAL OBSERVATION PER HR: HCPCS

## 2023-06-25 PROCEDURE — 36415 COLL VENOUS BLD VENIPUNCTURE: CPT | Performed by: INTERNAL MEDICINE

## 2023-06-25 PROCEDURE — 85025 COMPLETE CBC W/AUTO DIFF WBC: CPT | Performed by: INTERNAL MEDICINE

## 2023-06-25 PROCEDURE — 99239 HOSP IP/OBS DSCHRG MGMT >30: CPT | Performed by: PHYSICIAN ASSISTANT

## 2023-06-25 PROCEDURE — 250N000013 HC RX MED GY IP 250 OP 250 PS 637: Performed by: INTERNAL MEDICINE

## 2023-06-25 RX ORDER — LEVOFLOXACIN 500 MG/1
500 TABLET, FILM COATED ORAL DAILY
Qty: 1 TABLET | Refills: 0 | Status: SHIPPED | OUTPATIENT
Start: 2023-06-26 | End: 2023-07-10

## 2023-06-25 RX ORDER — LIDOCAINE 4 G/G
1 PATCH TOPICAL EVERY 24 HOURS
COMMUNITY
Start: 2023-06-25

## 2023-06-25 RX ORDER — NALOXONE HYDROCHLORIDE 0.4 MG/ML
0.4 INJECTION, SOLUTION INTRAMUSCULAR; INTRAVENOUS; SUBCUTANEOUS
Status: DISCONTINUED | OUTPATIENT
Start: 2023-06-25 | End: 2023-06-25 | Stop reason: HOSPADM

## 2023-06-25 RX ORDER — GABAPENTIN 100 MG/1
200 CAPSULE ORAL 3 TIMES DAILY
Status: DISCONTINUED | OUTPATIENT
Start: 2023-06-25 | End: 2023-06-25 | Stop reason: HOSPADM

## 2023-06-25 RX ORDER — NALOXONE HYDROCHLORIDE 0.4 MG/ML
0.2 INJECTION, SOLUTION INTRAMUSCULAR; INTRAVENOUS; SUBCUTANEOUS
Status: DISCONTINUED | OUTPATIENT
Start: 2023-06-25 | End: 2023-06-25 | Stop reason: HOSPADM

## 2023-06-25 RX ADMIN — FOLIC ACID 1 MG: 1 TABLET ORAL at 08:34

## 2023-06-25 RX ADMIN — DULOXETINE HYDROCHLORIDE 60 MG: 30 CAPSULE, DELAYED RELEASE ORAL at 08:33

## 2023-06-25 RX ADMIN — HYDRALAZINE HYDROCHLORIDE 25 MG: 25 TABLET, FILM COATED ORAL at 08:34

## 2023-06-25 RX ADMIN — GABAPENTIN 200 MG: 100 CAPSULE ORAL at 14:21

## 2023-06-25 RX ADMIN — LISINOPRIL 20 MG: 10 TABLET ORAL at 08:34

## 2023-06-25 RX ADMIN — ACETAMINOPHEN 975 MG: 325 TABLET, FILM COATED ORAL at 00:01

## 2023-06-25 RX ADMIN — AMLODIPINE BESYLATE 10 MG: 5 TABLET ORAL at 08:34

## 2023-06-25 RX ADMIN — HYDRALAZINE HYDROCHLORIDE 25 MG: 25 TABLET, FILM COATED ORAL at 14:22

## 2023-06-25 RX ADMIN — LEVOFLOXACIN 500 MG: 500 TABLET, FILM COATED ORAL at 08:34

## 2023-06-25 RX ADMIN — LOPERAMIDE HYDROCHLORIDE 2 MG: 2 CAPSULE ORAL at 09:09

## 2023-06-25 RX ADMIN — ACETAMINOPHEN 975 MG: 325 TABLET, FILM COATED ORAL at 08:33

## 2023-06-25 ASSESSMENT — ACTIVITIES OF DAILY LIVING (ADL)
ADLS_ACUITY_SCORE: 25

## 2023-06-25 NOTE — PLAN OF CARE
"CARE FROM: 1630 - 2300  PRIMARY DIAGNOSIS: Chest Pain  OUTPATIENT/OBSERVATION GOALS TO BE MET BEFORE DISCHARGE:  1. Pain Status: Pain free.    2. Return to near baseline physical activity: No    3. Cleared for discharge by consultants (if involved): No    Discharge Planner Nurse   Safe discharge environment identified: Yes  Barriers to discharge: Yes       Entered by: Sandi Gordon RN 06/24/2023 9:09 PM    Patient A & O x 4, Lung sounds CTA. Bowel sounds active, LBM 6/24. Pt is assist x 1 with walker. Pt denies pain/NV/chest discomfort at this time. CT - chest shows no evidence of pulmonary embolism. It however shows small pericardial effusion. Tele - SR 76.  Plan is to continue with Azthromycin IV daily & Levaquin po daily. Will continue to monitor.  BP (!) 147/71   Pulse 81   Temp 98.1  F (36.7  C) (Oral)   Resp 16   Ht 1.6 m (5' 2.99\")   Wt 64.9 kg (143 lb)   SpO2 94%   BMI 25.34 kg/m    Please review provider order for any additional goals.   Nurse to notify provider when observation goals have been met and patient is ready for discharge.      "

## 2023-06-25 NOTE — PLAN OF CARE
PRIMARY DIAGNOSIS: CHEST PAIN  OUTPATIENT/OBSERVATION GOALS TO BE MET BEFORE DISCHARGE:  1. Ruled out acute coronary syndrome (negative or stable Troponin):  Yes  2. Pain Status: Right sided pain   4. Cleared by Consultants (if applicable):No  5. Return to near baseline physical activity: Yes  Discharge Planner Nurse   Safe discharge environment identified: Yes  Barriers to discharge: Yes       Entered by: Marcella Patrick RN 06/25/2023    Please review provider order for any additional goals.   Nurse to notify provider when observation goals have been met and patient is ready for discharge.  Pt a&o x4. IV sl. On RA, denies sob. Telemetry removed. SBA in room. On regular diet, good appetite. Currently up in recliner for meals, call light within reach. Able to make needs known.

## 2023-06-25 NOTE — PLAN OF CARE
PRIMARY DIAGNOSIS: CHEST PAIN  OUTPATIENT/OBSERVATION GOALS TO BE MET BEFORE DISCHARGE:  1. Ruled out acute coronary syndrome (negative or stable Troponin):  Yes  2. Pain Status: Right sided pain  - Interpretation of cardiac rhythm per telemetry tech: sinus rhythm 90 per tele tech     3. Cleared by Consultants (if applicable):No  4. Return to near baseline physical activity: Yes  Discharge Planner Nurse   Safe discharge environment identified: Yes  Barriers to discharge: Yes       Entered by: Marcella Patrick RN 06/25/2023    Please review provider order for any additional goals.   Nurse to notify provider when observation goals have been met and patient is ready for discharge.    Pt a&o x4. IV sl. On RA, denies sob. On telemetry, has right sided chest pain. SBA in room. Pt ambulated with RN in halls with walker, tolerated well. Per pt ambulation had no effect on R side chest pain. Had loose stool, Imodium given. Currently resting in bed, call light within reach. Able to make needs known.

## 2023-06-25 NOTE — PLAN OF CARE
"PRIMARY DIAGNOSIS:RIGHT SIDED CHEST WALL PAIN  OUTPATIENT/OBSERVATION GOALS TO BE MET BEFORE DISCHARGE:  1. Pain Status: Pain free.    2. Return to near baseline physical activity: No    3. Cleared for discharge by consultants (if involved): No    Discharge Planner Nurse   Safe discharge environment identified: Yes  Barriers to discharge: Yes       Entered by: Yesika Wilks RN 06/25/2023   BP (!) 147/71   Pulse 81   Temp 98.1  F (36.7  C) (Oral)   Resp 16   Ht 1.6 m (5' 2.99\")   Wt 64.9 kg (143 lb)   SpO2 94%   BMI 25.34 kg/m       Please review provider order for any additional goals.   Nurse to notify provider when observation goals have been met and patient is ready for discharge.Goal Outcome Evaluation:                        "

## 2023-06-25 NOTE — PLAN OF CARE
"Patient's After Visit Summary was reviewed with patient and family.   Patient verbalized understanding of After Visit Summary, recommended follow up and was given an opportunity to ask questions.   Discharge medications sent home with patient/family: No, scripts sent to pharmacy   Discharged with son      Pt a&o x4. Iv removed. Steady on feet, denies pain. VSS, on RA. Belongings sent home with pt. WC ride given to private vehicle.     /48 (BP Location: Right arm)   Pulse 76   Temp 98  F (36.7  C) (Oral)   Resp 16   Ht 1.6 m (5' 2.99\")   Wt 64.9 kg (143 lb)   SpO2 96%   BMI 25.34 kg/m        "

## 2023-06-25 NOTE — PLAN OF CARE
"PRIMARY DIAGNOSIS:RIGHT SIDED CHEST WALL PAIN  OUTPATIENT/OBSERVATION GOALS TO BE MET BEFORE DISCHARGE:  1. Pain Status: Pain free.    2. Return to near baseline physical activity: No    3. Cleared for discharge by consultants (if involved): No    Discharge Planner Nurse   Safe discharge environment identified: Yes  Barriers to discharge: Yes       Entered by: Yesika Wilks RN 06/25/2023   BP (!) 85/55 (BP Location: Right arm)   Pulse 66   Temp 98.6  F (37  C) (Oral)   Resp 16   Ht 1.6 m (5' 2.99\")   Wt 64.9 kg (143 lb)   SpO2 95%   BMI 25.34 kg/m     Patient alert & oriented x 4. Pain managed with oral pain meds. Verbalizes decreased pain at rest but hurts with movement. Assist x 1 with walker & gait belt.  Patient on Tele reading SR-70s. On Levaquin & Zithromax. PIV saline locked and on regular diet. Will continue with plan of care,    Please review provider order for any additional goals.   Nurse to notify provider when observation goals have been met and patient is ready for discharge.Goal Outcome Evaluation:                        "

## 2023-06-25 NOTE — DISCHARGE SUMMARY
[facility]    Discharge Summary  Hospitalist    Date of Admission:  6/24/2023  Date of Discharge:  6/25/2023  Provider:  Codie Flores PA-C  Date of Service (when I last saw the patient): 06/25/23    Discharge Diagnoses   Chest wall pain, possibly costochondritis  Possible UTI (acute versus colonization)     Other medical issues:  Past Medical History:   Diagnosis Date     Aortic stenosis      Glaucoma      HTN (hypertension)      Hyperlipidemia      Pericardial effusion      Systemic lupus erythematosus      History of Present Illness   Swathi Dukes is an 87 year old female PMH significant for MDD, hypertension, anxiety previously requiring hospitalization, glaucoma, history of valley fever with resultant pulmonary nodules, and prior pericardial effusion requiring pericardial window who presented to the ED on 4/24/2023 for evaluation of reproducible right-sided chest wall pain. Please see the admission history and physical for full details.    Hospital Course   Swathi Dukes was admitted on 6/24/2023.  The following problems were addressed during her hospitalization:    #Chest wall pain, possible costochondritis: presented with right sided reproducible chest wall pain. Troponin level slightly elevated at 24 -> 21 -> 18. proBNP WNL. CT of chest negative for PE and showed basilar pulmonary opacities, slightly more on right than left, possible atelectasis versus infection. Echocardiogram was not completed due to recently being perfromed in 01/2023 with EF of 60-65% and no WMA and moderate aortic regurgitation. Pain seems to be most consistent with muscular etiology with improvement with pain control.  -continue pain control with PRN Tylenol, lidocaine patch, and icing     #UTI: h/o recurrent UTIs, thus uncertain if positive urine culture is a true acute infection versus colonization.   -initially was not going to send on antibiotics due to lack of symptoms but urine culture has come back  "growing >100,000 E coli (same as recent urine culture from 5/15 that was pansensitive)  -continue course of Levofloxacin for one additional dose  -f/u with gynecology about recurrent UTI     #Concern for community acquired pneumonia: covered for this by ED provider and continued just on Levaquin per admitting provider. No significant respiratory symptoms with CT of chest showing  basilar pulmonary opacities, slightly more on right than left, possible atelectasis versus infection. Afebrile, not hypoxic, no leukocytosis but with mildly elevated procalcitonin and CRP which could be due to UTI. Discussed with patient monitoring symptoms and if onset of respiratory symptoms discussed further antibiotics with PCP.    #HTN: intermittently elevated, stable once back on PTA regimen prior to discharge  -continue PTA regimen with Amlodipine, Hydralazine, and Lisinopril     Called and updated the patient's son prior to discharge with all questions answers. He was comfortable with the patient discharging and able to provide transport.     Pending Results   Unresulted Labs Ordered in the Past 30 Days of this Admission     Date and Time Order Name Status Description    6/24/2023  3:12 PM Blood Culture Wrist, Right Preliminary     6/24/2023  3:12 PM Blood Culture Hand, Left Preliminary     6/24/2023 11:02 AM Urine Culture Preliminary         Code Status   DNR / DNI       Primary Care Physician   Cortney Vanessa    Exam:    /56   Pulse 78   Temp 98.2  F (36.8  C) (Oral)   Resp 17   Ht 1.6 m (5' 2.99\")   Wt 64.9 kg (143 lb)   SpO2 95%   BMI 25.34 kg/m    General: A&Ox3, sitting up in bed, NAD  Lungs: CTAB with slightly decreased breath sounds in lung bases without crackles or wheezing  CV: RRR without murmur  Chest: pain with palpation over right costosternal border  GI: soft, nontender, nondistended, normoactive bowel sounds  Skin: warm, dry, no lesions in visualized areas    Discharge Disposition   Discharged to " home    Consultations This Hospital Stay   None    Time Spent on this Encounter   I, Jessie Flores PA-C, personally saw the patient today and spent greater than 30 minutes discharging this patient.    Discharge Orders      Reason for your hospital stay    You were admitted due to concerns for right-sided chest pain.  Your work-up included basic labs and imaging involving serial heart enzymes (troponin) which were slightly elevated but downward trending, EKG which did not show concern for heart attack, CT of your chest which showed possible decreased air at the bases of your lungs and no blood clot, urinalysis which showed improvement from the one obtained about one month ago, and mildly elevated inflammatory markers.  Suspected that your chest pain is musculoskeletal and possibly related to costochondritis.  Your pain has been stable and improving with pain medication which you can continue at home.  Started on antibiotics for a possible pneumonia and UTI but in setting of no associated respiratory symptoms and improvement in your urinalysis.  Based on no definitive concern for underlying infection, would not continue antibiotics at discharge and have you follow-up with your primary if your symptoms were to change.     Follow-up and recommended labs and tests     Follow up with primary care provider, Cortney Vanessa, within 7-10 days as needed for hospital follow- up if ongoing pain.  No follow up labs or test are needed.     Activity    Your activity upon discharge: activity as tolerated     Discharge Instructions    1. Continue use of over the counter tylenol, lidocaine patches, and ice as needed for ongoing pain     Diet    Follow this diet upon discharge: Orders Placed This Encounter      Regular Diet Adult     Discharge Medications   Current Discharge Medication List      START taking these medications    Details   Lidocaine (LIDOCARE) 4 % Patch Place 1 patch onto the skin every 24 hours To prevent  lidocaine toxicity, patient should be patch free for 12 hrs daily.    Associated Diagnoses: Chest pain, unspecified type         CONTINUE these medications which have NOT CHANGED    Details   acetaminophen (TYLENOL) 500 MG tablet Take 1,000 mg by mouth every 4 hours as needed for mild pain  Qty:        ALPHAGAN P 0.1 % ophthalmic solution INSTILL ONE DROP IN EACH EYE TWICE DAILY  Qty: 5 mL, Refills: 97    Associated Diagnoses: Glaucoma suspect, bilateral      amLODIPine (NORVASC) 10 MG tablet Take 1 tablet (10 mg) by mouth daily  Qty: 90 tablet, Refills: 3    Associated Diagnoses: Essential hypertension      dorzolamide (TRUSOPT) 2 % ophthalmic solution Place 1 drop into both eyes 2 times daily  Qty: 10 mL, Refills: 11    Associated Diagnoses: Glaucoma suspect, bilateral      DULoxetine (CYMBALTA) 60 MG capsule Take 60 mg by mouth daily      folic acid (FOLVITE) 1 MG tablet Take 1 tablet (1 mg) by mouth daily  Qty: 30 tablet, Refills: 11    Associated Diagnoses: Low iron      gabapentin (NEURONTIN) 100 MG capsule Take 200 mg by mouth 3 times daily  Qty: 105 capsule, Refills: 11    Comments: Dr. Parada prescribes this medication for patient  Associated Diagnoses: Low iron      hydrALAZINE (APRESOLINE) 25 MG tablet Take 1 tablet (25 mg) by mouth 3 times daily  Qty: 270 tablet, Refills: 2    Associated Diagnoses: Essential hypertension      latanoprost (XALATAN) 0.005 % ophthalmic solution Place 1 drop into both eyes At Bedtime  Qty: 7.5 mL, Refills: 11    Associated Diagnoses: Glaucoma suspect, bilateral      lisinopril (ZESTRIL) 20 MG tablet Take 1 tablet (20 mg) by mouth daily  Qty: 90 tablet, Refills: 3    Associated Diagnoses: Essential hypertension      loperamide (IMODIUM) 2 MG capsule Take 1 capsule (2 mg) by mouth 3 times daily as needed for diarrhea  Qty: 30 capsule, Refills: 1    Associated Diagnoses: Chronic diarrhea      OLANZapine (ZYPREXA) 5 MG tablet Take 1 tablet (5 mg) by mouth At Bedtime  Qty: 30  tablet, Refills: 0    Associated Diagnoses: Mixed obsessional thoughts and acts      simvastatin (ZOCOR) 10 MG tablet Take 1 tablet (10 mg) by mouth At Bedtime  Qty: 90 tablet, Refills: 3    Associated Diagnoses: Mixed hyperlipidemia           Allergies   Allergies   Allergen Reactions     Aspirin Anaphylaxis     Diclofenac Anaphylaxis     Ibuprofen Anaphylaxis     Iodinated Contrast Media Anaphylaxis     Iodine Anaphylaxis     Contrast  Pt states anaphylactic reaction to ct contrast about 20 years ago  Pt premedicated with prednisone and benadryl before iv isouve-370 CT contrast on 1/6/23 and exhibited no signs of reaction     Donepezil      Other reaction(s): Other (see comments)  Cervical dystonia     Lactose Diarrhea     Data   Results for orders placed or performed during the hospital encounter of 06/24/23   CT Chest Pulmonary Embolism w Contrast     Status: None    Narrative    EXAM: CT CHEST PULMONARY EMBOLISM W CONTRAST  LOCATION: [facility]  DATE: 6/24/2023    INDICATION: Pleuritic pain.  COMPARISON: CT chest abdomen pelvis on 01/06/2023 and chest CT on 03/29/2021  TECHNIQUE: CT chest pulmonary angiogram during arterial phase injection of IV contrast. Multiplanar reformats and MIP reconstructions were performed. Dose reduction techniques were used.   CONTRAST: 54mL Isovue 370    FINDINGS:  ANGIOGRAM CHEST: No evidence of pulmonary embolism.    LUNGS AND PLEURA: No pleural effusion or pneumothorax. Basilar pulmonary opacities, left slightly more than right, could be atelectatic or infectious.    MEDIASTINUM/AXILLAE: Mild cardiomegaly and small pericardial effusion. No significant mediastinal or hilar lymphadenopathy.    CORONARY ARTERY CALCIFICATION: Moderate.    UPPER ABDOMEN: Limited motion of the upper abdomen due to lack of coverage and timing of contrast. There is approximately 2.7 x 1.7 cm partially calcified hypoattenuating area in the posterior aspect of the left hepatic lobe  not significantly changed as   compared to 03/29/2021 exam.    MUSCULOSKELETAL: Multilevel degenerative changes of the spine. Mild compression deformity of T7 vertebral body with approximately 30% vertebral height loss.      Impression    IMPRESSION:  1.  No evidence of pulmonary embolism.  2.  Mild cardiomegaly and small pericardial effusion.  3.  Moderate atherosclerotic vascular calcification of the coronary arteries.  4.  Basilar pulmonary opacities, left slightly more than right, could be atelectatic or infectious.  5.  2.7 cm partially calcified area in the left hepatic lobe, not significantly changed as compared to 03/29/2021 exam, although remains indeterminate, likely benign given its interval stability.   Comprehensive metabolic panel     Status: Abnormal   Result Value Ref Range    Sodium 136 136 - 145 mmol/L    Potassium 4.4 3.4 - 5.3 mmol/L    Chloride 101 98 - 107 mmol/L    Carbon Dioxide (CO2) 25 22 - 29 mmol/L    Anion Gap 10 7 - 15 mmol/L    Urea Nitrogen 22.7 8.0 - 23.0 mg/dL    Creatinine 0.86 0.51 - 0.95 mg/dL    Calcium 9.8 8.8 - 10.2 mg/dL    Glucose 109 (H) 70 - 99 mg/dL    Alkaline Phosphatase 103 35 - 104 U/L    AST 16 0 - 45 U/L    ALT 9 0 - 50 U/L    Protein Total 7.4 6.4 - 8.3 g/dL    Albumin 4.2 3.5 - 5.2 g/dL    Bilirubin Total 0.2 <=1.2 mg/dL    GFR Estimate 66 >60 mL/min/1.73m2   Troponin T, High Sensitivity     Status: Abnormal   Result Value Ref Range    Troponin T, High Sensitivity 24 (H) <=14 ng/L   UA with Microscopic reflex to Culture     Status: Abnormal    Specimen: Urine, Midstream   Result Value Ref Range    Color Urine Straw Colorless, Straw, Light Yellow, Yellow    Appearance Urine Clear Clear    Glucose Urine Negative Negative mg/dL    Bilirubin Urine Negative Negative    Ketones Urine Negative Negative mg/dL    Specific Gravity Urine 1.005 1.003 - 1.035    Blood Urine Negative Negative    pH Urine 6.5 5.0 - 7.0    Protein Albumin Urine Negative Negative mg/dL     Urobilinogen Urine Normal Normal, 2.0 mg/dL    Nitrite Urine Negative Negative    Leukocyte Esterase Urine Large (A) Negative    RBC Urine 1 <=2 /HPF    WBC Urine 47 (H) <=5 /HPF    Squamous Epithelials Urine <1 <=1 /HPF    Narrative    Urine Culture ordered based on laboratory criteria   Extra Tube ([place] Draw)     Status: None    Narrative    The following orders were created for panel order Extra Tube ([place] Draw).  Procedure                               Abnormality         Status                     ---------                               -----------         ------                     Extra Blue Top Tube[[id number]]                              Final result               Extra Red Top Tube[[id number]]                               Final result                 Please view results for these tests on the individual orders.   CBC with platelets and differential     Status: Abnormal   Result Value Ref Range    WBC Count 8.2 4.0 - 11.0 10e3/uL    RBC Count 3.68 (L) 3.80 - 5.20 10e6/uL    Hemoglobin 11.1 (L) 11.7 - 15.7 g/dL    Hematocrit 34.3 (L) 35.0 - 47.0 %    MCV 93 78 - 100 fL    MCH 30.2 26.5 - 33.0 pg    MCHC 32.4 31.5 - 36.5 g/dL    RDW 13.3 10.0 - 15.0 %    Platelet Count 266 150 - 450 10e3/uL    % Neutrophils 68 %    % Lymphocytes 17 %    % Monocytes 11 %    % Eosinophils 3 %    % Basophils 1 %    % Immature Granulocytes 0 %    NRBCs per 100 WBC 0 <1 /100    Absolute Neutrophils 5.6 1.6 - 8.3 10e3/uL    Absolute Lymphocytes 1.4 0.8 - 5.3 10e3/uL    Absolute Monocytes 0.9 0.0 - 1.3 10e3/uL    Absolute Eosinophils 0.3 0.0 - 0.7 10e3/uL    Absolute Basophils 0.1 0.0 - 0.2 10e3/uL    Absolute Immature Granulocytes 0.0 <=0.4 10e3/uL    Absolute NRBCs 0.0 10e3/uL   Extra Blue Top Tube     Status: None   Result Value Ref Range    Hold Specimen JIC    Extra Red Top Tube     Status: None   Result Value Ref Range    Hold Specimen JIC    Troponin T, High Sensitivity     Status: Abnormal   Result Value Ref Range     Troponin T, High Sensitivity 21 (H) <=14 ng/L   Troponin T, High Sensitivity     Status: Abnormal   Result Value Ref Range    Troponin T, High Sensitivity 18 (H) <=14 ng/L   Nt probnp inpatient (BNP)     Status: Normal   Result Value Ref Range    N terminal Pro BNP Inpatient 462 0 - 1,800 pg/mL   CRP inflammation     Status: Abnormal   Result Value Ref Range    CRP Inflammation 9.28 (H) <5.00 mg/L   Erythrocyte sedimentation rate auto     Status: Abnormal   Result Value Ref Range    Erythrocyte Sedimentation Rate 32 (H) 0 - 30 mm/hr   Procalcitonin     Status: Abnormal   Result Value Ref Range    Procalcitonin 0.07 (H) <0.05 ng/mL   CBC with platelets and differential     Status: Abnormal   Result Value Ref Range    WBC Count 7.7 4.0 - 11.0 10e3/uL    RBC Count 3.51 (L) 3.80 - 5.20 10e6/uL    Hemoglobin 10.4 (L) 11.7 - 15.7 g/dL    Hematocrit 32.6 (L) 35.0 - 47.0 %    MCV 93 78 - 100 fL    MCH 29.6 26.5 - 33.0 pg    MCHC 31.9 31.5 - 36.5 g/dL    RDW 13.5 10.0 - 15.0 %    Platelet Count 269 150 - 450 10e3/uL    % Neutrophils 74 %    % Lymphocytes 14 %    % Monocytes 12 %    % Eosinophils 0 %    % Basophils 0 %    % Immature Granulocytes 0 %    NRBCs per 100 WBC 0 <1 /100    Absolute Neutrophils 5.6 1.6 - 8.3 10e3/uL    Absolute Lymphocytes 1.1 0.8 - 5.3 10e3/uL    Absolute Monocytes 0.9 0.0 - 1.3 10e3/uL    Absolute Eosinophils 0.0 0.0 - 0.7 10e3/uL    Absolute Basophils 0.0 0.0 - 0.2 10e3/uL    Absolute Immature Granulocytes 0.0 <=0.4 10e3/uL    Absolute NRBCs 0.0 10e3/uL   Extra Tube     Status: None    Narrative    The following orders were created for panel order Extra Tube.  Procedure                               Abnormality         Status                     ---------                               -----------         ------                     Extra Green Top (Lithium...[[id number]]                      Final result                 Please view results for these tests on the individual orders.   Extra Green Top  (Lithium Heparin) Tube     Status: None   Result Value Ref Range    Hold Specimen [facility]    EKG 12 lead     Status: None (Preliminary result)   Result Value Ref Range    Systolic Blood Pressure  mmHg    Diastolic Blood Pressure  mmHg    Ventricular Rate 65 BPM    Atrial Rate 65 BPM    MS Interval 238 ms    QRS Duration 84 ms     ms    QTc 436 ms    P Axis 67 degrees    R AXIS -21 degrees    T Axis 70 degrees    Interpretation ECG       Sinus rhythm with 1st degree A-V block  Moderate voltage criteria for LVH, may be normal variant  Borderline ECG  When compared with ECG of 06-JAN-2023 13:56,  No significant change was found     Urine Culture     Status: Abnormal (Preliminary result)    Specimen: Urine, Midstream   Result Value Ref Range    Culture >100,000 CFU/mL Escherichia coli (A)    Blood Culture Hand, Left     Status: Normal (Preliminary result)    Specimen: Hand, Left; Blood   Result Value Ref Range    Culture No growth after 12 hours    Blood Culture Wrist, Right     Status: Normal (Preliminary result)    Specimen: Wrist, Right; Blood   Result Value Ref Range    Culture No growth after 12 hours     Narrative    Only an Aerobic Blood Culture Bottle was collected, interpret results with caution.      CBC with platelets differential     Status: Abnormal    Narrative    The following orders were created for panel order CBC with platelets differential.  Procedure                               Abnormality         Status                     ---------                               -----------         ------                     CBC with platelets and d...[[id number]]  Abnormal            Final result                 Please view results for these tests on the individual orders.   CBC with platelets differential     Status: Abnormal    Narrative    The following orders were created for panel order CBC with platelets differential.  Procedure                               Abnormality         Status                      ---------                               -----------         ------                     CBC with platelets and d...[[id number]]  Abnormal            Final result                 Please view results for these tests on the individual orders.     Jessie Flores PA-C

## 2023-06-26 LAB
ATRIAL RATE - MUSE: 65 BPM
BACTERIA UR CULT: ABNORMAL
DIASTOLIC BLOOD PRESSURE - MUSE: NORMAL MMHG
INTERPRETATION ECG - MUSE: NORMAL
P AXIS - MUSE: 67 DEGREES
PR INTERVAL - MUSE: 238 MS
QRS DURATION - MUSE: 84 MS
QT - MUSE: 420 MS
QTC - MUSE: 436 MS
R AXIS - MUSE: -21 DEGREES
SYSTOLIC BLOOD PRESSURE - MUSE: NORMAL MMHG
T AXIS - MUSE: 70 DEGREES
VENTRICULAR RATE- MUSE: 65 BPM

## 2023-06-27 ENCOUNTER — APPOINTMENT (OUTPATIENT)
Dept: CT IMAGING | Facility: CLINIC | Age: 88
End: 2023-06-27
Attending: EMERGENCY MEDICINE
Payer: COMMERCIAL

## 2023-06-27 ENCOUNTER — NURSE TRIAGE (OUTPATIENT)
Dept: FAMILY MEDICINE | Facility: CLINIC | Age: 88
End: 2023-06-27
Payer: COMMERCIAL

## 2023-06-27 ENCOUNTER — HOSPITAL ENCOUNTER (EMERGENCY)
Facility: CLINIC | Age: 88
Discharge: HOME | End: 2023-06-27
Attending: EMERGENCY MEDICINE | Admitting: EMERGENCY MEDICINE
Payer: COMMERCIAL

## 2023-06-27 ENCOUNTER — PATIENT OUTREACH (OUTPATIENT)
Dept: CARE COORDINATION | Facility: CLINIC | Age: 88
End: 2023-06-27
Payer: COMMERCIAL

## 2023-06-27 VITALS
SYSTOLIC BLOOD PRESSURE: 132 MMHG | DIASTOLIC BLOOD PRESSURE: 51 MMHG | TEMPERATURE: 98.7 F | WEIGHT: 145.5 LBS | BODY MASS INDEX: 25.78 KG/M2 | OXYGEN SATURATION: 99 % | HEIGHT: 63 IN | RESPIRATION RATE: 16 BRPM | HEART RATE: 64 BPM

## 2023-06-27 DIAGNOSIS — R19.7 DIARRHEA, UNSPECIFIED TYPE: ICD-10-CM

## 2023-06-27 LAB
ALBUMIN SERPL BCG-MCNC: 4.1 G/DL (ref 3.5–5.2)
ALBUMIN UR-MCNC: NEGATIVE MG/DL
ALP SERPL-CCNC: 80 U/L (ref 35–104)
ALT SERPL W P-5'-P-CCNC: 12 U/L (ref 0–50)
ANION GAP SERPL CALCULATED.3IONS-SCNC: 9 MMOL/L (ref 7–15)
APPEARANCE UR: CLEAR
AST SERPL W P-5'-P-CCNC: 22 U/L (ref 0–45)
BASOPHILS # BLD AUTO: 0.1 10E3/UL (ref 0–0.2)
BASOPHILS NFR BLD AUTO: 1 %
BILIRUB SERPL-MCNC: <0.2 MG/DL
BILIRUB UR QL STRIP: NEGATIVE
BUN SERPL-MCNC: 29 MG/DL (ref 8–23)
CALCIUM SERPL-MCNC: 9.2 MG/DL (ref 8.8–10.2)
CHLORIDE SERPL-SCNC: 105 MMOL/L (ref 98–107)
COLOR UR AUTO: ABNORMAL
CREAT SERPL-MCNC: 1.02 MG/DL (ref 0.51–0.95)
DEPRECATED HCO3 PLAS-SCNC: 22 MMOL/L (ref 22–29)
EOSINOPHIL # BLD AUTO: 0.2 10E3/UL (ref 0–0.7)
EOSINOPHIL NFR BLD AUTO: 3 %
ERYTHROCYTE [DISTWIDTH] IN BLOOD BY AUTOMATED COUNT: 13.7 % (ref 10–15)
GFR SERPL CREATININE-BSD FRML MDRD: 54 ML/MIN/1.73M2
GLUCOSE SERPL-MCNC: 101 MG/DL (ref 70–99)
GLUCOSE UR STRIP-MCNC: NEGATIVE MG/DL
HCT VFR BLD AUTO: 29.6 % (ref 35–47)
HGB BLD-MCNC: 9.6 G/DL (ref 11.7–15.7)
HGB UR QL STRIP: NEGATIVE
HOLD SPECIMEN: NORMAL
HOLD SPECIMEN: NORMAL
HYALINE CASTS: 1 /LPF
IMM GRANULOCYTES # BLD: 0 10E3/UL
IMM GRANULOCYTES NFR BLD: 0 %
KETONES UR STRIP-MCNC: NEGATIVE MG/DL
LEUKOCYTE ESTERASE UR QL STRIP: ABNORMAL
LYMPHOCYTES # BLD AUTO: 1.3 10E3/UL (ref 0.8–5.3)
LYMPHOCYTES NFR BLD AUTO: 19 %
MCH RBC QN AUTO: 30.3 PG (ref 26.5–33)
MCHC RBC AUTO-ENTMCNC: 32.4 G/DL (ref 31.5–36.5)
MCV RBC AUTO: 93 FL (ref 78–100)
MONOCYTES # BLD AUTO: 0.8 10E3/UL (ref 0–1.3)
MONOCYTES NFR BLD AUTO: 12 %
MUCOUS THREADS #/AREA URNS LPF: PRESENT /LPF
NEUTROPHILS # BLD AUTO: 4.5 10E3/UL (ref 1.6–8.3)
NEUTROPHILS NFR BLD AUTO: 65 %
NITRATE UR QL: NEGATIVE
NRBC # BLD AUTO: 0 10E3/UL
NRBC BLD AUTO-RTO: 0 /100
PH UR STRIP: 6 [PH] (ref 5–7)
PLATELET # BLD AUTO: 266 10E3/UL (ref 150–450)
POTASSIUM SERPL-SCNC: 5 MMOL/L (ref 3.4–5.3)
PROT SERPL-MCNC: 6.6 G/DL (ref 6.4–8.3)
RBC # BLD AUTO: 3.17 10E6/UL (ref 3.8–5.2)
RBC URINE: <1 /HPF
SODIUM SERPL-SCNC: 136 MMOL/L (ref 136–145)
SP GR UR STRIP: 1.01 (ref 1–1.03)
UROBILINOGEN UR STRIP-MCNC: NORMAL MG/DL
WBC # BLD AUTO: 6.9 10E3/UL (ref 4–11)
WBC URINE: 5 /HPF

## 2023-06-27 PROCEDURE — 80053 COMPREHEN METABOLIC PANEL: CPT | Performed by: EMERGENCY MEDICINE

## 2023-06-27 PROCEDURE — 81003 URINALYSIS AUTO W/O SCOPE: CPT | Performed by: EMERGENCY MEDICINE

## 2023-06-27 PROCEDURE — 258N000003 HC RX IP 258 OP 636: Performed by: EMERGENCY MEDICINE

## 2023-06-27 PROCEDURE — 96361 HYDRATE IV INFUSION ADD-ON: CPT

## 2023-06-27 PROCEDURE — 36415 COLL VENOUS BLD VENIPUNCTURE: CPT | Performed by: EMERGENCY MEDICINE

## 2023-06-27 PROCEDURE — 74176 CT ABD & PELVIS W/O CONTRAST: CPT

## 2023-06-27 PROCEDURE — 96360 HYDRATION IV INFUSION INIT: CPT

## 2023-06-27 PROCEDURE — 85025 COMPLETE CBC W/AUTO DIFF WBC: CPT | Performed by: EMERGENCY MEDICINE

## 2023-06-27 PROCEDURE — 99284 EMERGENCY DEPT VISIT MOD MDM: CPT | Mod: 25

## 2023-06-27 RX ORDER — LOPERAMIDE HCL 2 MG
4 CAPSULE ORAL ONCE
Status: COMPLETED | OUTPATIENT
Start: 2023-06-27 | End: 2023-06-27

## 2023-06-27 RX ADMIN — SODIUM CHLORIDE, POTASSIUM CHLORIDE, SODIUM LACTATE AND CALCIUM CHLORIDE 500 ML: 600; 310; 30; 20 INJECTION, SOLUTION INTRAVENOUS at 15:03

## 2023-06-27 ASSESSMENT — ACTIVITIES OF DAILY LIVING (ADL): ADLS_ACUITY_SCORE: 38

## 2023-06-27 NOTE — PROGRESS NOTES
[facility]    Background: Transitional Care Management program identified per system criteria and reviewed by [facility] team for possible outreach.    Assessment: Upon chart review, CCR Team member will not proceed with patient outreach related to this episode of Transitional Care Management program due to reason below:    Patient has been advised per clinic to go to the ED    Plan: Transitional Care Management episode addressed appropriately per reason noted above.      MARIAH Adamson  [facility] [place] Resource Team does NOT follow patient ongoing. Referrals are identified based on internal discharge reports and the outreach is to ensure patient has an understanding of their discharge instructions.

## 2023-06-27 NOTE — TELEPHONE ENCOUNTER
"Huddled with in-clinic provider who states pt needs to be seen in ER. If son is unable to provider transportation, pt needs to be transported via EMS.     Spoke with pt and advised of provider recommendation. Pt states she will call son 1 more time and if no answer will call for EMS transport. States \"it's uncontrollable\".     Expressed understanding and acceptance of the plan. Had no further questions at this time.  Advised can call back to clinic at any time with concerns.     Message handled by Nurse Triage with [facility] - provider name: Susan Haase CNP.      Mary CHRISTIE RN        "

## 2023-06-27 NOTE — ED PROVIDER NOTES
History     Chief Complaint:  Diarrhea       HPI   Swathi Dukes is a 87 year old female with history of hypertension who presents with green diarrhea that started yesterday. The patient took 4 imodium yesterday which did not help. She is experiencing right sided abdominal pain and denies having a surgical delivery.    Of note, the patient was given one dose of Rocephin and 1 dose of oral Levaquin at the hospital and one dose for discharge and it was never ready at the pharmacy.  The patient was felt to have a urinary tract infection, the possibility of community-acquired pneumonia was raised based on some clinical crackles but definitive treatment was not prescribed for this.    Independent Historian:   Son reports the patient having a history of c diff (3-4 times) a couple of years ago.  This was treated with intravenous followed by oral vancomycin.    Review of External Notes:   The patient was admitted on 6/24 and discharged on 6/25 for chest wall pain and a possible UTI. She did have a urinalysis greater than one hundred thousand CF and was placed on levofloxacin. There was a concern for community acquired pneumonia.      Medications:    Norvasc  Cymbalta  Neurontin  Apresoline  Levaquin  Lidocare  Zestril  Zyprexa  Zocor  Fergon    Past Medical History:    Aortic stenosis  Glaucoma  Hypertension  Hyperlipidemia  Pericardial effusion  Systemic lupus erythematosus  Iron deficiency anemia  Thyroid nodule  Osteoporosis  Depression  Chronic pain of left knee  CKD, stage 2  Liver lesion  Recurrent UTI  Anxiety  Chronic diarrhea  Suicidal ideation  Pneumonia of both lungs  Unstable Angina  Dyspnea on exertion  DVT right calf    Past Surgical History:    CV heart catheterization with possible intervention  CV left ventriculogram   Other converted SHX x3  Appendectomy    Physical Exam     Patient Vitals for the past 24 hrs:   BP Temp Temp src Pulse Resp SpO2 Height Weight   06/27/23 1658 132/51 -- -- 64 16 99 % --  "--   06/27/23 1442 124/51 -- -- -- -- 96 % -- --   06/27/23 1427 129/50 -- -- 60 -- 96 % -- --   06/27/23 1203 125/49 98.7  F (37.1  C) Oral 61 18 96 % 1.6 m (5' 3\") 66 kg (145 lb 8.1 oz)        Physical Exam  General: Resting on the gurney  Head:  The scalp, face, and head appear normal  Eyes:  The pupils are equal, round, and reactive to light    There is no nystagmus    Extraocular muscles are intact    Conjunctivae and sclerae are normal  ENT:    The nose is normal    Pinnae are normal    The oropharynx is normal    Uvula is in the midline  Neck:  Normal range of motion    There is no rigidity noted    There is no midline cervical spine pain/tenderness    Trachea is in the midline    No mass is detected  CV:  Regular rate and underlying rhythm     Normal S1/S2, no S3/S4    No pathological murmur detected  Resp:  Lungs are clear    There is no tachypnea    Non-labored    No rales    No wheezing     Faint right, inferior, posterior crackles  GI:  Abdomen is soft, there is no rigidity    No tympani    No rebound tenderness     Non-surgical without peritoneal features    Mild abdominal distension    No focal pain  MS:  Normal muscular tone    Symmetric motor strength    No major joint effusions    No asymmetric leg swelling, no calf tenderness  Skin:  No rash or acute skin lesions noted  Neuro: Speech is normal and fluent  Psych:  Awake. Alert.      Normal affect.  Appropriate interactions.  Lymph: No anterior cervical lymphadenopathy noted      Emergency Department Course      Imaging:  CT Abdomen Pelvis w/o Contrast   Final Result   IMPRESSION:    1.  Fluid throughout the colon, compatible with diarrheal illness.    2.  No evidence of acute bowel inflammation or obstruction.      DARKE RAMOS MD            SYSTEM ID:  [id number]         Report per radiology    Laboratory:  Labs Ordered and Resulted from Time of ED Arrival to Time of ED Departure   COMPREHENSIVE METABOLIC PANEL - Abnormal       Result Value    " Sodium 136      Potassium 5.0      Chloride 105      Carbon Dioxide (CO2) 22      Anion Gap 9      Urea Nitrogen 29.0 (*)     Creatinine 1.02 (*)     Calcium 9.2      Glucose 101 (*)     Alkaline Phosphatase 80      AST 22      ALT 12      Protein Total 6.6      Albumin 4.1      Bilirubin Total <0.2      GFR Estimate 54 (*)    CBC WITH PLATELETS AND DIFFERENTIAL - Abnormal    WBC Count 6.9      RBC Count 3.17 (*)     Hemoglobin 9.6 (*)     Hematocrit 29.6 (*)     MCV 93      MCH 30.3      MCHC 32.4      RDW 13.7      Platelet Count 266      % Neutrophils 65      % Lymphocytes 19      % Monocytes 12      % Eosinophils 3      % Basophils 1      % Immature Granulocytes 0      NRBCs per 100 WBC 0      Absolute Neutrophils 4.5      Absolute Lymphocytes 1.3      Absolute Monocytes 0.8      Absolute Eosinophils 0.2      Absolute Basophils 0.1      Absolute Immature Granulocytes 0.0      Absolute NRBCs 0.0     ROUTINE UA WITH MICROSCOPIC REFLEX TO CULTURE - Abnormal    Color Urine Light Yellow      Appearance Urine Clear      Glucose Urine Negative      Bilirubin Urine Negative      Ketones Urine Negative      Specific Gravity Urine 1.009      Blood Urine Negative      pH Urine 6.0      Protein Albumin Urine Negative      Urobilinogen Urine Normal      Nitrite Urine Negative      Leukocyte Esterase Urine Trace (*)     Mucus Urine Present (*)     RBC Urine <1      WBC Urine 5      Hyaline Casts Urine 1     ENTERIC BACTERIA AND VIRUS PANEL BY [facility] STOOL   C. DIFFICILE TOXIN B PCR WITH REFLEX TO C. DIFFICILE ANTIGEN AND TOXINS A/B EIA        Procedures       Emergency Department Course & Assessments:         Interventions:  Medications   lactated ringers BOLUS 500 mL (500 mLs Intravenous $New Bag 6/27/23 1503)   loperamide (IMODIUM) capsule 4 mg (4 mg Oral Not Given 6/27/23 1504)        Assessments:  1432 I obtained history and examined the patient as noted above.  1541 I rechecked and updated the patient.  1644 I reassessed  the patient and she is feeling much better and still no stool. I deemed her safe to discharge home.    Consultations/Discussion of Management or Tests:  None        Disposition:  The patient was discharged to home.     Impression & Plan    CMS Diagnoses: None    Medical Decision Making:  This patient presents to the emergency department with a new onset of diarrhea that began yesterday.  She did recently receive Rocephin and 1 tablet of Levaquin in the hospital for UTI.  Her urine was reassessed and appears to be cured at this point.  She is not having any symptoms and her pyuria has essentially resolved.  The patient does have a history of C. difficile and so when she started having diarrhea yesterday and it did not immediately respond to Imodium she was worried about that possibility again.  She had no diarrheal stools in the emergency department over a several hour..  The patient was unable to give us any stool for C. difficile or enteric panel testing.  She was hydrated in the emergency department.  She did undergo CT scan of the abdomen looking for colitis or other intra-abdominal infective or life-threatening processes and the CT scan just showed diarrhea in the colon.  Patient apparently has had prior appendectomy.  The differential diagnosis would include C. difficile colitis, viral etiologies, other bacterial etiologies such as inflammatory diarrheas (less likely).  Given the lack of fever, the general lack of abdominal pain, the normal CT findings of the colon, and the normal white count, C. difficile would be unlikely.  It still is in the differential diagnosis.  I will send him home with the supplies to capture some stool and return it if the diarrhea continues.  At this point in time it appears that the Imodium is kicked in and her diarrhea has largely resolved and she is feeling better.  Fortunately, the patient's son is a paramedic and is sophisticated medically and will be able to assist the patient  with decision making and support her through this.    Diagnosis:    ICD-10-CM    1. Diarrhea, unspecified type  R19.7 Enteric Bacteria and Virus Panel by [facility] Stool     C. difficile Toxin B PCR with reflex to C. difficile Antigen and Toxins A/B EIA             Scribe Disclosure:  KEILY, Juan Brown, am serving as a scribe at 4:15 PM on 6/27/2023 to document services personally performed by Luis Manuel Osorio MD based on my observations and the provider's statements to me.      Luis Manuel Osorio MD  06/27/23 3218

## 2023-06-27 NOTE — DISCHARGE INSTRUCTIONS
Return your stool specimen to the laboratory here at the hospital if you are able to obtain it.    You may take additional Imodium at home as you have been doing if the diarrhea returns.

## 2023-06-27 NOTE — ED TRIAGE NOTES
Pt arrives to the ED due having diarrhea that began yesterday and has continued into today. Pt states she has been incontinent of stool since she can not make it to the bathroom in time. Stool is green. Was recently admitted to the hospital and was on antibiotics for possible pneumonia. States feeling dizzy. Took 3 imodium today.

## 2023-06-27 NOTE — TELEPHONE ENCOUNTER
Nurse Triage SBAR    Is this a 2nd Level Triage? YES, LICENSED PRACTITIONER REVIEW IS REQUIRED    Situation: Patient calls with concerns about diarrhea.     Background: She was admitting to the hospital this weekend following episode of chest pain. Discharged on Sunday after staying overnight, she states they told her she had possible pneumonia and UTI and given antibiotic, but she doesn't know which one. Per medication list, patient is taking Levaquin.     Diarrhea started yesterday, stopped after several Imodium, but came back this morning. She thought it was the antibiotic causing this, but concerned about how watery the stool is and that she had not been able to make it to the toilet in time. She does have history of periodic diarrhea that occurs about every 1-2 weeks, however those stools are not nearly as loose and she was able to control them with Imodium. She has been seen by [facility] for the periodic diarrhea, but they could not find a cause.     Assessment: Diarrhea started yesterday, was very watery. She was taking Imodium and diarrhea stopped in the afternoon. She thinks she had about 6 episodes during the day. Diarrhea returned this morning as soon as she got up and is very watery, she was not able to make it to the toilet in time. Her appetite was decreased yesterday, she is trying to push fluids and drank 2-3 cups of water plus a bottle of Pedialyte yesterday. No dizziness, fever, chills, nausea or vomiting present. She did urinate when she woke up this morning. She is not having any pain now, but states she was having severe cramping pain this morning on right side of abdomen from waist to her groin. Pain lasted for about 30 minutes and then went away after the second episode of diarrhea.      Protocol Recommended Disposition:   Go To ED/UCC Now (Or To Office With PCP Approval)    Recommendation: Advised patient evaluation recommended due to frequency of diarrhea as she is at risk for dehydration.  Patient states she tried to call her son this morning before calling the clinic, but he has not called her back. She thought he had today off, but if he is not available, she does not have transportation. Will route to provider for further recommendations. Advised patient to push fluids this morning and that she needs to go to ER if severe pain occurs again.       Routed to provider    Does the patient meet one of the following criteria for ADS visit consideration? 16+ years old, with an [facility] PCP     TIP  Providers, please consider if this condition is appropriate for management at one of our Acute and Diagnostic Services sites.     If patient is a good candidate, please use dotphrase <dot>triageresponse and select Refer to ADS to document.    Reason for Disposition    SEVERE diarrhea (e.g., 7 or more times / day more than normal) and age > 60 years    Additional Information    Negative: Shock suspected (e.g., cold/pale/clammy skin, too weak to stand, low BP, rapid pulse)    Negative: Difficult to awaken or acting confused (e.g., disoriented, slurred speech)    Negative: Sounds like a life-threatening emergency to the triager    Negative: SEVERE abdominal pain (e.g., excruciating) and present > 1 hour    Negative: SEVERE abdominal pain and age > 60 years    Negative: Bloody, black, or tarry bowel movements (Exception: chronic-unchanged black-grey bowel movements and is taking iron pills or Pepto-Bismol)    Negative: Vomiting also present and worse than the diarrhea    Negative: Blood in stool and without diarrhea    Protocols used: DIARRHEA-A-OH

## 2023-06-28 ENCOUNTER — TELEPHONE (OUTPATIENT)
Dept: FAMILY MEDICINE | Facility: CLINIC | Age: 88
End: 2023-06-28
Payer: COMMERCIAL

## 2023-06-28 ENCOUNTER — PATIENT OUTREACH (OUTPATIENT)
Dept: CARE COORDINATION | Facility: CLINIC | Age: 88
End: 2023-06-28
Payer: COMMERCIAL

## 2023-06-28 NOTE — LETTER
Swathi Dukes  [address]   [place] [address]    Dear Swathi Dukes,      I am a team member within the [facility] Care Resource Center with [facility]. I recently contacted you to ensure you were doing well following a recent visit within our health system. I also wanted to take this chance to introduce Clinic Care Coordination in case you would be interested in this program in the future.     Below is a description of Clinic Care Coordination and how this team can further assist you:       The Clinic Care Coordination team is made up of a Registered Nurse, , Financial Resource Worker, and a Community Health Worker who understand and can help navigate the health care system. The goal of clinic care coordination is to help you manage your health, improve access to care, and achieve optimal health outcomes. They work alongside your provider to assist you in determining your health and social needs, obtain health care and community resources, and provide you with necessary information and education. Clinic Care Coordination can work with you through any barriers and develop a care plan that helps coordinate and strengthen the relationship between you and your care team.    If you wish to connect with the Clinic Care Coordination Team, please let your [facility] Primary Care Provider or Clinic Care Team know and they can place a referral. The Clinic Care Coordination team will then reach out by phone to further support you.    We are focused on providing you with the highest-quality healthcare experience possible.    Sincerely,   Your care team with [facility]

## 2023-06-28 NOTE — TELEPHONE ENCOUNTER
Spoke with pt who states she is feeling better today. Has not had any episodes of diarrhea. Reports still feeling weak.    Advised to ensure hydration, drink Gatorade (avoid red), and eat a [place] Diet and advance as able. Scheduled pt for hospital f/u on 7/10/23 with Dr. Vanessa as pt can only schedule on Monday, Tuesday, or Wednesday. Offered to schedule with another provider however pt refused and only wants to be seen by PCP.    Expressed understanding and acceptance of the plan. Had no further questions at this time.  Advised can call back to clinic at any time with concerns.     Mary CHRISTIE RN

## 2023-06-29 ENCOUNTER — LAB (OUTPATIENT)
Dept: LAB | Facility: CLINIC | Age: 88
End: 2023-06-29
Payer: COMMERCIAL

## 2023-06-29 DIAGNOSIS — R19.7 DIARRHEA, UNSPECIFIED TYPE: ICD-10-CM

## 2023-06-29 LAB
BACTERIA BLD CULT: NO GROWTH
BACTERIA BLD CULT: NO GROWTH
C DIFF TOX B STL QL: NEGATIVE

## 2023-06-29 PROCEDURE — 87493 C DIFF AMPLIFIED PROBE: CPT

## 2023-06-29 NOTE — RESULT ENCOUNTER NOTE
Final C.difficile Toxin B PCR with reflex to C.difficile Antigen and Toxins A/B EIA is NEGATIVE.    No treatment or change in treatment per [facility] ED Lab Result Clostridium difficile protocol.

## 2023-06-30 ENCOUNTER — NURSE TRIAGE (OUTPATIENT)
Dept: FAMILY MEDICINE | Facility: CLINIC | Age: 88
End: 2023-06-30
Payer: COMMERCIAL

## 2023-06-30 NOTE — TELEPHONE ENCOUNTER
I don't have an appointment available. You can certainly look.     Sounds like UC would be more appropriate.     JH

## 2023-06-30 NOTE — TELEPHONE ENCOUNTER
Nurse Triage SBAR    Is this a 2nd Level Triage? YES, LICENSED PRACTITIONER REVIEW IS REQUIRED    Situation: shortness of breath, slight sore throat, and chest congestion    Background:  Recently discharged from the hospital.  Patient reports pneumonia and heart issue was ruled out.  Levaquin given while in hospital but caused diarrhea.    Assessment:  Patient calling.  C/o some constant shortness of breath with slight painful breathing since last night.  C/o shortness of breath while sitting and worse when laying down.  States she is not struggling to breathe.  Also has a slight sore throat.  Denies fever and not coughing much.  She is wondering if she now has pneumonia.    Protocol Recommended Disposition:   Go to ED Now    Recommendation:  Work in for appointment today?  Please advise, thanks.    Routed to provider    Does the patient meet one of the following criteria for ADS visit consideration? 16+ years old, with an [facility] PCP     TIP  Providers, please consider if this condition is appropriate for management at one of our Acute and Diagnostic Services sites.     If patient is a good candidate, please use dotphrase <dot>triageresponse and select Refer to ADS to document.      Reason for Disposition    MODERATE difficulty breathing (e.g., speaks in phrases, SOB even at rest, pulse 100-120) of new-onset or worse than normal    Additional Information    Negative: SEVERE difficulty breathing (e.g., struggling for each breath, speaks in single words, pulse > 120)    Negative: Breathing stopped and hasn't returned    Negative: Choking on something    Negative: Bluish (or gray) lips or face    Negative: Difficult to awaken or acting confused (e.g., disoriented, slurred speech)    Negative: Passed out (i.e., fainted, collapsed and was not responding)    Negative: Wheezing started suddenly after medicine, an allergic food, or bee sting    Negative: Stridor    Negative: Slow, shallow and weak breathing    Negative:  "Sounds like a life-threatening emergency to the triager    Negative: Chest pain    Negative: Wheezing (high pitched whistling sound) and previous asthma attacks or use of asthma medicines    Negative: Difficulty breathing and within 14 days of COVID-19 Exposure    Negative: Difficulty breathing and only present when coughing    Negative: Difficulty breathing and only from stuffy nose    Negative: Difficulty breathing and only from stuffy nose or runny nose from common cold    Answer Assessment - Initial Assessment Questions  1. RESPIRATORY STATUS: \"Describe your breathing?\" (e.g., wheezing, shortness of breath, unable to speak, severe coughing)       Patient c/o some shortness of breath and when breathes, feels like she is having some difficulty breathing.  Able to take a full breath.  2. ONSET: \"When did this breathing problem begin?\"       Started last night (DC'd from hospital 6/25/23 - ruled out pneumonia and heart issue per patient).  3. PATTERN \"Does the difficult breathing come and go, or has it been constant since it started?\"       Constant per patient.  4. SEVERITY: \"How bad is your breathing?\" (e.g., mild, moderate, severe)     - MILD: No SOB at rest, mild SOB with walking, speaks normally in sentences, can lie down, no retractions, pulse < 100.     - MODERATE: SOB at rest, SOB with minimal exertion and prefers to sit, cannot lie down flat, speaks in phrases, mild retractions, audible wheezing, pulse 100-120.     - SEVERE: Very SOB at rest, speaks in single words, struggling to breathe, sitting hunched forward, retractions, pulse > 120       Moderate in severity.  Worse when she lays down.  Pulse is 15 in 15 seconds.  5. RECURRENT SYMPTOM: \"Have you had difficulty breathing before?\" If Yes, ask: \"When was the last time?\" and \"What happened that time?\"       Has had this breathing issue before x \"couple months ago\".  Patient was given medication - does not remember what.  6. CARDIAC HISTORY: \"Do you have " "any history of heart disease?\" (e.g., heart attack, angina, bypass surgery, angioplasty)       Reports she has a \"pericardial window\" - had fluid around heart and \"opened it up\".  7. LUNG HISTORY: \"Do you have any history of lung disease?\"  (e.g., pulmonary embolus, asthma, emphysema)      History of asthma but reports she no longer has.  8. CAUSE: \"What do you think is causing the breathing problem?\"       Patient is wondering if she now has pneumonia.  9. OTHER SYMPTOMS: \"Do you have any other symptoms? (e.g., dizziness, runny nose, cough, chest pain, fever)      Chest congestion, slight sore throat  10. O2 SATURATION MONITOR:  \"Do you use an oxygen saturation monitor (pulse oximeter) at home?\" If Yes, \"What is your reading (oxygen level) today?\" \"What is your usual oxygen saturation reading?\" (e.g., 95%)        Patient doesn't have a pulse ox at home.  11. PREGNANCY: \"Is there any chance you are pregnant?\" \"When was your last menstrual period?\"        NA  12. TRAVEL: \"Have you traveled out of the country in the last month?\" (e.g., travel history, exposures)        No per patient.    Protocols used: BREATHING DIFFICULTY-A-OH      "

## 2023-07-10 ENCOUNTER — OFFICE VISIT (OUTPATIENT)
Dept: FAMILY MEDICINE | Facility: CLINIC | Age: 88
End: 2023-07-10
Payer: COMMERCIAL

## 2023-07-10 VITALS
SYSTOLIC BLOOD PRESSURE: 124 MMHG | BODY MASS INDEX: 25.16 KG/M2 | RESPIRATION RATE: 20 BRPM | DIASTOLIC BLOOD PRESSURE: 48 MMHG | HEIGHT: 63 IN | HEART RATE: 54 BPM | WEIGHT: 142 LBS | OXYGEN SATURATION: 96 % | TEMPERATURE: 97.9 F

## 2023-07-10 DIAGNOSIS — Z09 HOSPITAL DISCHARGE FOLLOW-UP: ICD-10-CM

## 2023-07-10 DIAGNOSIS — J18.9 COMMUNITY ACQUIRED PNEUMONIA, UNSPECIFIED LATERALITY: ICD-10-CM

## 2023-07-10 DIAGNOSIS — R19.5 LOOSE STOOLS: ICD-10-CM

## 2023-07-10 DIAGNOSIS — N30.00 ACUTE CYSTITIS WITHOUT HEMATURIA: ICD-10-CM

## 2023-07-10 DIAGNOSIS — M25.50 MULTIPLE JOINT PAIN: Primary | ICD-10-CM

## 2023-07-10 LAB
CRP SERPL-MCNC: 3.63 MG/L
ERYTHROCYTE [SEDIMENTATION RATE] IN BLOOD BY WESTERGREN METHOD: 37 MM/HR (ref 0–30)

## 2023-07-10 PROCEDURE — 86140 C-REACTIVE PROTEIN: CPT | Performed by: FAMILY MEDICINE

## 2023-07-10 PROCEDURE — 36415 COLL VENOUS BLD VENIPUNCTURE: CPT | Performed by: FAMILY MEDICINE

## 2023-07-10 PROCEDURE — 99214 OFFICE O/P EST MOD 30 MIN: CPT | Performed by: FAMILY MEDICINE

## 2023-07-10 PROCEDURE — 85652 RBC SED RATE AUTOMATED: CPT | Performed by: FAMILY MEDICINE

## 2023-07-10 ASSESSMENT — PATIENT HEALTH QUESTIONNAIRE - PHQ9: SUM OF ALL RESPONSES TO PHQ QUESTIONS 1-9: 1

## 2023-07-10 NOTE — PROGRESS NOTES
"  Assessment & Plan     Multiple joint pain - unclear etiology, joints and mm bodies. Will get inflammatory markers.   - CRP, inflammation; Future  - ESR: Erythrocyte sedimentation rate; Future  - CRP, inflammation  - ESR: Erythrocyte sedimentation rate    Hospital discharge follow-up    Acute cystitis without hematuria - resolved based on follow up UA    Community acquired pneumonia, unspecified laterality - no persistent symptoms    Loose stools - resolved    Post Medication Reconciliation Status:  Discharge medications reconciled, continue medications without change    Cortney Vanessa MD  [facility] DEANGELO Echevarria is a 87 year old, presenting for the following health issues:  No chief complaint on file.         No data to display              [facility]       Hospital Follow-up Visit:    Hospital/Nursing Home/IP Rehab Facility: [facility]  Date of Admission: 06/24/2023  Date of Discharge: 06/25/2023  Reason(s) for Admission: UTI and Pneumonia    Was your hospitalization related to COVID-19? No   Problems taking medications regularly:  None  Medication changes since discharge: None  Problems adhering to non-medication therapy:  None    Summary of hospitalization:  [facility] discharge summary reviewed  Diagnostic Tests/Treatments reviewed.  Follow up needed: none  Other Healthcare Providers Involved in Patient s Care:         None  Update since discharge: improved.         Plan of care communicated with patient        Review of Systems   Constitutional, HEENT, cardiovascular, pulmonary, gi and gu systems are negative, except as otherwise noted.      Objective    /48 (Cuff Size: Adult Regular)   Pulse 54   Temp 97.9  F (36.6  C) (Oral)   Resp 20   Ht 1.6 m (5' 3\")   Wt 64.4 kg (142 lb)   SpO2 96%   BMI 25.15 kg/m    Body mass index is 25.15 kg/m .  Physical Exam   GENERAL: healthy, alert and no distress  RESP: lungs clear to " auscultation - no rales, rhonchi or wheezes  CV: regular rate and rhythm, normal S1 S2, no S3 or S4, no murmur, click or rub, no peripheral edema and peripheral pulses strong  MS: tender to palpation right knee medial and lateral joint lines, ttp over the quadriceps, forearms

## 2023-07-11 ENCOUNTER — APPOINTMENT (OUTPATIENT)
Dept: CT IMAGING | Facility: CLINIC | Age: 88
End: 2023-07-11
Attending: EMERGENCY MEDICINE
Payer: COMMERCIAL

## 2023-07-11 VITALS
DIASTOLIC BLOOD PRESSURE: 54 MMHG | RESPIRATION RATE: 18 BRPM | SYSTOLIC BLOOD PRESSURE: 132 MMHG | TEMPERATURE: 97.5 F | HEART RATE: 61 BPM | OXYGEN SATURATION: 96 %

## 2023-07-11 PROCEDURE — 99285 EMERGENCY DEPT VISIT HI MDM: CPT | Mod: 25

## 2023-07-11 PROCEDURE — 70450 CT HEAD/BRAIN W/O DYE: CPT

## 2023-07-12 ENCOUNTER — APPOINTMENT (OUTPATIENT)
Dept: CT IMAGING | Facility: CLINIC | Age: 88
End: 2023-07-12
Attending: EMERGENCY MEDICINE
Payer: COMMERCIAL

## 2023-07-12 ENCOUNTER — TELEPHONE (OUTPATIENT)
Dept: EMERGENCY MEDICINE | Facility: CLINIC | Age: 88
End: 2023-07-12

## 2023-07-12 ENCOUNTER — OFFICE VISIT (OUTPATIENT)
Dept: UROLOGY | Facility: CLINIC | Age: 88
End: 2023-07-12
Attending: FAMILY MEDICINE
Payer: COMMERCIAL

## 2023-07-12 ENCOUNTER — HOSPITAL ENCOUNTER (EMERGENCY)
Facility: CLINIC | Age: 88
Discharge: HOME | End: 2023-07-12
Attending: EMERGENCY MEDICINE | Admitting: EMERGENCY MEDICINE
Payer: COMMERCIAL

## 2023-07-12 VITALS
SYSTOLIC BLOOD PRESSURE: 132 MMHG | HEIGHT: 63 IN | BODY MASS INDEX: 25.34 KG/M2 | DIASTOLIC BLOOD PRESSURE: 74 MMHG | WEIGHT: 143 LBS

## 2023-07-12 DIAGNOSIS — S09.90XA INJURY OF HEAD, INITIAL ENCOUNTER: ICD-10-CM

## 2023-07-12 DIAGNOSIS — R35.0 URINARY FREQUENCY: ICD-10-CM
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
## 2023-07-12 DIAGNOSIS — N95.2 VAGINAL ATROPHY: ICD-10-CM

## 2023-07-12 DIAGNOSIS — N39.0 RECURRENT UTI: Primary | ICD-10-CM

## 2023-07-12 DIAGNOSIS — M54.50 ACUTE BILATERAL LOW BACK PAIN WITHOUT SCIATICA: ICD-10-CM

## 2023-07-12 PROCEDURE — 72128 CT CHEST SPINE W/O DYE: CPT

## 2023-07-12 PROCEDURE — 99204 OFFICE O/P NEW MOD 45 MIN: CPT | Performed by: OBSTETRICS & GYNECOLOGY

## 2023-07-12 PROCEDURE — 71250 CT THORAX DX C-: CPT

## 2023-07-12 PROCEDURE — 72131 CT LUMBAR SPINE W/O DYE: CPT

## 2023-07-12 PROCEDURE — 250N000013 HC RX MED GY IP 250 OP 250 PS 637: Performed by: EMERGENCY MEDICINE

## 2023-07-12 RX ORDER — ONDANSETRON 4 MG/1
TABLET, FILM COATED ORAL
COMMUNITY
End: 2023-08-18

## 2023-07-12 RX ORDER — OXYCODONE HYDROCHLORIDE 5 MG/1
2.5 TABLET ORAL EVERY 8 HOURS PRN
Qty: 6 TABLET | Refills: 0 | Status: SHIPPED | OUTPATIENT
Start: 2023-07-12 | End: 2023-08-18

## 2023-07-12 RX ORDER — FERROUS GLUCONATE 324(38)MG
TABLET ORAL
COMMUNITY
End: 2024-01-16

## 2023-07-12 RX ORDER — CYCLOBENZAPRINE HCL 5 MG
1 TABLET ORAL DAILY
COMMUNITY
End: 2023-08-18

## 2023-07-12 RX ORDER — OXYCODONE HYDROCHLORIDE 5 MG/1
2.5 TABLET ORAL EVERY 6 HOURS PRN
Qty: 6 TABLET | Refills: 0 | Status: SHIPPED | OUTPATIENT
Start: 2023-07-12 | End: 2023-07-15

## 2023-07-12 RX ORDER — OXYCODONE HYDROCHLORIDE 5 MG/1
2.5-5 TABLET ORAL EVERY 8 HOURS PRN
Qty: 6 TABLET | Refills: 0 | Status: SHIPPED | OUTPATIENT
Start: 2023-07-12 | End: 2023-07-12

## 2023-07-12 RX ORDER — NITROFURANTOIN 25; 75 MG/1; MG/1
100 CAPSULE ORAL DAILY
Qty: 90 CAPSULE | Refills: 1 | Status: SHIPPED | OUTPATIENT
Start: 2023-07-12 | End: 2023-08-18

## 2023-07-12 RX ORDER — ESTRADIOL 0.1 MG/G
1 CREAM VAGINAL DAILY
Qty: 42.5 G | Refills: 11 | Status: SHIPPED | OUTPATIENT
Start: 2023-07-12 | End: 2023-08-18

## 2023-07-12 RX ORDER — ERGOCALCIFEROL 1.25 MG/1
CAPSULE ORAL
COMMUNITY

## 2023-07-12 RX ORDER — PAROXETINE 10 MG/1
TABLET, FILM COATED ORAL EVERY 24 HOURS
COMMUNITY

## 2023-07-12 RX ADMIN — OXYCODONE HYDROCHLORIDE 2.5 MG: 5 TABLET ORAL at 00:51

## 2023-07-12 RX ADMIN — OXYCODONE HYDROCHLORIDE 2.5 MG: 5 TABLET ORAL at 03:13

## 2023-07-12 ASSESSMENT — ACTIVITIES OF DAILY LIVING (ADL)
ADLS_ACUITY_SCORE: 38
ADLS_ACUITY_SCORE: 38

## 2023-07-12 NOTE — ED TRIAGE NOTES
Arrives from home. States she fell straight backward onto carpet, states her back, hips, and head are painful.     Denies blood thinners. Denies pain in the neck. Denies LOC.     States took tylenol EX X2 proir to arrival.

## 2023-07-12 NOTE — ED PROVIDER NOTES
Called by group home that her discharge oxycodone from the day prior cannot be written as a range from 2.5 to 5 mg, I will switch her prescription to 2 and half milligrams given her age.  New prescription was printed and group home will pick it up.    Sha Medina MD  [employer]  12:10 PM 07/12/23        Sha Medina MD  07/12/23 121

## 2023-07-12 NOTE — PROGRESS NOTES
July 12, 2023    Referring Provider: Cortney Vanessa MD  [id number] [place] [address]    Primary Care Provider: Cortney Vanessa    CC: Vaginal discharge      HPI:  Swathi Dukes is a 87 year old female who presents for evaluation of her pelvic floor symptoms.  She has a daily vaginal discharge. She is not sexually active. She also gets a UTI almost every month. She has had culture proven UTIs 6/24, 5/15, 3/29, all with E. Coli. SHe has not had a CT urogram or cystoscopy.      Past Medical History:   Diagnosis Date     Aortic stenosis      Glaucoma      HTN (hypertension)      Hyperlipidemia      Pericardial effusion      Systemic lupus erythematosus        Past Surgical History:   Procedure Laterality Date     CV HEART CATHETERIZATION WITH POSSIBLE INTERVENTION N/A 1/5/2021    Procedure: Heart Catheterization with Possible Intervention;  Surgeon: Hayden Bedoya MD;  Location:  HEART CARDIAC CATH LAB     CV LEFT VENTRICULOGRAM N/A 1/5/2021    Procedure: Left Ventriculogram;  Surgeon: Hayden Bedoya MD;  Location:  HEART CARDIAC CATH LAB       Social History     Socioeconomic History     Marital status:      Spouse name: Not on file     Number of children: Not on file     Years of education: Not on file     Highest education level: Not on file   Occupational History     Not on file   Tobacco Use     Smoking status: Never     Smokeless tobacco: Never   Vaping Use     Vaping Use: Never used   Substance and Sexual Activity     Alcohol use: Not Currently     Drug use: Never     Sexual activity: Not Currently   Other Topics Concern     Parent/sibling w/ CABG, MI or angioplasty before 65F 55M? Not Asked   Social History Narrative     Not on file     Social Determinants of Health     Financial Resource Strain: Low Risk  (1/6/2023)    Overall Financial Resource Strain (CARDIA)      Difficulty of Paying Living Expenses: Not hard at all   Food Insecurity: No Food Insecurity (1/6/2023)     Hunger Vital Sign      Worried About Running Out of Food in the Last Year: Never true      Ran Out of Food in the Last Year: Never true   Transportation Needs: No Transportation Needs (1/6/2023)    PRAPARE - Transportation      Lack of Transportation (Medical): No      Lack of Transportation (Non-Medical): No   Physical Activity: Insufficiently Active (1/6/2023)    Exercise Vital Sign      Days of Exercise per Week: 5 days      Minutes of Exercise per Session: 20 min   Stress: No Stress Concern Present (1/6/2023)    [employer] [place] [employer] - Occupational Stress Questionnaire      Feeling of Stress : Not at all   Social Connections: Moderately Isolated (1/6/2023)    Social Connection and Isolation Panel [NHANES]      Frequency of Communication with Friends and Family: More than three times a week      Frequency of Social Gatherings with Friends and Family: More than three times a week      Attends Oriental orthodox Services: More than 4 times per year      Active Member of Clubs or Organizations: No      Attends Club or Organization Meetings: Not on file      Marital Status:    Intimate Partner Violence: Not on file   Housing Stability: Low Risk  (1/6/2023)    Housing Stability Vital Sign      Unable to Pay for Housing in the Last Year: No      Number of Places Lived in the Last Year: 1      Unstable Housing in the Last Year: No       Family History   Problem Relation Age of Onset     Diabetes Father        ROS    Allergies   Allergen Reactions     Aspirin Anaphylaxis     Diclofenac Anaphylaxis     Ibuprofen Anaphylaxis     Iodinated Contrast Media Anaphylaxis     Iodine Anaphylaxis     Contrast  Pt states anaphylactic reaction to ct contrast about 20 years ago  Pt premedicated with prednisone and benadryl before iv isouve-370 CT contrast on 1/6/23 and exhibited no signs of reaction     Donepezil      Other reaction(s): Other (see comments)  Cervical dystonia     Lactose Diarrhea       Current  "Outpatient Medications   Medication     acetaminophen (TYLENOL) 500 MG tablet     ALPHAGAN P 0.1 % ophthalmic solution     amLODIPine (NORVASC) 10 MG tablet     cyclobenzaprine (FLEXERIL) 5 MG tablet     dorzolamide (TRUSOPT) 2 % ophthalmic solution     DULoxetine (CYMBALTA) 60 MG capsule     ergocalciferol (ERGOCALCIFEROL) 1.25 MG (16155 UT) capsule     ferrous gluconate (FERGON) 324 (38 Fe) MG tablet     folic acid (FOLVITE) 1 MG tablet     gabapentin (NEURONTIN) 100 MG capsule     hydrALAZINE (APRESOLINE) 25 MG tablet     latanoprost (XALATAN) 0.005 % ophthalmic solution     Lidocaine (LIDOCARE) 4 % Patch     lisinopril (ZESTRIL) 20 MG tablet     loperamide (IMODIUM) 2 MG capsule     melatonin 5 MG CAPS     OLANZapine (ZYPREXA) 5 MG tablet     ondansetron (ZOFRAN) 4 MG tablet     PARoxetine (PAXIL) 10 MG tablet     simvastatin (ZOCOR) 10 MG tablet     oxyCODONE (ROXICODONE) 5 MG tablet     oxyCODONE (ROXICODONE) 5 MG tablet     No current facility-administered medications for this visit.       /74   Ht 1.6 m (5' 3\")   Wt 64.9 kg (143 lb)   BMI 25.33 kg/m   No LMP recorded. Patient is postmenopausal. Body mass index is 25.33 kg/m .  Ms. Dukes is alert, comfortable in no acute distress, non-labored breathing.   Abdomen is soft, non-tender, non-distended, no CVAT.    Normal external female genitalia. The urethra had a small caruncle.    She has good support on supine strain.  Speculum and bimanual exam are remarkable for atrophic vaginal epithelium.      A/P: Swathi Dukes is a 87 year old F with recurrent UTIs and vaginal atrophy    Start macrobid prophylaxis and estrace cream    Will schedule CT urogram and cystoscopy.     I spent a total of 45 minutes with  Ms. Dukes  on the date of the encounter in chart review, face to face patient visit, review of tests, documentation and/or discussion with other providers about the issues documented above.    Sha Espinosa MD  Professor, " OB/GYN  Urogynecologist  CC  Patient Care Team:  Megan Vanessa MD as PCP - General (Family Practice)  Megan Vanessa MD as Assigned PCP  Joe Radford RPH as Pharmacist (Pharmacist)  Ronda Miller RN as Personal Advocate & Liaison (PAL) (Family Medicine)  Luis Manuel Davis MD as MD (Cardiovascular Disease)  Luis Manuel Davis MD as Assigned Heart and Vascular Provider  Sha Espinosa MD as MD (OB/Gyn)  MEGAN VANESSA

## 2023-07-12 NOTE — DISCHARGE INSTRUCTIONS
Discharge Instructions  Back Pain  You were seen today for back pain. Back pain can have many causes, but most will get better without surgery or other specific treatment. Sometimes there is a herniated ( slipped ) disc. We do not usually do MRI scans to look for these right away, since most herniated discs will get better on their own with time.  Today, we did not find any evidence that your back pain was caused by a serious condition. However, sometimes symptoms develop over time and cannot be found during an emergency visit, so it is very important that you follow up with your primary provider.  Generally, every Emergency Department visit should have a follow-up clinic visit with either a primary or a specialty clinic/provider. Please follow-up as instructed by your emergency provider today.    Return to the Emergency Department if:  You develop a fever with your back pain.   You have weakness or change in sensation in one or both legs.  You lose control of your bowels or bladder, or cannot empty your bladder (cannot pee).  Your pain gets much worse.     Follow-up with your provider:  Unless your pain has completely gone away, please make an appointment with your provider within one week. Most of the routine care for back pain is available in a clinic and not the Emergency Department. You may need further management of your back pain, such as more pain medication, imaging such as an X-ray or MRI, or physical therapy.    What can I do to help myself?  Remain Active -- People are often afraid that they will hurt their back further or delay recovery by remaining active, but this is one of the best things you can do for your back. In fact, staying in bed for a long time to rest is not recommended. Studies have shown that people with low back pain recover faster when they remain active. Movement helps to bring blood flow to the muscles and relieve muscle spasms as well as preventing loss of muscle strength.  Heat --  Using a heating pad can help with low back pain during the first few weeks. Do not sleep with a heating pad, as you can be burned.   Pain medications - You may take a pain medication such as Tylenol  (acetaminophen), Advil , Motrin  (ibuprofen) or Aleve  (naproxen).  If you were given a prescription for medicine here today, be sure to read all of the information (including the package insert) that comes with your prescription.  This will include important information about the medicine, its side effects, and any warnings that you need to know about.  The pharmacist who fills the prescription can provide more information and answer questions you may have about the medicine.  If you have questions or concerns that the pharmacist cannot address, please call or return to the Emergency Department.   Remember that you can always come back to the Emergency Department if you are not able to see your regular provider in the amount of time listed above, if you get any new symptoms, or if there is anything that worries you.    Discharge Instructions  Head Injury    You have been seen today for a head injury. Your evaluation included a history and physical examination. You may have had a CT (CAT) scan performed, though most head injuries do not require a scan. Based on this evaluation, your provider today does not feel that your head injury is serious.    Generally, every Emergency Department visit should have a follow-up clinic visit with either a primary or a specialty clinic/provider. Please follow-up as instructed by your emergency provider today.  Return to the Emergency Department if:  You are confused or you are not acting right.  Your headache gets worse or you start to have a really bad headache even with your recommended treatment plan.  You vomit (throw up) more than once.  You have a seizure.  You have trouble walking.  You have weakness or paralysis (cannot move) in an arm or a leg.  You have blood or fluid coming  from your ears or nose.  You have new symptoms or anything that worries you.    Sleeping:  It is okay for you to sleep, but someone should wake you up if instructed by your provider, and someone should check on you at your usual time to wake up.     Activity:  Do not drive for at least 24 hours.  Do not drive if you have dizzy spells or trouble concentrating, or remembering things.  Do not return to any contact sports until cleared by your regular provider.     MORE INFORMATION:    Concussion:  A concussion is a minor head injury that may cause temporary problems with the way the brain works. Although concussions are important, they are generally not an emergency or a reason that a person needs to be hospitalized. Some concussion symptoms include confusion, amnesia (forgetful), nausea (sick to your stomach) and vomiting (throwing up), dizziness, fatigue, memory or concentration problems, irritability and sleep problems. For most people, concussions are mild and temporary but some will have more severe and persistent symptoms that require on-going care and treatment.  CT Scans: Your evaluation today may have included a CT scan (CAT scan) to look for things like bleeding or a skull fracture (broken bone).  CT scans involve radiation and too many CT scans can cause serious health problems like cancer, especially in children.  Because of this, your provider may not have ordered a CT scan today if they think you are at low risk for a serious or life threatening problem.    If you were given a prescription for medicine here today, be sure to read all of the information (including the package insert) that comes with your prescription.  This will include important information about the medicine, its side effects, and any warnings that you need to know about.  The pharmacist who fills the prescription can provide more information and answer questions you may have about the medicine.  If you have questions or concerns that  the pharmacist cannot address, please call or return to the Emergency Department.     Remember that you can always come back to the Emergency Department if you are not able to see your regular provider in the amount of time listed above, if you get any new symptoms, or if there is anything that worries you.

## 2023-07-14 ENCOUNTER — TELEPHONE (OUTPATIENT)
Dept: UROLOGY | Facility: CLINIC | Age: 88
End: 2023-07-14

## 2023-07-14 ENCOUNTER — PATIENT OUTREACH (OUTPATIENT)
Dept: CARE COORDINATION | Facility: CLINIC | Age: 88
End: 2023-07-14

## 2023-07-14 NOTE — LETTER
[facility] CARE COORDINATION  [id number]  [facility] [address]    July 14, 2023    Swathi NEERAJ Dukes  [address]   [place] [address]      Dear Swathi,        I am a  clinic community health worker who works with Cortney Vanessa MD with the [facility]. I wanted to thank you for spending the time to talk with me.  Below is a description of clinic care coordination and how I can further assist you.       The clinic care coordination team is made up of a registered nurse, , financial resource worker and community health worker who understand the health care system. The goal of clinic care coordination is to help you manage your health and improve access to the health care system. Our team works alongside your provider to assist you in determining your health and social needs. We can help you obtain health care and community resources, providing you with necessary information and education. We can work with you through any barriers and develop a care plan that helps coordinate and strengthen the communication between you and your care team.  Our services are voluntary and are offered without charge to you personally.    If you wish to connect with the Clinic Care Coordination Team, please let your [facility] Primary Care Provider or Clinic Care Team know and they can place a referral. The Clinic Care Coordination team will then reach out by phone to further support you.    We are focused on providing you with the highest-quality healthcare experience possible.    Sincerely,   Your care team with [facility]

## 2023-07-14 NOTE — PROGRESS NOTES
Clinic Care Coordination Contact  Community Health Worker Initial Outreach    CHW Initial Information Gathering:  Referral Source: ED Follow-Up  Preferred Hospital: [facility]  [phone]  Preferred Urgent Care: [facility], [phone]  No PCP office visit in Past Year: No       Patient accepts CC: No, due to the patient stating that at this time there are no concerns for CCC to assist with. Patient will be sent Care Coordination introduction letter for future reference.     KIRSTIN Choi  [phone]  [facility]  [facility]

## 2023-07-17 ENCOUNTER — TELEPHONE (OUTPATIENT)
Dept: GERIATRICS | Facility: CLINIC | Age: 88
End: 2023-07-17
Payer: COMMERCIAL

## 2023-07-17 DIAGNOSIS — K52.9 CHRONIC DIARRHEA: ICD-10-CM

## 2023-07-17 RX ORDER — LOPERAMIDE HCL 2 MG
2 CAPSULE ORAL 3 TIMES DAILY PRN
Qty: 30 CAPSULE | Refills: 1 | Status: SHIPPED | OUTPATIENT
Start: 2023-07-17 | End: 2023-09-21

## 2023-07-19 ENCOUNTER — OFFICE VISIT (OUTPATIENT)
Dept: FAMILY MEDICINE | Facility: CLINIC | Age: 88
End: 2023-07-19
Payer: COMMERCIAL

## 2023-07-19 VITALS
DIASTOLIC BLOOD PRESSURE: 48 MMHG | WEIGHT: 143.5 LBS | OXYGEN SATURATION: 97 % | TEMPERATURE: 98.1 F | RESPIRATION RATE: 18 BRPM | HEIGHT: 61 IN | BODY MASS INDEX: 27.09 KG/M2 | SYSTOLIC BLOOD PRESSURE: 138 MMHG | HEART RATE: 59 BPM

## 2023-07-19 DIAGNOSIS — M54.41 ACUTE BILATERAL LOW BACK PAIN WITH BILATERAL SCIATICA: ICD-10-CM

## 2023-07-19 DIAGNOSIS — M54.42 ACUTE BILATERAL LOW BACK PAIN WITH BILATERAL SCIATICA: ICD-10-CM

## 2023-07-19 DIAGNOSIS — Z23 NEED FOR DIPHTHERIA-TETANUS-PERTUSSIS (TDAP) VACCINE: ICD-10-CM

## 2023-07-19 DIAGNOSIS — Z09 HOSPITAL DISCHARGE FOLLOW-UP: Primary | ICD-10-CM

## 2023-07-19 PROCEDURE — 99213 OFFICE O/P EST LOW 20 MIN: CPT | Performed by: NURSE PRACTITIONER

## 2023-07-19 ASSESSMENT — PAIN SCALES - GENERAL: PAINLEVEL: NO PAIN (0)

## 2023-07-19 NOTE — PROGRESS NOTES
"  Assessment & Plan     Need for diphtheria-tetanus-pertussis (Tdap) vaccine  Declined due to cost    Hospital discharge follow-up  - she is doing better from hospital discharge, has appointment with pain clinic in [place] to have nerve ablation for chronic compression, does have life alert button.       Acute bilateral low back pain with bilateral sciatica  - see above.     Post Medication Reconciliation Status:     BMI:   Estimated body mass index is 26.89 kg/m  as calculated from the following:    Height as of this encounter: 1.556 m (5' 1.25\").    Weight as of this encounter: 65.1 kg (143 lb 8 oz).   Weight management plan: Discussed healthy diet and exercise guidelines    Meryl Vincent NP  [facility] DEANGELO Echevarria is a 87 year old, presenting for the following health issues:  Hospital F/U        7/19/2023    11:20 AM   Additional Questions   Roomed by Louise ADRIAN   Accompanied by Son, Brent PIMENTEL       Hospital Follow-up Visit:    Hospital/Nursing Home/IP Rehab Facility: [facility]  Date of Admission: 07/12/2023  Date of Discharge:07/12/2023  Reason(s) for Admission: Acute bilateral low back pain without sciatica   Was your hospitalization related to COVID-19? No   Problems taking medications regularly:  None  Medication changes since discharge: None  Problems adhering to non-medication therapy:  None    Summary of hospitalization:  [facility] discharge summary reviewed  Diagnostic Tests/Treatments reviewed.  Follow up needed: none  Other Healthcare Providers Involved in Patient s Care:         Physical Therapy  Update since discharge: stable.         Plan of care communicated with patient and family           Review of Systems   Constitutional, HEENT, cardiovascular, pulmonary, gi and gu systems are negative, except as otherwise noted.      Objective    /48 (BP Location: Right arm, Patient Position: Sitting, Cuff Size: Adult " "Regular)   Pulse 59   Temp 98.1  F (36.7  C) (Oral)   Resp 18   Ht 1.556 m (5' 1.25\")   Wt 65.1 kg (143 lb 8 oz)   SpO2 97%   BMI 26.89 kg/m    Body mass index is 26.89 kg/m .  Physical Exam   GENERAL: healthy, alert and no distress  NECK: no adenopathy, no asymmetry, masses, or scars and thyroid normal to palpation  RESP: lungs clear to auscultation - no rales, rhonchi or wheezes  CV: regular rate and rhythm, normal S1 S2, no S3 or S4, no murmur, click or rub, no peripheral edema and peripheral pulses strong  ABDOMEN: soft, nontender, no hepatosplenomegaly, no masses and bowel sounds normal  MS: no gross musculoskeletal defects noted, no edema                    "

## 2023-07-20 DIAGNOSIS — H40.003 GLAUCOMA SUSPECT, BILATERAL: ICD-10-CM

## 2023-07-20 RX ORDER — LATANOPROST 50 UG/ML
1 SOLUTION/ DROPS OPHTHALMIC AT BEDTIME
Qty: 7.5 ML | Refills: 11 | Status: SHIPPED | OUTPATIENT
Start: 2023-07-20 | End: 2024-08-12

## 2023-07-21 ENCOUNTER — NURSE TRIAGE (OUTPATIENT)
Dept: NURSING | Facility: CLINIC | Age: 88
End: 2023-07-21
Payer: COMMERCIAL

## 2023-07-31 ENCOUNTER — TRANSFERRED RECORDS (OUTPATIENT)
Dept: HEALTH INFORMATION MANAGEMENT | Facility: CLINIC | Age: 88
End: 2023-07-31
Payer: COMMERCIAL

## 2023-08-11 ENCOUNTER — TELEPHONE (OUTPATIENT)
Dept: FAMILY MEDICINE | Facility: CLINIC | Age: 88
End: 2023-08-11
Payer: COMMERCIAL

## 2023-08-11 NOTE — TELEPHONE ENCOUNTER
Summary:    Patient is due/failing the following:   PHYSICAL    Reviewed:    [] CARE EVERYWHERE  [] LAST OV NOTE   [] FYI TAB  [] MYCHART ACTIVE?  [] LAST PANEL ENCOUNTER  [] FUTURE APPTS  [] IMMUNIZATIONS  [] Media Tab            Action needed:   Patient needs office visit for medicare annual wellness visit.    Type of outreach:    Phone, left message for patient to call back.                                                                                Ellen Jackson/[facility]

## 2023-08-14 ENCOUNTER — MEDICAL CORRESPONDENCE (OUTPATIENT)
Dept: HEALTH INFORMATION MANAGEMENT | Facility: CLINIC | Age: 88
End: 2023-08-14
Payer: COMMERCIAL

## 2023-08-14 ENCOUNTER — NURSE TRIAGE (OUTPATIENT)
Dept: FAMILY MEDICINE | Facility: CLINIC | Age: 88
End: 2023-08-14
Payer: COMMERCIAL

## 2023-08-14 NOTE — TELEPHONE ENCOUNTER
Situation   Asking do I need to be seen or not?     Assessment   Patient explains the right side of my throat hurts and she feels like there is a lump in there for 3 weeks     Pain is worse the last couple days when she swallows.     She states the pain is sharp. Denied pain without swallowing.      No trouble swallowing - no coughing on food or liquid  No fever. No chills   No other cold symptoms   Denied any neck abnormalities.   When she pushed on her glad on the left side she feels like it is swollen.   Does not feel sick or been exposed to illness   Has not looked in her throat   Denied tongue symptoms   Has not had anything like this before.   No chest pain or difficulty breathing.     She took Tylenol yesterday 650 mg and 1000 mg later in the day and it reduced the pain-advised continue to take as directed     Recommendations   Advised patient she should be seen today, anthony ANGUIANO no openings today    Patient could not be seen until after 430pm today due to another appointment , declined Urgent care today, requesting appointment in [place] tomorrow.     Advised to take Tylenol as directed today, drink cold or warm fluids to help with pain     Scheduled tomorrow advised of location ,arrival and appointment time.   Future Appointments 8/14/2023 - 2/10/2024        Date Visit Type Length Department Provider     8/15/2023  9:00 AM OFFICE VISIT SB5 60 min  FAMILY PRACTICE Marleni Ambrocio, PA-C    Location Instructions:     [facility] is located at [address] [facility], next to the [facility] [place] [facility]. Free parking is available; access the lot by turning east from [facility] onto [address] [place] [address].              10/20/2023  3:00 PM CYSTOSCOPY 30 min [facility] Sha Espinosa MD    Location Instructions:     Located in the Clinics and [facility] Center at [address] [place] [id number]. For parking options, enter the [facility] /arrival plaza from [facility] and attendants can assist  you based on your needs.  parking is available for those with limited mobility M-F from 7 a.m. to 5 p.m. Due to short staffing, we are unable to offer  to all patients/visitors. Visit [email or web address] for more details.  Self-parking:&nbsp;  West Lot: Located across from the main entrance, this is a convenient option for patients. Enter on [facility]. Parking attendants available most hours to assist.&nbsp;     [facility] Ramp: Enter at the [facility] SE entrance (one block north of the [facility] main entrance). Do not enter the ramp from [facility] - this entrance is not staffed and is further from the [facility] main entrance.                     Reason for Disposition   SEVERE sore throat pain    Additional Information   Negative: SEVERE difficulty breathing (e.g., struggling for each breath, speaks in single words)   Negative: Sounds like a life-threatening emergency to the triager   Negative: Throat culture results, call about   Negative: Productive cough is main symptom   Negative: Runny nose is main symptom   Negative: Drooling or spitting out saliva (because can't swallow)   Negative: Unable to open mouth completely   Negative: Drinking very little and has signs of dehydration (e.g., no urine > 12 hours, very dry mouth, very lightheaded)   Negative: Patient sounds very sick or weak to the triager   Negative: Difficulty breathing (per caller) but not severe   Negative: Fever > 103 F (39.4 C)   Negative: Refuses to drink anything for > 12 hours   Negative: Earache also present   Negative: Widespread rash (especially chest and abdomen)   Negative: Diabetes mellitus or weak immune system (e.g., HIV positive, cancer chemo, splenectomy, organ transplant, chronic steroids)    Protocols used: Sore Throat-A-OH

## 2023-08-15 ENCOUNTER — TELEPHONE (OUTPATIENT)
Dept: FAMILY MEDICINE | Facility: CLINIC | Age: 88
End: 2023-08-15

## 2023-08-15 ENCOUNTER — OFFICE VISIT (OUTPATIENT)
Dept: URGENT CARE | Facility: URGENT CARE | Age: 88
End: 2023-08-15
Payer: COMMERCIAL

## 2023-08-15 VITALS
TEMPERATURE: 98 F | DIASTOLIC BLOOD PRESSURE: 65 MMHG | SYSTOLIC BLOOD PRESSURE: 123 MMHG | HEART RATE: 78 BPM | OXYGEN SATURATION: 98 % | RESPIRATION RATE: 20 BRPM

## 2023-08-15 DIAGNOSIS — J02.9 SORE THROAT: ICD-10-CM

## 2023-08-15 DIAGNOSIS — J06.9 VIRAL URI: Primary | ICD-10-CM

## 2023-08-15 LAB
DEPRECATED S PYO AG THROAT QL EIA: NEGATIVE
GROUP A STREP BY PCR: NOT DETECTED

## 2023-08-15 PROCEDURE — 87651 STREP A DNA AMP PROBE: CPT | Performed by: PHYSICIAN ASSISTANT

## 2023-08-15 PROCEDURE — 99213 OFFICE O/P EST LOW 20 MIN: CPT | Performed by: PHYSICIAN ASSISTANT

## 2023-08-15 NOTE — TELEPHONE ENCOUNTER
S-(situation): patient calls regarding throat problem    B-(background): She spoke to triage nurse yesterday and was directed to make appointment. Patient was given appointment, but told the appointment was at the [facility] when it was scheduled in [place]. Due to the scheduling mistake, patient chose to be evaluated at [facility], but feels they did not do very much and told her to follow-up with PCP in 1 week if not getting better. Patient concerned about waiting since symptoms have been getting worse and requesting sooner appointment with Dr. Vanessa for follow-up.     A-(assessment): Patient states that it feels like there is something soft stuck in the right side of her throat that is causing pain. She describes this as feeling like there is a marshmallow stuck in her throat. Patient states this started about 1 week ago. Initially pain was occurring with swallowing, but now present almost all the time. Rates pain at 5-6/10. Nothing makes it feel better. See 8/14/23 Nurse Triage message and 8/15/23 Urgent Care visit notes for details regarding symptoms.     R-(recommendations): Routed to provider to review for recommendations.     Kari Shrestha RN  [facility]

## 2023-08-15 NOTE — PROGRESS NOTES
"  ASSESSMENT/PLAN:     (J06.9) Viral URI  (primary encounter diagnosis)         MDM: Acute onset upper respiratory symptoms consistent with viral URI. Rapid Strep is negative. Strep test result pending. No evidence of secondary bacterial infection on exam. No evidence of respiratory distress or other medical distress requiring emergent intervention at this time.    Plan:       August 15, 2023 [facility] Plan:     Continue home comfort care measures for the next several days to 1 week.  Follow-up with primary care team if sore throat fails to resolve in the next week-- sooner if any sudden worsening or if new symptoms develop.    -Try a teaspoon of honey once in a while to \"throat  -try warm salt water gargles  -Increase fluids  -Avoid over-the-counter decongestants due to other medications and other medical conditions  -Try over-the-counter cough drops to soothe throat if needed      (J02.9) Sore throat  Plan: Streptococcus A Rapid Screen w/Reflex to PCR,         Group A Streptococcus PCR Throat Swab        This progress note has been dictated, with use of voice recognition software. Any grammatical, typographical, or context errors are unintentional and inherent to use of voice recognition software.          Chief Complaint   Patient presents with    Pharyngitis     Sore throat X2 wks      ------------------------    SUBJECTIVE:     Swathi Dukes 87 year old female who presents to  today for evaluation of acute onset sore throat, postnasal drip, and some cough (described as mild, intermittent, and related to postnasal drip/clearing of throat).   Patient confirms she is still able to take in good fluids and soft food despite sore throat. Throat is more sore in the morning, and better later in day.     Illness Contact: No known close contact illness exposure.  Patient reportedly lives independently, in an assisted living complex.         ROS:   CONSITUTIONAL: No fever or chills.  No acute  fatigue (still " able to do all self  cares)  HEENT: Positive as per above HPI. No difficulty talking, swallowing, opening mouth or breathing upon questioning today. No sinus pain.   RESP: Positive as per above. No wheezing or shortness of breath   GI: No acute onset nausea, vomiting or diarrhea. .No abdominal pain.   SKIN: No acute rash or hives    NEURO: No acute headaches or lethargy.           Past Medical History:   Diagnosis Date    Aortic stenosis     Glaucoma     HTN (hypertension)     Hyperlipidemia     Pericardial effusion     Systemic lupus erythematosus        Patient Active Problem List   Diagnosis    Essential hypertension    Anxiety    Glaucoma suspect, bilateral    Personal history of DVT (deep vein thrombosis)    Mixed obsessional thoughts and acts    Age-related osteoporosis without current pathological fracture    Nonrheumatic aortic valve insufficiency    Chronic diarrhea    Pain in both hands    Primary osteoarthritis of left hip    Suicidal ideation    Pericardial effusion    Abnormal cardiovascular stress test    Recurrent UTI    Lumbar radiculopathy    Liver lesion    Moderate episode of recurrent major depressive disorder (H)    Nonrheumatic aortic valve stenosis    Chronic pain of left knee    Thyroid nodule    Epiglottic lesion    CKD (chronic kidney disease) stage 2, GFR 60-89 ml/min    Inflammatory osteoarthritis    Generalized muscle weakness    UTI (urinary tract infection), bacterial    Infection due to 2019 novel coronavirus    Unstable angina (H)    Dyspnea on exertion    Shortness of breath at rest    Pneumonia of both lungs due to infectious organism, unspecified part of lung    Chest pain, unspecified type         Current Outpatient Medications   Medication    acetaminophen (TYLENOL) 500 MG tablet    ALPHAGAN P 0.1 % ophthalmic solution    amLODIPine (NORVASC) 10 MG tablet    cyclobenzaprine (FLEXERIL) 5 MG tablet    dorzolamide (TRUSOPT) 2 % ophthalmic solution    DULoxetine (CYMBALTA) 60 MG  capsule    ergocalciferol (ERGOCALCIFEROL) 1.25 MG (13989 UT) capsule    estradiol (ESTRACE) 0.1 MG/GM vaginal cream    ferrous gluconate (FERGON) 324 (38 Fe) MG tablet    folic acid (FOLVITE) 1 MG tablet    gabapentin (NEURONTIN) 100 MG capsule    hydrALAZINE (APRESOLINE) 25 MG tablet    latanoprost (XALATAN) 0.005 % ophthalmic solution    Lidocaine (LIDOCARE) 4 % Patch    lisinopril (ZESTRIL) 20 MG tablet    loperamide (IMODIUM) 2 MG capsule    melatonin 5 MG CAPS    nitroFURantoin macrocrystal-monohydrate (MACROBID) 100 MG capsule    OLANZapine (ZYPREXA) 5 MG tablet    ondansetron (ZOFRAN) 4 MG tablet    oxyCODONE (ROXICODONE) 5 MG tablet    PARoxetine (PAXIL) 10 MG tablet    simvastatin (ZOCOR) 10 MG tablet     No current facility-administered medications for this visit.               OBJECTIVE:  /65   Pulse 78   Temp 98  F (36.7  C)   Resp 20   SpO2 98%           General appearance: alert and no apparent distress  Skin color is uniform in color and without rash.  HEENT:   Conjunctiva not injected.  Sclera clear.  Left TM is normal: no effusions, no erythema, and normal landmarks.  Right TM is normal: no effusions, no erythema, and normal landmarks.  Nasal mucosa is congested.  Oropharyngeal exam is positive for post nasal drip. No erythema.  No asymmetry. Uvula is midline. No trismus. Voice is clear. No lesions, adenopathy, plaque or exudate.  Neck is supple, FROM. No neck stiffness. No adenopathy  CARDIAC:NORMAL - regular rate and rhythm. 2/6 ALBERTO (reportedly longstanding by patient report)  RESP: No increased work of breathing. Lung fields are clear to ausculation. No rales, rhonchi, or wheezing.  NEURO: Alert and oriented.  Normal speech and mentation.  CN II/XII grossly intact.  Gait within normal limits.          LAB:      Results for orders placed or performed in visit on 08/15/23   Streptococcus A Rapid Screen w/Reflex to PCR     Status: Normal    Specimen: Throat; Swab   Result Value Ref Range     Group A Strep antigen Negative Negative            Strep PCR test result is pending      Patient is offered option of Covid 19 PCR screening here today, but respectfully declines

## 2023-08-15 NOTE — PATIENT INSTRUCTIONS
"    August 15, 2023 [facility] Plan:     Continue home comfort care measures for the next several days to 1 week.  Follow-up with primary care team if sore throat fails to resolve in the next week-- sooner if any sudden worsening or if new symptoms develop.    -Try a teaspoon of honey once in a while to \"throat  -try warm salt water gargles  -Increase fluids  -Avoid over-the-counter decongestants due to other medications and other medical conditions  -Try over-the-counter cough drops to soothe throat if needed        "

## 2023-08-17 NOTE — TELEPHONE ENCOUNTER
Looks like she was diagnosed with a viral URI in UC. It has only been two days. Advised comfort cares and please call on Monday to assess progress.

## 2023-08-17 NOTE — TELEPHONE ENCOUNTER
Called patient back, she made a follow up appt for tomorrow to be seen as she did not like what she was told in Urgent Care.  Patient also states she will ask the provider to take a look at her left leg as she has been having pain in her leg. She states she took tylenol and is waiting to see if that helps.

## 2023-08-18 ENCOUNTER — OFFICE VISIT (OUTPATIENT)
Dept: FAMILY MEDICINE | Facility: CLINIC | Age: 88
End: 2023-08-18
Payer: COMMERCIAL

## 2023-08-18 VITALS
WEIGHT: 138 LBS | DIASTOLIC BLOOD PRESSURE: 60 MMHG | HEART RATE: 67 BPM | RESPIRATION RATE: 16 BRPM | HEIGHT: 61 IN | SYSTOLIC BLOOD PRESSURE: 139 MMHG | BODY MASS INDEX: 26.06 KG/M2 | OXYGEN SATURATION: 96 % | TEMPERATURE: 98.2 F

## 2023-08-18 DIAGNOSIS — Z23 NEED FOR PROPHYLACTIC VACCINATION WITH COMBINED DIPHTHERIA-TETANUS-PERTUSSIS (DTP) VACCINE: Primary | ICD-10-CM

## 2023-08-18 DIAGNOSIS — J02.9 VIRAL PHARYNGITIS: ICD-10-CM

## 2023-08-18 PROCEDURE — 99213 OFFICE O/P EST LOW 20 MIN: CPT | Performed by: PHYSICIAN ASSISTANT

## 2023-08-18 ASSESSMENT — ENCOUNTER SYMPTOMS: BACK PAIN: 1

## 2023-08-18 ASSESSMENT — PAIN SCALES - GENERAL: PAINLEVEL: EXTREME PAIN (9)

## 2023-08-18 NOTE — PROGRESS NOTES
"  Assessment & Plan         Viral pharyngitis  Reassurances given.  Advised tylenol and salt water gargles.  Please follow-up if symptoms fail to resolve or worsen                 Ramona Ann Aaseby-Aguilera, PA-C [facility] DEANGELO Echevarria is a 87 year old, presenting for the following health issues:  Problems Swallowing (Over the past 4-5 days has had troubles with her throat and swallowing on the right side of her throat.  Strep test negative at . ) and Back Pain (Threw out her back.  Left leg gave away yesterday. )        8/18/2023    12:52 PM   Additional Questions   Accompanied by Son     Has had a few days of throat pain on right.  Has been sicking on throat lozenges. Not worsening.  Denies fever, rhinitis, cough, SOB.   Back Pain     History of Present Illness       Reason for visit:  Throat Troubles  Symptom onset:  1-3 days ago  Symptoms include:  Troubles swallowing  Symptom intensity:  Moderate  Symptom progression:  Staying the same  Had these symptoms before:  No  What makes it worse:  N/A  What makes it better:  N/A    She eats 2-3 servings of fruits and vegetables daily.She consumes 2 sweetened beverage(s) daily.She exercises with enough effort to increase her heart rate 10 to 19 minutes per day.  She exercises with enough effort to increase her heart rate 7 days per week. She is missing 1 dose(s) of medications per week.  She is not taking prescribed medications regularly due to remembering to take.       See CC      Review of Systems   Musculoskeletal:  Positive for back pain.      Constitutional, HEENT, cardiovascular, pulmonary, gi and gu systems are negative, except as otherwise noted.      Objective    BP (!) 140/48 (BP Location: Right arm, Patient Position: Sitting, Cuff Size: Adult Regular)   Pulse 67   Temp 98.2  F (36.8  C) (Oral)   Resp 16   Ht 1.556 m (5' 1.25\")   Wt 62.6 kg (138 lb)   SpO2 96%   BMI 25.86 kg/m    Body mass index is 25.86 " kg/m .  Physical Exam   GENERAL: healthy, alert and no distress  HENT: normal cephalic/atraumatic, ear canals and TM's normal, nose and mouth without ulcers or lesions, oropharynx clear, and oral mucous membranes moist  NECK: no adenopathy, no asymmetry, masses, or scars and thyroid normal to palpation  RESP: lungs clear to auscultation - no rales, rhonchi or wheezes  CV: regular rate and rhythm, normal S1 S2, no S3 or S4, no murmur, click or rub, no peripheral edema and peripheral pulses strong

## 2023-08-22 ENCOUNTER — TRANSFERRED RECORDS (OUTPATIENT)
Dept: HEALTH INFORMATION MANAGEMENT | Facility: CLINIC | Age: 88
End: 2023-08-22
Payer: COMMERCIAL

## 2023-08-23 ENCOUNTER — TELEPHONE (OUTPATIENT)
Dept: FAMILY MEDICINE | Facility: CLINIC | Age: 88
End: 2023-08-23
Payer: COMMERCIAL

## 2023-08-24 NOTE — TELEPHONE ENCOUNTER
PAL reviewed patient's chart we will reach out to patient in a couple weeks to make sure improving from viral illness.    Ronda Miller RN

## 2023-09-08 ENCOUNTER — TRANSFERRED RECORDS (OUTPATIENT)
Dept: HEALTH INFORMATION MANAGEMENT | Facility: CLINIC | Age: 88
End: 2023-09-08
Payer: COMMERCIAL

## 2023-09-08 ENCOUNTER — NURSE TRIAGE (OUTPATIENT)
Dept: FAMILY MEDICINE | Facility: CLINIC | Age: 88
End: 2023-09-08
Payer: COMMERCIAL

## 2023-09-08 NOTE — TELEPHONE ENCOUNTER
LM X 2  for pt's son to call -  Pt has not heard from her son as of yet.   Pt advised to call back to PAL once she hears from her son.    Ronda Miller RN

## 2023-09-08 NOTE — TELEPHONE ENCOUNTER
Pt calling back states she called son X 2 and no answer.   She is using heat which is giving some relief.  Will continue with outreach to son and come in once she reaches him;    Ronda Miller RN

## 2023-09-08 NOTE — TELEPHONE ENCOUNTER
"Spoke with pt's son-  he states \"she did not want to see the spine people at [facility] but did see spine clinic and have ablation of nerves in her back and I think that is what is the issue\"   She did get some relieve from that for awhile but now seems not to be working.       He states he will assess mom and if she is still not doing well he will take her to the [facility] urgent care at [place].   Pt has been reluctant to this in past.      PAL agrees this is likely the best option will forward to PCP as well.     PAL will follow up next week.       Ronda Miller RN         "

## 2023-09-08 NOTE — TELEPHONE ENCOUNTER
"Reason for Disposition   SEVERE back pain (e.g., excruciating, unable to do any normal activities) and not improved after pain medicine and CARE ADVICE    Additional Information   Negative: Passed out (i.e., fainted, collapsed and was not responding)   Negative: Shock suspected (e.g., cold/pale/clammy skin, too weak to stand, low BP, rapid pulse)   Negative: Sounds like a life-threatening emergency to the triager   Negative: Major injury to the back (e.g., MVA, fall > 10 feet or 3 meters, penetrating injury, etc.)   Negative: Pain in the upper back over the ribs (rib cage) that radiates (travels) into the chest   Negative: Pain in the upper back over the ribs (rib cage) and worsened by coughing (or clearly increases with breathing)   Negative: Back pain during pregnancy   Negative: SEVERE back pain of sudden onset and age > 60 years   Negative: SEVERE abdominal pain (e.g., excruciating)   Negative: Abdominal pain and age > 60 years   Negative: Unable to urinate (or only a few drops) and bladder feels very full   Negative: Loss of bladder or bowel control (urine or bowel incontinence; wetting self, leaking stool) of new-onset   Negative: Numbness (loss of sensation) in groin or rectal area   Negative: Pain radiates into groin, scrotum   Negative: Blood in urine (red, pink, or tea-colored)   Negative: Vomiting and pain over lower ribs of back (i.e., flank - kidney area)   Negative: Weakness of a leg or foot (e.g., unable to bear weight, dragging foot)   Negative: Patient sounds very sick or weak to the triager   Negative: Fever > 100.4 F (38.0 C) and flank pain   Negative: Pain or burning with passing urine (urination)    Answer Assessment - Initial Assessment Questions  1. ONSET: \"When did the pain begin?\"       Several weeks   2. LOCATION: \"Where does it hurt?\" (upper, mid or lower back)      Mid to lower back on right side   3. SEVERITY: \"How bad is the pain?\"  (e.g., Scale 1-10; mild, moderate, or severe)    - " "MILD (1-3): Doesn't interfere with normal activities.     - MODERATE (4-7): Interferes with normal activities or awakens from sleep.     - SEVERE (8-10): Excruciating pain, unable to do any normal activities.       Mod to severe at times   4. PATTERN: \"Is the pain constant?\" (e.g., yes, no; constant, intermittent)       Constant sharp pain, \"if I move a certain way it is stabbing\"  she states it can move down my left leg  5. RADIATION: \"Does the pain shoot into your legs or somewhere else?\"      Down left leg sometimes   6. CAUSE:  \"What do you think is causing the back pain?\"       Pt unsure but states \"My leg went out a while back since then I have been miserable\"   7. BACK OVERUSE:  \"Any recent lifting of heavy objects, strenuous work or exercise?\"      No   8. MEDICINES: \"What have you taken so far for the pain?\" (e.g., nothing, acetaminophen, NSAIDS)      650 mg tylenol \"it does not help\"   9. NEUROLOGIC SYMPTOMS: \"Do you have any weakness, numbness, or problems with bowel/bladder control?\"      Denies all   10. OTHER SYMPTOMS: \"Do you have any other symptoms?\" (e.g., fever, abdomen pain, burning with urination, blood in urine)        Denies all   11. PREGNANCY: \"Is there any chance you are pregnant?\" \"When was your last menstrual period?\"        NA    Protocols used: Back Pain-A-OH    "

## 2023-09-13 NOTE — TELEPHONE ENCOUNTER
Spoke with pt's son-  she was seen in [facility]     Has MRI today and has follow up with Spine Specialist coming up as well.    Per son she has not been c/o pain as much this week.       Advised to call back with any concerns.  Pain clinic follow up within the next 2 weeks as well.     Ronda Miller RN

## 2023-09-14 ENCOUNTER — TRANSFERRED RECORDS (OUTPATIENT)
Dept: HEALTH INFORMATION MANAGEMENT | Facility: CLINIC | Age: 88
End: 2023-09-14

## 2023-09-18 ENCOUNTER — NURSE TRIAGE (OUTPATIENT)
Dept: FAMILY MEDICINE | Facility: CLINIC | Age: 88
End: 2023-09-18
Payer: COMMERCIAL

## 2023-09-18 ENCOUNTER — TELEPHONE (OUTPATIENT)
Dept: FAMILY MEDICINE | Facility: CLINIC | Age: 88
End: 2023-09-18
Payer: COMMERCIAL

## 2023-09-18 NOTE — TELEPHONE ENCOUNTER
"Reason for Disposition   Patient sounds very sick or weak to the triager    Additional Information   Negative: Passed out (i.e., fainted, collapsed and was not responding)   Negative: Shock suspected (e.g., cold/pale/clammy skin, too weak to stand, low BP, rapid pulse)   Negative: Sounds like a life-threatening emergency to the triager    Answer Assessment - Initial Assessment Questions  1. ONSET: \"When did the pain begin?\"     Over 2 weeks per pt   2. LOCATION: \"Where does it hurt?\" (upper, mid or lower back)      All over the back pain is the worse per pt \"I can't even tell you were in the back it hurts now\"   3. SEVERITY: \"How bad is the pain?\"  (e.g., Scale 1-10; mild, moderate, or severe)    - MILD (1-3): Doesn't interfere with normal activities.     - MODERATE (4-7): Interferes with normal activities or awakens from sleep.     - SEVERE (8-10): Excruciating pain, unable to do any normal activities.       10-10 pt states she is having trouble even getting out of bed now.    4. PATTERN: \"Is the pain constant?\" (e.g., yes, no; constant, intermittent)       Constant ache to sharp pain \"I just can't take it anymore\"   5. RADIATION: \"Does the pain shoot into your legs or somewhere else?\"      Pt reports pain in legs, arms and chest.  \"It seems to move ever day or two as to where I have pain\"   6. CAUSE:  \"What do you think is causing the back pain?\"       Pt has now idea but has had several work ups at [facility] and recent ablation for spine.  Has not followed up with spine MD   7. BACK OVERUSE:  \"Any recent lifting of heavy objects, strenuous work or exercise?\"      Denies   8. MEDICINES: \"What have you taken so far for the pain?\" (e.g., nothing, acetaminophen, NSAIDS)      Tylenol and heat helps \"only for a little bit\"   9. NEUROLOGIC SYMPTOMS: \"Do you have any weakness, numbness, or problems with bowel/bladder control?\"      Denies- \"It's just pain all the time\"   10. OTHER SYMPTOMS: \"Do you have any other symptoms?\" " "(e.g., fever, abdomen pain, burning with urination, blood in urine)        Denies   11. PREGNANCY: \"Is there any chance you are pregnant?\" \"When was your last menstrual period?\"        NA    Protocols used: Back Pain-A-OH    "

## 2023-09-19 DIAGNOSIS — H40.003 GLAUCOMA SUSPECT, BILATERAL: ICD-10-CM

## 2023-09-19 RX ORDER — DORZOLAMIDE HCL 20 MG/ML
1 SOLUTION/ DROPS OPHTHALMIC 2 TIMES DAILY
Qty: 10 ML | Refills: 11 | Status: SHIPPED | OUTPATIENT
Start: 2023-09-19

## 2023-09-21 DIAGNOSIS — K52.9 CHRONIC DIARRHEA: ICD-10-CM

## 2023-09-21 RX ORDER — LOPERAMIDE HCL 2 MG
2 CAPSULE ORAL 3 TIMES DAILY PRN
Qty: 30 CAPSULE | Refills: 1 | Status: SHIPPED | OUTPATIENT
Start: 2023-09-21 | End: 2023-11-28

## 2023-09-22 NOTE — PLAN OF CARE
Problem: OT General Care Plan  Goal: OT Goal 1  Description: Will attend OT groups and set goal focus.      Note: Attended 1 of 2 OT groups. In the OT goal oriented task group, she participated for 55 minutes with 3 pts. She worked on a task, followed partial of the directions and plans she had discussed from the prior day and chose to leave the design in partial completion. She did initiate work on a new multi step task, worked at a constant pace on the structured steps and was attentive to detail. She was seen for OT Cognitive Testing in the am. See separate note with results.       Cystoscopy left ureteroscopy with laser stone removal left stent

## 2023-09-26 RX ORDER — DULOXETIN HYDROCHLORIDE 60 MG/1
60 CAPSULE, DELAYED RELEASE ORAL DAILY
Status: CANCELLED | OUTPATIENT
Start: 2023-09-26

## 2023-09-28 ENCOUNTER — TELEPHONE (OUTPATIENT)
Dept: CARDIOLOGY | Facility: CLINIC | Age: 88
End: 2023-09-28
Payer: COMMERCIAL

## 2023-09-28 NOTE — TELEPHONE ENCOUNTER
----- Message from William Flores sent at 9/28/2023 12:34 PM CDT -----  Regarding: Max attempts  Please be advised that we have attempted to reach this patient to schedule their cardiology follow up appointmenthowever three attempts have been made to contact patient and they have not returned our call.  No further attempts to reach this patient will by made by scheduling team.  Please contact this patient to encourage them to call our office at [phone] and schedule this order if it is still clinically recommended.

## 2023-09-28 NOTE — TELEPHONE ENCOUNTER
Patient last seen 2-7-2023 and Dr. Davis recommended a follow up 8-2023 scheduling has attempted 3 times with no response.  Will forward to PCP for update.

## 2023-10-03 ENCOUNTER — TELEPHONE (OUTPATIENT)
Dept: FAMILY MEDICINE | Facility: CLINIC | Age: 88
End: 2023-10-03
Payer: COMMERCIAL

## 2023-10-03 DIAGNOSIS — F42.2 MIXED OBSESSIONAL THOUGHTS AND ACTS: ICD-10-CM

## 2023-10-03 RX ORDER — OLANZAPINE 5 MG/1
5 TABLET ORAL AT BEDTIME
Qty: 30 TABLET | Refills: 0 | OUTPATIENT
Start: 2023-10-03

## 2023-10-03 NOTE — TELEPHONE ENCOUNTER
Codie Noble calls with medication question. She asks if patient has order for Proair or any other inhaler. Informed her patient does not have active order for Proair or any other inhaler. She is requesting a current, signed medication list be sent to their nurses office for patient.     Fax Number: [phone]  Phone number: [phone]    Kari Shrestha RN  [facility]

## 2023-10-03 NOTE — TELEPHONE ENCOUNTER
Please let patient know PCP message regarding needing to see psychiatry for refill    Marge Degroot/

## 2023-10-06 ENCOUNTER — TELEPHONE (OUTPATIENT)
Dept: FAMILY MEDICINE | Facility: CLINIC | Age: 88
End: 2023-10-06
Payer: COMMERCIAL

## 2023-10-06 ENCOUNTER — TELEPHONE (OUTPATIENT)
Dept: UROLOGY | Facility: CLINIC | Age: 88
End: 2023-10-06
Payer: COMMERCIAL

## 2023-10-06 NOTE — TELEPHONE ENCOUNTER
[facility] Call Center    Phone Message    May a detailed message be left on voicemail: yes     Reason for Call: Other: See from Domenic Joyce is calling requesting patients medication list be signed and faxed back to the nurse station. Fax number is [phone] and See phone number is [phone]. Please advise. Thank you.     Action Taken: Message routed to:  [facility] ([facility] Uro    Travel Screening: Not Applicable

## 2023-10-08 ENCOUNTER — NURSE TRIAGE (OUTPATIENT)
Dept: NURSING | Facility: CLINIC | Age: 88
End: 2023-10-08
Payer: COMMERCIAL

## 2023-10-08 ENCOUNTER — HOSPITAL ENCOUNTER (EMERGENCY)
Facility: CLINIC | Age: 88
Discharge: HOME | End: 2023-10-09
Attending: EMERGENCY MEDICINE | Admitting: EMERGENCY MEDICINE
Payer: COMMERCIAL

## 2023-10-08 DIAGNOSIS — R19.7 DIARRHEA, UNSPECIFIED TYPE: ICD-10-CM

## 2023-10-08 DIAGNOSIS — R10.9 ABDOMINAL PAIN, UNSPECIFIED ABDOMINAL LOCATION: ICD-10-CM

## 2023-10-08 PROCEDURE — 87637 SARSCOV2&INF A&B&RSV AMP PRB: CPT | Performed by: EMERGENCY MEDICINE

## 2023-10-08 PROCEDURE — 83690 ASSAY OF LIPASE: CPT | Performed by: EMERGENCY MEDICINE

## 2023-10-08 PROCEDURE — 99284 EMERGENCY DEPT VISIT MOD MDM: CPT | Mod: 25

## 2023-10-08 PROCEDURE — 80053 COMPREHEN METABOLIC PANEL: CPT | Performed by: EMERGENCY MEDICINE

## 2023-10-08 PROCEDURE — 36415 COLL VENOUS BLD VENIPUNCTURE: CPT | Performed by: EMERGENCY MEDICINE

## 2023-10-08 PROCEDURE — 96360 HYDRATION IV INFUSION INIT: CPT

## 2023-10-08 RX ORDER — ONDANSETRON 2 MG/ML
4 INJECTION INTRAMUSCULAR; INTRAVENOUS ONCE
Status: DISCONTINUED | OUTPATIENT
Start: 2023-10-08 | End: 2023-10-09 | Stop reason: HOSPADM

## 2023-10-08 ASSESSMENT — ACTIVITIES OF DAILY LIVING (ADL): ADLS_ACUITY_SCORE: 38

## 2023-10-09 ENCOUNTER — APPOINTMENT (OUTPATIENT)
Dept: CT IMAGING | Facility: CLINIC | Age: 88
End: 2023-10-09
Attending: EMERGENCY MEDICINE
Payer: COMMERCIAL

## 2023-10-09 ENCOUNTER — TELEPHONE (OUTPATIENT)
Dept: FAMILY MEDICINE | Facility: CLINIC | Age: 88
End: 2023-10-09

## 2023-10-09 VITALS
HEART RATE: 73 BPM | RESPIRATION RATE: 16 BRPM | DIASTOLIC BLOOD PRESSURE: 77 MMHG | TEMPERATURE: 97.2 F | SYSTOLIC BLOOD PRESSURE: 162 MMHG | OXYGEN SATURATION: 96 %

## 2023-10-09 LAB
ALBUMIN SERPL BCG-MCNC: 4 G/DL (ref 3.5–5.2)
ALBUMIN UR-MCNC: NEGATIVE MG/DL
ALP SERPL-CCNC: 78 U/L (ref 35–104)
ALT SERPL W P-5'-P-CCNC: 10 U/L (ref 0–50)
ANION GAP SERPL CALCULATED.3IONS-SCNC: 11 MMOL/L (ref 7–15)
APPEARANCE UR: CLEAR
AST SERPL W P-5'-P-CCNC: 20 U/L (ref 0–45)
BASO+EOS+MONOS # BLD AUTO: ABNORMAL 10*3/UL
BASO+EOS+MONOS NFR BLD AUTO: ABNORMAL %
BASOPHILS # BLD AUTO: 0.1 10E3/UL (ref 0–0.2)
BASOPHILS NFR BLD AUTO: 1 %
BILIRUB SERPL-MCNC: 0.3 MG/DL
BILIRUB UR QL STRIP: NEGATIVE
BUN SERPL-MCNC: 21.2 MG/DL (ref 8–23)
CALCIUM SERPL-MCNC: 9.4 MG/DL (ref 8.8–10.2)
CHLORIDE SERPL-SCNC: 102 MMOL/L (ref 98–107)
COLOR UR AUTO: ABNORMAL
CREAT SERPL-MCNC: 0.75 MG/DL (ref 0.51–0.95)
DEPRECATED HCO3 PLAS-SCNC: 22 MMOL/L (ref 22–29)
EGFRCR SERPLBLD CKD-EPI 2021: 78 ML/MIN/1.73M2
EOSINOPHIL # BLD AUTO: 0.3 10E3/UL (ref 0–0.7)
EOSINOPHIL NFR BLD AUTO: 3 %
ERYTHROCYTE [DISTWIDTH] IN BLOOD BY AUTOMATED COUNT: 13.9 % (ref 10–15)
FLUAV RNA SPEC QL NAA+PROBE: NEGATIVE
FLUBV RNA RESP QL NAA+PROBE: NEGATIVE
GLUCOSE SERPL-MCNC: 109 MG/DL (ref 70–99)
GLUCOSE UR STRIP-MCNC: NEGATIVE MG/DL
HCT VFR BLD AUTO: 34.2 % (ref 35–47)
HGB BLD-MCNC: 11.2 G/DL (ref 11.7–15.7)
HGB UR QL STRIP: NEGATIVE
IMM GRANULOCYTES # BLD: 0 10E3/UL
IMM GRANULOCYTES NFR BLD: 0 %
KETONES UR STRIP-MCNC: NEGATIVE MG/DL
LEUKOCYTE ESTERASE UR QL STRIP: ABNORMAL
LIPASE SERPL-CCNC: 22 U/L (ref 13–60)
LYMPHOCYTES # BLD AUTO: 1.7 10E3/UL (ref 0.8–5.3)
LYMPHOCYTES NFR BLD AUTO: 19 %
MCH RBC QN AUTO: 30.2 PG (ref 26.5–33)
MCHC RBC AUTO-ENTMCNC: 32.7 G/DL (ref 31.5–36.5)
MCV RBC AUTO: 92 FL (ref 78–100)
MONOCYTES # BLD AUTO: 0.9 10E3/UL (ref 0–1.3)
MONOCYTES NFR BLD AUTO: 10 %
MUCOUS THREADS #/AREA URNS LPF: PRESENT /LPF
NEUTROPHILS # BLD AUTO: 6.2 10E3/UL (ref 1.6–8.3)
NEUTROPHILS NFR BLD AUTO: 67 %
NITRATE UR QL: NEGATIVE
NRBC # BLD AUTO: 0 10E3/UL
NRBC BLD AUTO-RTO: 0 /100
PH UR STRIP: 7 [PH] (ref 5–7)
PLATELET # BLD AUTO: 269 10E3/UL (ref 150–450)
POTASSIUM SERPL-SCNC: 4.5 MMOL/L (ref 3.4–5.3)
PROT SERPL-MCNC: 6.6 G/DL (ref 6.4–8.3)
RBC # BLD AUTO: 3.71 10E6/UL (ref 3.8–5.2)
RBC URINE: 0 /HPF
RSV RNA SPEC NAA+PROBE: NEGATIVE
SARS-COV-2 RNA RESP QL NAA+PROBE: NEGATIVE
SODIUM SERPL-SCNC: 135 MMOL/L (ref 135–145)
SP GR UR STRIP: 1 (ref 1–1.03)
UROBILINOGEN UR STRIP-MCNC: NORMAL MG/DL
WBC # BLD AUTO: 9.2 10E3/UL (ref 4–11)
WBC URINE: 9 /HPF

## 2023-10-09 PROCEDURE — 87086 URINE CULTURE/COLONY COUNT: CPT | Performed by: EMERGENCY MEDICINE

## 2023-10-09 PROCEDURE — 36415 COLL VENOUS BLD VENIPUNCTURE: CPT | Performed by: EMERGENCY MEDICINE

## 2023-10-09 PROCEDURE — 85025 COMPLETE CBC W/AUTO DIFF WBC: CPT | Performed by: EMERGENCY MEDICINE

## 2023-10-09 PROCEDURE — 81001 URINALYSIS AUTO W/SCOPE: CPT | Performed by: EMERGENCY MEDICINE

## 2023-10-09 PROCEDURE — 74176 CT ABD & PELVIS W/O CONTRAST: CPT

## 2023-10-09 PROCEDURE — 258N000003 HC RX IP 258 OP 636: Performed by: EMERGENCY MEDICINE

## 2023-10-09 RX ORDER — ONDANSETRON 4 MG/1
4 TABLET, ORALLY DISINTEGRATING ORAL EVERY 8 HOURS PRN
Qty: 10 TABLET | Refills: 0 | Status: SHIPPED | OUTPATIENT
Start: 2023-10-09 | End: 2023-10-12

## 2023-10-09 RX ADMIN — SODIUM CHLORIDE 500 ML: 9 INJECTION, SOLUTION INTRAVENOUS at 00:31

## 2023-10-09 ASSESSMENT — ACTIVITIES OF DAILY LIVING (ADL): ADLS_ACUITY_SCORE: 38

## 2023-10-09 NOTE — TELEPHONE ENCOUNTER
Pt had roast beef sandwich for lunch at the [employer]. Then had BM after lunch. Pt said she was on her way to her apartment and had a sudden urge to pass stools, had 2nd BM described as diarrhea and she was not able to control it. An aide was able to help her get changed.     Pt was feeling fine after, showered and washed her clothes.  4:30 PM, pt felt abdominal pain and cramping rated 6-7/10. Constant pain.   Whole body feels weak    Pt has only taken 7up and Pedialyte. Did not eat dinner.    No fever  No nausea  No vomiting  No urinary symptoms    PLAN:  Per protocol, advised to be seen within 4 hours. Care advice reviewed. Patient verbalizes understanding, will have son drive her to [facility] ED. Advised to call back with further questions/concerns.     Abbey Garcia RN/[facility] Nurse Advisor      Reason for Disposition   [1] Constant abdominal pain AND [2] present > 2 hours    Additional Information   Negative: Shock suspected (e.g., cold/pale/clammy skin, too weak to stand, low BP, rapid pulse)   Negative: Difficult to awaken or acting confused (e.g., disoriented, slurred speech)   Negative: Sounds like a life-threatening emergency to the triager   Negative: Vomiting also present and worse than the diarrhea   Negative: [1] Blood in stool AND [2] without diarrhea   Negative: Diarrhea in a cancer patient who is currently (or recently) receiving chemotherapy or radiation therapy, or cancer patient who has metastatic or end-stage cancer and is receiving palliative care   Negative: Diarrhea begins while taking an antibiotic by mouth (oral antibiotic)   Negative: [1] SEVERE abdominal pain (e.g., excruciating) AND [2] present > 1 hour   Negative: [1] SEVERE abdominal pain AND [2] age > 60 years   Negative: [1] Blood in the stool AND [2] moderate or large amount of blood   Negative: Black or tarry bowel movements  (Exception: Chronic-unchanged black-grey BMs AND is taking iron pills or Pepto-Bismol.)   Negative: [1]  Drinking very little AND [2] dehydration suspected (e.g., no urine > 12 hours, very dry mouth, very lightheaded)   Negative: Patient sounds very sick or weak to the triager   Negative: [1] SEVERE diarrhea (e.g., 7 or more times / day more than normal) AND [2] age > 60 years    Protocols used: Diarrhea-A-AH

## 2023-10-09 NOTE — ED TRIAGE NOTES
Abdominal pain that started today. Patient reports eating lunch and had a normal bowel movement after that. Following that she has had diarrhea and pain. No nausea or vomiting.

## 2023-10-09 NOTE — ED PROVIDER NOTES
"  History     Chief Complaint:  Abdominal Pain       HPI   Swathi Dukes is a 87 year old female with a history of hypertension, PE, chronic diarrhea who presents with abdominal pain and nausea. The patient states that after lunch today, where she had a roast beef sandwich, she had a normal bowel movement, then felt like she needed to go again which was nonbloody diarrhea that she describes as \"all water\". She then felt better and got cleaned up by staff and around 1630, before eating dinner, she began to feel nauseated and aching generalized abdominal pain. She also endorses urinary frequency. She denies any vomiting, fever, chills, chest pain, cough, dysuria. No sick contacts, recent hospitalizations or recent antibiotics.     Independent Historian:   None - Patient Only    Review of External Notes:   10/4/23 office visit    Medications:    Norvasc   Cymbalta   Neurontin   Apresoline   Zestril   Paxil   Zocor     Past Medical History:    Aortic stenosis   Glaucoma   Hypertension   PE  Systemic lupus erythematosus   Anxiety  DVT  Nonrheumatic aortic valve insufficiency  Chronic diarrhea  Suicidal ideation  Recurrent UTI  Lumbar radiculopathy  Liver lesion  Depression   Nonrheumatic aortic valve stenosis  Thyroid nodule  Epiglottic lesion  CKD   Inflammatory osteoarthritis  Unstable angina   Dyspnea on exertion  Pneumonia     Past Surgical History:    Heart cath   Left ventriculogram      Physical Exam   Patient Vitals for the past 24 hrs:   BP Temp Pulse Resp SpO2   10/08/23 2204 (!) 172/68 -- -- -- --   10/08/23 2203 -- 97.2  F (36.2  C) 78 16 98 %   10/08/23 2202 -- -- -- 16 --        Physical Exam  Nursing note and vitals reviewed.  Constitutional: Well nourished.   Eyes: Conjunctiva normal.  Pupils are equal, round, and reactive to light.   ENT: Nose normal. Mucous membranes pink and moist.    Neck: Normal range of motion.  CVS: Normal rate, regular rhythm.  Normal heart sounds.  Systolic murmur  Pulmonary: " Lungs clear to auscultation bilaterally. No wheezes/rales/rhonchi.  GI: Abdomen soft. Mild generalized tenderness. No rigidity or guarding.  No CVA tenderness  MSK: No calf tenderness or swelling.  Neuro: Alert. Follows simple commands.  Skin: Skin is warm and dry. No rash noted.   Psychiatric: Normal affect.       Emergency Department Course     Imaging:  Abd/pelvis CT no contrast - Stone Protocol   Final Result   IMPRESSION:    1.  Subtle pericolonic edema along the splenic flexure and proximal descending colon. The colon is decompressed in these regions which limits assessment for wall thickening. A mild inflammatory process is possible.      2.  Otherwise, no focal inflammatory change in the abdomen or pelvis.            Report per radiology    Laboratory:  Labs Ordered and Resulted from Time of ED Arrival to Time of ED Departure   COMPREHENSIVE METABOLIC PANEL - Abnormal       Result Value    Sodium 135      Potassium 4.5      Carbon Dioxide (CO2) 22      Anion Gap 11      Urea Nitrogen 21.2      Creatinine 0.75      GFR Estimate 78      Calcium 9.4      Chloride 102      Glucose 109 (*)     Alkaline Phosphatase 78      AST 20      ALT 10      Protein Total 6.6      Albumin 4.0      Bilirubin Total 0.3     ROUTINE UA WITH MICROSCOPIC - Abnormal    Color Urine Straw      Appearance Urine Clear      Glucose Urine Negative      Bilirubin Urine Negative      Ketones Urine Negative      Specific Gravity Urine 1.005      Blood Urine Negative      pH Urine 7.0      Protein Albumin Urine Negative      Urobilinogen Urine Normal      Nitrite Urine Negative      Leukocyte Esterase Urine Trace (*)     Mucus Urine Present (*)     RBC Urine 0      WBC Urine 9 (*)    CBC WITH PLATELETS AND DIFFERENTIAL - Abnormal    WBC Count 9.2      RBC Count 3.71 (*)     Hemoglobin 11.2 (*)     Hematocrit 34.2 (*)     MCV 92      MCH 30.2      MCHC 32.7      RDW 13.9      Platelet Count 269      % Neutrophils 67      % Lymphocytes 19       % Monocytes 10      Mids % (Monos, Eos, Basos)        % Eosinophils 3      % Basophils 1      % Immature Granulocytes 0      NRBCs per 100 WBC 0      Absolute Neutrophils 6.2      Absolute Lymphocytes 1.7      Absolute Monocytes 0.9      Mids Abs (Monos, Eos, Basos)        Absolute Eosinophils 0.3      Absolute Basophils 0.1      Absolute Immature Granulocytes 0.0      Absolute NRBCs 0.0     LIPASE - Normal    Lipase 22     INFLUENZA A/B, RSV, & SARS-COV2 PCR - Normal    Influenza A PCR Negative      Influenza B PCR Negative      RSV PCR Negative      SARS CoV2 PCR Negative     URINE CULTURE        Emergency Department Course & Assessments:       Interventions:  Medications   sodium chloride 0.9% BOLUS 500 mL (has no administration in time range)   ondansetron (ZOFRAN) injection 4 mg (has no administration in time range)      Assessments:  2219 I consulted with the patient and obtained history as shown above   I rechecked the patient and explained exam results       Consultations/Discussion of Management or Tests:  None        Social Determinants of Health affecting care:   None    Disposition:  The patient was discharged to home.     Impression & Plan        Medical Decision Making:  Patient is a 87-year-old female presenting with predominantly abdominal pain, nausea and diarrhea.  She is nontoxic, in no significant distress on arrival.  She had minimal abdominal tenderness on exam, decision was made to pursue formal CT abdomen which fortunately is without evidence of intra-abdominal catastrophe.  Subtle pericolonic edema along the splenic flexure though no significant inflammatory changes.  No profound anemia or significant electrolyte derangements.  UA without evidence of obvious infection though formal culture sent.  She tested negative for COVID-19/influenza/RSV.  Patient declined wanting analgesia during her time in the ED.  I did discuss with patient possible her presentation is secondary to viral etiology or  foodborne illness.  Should diarrhea persist, outpatient stool testing may be warranted.  I did recommend brat diet with p.o. hydration.  Antiemetics on discharge and I discussed close monitoring for fever, increasing abdominal pain, protracted vomiting or should symptoms worsen or change to promptly or present.  Plan close PCP follow-up.    Diagnosis:    ICD-10-CM    1. Abdominal pain, unspecified abdominal location  R10.9       2. Diarrhea, unspecified type  R19.7            Discharge Medications:  Discharge Medication List as of 10/9/2023  1:21 AM        START taking these medications    Details   ondansetron (ZOFRAN ODT) 4 MG ODT tab Take 1 tablet (4 mg) by mouth every 8 hours as needed for nausea, Disp-10 tablet, R-0, Local Print            Scribe Disclosure:  I, Rico Pizarro, am serving as a scribe at 10:24 PM on 10/8/2023 to document services personally performed by Eun Tovar DO based on my observations and the provider's statements to me.     10/8/2023   Eun Tovar DO McDonald, Lindsey E, DO  10/09/23 0159

## 2023-10-09 NOTE — TELEPHONE ENCOUNTER
"Pt calling to follow up after ER visit     She has not had any diarrhea stools since this am.  \"It was a soft stool vs watery\"    Had some toast and eggs.   This has stayed.      Still having some nausea but no vomiting. .    Pt is wondering what more she can eat, advised   Then advance diet as tolerated  dry toast, crackers, and BRAT diet.  Progress to regular diet as tolerated.  No dairy for 24 hours.  No citrus, raw fruit/vegtables, or fried/spicy foods for 2-5 days. Be seen if symptoms do not improve or worsen.    Can try adding room temp, flat ginger ale for nausea.   Recommend drinks are not iced.   Follow up if needed.     Pt expressed understanding and acceptance of the plan. had no further questions at this time.  Advised can call back to clinic at any time with concerns.  PAL will follow up later in the week    Ronda Miller RN     "

## 2023-10-10 ENCOUNTER — DOCUMENTATION ONLY (OUTPATIENT)
Dept: UROLOGY | Facility: CLINIC | Age: 88
End: 2023-10-10
Payer: COMMERCIAL

## 2023-10-10 ENCOUNTER — PATIENT OUTREACH (OUTPATIENT)
Dept: CARE COORDINATION | Facility: CLINIC | Age: 88
End: 2023-10-10
Payer: COMMERCIAL

## 2023-10-10 LAB — BACTERIA UR CULT: NO GROWTH

## 2023-10-10 NOTE — PROGRESS NOTES
Faxed medication list to patient's ECF at [facility] nurses' station fax number: [phone]

## 2023-10-10 NOTE — TELEPHONE ENCOUNTER
[facility] nurse Renetta called. Medications reviewed. Confirmed Macrobid was discontinued by a different provider.    Maia Beebe CMA  10/10/23  3:44 PM

## 2023-10-10 NOTE — PROGRESS NOTES
Clinic Care Coordination Contact    The CHW called and the patient was not able to talk, and asked for a call on 10/11/23. CHW stated that would be okay and will plan to call on 10/11/23.    THOMAS ChoiW  [phone]  [facility]

## 2023-10-10 NOTE — LETTER
[facility] CARE COORDINATION  [id number]  [facility] [address]    October 11, 2023    Swathi NEERAJ Dukes  [address]   [place] [address]      Dear Swathi,        I am a  clinic community health worker who works with Cortney Vanessa MD with the [facility]. I wanted to thank you for spending the time to talk with me.  Below is a description of clinic care coordination and how we can further assist you.       The clinic care coordination team is made up of a registered nurse, , financial resource worker and community health worker who understand the health care system. The goal of clinic care coordination is to help you manage your health and improve access to the health care system. Our team works alongside your provider to assist you in determining your health and social needs. We can help you obtain health care and community resources, providing you with necessary information and education. We can work with you through any barriers and develop a care plan that helps coordinate and strengthen the communication between you and your care team.  Our services are voluntary and are offered without charge to you personally.    If you wish to connect with the Clinic Care Coordination Team, please let your [facility] Primary Care Provider or Clinic Care Team know and they can place a referral. The Clinic Care Coordination team will then reach out by phone to further support you.    We are focused on providing you with the highest-quality healthcare experience possible.    Sincerely,   Your care team with [facility]

## 2023-10-10 NOTE — TELEPHONE ENCOUNTER
Renetta, a nurse with [facility], called stating they received the medication list, but wanted to update the care team regarding PARoxetine (PAXIL) 10 MG tablet being discontinued.  She also had a question regarding  nitroFURantoin macrocrystal-monohydrate (MACROBID) 100 MG capsule. Please contact [facility] nurses at [phone]. Thank you.

## 2023-10-11 NOTE — PROGRESS NOTES
Clinic Care Coordination Contact  Community Health Worker Initial Outreach    CHW Initial Information Gathering:  Referral Source: ED Follow-Up  Preferred Hospital: [facility]  [phone]  Preferred Urgent Care: [facility], [phone]  No PCP office visit in Past Year: No       Patient accepts CC: No, due to the patient stating that at this time there are no concerns or questions at this time for CCC to assist with. Patient will be sent Care Coordination introduction letter for future reference.     KIRSTIN Choi  [phone]  [facility] Care Resource [facility]

## 2023-10-13 ENCOUNTER — MEDICAL CORRESPONDENCE (OUTPATIENT)
Dept: HEALTH INFORMATION MANAGEMENT | Facility: CLINIC | Age: 88
End: 2023-10-13
Payer: COMMERCIAL

## 2023-10-13 ENCOUNTER — PRE VISIT (OUTPATIENT)
Dept: UROLOGY | Facility: CLINIC | Age: 88
End: 2023-10-13
Payer: COMMERCIAL

## 2023-10-13 NOTE — TELEPHONE ENCOUNTER
Reason for Visit: Cystoscopy    Diagnosis: Recurrent UTI Urinary frequency Vaginal atrophy    Rooming Requirements:  UA dip prior to getting ready for cystoscopy. If positive for Leuks and/or Nitrites, will not do cystoscopy. If positive, send urine for official UA / UC.    Enrrique Fu  10/13/23  2:14 PM

## 2023-10-20 ENCOUNTER — OFFICE VISIT (OUTPATIENT)
Dept: UROLOGY | Facility: CLINIC | Age: 88
End: 2023-10-20
Payer: COMMERCIAL

## 2023-10-20 VITALS
HEART RATE: 57 BPM | WEIGHT: 139 LBS | HEIGHT: 61 IN | DIASTOLIC BLOOD PRESSURE: 70 MMHG | BODY MASS INDEX: 26.24 KG/M2 | SYSTOLIC BLOOD PRESSURE: 120 MMHG

## 2023-10-20 DIAGNOSIS — N39.0 RECURRENT UTI: ICD-10-CM

## 2023-10-20 DIAGNOSIS — R35.0 URINARY FREQUENCY: Primary | ICD-10-CM

## 2023-10-20 PROCEDURE — 52000 CYSTOURETHROSCOPY: CPT | Performed by: OBSTETRICS & GYNECOLOGY

## 2023-10-20 PROCEDURE — 99207 PR DROP WITH A PROCEDURE: CPT | Performed by: OBSTETRICS & GYNECOLOGY

## 2023-10-20 RX ORDER — LIDOCAINE HYDROCHLORIDE 20 MG/ML
JELLY TOPICAL ONCE
Status: COMPLETED | OUTPATIENT
Start: 2023-10-20 | End: 2023-10-20

## 2023-10-20 RX ORDER — CHOLECALCIFEROL (VITAMIN D3) 1250 MCG
1 CAPSULE ORAL
COMMUNITY
End: 2023-11-13

## 2023-10-20 RX ADMIN — LIDOCAINE HYDROCHLORIDE: 20 JELLY TOPICAL at 15:48

## 2023-10-20 ASSESSMENT — PAIN SCALES - GENERAL: PAINLEVEL: SEVERE PAIN (6)

## 2023-10-20 NOTE — LETTER
10/20/2023       RE: Swathi Dukes  [address]  [place] [address]     Dear Colleague,    Thank you for referring your patient, Swathi Dukes, to the [facility] [place] [facility]. Please see a copy of my visit note below.    Reason for Visit:  Cystoscopy    Clinical Data: Ms. Swathi Dukes is a 87 year old female with a hx of recurrent UTIs    Cystoscopy procedure:  Pt. Was consented and placed in the lithotomy position.  She was cleaned and preparred in the usual fashion.  Lidocain gel was inserted into the urethra and given time to take effect.  A 16 fr flexible cystoscope was then inserted through the urethra and into the bladder.  The urethra was wnl.  The bladder was with 1+ trabeculation.  No tumors, diverticulae, or stones.  Bilateral u/o's were effluxing clear urine.  The cystoscope was then withdrawn.  The pt. Tolerated the procedure well.    A/P:  87 year old female with recurrent UTIs  -Review of her CT showed that Swathi has a kidney stone that may be the source of her recurrent UTIs. Will refer to Urology for evaluation and treatment.    Thank you for allowing me to participate in the care of  Ms. Swathi Dukes and I will keep you updated on her progress.    Sha Espinosa MD

## 2023-10-20 NOTE — PATIENT INSTRUCTIONS
"AFTER YOUR CYSTOSCOPY        You have just completed a cystoscopy, or \"cysto\", which allowed your physician to learn more about your bladder (or to remove a stent placed after surgery). We suggest that you continue to avoid caffeine, fruit juice, and alcohol for the next 24 hours, however, you are encouraged to return to your normal activities.         A few things that are considered normal after your cystoscopy:     * Small amount of bleeding (or spotting) that clears within the next 24 hours     * Slight burning sensation with urination     * Sensation to of needing to avoid more frequently     * The feeling of \"air\" in your urine     * Mild discomfort that is relieved with Tylenol        Please contact our office promptly if you:     * Develop a fever above 101 degrees     * Are unable to urinate     * Develop bright red blood that does not stop     * Severe pain or swelling         Please contact our office with any concerns or questions @ [phone]   AFTER YOUR CYSTOSCOPY        You have just completed a cystoscopy, or \"cysto\", which allowed your physician to learn more about your bladder (or to remove a stent placed after surgery). We suggest that you continue to avoid caffeine, fruit juice, and alcohol for the next 24 hours, however, you are encouraged to return to your normal activities.         A few things that are considered normal after your cystoscopy:     * Small amount of bleeding (or spotting) that clears within the next 24 hours     * Slight burning sensation with urination     * Sensation to of needing to avoid more frequently     * The feeling of \"air\" in your urine     * Mild discomfort that is relieved with Tylenol        Please contact our office promptly if you:     * Develop a fever above 101 degrees     * Are unable to urinate     * Develop bright red blood that does not stop     * Severe pain or swelling         Please contact our office with any concerns or questions @[facility].  "

## 2023-10-20 NOTE — NURSING NOTE
No chief complaint on file.      not currently breastfeeding. There is no height or weight on file to calculate BMI.    Patient Active Problem List   Diagnosis    Essential hypertension    Anxiety    Glaucoma suspect, bilateral    Personal history of DVT (deep vein thrombosis)    Mixed obsessional thoughts and acts    Age-related osteoporosis without current pathological fracture    Nonrheumatic aortic valve insufficiency    Chronic diarrhea    Pain in both hands    Primary osteoarthritis of left hip    Suicidal ideation    Pericardial effusion    Abnormal cardiovascular stress test    Recurrent UTI    Lumbar radiculopathy    Liver lesion    Moderate episode of recurrent major depressive disorder (H)    Nonrheumatic aortic valve stenosis    Chronic pain of left knee    Thyroid nodule    Epiglottic lesion    CKD (chronic kidney disease) stage 2, GFR 60-89 ml/min    Inflammatory osteoarthritis    Generalized muscle weakness    UTI (urinary tract infection), bacterial    Infection due to 2019 novel coronavirus    Unstable angina (H)    Dyspnea on exertion    Shortness of breath at rest    Pneumonia of both lungs due to infectious organism, unspecified part of lung    Chest pain, unspecified type       Allergies   Allergen Reactions    Aspirin Anaphylaxis    Diclofenac Anaphylaxis    Ibuprofen Anaphylaxis    Iodinated Contrast Media Anaphylaxis    Iodine Anaphylaxis     Contrast  Pt states anaphylactic reaction to ct contrast about 20 years ago  Pt premedicated with prednisone and benadryl before iv isouve-370 CT contrast on 1/6/23 and exhibited no signs of reaction    Contrast Dye Unknown    Donepezil      Other reaction(s): Other (see comments)  Cervical dystonia    Lactose Diarrhea       Current Outpatient Medications   Medication Sig Dispense Refill    loperamide (IMODIUM) 2 MG capsule Take 1 capsule (2 mg) by mouth 3 times daily as needed for diarrhea 30 capsule 1    acetaminophen (TYLENOL) 500 MG tablet Take 1,000  mg by mouth every 4 hours as needed for mild pain      ALPHAGAN P 0.1 % ophthalmic solution INSTILL ONE DROP IN EACH EYE TWICE DAILY 5 mL 97    amLODIPine (NORVASC) 10 MG tablet Take 1 tablet (10 mg) by mouth daily 90 tablet 3    dorzolamide (TRUSOPT) 2 % ophthalmic solution Place 1 drop into both eyes 2 times daily 10 mL 11    DULoxetine (CYMBALTA) 60 MG capsule Take 60 mg by mouth daily      ergocalciferol (ERGOCALCIFEROL) 1.25 MG (78820 UT) capsule       ferrous gluconate (FERGON) 324 (38 Fe) MG tablet       folic acid (FOLVITE) 1 MG tablet Take 1 tablet (1 mg) by mouth daily 30 tablet 11    gabapentin (NEURONTIN) 100 MG capsule Take 200 mg by mouth 3 times daily 105 capsule 11    hydrALAZINE (APRESOLINE) 25 MG tablet Take 1 tablet (25 mg) by mouth 3 times daily 270 tablet 2    latanoprost (XALATAN) 0.005 % ophthalmic solution Place 1 drop into both eyes At Bedtime 7.5 mL 11    Lidocaine (LIDOCARE) 4 % Patch Place 1 patch onto the skin every 24 hours To prevent lidocaine toxicity, patient should be patch free for 12 hrs daily.      lisinopril (ZESTRIL) 20 MG tablet Take 1 tablet (20 mg) by mouth daily 90 tablet 3    OLANZapine (ZYPREXA) 5 MG tablet Take 1 tablet (5 mg) by mouth At Bedtime 30 tablet 0    PARoxetine (PAXIL) 10 MG tablet Take by mouth every 24 hours      simvastatin (ZOCOR) 10 MG tablet Take 1 tablet (10 mg) by mouth At Bedtime 90 tablet 3       Social History     Tobacco Use    Smoking status: Never    Smokeless tobacco: Never   Vaping Use    Vaping Use: Never used   Substance Use Topics    Alcohol use: Not Currently    Drug use: Never       What to expect after the procedure reviewed with patient: Yes    Leonila Borges LPN  10/20/2023  2:55 PM     Invasive Procedure Safety Checklist:    Procedure: Cystoscopy    Action: Complete sections and checkboxes as appropriate.    Pre-procedure:  1. Patient ID Verified with 2 identifiers (Car[place] and  or MRN) : YES    2. Procedure and site verified with  patient/designee (when able) : YES    3. Accurate consent documentation in medical record : YES    4. H&P (or appropriate assessment) documented in medical record : YES  H&P must be up to 30 days prior to procedure an updated within 24 hours of                 Procedure as applicable.     5. Relevant diagnostic and radiology test results appropriately labeled and displayed as applicable : YES    6. Blood products, implants, devices, and/or special equipment available for the procedure as applicable : YES    7. Procedure site(s) marked with provider initials [Exclusions: None] : NO    8. Marking not required. Reason : Yes  Procedure does not require site marking    Time Out:     Time-Out performed immediately prior to starting procedure, including verbal and active participation of all team members addressing: YES    1. Correct patient identity.  2. Confirmed that the correct side and site are marked.  3. An accurate procedure to be done.  4. Agreement on the procedure to be done.  5. Correct patient position.  6. Relevant images and results are properly labeled and appropriately displayed.  7. The need to administer antibiotics or fluids for irrigation purposes during the procedure as applicable.  8. Safety precautions based on patient history or medication use.    During Procedure: Verification of correct person, site, and procedure occurs any time the responsibility for care of the patient is transferred to another member of the care team.      The following medication was given:     MEDICATION:  Uro-jet  ROUTE: Intra-urethral   SITE: Urethra  DOSE: 10 mL 2% lidocaine  LOT #: [id number]  : [employer]  EXPIRATION DATE: 5/25  NDC#: [address]-94   Was there drug waste? No    Prior to administration, verified patient identity using patient's name and date of birth.  Due to administration, patient instructed to remain in clinic for 15 minutes  afterwards, and to report any adverse reaction to me  immediately.      Drug Amount Wasted:  None.  Vial/Syringe: Single dose vial    Leonila Borges LPN  October 20, 2023

## 2023-10-20 NOTE — PROGRESS NOTES
Reason for Visit:  Cystoscopy    Clinical Data: Ms. Swathi Dukes is a 87 year old female with a hx of recurrent UTIs    Cystoscopy procedure:  Pt. Was consented and placed in the lithotomy position.  She was cleaned and preparred in the usual fashion.  Lidocain gel was inserted into the urethra and given time to take effect.  A 16 fr flexible cystoscope was then inserted through the urethra and into the bladder.  The urethra was wnl.  The bladder was with 1+ trabeculation.  No tumors, diverticulae, or stones.  Bilateral u/o's were effluxing clear urine.  The cystoscope was then withdrawn.  The pt. Tolerated the procedure well.    A/P:  87 year old female with recurrent UTIs  -Review of her CT showed that Swathi has a kidney stone that may be the source of her recurrent UTIs. Will refer to Urology for evaluation and treatment.    Thank you for allowing me to participate in the care of  Ms. Swathi Dukes and I will keep you updated on her progress.    Sha Espinosa MD

## 2023-10-22 ENCOUNTER — APPOINTMENT (OUTPATIENT)
Dept: CT IMAGING | Facility: CLINIC | Age: 88
End: 2023-10-22
Payer: COMMERCIAL

## 2023-10-22 ENCOUNTER — APPOINTMENT (OUTPATIENT)
Dept: ULTRASOUND IMAGING | Facility: CLINIC | Age: 88
End: 2023-10-22
Payer: COMMERCIAL

## 2023-10-22 ENCOUNTER — HOSPITAL ENCOUNTER (EMERGENCY)
Facility: CLINIC | Age: 88
Discharge: HOME | End: 2023-10-22
Attending: EMERGENCY MEDICINE | Admitting: EMERGENCY MEDICINE
Payer: COMMERCIAL

## 2023-10-22 ENCOUNTER — APPOINTMENT (OUTPATIENT)
Dept: GENERAL RADIOLOGY | Facility: CLINIC | Age: 88
End: 2023-10-22
Payer: COMMERCIAL

## 2023-10-22 VITALS
DIASTOLIC BLOOD PRESSURE: 55 MMHG | SYSTOLIC BLOOD PRESSURE: 149 MMHG | OXYGEN SATURATION: 94 % | TEMPERATURE: 98.3 F | HEART RATE: 74 BPM | RESPIRATION RATE: 17 BRPM

## 2023-10-22 DIAGNOSIS — K52.9 ACUTE COLITIS: ICD-10-CM

## 2023-10-22 DIAGNOSIS — M16.12 ARTHRITIS OF LEFT HIP: ICD-10-CM

## 2023-10-22 DIAGNOSIS — K86.9 PANCREATIC LESION: ICD-10-CM

## 2023-10-22 DIAGNOSIS — K76.9 LIVER LESION: ICD-10-CM

## 2023-10-22 DIAGNOSIS — D64.9 CHRONIC ANEMIA: ICD-10-CM

## 2023-10-22 DIAGNOSIS — K57.90 DIVERTICULOSIS OF INTESTINE: ICD-10-CM

## 2023-10-22 LAB
ABO/RH(D): NORMAL
ALBUMIN SERPL BCG-MCNC: 4.1 G/DL (ref 3.5–5.2)
ALP SERPL-CCNC: 79 U/L (ref 35–104)
ALT SERPL W P-5'-P-CCNC: 10 U/L (ref 0–50)
ANION GAP SERPL CALCULATED.3IONS-SCNC: 10 MMOL/L (ref 7–15)
ANTIBODY SCREEN: NEGATIVE
AST SERPL W P-5'-P-CCNC: 16 U/L (ref 0–45)
BASO+EOS+MONOS # BLD AUTO: ABNORMAL 10*3/UL
BASO+EOS+MONOS NFR BLD AUTO: ABNORMAL %
BASOPHILS # BLD AUTO: 0.1 10E3/UL (ref 0–0.2)
BASOPHILS NFR BLD AUTO: 1 %
BILIRUB SERPL-MCNC: 0.2 MG/DL
BUN SERPL-MCNC: 20.4 MG/DL (ref 8–23)
CALCIUM SERPL-MCNC: 9.4 MG/DL (ref 8.8–10.2)
CHLORIDE SERPL-SCNC: 107 MMOL/L (ref 98–107)
CREAT SERPL-MCNC: 0.83 MG/DL (ref 0.51–0.95)
DEPRECATED HCO3 PLAS-SCNC: 22 MMOL/L (ref 22–29)
EGFRCR SERPLBLD CKD-EPI 2021: 69 ML/MIN/1.73M2
EOSINOPHIL # BLD AUTO: 0.2 10E3/UL (ref 0–0.7)
EOSINOPHIL NFR BLD AUTO: 3 %
ERYTHROCYTE [DISTWIDTH] IN BLOOD BY AUTOMATED COUNT: 14.3 % (ref 10–15)
GLUCOSE SERPL-MCNC: 160 MG/DL (ref 70–99)
HCT VFR BLD AUTO: 32.6 % (ref 35–47)
HGB BLD-MCNC: 10.5 G/DL (ref 11.7–15.7)
HGB BLD-MCNC: 11.1 G/DL (ref 11.7–15.7)
HOLD SPECIMEN: NORMAL
HOLD SPECIMEN: NORMAL
IMM GRANULOCYTES # BLD: 0.1 10E3/UL
IMM GRANULOCYTES NFR BLD: 1 %
LYMPHOCYTES # BLD AUTO: 1.1 10E3/UL (ref 0.8–5.3)
LYMPHOCYTES NFR BLD AUTO: 17 %
MCH RBC QN AUTO: 29.7 PG (ref 26.5–33)
MCHC RBC AUTO-ENTMCNC: 32.2 G/DL (ref 31.5–36.5)
MCV RBC AUTO: 92 FL (ref 78–100)
MONOCYTES # BLD AUTO: 0.6 10E3/UL (ref 0–1.3)
MONOCYTES NFR BLD AUTO: 10 %
NEUTROPHILS # BLD AUTO: 4.2 10E3/UL (ref 1.6–8.3)
NEUTROPHILS NFR BLD AUTO: 68 %
NRBC # BLD AUTO: 0 10E3/UL
NRBC BLD AUTO-RTO: 0 /100
PLATELET # BLD AUTO: 307 10E3/UL (ref 150–450)
POTASSIUM SERPL-SCNC: 4.3 MMOL/L (ref 3.4–5.3)
PROT SERPL-MCNC: 6.5 G/DL (ref 6.4–8.3)
RBC # BLD AUTO: 3.53 10E6/UL (ref 3.8–5.2)
SODIUM SERPL-SCNC: 139 MMOL/L (ref 135–145)
SPECIMEN EXPIRATION DATE: NORMAL
WBC # BLD AUTO: 6.2 10E3/UL (ref 4–11)

## 2023-10-22 PROCEDURE — 86850 RBC ANTIBODY SCREEN: CPT | Performed by: SOCIAL WORKER

## 2023-10-22 PROCEDURE — 73502 X-RAY EXAM HIP UNI 2-3 VIEWS: CPT

## 2023-10-22 PROCEDURE — 96374 THER/PROPH/DIAG INJ IV PUSH: CPT | Mod: 59

## 2023-10-22 PROCEDURE — 85025 COMPLETE CBC W/AUTO DIFF WBC: CPT | Performed by: EMERGENCY MEDICINE

## 2023-10-22 PROCEDURE — 86850 RBC ANTIBODY SCREEN: CPT | Performed by: EMERGENCY MEDICINE

## 2023-10-22 PROCEDURE — 36415 COLL VENOUS BLD VENIPUNCTURE: CPT | Performed by: SOCIAL WORKER

## 2023-10-22 PROCEDURE — 99285 EMERGENCY DEPT VISIT HI MDM: CPT | Mod: 25

## 2023-10-22 PROCEDURE — 80053 COMPREHEN METABOLIC PANEL: CPT | Performed by: SOCIAL WORKER

## 2023-10-22 PROCEDURE — 250N000011 HC RX IP 250 OP 636: Performed by: EMERGENCY MEDICINE

## 2023-10-22 PROCEDURE — 250N000009 HC RX 250: Performed by: EMERGENCY MEDICINE

## 2023-10-22 PROCEDURE — 250N000011 HC RX IP 250 OP 636: Mod: JZ

## 2023-10-22 PROCEDURE — 85025 COMPLETE CBC W/AUTO DIFF WBC: CPT | Performed by: SOCIAL WORKER

## 2023-10-22 PROCEDURE — 80053 COMPREHEN METABOLIC PANEL: CPT | Performed by: EMERGENCY MEDICINE

## 2023-10-22 PROCEDURE — 93971 EXTREMITY STUDY: CPT | Mod: LT

## 2023-10-22 PROCEDURE — 96375 TX/PRO/DX INJ NEW DRUG ADDON: CPT

## 2023-10-22 PROCEDURE — 36415 COLL VENOUS BLD VENIPUNCTURE: CPT

## 2023-10-22 PROCEDURE — 86901 BLOOD TYPING SEROLOGIC RH(D): CPT | Performed by: EMERGENCY MEDICINE

## 2023-10-22 PROCEDURE — 85018 HEMOGLOBIN: CPT | Mod: 91

## 2023-10-22 PROCEDURE — 74174 CTA ABD&PLVS W/CONTRAST: CPT

## 2023-10-22 PROCEDURE — 86901 BLOOD TYPING SEROLOGIC RH(D): CPT | Performed by: SOCIAL WORKER

## 2023-10-22 RX ORDER — DIPHENHYDRAMINE HYDROCHLORIDE 50 MG/ML
25 INJECTION INTRAMUSCULAR; INTRAVENOUS ONCE
Status: COMPLETED | OUTPATIENT
Start: 2023-10-22 | End: 2023-10-22

## 2023-10-22 RX ORDER — METHYLPREDNISOLONE SODIUM SUCCINATE 125 MG/2ML
125 INJECTION, POWDER, LYOPHILIZED, FOR SOLUTION INTRAMUSCULAR; INTRAVENOUS ONCE
Status: COMPLETED | OUTPATIENT
Start: 2023-10-22 | End: 2023-10-22

## 2023-10-22 RX ORDER — IOPAMIDOL 755 MG/ML
500 INJECTION, SOLUTION INTRAVASCULAR ONCE
Status: COMPLETED | OUTPATIENT
Start: 2023-10-22 | End: 2023-10-22

## 2023-10-22 RX ADMIN — DIPHENHYDRAMINE HYDROCHLORIDE 25 MG: 50 INJECTION, SOLUTION INTRAMUSCULAR; INTRAVENOUS at 16:31

## 2023-10-22 RX ADMIN — IOPAMIDOL 70 ML: 755 INJECTION, SOLUTION INTRAVENOUS at 16:53

## 2023-10-22 RX ADMIN — METHYLPREDNISOLONE SODIUM SUCCINATE 125 MG: 125 INJECTION, POWDER, FOR SOLUTION INTRAMUSCULAR; INTRAVENOUS at 16:31

## 2023-10-22 RX ADMIN — SODIUM CHLORIDE 63 ML: 9 INJECTION, SOLUTION INTRAVENOUS at 16:54

## 2023-10-22 ASSESSMENT — ACTIVITIES OF DAILY LIVING (ADL)
ADLS_ACUITY_SCORE: 38
ADLS_ACUITY_SCORE: 38
ADLS_ACUITY_SCORE: 36

## 2023-10-22 NOTE — ED TRIAGE NOTES
Pt complains of one episode of bright red stool today. Pt also complains of L groin pain that shoots down leg x1.5 wks. No sob, no dizziness. No vomiting. No abd pain. No thinners.

## 2023-10-22 NOTE — ED PROVIDER NOTES
Emergency Department Attending Supervision Note  10/22/2023  2:43 PM      I evaluated this patient in conjunction with Marina BONILLA, please see primary note for full details      Briefly, the patient presented with 1 episode of bloody loose stool.  Patient denies abdominal pain, chest pain, shortness of breath, vomiting.  She denies similar episodes previously.      On my exam, nontoxic appearance, steady gait, abdomen nontender and soft, normal respiratory effort    Results:  XR Pelvis w Hip Left 1 View   Final Result   IMPRESSION: Mild degenerative changes left hip. No acute fracture or dislocation.       Contrast within the bladder. Prior ORIF of the right femur.      US Lower Extremity Venous Duplex Left   Final Result   IMPRESSION:   1.  No deep venous thrombosis in the left lower extremity.      CTA Abdomen Pelvis with Contrast   Final Result   IMPRESSION:    1.  No CT evidence of acute gastrointestinal bleeding.   2.  Possible mild segmental colitis involving the proximal and mid descending colon.    3.  Gas within the lumen of the urinary bladder; correlate with urinalysis to exclude cystitis, in the absence of any recent instrumentation.   4.  Multiple small cystic-appearing lesions of the pancreas, largest measuring 1.8 cm at the pancreatic tail. These favor benign cysts or intraductal papillary mucinous neoplasms, though warrant continued follow-up to exclude malignancy. Follow-up, as    per below.   5.  Complex, partially calcified cystic lesion of the left hepatic lobe, measuring up to 2.6 cm, unchanged from oldest prior available comparison dated 01/06/2023. Attention on imaging follow-up performed for pancreatic cystic lesions.   6.  Colonic diverticulosis, without evidence of diverticulitis.      REFERENCE:   International Consensus Guidelines for Management of IPMN and MCN of the Pancreas. Pancreatology 12 (2012) 183-197.      Asymptomatic patient without high-risk stigmata of malignancy  (obstructive jaundice with cystic lesion in head of pancreas, enhancing solid component within cyst, or main pancreatic duct greater than 10 mm), and without worrisome features (cyst greater    than 3 cm, thickened/enhancing cyst walls, main pancreatic duct 5-9 mm, mural nodule, or abrupt change in caliber of pancreatic duct with distal pancreatic atrophy).      Size of largest cyst:   Less than 1 cm: CT or MRI with contrast in 2-3 years.      1-2 cm: CT or MRI with contrast yearly for 2 years, then lengthen interval if no change.      2-3 cm: EUS in 3-6 months, then can alternate MRI with EUS as appropriate.      Greater than 3 cm: Close surveillance alternating MRI with EUS every 3-6 months. Consider surgery in young, fit patients.      Consider Gastroenterology consultation for all categories of pancreatic cysts.                  Labs Ordered and Resulted from Time of ED Arrival to Time of ED Departure   COMPREHENSIVE METABOLIC PANEL - Abnormal       Result Value    Sodium 139      Potassium 4.3      Carbon Dioxide (CO2) 22      Anion Gap 10      Urea Nitrogen 20.4      Creatinine 0.83      GFR Estimate 69      Calcium 9.4      Chloride 107      Glucose 160 (*)     Alkaline Phosphatase 79      AST 16      ALT 10      Protein Total 6.5      Albumin 4.1      Bilirubin Total 0.2     CBC WITH PLATELETS AND DIFFERENTIAL - Abnormal    WBC Count 6.2      RBC Count 3.53 (*)     Hemoglobin 10.5 (*)     Hematocrit 32.6 (*)     MCV 92      MCH 29.7      MCHC 32.2      RDW 14.3      Platelet Count 307      % Neutrophils 68      % Lymphocytes 17      % Monocytes 10      Mids % (Monos, Eos, Basos)        % Eosinophils 3      % Basophils 1      % Immature Granulocytes 1      NRBCs per 100 WBC 0      Absolute Neutrophils 4.2      Absolute Lymphocytes 1.1      Absolute Monocytes 0.6      Mids Abs (Monos, Eos, Basos)        Absolute Eosinophils 0.2      Absolute Basophils 0.1      Absolute Immature Granulocytes 0.1      Absolute  NRBCs 0.0     HEMOGLOBIN - Abnormal    Hemoglobin 11.1 (*)    TYPE AND SCREEN, ADULT    ABO/RH(D) A NEG      Antibody Screen Negative      SPECIMEN EXPIRATION DATE [id number]     ABO/RH TYPE AND SCREEN           MDM    87-year-old female presenting with 1 bloody bowel movement.  Vital signs and exam are reassuring.  Labs demonstrated mild anemia although improved upon recheck likely within the air range of the lab.  The patient has had no further bloody bowel movements since initial 1 that she presented for.  CT demonstrated nonspecific colitis.  Given location this is possibly ischemic although remains nonspecific.  Given the patient is hemodynamically stable with no further episodes of bloody bowel movements and no change in hemoglobin level after approximately 5 hours in the emergency department, I feel she is safe for discharge with recommended follow-up with gastroenterology.  Strict return precautions were given for follow-up in the emergency department for recurrent episodes of GI bleeding.  The patient and her son are understanding and agreeable to plan.  Patient discharged in stable condition.    Diagnosis    ICD-10-CM    1. Acute colitis  K52.9       2. Arthritis of left hip  M16.12       3. Diverticulosis of intestine  K57.90       4. Liver lesion  K76.9       5. Pancreatic lesion  K86.9       6. Chronic anemia  D64.9             MD Channing Johnson Christopher E, MD  10/22/23 1391

## 2023-10-22 NOTE — DISCHARGE INSTRUCTIONS
-Follow-up with GI for further evaluation of colitis as soon as you are able to  -Follow-up with [facility] for left hip pain as soon as you are able to  -Return to ED for fevers, worsening abdominal pain, vomiting, diarrhea, increased rectal bleeding, increased pain, numbness, tingling, weakness, or any other new concerns

## 2023-10-22 NOTE — ED NOTES
GastrointestinalGastrointestinal WDL: stool color (pt complains of one episode of bright red stool today and also complains of Lt sided groin pain that shoots down leg to left knee. no injury.)Last Bowel Movement: 10/22/23Stool Color: red, brightBowel Sounds: All QuadrantsAll Quadrants Bowel Sounds: audibleAdditional Documentation: Bowel Sounds (Row)

## 2023-10-23 ENCOUNTER — TELEPHONE (OUTPATIENT)
Dept: FAMILY MEDICINE | Facility: CLINIC | Age: 88
End: 2023-10-23
Payer: COMMERCIAL

## 2023-10-23 NOTE — TELEPHONE ENCOUNTER
"Spoke with pt regarding ER follow up.    States \"I am feeling fine\". Denies any current new or worsening symptoms. Scheduled follow up ER visit per ER discharge report for 10/25/23 @ 9:40 AM with Susan Haase CNP. Expressed understanding and acceptance of the plan. Had no further questions at this time.  Advised can call back to clinic at any time with concerns.     Mary CHRISTIE RN    "

## 2023-10-23 NOTE — ED PROVIDER NOTES
History     Chief Complaint:  Rectal Bleeding       HPI   Swathi Dukes is a 87 year old female with a history of hypertension, anxiety, pericardial effusion, lumbar radiculopathy, aortic valve stenosis, CKD, and DVT who presents for evaluation of rectal bleeding and left groin pain.  The patient states that 1-1/2 weeks ago she developed intermittent left groin pain that is exacerbated by walking and certain movements and the pain radiates down to her thigh.  She has also had associated left lower extremity swelling.  Patient denies any recent trauma/injury, numbness, tingling, or weakness.  She is not anticoagulated.    Today, the patient states that she felt as though she needed to pass a bowel movement, and had an episode of rectal bleeding with bright red blood.  This has been the only episode and she did not have any bleeding prior to today.  Patient denies fevers, chest pain, shortness of breath, abdominal pain, new back pain, nausea, vomiting, or diarrhea.  Of note, the patient had a cystoscopy performed on 10/20/2023 due to history of recurrent UTIs.    Independent Historian:   None - Patient Only    Review of External Notes:   10/20/2023: Cystoscopy performed, this showed no concerning findings or obvious cause of UTIs, patient was referred to urology for further evaluation and treatment  10/8/2023: Patient was evaluated for abdominal pain and nausea in the ED, lab work and CT scan obtained showing no acute findings.    Medications:    acetaminophen (TYLENOL) 500 MG tablet  ALPHAGAN P 0.1 % ophthalmic solution  amLODIPine (NORVASC) 10 MG tablet  cholecalciferol (D3-50) 1250 mcg (66903 units) capsule  dorzolamide (TRUSOPT) 2 % ophthalmic solution  DULoxetine (CYMBALTA) 60 MG capsule  ergocalciferol (ERGOCALCIFEROL) 1.25 MG (68524 UT) capsule  ferrous gluconate (FERGON) 324 (38 Fe) MG tablet  folic acid (FOLVITE) 1 MG tablet  gabapentin (NEURONTIN) 100 MG capsule  hydrALAZINE (APRESOLINE) 25 MG  tablet  latanoprost (XALATAN) 0.005 % ophthalmic solution  Lidocaine (LIDOCARE) 4 % Patch  lisinopril (ZESTRIL) 20 MG tablet  loperamide (IMODIUM) 2 MG capsule  OLANZapine (ZYPREXA) 5 MG tablet  PARoxetine (PAXIL) 10 MG tablet  simvastatin (ZOCOR) 10 MG tablet        Past Medical History:    Past Medical History:   Diagnosis Date    Aortic stenosis     Glaucoma     HTN (hypertension)     Hyperlipidemia     Pericardial effusion     Systemic lupus erythematosus        Past Surgical History:    Past Surgical History:   Procedure Laterality Date    CV HEART CATHETERIZATION WITH POSSIBLE INTERVENTION N/A 1/5/2021    Procedure: Heart Catheterization with Possible Intervention;  Surgeon: Hayden Bedoya MD;  Location:  [facility]    CV LEFT VENTRICULOGRAM N/A 1/5/2021    Procedure: Left Ventriculogram;  Surgeon: Hayden Bedoya MD;  Location:  [facility]ATH [facility]        Physical Exam   Patient Vitals for the past 24 hrs:   BP Temp Temp src Pulse Resp SpO2   10/22/23 1830 (!) 149/55 -- -- 74 -- 94 %   10/22/23 1745 (!) 151/55 -- -- 73 -- 92 %   10/22/23 1218 (!) 158/57 98.3  F (36.8  C) Oral 69 17 97 %        Physical Exam  General: Alert, appears well-developed and well-nourished. Cooperative.     In mild distress  HEENT:  Head:  Atraumatic  Ears:  External ears are normal  Eyes:   Conjunctivae normal and EOM are normal. No scleral icterus.    Pupils are equal, round, and reactive to light.   Neck:   Normal range of motion. Neck supple.  CV:  Normal rate, regular rhythm, normal heart sounds and radial and dorsalis pedis pulses are 2+ and symmetric.  Systolic murmur.  Resp:  Breath sounds are clear bilaterally    Non-labored, no retractions or accessory muscle use  GI:  Tenderness to palpation over left upper quadrant and left lower quadrant.  Abdomen is soft, no distension. No rebound or guarding.  MS:  LLE: Mild, nonpitting edema throughout.  No tenderness palpation over greater trochanter.  Pain  with internal rotation of hip.  Difficulty with straight leg raise test secondary to pain.  Compartments soft and nontender.  Peripheral sensation intact light touch distally.  DP pulse 2+.  Distal CMS intact.  Normal range of motion. No edema.    Normal strength in all 4 extremities.     Back atraumatic.    No midline cervical, thoracic, or lumbar tenderness  Skin:  Warm and dry.  No rash or lesions noted.  Neuro:   Alert. Normal strength.  Sensation intact in all 4 extremities. GCS: 15  Psych: Normal mood and affect.    Emergency Department Course   Imaging:  XR Pelvis w Hip Left 1 View   Final Result   IMPRESSION: Mild degenerative changes left hip. No acute fracture or dislocation.       Contrast within the bladder. Prior ORIF of the right femur.      US Lower Extremity Venous Duplex Left   Final Result   IMPRESSION:   1.  No deep venous thrombosis in the left lower extremity.      CTA Abdomen Pelvis with Contrast   Final Result   IMPRESSION:    1.  No CT evidence of acute gastrointestinal bleeding.   2.  Possible mild segmental colitis involving the proximal and mid descending colon.    3.  Gas within the lumen of the urinary bladder; correlate with urinalysis to exclude cystitis, in the absence of any recent instrumentation.   4.  Multiple small cystic-appearing lesions of the pancreas, largest measuring 1.8 cm at the pancreatic tail. These favor benign cysts or intraductal papillary mucinous neoplasms, though warrant continued follow-up to exclude malignancy. Follow-up, as    per below.   5.  Complex, partially calcified cystic lesion of the left hepatic lobe, measuring up to 2.6 cm, unchanged from oldest prior available comparison dated 01/06/2023. Attention on imaging follow-up performed for pancreatic cystic lesions.   6.  Colonic diverticulosis, without evidence of diverticulitis.      REFERENCE:   International Consensus Guidelines for Management of IPMN and MCN of the Pancreas. Pancreatology 12 (2012)  183-197.      Asymptomatic patient without high-risk stigmata of malignancy (obstructive jaundice with cystic lesion in head of pancreas, enhancing solid component within cyst, or main pancreatic duct greater than 10 mm), and without worrisome features (cyst greater    than 3 cm, thickened/enhancing cyst walls, main pancreatic duct 5-9 mm, mural nodule, or abrupt change in caliber of pancreatic duct with distal pancreatic atrophy).      Size of largest cyst:   Less than 1 cm: CT or MRI with contrast in 2-3 years.      1-2 cm: CT or MRI with contrast yearly for 2 years, then lengthen interval if no change.      2-3 cm: EUS in 3-6 months, then can alternate MRI with EUS as appropriate.      Greater than 3 cm: Close surveillance alternating MRI with EUS every 3-6 months. Consider surgery in young, fit patients.      Consider Gastroenterology consultation for all categories of pancreatic cysts.                   Laboratory:  Labs Ordered and Resulted from Time of ED Arrival to Time of ED Departure   COMPREHENSIVE METABOLIC PANEL - Abnormal       Result Value    Sodium 139      Potassium 4.3      Carbon Dioxide (CO2) 22      Anion Gap 10      Urea Nitrogen 20.4      Creatinine 0.83      GFR Estimate 69      Calcium 9.4      Chloride 107      Glucose 160 (*)     Alkaline Phosphatase 79      AST 16      ALT 10      Protein Total 6.5      Albumin 4.1      Bilirubin Total 0.2     CBC WITH PLATELETS AND DIFFERENTIAL - Abnormal    WBC Count 6.2      RBC Count 3.53 (*)     Hemoglobin 10.5 (*)     Hematocrit 32.6 (*)     MCV 92      MCH 29.7      MCHC 32.2      RDW 14.3      Platelet Count 307      % Neutrophils 68      % Lymphocytes 17      % Monocytes 10      Mids % (Monos, Eos, Basos)        % Eosinophils 3      % Basophils 1      % Immature Granulocytes 1      NRBCs per 100 WBC 0      Absolute Neutrophils 4.2      Absolute Lymphocytes 1.1      Absolute Monocytes 0.6      Mids Abs (Monos, Eos, Basos)        Absolute  Eosinophils 0.2      Absolute Basophils 0.1      Absolute Immature Granulocytes 0.1      Absolute NRBCs 0.0     HEMOGLOBIN - Abnormal    Hemoglobin 11.1 (*)    TYPE AND SCREEN, ADULT    ABO/RH(D) A NEG      Antibody Screen Negative      SPECIMEN EXPIRATION DATE [id number]     ABO/RH TYPE AND SCREEN        Procedures   None    Emergency Department Course & Assessments:    Interventions:  Medications   methylPREDNISolone sodium succinate (solu-MEDROL) injection 125 mg (125 mg Intravenous $Given 10/22/23 1631)   diphenhydrAMINE (BENADRYL) injection 25 mg (25 mg Intravenous $Given 10/22/23 1631)   CT Scan Flush (63 mLs Intravenous $Given 10/22/23 1654)   iopamidol (ISOVUE-370) solution 500 mL (70 mLs Intravenous $Given 10/22/23 1653)        Assessments:  1418: Initial evaluation and assessment.  1835: Patient reassessed, results discussed and in agreement with plan for discharge home.  Return precautions advised, all questions were answered.    Independent Interpretation (X-rays, CTs, rhythm strip):  XR Left Hip: No evidence for fracture or dislocation.    Consultations/Discussion of Management or Tests:  Patient was seen in conjunction with Dr. Snell    Social Determinants of Health affecting care:   None    Disposition:  The patient was discharged to home.     Impression & Plan    CMS Diagnoses: None    Medical Decision Making:  Swathi Dukes is a 87 year old female with a history of hypertension, anxiety, pericardial effusion, lumbar radiculopathy, aortic valve stenosis, CKD, and DVT who presents for evaluation of rectal bleeding and left groin pain.  On exam, the patient has tenderness palpation over her left abdomen, pain with left hip internal rotation, and left lower extremity edema.  The patient does not have concerning signs of peritonitis and distal CMS is intact.  The patient is mildly hypertensive with vital signs otherwise within normal limits.  Blood work is notable for mild anemia with a hemoglobin  of 10.5 without any findings for leukocytosis or electrolyte abnormalities. CTA of the abdomen pelvis was GI bleed along with other intra-abdominal pathology which showed no evidence of acute GI bleed and mild segmental colitis.  Incidentally, this also showed multiple small cystic appearing lesions of the pancreas and a complex partially calcified cystic lesion of the left hepatic lobe along with diverticulosis without diverticulitis.  There was also evidence of gas within the lumen of the urinary bladder which is consistent with cystoscopy performed 2 days ago.  The patient does not have any urinary symptoms or concerning signs of infection and therefore we did not obtain a UA.  The patient was provided with Solu-Medrol and Benadryl prior to CT given history of anaphylaxis and did not have any allergic reaction with contrast today.  We discussed these results with the patient and recommended she have close follow up with GI for further evaluation of colitis and follow up with her orthopedist at [facility] for further evaluation/management of hip pain.  We also informed the patient of the incidental findings of the liver and pancreatic lesions on her CT and recommended she follow-up with her primary care provider for follow-up and reassessment.  The patient was advised to return to the ED for fevers, worsening abdominal pain, vomiting, diarrhea, increased rectal bleeding, increased pain, numbness, tingling, weakness, or any other new concerns. The patient was in agreement with this plan and all questions were answered.       Diagnosis:    ICD-10-CM    1. Acute colitis  K52.9       2. Arthritis of left hip  M16.12       3. Diverticulosis of intestine  K57.90       4. Liver lesion  K76.9       5. Pancreatic lesion  K86.9       6. Chronic anemia  D64.9              Marina Garcia PA-C  10/22/2023        Marina Garcia PA-C  10/22/23 2041

## 2023-10-23 NOTE — TELEPHONE ENCOUNTER
Patient returns call to say her son can't make appointment on Wednesday for hospital follow, but can bring patient to appointment Thursday morning if provider okay to change appointment to Thursday. Patient wondering about [facility] or Dr. Leigh's open spots for Thursday morning. Please advise.    Thank you,  Atul Kincaid, Triage RN [facility]  9:39 AM 10/23/2023

## 2023-10-24 ENCOUNTER — TELEPHONE (OUTPATIENT)
Dept: UROLOGY | Facility: CLINIC | Age: 88
End: 2023-10-24
Payer: COMMERCIAL

## 2023-10-24 NOTE — TELEPHONE ENCOUNTER
----- Message from Debbi Cavazos RN sent at 10/24/2023 10:31 AM CDT -----  Regarding: RE: Kidney Stone  No wear ever she can be seen   Thanks  Debbi   ----- Message -----  From: Christine Tompkins  Sent: 10/24/2023  10:15 AM CDT  To: Debbi Cavazos RN  Subject: RE: Kidney Stone                                 Dr. Carlson doesn't go to [facility]? Do you want a [facility] provider?  ----- Message -----  From: Debbi Cavazos RN  Sent: 10/24/2023   7:14 AM CDT  To: [facility] - Scheduling Pool  Subject: FW: Kidney Stone                                 Would someone schedule an appointment with Dr Carlson for kidney stones which may be causing uti's? Please call to schedule  thanks  ----- Message -----  From: Nedra Ernandez RN  Sent: 10/23/2023   8:13 AM CDT  To: Debbi Cavazos RN  Subject: RE: Kidney Stone                                 HI Max-    I think the [facility] is closest for her as far a location to be seen.  Virtual or in person is fine.    Thank you  Nedra   ----- Message -----  From: Debbi Cavazos RN  Sent: 10/20/2023   6:02 PM CDT  To: Nedra Ernandez RN  Subject: FW: Kidney Stone                                 Hi Dr Jesús Sneed wants this patient seen to evaluate kidney stone which may be causing her UTI's  Where do you want this patient seen?  Patient lives in [place]  Let me know   thanks  ----- Message -----  From: Sha Espinosa MD  Sent: 10/20/2023   3:51 PM CDT  To: Debbi Cavazos RN  Subject: Kidney Stone                                     Hi Max,    Please set this patient to have her kidney stone evlauated.  Thank you,    Sha

## 2023-10-30 ENCOUNTER — TELEPHONE (OUTPATIENT)
Dept: FAMILY MEDICINE | Facility: CLINIC | Age: 88
End: 2023-10-30
Payer: COMMERCIAL

## 2023-10-30 NOTE — TELEPHONE ENCOUNTER
"Pt LM on [facility] VM states she was to have gotten antibiotic from urology but did not and now thinks she has UTI as \"my urine has a strong odor\"     [facility]AL called to urology to find out if pt was to have received antibiotic or if they wanted to call and triage for possible UTI.   Representative is going to pass message on to team to follow up with pt.     LM for pt with above information     Ronda Miller, RN     "

## 2023-10-31 NOTE — TELEPHONE ENCOUNTER
Call back from Swathi PATEL with Dr Espinosa.       If only symptoms is odor recommend increase fluids.   No antibiotic was prescribed at last visit.        IF symptomatic they recommend OV with PCP or UC    LM for pt to return call     Ronda Miller RN

## 2023-10-31 NOTE — TELEPHONE ENCOUNTER
Pt notified and agrees with plan.  Denies all other symptoms of UTI - will call back with any increase in symptoms    Ronda Miller RN

## 2023-11-02 NOTE — TELEPHONE ENCOUNTER
Re-routing as Dr. Vanessa as previously ordered by her one year ago. Please advise if patient needs to get from eye provider moving forward since was ordered by PCP.     Reply from Dr. Steen (routing comment.     Usually patient has to get that from the eye doctor .         Note        Keily Thomas R.N.     Libtayo Counseling- I discussed with the patient the risks of Libtayo including but not limited to nausea, vomiting, diarrhea, and bone or muscle pain.  The patient verbalized understanding of the proper use and possible adverse effects of Libtayo.  All of the patient's questions and concerns were addressed.

## 2023-11-08 ENCOUNTER — OFFICE VISIT (OUTPATIENT)
Dept: URGENT CARE | Facility: URGENT CARE | Age: 88
End: 2023-11-08
Payer: COMMERCIAL

## 2023-11-08 ENCOUNTER — ANCILLARY PROCEDURE (OUTPATIENT)
Dept: GENERAL RADIOLOGY | Facility: CLINIC | Age: 88
End: 2023-11-08
Attending: PHYSICIAN ASSISTANT
Payer: COMMERCIAL

## 2023-11-08 VITALS
TEMPERATURE: 99 F | DIASTOLIC BLOOD PRESSURE: 83 MMHG | SYSTOLIC BLOOD PRESSURE: 146 MMHG | OXYGEN SATURATION: 95 % | HEART RATE: 75 BPM

## 2023-11-08 DIAGNOSIS — J06.9 VIRAL URI: Primary | ICD-10-CM

## 2023-11-08 DIAGNOSIS — R05.1 ACUTE COUGH: ICD-10-CM

## 2023-11-08 LAB
DEPRECATED S PYO AG THROAT QL EIA: NEGATIVE
FLUAV AG SPEC QL IA: NEGATIVE
FLUBV AG SPEC QL IA: NEGATIVE
GROUP A STREP BY PCR: NOT DETECTED

## 2023-11-08 PROCEDURE — 71046 X-RAY EXAM CHEST 2 VIEWS: CPT | Mod: TC | Performed by: RADIOLOGY

## 2023-11-08 PROCEDURE — 99214 OFFICE O/P EST MOD 30 MIN: CPT | Performed by: PHYSICIAN ASSISTANT

## 2023-11-08 PROCEDURE — 87804 INFLUENZA ASSAY W/OPTIC: CPT | Performed by: PHYSICIAN ASSISTANT

## 2023-11-08 PROCEDURE — 87651 STREP A DNA AMP PROBE: CPT | Performed by: PHYSICIAN ASSISTANT

## 2023-11-08 NOTE — PATIENT INSTRUCTIONS
I suspect that you have a viral infection  Your strep test, chest XR and influenza test are all negative  Use Tylenol 650-1000mg three times per day for sore throat  Salt water gargles for sore throat  Lots of rest and fluids    If your symptoms do not improve by Friday, please call your PCP. If your symptoms are worsening, I would like you to follow-up right away.

## 2023-11-08 NOTE — PROGRESS NOTES
Assessment & Plan     1. Viral URI  Patient presents to the clinic for evaluation of sore throat, runny nose, congestion and cough. Symptoms have been present for the past several days. On exam, VSS. Lungs are CTAB without sign of respiratory distress. Throat without PTA or RPA and TM clear B/L.   Strep and flu are both negative. CXR is without abnormality.   Encouraged supportive cares, fluids and rest   Tylenol for pain and sore throat.   Salt water gargles  Mucinex for cough  Discussed indications for follow-up, worsening symptoms follow-up right away.  - Streptococcus A Rapid Screen w/Reflex to PCR  - Group A Streptococcus PCR Throat Swab  - Influenza A & B Antigen - Clinic Collect  - XR Chest 2 Views; Future      Return in about 3 days (around 11/11/2023), or if symptoms worsen or fail to improve.    Diagnosis and treatment plan was reviewed with patient and/or family.   We went over any labs or imaging. Discussed worsening symptoms or little to no relief despite treatment plan to follow-up with PCP or return to clinic.  Patient verbalizes understanding. All questions were addressed and answered.     Kina Mendoza PA-C  [facility] [place]    CHIEF COMPLAINT:   Chief Complaint   Patient presents with    Urgent Care     St since last night- overall wekaness, cold sx x 3 days- 2 negative covid tests     Sugar Echevarria is a 87 year old female who presents to clinic today for evaluation of sore throat, runny nose, congestion and cough. Symptoms started several days ago, but yesterday developed a severe sore throat. Very sore to swallow. She will feel some shortness of breath with coughing, but she does not have chest pain or shortness of breath at rest. Neg COVID test at home. No fever noted, but feels chills and very fatigued and weak.       Past Medical History:   Diagnosis Date    Aortic stenosis     Glaucoma     HTN (hypertension)     Hyperlipidemia     Pericardial effusion      Systemic lupus erythematosus      Past Surgical History:   Procedure Laterality Date    CV HEART CATHETERIZATION WITH POSSIBLE INTERVENTION N/A 1/5/2021    Procedure: Heart Catheterization with Possible Intervention;  Surgeon: Hayden Bedoya MD;  Location:  HEART CARDIAC CATH LAB    CV LEFT VENTRICULOGRAM N/A 1/5/2021    Procedure: Left Ventriculogram;  Surgeon: Hayden Bedoya MD;  Location:  HEART CARDIAC CATH LAB     Social History     Tobacco Use    Smoking status: Never    Smokeless tobacco: Never   Substance Use Topics    Alcohol use: Not Currently     Current Outpatient Medications   Medication    acetaminophen (TYLENOL) 500 MG tablet    ALPHAGAN P 0.1 % ophthalmic solution    amLODIPine (NORVASC) 10 MG tablet    cholecalciferol (D3-50) 1250 mcg (81512 units) capsule    dorzolamide (TRUSOPT) 2 % ophthalmic solution    DULoxetine (CYMBALTA) 60 MG capsule    ergocalciferol (ERGOCALCIFEROL) 1.25 MG (52132 UT) capsule    ferrous gluconate (FERGON) 324 (38 Fe) MG tablet    folic acid (FOLVITE) 1 MG tablet    gabapentin (NEURONTIN) 100 MG capsule    hydrALAZINE (APRESOLINE) 25 MG tablet    latanoprost (XALATAN) 0.005 % ophthalmic solution    Lidocaine (LIDOCARE) 4 % Patch    lisinopril (ZESTRIL) 20 MG tablet    loperamide (IMODIUM) 2 MG capsule    OLANZapine (ZYPREXA) 5 MG tablet    PARoxetine (PAXIL) 10 MG tablet    simvastatin (ZOCOR) 10 MG tablet     No current facility-administered medications for this visit.     Allergies   Allergen Reactions    Aspirin Anaphylaxis    Diclofenac Anaphylaxis    Ibuprofen Anaphylaxis    Iodinated Contrast Media Anaphylaxis    Iodine Anaphylaxis     Contrast  Pt states anaphylactic reaction to ct contrast about 20 years ago  Pt premedicated with prednisone and benadryl before iv isouve-370 CT contrast on 1/6/23 and exhibited no signs of reaction    Contrast Dye Unknown    Donepezil      Other reaction(s): Other (see comments)  Cervical dystonia    Lactose Diarrhea        10 point ROS of systems were all negative except for pertinent positives noted in my HPI.      Exam:   BP (!) 146/83   Pulse 75   Temp 99  F (37.2  C) (Tympanic)   SpO2 95%   Constitutional: alert and no distress  Head: Normocephalic, atraumatic.  Eyes: conjunctiva clear, no drainage  ENT: TMs clear and shiny marina, nasal mucosa pink and moist, throat is slightly erythematous. No trismus, stridor or drooling.   Neck: neck is supple, no cervical lymphadenopathy or nuchal rigidity  Cardiovascular: RRR  Respiratory: CTA bilaterally, no rhonchi or rales  Skin: no rashes  Neurologic: Speech clear, gait normal. Moves all extremities.    Results for orders placed or performed in visit on 11/08/23   XR Chest 2 Views     Status: None    Narrative    XR CHEST 2 VIEWS  11/8/2023 12:12 PM       INDICATION: cough, fever, weakness  COMPARISON: 7/12/2023       Impression    IMPRESSION: No acute infiltrate or significant change.    RENA VERDUGO MD         SYSTEM ID:  [id number]   Results for orders placed or performed in visit on 11/08/23   Streptococcus A Rapid Screen w/Reflex to PCR     Status: Normal    Specimen: Throat; Swab   Result Value Ref Range    Group A Strep antigen Negative Negative   Influenza A & B Antigen - Clinic Collect     Status: Normal    Specimen: Nose; Swab   Result Value Ref Range    Influenza A antigen Negative Negative    Influenza B antigen Negative Negative    Narrative    Test results must be correlated with clinical data. If necessary, results should be confirmed by a molecular assay or viral culture.

## 2023-11-10 ENCOUNTER — APPOINTMENT (OUTPATIENT)
Dept: GENERAL RADIOLOGY | Facility: CLINIC | Age: 88
End: 2023-11-10
Attending: EMERGENCY MEDICINE
Payer: COMMERCIAL

## 2023-11-10 ENCOUNTER — HOSPITAL ENCOUNTER (EMERGENCY)
Facility: CLINIC | Age: 88
Discharge: HOME | End: 2023-11-10
Attending: EMERGENCY MEDICINE | Admitting: EMERGENCY MEDICINE
Payer: COMMERCIAL

## 2023-11-10 VITALS
HEART RATE: 59 BPM | OXYGEN SATURATION: 96 % | DIASTOLIC BLOOD PRESSURE: 62 MMHG | SYSTOLIC BLOOD PRESSURE: 148 MMHG | RESPIRATION RATE: 16 BRPM | TEMPERATURE: 97.9 F

## 2023-11-10 DIAGNOSIS — J02.9 PHARYNGITIS, UNSPECIFIED ETIOLOGY: ICD-10-CM

## 2023-11-10 DIAGNOSIS — R13.10 ODYNOPHAGIA: ICD-10-CM

## 2023-11-10 DIAGNOSIS — J06.9 UPPER RESPIRATORY TRACT INFECTION, UNSPECIFIED TYPE: ICD-10-CM

## 2023-11-10 LAB
FLUAV RNA SPEC QL NAA+PROBE: NEGATIVE
FLUBV RNA RESP QL NAA+PROBE: NEGATIVE
RSV RNA SPEC NAA+PROBE: NEGATIVE
SARS-COV-2 RNA RESP QL NAA+PROBE: NEGATIVE

## 2023-11-10 PROCEDURE — 250N000013 HC RX MED GY IP 250 OP 250 PS 637: Performed by: EMERGENCY MEDICINE

## 2023-11-10 PROCEDURE — 70360 X-RAY EXAM OF NECK: CPT

## 2023-11-10 PROCEDURE — 87637 SARSCOV2&INF A&B&RSV AMP PRB: CPT | Performed by: EMERGENCY MEDICINE

## 2023-11-10 PROCEDURE — 99284 EMERGENCY DEPT VISIT MOD MDM: CPT

## 2023-11-10 RX ORDER — ACETAMINOPHEN 325 MG/1
650 TABLET ORAL ONCE
Status: COMPLETED | OUTPATIENT
Start: 2023-11-10 | End: 2023-11-10

## 2023-11-10 RX ADMIN — Medication 1 LOZENGE: at 08:06

## 2023-11-10 RX ADMIN — ACETAMINOPHEN 650 MG: 325 TABLET, FILM COATED ORAL at 08:06

## 2023-11-10 ASSESSMENT — ACTIVITIES OF DAILY LIVING (ADL): ADLS_ACUITY_SCORE: 38

## 2023-11-10 NOTE — ED PROVIDER NOTES
History     Chief Complaint:  Pharyngitis       HPI   Swathi Dukes is a 87 year old female who presents to the ED for an evaluation of pharyngitis. Patient reports at around 0400 this morning, she woke up feeling pain in her throat while coughing. Adds at 0600, patient had a really hard time swallowing and felt much worse. She recalls going to bed at 8 pm last night. Denies any fevers, chills, and muscle aches. Swathi has been taking tylenol for the throat pain but it hasn't been helping her. When she coughs, mucus comes up and she immediately swallows it back. Denies blood in mucus. Swathi hasn't had anything to swallow this morning. Patient states she hasn't been around anyone that's sick.     Independent Historian:   None - Patient Only    Review of External Notes:   See ed course        Medications:    Alphagan   Amlodipine   Dorzolamide   Hydralazine   Latanoprost   Lidocaine   Loperamide   Olanzapine   Simvastatin   Cholecalciferol   Duloxetine   Ergocalciferol   Gabapentin   Ferrous gluconate   Paroxetine     Past Medical History:    Depression disorder   Anemia   Thromboembolism NOS   Hypertension   DVT   Osteoarthritis of left hip   Pericardial effusion   Recurrent UTI   Pneumonia   Liver lesion   CKD   Epiglottic lesion   Inflammatory osteoarthritis   Systemic lupus erythematosus    Past Surgical History:    Appendectomy   Left ventriculogram   Heart catheterization      Physical Exam   Patient Vitals for the past 24 hrs:   BP Temp Temp src Pulse Resp SpO2   11/10/23 0730 (!) 151/57 -- -- 60 16 96 %   11/10/23 0714 (!) 154/63 97.9  F (36.6  C) Oral 60 20 96 %        Physical Exam  HEENT: Mild posterior oropharyngeal erythema, no exudates, no significant hypertrophy, no uvular edema, no sublingual edema.    Neck: Supple, no stridor    CV: ppi, regular   Resp: Lungs clear to auscultation bilaterally no wheezing rhonchi or rales.  Skin: warm dry well perfused  Neuro: Alert, no gross motor or sensory  deficits,  gait stable      Emergency Department Course     Imaging:  Neck soft tissue XR   Final Result   IMPRESSION: Normal appearance of the epiglottis. Multifocal   calcifications of the thyroid, arytenoid, and cricoid cartilages. No   radiopaque foreign body is identified. No large anterior osteophyte,   although there are small osteophytes at multiple levels throughout the   cervical spine. Multilevel cervical degenerative changes.        TANNA SNYDER MD            SYSTEM ID:  [id number]         Laboratory:  Labs Ordered and Resulted from Time of ED Arrival to Time of ED Departure   INFLUENZA A/B, RSV, & SARS-COV2 PCR - Normal       Result Value    Influenza A PCR Negative      Influenza B PCR Negative      RSV PCR Negative      SARS CoV2 PCR Negative       Emergency Department Course & Assessments:    Interventions:  Medications   benzocaine-menthol (CHLORASEPTIC) 6-10 MG lozenge 1 lozenge (1 lozenge Buccal $Given 11/10/23 0806)   acetaminophen (TYLENOL) tablet 650 mg (650 mg Oral $Given 11/10/23 0806)      Assessments:  0747 I obtained history and examined the patient as noted above.     Independent Interpretation (X-rays, CTs, rhythm strip):  None    Consultations/Discussion of Management or Tests:  None   ED Course as of 11/10/23 0956   Fri Nov 10, 2023   0742   I reviewed office visit from 2023 where she was seen for viral URI.  He mentions symptoms including sore throat runny nose congestion cough for the past several days at that time.  Strep test and influenza screen were done and they are negative.  Chest radiograph was done and negative.       Social Determinants of Health affecting care:   None    Disposition:  The patient was discharged to home.     Impression & Plan        Medical Decision Makin-year-old female here with sore throat and sensation of painful difficulty swallowing.  No significant respiratory distress here, no stridor, visualized portion of the oropharynx showed  only mild erythema.  Radiograph of the neck shows no evidence of epiglottitis or concerning deep space neck infection.  Recent strep test was done and negative.  Influenza again negative here as is COVID and RSV.  Low suspicion is bacterial involvement at this time.  Safe and stable for discharge home.    Diagnosis:    ICD-10-CM    1. Upper respiratory tract infection, unspecified type  J06.9       2. Pharyngitis, unspecified etiology  J02.9       3. Odynophagia  R13.10          Discharge Medications:  New Prescriptions    No medications on file      Scribe Disclosure:  Beth MICHAUD, am serving as a scribe at 7:40 AM on 11/10/2023 to document services personally performed by Yefri Gross MD based on my observations and the provider's statements to me.   11/10/2023   Yefri Gross MD Walker, Jerome Richard, MD  11/10/23 1526

## 2023-11-10 NOTE — ED TRIAGE NOTES
Pt arrives by EMS with complaint of feeling like she has a lump in her throat since 4am. Pt states she woke up at 4am feeling the lump in left throat. She states it hurts to swallow and take a deep breath. Pt states she's had a cough for a few days and diarrhea. EMS reports no airway obstruction noted.      Triage Assessment (Adult)       Row Name 11/10/23 0712          Triage Assessment    Airway WDL WDL        Respiratory WDL    Respiratory WDL X  congested        Skin Circulation/Temperature WDL    Skin Circulation/Temperature WDL WDL        Cardiac WDL    Cardiac WDL WDL        Peripheral/Neurovascular WDL    Peripheral Neurovascular WDL WDL        Cognitive/Neuro/Behavioral WDL    Cognitive/Neuro/Behavioral WDL WDL

## 2023-11-13 ENCOUNTER — TELEPHONE (OUTPATIENT)
Dept: FAMILY MEDICINE | Facility: CLINIC | Age: 88
End: 2023-11-13
Payer: COMMERCIAL

## 2023-11-13 DIAGNOSIS — E55.9 VITAMIN D DEFICIENCY: Primary | ICD-10-CM

## 2023-11-13 NOTE — TELEPHONE ENCOUNTER
"PAL call to pt for ER follow up     \"I have a viral infection\"  Pt states she is starting to feel better.  She was up coughing most of last night but this is the first night of that.     She has been using over the counter cough med which helps.      Continues afebrile.  Denies chest pain and no SOB.        Advised can use OTC cough med at hs if helpful  Rest, push fluids.  Delsym or robitussin for cough. Warm tea with honey.   Warm salt water gargle for sore throat or throat lozenges. Follow up with any worsening symptoms, fever or chest pain.       Pt expressed understanding and acceptance of the plan. had no further questions at this time.  Advised can call back to clinic at any time with concerns.      Ronda Miller RN     "

## 2023-11-14 RX ORDER — CHOLECALCIFEROL (VITAMIN D3) 1250 MCG
1250 CAPSULE ORAL
Qty: 4 CAPSULE | Refills: 0 | Status: SHIPPED | OUTPATIENT
Start: 2023-11-14 | End: 2023-12-04

## 2023-11-21 NOTE — TELEPHONE ENCOUNTER
Pt called on labs     Notified positive referred to rheumatology and gave contact information to call and schedule OV    Ronda Miller RN     No

## 2023-11-28 DIAGNOSIS — K52.9 CHRONIC DIARRHEA: ICD-10-CM

## 2023-11-28 RX ORDER — LOPERAMIDE HCL 2 MG
2 CAPSULE ORAL 3 TIMES DAILY PRN
Qty: 30 CAPSULE | Refills: 1 | Status: SHIPPED | OUTPATIENT
Start: 2023-11-28 | End: 2024-02-13

## 2023-12-04 DIAGNOSIS — E55.9 VITAMIN D DEFICIENCY: ICD-10-CM

## 2023-12-04 NOTE — PROGRESS NOTES
Clinic Care Coordination Contact  Community Health Worker Initial Outreach    CHW Initial Information Gathering:  Referral Source: ED Follow-Up  CHW Additional Questions  MyChart active?: Yes    Patient accepts CC: No, patient declined at this time. Patient will be sent Care Coordination introduction letter for future reference.     Kina Rocha  Community Health Worker  [facility]     4 = No assist / stand by assistance

## 2023-12-04 NOTE — PROGRESS NOTES
HISTORY OF PRESENT ILLNESS:    This is a 88 year old female who follows with Dr Davis at [facility]  Her past medical history includes:  Hypertension, chronic HFpEF, hyperlipidemia, chronic pericardial effusion, aortic valvular disease, trivial coronary artery disease, and lumbar stenosis    Ms Dukes established care in our clinic (2020) after moving here from [place].  Prior ECHO (2018) showed LVEF 69%, moderate aortic insufficiency, mild-moderate pericardial effusion without tamponade.    She developed dyspnea on exertion and a stress test (1/2021) was abnormal  Subsequent coronary angiography showed normal coronaries    She has continued to complain of dyspnea on exertion over the years.  She was hospitalized earlier this year for her dyspnea and was treated for mild HFpEF exacerbation.  ECHO then (1/2023) showed normal LVEF, grade 1 diastolic dysfunction, moderate aortic insufficiency, mild aortic stenosis, small-sized pericardial effusion without tamponade physiology    Recently, she has been seen several times in the ED or urgent care for diarrhea, colitis and upper respiratory infection    Our visit today is for further review    Ms Dukes's activity is limited due to her lower back pain  She denies any significant chest pain, shortness of breath, palpitations, or orthopnea  She occasionally gets some left leg swelling which eventually resolves  None in the past couple of months  She admits to liking salty foods and I encouraged avoidance        VITAL SIGNS:  BP:  138/50  Pulse:  67  Weight:  144 lbs  (BMI: 26)      IMPRESSION AND PLAN:    Hypertension:  -on Amlodipine 10 mg, Hydralazine 25 mg TID, Lisinopril 20 mg  -BP controlled, but borderline  -she is to call if her BP remains > 140/90 mmhg on a regular basis    Chronic HFpEF  -preserved LVEF with grade 1 diastolic dysfunction per ECHO (1/2023)  -not on diuretic  -no signs or symptoms of heart failure  -weight stable    Aortic Valve  Disorder  -moderate aortic insufficiency and mild aortic stenosis    Hyperlipidemia:  -on Simvastatin 10 mg  -LDL 64    Chronic pericardial effusion  -small    The total time for the visit today was 28 minutes which includes patient visit, reviewing of records, discussion, and placing of orders of the outpatient coordination of cardiovascular care as described.  The level of medical decision making during this visit was of mild complexity.  Thank you for allowing me to participate in their care.       Orders Placed This Encounter   Procedures    Follow-Up with Cardiology    Echocardiogram Complete       No orders of the defined types were placed in this encounter.      There are no discontinued medications.      Encounter Diagnoses   Name Primary?    Essential hypertension     Nonrheumatic aortic valve insufficiency     Nonrheumatic aortic valve stenosis        CURRENT MEDICATIONS:  Current Outpatient Medications   Medication Sig Dispense Refill    acetaminophen (TYLENOL) 500 MG tablet Take 1,000 mg by mouth every 4 hours as needed for mild pain      ALPHAGAN P 0.1 % ophthalmic solution INSTILL ONE DROP IN EACH EYE TWICE DAILY 5 mL 97    amLODIPine (NORVASC) 10 MG tablet Take 1 tablet (10 mg) by mouth daily 90 tablet 3    cholecalciferol (D3-50) 1250 mcg (10910 units) capsule Take 1 capsule (1,250 mcg) by mouth every 7 days 4 capsule 0    dorzolamide (TRUSOPT) 2 % ophthalmic solution Place 1 drop into both eyes 2 times daily 10 mL 11    DULoxetine (CYMBALTA) 60 MG capsule Take 60 mg by mouth daily      ergocalciferol (ERGOCALCIFEROL) 1.25 MG (44729 UT) capsule       ferrous gluconate (FERGON) 324 (38 Fe) MG tablet       folic acid (FOLVITE) 1 MG tablet Take 1 tablet (1 mg) by mouth daily 30 tablet 11    gabapentin (NEURONTIN) 100 MG capsule Take 200 mg by mouth 3 times daily 105 capsule 11    hydrALAZINE (APRESOLINE) 25 MG tablet Take 1 tablet (25 mg) by mouth 3 times daily 270 tablet 2    latanoprost (XALATAN)  0.005 % ophthalmic solution Place 1 drop into both eyes At Bedtime 7.5 mL 11    Lidocaine (LIDOCARE) 4 % Patch Place 1 patch onto the skin every 24 hours To prevent lidocaine toxicity, patient should be patch free for 12 hrs daily.      lisinopril (ZESTRIL) 20 MG tablet Take 1 tablet (20 mg) by mouth daily 90 tablet 3    loperamide (IMODIUM) 2 MG capsule Take 1 capsule (2 mg) by mouth 3 times daily as needed for diarrhea 30 capsule 1    OLANZapine (ZYPREXA) 5 MG tablet Take 1 tablet (5 mg) by mouth At Bedtime 30 tablet 0    PARoxetine (PAXIL) 10 MG tablet Take by mouth every 24 hours      simvastatin (ZOCOR) 10 MG tablet Take 1 tablet (10 mg) by mouth At Bedtime 90 tablet 3       ALLERGIES     Allergies   Allergen Reactions    Aspirin Anaphylaxis    Diclofenac Anaphylaxis    Ibuprofen Anaphylaxis    Iodinated Contrast Media Anaphylaxis    Iodine Anaphylaxis     Contrast  Pt states anaphylactic reaction to ct contrast about 20 years ago  Pt premedicated with prednisone and benadryl before iv isouve-370 CT contrast on 1/6/23 and exhibited no signs of reaction    Contrast Dye Unknown    Donepezil      Other reaction(s): Other (see comments)  Cervical dystonia    Lactose Diarrhea       PAST MEDICAL HISTORY:  Past Medical History:   Diagnosis Date    Aortic stenosis     Glaucoma     HTN (hypertension)     Hyperlipidemia     Pericardial effusion     Systemic lupus erythematosus        PAST SURGICAL HISTORY:  Past Surgical History:   Procedure Laterality Date    CV HEART CATHETERIZATION WITH POSSIBLE INTERVENTION N/A 1/5/2021    Procedure: Heart Catheterization with Possible Intervention;  Surgeon: Hayden Bedoya MD;  Location:  HEART CARDIAC CATH LAB    CV LEFT VENTRICULOGRAM N/A 1/5/2021    Procedure: Left Ventriculogram;  Surgeon: Hayden Bedoya MD;  Location:  HEART CARDIAC CATH LAB       FAMILY HISTORY:  Family History   Problem Relation Age of Onset    Diabetes Father        SOCIAL HISTORY:  Social  History     Socioeconomic History    Marital status:      Spouse name: None    Number of children: None    Years of education: None    Highest education level: None   Tobacco Use    Smoking status: Never    Smokeless tobacco: Never   Vaping Use    Vaping Use: Never used   Substance and Sexual Activity    Alcohol use: Not Currently    Drug use: Never    Sexual activity: Not Currently     Social Determinants of Health     Financial Resource Strain: Low Risk  (1/6/2023)    Overall Financial Resource Strain (CARDIA)     Difficulty of Paying Living Expenses: Not hard at all   Food Insecurity: No Food Insecurity (1/6/2023)    Hunger Vital Sign     Worried About Running Out of Food in the Last Year: Never true     Ran Out of Food in the Last Year: Never true   Transportation Needs: No Transportation Needs (1/6/2023)    PRAPARE - Transportation     Lack of Transportation (Medical): No     Lack of Transportation (Non-Medical): No   Physical Activity: Insufficiently Active (1/6/2023)    Exercise Vital Sign     Days of Exercise per Week: 5 days     Minutes of Exercise per Session: 20 min   Stress: No Stress Concern Present (1/6/2023)    [place] [employer] - Occupational Stress Questionnaire     Feeling of Stress : Not at all   Social Connections: Moderately Isolated (1/6/2023)    Social Connection and Isolation Panel [NHANES]     Frequency of Communication with Friends and Family: More than three times a week     Frequency of Social Gatherings with Friends and Family: More than three times a week     Attends Tenriism Services: More than 4 times per year     Active Member of Clubs or Organizations: No     Marital Status:    Housing Stability: Low Risk  (1/6/2023)    Housing Stability Vital Sign     Unable to Pay for Housing in the Last Year: No     Number of Places Lived in the Last Year: 1     Unstable Housing in the Last Year: No       Review of Systems:  Skin:          Eyes:         ENT:  "        Respiratory:  Positive for dyspnea on exertion     Cardiovascular:  Negative      Gastroenterology:        Genitourinary:         Musculoskeletal:         Neurologic:         Psychiatric:         Heme/Lymph/Imm:         Endocrine:           Physical Exam:  Vitals: /50 (BP Location: Right arm, Patient Position: Sitting, Cuff Size: Adult Regular)   Pulse 67   Ht 1.556 m (5' 1.25\")   Wt 65.3 kg (144 lb)   SpO2 96%   BMI 26.99 kg/m      Constitutional:           Skin:             Head:           Eyes:           Lymph:      ENT:           Neck:           Respiratory:            Cardiac:                                                           GI:           Extremities and Muscular Skeletal:                 Neurological:           Psych:             CC  Luis Manuel Davis MD  [address] [place] [phone]                    "

## 2023-12-05 RX ORDER — CHOLECALCIFEROL (VITAMIN D3) 1250 MCG
1250 CAPSULE ORAL
Qty: 4 CAPSULE | Refills: 0 | Status: SHIPPED | OUTPATIENT
Start: 2023-12-05 | End: 2024-01-31

## 2023-12-06 ENCOUNTER — OFFICE VISIT (OUTPATIENT)
Dept: CARDIOLOGY | Facility: CLINIC | Age: 88
End: 2023-12-06
Attending: INTERNAL MEDICINE
Payer: COMMERCIAL

## 2023-12-06 VITALS
SYSTOLIC BLOOD PRESSURE: 138 MMHG | HEART RATE: 67 BPM | OXYGEN SATURATION: 96 % | BODY MASS INDEX: 27.19 KG/M2 | HEIGHT: 61 IN | DIASTOLIC BLOOD PRESSURE: 50 MMHG | WEIGHT: 144 LBS

## 2023-12-06 DIAGNOSIS — I10 ESSENTIAL HYPERTENSION: ICD-10-CM

## 2023-12-06 DIAGNOSIS — I35.0 NONRHEUMATIC AORTIC VALVE STENOSIS: ICD-10-CM

## 2023-12-06 DIAGNOSIS — I35.1 NONRHEUMATIC AORTIC VALVE INSUFFICIENCY: ICD-10-CM

## 2023-12-06 PROCEDURE — 99213 OFFICE O/P EST LOW 20 MIN: CPT | Performed by: NURSE PRACTITIONER

## 2023-12-06 NOTE — PATIENT INSTRUCTIONS
Check your blood pressure at least once a month and call if your blood pressure is staying > 140/90 mmHg    Return next year    It was a pleasure seeing you today     Please do not hesitate to call my nurse team with any questions or concerns:  [phone]    Scheduling number:  [phone]    CHAPITO Asif, CNP

## 2023-12-06 NOTE — LETTER
12/6/2023    Cortney Vanessa MD  [id number]  [facility] [address]    RE: Swathi Dukes       Dear Colleague,     I had the pleasure of seeing Swathi Dukes in the [facility].  HISTORY OF PRESENT ILLNESS:    This is a 88 year old female who follows with Dr Davis at [facility]  Her past medical history includes:  Hypertension, chronic HFpEF, hyperlipidemia, chronic pericardial effusion, aortic valvular disease, trivial coronary artery disease, and lumbar stenosis    Ms Dukes established care in our clinic (2020) after moving here from [place].  Prior ECHO (2018) showed LVEF 69%, moderate aortic insufficiency, mild-moderate pericardial effusion without tamponade.    She developed dyspnea on exertion and a stress test (1/2021) was abnormal  Subsequent coronary angiography showed normal coronaries    She has continued to complain of dyspnea on exertion over the years.  She was hospitalized earlier this year for her dyspnea and was treated for mild HFpEF exacerbation.  ECHO then (1/2023) showed normal LVEF, grade 1 diastolic dysfunction, moderate aortic insufficiency, mild aortic stenosis, small-sized pericardial effusion without tamponade physiology    Recently, she has been seen several times in the ED or urgent care for diarrhea, colitis and upper respiratory infection    Our visit today is for further review    Ms Dukes's activity is limited due to her lower back pain  She denies any significant chest pain, shortness of breath, palpitations, or orthopnea  She occasionally gets some left leg swelling which eventually resolves  None in the past couple of months  She admits to liking salty foods and I encouraged avoidance        VITAL SIGNS:  BP:  138/50  Pulse:  67  Weight:  144 lbs  (BMI: 26)      IMPRESSION AND PLAN:    Hypertension:  -on Amlodipine 10 mg, Hydralazine 25 mg TID, Lisinopril 20 mg  -BP controlled, but borderline  -she is to call if her BP remains > 140/90  mmhg on a regular basis    Chronic HFpEF  -preserved LVEF with grade 1 diastolic dysfunction per ECHO (1/2023)  -not on diuretic  -no signs or symptoms of heart failure  -weight stable    Aortic Valve Disorder  -moderate aortic insufficiency and mild aortic stenosis    Hyperlipidemia:  -on Simvastatin 10 mg  -LDL 64    Chronic pericardial effusion  -small    The total time for the visit today was 28 minutes which includes patient visit, reviewing of records, discussion, and placing of orders of the outpatient coordination of cardiovascular care as described.  The level of medical decision making during this visit was of mild complexity.  Thank you for allowing me to participate in their care.       Orders Placed This Encounter   Procedures    Follow-Up with Cardiology    Echocardiogram Complete       No orders of the defined types were placed in this encounter.      There are no discontinued medications.      Encounter Diagnoses   Name Primary?    Essential hypertension     Nonrheumatic aortic valve insufficiency     Nonrheumatic aortic valve stenosis        CURRENT MEDICATIONS:  Current Outpatient Medications   Medication Sig Dispense Refill    acetaminophen (TYLENOL) 500 MG tablet Take 1,000 mg by mouth every 4 hours as needed for mild pain      ALPHAGAN P 0.1 % ophthalmic solution INSTILL ONE DROP IN EACH EYE TWICE DAILY 5 mL 97    amLODIPine (NORVASC) 10 MG tablet Take 1 tablet (10 mg) by mouth daily 90 tablet 3    cholecalciferol (D3-50) 1250 mcg (03444 units) capsule Take 1 capsule (1,250 mcg) by mouth every 7 days 4 capsule 0    dorzolamide (TRUSOPT) 2 % ophthalmic solution Place 1 drop into both eyes 2 times daily 10 mL 11    DULoxetine (CYMBALTA) 60 MG capsule Take 60 mg by mouth daily      ergocalciferol (ERGOCALCIFEROL) 1.25 MG (78041 UT) capsule       ferrous gluconate (FERGON) 324 (38 Fe) MG tablet       folic acid (FOLVITE) 1 MG tablet Take 1 tablet (1 mg) by mouth daily 30 tablet 11    gabapentin  (NEURONTIN) 100 MG capsule Take 200 mg by mouth 3 times daily 105 capsule 11    hydrALAZINE (APRESOLINE) 25 MG tablet Take 1 tablet (25 mg) by mouth 3 times daily 270 tablet 2    latanoprost (XALATAN) 0.005 % ophthalmic solution Place 1 drop into both eyes At Bedtime 7.5 mL 11    Lidocaine (LIDOCARE) 4 % Patch Place 1 patch onto the skin every 24 hours To prevent lidocaine toxicity, patient should be patch free for 12 hrs daily.      lisinopril (ZESTRIL) 20 MG tablet Take 1 tablet (20 mg) by mouth daily 90 tablet 3    loperamide (IMODIUM) 2 MG capsule Take 1 capsule (2 mg) by mouth 3 times daily as needed for diarrhea 30 capsule 1    OLANZapine (ZYPREXA) 5 MG tablet Take 1 tablet (5 mg) by mouth At Bedtime 30 tablet 0    PARoxetine (PAXIL) 10 MG tablet Take by mouth every 24 hours      simvastatin (ZOCOR) 10 MG tablet Take 1 tablet (10 mg) by mouth At Bedtime 90 tablet 3       ALLERGIES     Allergies   Allergen Reactions    Aspirin Anaphylaxis    Diclofenac Anaphylaxis    Ibuprofen Anaphylaxis    Iodinated Contrast Media Anaphylaxis    Iodine Anaphylaxis     Contrast  Pt states anaphylactic reaction to ct contrast about 20 years ago  Pt premedicated with prednisone and benadryl before iv isouve-370 CT contrast on 1/6/23 and exhibited no signs of reaction    Contrast Dye Unknown    Donepezil      Other reaction(s): Other (see comments)  Cervical dystonia    Lactose Diarrhea       PAST MEDICAL HISTORY:  Past Medical History:   Diagnosis Date    Aortic stenosis     Glaucoma     HTN (hypertension)     Hyperlipidemia     Pericardial effusion     Systemic lupus erythematosus        PAST SURGICAL HISTORY:  Past Surgical History:   Procedure Laterality Date    CV HEART CATHETERIZATION WITH POSSIBLE INTERVENTION N/A 1/5/2021    Procedure: Heart Catheterization with Possible Intervention;  Surgeon: Hayden Bedoya MD;  Location:  HEART CARDIAC CATH LAB    CV LEFT VENTRICULOGRAM N/A 1/5/2021    Procedure: Left  Ventriculogram;  Surgeon: Hayden Bedoya MD;  Location:  HEART CARDIAC CATH LAB       FAMILY HISTORY:  Family History   Problem Relation Age of Onset    Diabetes Father        SOCIAL HISTORY:  Social History     Socioeconomic History    Marital status:      Spouse name: None    Number of children: None    Years of education: None    Highest education level: None   Tobacco Use    Smoking status: Never    Smokeless tobacco: Never   Vaping Use    Vaping Use: Never used   Substance and Sexual Activity    Alcohol use: Not Currently    Drug use: Never    Sexual activity: Not Currently     Social Determinants of Health     Financial Resource Strain: Low Risk  (1/6/2023)    Overall Financial Resource Strain (CARDIA)     Difficulty of Paying Living Expenses: Not hard at all   Food Insecurity: No Food Insecurity (1/6/2023)    Hunger Vital Sign     Worried About Running Out of Food in the Last Year: Never true     Ran Out of Food in the Last Year: Never true   Transportation Needs: No Transportation Needs (1/6/2023)    PRAPARE - Transportation     Lack of Transportation (Medical): No     Lack of Transportation (Non-Medical): No   Physical Activity: Insufficiently Active (1/6/2023)    Exercise Vital Sign     Days of Exercise per Week: 5 days     Minutes of Exercise per Session: 20 min   Stress: No Stress Concern Present (1/6/2023)    [employer] - Occupational Stress Questionnaire     Feeling of Stress : Not at all   Social Connections: Moderately Isolated (1/6/2023)    Social Connection and Isolation Panel [NHANES]     Frequency of Communication with Friends and Family: More than three times a week     Frequency of Social Gatherings with Friends and Family: More than three times a week     Attends Voodoo Services: More than 4 times per year     Active Member of Clubs or Organizations: No     Marital Status:    Housing Stability: Low Risk  (1/6/2023)    Housing Stability  "Vital Sign     Unable to Pay for Housing in the Last Year: No     Number of Places Lived in the Last Year: 1     Unstable Housing in the Last Year: No       Review of Systems:  Skin:          Eyes:         ENT:         Respiratory:  Positive for dyspnea on exertion     Cardiovascular:  Negative      Gastroenterology:        Genitourinary:         Musculoskeletal:         Neurologic:         Psychiatric:         Heme/Lymph/Imm:         Endocrine:           Physical Exam:  Vitals: /50 (BP Location: Right arm, Patient Position: Sitting, Cuff Size: Adult Regular)   Pulse 67   Ht 1.556 m (5' 1.25\")   Wt 65.3 kg (144 lb)   SpO2 96%   BMI 26.99 kg/m      Constitutional:           Skin:             Head:           Eyes:           Lymph:      ENT:           Neck:           Respiratory:            Cardiac:                                                           GI:           Extremities and Muscular Skeletal:                 Neurological:           Psych:         CC  Luis Manuel Davis MD  [address] [place], [place] [id number]    Thank you for allowing me to participate in the care of your patient.      Sincerely,     CHAPITO Dixon CNP     [facility]  "

## 2023-12-18 ENCOUNTER — OFFICE VISIT (OUTPATIENT)
Dept: URGENT CARE | Facility: URGENT CARE | Age: 88
End: 2023-12-18
Payer: COMMERCIAL

## 2023-12-18 ENCOUNTER — NURSE TRIAGE (OUTPATIENT)
Dept: FAMILY MEDICINE | Facility: CLINIC | Age: 88
End: 2023-12-18
Payer: COMMERCIAL

## 2023-12-18 VITALS
DIASTOLIC BLOOD PRESSURE: 78 MMHG | RESPIRATION RATE: 20 BRPM | SYSTOLIC BLOOD PRESSURE: 168 MMHG | TEMPERATURE: 98 F | HEART RATE: 74 BPM | OXYGEN SATURATION: 97 %

## 2023-12-18 DIAGNOSIS — R35.0 URINARY FREQUENCY: ICD-10-CM

## 2023-12-18 DIAGNOSIS — N39.0 URINARY TRACT INFECTION WITH HEMATURIA, SITE UNSPECIFIED: Primary | ICD-10-CM

## 2023-12-18 DIAGNOSIS — R31.9 URINARY TRACT INFECTION WITH HEMATURIA, SITE UNSPECIFIED: Primary | ICD-10-CM

## 2023-12-18 LAB
ALBUMIN UR-MCNC: NEGATIVE MG/DL
APPEARANCE UR: CLEAR
BACTERIA #/AREA URNS HPF: ABNORMAL /HPF
BILIRUB UR QL STRIP: NEGATIVE
COLOR UR AUTO: YELLOW
GLUCOSE UR STRIP-MCNC: NEGATIVE MG/DL
HGB UR QL STRIP: NEGATIVE
KETONES UR STRIP-MCNC: NEGATIVE MG/DL
LEUKOCYTE ESTERASE UR QL STRIP: ABNORMAL
NITRATE UR QL: NEGATIVE
PH UR STRIP: 5.5 [PH] (ref 5–7)
RBC #/AREA URNS AUTO: ABNORMAL /HPF
SP GR UR STRIP: 1.01 (ref 1–1.03)
SQUAMOUS #/AREA URNS AUTO: ABNORMAL /LPF
UROBILINOGEN UR STRIP-ACNC: 0.2 E.U./DL
WBC #/AREA URNS AUTO: ABNORMAL /HPF

## 2023-12-18 PROCEDURE — 81001 URINALYSIS AUTO W/SCOPE: CPT | Performed by: PHYSICIAN ASSISTANT

## 2023-12-18 PROCEDURE — 99213 OFFICE O/P EST LOW 20 MIN: CPT | Performed by: PHYSICIAN ASSISTANT

## 2023-12-18 PROCEDURE — 87186 SC STD MICRODIL/AGAR DIL: CPT | Performed by: PHYSICIAN ASSISTANT

## 2023-12-18 PROCEDURE — 87086 URINE CULTURE/COLONY COUNT: CPT | Performed by: PHYSICIAN ASSISTANT

## 2023-12-18 RX ORDER — CEPHALEXIN 500 MG/1
500 CAPSULE ORAL 2 TIMES DAILY
Qty: 14 CAPSULE | Refills: 0 | Status: SHIPPED | OUTPATIENT
Start: 2023-12-18 | End: 2023-12-25

## 2023-12-18 NOTE — TELEPHONE ENCOUNTER
"After trying to get pt into [place] and [facility] - today or tomorrow - no appointments available    Advised pt to go to   due to having no appointments and RN reviewed if left untreated the risks of what can happen     Pt stated she will go to  this afternoon after her other appointment   Lennie Rosenthal RN, BSN  [facility] Triage    Reason for Disposition   Bad or foul-smelling urine    Additional Information   Negative: Shock suspected (e.g., cold/pale/clammy skin, too weak to stand, low BP, rapid pulse)   Negative: Sounds like a life-threatening emergency to the triager   Negative: Followed a female genital area injury (e.g., labia, vagina, vulva)   Negative: Followed a male genital area injury (penis, scrotum)   Negative: Vaginal discharge   Negative: Pus (white, yellow) or bloody discharge from end of penis   Negative: Pain or burning with passing urine (urination) and pregnant   Negative: Pain or burning with passing urine (urination) and female   Negative: Pain or burning with passing urine (urination) and male   Negative: Pain or itching in the vulvar area   Negative: Pain in scrotum is main symptom   Negative: Blood in the urine is main symptom   Negative: Symptoms arising from use of a urinary catheter (e.g., Coude, Momin)   Negative: Unable to urinate (or only a few drops) > 4 hours and bladder feels very full (e.g., palpable bladder or strong urge to urinate)   Negative: Decreased urination and drinking very little and dehydration suspected (e.g., dark urine, no urine > 12 hours, very dry mouth, very lightheaded)   Negative: Patient sounds very sick or weak to the triager   Negative: Side (flank) or lower back pain present    Answer Assessment - Initial Assessment Questions  1. SYMPTOM: \"What's the main symptom you're concerned about?\" (e.g., frequency, incontinence)      Urine is very smelly and cloudy, discharge - white  2. ONSET: \"When did the  smelly urine  start?\"      3 " "weeks     3. PAIN: \"Is there any pain?\" If Yes, ask: \"How bad is it?\" (Scale: 1-10; mild, moderate, severe)      None     4. CAUSE: \"What do you think is causing the symptoms?\"      Unsure     5. OTHER SYMPTOMS: \"Do you have any other symptoms?\" (e.g., blood in urine, fever, flank pain, pain with urination)      Denies: blood in urine, fevers, low back pain, burning with voiding.    6. PREGNANCY: \"Is there any chance you are pregnant?\" \"When was your last menstrual period?\"      None    Protocols used: Urinary Symptoms-A-OH    "

## 2023-12-18 NOTE — PROGRESS NOTES
SUBJECTIVE:  Swathi Dukes is a 88 year old female who comes in with concerns for UTI this been going on for approximately 3 weeks.  Patient states that she has significantly malodorous urine.  She is now having slight pressure and unsure about frequency.  She denies any nausea, vomiting, fever or flank pain.  No fevers have been present have a long history of urinary tract infections.  She has been eating and drinking normally.  She is otherwise at baseline health.    Past Medical History:   Diagnosis Date    Aortic stenosis     Glaucoma     HTN (hypertension)     Hyperlipidemia     Pericardial effusion     Systemic lupus erythematosus      Patient Active Problem List   Diagnosis    Essential hypertension    Anxiety    Glaucoma suspect, bilateral    Personal history of DVT (deep vein thrombosis)    Mixed obsessional thoughts and acts    Age-related osteoporosis without current pathological fracture    Nonrheumatic aortic valve insufficiency    Chronic diarrhea    Pain in both hands    Primary osteoarthritis of left hip    Suicidal ideation    Pericardial effusion    Abnormal cardiovascular stress test    Recurrent UTI    Lumbar radiculopathy    Liver lesion    Moderate episode of recurrent major depressive disorder (H)    Nonrheumatic aortic valve stenosis    Chronic pain of left knee    Thyroid nodule    Epiglottic lesion    CKD (chronic kidney disease) stage 2, GFR 60-89 ml/min    Inflammatory osteoarthritis    Generalized muscle weakness    UTI (urinary tract infection), bacterial    Infection due to 2019 novel coronavirus    Unstable angina (H)    Dyspnea on exertion    Shortness of breath at rest    Pneumonia of both lungs due to infectious organism, unspecified part of lung    Chest pain, unspecified type     Current Outpatient Medications   Medication    acetaminophen (TYLENOL) 500 MG tablet    ALPHAGAN P 0.1 % ophthalmic solution    amLODIPine (NORVASC) 10 MG tablet    cholecalciferol (D3-50) 1250 mcg  (74884 units) capsule    dorzolamide (TRUSOPT) 2 % ophthalmic solution    DULoxetine (CYMBALTA) 60 MG capsule    ergocalciferol (ERGOCALCIFEROL) 1.25 MG (97911 UT) capsule    ferrous gluconate (FERGON) 324 (38 Fe) MG tablet    folic acid (FOLVITE) 1 MG tablet    gabapentin (NEURONTIN) 100 MG capsule    hydrALAZINE (APRESOLINE) 25 MG tablet    latanoprost (XALATAN) 0.005 % ophthalmic solution    Lidocaine (LIDOCARE) 4 % Patch    lisinopril (ZESTRIL) 20 MG tablet    loperamide (IMODIUM) 2 MG capsule    OLANZapine (ZYPREXA) 5 MG tablet    PARoxetine (PAXIL) 10 MG tablet    simvastatin (ZOCOR) 10 MG tablet     No current facility-administered medications for this visit.     Social History     Socioeconomic History    Marital status:      Spouse name: Not on file    Number of children: Not on file    Years of education: Not on file    Highest education level: Not on file   Occupational History    Not on file   Tobacco Use    Smoking status: Never    Smokeless tobacco: Never   Vaping Use    Vaping Use: Never used   Substance and Sexual Activity    Alcohol use: Not Currently    Drug use: Never    Sexual activity: Not Currently   Other Topics Concern    Parent/sibling w/ CABG, MI or angioplasty before 65F 55M? Not Asked   Social History Narrative    Not on file     Social Determinants of Health     Financial Resource Strain: Low Risk  (1/6/2023)    Overall Financial Resource Strain (CARDIA)     Difficulty of Paying Living Expenses: Not hard at all   Food Insecurity: No Food Insecurity (1/6/2023)    Hunger Vital Sign     Worried About Running Out of Food in the Last Year: Never true     Ran Out of Food in the Last Year: Never true   Transportation Needs: No Transportation Needs (1/6/2023)    PRAPARE - Transportation     Lack of Transportation (Medical): No     Lack of Transportation (Non-Medical): No   Physical Activity: Insufficiently Active (1/6/2023)    Exercise Vital Sign     Days of Exercise per Week: 5 days      Minutes of Exercise per Session: 20 min   Stress: No Stress Concern Present (1/6/2023)    [employer] - Occupational Stress Questionnaire     Feeling of Stress : Not at all   Social Connections: Moderately Isolated (1/6/2023)    Social Connection and Isolation Panel [NHANES]     Frequency of Communication with Friends and Family: More than three times a week     Frequency of Social Gatherings with Friends and Family: More than three times a week     Attends Church Services: More than 4 times per year     Active Member of Clubs or Organizations: No     Attends Club or Organization Meetings: Not on file     Marital Status:    Interpersonal Safety: Not on file   Housing Stability: Low Risk  (1/6/2023)    Housing Stability Vital Sign     Unable to Pay for Housing in the Last Year: No     Number of Places Lived in the Last Year: 1     Unstable Housing in the Last Year: No     ROS  negative other than stated above    Exam:  GENERAL APPEARANCE: healthy, alert and no distress  EYES: EOMI,  PERRL  RESP: lungs clear to auscultation - no rales, rhonchi or wheezes  CV: regular rates and rhythm, normal S1 S2, no S3 or S4 and no murmur, click or rub -  ABDOMEN:  soft, nontender, no HSM or masses and bowel sounds normal.  No CVA tenderness noted  SKIN: no suspicious lesions or rashes    Results for orders placed or performed in visit on 12/18/23   UA Macroscopic with reflex to Microscopic and Culture     Status: Abnormal    Specimen: Urine, Clean Catch   Result Value Ref Range    Color Urine Yellow Colorless, Straw, Light Yellow, Yellow    Appearance Urine Clear Clear    Glucose Urine Negative Negative mg/dL    Bilirubin Urine Negative Negative    Ketones Urine Negative Negative mg/dL    Specific Gravity Urine 1.015 1.003 - 1.035    Blood Urine Negative Negative    pH Urine 5.5 5.0 - 7.0    Protein Albumin Urine Negative Negative mg/dL    Urobilinogen Urine 0.2 0.2, 1.0 E.U./dL    Nitrite  Urine Negative Negative    Leukocyte Esterase Urine Small (A) Negative   UA Microscopic with Reflex to Culture     Status: Abnormal   Result Value Ref Range    Bacteria Urine Many (A) None Seen /HPF    RBC Urine 0-2 0-2 /HPF /HPF    WBC Urine 10-25 (A) 0-5 /HPF /HPF    Squamous Epithelials Urine Few (A) None Seen /LPF     assessment/plan:  (N39.0,  R31.9) Urinary tract infection with hematuria, site unspecified  (primary encounter diagnosis)  Comment:   Plan: cephALEXin (KEFLEX) 500 MG capsule          Patient with several week history of malodorous urine.  She has mild irritation with urination.  Exam is consistent with urinary tract infection but no evidence of pyelonephritis.  Culture is pending.  Keflex as directed.  Advised over-the-counter med for symptomatic relief.  Continue to push fluids.  Red flag signs were discussed will follow-up as needed    (R35.0) Urinary frequency  Comment:   Plan: UA Macroscopic with reflex to Microscopic and         Culture, UA Microscopic with Reflex to Culture,        Urine Culture

## 2023-12-21 LAB
BACTERIA UR CULT: ABNORMAL
BACTERIA UR CULT: ABNORMAL

## 2024-01-02 ENCOUNTER — TELEPHONE (OUTPATIENT)
Dept: FAMILY MEDICINE | Facility: CLINIC | Age: 89
End: 2024-01-02
Payer: COMMERCIAL

## 2024-01-02 DIAGNOSIS — R30.0 DYSURIA: Primary | ICD-10-CM

## 2024-01-02 NOTE — TELEPHONE ENCOUNTER
LM ok to do UA only and PCP will follow up with results.     Call back with questions     Ronda Miller RN

## 2024-01-02 NOTE — TELEPHONE ENCOUNTER
Pt was seen in UC for UTI on 12/18 for UTI treated with cephalexin for 7 days.      Has been off for over a week and over the last 1-2 day feels symptoms returning.     OK to just recheck urine or needs visit?    Ronda Miller RN

## 2024-01-03 ENCOUNTER — TELEPHONE (OUTPATIENT)
Dept: FAMILY MEDICINE | Facility: CLINIC | Age: 89
End: 2024-01-03

## 2024-01-03 DIAGNOSIS — N39.0 RECURRENT UTI: Primary | ICD-10-CM

## 2024-01-03 LAB
ALBUMIN UR-MCNC: 100 MG/DL
APPEARANCE UR: CLEAR
BACTERIA #/AREA URNS HPF: ABNORMAL /HPF
BILIRUB UR QL STRIP: NEGATIVE
COLOR UR AUTO: YELLOW
GLUCOSE UR STRIP-MCNC: NEGATIVE MG/DL
HGB UR QL STRIP: ABNORMAL
KETONES UR STRIP-MCNC: NEGATIVE MG/DL
LEUKOCYTE ESTERASE UR QL STRIP: ABNORMAL
NITRATE UR QL: POSITIVE
PH UR STRIP: 5.5 [PH] (ref 5–7)
RBC #/AREA URNS AUTO: ABNORMAL /HPF
SP GR UR STRIP: 1.02 (ref 1–1.03)
SQUAMOUS #/AREA URNS AUTO: ABNORMAL /LPF
UROBILINOGEN UR STRIP-ACNC: 0.2 E.U./DL
WBC #/AREA URNS AUTO: >100 /HPF

## 2024-01-03 PROCEDURE — 81001 URINALYSIS AUTO W/SCOPE: CPT | Performed by: FAMILY MEDICINE

## 2024-01-03 PROCEDURE — 87086 URINE CULTURE/COLONY COUNT: CPT | Performed by: FAMILY MEDICINE

## 2024-01-03 PROCEDURE — 87186 SC STD MICRODIL/AGAR DIL: CPT | Performed by: FAMILY MEDICINE

## 2024-01-03 RX ORDER — SULFAMETHOXAZOLE/TRIMETHOPRIM 800-160 MG
1 TABLET ORAL 2 TIMES DAILY
Qty: 6 TABLET | Refills: 0 | Status: SHIPPED | OUTPATIENT
Start: 2024-01-03 | End: 2024-01-19

## 2024-01-03 NOTE — TELEPHONE ENCOUNTER
Pt calling to follow up should she have antibiotic for UTI after urgent care visit?    Please contact pt     Ronda Miller RN

## 2024-01-03 NOTE — TELEPHONE ENCOUNTER
Error in message below pt was not seen in UC just left urine     PCP is out of office.  UC pending     Bactrim DS BID for 3 days     Pt notified, advised follow up with call if not improving.     Pt expressed understanding and acceptance of the plan. had no further questions at this time.  Advised can call back to clinic at any time with concerns.      Message handled by Nurse Triage with [facility] - provider name: Aidan Parra MD  .     Ronda Miller RN

## 2024-01-04 LAB — BACTERIA UR CULT: ABNORMAL

## 2024-01-16 DIAGNOSIS — E61.1 IRON DEFICIENCY: Primary | ICD-10-CM

## 2024-01-18 RX ORDER — FERROUS GLUCONATE 324(38)MG
324 TABLET ORAL
Qty: 90 TABLET | Refills: 3 | Status: SHIPPED | OUTPATIENT
Start: 2024-01-18

## 2024-01-19 ENCOUNTER — TELEPHONE (OUTPATIENT)
Dept: FAMILY MEDICINE | Facility: CLINIC | Age: 89
End: 2024-01-19
Payer: COMMERCIAL

## 2024-01-19 DIAGNOSIS — N39.0 RECURRENT UTI: ICD-10-CM

## 2024-01-19 RX ORDER — SULFAMETHOXAZOLE/TRIMETHOPRIM 800-160 MG
1 TABLET ORAL 2 TIMES DAILY
Qty: 10 TABLET | Refills: 0 | Status: SHIPPED | OUTPATIENT
Start: 2024-01-19 | End: 2024-03-28

## 2024-01-19 NOTE — TELEPHONE ENCOUNTER
"Pt feels she has another UTI-   symptoms started back 3-4 days after completing the Bactrim DS    She is now complaining of \"odor and cloudy urine\"   Denies fever, no abdominal, back or flank pain.  No urgency or pain    Please advise     Ronda Miller, RN         "

## 2024-01-19 NOTE — TELEPHONE ENCOUNTER
LM with information below     Also gave contact number for urology to move up appointment.  If unable to get sooner OV she is to call back to DARIO Miller RN

## 2024-01-30 ENCOUNTER — TELEPHONE (OUTPATIENT)
Dept: FAMILY MEDICINE | Facility: CLINIC | Age: 89
End: 2024-01-30
Payer: COMMERCIAL

## 2024-01-30 NOTE — TELEPHONE ENCOUNTER
Call received from   [facility]    stating patient missed a dose of Simvastatin last evening. States this drug was missed when setting up medications. Facility is required to report because it was a medication error.    SATHYA

## 2024-01-31 DIAGNOSIS — E55.9 VITAMIN D DEFICIENCY: ICD-10-CM

## 2024-01-31 RX ORDER — CHOLECALCIFEROL (VITAMIN D3) 1250 MCG
1250 CAPSULE ORAL
Qty: 4 CAPSULE | Refills: 11 | Status: SHIPPED | OUTPATIENT
Start: 2024-01-31

## 2024-01-31 NOTE — PROGRESS NOTES
Fax from [facility] pharmacy written order signed by Dr. Vanessa, transcribed and sent via electronic.

## 2024-02-08 ENCOUNTER — TRANSFERRED RECORDS (OUTPATIENT)
Dept: HEALTH INFORMATION MANAGEMENT | Facility: CLINIC | Age: 89
End: 2024-02-08
Payer: COMMERCIAL

## 2024-02-13 DIAGNOSIS — K52.9 CHRONIC DIARRHEA: ICD-10-CM

## 2024-02-14 ENCOUNTER — MYC MEDICAL ADVICE (OUTPATIENT)
Dept: FAMILY MEDICINE | Facility: CLINIC | Age: 89
End: 2024-02-14
Payer: COMMERCIAL

## 2024-02-14 DIAGNOSIS — N39.0 RECURRENT UTI: Primary | ICD-10-CM

## 2024-02-16 RX ORDER — LOPERAMIDE HCL 2 MG
2 CAPSULE ORAL 3 TIMES DAILY PRN
Qty: 30 CAPSULE | Refills: 1 | Status: SHIPPED | OUTPATIENT
Start: 2024-02-16 | End: 2024-03-29

## 2024-03-25 ENCOUNTER — NURSE TRIAGE (OUTPATIENT)
Dept: NURSING | Facility: CLINIC | Age: 89
End: 2024-03-25
Payer: COMMERCIAL

## 2024-03-26 NOTE — TELEPHONE ENCOUNTER
"  Discuss with son that if only odor likely just dehydration, he will work with her on hydrating.   She does not drink \"hardly anything\"   per son maybe 3-4 oz of Gatorade a day.   He will again discuss and work on this with pt.     Son if very sorry that provider was woke last night.       Again ok for for UA so does not feel she is having any other worrisome symptoms that require UC     Ronda Miller RN         "

## 2024-03-26 NOTE — TELEPHONE ENCOUNTER
Son called to [facility]\ and LM on VM concerning call below. Writer reviewed notes from [place] visit 3/14 see below.    9 Patient has asymptomatic bacteriuria without evidence of UTI  I do not recommend antibiotic treatment at this time.     Advised son that per that note no UTI present.   If they feel something has changed will discuss with PCP rechecking UA.   If pt having more symptoms should be seen in UC today.      Son was on his way to S so LM with information as above and advised call back to [facility].      Please advise ok for UA check today or be seen?      Ronda Miller RN

## 2024-03-26 NOTE — TELEPHONE ENCOUNTER
"Nurse Triage SBAR    Is this a 2nd Level Triage? YES, LICENSED PRACTITIONER REVIEW IS REQUIRED    Situation: weakness    Background: Patient states that two weeks ago she had lab work done at [facility] and that son noticed that she had a UTI for which she is not being treated. Patient states that she had diarrhea this morning for which she has taken imodium and resolved that issue. Patient states that now she just doesn't \"feel good\"    Assessment: Spoke with son as requested by Dr. Vanessa (consent on file) son states that this is recurrent issue with the patient and he didn't feel it would be necessary for patient to be seen in ED.  Per son patient has a cycle of recurrent UTI's, diarrhea, and constipation.      Protocol Recommended Disposition:   See HCP Within 4 Hours (Or PCP Triage)    Recommendation: Patient was advised to contact clinic in the morning, rehydrated, eat something more nourishing than apple sauce, and to go to the ED if she felt that she needed to be seen immediately.    Paged to provider  Dr. Cortney Vanessa [phone]    Does the patient meet one of the following criteria for ADS visit consideration? 16+ years old, with an [facility] PCP     TIP  Providers, please consider if this condition is appropriate for management at one of our Acute and Diagnostic Services sites.     If patient is a good candidate, please use dotphrase <dot>triageresponse and select Refer to ADS to document.    Reason for Disposition   [1] MODERATE weakness (i.e., interferes with work, school, normal activities) AND [2] cause unknown  (Exceptions: Weakness from acute minor illness or poor fluid intake; weakness is chronic and not worse.)    Additional Information   Negative: SEVERE difficulty breathing (e.g., struggling for each breath, speaks in single words)   Negative: Shock suspected (e.g., cold/pale/clammy skin, too weak to stand, low BP, rapid pulse)   Negative: Difficult to awaken or acting confused (e.g., disoriented, slurred " "speech)   Negative: [1] Fainted > 15 minutes ago AND [2] still feels too weak or dizzy to stand   Negative: [1] SEVERE weakness (i.e., unable to walk or barely able to walk, requires support) AND [2] new-onset or worsening   Negative: Sounds like a life-threatening emergency to the triager   Negative: Difficulty breathing   Negative: Heart beating < 50 beats per minute OR > 140 beats per minute   Negative: Extra heartbeats, irregular heart beating, or heart is beating very fast  (i.e., \"palpitations\")   Negative: Follows large amount of bleeding (e.g., from vomiting, rectum, vagina  (Exception: Small brief weakness from sight of a small amount blood.)   Negative: Black or tarry bowel movements   Negative: [1] Drinking very little AND [2] dehydration suspected (e.g., no urine > 12 hours, very dry mouth, very lightheaded)   Negative: Patient sounds very sick or weak to the triager    Protocols used: Weakness (Generalized) and Fatigue-A-AH    "

## 2024-03-28 ENCOUNTER — MYC MEDICAL ADVICE (OUTPATIENT)
Dept: FAMILY MEDICINE | Facility: CLINIC | Age: 89
End: 2024-03-28
Payer: COMMERCIAL

## 2024-03-28 DIAGNOSIS — I10 ESSENTIAL HYPERTENSION: ICD-10-CM

## 2024-03-28 DIAGNOSIS — N89.8 VAGINAL DISCHARGE: ICD-10-CM

## 2024-03-28 DIAGNOSIS — N39.0 RECURRENT UTI: ICD-10-CM

## 2024-03-28 LAB
ALBUMIN UR-MCNC: NEGATIVE MG/DL
APPEARANCE UR: ABNORMAL
BACTERIA #/AREA URNS HPF: ABNORMAL /HPF
BILIRUB UR QL STRIP: NEGATIVE
COLOR UR AUTO: YELLOW
GLUCOSE UR STRIP-MCNC: NEGATIVE MG/DL
HGB UR QL STRIP: NEGATIVE
KETONES UR STRIP-MCNC: NEGATIVE MG/DL
LEUKOCYTE ESTERASE UR QL STRIP: ABNORMAL
NITRATE UR QL: NEGATIVE
PH UR STRIP: 5.5 [PH] (ref 5–7)
RBC #/AREA URNS AUTO: ABNORMAL /HPF
SP GR UR STRIP: 1.01 (ref 1–1.03)
SQUAMOUS #/AREA URNS AUTO: ABNORMAL /LPF
UROBILINOGEN UR STRIP-ACNC: 0.2 E.U./DL
WBC #/AREA URNS AUTO: ABNORMAL /HPF

## 2024-03-28 PROCEDURE — 87186 SC STD MICRODIL/AGAR DIL: CPT

## 2024-03-28 PROCEDURE — 81001 URINALYSIS AUTO W/SCOPE: CPT

## 2024-03-28 PROCEDURE — 87086 URINE CULTURE/COLONY COUNT: CPT

## 2024-03-28 RX ORDER — SULFAMETHOXAZOLE/TRIMETHOPRIM 800-160 MG
1 TABLET ORAL 2 TIMES DAILY
Qty: 10 TABLET | Refills: 0 | Status: SHIPPED | OUTPATIENT
Start: 2024-03-28

## 2024-03-28 RX ORDER — HYDRALAZINE HYDROCHLORIDE 25 MG/1
25 TABLET, FILM COATED ORAL 3 TIMES DAILY
Qty: 270 TABLET | Refills: 0 | Status: SHIPPED | OUTPATIENT
Start: 2024-03-28

## 2024-03-28 NOTE — TELEPHONE ENCOUNTER
Priority call from [facility] to follow up on refill request for hydralazine. Informed this was sent to pharmacy today. No further questions at this time.     Tayla HAMMONDSN, RN  [facility]

## 2024-03-29 DIAGNOSIS — K52.9 CHRONIC DIARRHEA: ICD-10-CM

## 2024-03-29 LAB — BACTERIA UR CULT: ABNORMAL

## 2024-04-01 RX ORDER — LOPERAMIDE HCL 2 MG
2 CAPSULE ORAL 3 TIMES DAILY PRN
Qty: 30 CAPSULE | Refills: 1 | Status: SHIPPED | OUTPATIENT
Start: 2024-04-01 | End: 2024-09-26

## 2024-04-12 ENCOUNTER — MEDICAL CORRESPONDENCE (OUTPATIENT)
Dept: HEALTH INFORMATION MANAGEMENT | Facility: CLINIC | Age: 89
End: 2024-04-12

## 2024-04-14 ENCOUNTER — E-VISIT (OUTPATIENT)
Dept: URGENT CARE | Facility: CLINIC | Age: 89
End: 2024-04-14
Payer: COMMERCIAL

## 2024-04-14 DIAGNOSIS — N89.8 VAGINAL DISCHARGE: Primary | ICD-10-CM

## 2024-04-14 PROCEDURE — 99421 OL DIG E/M SVC 5-10 MIN: CPT | Performed by: FAMILY MEDICINE

## 2024-04-14 NOTE — PATIENT INSTRUCTIONS
Thank you for choosing us for your care. Given your symptoms, I would like you to do a lab-only visit to determine what is causing them.  I have placed the orders.  Please schedule an appointment with the lab right here in [employer], or call [phone].  I will let you know when the results are back and next steps to take.

## 2024-04-15 ENCOUNTER — NURSE TRIAGE (OUTPATIENT)
Dept: FAMILY MEDICINE | Facility: CLINIC | Age: 89
End: 2024-04-15
Payer: COMMERCIAL

## 2024-04-15 NOTE — TELEPHONE ENCOUNTER
"Reason for Disposition   Colds with no complications    Additional Information   Negative: SEVERE difficulty breathing (e.g., struggling for each breath, speaks in single words)   Negative: Very weak (can't stand)   Negative: Sounds like a life-threatening emergency to the triager   Negative: Difficulty breathing and not from stuffy nose (e.g., not relieved by cleaning out the nose)   Negative: Runny nose is caused by pollen or other allergies   Negative: Cough is main symptom   Negative: Sore throat is main symptom   Negative: Patient sounds very sick or weak to the triager   Negative: Fever > 103 F (39.4 C)   Negative: Fever > 101 F (38.3 C) and over 60 years of age   Negative: Fever > 100.0 F (37.8 C) and has diabetes mellitus or a weak immune system (e.g., HIV positive, cancer chemotherapy, organ transplant, splenectomy, chronic steroids)   Negative: Fever > 100.0 F (37.8 C) and bedridden (e.g., CVA, chronic illness, recovering from surgery)   Negative: Fever present > 3 days (72 hours)   Negative: Fever returns after gone for over 24 hours and symptoms worse or not improved   Negative: Sinus pain (not just congestion) and fever   Negative: Earache   Negative: Sinus congestion (pressure, fullness) present > 10 days   Negative: Nasal discharge present > 10 days   Negative: Using nasal washes and pain medicine > 24 hours and sinus pain (lower forehead, cheekbone, or eye) persists   Negative: Patient wants to be seen   Negative: Sore throat present > 5 days    Answer Assessment - Initial Assessment Questions  1. ONSET: 1-1.5 weeks   2. AMOUNT: \"How much discharge is there?\"       \"Not too much     3. COUGH: \"Do you have a cough?\" If Yes, ask: \"Describe the color of your sputum\" (clear, white, yellow, green)      Denies cough but has some PND   4. RESPIRATORY DISTRESS: \"Describe your breathing.\"       Denies chest pain, wheezing, chest tightness or SOB   5. FEVER: \"Do you have a fever?\" If Yes, ask: \"What is your " "temperature, how was it measured, and when did it start?\"      Pt is afebrile   6. SEVERITY: \"Overall, how bad are you feeling right now?\" (e.g., doesn't interfere with normal activities, staying home from school/work, staying in bed)       \"Just tired and not great\"   7. OTHER SYMPTOMS: \"Do you have any other symptoms?\" (e.g., sore throat, earache, wheezing, vomiting)      Denies although my have yeast infection see e visit   8. PREGNANCY: \"Is there any chance you are pregnant?\" \"When was your last menstrual period?\"      NA    Protocols used: Common Cold-A-[place]    "

## 2024-04-16 PROCEDURE — 87210 SMEAR WET MOUNT SALINE/INK: CPT

## 2024-04-17 PROCEDURE — 81001 URINALYSIS AUTO W/SCOPE: CPT | Performed by: FAMILY MEDICINE

## 2024-04-21 DIAGNOSIS — H40.003 GLAUCOMA SUSPECT, BILATERAL: ICD-10-CM

## 2024-04-22 RX ORDER — BRIMONIDINE TARTRATE 1 MG/ML
SOLUTION/ DROPS OPHTHALMIC
Qty: 5 ML | Refills: 0 | Status: SHIPPED | OUTPATIENT
Start: 2024-04-22 | End: 2024-05-13

## 2024-04-22 NOTE — TELEPHONE ENCOUNTER
Writer called left message on patient phone regarding her eye drops being filled by Dr. Sousa. Writer left clinic number in case she has any questions.

## 2024-05-11 DIAGNOSIS — I10 ESSENTIAL HYPERTENSION: ICD-10-CM

## 2024-05-13 DIAGNOSIS — H40.003 GLAUCOMA SUSPECT, BILATERAL: ICD-10-CM

## 2024-05-13 RX ORDER — LISINOPRIL 20 MG/1
20 TABLET ORAL DAILY
Qty: 90 TABLET | Refills: 3 | Status: SHIPPED | OUTPATIENT
Start: 2024-05-13

## 2024-05-14 RX ORDER — BRIMONIDINE TARTRATE 1 MG/ML
SOLUTION/ DROPS OPHTHALMIC
Qty: 5 ML | Refills: 11 | Status: SHIPPED | OUTPATIENT
Start: 2024-05-14

## 2024-05-22 DIAGNOSIS — E78.2 MIXED HYPERLIPIDEMIA: ICD-10-CM

## 2024-05-22 NOTE — TELEPHONE ENCOUNTER
Faxed order signed by Dr. Vanessa from pharmacy for Simvastatin-- however upon entering Rx signed is for 30 tablets with 11 refills. Previous order was 90 tablets with 3 refills.     Call to pharmacy to clarify as patient is in long term facility. Call to pharmacy to find out why the are requesting different dispense ratio. She said fax auto--populated that and 90 day would be better but whatever provider prefers.     Routing to Dr. Vanessa to sign for 90 tablets or change to 30 if preferred.     Keily Thomas R.N.

## 2024-05-23 RX ORDER — SIMVASTATIN 10 MG
10 TABLET ORAL AT BEDTIME
Qty: 90 TABLET | Refills: 3 | Status: SHIPPED | OUTPATIENT
Start: 2024-05-23

## 2024-05-30 DIAGNOSIS — E61.1 LOW IRON: ICD-10-CM

## 2024-05-31 RX ORDER — FOLIC ACID 1 MG/1
1 TABLET ORAL DAILY
Qty: 30 TABLET | Refills: 11 | Status: SHIPPED | OUTPATIENT
Start: 2024-05-31

## 2024-06-25 ENCOUNTER — PRE VISIT (OUTPATIENT)
Dept: UROLOGY | Facility: CLINIC | Age: 89
End: 2024-06-25
Payer: COMMERCIAL

## 2024-06-25 NOTE — TELEPHONE ENCOUNTER
Reason for visit: follow-up     Relevant information: 1 yr, last saw Dr. Espinosa for a recurrent UTI    Records/imaging/labs/orders: all records available    Pt called: No need for a call    At Rooming: standard procedure, ask if symptoms    Ping Coffey  6/25/2024  3:01 PM

## 2024-08-12 DIAGNOSIS — H40.003 GLAUCOMA SUSPECT, BILATERAL: ICD-10-CM

## 2024-08-12 RX ORDER — LATANOPROST 50 UG/ML
SOLUTION/ DROPS OPHTHALMIC
Qty: 2.5 ML | Refills: 97 | Status: SHIPPED | OUTPATIENT
Start: 2024-08-12

## 2024-09-24 DIAGNOSIS — K52.9 CHRONIC DIARRHEA: ICD-10-CM

## 2024-09-26 RX ORDER — LOPERAMIDE HCL 2 MG
CAPSULE ORAL
Qty: 30 CAPSULE | Refills: 4 | Status: SHIPPED | OUTPATIENT
Start: 2024-09-26

## 2024-10-11 ENCOUNTER — TRANSFERRED RECORDS (OUTPATIENT)
Dept: HEALTH INFORMATION MANAGEMENT | Facility: CLINIC | Age: 89
End: 2024-10-11
Payer: COMMERCIAL

## 2024-10-23 NOTE — TELEPHONE ENCOUNTER
"Dr. Vanessa Paged at [phone] and 2300 and [phone]    Call Back [phone]    PCP recommendations,     Go to ED if unbearable otherwise safe to go to [facility] tomorrow if continuing to have pain.         Nurse Triage SBAR    Is this a 2nd Level Triage? YES, LICENSED PRACTITIONER REVIEW IS REQUIRED    Situation:   Bilateral Anterior leg pain from groin to knee    Background:   Bilateral pain anterior leg, groin to knee, onset this morning, despite OTC medication and heat all day she remains having pain that's worse when walking. Remote history of DVT. Fall 4-5 days ago seen at [facility] with imaging no findings per the patient.     Assessment:   Anterior bilateral leg pain groin to knee, no swelling redness or sensation changes.     Protocol Recommended Disposition:   See HCP Within 4 Hours (Or PCP Triage)    Recommendation:   Paging provider  Paged to provider    Does the patient meet one of the following criteria for ADS visit consideration? 16+ years old, no PCP (internal or external) but seen at [facility]     TIP  Providers, please consider if this condition is appropriate for management at one of our Acute and Diagnostic Services sites.     If patient is a good candidate, please use dotphrase <dot>triageresponse and select Refer to ADS to document.    Reason for Disposition    History of prior \"blood clot\" in leg or lungs (i.e., deep vein thrombosis, pulmonary embolism)    Additional Information    Negative: Looks like a broken bone or dislocated joint (e.g., crooked or deformed)    Negative: Sounds like a life-threatening emergency to the triager    Negative: Chest pain    Negative: Difficulty breathing    Negative: Entire foot is cool or blue in comparison to other side    Negative: Unable to walk    Negative: [1] Red area or streak AND [2] fever    Negative: [1] Swollen joint AND [2] fever    Negative: [1] Cast on leg or ankle AND [2] now increased pain    Negative: Patient sounds very sick or weak to the triager    " Negative: [1] SEVERE pain (e.g., excruciating, unable to do any normal activities) AND [2] not improved after 2 hours of pain medicine    Negative: [1] Thigh or calf pain AND [2] only 1 side AND [3] present > 1 hour (Exception: chronic unchanged pain)    Negative: [1] Thigh, calf, or ankle swelling AND [2] only 1 side    Negative: [1] Thigh, calf, or ankle swelling AND [2] bilateral AND [3] 1 side is more swollen    Negative: [1] Red area or streak AND [2] large (> 2 in. or 5 cm)    Protocols used: LEG PAIN-A-AH       Kalyani Sage [facility] [phone] / Cardiology Dr. Bishop [phone]

## 2024-12-17 ENCOUNTER — TRANSFERRED RECORDS (OUTPATIENT)
Dept: HEALTH INFORMATION MANAGEMENT | Facility: CLINIC | Age: 89
End: 2024-12-17
Payer: COMMERCIAL

## 2025-01-25 NOTE — TELEPHONE ENCOUNTER
Patient's son called to clarify that Dr. Parada is the person that should be prescribing the gabapentin not Dr. Vanessa.     Regarding note below, Brent said that Dr. Parada's office has taken care of the new Rx requested.    Updated in chart that Dr. Parada fills the gabapentin.     Ermelinda Mora RN      Home

## 2025-02-03 ENCOUNTER — OFFICE VISIT (OUTPATIENT)
Dept: URGENT CARE | Facility: URGENT CARE | Age: 89
End: 2025-02-03
Payer: COMMERCIAL

## 2025-02-03 VITALS
BODY MASS INDEX: 26.22 KG/M2 | OXYGEN SATURATION: 98 % | WEIGHT: 139.9 LBS | HEART RATE: 50 BPM | TEMPERATURE: 97.7 F | SYSTOLIC BLOOD PRESSURE: 133 MMHG | DIASTOLIC BLOOD PRESSURE: 53 MMHG

## 2025-02-03 DIAGNOSIS — R31.9 URINARY TRACT INFECTION WITH HEMATURIA, SITE UNSPECIFIED: Primary | ICD-10-CM

## 2025-02-03 DIAGNOSIS — R30.0 DYSURIA: ICD-10-CM

## 2025-02-03 DIAGNOSIS — N39.0 URINARY TRACT INFECTION WITH HEMATURIA, SITE UNSPECIFIED: Primary | ICD-10-CM

## 2025-02-03 LAB
ALBUMIN UR-MCNC: NEGATIVE MG/DL
APPEARANCE UR: CLEAR
BACTERIA #/AREA URNS HPF: ABNORMAL /HPF
BILIRUB UR QL STRIP: NEGATIVE
COLOR UR AUTO: YELLOW
GLUCOSE UR STRIP-MCNC: NEGATIVE MG/DL
HGB UR QL STRIP: ABNORMAL
KETONES UR STRIP-MCNC: NEGATIVE MG/DL
LEUKOCYTE ESTERASE UR QL STRIP: ABNORMAL
NITRATE UR QL: POSITIVE
PH UR STRIP: 5.5 [PH] (ref 5–7)
RBC #/AREA URNS AUTO: ABNORMAL /HPF
SP GR UR STRIP: 1.01 (ref 1–1.03)
SQUAMOUS #/AREA URNS AUTO: ABNORMAL /LPF
UROBILINOGEN UR STRIP-ACNC: 0.2 E.U./DL
WBC #/AREA URNS AUTO: ABNORMAL /HPF
WBC CLUMPS #/AREA URNS HPF: PRESENT /HPF

## 2025-02-03 PROCEDURE — 87186 SC STD MICRODIL/AGAR DIL: CPT | Performed by: NURSE PRACTITIONER

## 2025-02-03 PROCEDURE — 87086 URINE CULTURE/COLONY COUNT: CPT | Performed by: NURSE PRACTITIONER

## 2025-02-03 PROCEDURE — 87088 URINE BACTERIA CULTURE: CPT | Performed by: NURSE PRACTITIONER

## 2025-02-03 PROCEDURE — 99213 OFFICE O/P EST LOW 20 MIN: CPT | Performed by: NURSE PRACTITIONER

## 2025-02-03 PROCEDURE — 81001 URINALYSIS AUTO W/SCOPE: CPT | Performed by: NURSE PRACTITIONER

## 2025-02-03 RX ORDER — CEPHALEXIN 500 MG/1
500 CAPSULE ORAL 2 TIMES DAILY
Qty: 14 CAPSULE | Refills: 0 | Status: SHIPPED | OUTPATIENT
Start: 2025-02-03 | End: 2025-02-10

## 2025-02-03 ASSESSMENT — ENCOUNTER SYMPTOMS
FEVER: 0
DYSURIA: 0
BACK PAIN: 1
EYES NEGATIVE: 1
FLANK PAIN: 0
ABDOMINAL PAIN: 0
FREQUENCY: 0
RESPIRATORY NEGATIVE: 1

## 2025-02-03 NOTE — PROGRESS NOTES
Assessment & Plan       ICD-10-CM    1. Urinary tract infection with hematuria, site unspecified  N39.0 cephALEXin (KEFLEX) 500 MG capsule    R31.9       2. Dysuria  R30.0 UA Macroscopic with reflex to Microscopic and Culture - Lab Collect     UA Macroscopic with reflex to Microscopic and Culture - Lab Collect     Urine Microscopic Exam     Urine Culture     CANCELED: Wet preparation           Patient Instructions   Take antibiotic as directed.     Adequate fluids. Monitor for new or worsening symptoms. Go to the ED if symptoms worsen.     You will be treated with cephalexin 500 mg one tablet twice a day for 7 days until cultures return and will adjust as necessary. Side effects reviewed and discussed.     Follow up in primary care as previously planned on 02/11/2025      No signs of pyelonephritis at this time. However, patient does understand if her symptoms worsen or don't improve in two to three days she needs to follow up.    Patient and her son agreed to the treatment plan and verbalized understanding.     Results for orders placed or performed in visit on 02/03/25 (from the past 24 hours)   UA Macroscopic with reflex to Microscopic and Culture - Lab Collect    Specimen: Urine, Midstream   Result Value Ref Range    Color Urine Yellow Colorless, Straw, Light Yellow, Yellow    Appearance Urine Clear Clear    Glucose Urine Negative Negative mg/dL    Bilirubin Urine Negative Negative    Ketones Urine Negative Negative mg/dL    Specific Gravity Urine 1.010 1.003 - 1.035    Blood Urine Trace (A) Negative    pH Urine 5.5 5.0 - 7.0    Protein Albumin Urine Negative Negative mg/dL    Urobilinogen Urine 0.2 0.2, 1.0 E.U./dL    Nitrite Urine Positive (A) Negative    Leukocyte Esterase Urine Moderate (A) Negative   Urine Microscopic Exam   Result Value Ref Range    Bacteria Urine Few (A) None Seen /HPF    RBC Urine 0-2 0-2 /HPF /HPF    WBC Urine  (A) 0-5 /HPF /HPF    Squamous Epithelials Urine Few (A) None Seen /LPF     WBC Clumps Urine Present (A) None Seen /HPF       Subjective     Swathi is a 89 year old female who presents to clinic today with her son, who helps provide her health history, for the following health issues:  Chief Complaint   Patient presents with    UTI     Start 8-9 days sx cloudy urine, odor hx UTI tx none      HPI  Patient with a history of chronic kidney disease stage II, recurrent UTIs, along with other multiple co-morbidities has had UTI symptoms for the last 9 days. Patient states it started as cloudy urine and odor. Now she has had urgency the last couple of days. She was seen in the emergency department at [facility] on 10/3/2024 for right pyelonephritis as well as diarrhea.  She was treated with Keflex 500 mg twice daily for 7 days after being given ceftriaxone in the emergency department.  She had a CT at that time which indicated she had a tiny nonobstructing right renal stone. Patient was also seen on 12/13/2025 and diagnosed with UTI. Treated with cephalexin for 7 days and did well. No medications for her symptoms over the last 8-9 days.       Review of Systems   Constitutional:  Negative for fever.   HENT: Negative.     Eyes: Negative.    Respiratory: Negative.     Gastrointestinal:  Negative for abdominal pain.   Genitourinary:  Positive for urgency. Negative for dysuria, flank pain and frequency.   Musculoskeletal:  Positive for back pain (lower back pain which patient states is common for her and she baseline has back pain).       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  2022-05: CKD (chronic kidney disease) stage 2, GFR 60-89  ml/min  2021-11: Thyroid nodule  2021-11: Epiglottic lesion  2021-09: Chronic pain of left knee  2021-06: Nonrheumatic aortic valve stenosis  2021-03: Moderate episode of recurrent major depressive disorder (H)  2021-02: Depression, unspecified depression type  2021-01: Recurrent UTI  2021-01: Anemia due to blood loss, acute  2021-01: Acute kidney injury superimposed on CKD  2021-01: Lumbar radiculopathy  2021-01: Liver lesion  2021-01: Acute cystitis without hematuria  2021-01: Fall, initial encounter  2021-01: Laceration of scalp without foreign body, initial encounter  2021-01: Neck pain  2021-01: Pericardial effusion  2021-01: Abnormal cardiovascular stress test  2020-10: Suicidal ideation  2020-07: Chronic diarrhea  2020-07: Pain in both hands  2020-07: Primary osteoarthritis of left hip  2020-02: Lumbago  2019-12: Mixed obsessional thoughts and acts  2019-12: Age-related osteoporosis without current pathological   fracture  2019-12: Nonrheumatic aortic valve insufficiency  2019-08: Essential hypertension  2019-08: Anxiety  2019-08: Glaucoma suspect, bilateral  2019-08: Personal history of DVT (deep vein thrombosis)      Past Medical History:   Diagnosis Date    Aortic stenosis     Glaucoma     HTN (hypertension)     Hyperlipidemia     Pericardial effusion     Systemic lupus erythematosus          Social History     Tobacco Use    Smoking status: Never    Smokeless tobacco: Never   Substance Use Topics    Alcohol use: Not Currently           Objective    /53   Pulse 50   Temp 97.7  F (36.5  C)   Wt 63.5 kg (139 lb 14.4 oz)   SpO2 98%   BMI 26.22 kg/m    Physical Exam  Vitals and nursing note reviewed.   Constitutional:       Appearance: Normal appearance.   HENT:      Head: Normocephalic and atraumatic.      Right Ear: Tympanic membrane and ear canal normal.      Left Ear: Tympanic membrane and ear canal normal.      Nose: Nose normal.      Mouth/Throat:      Mouth: Mucous membranes are moist.       Pharynx: Oropharynx is clear.   Eyes:      General:         Right eye: No discharge.         Left eye: No discharge.      Extraocular Movements: Extraocular movements intact.      Conjunctiva/sclera: Conjunctivae normal.      Pupils: Pupils are equal, round, and reactive to light.   Cardiovascular:      Rate and Rhythm: Normal rate and regular rhythm.      Heart sounds: Murmur (Patient has known aortic stenosis) heard.   Pulmonary:      Effort: Pulmonary effort is normal.      Breath sounds: Normal breath sounds.   Abdominal:      Tenderness: There is no right CVA tenderness or left CVA tenderness.   Lymphadenopathy:      Cervical: No cervical adenopathy.   Neurological:      Mental Status: She is alert and oriented to person, place, and time.              Helene Beltran NP

## 2025-02-03 NOTE — PATIENT INSTRUCTIONS
Take antibiotic as directed.     Adequate fluids. Monitor for new or worsening symptoms. Go to the ED if symptoms worsen.     You will be treated with cephalexin 500 mg one tablet twice a day for 7 days until cultures return and will adjust as necessary. Side effects reviewed and discussed.     Follow up in primary care as previously planned on 02/11/2025

## 2025-02-04 LAB — BACTERIA UR CULT: ABNORMAL

## 2025-06-01 ENCOUNTER — HEALTH MAINTENANCE LETTER (OUTPATIENT)
Age: 89
End: 2025-06-01

## 2025-08-13 ENCOUNTER — LAB REQUISITION (OUTPATIENT)
Dept: LAB | Facility: CLINIC | Age: 89
End: 2025-08-13
Payer: COMMERCIAL

## 2025-08-13 DIAGNOSIS — R39.15 URGENCY OF URINATION: ICD-10-CM

## 2025-09-02 ENCOUNTER — APPOINTMENT (OUTPATIENT)
Dept: MRI IMAGING | Facility: CLINIC | Age: 89
End: 2025-09-02
Attending: EMERGENCY MEDICINE
Payer: COMMERCIAL

## 2025-09-02 ENCOUNTER — HOSPITAL ENCOUNTER (EMERGENCY)
Facility: CLINIC | Age: 89
Discharge: HOME | End: 2025-09-03
Attending: EMERGENCY MEDICINE | Admitting: EMERGENCY MEDICINE
Payer: COMMERCIAL

## 2025-09-02 DIAGNOSIS — M54.50 ACUTE MIDLINE LOW BACK PAIN WITHOUT SCIATICA: Primary | ICD-10-CM

## 2025-09-02 DIAGNOSIS — R15.9 INCONTINENCE OF FECES, UNSPECIFIED FECAL INCONTINENCE TYPE: ICD-10-CM

## 2025-09-02 DIAGNOSIS — M79.605 BILATERAL LEG PAIN: ICD-10-CM

## 2025-09-02 DIAGNOSIS — N17.9 AKI (ACUTE KIDNEY INJURY): ICD-10-CM

## 2025-09-02 DIAGNOSIS — M79.604 BILATERAL LEG PAIN: ICD-10-CM

## 2025-09-02 DIAGNOSIS — M54.2 ACUTE NECK PAIN: ICD-10-CM

## 2025-09-02 DIAGNOSIS — R19.7 DIARRHEA, UNSPECIFIED TYPE: ICD-10-CM

## 2025-09-02 LAB
ALBUMIN SERPL BCG-MCNC: 4.3 G/DL (ref 3.5–5.2)
ALBUMIN UR-MCNC: NEGATIVE MG/DL
ALP SERPL-CCNC: 103 U/L (ref 40–150)
ALT SERPL W P-5'-P-CCNC: 8 U/L (ref 0–50)
ANION GAP SERPL CALCULATED.3IONS-SCNC: 13 MMOL/L (ref 7–15)
APPEARANCE UR: CLEAR
AST SERPL W P-5'-P-CCNC: 18 U/L (ref 0–45)
BASOPHILS # BLD AUTO: 0.05 10E3/UL (ref 0–0.2)
BASOPHILS NFR BLD AUTO: 0.7 %
BILIRUB SERPL-MCNC: 0.2 MG/DL
BILIRUB UR QL STRIP: NEGATIVE
BUN SERPL-MCNC: 29.8 MG/DL (ref 8–23)
CALCIUM SERPL-MCNC: 9.9 MG/DL (ref 8.8–10.4)
CHLORIDE SERPL-SCNC: 103 MMOL/L (ref 98–107)
CK SERPL-CCNC: 48 U/L (ref 26–192)
COLOR UR AUTO: ABNORMAL
CREAT SERPL-MCNC: 1.1 MG/DL (ref 0.51–0.95)
EGFRCR SERPLBLD CKD-EPI 2021: 48 ML/MIN/1.73M2
EOSINOPHIL # BLD AUTO: 0.24 10E3/UL (ref 0–0.7)
EOSINOPHIL NFR BLD AUTO: 3.1 %
ERYTHROCYTE [DISTWIDTH] IN BLOOD BY AUTOMATED COUNT: 14.3 % (ref 10–15)
FLUAV RNA SPEC QL NAA+PROBE: NEGATIVE
FLUBV RNA RESP QL NAA+PROBE: NEGATIVE
GLUCOSE SERPL-MCNC: 116 MG/DL (ref 70–99)
GLUCOSE UR STRIP-MCNC: NEGATIVE MG/DL
HCO3 SERPL-SCNC: 24 MMOL/L (ref 22–29)
HCT VFR BLD AUTO: 33.9 % (ref 35–47)
HGB BLD-MCNC: 11.1 G/DL (ref 11.7–15.7)
HGB UR QL STRIP: NEGATIVE
HOLD SPECIMEN: NORMAL
HOLD SPECIMEN: NORMAL
HYALINE CASTS: 4 /LPF
IMM GRANULOCYTES # BLD: <0.03 10E3/UL
IMM GRANULOCYTES NFR BLD: 0.3 %
KETONES UR STRIP-MCNC: NEGATIVE MG/DL
LEUKOCYTE ESTERASE UR QL STRIP: ABNORMAL
LYMPHOCYTES # BLD AUTO: 1.76 10E3/UL (ref 0.8–5.3)
LYMPHOCYTES NFR BLD AUTO: 22.9 %
MCH RBC QN AUTO: 29.4 PG (ref 26.5–33)
MCHC RBC AUTO-ENTMCNC: 32.7 G/DL (ref 31.5–36.5)
MCV RBC AUTO: 89.9 FL (ref 78–100)
MONOCYTES # BLD AUTO: 0.92 10E3/UL (ref 0–1.3)
MONOCYTES NFR BLD AUTO: 12 %
MUCOUS THREADS #/AREA URNS LPF: PRESENT /LPF
NEUTROPHILS # BLD AUTO: 4.68 10E3/UL (ref 1.6–8.3)
NEUTROPHILS NFR BLD AUTO: 61 %
NITRATE UR QL: NEGATIVE
NRBC # BLD AUTO: <0.03 10E3/UL
NRBC BLD AUTO-RTO: 0 /100
PH UR STRIP: 5.5 [PH] (ref 5–7)
PLATELET # BLD AUTO: 288 10E3/UL (ref 150–450)
POTASSIUM SERPL-SCNC: 4.7 MMOL/L (ref 3.4–5.3)
PROT SERPL-MCNC: 7.5 G/DL (ref 6.4–8.3)
RBC # BLD AUTO: 3.77 10E6/UL (ref 3.8–5.2)
RBC URINE: 1 /HPF
RSV RNA SPEC NAA+PROBE: NEGATIVE
SARS-COV-2 RNA RESP QL NAA+PROBE: NEGATIVE
SODIUM SERPL-SCNC: 140 MMOL/L (ref 135–145)
SP GR UR STRIP: 1.01 (ref 1–1.03)
SQUAMOUS EPITHELIAL: <1 /HPF
UROBILINOGEN UR STRIP-MCNC: NORMAL MG/DL
WBC # BLD AUTO: 7.67 10E3/UL (ref 4–11)
WBC URINE: 27 /HPF

## 2025-09-02 PROCEDURE — 81003 URINALYSIS AUTO W/O SCOPE: CPT | Performed by: EMERGENCY MEDICINE

## 2025-09-02 PROCEDURE — 99284 EMERGENCY DEPT VISIT MOD MDM: CPT | Mod: 25

## 2025-09-02 PROCEDURE — 72148 MRI LUMBAR SPINE W/O DYE: CPT

## 2025-09-02 PROCEDURE — 258N000003 HC RX IP 258 OP 636: Performed by: EMERGENCY MEDICINE

## 2025-09-02 PROCEDURE — 87086 URINE CULTURE/COLONY COUNT: CPT | Performed by: EMERGENCY MEDICINE

## 2025-09-02 PROCEDURE — 85048 AUTOMATED LEUKOCYTE COUNT: CPT | Performed by: EMERGENCY MEDICINE

## 2025-09-02 PROCEDURE — 87637 SARSCOV2&INF A&B&RSV AMP PRB: CPT | Performed by: EMERGENCY MEDICINE

## 2025-09-02 PROCEDURE — 36415 COLL VENOUS BLD VENIPUNCTURE: CPT | Performed by: EMERGENCY MEDICINE

## 2025-09-02 PROCEDURE — 82550 ASSAY OF CK (CPK): CPT | Performed by: EMERGENCY MEDICINE

## 2025-09-02 PROCEDURE — 96360 HYDRATION IV INFUSION INIT: CPT

## 2025-09-02 PROCEDURE — 82040 ASSAY OF SERUM ALBUMIN: CPT | Performed by: EMERGENCY MEDICINE

## 2025-09-02 PROCEDURE — 250N000013 HC RX MED GY IP 250 OP 250 PS 637: Performed by: EMERGENCY MEDICINE

## 2025-09-02 PROCEDURE — 99291 CRITICAL CARE FIRST HOUR: CPT | Mod: 25 | Performed by: EMERGENCY MEDICINE

## 2025-09-02 RX ORDER — ACETAMINOPHEN 325 MG/1
975 TABLET ORAL ONCE
Status: COMPLETED | OUTPATIENT
Start: 2025-09-02 | End: 2025-09-02

## 2025-09-02 RX ADMIN — ACETAMINOPHEN 975 MG: 325 TABLET ORAL at 22:14

## 2025-09-02 RX ADMIN — SODIUM CHLORIDE 1000 ML: 0.9 INJECTION, SOLUTION INTRAVENOUS at 23:13

## 2025-09-02 ASSESSMENT — COLUMBIA-SUICIDE SEVERITY RATING SCALE - C-SSRS
1. IN THE PAST MONTH, HAVE YOU WISHED YOU WERE DEAD OR WISHED YOU COULD GO TO SLEEP AND NOT WAKE UP?: NO
2. HAVE YOU ACTUALLY HAD ANY THOUGHTS OF KILLING YOURSELF IN THE PAST MONTH?: NO
6. HAVE YOU EVER DONE ANYTHING, STARTED TO DO ANYTHING, OR PREPARED TO DO ANYTHING TO END YOUR LIFE?: NO

## 2025-09-02 ASSESSMENT — ACTIVITIES OF DAILY LIVING (ADL)
ADLS_ACUITY_SCORE: 56

## 2025-09-03 VITALS
HEART RATE: 72 BPM | BODY MASS INDEX: 27.3 KG/M2 | HEIGHT: 62 IN | DIASTOLIC BLOOD PRESSURE: 79 MMHG | OXYGEN SATURATION: 98 % | SYSTOLIC BLOOD PRESSURE: 182 MMHG | WEIGHT: 148.37 LBS | RESPIRATION RATE: 20 BRPM | TEMPERATURE: 98.9 F

## 2025-09-03 PROCEDURE — 96361 HYDRATE IV INFUSION ADD-ON: CPT

## 2025-09-03 ASSESSMENT — ACTIVITIES OF DAILY LIVING (ADL): ADLS_ACUITY_SCORE: 56

## 2025-09-04 LAB — BACTERIA UR CULT: NORMAL

## (undated) DEVICE — CATH ANGIO INFINITI 3DRC 4FRX100CM 538476

## (undated) DEVICE — MEDITRACE MULTIFUNTION ADULT RADIOTRANSPARENT ELECTRODE FOR ZOLL

## (undated) DEVICE — CATH DIAG 4FR JL 4.5 538417

## (undated) DEVICE — MANIFOLD KIT ANGIO AUTOMATED 014613

## (undated) DEVICE — KIT HAND CONTROL ANGIOTOUCH ACIST 65CM AT-P65

## (undated) DEVICE — TOTE ANGIO CORP PC15AT SAN32CC83O

## (undated) DEVICE — CATH DIAG 4FR ANG PIG 538453S

## (undated) DEVICE — INTRO SHEATH 4FRX10CM PINNACLE RSS402

## (undated) DEVICE — NDL PERC ENTRY THINWALL 18GA 7.0" G00166

## (undated) RX ORDER — HEPARIN SODIUM 200 [USP'U]/100ML
INJECTION, SOLUTION INTRAVENOUS
Status: DISPENSED
Start: 2021-01-05

## (undated) RX ORDER — LIDOCAINE HYDROCHLORIDE 10 MG/ML
INJECTION, SOLUTION EPIDURAL; INFILTRATION; INTRACAUDAL; PERINEURAL
Status: DISPENSED
Start: 2021-01-05

## (undated) RX ORDER — HEPARIN SODIUM 1000 [USP'U]/ML
INJECTION, SOLUTION INTRAVENOUS; SUBCUTANEOUS
Status: DISPENSED
Start: 2021-01-05

## (undated) RX ORDER — FENTANYL CITRATE 50 UG/ML
INJECTION, SOLUTION INTRAMUSCULAR; INTRAVENOUS
Status: DISPENSED
Start: 2021-01-05

## (undated) RX ORDER — REGADENOSON 0.08 MG/ML
INJECTION, SOLUTION INTRAVENOUS
Status: DISPENSED
Start: 2021-01-04

## (undated) RX ORDER — LIDOCAINE HYDROCHLORIDE 20 MG/ML
JELLY TOPICAL
Status: DISPENSED
Start: 2023-10-20